# Patient Record
Sex: MALE | Race: WHITE | Employment: OTHER | URBAN - METROPOLITAN AREA
[De-identification: names, ages, dates, MRNs, and addresses within clinical notes are randomized per-mention and may not be internally consistent; named-entity substitution may affect disease eponyms.]

---

## 2017-01-05 ENCOUNTER — CLINICAL DOCUMENTATION (OUTPATIENT)
Dept: PHYSICAL THERAPY | Age: 62
End: 2017-01-05

## 2017-01-10 ENCOUNTER — CLINICAL DOCUMENTATION (OUTPATIENT)
Dept: PHYSICAL THERAPY | Age: 62
End: 2017-01-10

## 2017-01-17 ENCOUNTER — HOSPITAL ENCOUNTER (OUTPATIENT)
Dept: PHYSICAL THERAPY | Age: 62
Setting detail: THERAPIES SERIES
Discharge: HOME | End: 2017-01-17
Payer: COMMERCIAL

## 2017-01-17 PROCEDURE — 97163 PT EVAL HIGH COMPLEX 45 MIN: CPT

## 2017-01-17 ASSESSMENT — PAIN DESCRIPTION - LOCATION: LOCATION: HEAD

## 2017-01-17 ASSESSMENT — PAIN SCALES - GENERAL: PAINLEVEL_OUTOF10: 5

## 2017-01-17 ASSESSMENT — PAIN DESCRIPTION - FREQUENCY: FREQUENCY: INTERMITTENT

## 2017-01-17 ASSESSMENT — PAIN DESCRIPTION - DESCRIPTORS: DESCRIPTORS: ACHING

## 2017-01-24 ENCOUNTER — HOSPITAL ENCOUNTER (OUTPATIENT)
Dept: NUCLEAR MEDICINE | Age: 62
Discharge: HOME | End: 2017-01-24
Payer: COMMERCIAL

## 2017-01-24 ENCOUNTER — HOSPITAL ENCOUNTER (OUTPATIENT)
Dept: NON INVASIVE DIAGNOSTICS | Age: 62
Discharge: HOME | End: 2017-01-24
Payer: COMMERCIAL

## 2017-01-24 VITALS — SYSTOLIC BLOOD PRESSURE: 160 MMHG | DIASTOLIC BLOOD PRESSURE: 85 MMHG

## 2017-01-24 DIAGNOSIS — Z86.79 HISTORY OF HEART FAILURE: ICD-10-CM

## 2017-01-24 DIAGNOSIS — I25.10 CORONARY ARTERY DISEASE INVOLVING NATIVE CORONARY ARTERY OF NATIVE HEART WITHOUT ANGINA PECTORIS: Chronic | ICD-10-CM

## 2017-01-24 DIAGNOSIS — I10 ESSENTIAL HYPERTENSION: Chronic | ICD-10-CM

## 2017-01-24 DIAGNOSIS — Z95.5 STENTED CORONARY ARTERY: Chronic | ICD-10-CM

## 2017-01-24 PROCEDURE — 93017 CV STRESS TEST TRACING ONLY: CPT

## 2017-01-24 PROCEDURE — 2580000003 HC RX 258: Performed by: INTERNAL MEDICINE

## 2017-01-24 PROCEDURE — 6360000002 HC RX W HCPCS: Performed by: INTERNAL MEDICINE

## 2017-01-24 PROCEDURE — A9500 TC99M SESTAMIBI: HCPCS | Performed by: INTERNAL MEDICINE

## 2017-01-24 PROCEDURE — 3430000000 HC RX DIAGNOSTIC RADIOPHARMACEUTICAL: Performed by: INTERNAL MEDICINE

## 2017-01-24 PROCEDURE — 93306 TTE W/DOPPLER COMPLETE: CPT

## 2017-01-24 PROCEDURE — 78452 HT MUSCLE IMAGE SPECT MULT: CPT

## 2017-01-24 RX ORDER — SODIUM CHLORIDE 0.9 % (FLUSH) 0.9 %
10 SYRINGE (ML) INJECTION 2 TIMES DAILY
Status: DISCONTINUED | OUTPATIENT
Start: 2017-01-24 | End: 2017-01-27 | Stop reason: HOSPADM

## 2017-01-24 RX ADMIN — Medication 10 ML: at 11:20

## 2017-01-24 RX ADMIN — REGADENOSON 0.4 MG: 0.08 INJECTION, SOLUTION INTRAVENOUS at 11:20

## 2017-01-24 RX ADMIN — TETRAKIS(2-METHOXYISOBUTYLISOCYANIDE)COPPER(I) TETRAFLUOROBORATE 10 MILLICURIE: 1 INJECTION, POWDER, LYOPHILIZED, FOR SOLUTION INTRAVENOUS at 08:55

## 2017-01-24 RX ADMIN — Medication 10 ML: at 08:55

## 2017-01-24 RX ADMIN — TETRAKIS(2-METHOXYISOBUTYLISOCYANIDE)COPPER(I) TETRAFLUOROBORATE 30 MILLICURIE: 1 INJECTION, POWDER, LYOPHILIZED, FOR SOLUTION INTRAVENOUS at 11:20

## 2017-01-25 ENCOUNTER — HOSPITAL ENCOUNTER (OUTPATIENT)
Dept: PHYSICAL THERAPY | Age: 62
Setting detail: THERAPIES SERIES
Discharge: HOME | End: 2017-01-25
Payer: COMMERCIAL

## 2017-01-25 PROCEDURE — 97112 NEUROMUSCULAR REEDUCATION: CPT

## 2017-01-31 ENCOUNTER — TELEPHONE (OUTPATIENT)
Dept: CARDIOLOGY | Age: 62
End: 2017-01-31

## 2017-02-01 ENCOUNTER — HOSPITAL ENCOUNTER (OUTPATIENT)
Dept: PHYSICAL THERAPY | Age: 62
Setting detail: THERAPIES SERIES
Discharge: HOME | End: 2017-02-01
Payer: COMMERCIAL

## 2017-02-01 ENCOUNTER — TELEPHONE (OUTPATIENT)
Dept: FAMILY MEDICINE CLINIC | Age: 62
End: 2017-02-01

## 2017-02-01 PROCEDURE — 97112 NEUROMUSCULAR REEDUCATION: CPT

## 2017-02-08 ENCOUNTER — HOSPITAL ENCOUNTER (OUTPATIENT)
Dept: PHYSICAL THERAPY | Age: 62
Setting detail: THERAPIES SERIES
Discharge: HOME | End: 2017-02-08
Payer: COMMERCIAL

## 2017-02-08 PROCEDURE — 97112 NEUROMUSCULAR REEDUCATION: CPT

## 2017-02-10 ENCOUNTER — TELEPHONE (OUTPATIENT)
Dept: FAMILY MEDICINE CLINIC | Age: 62
End: 2017-02-10

## 2017-02-10 DIAGNOSIS — E11.9 TYPE 2 DIABETES MELLITUS WITHOUT COMPLICATION, WITHOUT LONG-TERM CURRENT USE OF INSULIN (HCC): Primary | Chronic | ICD-10-CM

## 2017-02-13 ENCOUNTER — HOSPITAL ENCOUNTER (OUTPATIENT)
Dept: LAB | Age: 62
Discharge: HOME | End: 2017-02-13
Payer: COMMERCIAL

## 2017-02-13 DIAGNOSIS — E11.9 TYPE 2 DIABETES MELLITUS WITHOUT COMPLICATION, WITHOUT LONG-TERM CURRENT USE OF INSULIN (HCC): Chronic | ICD-10-CM

## 2017-02-13 LAB
ANION GAP SERPL CALCULATED.3IONS-SCNC: 16 MEQ/L (ref 7–13)
BUN BLDV-MCNC: 12 MG/DL (ref 8–23)
CALCIUM SERPL-MCNC: 9.7 MG/DL (ref 8.6–10.2)
CHLORIDE BLD-SCNC: 96 MEQ/L (ref 98–107)
CO2: 28 MEQ/L (ref 22–29)
CREAT SERPL-MCNC: 0.87 MG/DL (ref 0.7–1.2)
GFR AFRICAN AMERICAN: >60
GFR NON-AFRICAN AMERICAN: >60
GLUCOSE BLD-MCNC: 98 MG/DL (ref 74–109)
HBA1C MFR BLD: 5.7 % (ref 4.8–5.9)
POTASSIUM SERPL-SCNC: 3.5 MEQ/L (ref 3.5–5.1)
SODIUM BLD-SCNC: 140 MEQ/L (ref 132–144)

## 2017-02-13 PROCEDURE — 83036 HEMOGLOBIN GLYCOSYLATED A1C: CPT

## 2017-02-13 PROCEDURE — 36415 COLL VENOUS BLD VENIPUNCTURE: CPT

## 2017-02-13 PROCEDURE — 80048 BASIC METABOLIC PNL TOTAL CA: CPT

## 2017-02-15 ENCOUNTER — OFFICE VISIT (OUTPATIENT)
Dept: CARDIOLOGY | Age: 62
End: 2017-02-15

## 2017-02-15 ENCOUNTER — HOSPITAL ENCOUNTER (OUTPATIENT)
Dept: PHYSICAL THERAPY | Age: 62
Setting detail: THERAPIES SERIES
Discharge: HOME | End: 2017-02-15
Payer: COMMERCIAL

## 2017-02-15 VITALS
SYSTOLIC BLOOD PRESSURE: 122 MMHG | HEIGHT: 72 IN | BODY MASS INDEX: 35.03 KG/M2 | HEART RATE: 88 BPM | OXYGEN SATURATION: 97 % | WEIGHT: 258.6 LBS | DIASTOLIC BLOOD PRESSURE: 68 MMHG | RESPIRATION RATE: 16 BRPM

## 2017-02-15 DIAGNOSIS — Z86.79 HISTORY OF HEART FAILURE: Chronic | ICD-10-CM

## 2017-02-15 DIAGNOSIS — Z98.61 CAD S/P PERCUTANEOUS CORONARY ANGIOPLASTY: Primary | ICD-10-CM

## 2017-02-15 DIAGNOSIS — I10 ESSENTIAL HYPERTENSION: Chronic | ICD-10-CM

## 2017-02-15 DIAGNOSIS — Z86.73 HISTORY OF CVA (CEREBROVASCULAR ACCIDENT): ICD-10-CM

## 2017-02-15 DIAGNOSIS — I25.10 CAD S/P PERCUTANEOUS CORONARY ANGIOPLASTY: Primary | ICD-10-CM

## 2017-02-15 DIAGNOSIS — I25.2 HISTORY OF MYOCARDIAL INFARCTION: Chronic | ICD-10-CM

## 2017-02-15 PROCEDURE — G8427 DOCREV CUR MEDS BY ELIG CLIN: HCPCS | Performed by: INTERNAL MEDICINE

## 2017-02-15 PROCEDURE — 99213 OFFICE O/P EST LOW 20 MIN: CPT | Performed by: INTERNAL MEDICINE

## 2017-02-15 PROCEDURE — G8598 ASA/ANTIPLAT THER USED: HCPCS | Performed by: INTERNAL MEDICINE

## 2017-02-15 PROCEDURE — 3017F COLORECTAL CA SCREEN DOC REV: CPT | Performed by: INTERNAL MEDICINE

## 2017-02-15 PROCEDURE — G8484 FLU IMMUNIZE NO ADMIN: HCPCS | Performed by: INTERNAL MEDICINE

## 2017-02-15 PROCEDURE — 1036F TOBACCO NON-USER: CPT | Performed by: INTERNAL MEDICINE

## 2017-02-15 PROCEDURE — 97112 NEUROMUSCULAR REEDUCATION: CPT

## 2017-02-15 PROCEDURE — G8417 CALC BMI ABV UP PARAM F/U: HCPCS | Performed by: INTERNAL MEDICINE

## 2017-02-15 ASSESSMENT — ENCOUNTER SYMPTOMS
CHEST TIGHTNESS: 0
SHORTNESS OF BREATH: 0

## 2017-02-16 ENCOUNTER — OFFICE VISIT (OUTPATIENT)
Dept: FAMILY MEDICINE CLINIC | Age: 62
End: 2017-02-16

## 2017-02-16 VITALS
OXYGEN SATURATION: 99 % | DIASTOLIC BLOOD PRESSURE: 72 MMHG | SYSTOLIC BLOOD PRESSURE: 130 MMHG | BODY MASS INDEX: 32.97 KG/M2 | WEIGHT: 243.4 LBS | TEMPERATURE: 98.5 F | RESPIRATION RATE: 18 BRPM | HEART RATE: 91 BPM | HEIGHT: 72 IN

## 2017-02-16 DIAGNOSIS — Z86.73 HISTORY OF CVA (CEREBROVASCULAR ACCIDENT): Chronic | ICD-10-CM

## 2017-02-16 DIAGNOSIS — Z98.61 CAD S/P PERCUTANEOUS CORONARY ANGIOPLASTY: Chronic | ICD-10-CM

## 2017-02-16 DIAGNOSIS — I25.10 CAD S/P PERCUTANEOUS CORONARY ANGIOPLASTY: Chronic | ICD-10-CM

## 2017-02-16 DIAGNOSIS — M10.9 GOUT OF MULTIPLE SITES, UNSPECIFIED CAUSE, UNSPECIFIED CHRONICITY: Primary | Chronic | ICD-10-CM

## 2017-02-16 DIAGNOSIS — E11.9 TYPE 2 DIABETES MELLITUS WITHOUT COMPLICATION, WITHOUT LONG-TERM CURRENT USE OF INSULIN (HCC): Chronic | ICD-10-CM
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
## 2017-02-16 DIAGNOSIS — I10 ESSENTIAL HYPERTENSION: Chronic | ICD-10-CM

## 2017-02-16 DIAGNOSIS — M10.9 ACUTE GOUT INVOLVING TOE OF LEFT FOOT, UNSPECIFIED CAUSE: ICD-10-CM
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
## 2017-02-16 DIAGNOSIS — S09.90XA HEAD INJURY, ACUTE, WITHOUT LOSS OF CONSCIOUSNESS, INITIAL ENCOUNTER: ICD-10-CM

## 2017-02-16 PROCEDURE — G8484 FLU IMMUNIZE NO ADMIN: HCPCS | Performed by: FAMILY MEDICINE

## 2017-02-16 PROCEDURE — 3017F COLORECTAL CA SCREEN DOC REV: CPT | Performed by: FAMILY MEDICINE

## 2017-02-16 PROCEDURE — 99214 OFFICE O/P EST MOD 30 MIN: CPT | Performed by: FAMILY MEDICINE

## 2017-02-16 PROCEDURE — G8427 DOCREV CUR MEDS BY ELIG CLIN: HCPCS | Performed by: FAMILY MEDICINE

## 2017-02-16 PROCEDURE — G8417 CALC BMI ABV UP PARAM F/U: HCPCS | Performed by: FAMILY MEDICINE

## 2017-02-16 PROCEDURE — 1036F TOBACCO NON-USER: CPT | Performed by: FAMILY MEDICINE

## 2017-02-16 PROCEDURE — G8598 ASA/ANTIPLAT THER USED: HCPCS | Performed by: FAMILY MEDICINE

## 2017-02-16 PROCEDURE — 3044F HG A1C LEVEL LT 7.0%: CPT | Performed by: FAMILY MEDICINE

## 2017-02-16 RX ORDER — ALLOPURINOL 100 MG/1
TABLET ORAL
Qty: 60 TABLET | Refills: 3 | Status: SHIPPED | OUTPATIENT
Start: 2017-02-16 | End: 2017-09-14 | Stop reason: DRUGHIGH

## 2017-02-16 RX ORDER — INDOMETHACIN 50 MG/1
50 CAPSULE ORAL 3 TIMES DAILY
Qty: 42 CAPSULE | Refills: 0 | Status: SHIPPED | OUTPATIENT
Start: 2017-02-16 | End: 2017-11-27

## 2017-02-16 RX ORDER — METHYLPREDNISOLONE 4 MG/1
TABLET ORAL
Qty: 21 TABLET | Refills: 0 | Status: SHIPPED | OUTPATIENT
Start: 2017-02-16 | End: 2017-02-16

## 2017-02-16 ASSESSMENT — ENCOUNTER SYMPTOMS
VOMITING: 0
DIARRHEA: 0
SHORTNESS OF BREATH: 0
ROS SKIN COMMENTS: SEE HPI
NAUSEA: 0
ALLERGIC REACTION: 1
ABDOMINAL PAIN: 0
CHEST TIGHTNESS: 0
WHEEZING: 0
COUGH: 0

## 2017-02-16 ASSESSMENT — PATIENT HEALTH QUESTIONNAIRE - PHQ9
SUM OF ALL RESPONSES TO PHQ9 QUESTIONS 1 & 2: 0
1. LITTLE INTEREST OR PLEASURE IN DOING THINGS: 0
SUM OF ALL RESPONSES TO PHQ QUESTIONS 1-9: 0
2. FEELING DOWN, DEPRESSED OR HOPELESS: 0

## 2017-03-01 ENCOUNTER — HOSPITAL ENCOUNTER (OUTPATIENT)
Dept: PHYSICAL THERAPY | Age: 62
Setting detail: THERAPIES SERIES
Discharge: HOME | End: 2017-03-01
Payer: COMMERCIAL

## 2017-03-01 PROCEDURE — 97112 NEUROMUSCULAR REEDUCATION: CPT

## 2017-03-01 PROCEDURE — 97116 GAIT TRAINING THERAPY: CPT

## 2017-03-21 ENCOUNTER — TELEPHONE (OUTPATIENT)
Dept: FAMILY MEDICINE CLINIC | Age: 62
End: 2017-03-21

## 2017-03-22 ENCOUNTER — HOSPITAL ENCOUNTER (OUTPATIENT)
Dept: PHYSICAL THERAPY | Age: 62
Setting detail: THERAPIES SERIES
Discharge: HOME | End: 2017-03-22
Payer: COMMERCIAL

## 2017-03-22 PROCEDURE — 97112 NEUROMUSCULAR REEDUCATION: CPT

## 2017-03-22 ASSESSMENT — PAIN DESCRIPTION - LOCATION: LOCATION: KNEE

## 2017-03-22 ASSESSMENT — PAIN DESCRIPTION - DESCRIPTORS: DESCRIPTORS: ACHING;SPASM

## 2017-03-22 ASSESSMENT — PAIN SCALES - GENERAL: PAINLEVEL_OUTOF10: 5

## 2017-03-22 ASSESSMENT — PAIN DESCRIPTION - ORIENTATION: ORIENTATION: RIGHT

## 2017-03-29 ENCOUNTER — HOSPITAL ENCOUNTER (OUTPATIENT)
Dept: PHYSICAL THERAPY | Age: 62
Setting detail: THERAPIES SERIES
Discharge: HOME | End: 2017-03-29
Payer: COMMERCIAL

## 2017-03-29 PROCEDURE — 97112 NEUROMUSCULAR REEDUCATION: CPT

## 2017-04-12 ENCOUNTER — HOSPITAL ENCOUNTER (OUTPATIENT)
Dept: PHYSICAL THERAPY | Age: 62
Setting detail: THERAPIES SERIES
Discharge: HOME | End: 2017-04-12
Payer: COMMERCIAL

## 2017-04-12 PROCEDURE — 97112 NEUROMUSCULAR REEDUCATION: CPT

## 2017-04-12 PROCEDURE — 97116 GAIT TRAINING THERAPY: CPT

## 2017-05-05 ENCOUNTER — TELEPHONE (OUTPATIENT)
Dept: FAMILY MEDICINE CLINIC | Age: 62
End: 2017-05-05

## 2017-05-05 DIAGNOSIS — E11.9 TYPE 2 DIABETES MELLITUS WITHOUT COMPLICATION, WITHOUT LONG-TERM CURRENT USE OF INSULIN (HCC): Primary | Chronic | ICD-10-CM

## 2017-05-05 DIAGNOSIS — Z98.61 CAD S/P PERCUTANEOUS CORONARY ANGIOPLASTY: Chronic | ICD-10-CM

## 2017-05-05 DIAGNOSIS — I25.10 CAD S/P PERCUTANEOUS CORONARY ANGIOPLASTY: Chronic | ICD-10-CM

## 2017-05-09 ENCOUNTER — HOSPITAL ENCOUNTER (OUTPATIENT)
Dept: LAB | Age: 62
Discharge: HOME | End: 2017-05-09
Payer: COMMERCIAL

## 2017-05-09 DIAGNOSIS — I25.10 CAD S/P PERCUTANEOUS CORONARY ANGIOPLASTY: Chronic | ICD-10-CM

## 2017-05-09 DIAGNOSIS — Z98.61 CAD S/P PERCUTANEOUS CORONARY ANGIOPLASTY: Chronic | ICD-10-CM

## 2017-05-09 DIAGNOSIS — E11.9 TYPE 2 DIABETES MELLITUS WITHOUT COMPLICATION, WITHOUT LONG-TERM CURRENT USE OF INSULIN (HCC): Chronic | ICD-10-CM

## 2017-05-09 LAB
ALBUMIN SERPL-MCNC: 4.2 G/DL (ref 3.9–4.9)
ALP BLD-CCNC: 50 U/L (ref 35–104)
ALT SERPL-CCNC: 38 U/L (ref 0–41)
ANION GAP SERPL CALCULATED.3IONS-SCNC: 12 MEQ/L (ref 7–13)
AST SERPL-CCNC: 35 U/L (ref 0–40)
BILIRUB SERPL-MCNC: 0.8 MG/DL (ref 0–1.2)
BUN BLDV-MCNC: 14 MG/DL (ref 8–23)
CALCIUM SERPL-MCNC: 9.4 MG/DL (ref 8.6–10.2)
CHLORIDE BLD-SCNC: 96 MEQ/L (ref 98–107)
CHOLESTEROL, TOTAL: 139 MG/DL (ref 0–199)
CO2: 31 MEQ/L (ref 22–29)
CREAT SERPL-MCNC: 0.84 MG/DL (ref 0.7–1.2)
GFR AFRICAN AMERICAN: >60
GFR NON-AFRICAN AMERICAN: >60
GLOBULIN: 2.6 G/DL (ref 2.3–3.5)
GLUCOSE BLD-MCNC: 134 MG/DL (ref 74–109)
HBA1C MFR BLD: 5.5 % (ref 4.8–5.9)
HDLC SERPL-MCNC: 57 MG/DL (ref 40–59)
LDL CHOLESTEROL CALCULATED: 61 MG/DL (ref 0–129)
POTASSIUM SERPL-SCNC: 3.8 MEQ/L (ref 3.5–5.1)
SODIUM BLD-SCNC: 139 MEQ/L (ref 132–144)
TOTAL PROTEIN: 6.8 G/DL (ref 6.4–8.1)
TRIGL SERPL-MCNC: 107 MG/DL (ref 0–200)

## 2017-05-09 PROCEDURE — 83036 HEMOGLOBIN GLYCOSYLATED A1C: CPT

## 2017-05-09 PROCEDURE — 80053 COMPREHEN METABOLIC PANEL: CPT

## 2017-05-09 PROCEDURE — 80061 LIPID PANEL: CPT

## 2017-05-09 PROCEDURE — 36415 COLL VENOUS BLD VENIPUNCTURE: CPT

## 2017-05-12 RX ORDER — SIMVASTATIN 40 MG
TABLET ORAL
Qty: 90 TABLET | Refills: 3 | Status: SHIPPED | OUTPATIENT
Start: 2017-05-12 | End: 2017-09-09 | Stop reason: ALTCHOICE

## 2017-05-15 ENCOUNTER — OFFICE VISIT (OUTPATIENT)
Dept: FAMILY MEDICINE CLINIC | Age: 62
End: 2017-05-15

## 2017-05-15 VITALS
HEART RATE: 74 BPM | RESPIRATION RATE: 18 BRPM | WEIGHT: 258 LBS | DIASTOLIC BLOOD PRESSURE: 80 MMHG | BODY MASS INDEX: 34.95 KG/M2 | TEMPERATURE: 97.8 F | HEIGHT: 72 IN | SYSTOLIC BLOOD PRESSURE: 136 MMHG

## 2017-05-15 DIAGNOSIS — I25.10 CAD S/P PERCUTANEOUS CORONARY ANGIOPLASTY: Chronic | ICD-10-CM

## 2017-05-15 DIAGNOSIS — E11.8 TYPE 2 DIABETES MELLITUS WITH COMPLICATION, WITHOUT LONG-TERM CURRENT USE OF INSULIN (HCC): Primary | Chronic | ICD-10-CM

## 2017-05-15 DIAGNOSIS — R25.1 TREMOR OF RIGHT HAND: ICD-10-CM

## 2017-05-15 DIAGNOSIS — Z86.73 HISTORY OF CVA (CEREBROVASCULAR ACCIDENT): Chronic | ICD-10-CM

## 2017-05-15 DIAGNOSIS — M10.9 GOUT OF MULTIPLE SITES, UNSPECIFIED CAUSE, UNSPECIFIED CHRONICITY: Chronic | ICD-10-CM

## 2017-05-15 DIAGNOSIS — G47.33 OSA (OBSTRUCTIVE SLEEP APNEA): Chronic | ICD-10-CM

## 2017-05-15 DIAGNOSIS — I10 ESSENTIAL HYPERTENSION: Chronic | ICD-10-CM

## 2017-05-15 DIAGNOSIS — D75.89 MACROCYTOSIS WITHOUT ANEMIA: Chronic | ICD-10-CM

## 2017-05-15 DIAGNOSIS — M62.81 RIGHT-SIDED MUSCLE WEAKNESS: Chronic | ICD-10-CM

## 2017-05-15 DIAGNOSIS — Z98.61 CAD S/P PERCUTANEOUS CORONARY ANGIOPLASTY: Chronic | ICD-10-CM

## 2017-05-15 DIAGNOSIS — H53.2 DIPLOPIA: ICD-10-CM

## 2017-05-15 DIAGNOSIS — R26.81 UNSTEADY GAIT: ICD-10-CM

## 2017-05-15 PROCEDURE — G8598 ASA/ANTIPLAT THER USED: HCPCS | Performed by: FAMILY MEDICINE

## 2017-05-15 PROCEDURE — G8427 DOCREV CUR MEDS BY ELIG CLIN: HCPCS | Performed by: FAMILY MEDICINE

## 2017-05-15 PROCEDURE — G8417 CALC BMI ABV UP PARAM F/U: HCPCS | Performed by: FAMILY MEDICINE

## 2017-05-15 PROCEDURE — 3017F COLORECTAL CA SCREEN DOC REV: CPT | Performed by: FAMILY MEDICINE

## 2017-05-15 PROCEDURE — 1036F TOBACCO NON-USER: CPT | Performed by: FAMILY MEDICINE

## 2017-05-15 PROCEDURE — 3044F HG A1C LEVEL LT 7.0%: CPT | Performed by: FAMILY MEDICINE

## 2017-05-15 PROCEDURE — 99214 OFFICE O/P EST MOD 30 MIN: CPT | Performed by: FAMILY MEDICINE

## 2017-05-15 ASSESSMENT — ENCOUNTER SYMPTOMS
CHEST TIGHTNESS: 0
COUGH: 0
ABDOMINAL PAIN: 0
WHEEZING: 0
DIARRHEA: 0
SHORTNESS OF BREATH: 0
NAUSEA: 0
VOMITING: 0

## 2017-05-16 ENCOUNTER — OFFICE VISIT (OUTPATIENT)
Dept: PULMONOLOGY | Age: 62
End: 2017-05-16

## 2017-05-16 VITALS
HEIGHT: 72 IN | WEIGHT: 258 LBS | RESPIRATION RATE: 16 BRPM | OXYGEN SATURATION: 98 % | DIASTOLIC BLOOD PRESSURE: 86 MMHG | TEMPERATURE: 97.8 F | SYSTOLIC BLOOD PRESSURE: 124 MMHG | BODY MASS INDEX: 34.95 KG/M2 | HEART RATE: 88 BPM

## 2017-05-16 DIAGNOSIS — I63.9 CEREBROVASCULAR ACCIDENT (CVA), UNSPECIFIED MECHANISM (HCC): ICD-10-CM

## 2017-05-16 DIAGNOSIS — G47.33 SLEEP APNEA, OBSTRUCTIVE: Primary | ICD-10-CM

## 2017-05-16 PROCEDURE — G8598 ASA/ANTIPLAT THER USED: HCPCS | Performed by: INTERNAL MEDICINE

## 2017-05-16 PROCEDURE — G8427 DOCREV CUR MEDS BY ELIG CLIN: HCPCS | Performed by: INTERNAL MEDICINE

## 2017-05-16 PROCEDURE — G8417 CALC BMI ABV UP PARAM F/U: HCPCS | Performed by: INTERNAL MEDICINE

## 2017-05-16 PROCEDURE — 99213 OFFICE O/P EST LOW 20 MIN: CPT | Performed by: INTERNAL MEDICINE

## 2017-05-16 PROCEDURE — 3017F COLORECTAL CA SCREEN DOC REV: CPT | Performed by: INTERNAL MEDICINE

## 2017-05-16 PROCEDURE — 1036F TOBACCO NON-USER: CPT | Performed by: INTERNAL MEDICINE

## 2017-05-16 ASSESSMENT — ENCOUNTER SYMPTOMS
SINUS PRESSURE: 0
ABDOMINAL PAIN: 0
SORE THROAT: 0
COUGH: 0
SHORTNESS OF BREATH: 0
DIARRHEA: 0
CHEST TIGHTNESS: 0
NAUSEA: 0
VOMITING: 0
RHINORRHEA: 0
WHEEZING: 0

## 2017-06-05 DIAGNOSIS — I10 ESSENTIAL HYPERTENSION: Chronic | ICD-10-CM

## 2017-06-05 RX ORDER — CARVEDILOL 25 MG/1
25 TABLET ORAL 2 TIMES DAILY
Qty: 180 TABLET | Refills: 3 | Status: SHIPPED | OUTPATIENT
Start: 2017-06-05 | End: 2018-07-16 | Stop reason: SDUPTHER

## 2017-06-14 ENCOUNTER — HOSPITAL ENCOUNTER (OUTPATIENT)
Dept: LAB | Age: 62
Discharge: HOME | End: 2017-06-14
Payer: COMMERCIAL

## 2017-06-14 LAB — URIC ACID, SERUM: 6.1 MG/DL (ref 3.4–7)

## 2017-06-14 PROCEDURE — 36415 COLL VENOUS BLD VENIPUNCTURE: CPT

## 2017-06-14 PROCEDURE — 84550 ASSAY OF BLOOD/URIC ACID: CPT

## 2017-06-21 ENCOUNTER — CLINICAL DOCUMENTATION (OUTPATIENT)
Dept: PHYSICAL THERAPY | Age: 62
End: 2017-06-21

## 2017-06-28 ENCOUNTER — OFFICE VISIT (OUTPATIENT)
Dept: CARDIOLOGY | Age: 62
End: 2017-06-28

## 2017-06-28 VITALS
WEIGHT: 258 LBS | DIASTOLIC BLOOD PRESSURE: 80 MMHG | BODY MASS INDEX: 34.95 KG/M2 | SYSTOLIC BLOOD PRESSURE: 130 MMHG | HEIGHT: 72 IN | HEART RATE: 106 BPM | OXYGEN SATURATION: 95 %

## 2017-06-28 DIAGNOSIS — Z86.73 HISTORY OF CVA (CEREBROVASCULAR ACCIDENT): Chronic | ICD-10-CM

## 2017-06-28 DIAGNOSIS — I25.10 CAD S/P PERCUTANEOUS CORONARY ANGIOPLASTY: Primary | Chronic | ICD-10-CM

## 2017-06-28 DIAGNOSIS — I25.2 HISTORY OF MYOCARDIAL INFARCTION: Chronic | ICD-10-CM

## 2017-06-28 DIAGNOSIS — M10.9 GOUT OF MULTIPLE SITES, UNSPECIFIED CAUSE, UNSPECIFIED CHRONICITY: Chronic | ICD-10-CM

## 2017-06-28 DIAGNOSIS — Z98.61 CAD S/P PERCUTANEOUS CORONARY ANGIOPLASTY: Primary | Chronic | ICD-10-CM

## 2017-06-28 DIAGNOSIS — E11.8 TYPE 2 DIABETES MELLITUS WITH COMPLICATION, WITHOUT LONG-TERM CURRENT USE OF INSULIN (HCC): Chronic | ICD-10-CM

## 2017-06-28 PROCEDURE — G8598 ASA/ANTIPLAT THER USED: HCPCS | Performed by: INTERNAL MEDICINE

## 2017-06-28 PROCEDURE — 3046F HEMOGLOBIN A1C LEVEL >9.0%: CPT | Performed by: INTERNAL MEDICINE

## 2017-06-28 PROCEDURE — 99213 OFFICE O/P EST LOW 20 MIN: CPT | Performed by: INTERNAL MEDICINE

## 2017-06-28 PROCEDURE — G8427 DOCREV CUR MEDS BY ELIG CLIN: HCPCS | Performed by: INTERNAL MEDICINE

## 2017-06-28 PROCEDURE — 1036F TOBACCO NON-USER: CPT | Performed by: INTERNAL MEDICINE

## 2017-06-28 PROCEDURE — G8417 CALC BMI ABV UP PARAM F/U: HCPCS | Performed by: INTERNAL MEDICINE

## 2017-06-28 PROCEDURE — 3017F COLORECTAL CA SCREEN DOC REV: CPT | Performed by: INTERNAL MEDICINE

## 2017-06-28 ASSESSMENT — ENCOUNTER SYMPTOMS: RESPIRATORY NEGATIVE: 1

## 2017-09-08 ENCOUNTER — HOSPITAL ENCOUNTER (OUTPATIENT)
Dept: LAB | Age: 62
Discharge: HOME | End: 2017-09-08
Payer: COMMERCIAL

## 2017-09-08 DIAGNOSIS — G47.33 OSA (OBSTRUCTIVE SLEEP APNEA): Chronic | ICD-10-CM

## 2017-09-08 DIAGNOSIS — I10 ESSENTIAL HYPERTENSION: Chronic | ICD-10-CM

## 2017-09-08 DIAGNOSIS — D75.89 MACROCYTOSIS WITHOUT ANEMIA: Chronic | ICD-10-CM

## 2017-09-08 DIAGNOSIS — E11.8 TYPE 2 DIABETES MELLITUS WITH COMPLICATION, WITHOUT LONG-TERM CURRENT USE OF INSULIN (HCC): Chronic | ICD-10-CM

## 2017-09-08 DIAGNOSIS — I25.10 CAD S/P PERCUTANEOUS CORONARY ANGIOPLASTY: Chronic | ICD-10-CM

## 2017-09-08 DIAGNOSIS — Z98.61 CAD S/P PERCUTANEOUS CORONARY ANGIOPLASTY: Chronic | ICD-10-CM

## 2017-09-08 LAB
ALBUMIN SERPL-MCNC: 4.2 G/DL (ref 3.9–4.9)
ALP BLD-CCNC: 51 U/L (ref 35–104)
ALT SERPL-CCNC: 27 U/L (ref 0–41)
ANION GAP SERPL CALCULATED.3IONS-SCNC: 20 MEQ/L (ref 7–13)
AST SERPL-CCNC: 24 U/L (ref 0–40)
BASOPHILS ABSOLUTE: 0.1 K/UL (ref 0–0.2)
BASOPHILS RELATIVE PERCENT: 1 %
BILIRUB SERPL-MCNC: 0.6 MG/DL (ref 0–1.2)
BUN BLDV-MCNC: 12 MG/DL (ref 8–23)
CALCIUM SERPL-MCNC: 9.2 MG/DL (ref 8.6–10.2)
CHLORIDE BLD-SCNC: 99 MEQ/L (ref 98–107)
CHOLESTEROL, TOTAL: 176 MG/DL (ref 0–199)
CO2: 23 MEQ/L (ref 22–29)
CREAT SERPL-MCNC: 0.74 MG/DL (ref 0.7–1.2)
EOSINOPHILS ABSOLUTE: 0 K/UL (ref 0–0.7)
EOSINOPHILS RELATIVE PERCENT: 2.2 %
GFR AFRICAN AMERICAN: >60
GFR NON-AFRICAN AMERICAN: >60
GLOBULIN: 3 G/DL (ref 2.3–3.5)
GLUCOSE BLD-MCNC: 128 MG/DL (ref 74–109)
HBA1C MFR BLD: 6.2 % (ref 4.8–5.9)
HCT VFR BLD CALC: 43.9 % (ref 42–52)
HDLC SERPL-MCNC: 66 MG/DL (ref 40–59)
HEMOGLOBIN: 15.1 G/DL (ref 14–18)
LDL CHOLESTEROL CALCULATED: 87 MG/DL (ref 0–129)
LYMPHOCYTES ABSOLUTE: 1.4 K/UL (ref 1–4.8)
LYMPHOCYTES RELATIVE PERCENT: 24 %
MACROCYTES: ABNORMAL
MCH RBC QN AUTO: 37.3 PG (ref 27–31.3)
MCHC RBC AUTO-ENTMCNC: 34.3 % (ref 33–37)
MCV RBC AUTO: 108.8 FL (ref 80–100)
MONOCYTES ABSOLUTE: 0.4 K/UL (ref 0.2–0.8)
MONOCYTES RELATIVE PERCENT: 6.7 %
MYELOCYTE PERCENT: 1 %
NEUTROPHILS ABSOLUTE: 3.9 K/UL (ref 1.4–6.5)
NEUTROPHILS RELATIVE PERCENT: 68 %
PDW BLD-RTO: 13.3 % (ref 11.5–14.5)
PLATELET # BLD: 192 K/UL (ref 130–400)
PLATELET SLIDE REVIEW: NORMAL
POTASSIUM SERPL-SCNC: 4.1 MEQ/L (ref 3.5–5.1)
RBC # BLD: 4.04 M/UL (ref 4.7–6.1)
SODIUM BLD-SCNC: 142 MEQ/L (ref 132–144)
TOTAL PROTEIN: 7.2 G/DL (ref 6.4–8.1)
TRIGL SERPL-MCNC: 116 MG/DL (ref 0–200)
URIC ACID, SERUM: 5.9 MG/DL (ref 3.4–7)
WBC # BLD: 5.7 K/UL (ref 4.8–10.8)

## 2017-09-08 PROCEDURE — 80061 LIPID PANEL: CPT

## 2017-09-08 PROCEDURE — 84550 ASSAY OF BLOOD/URIC ACID: CPT

## 2017-09-08 PROCEDURE — 85025 COMPLETE CBC W/AUTO DIFF WBC: CPT

## 2017-09-08 PROCEDURE — 36415 COLL VENOUS BLD VENIPUNCTURE: CPT

## 2017-09-08 PROCEDURE — 83036 HEMOGLOBIN GLYCOSYLATED A1C: CPT

## 2017-09-08 PROCEDURE — 80053 COMPREHEN METABOLIC PANEL: CPT

## 2017-09-09 DIAGNOSIS — Z98.61 CAD S/P PERCUTANEOUS CORONARY ANGIOPLASTY: Primary | Chronic | ICD-10-CM

## 2017-09-09 DIAGNOSIS — I25.10 CAD S/P PERCUTANEOUS CORONARY ANGIOPLASTY: Primary | Chronic | ICD-10-CM

## 2017-09-09 DIAGNOSIS — D75.89 MACROCYTOSIS WITHOUT ANEMIA: Chronic | ICD-10-CM

## 2017-09-09 RX ORDER — ATORVASTATIN CALCIUM 40 MG/1
40 TABLET, FILM COATED ORAL DAILY
Qty: 30 TABLET | Refills: 3 | Status: SHIPPED | OUTPATIENT
Start: 2017-09-09 | End: 2017-09-11 | Stop reason: SDUPTHER

## 2017-09-10 PROBLEM — H53.2 DIPLOPIA: Status: RESOLVED | Noted: 2017-05-15 | Resolved: 2017-09-10

## 2017-09-11 DIAGNOSIS — I25.10 CAD S/P PERCUTANEOUS CORONARY ANGIOPLASTY: Chronic | ICD-10-CM

## 2017-09-11 DIAGNOSIS — Z98.61 CAD S/P PERCUTANEOUS CORONARY ANGIOPLASTY: Chronic | ICD-10-CM

## 2017-09-11 RX ORDER — ATORVASTATIN CALCIUM 40 MG/1
40 TABLET, FILM COATED ORAL DAILY
Qty: 90 TABLET | Refills: 3 | Status: SHIPPED | OUTPATIENT
Start: 2017-09-11 | End: 2018-09-28 | Stop reason: SDUPTHER

## 2017-09-14 ENCOUNTER — OFFICE VISIT (OUTPATIENT)
Dept: FAMILY MEDICINE CLINIC | Age: 62
End: 2017-09-14

## 2017-09-14 VITALS
HEART RATE: 82 BPM | TEMPERATURE: 98 F | DIASTOLIC BLOOD PRESSURE: 78 MMHG | SYSTOLIC BLOOD PRESSURE: 128 MMHG | HEIGHT: 72 IN | WEIGHT: 267 LBS | RESPIRATION RATE: 16 BRPM | BODY MASS INDEX: 36.16 KG/M2

## 2017-09-14 DIAGNOSIS — E66.01 MORBID OBESITY DUE TO EXCESS CALORIES (HCC): Chronic | ICD-10-CM

## 2017-09-14 DIAGNOSIS — Z86.73 HISTORY OF CVA (CEREBROVASCULAR ACCIDENT): Chronic | ICD-10-CM

## 2017-09-14 DIAGNOSIS — Z98.61 CAD S/P PERCUTANEOUS CORONARY ANGIOPLASTY: Chronic | ICD-10-CM

## 2017-09-14 DIAGNOSIS — Z23 NEED FOR INFLUENZA VACCINATION: ICD-10-CM

## 2017-09-14 DIAGNOSIS — D75.89 MACROCYTOSIS WITHOUT ANEMIA: Chronic | ICD-10-CM

## 2017-09-14 DIAGNOSIS — E11.8 TYPE 2 DIABETES MELLITUS WITH COMPLICATION, WITHOUT LONG-TERM CURRENT USE OF INSULIN (HCC): Primary | Chronic | ICD-10-CM

## 2017-09-14 DIAGNOSIS — I25.10 CAD S/P PERCUTANEOUS CORONARY ANGIOPLASTY: Chronic | ICD-10-CM

## 2017-09-14 DIAGNOSIS — G47.33 OSA (OBSTRUCTIVE SLEEP APNEA): Chronic | ICD-10-CM

## 2017-09-14 DIAGNOSIS — R60.9 DEPENDENT EDEMA: ICD-10-CM

## 2017-09-14 DIAGNOSIS — I10 ESSENTIAL HYPERTENSION: Chronic | ICD-10-CM

## 2017-09-14 PROCEDURE — 1036F TOBACCO NON-USER: CPT | Performed by: FAMILY MEDICINE

## 2017-09-14 PROCEDURE — 99214 OFFICE O/P EST MOD 30 MIN: CPT | Performed by: FAMILY MEDICINE

## 2017-09-14 PROCEDURE — 3046F HEMOGLOBIN A1C LEVEL >9.0%: CPT | Performed by: FAMILY MEDICINE

## 2017-09-14 PROCEDURE — 3017F COLORECTAL CA SCREEN DOC REV: CPT | Performed by: FAMILY MEDICINE

## 2017-09-14 PROCEDURE — G8427 DOCREV CUR MEDS BY ELIG CLIN: HCPCS | Performed by: FAMILY MEDICINE

## 2017-09-14 PROCEDURE — G8417 CALC BMI ABV UP PARAM F/U: HCPCS | Performed by: FAMILY MEDICINE

## 2017-09-14 PROCEDURE — 90471 IMMUNIZATION ADMIN: CPT | Performed by: FAMILY MEDICINE

## 2017-09-14 PROCEDURE — 90688 IIV4 VACCINE SPLT 0.5 ML IM: CPT | Performed by: FAMILY MEDICINE

## 2017-09-14 PROCEDURE — G8598 ASA/ANTIPLAT THER USED: HCPCS | Performed by: FAMILY MEDICINE

## 2017-09-14 RX ORDER — ALLOPURINOL 300 MG/1
300 TABLET ORAL DAILY
COMMUNITY
Start: 2017-06-08 | End: 2019-06-27 | Stop reason: SDUPTHER

## 2017-09-14 RX ORDER — AMMONIUM LACTATE 12 G/100G
CREAM TOPICAL
Refills: 5 | COMMUNITY
Start: 2017-08-21

## 2017-09-14 ASSESSMENT — ENCOUNTER SYMPTOMS
VOMITING: 0
ABDOMINAL PAIN: 0
NAUSEA: 0
WHEEZING: 0
SHORTNESS OF BREATH: 0
CHEST TIGHTNESS: 0
DIARRHEA: 0
CONSTIPATION: 0
COUGH: 0

## 2017-09-15 ENCOUNTER — HOSPITAL ENCOUNTER (OUTPATIENT)
Age: 62
Setting detail: SPECIMEN
Discharge: HOME | End: 2017-09-15
Payer: COMMERCIAL

## 2017-09-15 DIAGNOSIS — E11.8 TYPE 2 DIABETES MELLITUS WITH COMPLICATION, WITHOUT LONG-TERM CURRENT USE OF INSULIN (HCC): Chronic | ICD-10-CM

## 2017-09-15 LAB
CREATININE URINE: 92.2 MG/DL
MICROALBUMIN UR-MCNC: 1.9 MG/DL
MICROALBUMIN/CREAT UR-RTO: 20.6 MG/G (ref 0–30)

## 2017-09-15 PROCEDURE — 82570 ASSAY OF URINE CREATININE: CPT

## 2017-09-15 PROCEDURE — 82043 UR ALBUMIN QUANTITATIVE: CPT

## 2017-10-09 ENCOUNTER — TELEPHONE (OUTPATIENT)
Dept: FAMILY MEDICINE CLINIC | Age: 62
End: 2017-10-09

## 2017-10-26 ENCOUNTER — HOSPITAL ENCOUNTER (OUTPATIENT)
Dept: LAB | Age: 62
Discharge: HOME | End: 2017-10-26
Payer: COMMERCIAL

## 2017-10-26 ENCOUNTER — HOSPITAL ENCOUNTER (OUTPATIENT)
Dept: ULTRASOUND IMAGING | Age: 62
Discharge: HOME | End: 2017-10-26
Payer: COMMERCIAL

## 2017-10-26 DIAGNOSIS — D75.89 MACROCYTOSIS WITHOUT ANEMIA: ICD-10-CM

## 2017-10-26 LAB
ALBUMIN SERPL-MCNC: 4.3 G/DL (ref 3.9–4.9)
ALP BLD-CCNC: 57 U/L (ref 35–104)
ALT SERPL-CCNC: 15 U/L (ref 0–41)
ANION GAP SERPL CALCULATED.3IONS-SCNC: 20 MEQ/L (ref 7–13)
AST SERPL-CCNC: 16 U/L (ref 0–40)
BASOPHILS ABSOLUTE: 0 K/UL (ref 0–0.2)
BASOPHILS RELATIVE PERCENT: 0.8 %
BILIRUB SERPL-MCNC: 0.5 MG/DL (ref 0–1.2)
BUN BLDV-MCNC: 15 MG/DL (ref 8–23)
CALCIUM SERPL-MCNC: 10 MG/DL (ref 8.6–10.2)
CHLORIDE BLD-SCNC: 94 MEQ/L (ref 98–107)
CO2: 28 MEQ/L (ref 22–29)
CREAT SERPL-MCNC: 0.81 MG/DL (ref 0.7–1.2)
EOSINOPHILS ABSOLUTE: 0.2 K/UL (ref 0–0.7)
EOSINOPHILS RELATIVE PERCENT: 3.8 %
FOLATE: 18.8 NG/ML (ref 7.3–26.1)
GFR AFRICAN AMERICAN: >60
GFR NON-AFRICAN AMERICAN: >60
GLOBULIN: 2.8 G/DL (ref 2.3–3.5)
GLUCOSE BLD-MCNC: 126 MG/DL (ref 74–109)
HCT VFR BLD CALC: 46 % (ref 42–52)
HEMOGLOBIN: 15.4 G/DL (ref 14–18)
LYMPHOCYTES ABSOLUTE: 1.6 K/UL (ref 1–4.8)
LYMPHOCYTES RELATIVE PERCENT: 29.3 %
MACROCYTES: ABNORMAL
MCH RBC QN AUTO: 36.5 PG (ref 27–31.3)
MCHC RBC AUTO-ENTMCNC: 33.4 % (ref 33–37)
MCV RBC AUTO: 109.3 FL (ref 80–100)
MONOCYTES ABSOLUTE: 0.5 K/UL (ref 0.2–0.8)
MONOCYTES RELATIVE PERCENT: 8.1 %
NEUTROPHILS ABSOLUTE: 3.2 K/UL (ref 1.4–6.5)
NEUTROPHILS RELATIVE PERCENT: 58 %
PDW BLD-RTO: 13.7 % (ref 11.5–14.5)
PLATELET # BLD: 204 K/UL (ref 130–400)
PLATELET SLIDE REVIEW: NORMAL
POTASSIUM SERPL-SCNC: 3.7 MEQ/L (ref 3.5–5.1)
RBC # BLD: 4.21 M/UL (ref 4.7–6.1)
SODIUM BLD-SCNC: 142 MEQ/L (ref 132–144)
TOTAL PROTEIN: 7.1 G/DL (ref 6.4–8.1)
TSH SERPL DL<=0.05 MIU/L-ACNC: 3.42 UIU/ML (ref 0.27–4.2)
VITAMIN B-12: 532 PG/ML (ref 211–946)
WBC # BLD: 5.5 K/UL (ref 4.8–10.8)

## 2017-10-26 PROCEDURE — 76705 ECHO EXAM OF ABDOMEN: CPT

## 2017-10-26 PROCEDURE — 36415 COLL VENOUS BLD VENIPUNCTURE: CPT

## 2017-10-26 PROCEDURE — 80053 COMPREHEN METABOLIC PANEL: CPT

## 2017-10-26 PROCEDURE — 82607 VITAMIN B-12: CPT

## 2017-10-26 PROCEDURE — 83883 ASSAY NEPHELOMETRY NOT SPEC: CPT

## 2017-10-26 PROCEDURE — 82784 ASSAY IGA/IGD/IGG/IGM EACH: CPT

## 2017-10-26 PROCEDURE — 82746 ASSAY OF FOLIC ACID SERUM: CPT

## 2017-10-26 PROCEDURE — 86334 IMMUNOFIX E-PHORESIS SERUM: CPT

## 2017-10-26 PROCEDURE — 84160 ASSAY OF PROTEIN ANY SOURCE: CPT

## 2017-10-26 PROCEDURE — 84443 ASSAY THYROID STIM HORMONE: CPT

## 2017-10-26 PROCEDURE — 84165 PROTEIN E-PHORESIS SERUM: CPT

## 2017-10-26 PROCEDURE — 85025 COMPLETE CBC W/AUTO DIFF WBC: CPT

## 2017-10-29 LAB
+IMM: ABNORMAL
ALBUMIN SERPL-MCNC: 4.56 G/DL (ref 3.75–5.01)
ALPHA-1-GLOBULIN: 0.32 G/DL (ref 0.19–0.46)
ALPHA-2-GLOBULIN: 0.88 G/DL (ref 0.48–1.05)
BETA GLOBULIN: 0.98 G/DL (ref 0.48–1.1)
GAMMA GLOBULIN: 1.06 G/DL (ref 0.62–1.51)
IGA: 332 MG/DL (ref 68–408)
IGG: 1010 MG/DL (ref 768–1632)
IGM: 75 MG/DL (ref 35–263)
KAPPA FREE LIGHT CHAINS QNT: 3.85 MG/DL (ref 0.33–1.94)
KAPPA/LAMBDA FREE LIGHT CHAIN RATIO: 1.44 (ref 0.26–1.65)
LAMBDA FREE LIGHT CHAINS QNT: 2.67 MG/DL (ref 0.57–2.63)
SPE/IFE INTERPRETATION: ABNORMAL
TOTAL PROTEIN: 7.8 G/DL (ref 6–8.3)

## 2017-10-31 DIAGNOSIS — E11.9 TYPE 2 DIABETES MELLITUS WITHOUT COMPLICATION, WITHOUT LONG-TERM CURRENT USE OF INSULIN (HCC): ICD-10-CM

## 2017-10-31 RX ORDER — GLIMEPIRIDE 1 MG/1
1 TABLET ORAL
Qty: 90 TABLET | Refills: 3 | Status: SHIPPED | OUTPATIENT
Start: 2017-10-31 | End: 2018-12-19 | Stop reason: SDUPTHER

## 2017-11-08 ENCOUNTER — HOSPITAL ENCOUNTER (OUTPATIENT)
Dept: PHYSICAL THERAPY | Age: 62
Setting detail: THERAPIES SERIES
Discharge: HOME | End: 2017-11-08
Payer: COMMERCIAL

## 2017-11-08 PROCEDURE — G8978 MOBILITY CURRENT STATUS: HCPCS

## 2017-11-08 PROCEDURE — 97163 PT EVAL HIGH COMPLEX 45 MIN: CPT

## 2017-11-08 PROCEDURE — G8979 MOBILITY GOAL STATUS: HCPCS

## 2017-11-08 ASSESSMENT — PAIN DESCRIPTION - ORIENTATION: ORIENTATION: POSTERIOR

## 2017-11-08 ASSESSMENT — PAIN SCALES - GENERAL: PAINLEVEL_OUTOF10: 2

## 2017-11-08 ASSESSMENT — PAIN DESCRIPTION - FREQUENCY: FREQUENCY: INTERMITTENT

## 2017-11-08 ASSESSMENT — PAIN DESCRIPTION - PAIN TYPE: TYPE: CHRONIC PAIN

## 2017-11-08 ASSESSMENT — PAIN DESCRIPTION - LOCATION: LOCATION: HEAD;NECK

## 2017-11-08 ASSESSMENT — PAIN DESCRIPTION - DESCRIPTORS: DESCRIPTORS: TIGHTNESS;SHARP

## 2017-11-08 NOTE — PROGRESS NOTES
hallucinations\". Pt continues to report mild photosensitivity, which has also improved with cataract surgery. Pt reports frequent imbalance. Most recent \"bad fall\" was 2 mos ago, landing on knees and hands with no injury. Pt reports continued intermittent episodes with dizziness. Difficulty with patterened objects.     Additional Pertinent Hx: CAD, MI, CVA, DM, cataract surgery   Pain Screening  Patient Currently in Pain: Yes  Pain Assessment  Pain Assessment: 0-10  Pain Level: 2 (Range: 0-7/10)  Pain Type: Chronic pain  Pain Location: Head, Neck  Pain Orientation: Posterior  Pain Descriptors: Tightness, Sharp (head: burning/ fire, throb, occ sharp, stabbing )  Pain Frequency: Intermittent (no pain with lying down )  Social/Functional History  Lives With: Spouse  Type of Home: House  Home Layout: Two level, Bed/Bath upstairs (14 steps with 1 HR - uses cane )  Home Access: Stairs to enter with rails  Entrance Stairs - Number of Steps: 4-5  Entrance Stairs - Rails: Both  ADL Assistance: Independent  Homemaking Assistance: Needs assistance  Homemaking Responsibilities: No  Ambulation Assistance: Independent  Transfer Assistance: Independent  Active : No  Occupation: Retired  Type of occupation: 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[address]: volunteers with disabled vets     PAIN   Location:   Pain Location: Head, Neck  Pain Rating (0-10 pain scale):  Pain Level: 2 (Range: 0-7/10)  Pain Description:  Pain Descriptors: Tightness, Sharp (head: burning/ fire, throb, occ sharp, stabbing )  Action:  [x] Acceptable for treatment  []  Other:      Objective  Vision  Vision: Within Functional Limits (photosensitivity )  Hearing  Hearing: Within functional limits  Observation/Palpation  Posture: Fair  Spine  Cervical: Rt rotation 32, Lt rotation 56   Strength RLE  Strength RLE: Exception  R Hip Flexion: 3+/5  R Hip Extension: 4/5  R Hip ABduction: 4/5  R Knee Flexion: 4+/5  R Knee Extension: 4+/5  R Ankle Dorsiflexion: 4+/5  Strength LLE  Strength LLE: Exception  L Hip Flexion: 4+/5  L Hip Extension: 4/5  L Hip ABduction: 4-/5  L Knee Flexion: 4+/5  L Knee Extension: 4+/5  L Ankle Dorsiflexion: 4+/5  Strength RUE  Comment: 4+/5 grossly   Strength LUE  Comment: 4+/5 grossly                    Sensation  Overall Sensation Status: Impaired (c/o intermittent numbness and tingling Rt UE while lying in bed, c/o intermittent N&T Rt posterolateral LE )     Transfers  Sit to Stand: Independent  Stand to sit:  Independent  Comment: with moderate UE use   Ambulation 1  Surface: carpet  Device: Rolling Walker  Assistance: Independent  Quality of Gait: decreased janeth, decreased foot clearance and step length bilaterally, wide HOLLIE   Distance: 45'   Stairs  # Steps : 8  Stairs Height: 8\"  Rails: Bilateral  Assistance: Independent  Comment: non-reciprocal pattern      Balance  Sitting - Static: Good  Sitting - Dynamic: Good  Standing - Static: Fair, -  Standing - Dynamic: Poor    Oculomotor Exam:   NT WFL Symptoms Intensity (0-5) Nystagmus Duration   Spontaneous []  [x]    []     Smooth Pursuit []  []  Difficulty maintaining focus, tracking with smooth pursuit, no corrective saccades, able to tolerate 8 reps ~35 degrees  5/5  [] some c/o nausea     Saccades [x]  []    []     VOR Cancellation [x]  []    []     Gaze Holding [x]  []    []     VOR []  []  Able to go 30-40 degrees x5 reps bilaterally  4.5/5  []     Head Thrust [x]  []    []     DVA [x]  []    []     Rt Hallpike [x]  []    []     Lt Hallpike [x]  []    []     Exercises:   Exercises  Exercise 1: ** posture exs   Exercise 2: ** cervical ROM   Exercise 3: ** chin tucks   Exercise 4: ** UT str/ levator str  Exercise 5: ** static stand   Exercise 6: VOR x8 reps bilaterally   Exercise 7: ** smooth pursuits   Exercise 8: ** single steps   Exercise 9: ** wt shifts   Exercise 10: ** 3 way SLR   Exercise 20: HEP: VOR    Manual therapy  PROM: ** cervical all planes   Muscle energy: ** cervical METs as needed   Manual traction: ** cervical as tolerated   Soft Tissue Mobalization: ** MFR/ STM UT, SCM    ** to be initiated at a future date    Convergence:  [] [facility]  [x] Impaired 18 cm    POST-PAIN    Pain Rating (0-10 pain scale): 3  Location and Pain Description same as pre-pain unless otherwise indicated. Action: [] NA  [x] Call Physician  [x] Perform HEP  [] Meds as prescribed     Assessment   Conditions Requiring Skilled Therapeutic Intervention  Body structures, Functions, Activity limitations: Decreased functional mobility , Decreased strength, Decreased coordination, Decreased endurance, Decreased sensation, Decreased balance, Decreased ROM  Assessment: Pt demonstrates continued imbalance and symptoms with change of head position and eye movements. Pt with overall improved mobility tolerance vs previous episodes of therapy. Pt somewhat self-limiting at times, requiring encouragement for max participation. Treatment Diagnosis: gait abnormality, imbalance, dizziness  Prognosis: Good  Decision Making: High Complexity  History: personal factors: sedentary lifestyle, obesity; contributing PMH: CAD, MI, CVA, DM, cataract surgery   Exam: musculoskeletal, neurological, vestibualr, pain and sensory systems involved impacting ADLs, strength, balance, gait   Clinical Presentation: complicated, unpredictable   Patient Education: importance of mobility   Barriers to Learning: increased processing time   REQUIRES PT FOLLOW UP: Yes  Patient Goal:  Patient goals : \"any help\"    Patient Education:  importance of mobility   Pt verbalized/demonstrated good understanding:     [x] Yes         [] No, pt required further clarification. G Code:     PT G-Codes  Functional Assessment Tool Used: Anderson and clinical judgment   Score: 16/56   Functional Limitation: Mobility: Walking and moving around  Mobility: Walking and Moving Around Current Status ():  At least 60 percent but less than 80 percent impaired, limited or restricted  Mobility: Walking and Moving Around Goal Status (): At least 20 percent but less than 40 percent impaired, limited or restricted    Plan:  Plan  Times per week: 2  Plan weeks: 8  Current Treatment Recommendations: Strengthening, ROM, Balance Training, Functional Mobility Training, Transfer Training, Endurance Training, Gait Training, Stair training, Neuromuscular Re-education, Manual Therapy - Soft Tissue Mobilization, Manual Therapy - Joint Manipulation, Home Exercise Program, Safety Education & Training, Patient/Caregiver Education & Training, Equipment Evaluation, Education, & procurement, Modalities       Evaluation and patient rights have been reviewed and patient agrees with plan of care. Yes  [x]  No  [] Explain:     Signature: Electronically signed by Marilynn Palma PT on 11/8/2017 at 5:04 PM     PT Individual Minutes  Time In: 4305  Time Out: 1520  Minutes: 55  Timed Code Treatment Minutes: 5 Minutes  Procedure Minutes: 50 eval           [place] Fall Risk Assessment    Risk Factor Scale  Score   History of Falls [x] Yes  [] No 25  0 25   Secondary Diagnosis [] Yes  [x] No 15  0 0   Ambulatory Aid [] Furniture  [x] Crutches/cane/walker  [] None/bedrest/wheelchair/nurse 30  15  0 15   IV/Heparin Lock [] Yes  [x] No 20  0 0   Gait/Transferring [] Impaired  [x] Weak  [] Normal/bedrest/immobile 20  10  0 10   Mental Status [] Forgets limitations  [x] Oriented to own ability 15  0 0      Total: 50     Based on the Assessment score: check the appropriate box.     []  No intervention needed   Low =   Score of 0-24    []  Use standard prevention interventions Moderate =  Score of 24-44   [] Discuss fall prevention strategies   [] Indicate moderate falls risk on eval    [x]  Use high risk prevention interventions High = Score of 45 and higher   [x] Discuss fall prevention strategies   [x] Provide supervision during treatment time

## 2017-11-08 NOTE — PLAN OF CARE
Modalities     Precautions: falls             ? Patient Status:[x] Continue/ Initiate plan of Care    [] Discharge PT. Recommend pt continue with HEP. [] Additional visits requested, Please re-certify for additional visits:        Signature: Electronically signed by Laura Dao PT on 11/8/17 at 3:45 PM    If you have any questions or concerns, please don't hesitate to call. Thank you for your referral.    I have reviewed this plan of care and certify a need for medically necessary rehabilitation services.     Physician Signature:__________________________________________________________  Date:  Please sign and return

## 2017-11-12 ENCOUNTER — APPOINTMENT (OUTPATIENT)
Dept: GENERAL RADIOLOGY | Age: 62
DRG: 062 | End: 2017-11-12
Payer: COMMERCIAL

## 2017-11-12 ENCOUNTER — HOSPITAL ENCOUNTER (INPATIENT)
Age: 62
LOS: 2 days | Discharge: NURSING, REHAB OR LONG-TERM CARE | DRG: 062 | End: 2017-11-14
Attending: EMERGENCY MEDICINE | Admitting: INTERNAL MEDICINE
Payer: COMMERCIAL

## 2017-11-12 ENCOUNTER — APPOINTMENT (OUTPATIENT)
Dept: CT IMAGING | Age: 62
DRG: 062 | End: 2017-11-12
Payer: COMMERCIAL

## 2017-11-12 DIAGNOSIS — I63.012 CEREBROVASCULAR ACCIDENT (CVA) DUE TO THROMBOSIS OF LEFT VERTEBRAL ARTERY (HCC): Primary | ICD-10-CM

## 2017-11-12 PROBLEM — I63.9 CVA (CEREBRAL VASCULAR ACCIDENT) (HCC): Status: ACTIVE | Noted: 2017-11-12

## 2017-11-12 LAB
ALBUMIN SERPL-MCNC: 4.4 G/DL (ref 3.9–4.9)
ALP BLD-CCNC: 60 U/L (ref 35–104)
ALT SERPL-CCNC: 17 U/L (ref 0–41)
AMPHETAMINE SCREEN, URINE: NORMAL
ANION GAP SERPL CALCULATED.3IONS-SCNC: 17 MEQ/L (ref 7–13)
APTT: 25.4 SEC (ref 21.6–35.4)
AST SERPL-CCNC: 19 U/L (ref 0–40)
BARBITURATE SCREEN URINE: NORMAL
BASOPHILS ABSOLUTE: 0.1 K/UL (ref 0–0.2)
BASOPHILS RELATIVE PERCENT: 1.1 %
BENZODIAZEPINE SCREEN, URINE: NORMAL
BILIRUB SERPL-MCNC: 0.2 MG/DL (ref 0–1.2)
BILIRUBIN URINE: NEGATIVE
BLOOD, URINE: ABNORMAL
BUN BLDV-MCNC: 12 MG/DL (ref 8–23)
CALCIUM SERPL-MCNC: 9.8 MG/DL (ref 8.6–10.2)
CANNABINOID SCREEN URINE: NORMAL
CHLORIDE BLD-SCNC: 91 MEQ/L (ref 98–107)
CLARITY: CLEAR
CO2: 28 MEQ/L (ref 22–29)
COCAINE METABOLITE SCREEN URINE: NORMAL
COLOR: YELLOW
CREAT SERPL-MCNC: 0.61 MG/DL (ref 0.7–1.2)
EKG ATRIAL RATE: 84 BPM
EKG ATRIAL RATE: 87 BPM
EKG P AXIS: 46 DEGREES
EKG P AXIS: 47 DEGREES
EKG P-R INTERVAL: 206 MS
EKG P-R INTERVAL: 212 MS
EKG Q-T INTERVAL: 380 MS
EKG Q-T INTERVAL: 396 MS
EKG QRS DURATION: 72 MS
EKG QRS DURATION: 74 MS
EKG QTC CALCULATION (BAZETT): 449 MS
EKG QTC CALCULATION (BAZETT): 476 MS
EKG R AXIS: 0 DEGREES
EKG R AXIS: 11 DEGREES
EKG T AXIS: 62 DEGREES
EKG T AXIS: 74 DEGREES
EKG VENTRICULAR RATE: 84 BPM
EKG VENTRICULAR RATE: 87 BPM
EOSINOPHILS ABSOLUTE: 0.1 K/UL (ref 0–0.7)
EOSINOPHILS RELATIVE PERCENT: 2.2 %
EPITHELIAL CELLS, UA: NORMAL /HPF
ETHANOL PERCENT: 0.24 G/DL
ETHANOL: 278 MG/DL (ref 0–0.08)
FOLATE: 11.8 NG/ML (ref 7.3–26.1)
GFR AFRICAN AMERICAN: >60
GFR NON-AFRICAN AMERICAN: >60
GLOBULIN: 2.8 G/DL (ref 2.3–3.5)
GLUCOSE BLD-MCNC: 115 MG/DL (ref 74–109)
GLUCOSE BLD-MCNC: 122 MG/DL (ref 60–115)
GLUCOSE BLD-MCNC: 153 MG/DL (ref 60–115)
GLUCOSE BLD-MCNC: 164 MG/DL (ref 60–115)
GLUCOSE BLD-MCNC: 219 MG/DL (ref 60–115)
GLUCOSE URINE: NEGATIVE MG/DL
HCT VFR BLD CALC: 45.8 % (ref 42–52)
HEMOGLOBIN: 15.8 G/DL (ref 14–18)
INR BLD: 1
INR BLD: 1
KETONES, URINE: NEGATIVE MG/DL
LEUKOCYTE ESTERASE, URINE: NEGATIVE
LYMPHOCYTES ABSOLUTE: 2.9 K/UL (ref 1–4.8)
LYMPHOCYTES RELATIVE PERCENT: 43 %
Lab: NORMAL
MACROCYTES: ABNORMAL
MAGNESIUM: 2.2 MG/DL (ref 1.7–2.3)
MCH RBC QN AUTO: 37.5 PG (ref 27–31.3)
MCHC RBC AUTO-ENTMCNC: 34.4 % (ref 33–37)
MCV RBC AUTO: 108.9 FL (ref 80–100)
MONOCYTES ABSOLUTE: 0.5 K/UL (ref 0.2–0.8)
MONOCYTES RELATIVE PERCENT: 8.1 %
NEUTROPHILS ABSOLUTE: 3 K/UL (ref 1.4–6.5)
NEUTROPHILS RELATIVE PERCENT: 45.6 %
NITRITE, URINE: NEGATIVE
OPIATE SCREEN URINE: NORMAL
PDW BLD-RTO: 14.1 % (ref 11.5–14.5)
PERFORMED ON: ABNORMAL
PERFORMED ON: NORMAL
PH UA: 5.5 (ref 5–9)
PHENCYCLIDINE SCREEN URINE: NORMAL
PLATELET # BLD: 188 K/UL (ref 130–400)
PLATELET SLIDE REVIEW: NORMAL
POC SAMPLE TYPE: NORMAL
POTASSIUM SERPL-SCNC: 3.6 MEQ/L (ref 3.5–5.1)
PROTEIN UA: NEGATIVE MG/DL
PROTHROMBIN TIME, POC: 12.4 SEC (ref 9.5–12.5)
PROTHROMBIN TIME: 10.7 SEC (ref 8.1–13.7)
RBC # BLD: 4.2 M/UL (ref 4.7–6.1)
RBC UA: NORMAL /HPF (ref 0–2)
SODIUM BLD-SCNC: 136 MEQ/L (ref 132–144)
SPECIFIC GRAVITY UA: 1.01 (ref 1–1.03)
TOTAL PROTEIN: 7.2 G/DL (ref 6.4–8.1)
TROPONIN: <0.01 NG/ML (ref 0–0.01)
TSH SERPL DL<=0.05 MIU/L-ACNC: 4.08 UIU/ML (ref 0.27–4.2)
UROBILINOGEN, URINE: 0.2 E.U./DL
VITAMIN B-12: 685 PG/ML (ref 211–946)
VITAMIN D 25-HYDROXY: 47.1 NG/ML (ref 30–100)
WBC # BLD: 6.7 K/UL (ref 4.8–10.8)
WBC UA: NORMAL /HPF (ref 0–5)

## 2017-11-12 PROCEDURE — G0480 DRUG TEST DEF 1-7 CLASSES: HCPCS

## 2017-11-12 PROCEDURE — 2580000003 HC RX 258: Performed by: INTERNAL MEDICINE

## 2017-11-12 PROCEDURE — 84484 ASSAY OF TROPONIN QUANT: CPT

## 2017-11-12 PROCEDURE — 85730 THROMBOPLASTIN TIME PARTIAL: CPT

## 2017-11-12 PROCEDURE — 85610 PROTHROMBIN TIME: CPT

## 2017-11-12 PROCEDURE — 6370000000 HC RX 637 (ALT 250 FOR IP): Performed by: INTERNAL MEDICINE

## 2017-11-12 PROCEDURE — 82948 REAGENT STRIP/BLOOD GLUCOSE: CPT

## 2017-11-12 PROCEDURE — 6360000002 HC RX W HCPCS: Performed by: EMERGENCY MEDICINE

## 2017-11-12 PROCEDURE — 99285 EMERGENCY DEPT VISIT HI MDM: CPT

## 2017-11-12 PROCEDURE — 71010 XR CHEST PORTABLE: CPT

## 2017-11-12 PROCEDURE — 96365 THER/PROPH/DIAG IV INF INIT: CPT

## 2017-11-12 PROCEDURE — 92610 EVALUATE SWALLOWING FUNCTION: CPT

## 2017-11-12 PROCEDURE — 2500000003 HC RX 250 WO HCPCS: Performed by: INTERNAL MEDICINE

## 2017-11-12 PROCEDURE — G8997 SWALLOW GOAL STATUS: HCPCS

## 2017-11-12 PROCEDURE — 80053 COMPREHEN METABOLIC PANEL: CPT

## 2017-11-12 PROCEDURE — 82746 ASSAY OF FOLIC ACID SERUM: CPT

## 2017-11-12 PROCEDURE — 80307 DRUG TEST PRSMV CHEM ANLYZR: CPT

## 2017-11-12 PROCEDURE — 83735 ASSAY OF MAGNESIUM: CPT

## 2017-11-12 PROCEDURE — 2000000000 HC ICU R&B

## 2017-11-12 PROCEDURE — 82306 VITAMIN D 25 HYDROXY: CPT

## 2017-11-12 PROCEDURE — 84443 ASSAY THYROID STIM HORMONE: CPT

## 2017-11-12 PROCEDURE — 93005 ELECTROCARDIOGRAM TRACING: CPT

## 2017-11-12 PROCEDURE — 36415 COLL VENOUS BLD VENIPUNCTURE: CPT

## 2017-11-12 PROCEDURE — 81001 URINALYSIS AUTO W/SCOPE: CPT

## 2017-11-12 PROCEDURE — 70450 CT HEAD/BRAIN W/O DYE: CPT

## 2017-11-12 PROCEDURE — G8996 SWALLOW CURRENT STATUS: HCPCS

## 2017-11-12 PROCEDURE — 3E03317 INTRODUCTION OF OTHER THROMBOLYTIC INTO PERIPHERAL VEIN, PERCUTANEOUS APPROACH: ICD-10-PCS | Performed by: EMERGENCY MEDICINE

## 2017-11-12 PROCEDURE — 82607 VITAMIN B-12: CPT

## 2017-11-12 PROCEDURE — 85025 COMPLETE CBC W/AUTO DIFF WBC: CPT

## 2017-11-12 PROCEDURE — 2580000003 HC RX 258

## 2017-11-12 RX ORDER — SODIUM CHLORIDE 0.9 % (FLUSH) 0.9 %
10 SYRINGE (ML) INJECTION EVERY 12 HOURS SCHEDULED
Status: DISCONTINUED | OUTPATIENT
Start: 2017-11-12 | End: 2017-11-14 | Stop reason: HOSPADM

## 2017-11-12 RX ORDER — ATORVASTATIN CALCIUM 40 MG/1
40 TABLET, FILM COATED ORAL DAILY
Status: DISCONTINUED | OUTPATIENT
Start: 2017-11-12 | End: 2017-11-14 | Stop reason: HOSPADM

## 2017-11-12 RX ORDER — DEXTROSE MONOHYDRATE 25 G/50ML
12.5 INJECTION, SOLUTION INTRAVENOUS
Status: ACTIVE | OUTPATIENT
Start: 2017-11-12 | End: 2017-11-12

## 2017-11-12 RX ORDER — LABETALOL HYDROCHLORIDE 5 MG/ML
10 INJECTION, SOLUTION INTRAVENOUS EVERY 4 HOURS PRN
Status: DISCONTINUED | OUTPATIENT
Start: 2017-11-12 | End: 2017-11-14 | Stop reason: HOSPADM

## 2017-11-12 RX ORDER — DEXTROSE MONOHYDRATE 50 MG/ML
100 INJECTION, SOLUTION INTRAVENOUS PRN
Status: DISCONTINUED | OUTPATIENT
Start: 2017-11-12 | End: 2017-11-14 | Stop reason: HOSPADM

## 2017-11-12 RX ORDER — L. ACIDOPHILUS/L.BULGARICUS 1MM CELL
1 TABLET ORAL EVERY MORNING
Status: DISCONTINUED | OUTPATIENT
Start: 2017-11-13 | End: 2017-11-14 | Stop reason: HOSPADM

## 2017-11-12 RX ORDER — DEXTROSE MONOHYDRATE 25 G/50ML
12.5 INJECTION, SOLUTION INTRAVENOUS PRN
Status: DISCONTINUED | OUTPATIENT
Start: 2017-11-12 | End: 2017-11-14 | Stop reason: HOSPADM

## 2017-11-12 RX ORDER — NICOTINE POLACRILEX 4 MG
15 LOZENGE BUCCAL PRN
Status: DISCONTINUED | OUTPATIENT
Start: 2017-11-12 | End: 2017-11-14 | Stop reason: HOSPADM

## 2017-11-12 RX ORDER — CARVEDILOL 25 MG/1
25 TABLET ORAL 2 TIMES DAILY
Status: DISCONTINUED | OUTPATIENT
Start: 2017-11-12 | End: 2017-11-14 | Stop reason: HOSPADM

## 2017-11-12 RX ORDER — SODIUM CHLORIDE 9 MG/ML
INJECTION, SOLUTION INTRAVENOUS
Status: COMPLETED
Start: 2017-11-12 | End: 2017-11-12

## 2017-11-12 RX ORDER — SODIUM CHLORIDE 0.9 % (FLUSH) 0.9 %
10 SYRINGE (ML) INJECTION PRN
Status: DISCONTINUED | OUTPATIENT
Start: 2017-11-12 | End: 2017-11-14 | Stop reason: HOSPADM

## 2017-11-12 RX ORDER — GLIMEPIRIDE 1 MG/1
1 TABLET ORAL
Status: DISCONTINUED | OUTPATIENT
Start: 2017-11-13 | End: 2017-11-14 | Stop reason: HOSPADM

## 2017-11-12 RX ORDER — AMMONIUM LACTATE 12 G/100G
LOTION TOPICAL PRN
Status: DISCONTINUED | OUTPATIENT
Start: 2017-11-12 | End: 2017-11-14 | Stop reason: HOSPADM

## 2017-11-12 RX ORDER — ALLOPURINOL 300 MG/1
300 TABLET ORAL DAILY
Status: DISCONTINUED | OUTPATIENT
Start: 2017-11-12 | End: 2017-11-14 | Stop reason: HOSPADM

## 2017-11-12 RX ORDER — DEXTROSE AND SODIUM CHLORIDE 5; .9 G/100ML; G/100ML
INJECTION, SOLUTION INTRAVENOUS CONTINUOUS
Status: DISCONTINUED | OUTPATIENT
Start: 2017-11-12 | End: 2017-11-13

## 2017-11-12 RX ORDER — MULTIVITAMIN WITH FOLIC ACID 400 MCG
1 TABLET ORAL DAILY
Status: DISCONTINUED | OUTPATIENT
Start: 2017-11-12 | End: 2017-11-14 | Stop reason: HOSPADM

## 2017-11-12 RX ADMIN — THERA TABS 1 TABLET: TAB at 17:32

## 2017-11-12 RX ADMIN — DEXTROSE AND SODIUM CHLORIDE: 5; 900 INJECTION, SOLUTION INTRAVENOUS at 08:30

## 2017-11-12 RX ADMIN — ATORVASTATIN CALCIUM 40 MG: 40 TABLET, FILM COATED ORAL at 21:04

## 2017-11-12 RX ADMIN — ALLOPURINOL 300 MG: 300 TABLET ORAL at 17:32

## 2017-11-12 RX ADMIN — LABETALOL HYDROCHLORIDE 10 MG: 5 INJECTION INTRAVENOUS at 21:30

## 2017-11-12 RX ADMIN — ALTEPLASE 81 MG: KIT at 05:09

## 2017-11-12 RX ADMIN — SODIUM CHLORIDE 50 ML: 9 INJECTION, SOLUTION INTRAVENOUS at 06:10

## 2017-11-12 RX ADMIN — CARVEDILOL 25 MG: 25 TABLET, FILM COATED ORAL at 17:32

## 2017-11-12 RX ADMIN — DEXTROSE AND SODIUM CHLORIDE: 5; 900 INJECTION, SOLUTION INTRAVENOUS at 17:32

## 2017-11-12 ASSESSMENT — ENCOUNTER SYMPTOMS
SHORTNESS OF BREATH: 0
NAUSEA: 0
DIARRHEA: 0
EYE PAIN: 0
ABDOMINAL PAIN: 0
EYE DISCHARGE: 0
COUGH: 0
RESPIRATORY NEGATIVE: 1
EYE REDNESS: 0
SORE THROAT: 0
GASTROINTESTINAL NEGATIVE: 1
VOMITING: 0
BLURRED VISION: 1
PHOTOPHOBIA: 0
BACK PAIN: 0

## 2017-11-12 ASSESSMENT — PAIN SCALES - GENERAL
PAINLEVEL_OUTOF10: 7
PAINLEVEL_OUTOF10: 0

## 2017-11-12 ASSESSMENT — PAIN DESCRIPTION - DIRECTION: RADIATING_TOWARDS: BEHIND RIGHT EYE

## 2017-11-12 ASSESSMENT — PAIN DESCRIPTION - ORIENTATION: ORIENTATION: RIGHT

## 2017-11-12 ASSESSMENT — PAIN DESCRIPTION - PAIN TYPE: TYPE: ACUTE PAIN

## 2017-11-12 ASSESSMENT — PAIN DESCRIPTION - LOCATION: LOCATION: HEAD

## 2017-11-12 ASSESSMENT — PAIN DESCRIPTION - DESCRIPTORS: DESCRIPTORS: THROBBING

## 2017-11-12 NOTE — H&P
Department of Internal Medicine  General Internal Medicine  Attending History and Physical      CHIEF COMPLAINT:      Confusion, memory loss, r sided weakness, dysarthria. Reason for Admission:  Brain attack    History Obtained From:  patient    HISTORY OF PRESENT ILLNESS:      The patient is a 58 y.o. male with significant past medical history of Brainsteam CVA 6/2016 with residual deficits who presents with altered mental stratus and worsening R sided weakness to ED after acute onset of symptoms after MN. Met criteria and got TPA  In ED. Patient appears near baseline with no real recollection of events.     Past Medical History:        Diagnosis Date    Acute renal failure ([address]     CAD S/P percutaneous coronary angioplasty 3/11/2014    Cardiomyopathy [facility])     Cerebrovascular accident (CVA) due to occlusion of left middle cerebral artery ([address] 08/2016    brainstem infarction from left MCA occlusion    Cerebrovascular disease 07/27/2016    Coronary angioplasty status     Dependent edema 9/14/2017    Diplopia 5/15/2017    Resolved with management of cataracts    Dysphagia 8/18/2011    Dysphonia 8/18/2011    Essential hypertension 8/17/2016    Gallop rhythm     Gout 12/10/2016    Gout of big toe 08/2014    Right Great Toe    H/O fall     Headache     migraines    Heart failure ([address]     History of 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    History of heart failure 1/6/2014    History of myocardial infarction 3/11/2014    History of recurrent pneumonia 2/11/2014    Hx of bacterial pneumonia     Hypotension     Leg swelling     Macrocytosis without anemia 12/10/2016    Morbid obesity ([address] 1/2/2014    Myocardial infarction     Obesity     AYLIN (obstructive sleep apnea) 12/10/2016    Osteoarthritis     Right-sided muscle weakness 10/19/2016    S/P cardiac catheterization     Skin cyst 4/8/2016    Spasm 11/9/2016    Stented coronary artery     Tremor of right hand 5/15/2017    Type 2 diabetes mellitus with complication, without long-term current use of insulin ([facility] 3/11/2014    Unsteady gait 5/15/2017    Weight gain      Past Surgical History:        Procedure Laterality Date    CHOLECYSTECTOMY      CORONARY ANGIOPLASTY WITH STENT PLACEMENT  2014    CYST REMOVAL  05/16/16    DR Allyson Weeks CYST    SHOULDER SURGERY Right 12/18/2015     Immunizations:                Influenza:  Up-to-date            Pneumococcal Polysaccharide:  Up-to-date; approximate date: 2017    Medications Prior to Admission:    Prescriptions Prior to Admission: glimepiride (AMARYL) 1 MG tablet, Take 1 tablet by mouth every morning (before breakfast)  allopurinol (ZYLOPRIM) 300 MG tablet,   ammonium lactate (AMLACTIN) 12 % cream, APPLY TO DRY CALLUS AREAS 1 TO 2 TIMES DAILY  atorvastatin (LIPITOR) 40 MG tablet, Take 1 tablet by mouth daily  carvedilol (COREG) 25 MG tablet, Take 1 tablet by mouth 2 times daily  metFORMIN (GLUCOPHAGE) 500 MG tablet, Take 1 tablet by mouth 2 times daily (with meals)  glucose blood VI test strips (ASCENSIA AUTODISC VI;ONE TOUCH ULTRA TEST VI) strip, Please replace with True Test Strips test 3 times daily  clopidogrel (PLAVIX) 75 MG tablet, Take 1 tablet by mouth daily  torsemide (DEMADEX) 20 MG tablet, Take 1 tablet by mouth daily  Misc. Devices (COMMODE BEDSIDE) MISC, Use daily as needed  LACTOBACILLUS PO, Take by mouth every morning  aspirin 81 MG tablet, Take 81 mg by mouth daily  MULTIPLE VITAMIN PO, Take by mouth daily  indomethacin (INDOCIN) 50 MG capsule, Take 1 capsule by mouth 3 times daily for 14 days  baclofen (LIORESAL) 10 MG tablet,   methocarbamol (ROBAXIN) 500 MG tablet, TAKE 1 TABLET BY MOUTH TWICE DAILY  potassium chloride (K-DUR) 10 MEQ tablet, Take 1 tablet by mouth daily    Allergies:  Review of patient's allergies indicates no known allergies.     Social History:   ETOH:  [employer]    Family History:       Problem Relation Age of Onset    Breast Cancer Mother     Stroke Father      REVIEW OF SYSTEMS:  10 point neg. Uses walker  PHYSICAL EXAM:    Vitals:  /78   Pulse 87   Temp 98.4 °F (36.9 °C) (Oral)   Resp 23   Ht 6' (1.829 m)   Wt 255 lb (115.7 kg)   SpO2 94%   BMI 34.58 kg/m²     Dysarthric  LUNGS:  No increased work of breathing, good air exchange, clear to auscultation bilaterally, no crackles or wheezing  CARDIOVASCULAR:  Normal apical impulse, regular rate and rhythm, normal S1 and S2, no S3 or S4, and no murmur noted  NEUROLOGIC:  Awake, alert, oriented to name, place and time. Cranial nerves II-XII are grossly intact. Motor is 5 out of 5 bilaterally. Cerebellar finger to nose, heel to shin intact. Sensory is intact.   Babinski down going, Romberg negative, and gait is normal.    DATA:  CBC with Differential:    Lab Results   Component Value Date    WBC 6.7 11/12/2017    RBC 4.20 11/12/2017    HGB 15.8 11/12/2017    HCT 45.8 11/12/2017     11/12/2017    .9 11/12/2017    MCH 37.5 11/12/2017    MCHC 34.4 11/12/2017    RDW 14.1 11/12/2017    LYMPHOPCT 43.0 11/12/2017    MONOPCT 8.1 11/12/2017    MYELOPCT 1 09/08/2017    BASOPCT 1.1 11/12/2017    MONOSABS 0.5 11/12/2017    LYMPHSABS 2.9 11/12/2017    EOSABS 0.1 11/12/2017    BASOSABS 0.1 11/12/2017     BMP:    Lab Results   Component Value Date     11/12/2017    K 3.6 11/12/2017    CL 91 11/12/2017    CO2 28 11/12/2017    BUN 12 11/12/2017    LABALBU 4.4 11/12/2017    LABALBU DETECTED 10/27/2011    CREATININE 0.61 11/12/2017    CALCIUM 9.8 11/12/2017    GFRAA >60.0 11/12/2017    LABGLOM >60.0 11/12/2017    GLUCOSE 115 11/12/2017     ASSESSMENT AND PLAN:      Patient Active Hospital Problem List:   Morbid obesity ([address] (1/2/2014)       CAD S/P percutaneous coronary angioplasty (3/11/2014)       Type 2 diabetes mellitus with complication, without long-term current

## 2017-11-12 NOTE — H&P
with complication, without long-term current use of insulin ([facility] 3/11/2014    Unsteady gait 5/15/2017    Weight gain        Allergies: No Known Allergies    Active Problems:    * No active hospital problems. *    Blood pressure 121/73, pulse 86, temperature 98.4 °F (36.9 °C), temperature source Oral, resp. rate 25, height 6' (1.829 m), weight 255 lb (115.7 kg), SpO2 96 %. Review of Systems   Constitutional: Negative. HENT: Negative. Eyes: Positive for blurred vision. Negative for photophobia, pain, discharge and redness. Respiratory: Negative. Cardiovascular: Negative. Gastrointestinal: Negative. Genitourinary: Negative. Musculoskeletal: Negative. Skin: Negative. Neurological: Positive for tremors, sensory change, speech change, focal weakness and headaches. Psychiatric/Behavioral: Negative. Physical Exam   Constitutional: He is oriented to person, place, and time. He appears well-developed and well-nourished. He appears distressed. HENT:   Head: Normocephalic and atraumatic. Right Ear: External ear normal.   Left Ear: External ear normal.   Mouth/Throat: Oropharyngeal exudate present. Eyes: EOM are normal. Pupils are equal, round, and reactive to light. Left eye exhibits no discharge. No scleral icterus. Neck: Normal range of motion. No JVD present. Thyromegaly present. Cardiovascular: Normal rate, regular rhythm, normal heart sounds and intact distal pulses. Exam reveals no gallop. No murmur heard. Pulmonary/Chest: Effort normal and breath sounds normal. No respiratory distress. He has no wheezes. He exhibits no tenderness. Abdominal: Soft. Bowel sounds are normal. He exhibits no distension and no mass. There is no tenderness. There is no rebound and no guarding. Musculoskeletal: Normal range of motion. He exhibits deformity. He exhibits no edema or tenderness. Neurological: He is alert and oriented to person, place, and time.  He displays abnormal reflex. A cranial nerve deficit is present. He exhibits abnormal muscle tone. Coordination abnormal.   Skin: Skin is dry. He is not diaphoretic. Psychiatric: He has a normal mood and affect. His behavior is normal.     Labs/imaging reviewed. Assessment:/Plan:  Acute  Ischemic CVA with right side hemiplegia s/p tPA treatment.   Continue close monitoring of blood pressure  Neurology consult  Swallowing eval  OT/PT evaluation  Dextrose/saline 100cc/hr        Albino Hancock MD  11/12/2017

## 2017-11-12 NOTE — ED NOTES
Call from [facility] Rad at 1512 with preliminary reading for code bat patient     Patricia Res  11/12/17 0435

## 2017-11-12 NOTE — ED PROVIDER NOTES
[facility] ED  eMERGENCY dEPARTMENT eNCOUnter      Pt Name: Silvia Renee  MRN: [id number]  [place] 1955  Date of evaluation: 11/12/2017  Provider: Kaylin Liu MD        HISTORY OF PRESENT ILLNESS    Silvia Renee is a 58 y.o. male per chart review has a h/o CAD s/p PCI, HTN, Hpl, DM II presents to the ED with RUE weakness, R eye blindness, RLE weakness. Pt notes these symptoms started 45 min prior to arrival.  Pt admits to alcohol use today. Pt had a CVA 7/27/16 in which he presented with a L sided headache, RLE weakness, blurry vision. Pt was placed on heparin and admitted to the ICU. REVIEW OF SYSTEMS       Review of Systems   Constitutional: Negative for activity change, chills and fever. HENT: Negative for ear pain and sore throat. Eyes: Positive for visual disturbance. Respiratory: Negative for cough and shortness of breath. Cardiovascular: Negative for chest pain, palpitations and leg swelling. Gastrointestinal: Negative for abdominal pain, diarrhea, nausea and vomiting. Genitourinary: Negative for dysuria. Musculoskeletal: Negative for back pain. Skin: Negative for rash. Neurological: Positive for weakness. Negative for dizziness. Except as noted above the remainder of the review of systems was reviewed and negative.        PAST MEDICAL HISTORY     Past Medical History:   Diagnosis Date    Acute renal failure [facility]     CAD S/P percutaneous coronary angioplasty 3/11/2014    Cardiomyopathy [facility])     Cerebrovascular accident (CVA) due to occlusion of left middle cerebral artery ([facility] 08/2016    brainstem infarction from left MCA occlusion    Cerebrovascular disease 07/27/2016    Coronary angioplasty status     Dependent edema 9/14/2017    Diplopia 5/15/2017    Resolved with management of cataracts    Dysphagia 8/18/2011    Dysphonia 8/18/2011    Essential hypertension 8/17/2016    Gallop rhythm     Gout 12/10/2016    Gout of big toe 08/2014 Right Great Toe    H/O fall     Headache     migraines    Heart failure (HCC)     History of 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    History of heart failure 1/6/2014    History of myocardial infarction 3/11/2014    History of recurrent pneumonia 2/11/2014    Hx of bacterial pneumonia     Hypotension     Leg swelling     Macrocytosis without anemia 12/10/2016    Morbid obesity ([address] 1/2/2014    Myocardial infarction     Obesity     AYLIN (obstructive sleep apnea) 12/10/2016    Osteoarthritis     Right-sided muscle weakness 10/19/2016    S/P cardiac catheterization     Skin cyst 4/8/2016    Spasm 11/9/2016    Stented coronary artery     Tremor of right hand 5/15/2017    Type 2 diabetes mellitus with complication, without long-term current use of insulin ([facility] [address] 3/11/2014    Unsteady gait 5/15/2017    Weight gain          SURGICAL HISTORY       Past Surgical History:   Procedure Laterality Date    CHOLECYSTECTOMY      CORONARY ANGIOPLASTY WITH STENT PLACEMENT  2014    CYST REMOVAL  05/16/16    DR William Ibarra CYST    SHOULDER SURGERY Right 12/18/2015         CURRENT MEDICATIONS       Previous Medications    ALLOPURINOL (ZYLOPRIM) 300 MG TABLET        AMMONIUM LACTATE (AMLACTIN) 12 % CREAM    APPLY TO DRY CALLUS AREAS 1 TO 2 TIMES DAILY    ASPIRIN 81 MG TABLET    Take 81 mg by mouth daily    ATORVASTATIN (LIPITOR) 40 MG TABLET    Take 1 tablet by mouth daily    BACLOFEN (LIORESAL) 10 MG TABLET        CARVEDILOL (COREG) 25 MG TABLET    Take 1 tablet by mouth 2 times daily    CLOPIDOGREL (PLAVIX) 75 MG TABLET    Take 1 tablet by mouth daily    GLIMEPIRIDE (AMARYL) 1 MG TABLET    Take 1 tablet by mouth every morning (before breakfast)    GLUCOSE BLOOD VI TEST STRIPS (ASCENSIA AUTODISC VI;ONE TOUCH ULTRA TEST VI) STRIP    Please replace with True Test Strips test 3 times daily    INDOMETHACIN normal and breath sounds normal.   Abdominal: Soft. Bowel sounds are normal. He exhibits no distension. There is no tenderness. Musculoskeletal: Normal range of motion. Neurological: He is alert and oriented to person, place, and time. NIHSS 8  Complete hemianopia  Minor R sided facial droop  RUE drift, hits bed  RLE some effort against gravity  Mild-Moderate dysarthria   Skin: Skin is warm and dry. Psychiatric: He has a normal mood and affect. Nursing note and vitals reviewed. LABS:  Labs Reviewed   COMPREHENSIVE METABOLIC PANEL - Abnormal; Notable for the following:        Result Value    Chloride 91 (*)     Anion Gap 17 (*)     Glucose 115 (*)     CREATININE 0.61 (*)     All other components within normal limits   CBC WITH AUTO DIFFERENTIAL - Abnormal; Notable for the following:     RBC 4.20 (*)     .9 (*)     MCH 37.5 (*)     All other components within normal limits   POCT GLUCOSE - Abnormal; Notable for the following:     POC Glucose 122 (*)     All other components within normal limits   TROPONIN   APTT   PROTIME-INR   ETHANOL   URINE DRUG SCREEN   MAGNESIUM   URINALYSIS   POCT CAPILLARY         MDM:   Vitals:    Vitals:    11/12/17 0344 11/12/17 0433   BP: (!) 148/86 119/86   Pulse: 89 82   Resp: 22 13   SpO2: 97% 97%   Weight: 255 lb (115.7 kg)    Height: 6' (1.829 m)      59 yo male presents to the ED with R sided weakness, R eye blindness, dysarthria. NIHSS 8. Code BAT paged as pt's symptoms started within 4.5 hours. EKG shows NSR with HR 87, normal axis, normal intervals, no ST changes. Labs significant for glucose 115. Stat CT head read as no acute process. CXR negative for any acute process. Case discussed with Dr. Kamar Steinberg who believed that the pt is having a L posterior CVA. Pt qualifies for tPa. The risks/benefits were discussed about tPa and pt wanted the medication. Pt given tPA in the ED.   Case then discussed with Dr. Jass Abreu and pt admitted to ICU in critical condition. Pt understands plan. CRITICAL CARE TIME   Total Critical Care time was 42 minutes, excluding separately reportable procedures. There was a high probability of clinically significant/life threatening deterioration in the patient's condition which required my urgent intervention. PROCEDURES:  Unless otherwise noted below, none     Procedures      FINAL IMPRESSION      1.  Cerebrovascular accident (CVA) due to thrombosis of left vertebral artery [facility])          DISPOSITION/PLAN   DISPOSITION Decision to Sharon Ashby MD (electronically signed)  Attending Emergency Physician          Syeda White MD  11/12/17 6491

## 2017-11-12 NOTE — FLOWSHEET NOTE
0715 report at bedside, neuro checks maintained every 15 minutes, see flow sheet. Vss. Pt alert and oriented x 4, denies pain except for right eye sensitivity to light and slight right sided headache behind the eye. Wife at bedside. 0745 complete assessment done at this time, see flow sheet. ls cta ra no sob MP SR 2+ blle edema. Denies pain other than photosensitivity to right eye. Assisted with the urinal per request, pt voided 550 cc chandni urine. 0830 IVF hung per order. Neuro checks and VS maintained every 15 minutes. No needs per patient. 0930 Dr. Chaya Hook here, updated. See orders  1000 neuro checks done, pupils equal and reactive at this time. Otherwise neuro checks unchanged. 1200 assessment unchanged from previous. Vss, no needs per pt. Assisted with urinal per request.  1230 ST here, recommended dental soft diet. Order obtained. 1300 pt eating lunch, no coughing noted, taking small bites and voices no issues regarding swallowing   1400 resting in bed, c/o feeling flushed, slight fever 99.2 but no other c/o. Neuro checks unchanged  1500 resting in bed with eyes closed.  Vss, assisted with urinal. _Electronically signed by Grace Shelton RN on 11/12/2017 at 3:43 PM

## 2017-11-12 NOTE — FLOWSHEET NOTE
Pt. To ICU overflow 8 via cart. Neuro check completed. Pt oriented to room. Spouse to bedside. Bedside report given to Ruben Adams.

## 2017-11-12 NOTE — PROGRESS NOTES
SPEECH/LANGUAGE PATHOLOGY  BEDSIDE SWALLOWING EVALUATION    PATIENT NAME:  Rashida Diaz      :  1955      TODAY'S DATE:  2017  ROOM: [place] [id number]    Time in: 12:25    Time out: 12:55      [x]The admitting diagnosis and active problem list, as listed below have been reviewed prior to initiation of this evaluation. Acute CVA (cerebrovascular accident) [facility]) [I63.9]  Patient Active Problem List   Diagnosis    Morbid obesity ([address]    History of heart failure    History of recurrent pneumonia    CAD S/P percutaneous coronary angioplasty    History of myocardial infarction    Type 2 diabetes mellitus with complication, without long-term current use of insulin ([facility]    History of CVA (cerebrovascular accident)    Essential hypertension    Right-sided muscle weakness    Spasm    AYLIN (obstructive sleep apnea)    Gout    Macrocytosis without anemia    Tremor of right hand    Unsteady gait    Dependent edema    CVA (cerebral vascular accident) ([facility]     No Known Allergies        DYSPHAGIA DIAGNOSIS:   Mild oral dysphagia characterized by mild oral weakness resulting in extended mastication time for solid trials.            DIET RECOMMENDATIONS: Dental Soft/Thin    FEEDING RECOMMENDATIONS:    []No assistance required   [x]Stand by assist    []Full assistance required  [x]Set up required    []Supervision with all PO intake [x]Up at 90     []Double swallow    []Chin tuck   []Multiple swallow     [x]Small bites/sips      []Alternate solids / liquids  []Check for oral pocketing   []No straw    []Throat clear       []Spoon sip liquids   []Effortful swallow       THERAPY RECOMMENDATIONS:   []  Therapy is not recommended     [x]  Therapy is recommended to:    [x]  Improve oral motor strength and range of motion    []  Improve tongue base retraction     []  Improve laryngeal strength and range of motion     []  Mealtime assessment of patient's tolerance of prescribed diet     [] Repeat bedside swallow study in    []  Modified Barium Swallow (MBS) is recommended and requires a Physician order    [x]  Yesenia Brennan RN notified                 DIET LEVEL PRIOR TO THIS EVALUATION :    Diet NPO Effective Now    CURRENT RESPIRATORY STATUS:   [x]Room Air  []Nasal Canula []O2  Mask  []Non Re-Breather    []Ventilator  []BiPAP  []Tracheostomy with Room Air []Tracheostomy with O2        CURRENT COGNITIVE STATUS:   [x] Alert    [x]Responsive       []Non-responsive   []Confused  [x] Cooperative     []Uncooperative    []Aphasic       []Impulsive              PROCEDURE   Consistencies Administered During the Evaluation   Liquids: [x]  Thin    [x]  Nectar   []  Honey  Solids:  [x]  Pureed/Pudding   [x]  Soft Solid   [x]  Solid     [x]Moistened Toothette   []Other:      Method of Intake:   [x]  Cup    [x]  Spoon  [x]  Straw    [x]  Self Fed    [x]Set-Up     [x]  Fed by Clinician     Position:   [x]  Upright seated ([x]In bed [] in chair)   []  Reclined upright in bed                     ORAL MECHANISM EXAMINATION  [] Adequate lingual/labial strength  [x] Generalized oral weakness  [] Left labiobuccal weakness   [] Right labiobuccal weakness  [] Left lingual deviation   [] Right lingual deviation  [] Oral apraxia    [] CNT  [] DNT    RESULTS   Oral Stage:   []  WNL []  WFL [x]  Exceptions     [x]  Dentition: [x]  natural  []  missing teeth  []  edentulous  []  partials  [] dentures     []  Inadequate labial seal resulting anterior labial spillage from:   [] right  [] left  [] midline     [x]  Decreased mastication due to:     [x]  General oral weakness  []  decreased lingual control  []  vertical munch    []  cognitive function    []  Delayed A-P transit due to []  decreased initiation   [] reduced lingual strength         []  cognitive function     []  Oral residuals []  right buccal cavity  []  left buccal cavity []  sub lingually   []  on tongue base   []  throughout oral cavity     []  on superior tongue

## 2017-11-12 NOTE — PLAN OF CARE
Problem: IP SWALLOWING  Goal: LTG - patient will tolerate the least restrictive diet consistency to allow for safe consumption of daily meals  Outcome: Ongoing  BSE

## 2017-11-13 ENCOUNTER — APPOINTMENT (OUTPATIENT)
Dept: ULTRASOUND IMAGING | Age: 62
DRG: 062 | End: 2017-11-13
Payer: COMMERCIAL

## 2017-11-13 ENCOUNTER — APPOINTMENT (OUTPATIENT)
Dept: MRI IMAGING | Age: 62
DRG: 062 | End: 2017-11-13
Payer: COMMERCIAL

## 2017-11-13 ENCOUNTER — HOSPITAL ENCOUNTER (OUTPATIENT)
Dept: PHYSICAL THERAPY | Age: 62
Setting detail: THERAPIES SERIES
Discharge: HOME | End: 2017-11-13
Payer: COMMERCIAL

## 2017-11-13 ENCOUNTER — APPOINTMENT (OUTPATIENT)
Dept: CT IMAGING | Age: 62
DRG: 062 | End: 2017-11-13
Payer: COMMERCIAL

## 2017-11-13 LAB
ALBUMIN SERPL-MCNC: 3.5 G/DL (ref 3.9–4.9)
ALP BLD-CCNC: 48 U/L (ref 35–104)
ALT SERPL-CCNC: 13 U/L (ref 0–41)
ANION GAP SERPL CALCULATED.3IONS-SCNC: 14 MEQ/L (ref 7–13)
APTT: 26.8 SEC (ref 21.6–35.4)
AST SERPL-CCNC: 14 U/L (ref 0–40)
BASOPHILS ABSOLUTE: 0 K/UL (ref 0–0.2)
BASOPHILS RELATIVE PERCENT: 0.6 %
BILIRUB SERPL-MCNC: 0.6 MG/DL (ref 0–1.2)
BUN BLDV-MCNC: 8 MG/DL (ref 8–23)
CALCIUM SERPL-MCNC: 8.9 MG/DL (ref 8.6–10.2)
CHLORIDE BLD-SCNC: 98 MEQ/L (ref 98–107)
CO2: 27 MEQ/L (ref 22–29)
CREAT SERPL-MCNC: 0.76 MG/DL (ref 0.7–1.2)
EOSINOPHILS ABSOLUTE: 0.1 K/UL (ref 0–0.7)
EOSINOPHILS RELATIVE PERCENT: 1.9 %
GFR AFRICAN AMERICAN: >60
GFR NON-AFRICAN AMERICAN: >60
GLOBULIN: 2.3 G/DL (ref 2.3–3.5)
GLUCOSE BLD-MCNC: 127 MG/DL (ref 74–109)
GLUCOSE BLD-MCNC: 145 MG/DL (ref 60–115)
GLUCOSE BLD-MCNC: 157 MG/DL (ref 60–115)
GLUCOSE BLD-MCNC: 175 MG/DL (ref 60–115)
GLUCOSE BLD-MCNC: 202 MG/DL (ref 60–115)
HCT VFR BLD CALC: 41.4 % (ref 42–52)
HEMOGLOBIN: 14 G/DL (ref 14–18)
INR BLD: 1.1
LYMPHOCYTES ABSOLUTE: 1.7 K/UL (ref 1–4.8)
LYMPHOCYTES RELATIVE PERCENT: 32.9 %
MCH RBC QN AUTO: 38 PG (ref 27–31.3)
MCHC RBC AUTO-ENTMCNC: 33.9 % (ref 33–37)
MCV RBC AUTO: 111.9 FL (ref 80–100)
MONOCYTES ABSOLUTE: 0.4 K/UL (ref 0.2–0.8)
MONOCYTES RELATIVE PERCENT: 7.8 %
NEUTROPHILS ABSOLUTE: 3 K/UL (ref 1.4–6.5)
NEUTROPHILS RELATIVE PERCENT: 56.8 %
PDW BLD-RTO: 14.1 % (ref 11.5–14.5)
PERFORMED ON: ABNORMAL
PLATELET # BLD: 151 K/UL (ref 130–400)
POTASSIUM SERPL-SCNC: 3.3 MEQ/L (ref 3.5–5.1)
PROTHROMBIN TIME: 11.5 SEC (ref 8.1–13.7)
RBC # BLD: 3.7 M/UL (ref 4.7–6.1)
SODIUM BLD-SCNC: 139 MEQ/L (ref 132–144)
TOTAL PROTEIN: 5.8 G/DL (ref 6.4–8.1)
WBC # BLD: 5.2 K/UL (ref 4.8–10.8)

## 2017-11-13 PROCEDURE — G8978 MOBILITY CURRENT STATUS: HCPCS

## 2017-11-13 PROCEDURE — 6370000000 HC RX 637 (ALT 250 FOR IP): Performed by: INTERNAL MEDICINE

## 2017-11-13 PROCEDURE — 2580000003 HC RX 258: Performed by: INTERNAL MEDICINE

## 2017-11-13 PROCEDURE — G8979 MOBILITY GOAL STATUS: HCPCS

## 2017-11-13 PROCEDURE — 93005 ELECTROCARDIOGRAM TRACING: CPT

## 2017-11-13 PROCEDURE — 70551 MRI BRAIN STEM W/O DYE: CPT

## 2017-11-13 PROCEDURE — 1210000000 HC MED SURG R&B

## 2017-11-13 PROCEDURE — 2580000003 HC RX 258: Performed by: EMERGENCY MEDICINE

## 2017-11-13 PROCEDURE — 97165 OT EVAL LOW COMPLEX 30 MIN: CPT

## 2017-11-13 PROCEDURE — 70450 CT HEAD/BRAIN W/O DYE: CPT

## 2017-11-13 PROCEDURE — 70544 MR ANGIOGRAPHY HEAD W/O DYE: CPT

## 2017-11-13 PROCEDURE — 85610 PROTHROMBIN TIME: CPT

## 2017-11-13 PROCEDURE — G8987 SELF CARE CURRENT STATUS: HCPCS

## 2017-11-13 PROCEDURE — 85730 THROMBOPLASTIN TIME PARTIAL: CPT

## 2017-11-13 PROCEDURE — G8988 SELF CARE GOAL STATUS: HCPCS

## 2017-11-13 PROCEDURE — 85025 COMPLETE CBC W/AUTO DIFF WBC: CPT

## 2017-11-13 PROCEDURE — 95816 EEG AWAKE AND DROWSY: CPT

## 2017-11-13 PROCEDURE — 93880 EXTRACRANIAL BILAT STUDY: CPT

## 2017-11-13 PROCEDURE — 93010 ELECTROCARDIOGRAM REPORT: CPT | Performed by: INTERNAL MEDICINE

## 2017-11-13 PROCEDURE — 36415 COLL VENOUS BLD VENIPUNCTURE: CPT

## 2017-11-13 PROCEDURE — 70547 MR ANGIOGRAPHY NECK W/O DYE: CPT

## 2017-11-13 PROCEDURE — 80053 COMPREHEN METABOLIC PANEL: CPT

## 2017-11-13 PROCEDURE — 97161 PT EVAL LOW COMPLEX 20 MIN: CPT

## 2017-11-13 RX ORDER — TORSEMIDE 20 MG/1
20 TABLET ORAL DAILY
Status: DISCONTINUED | OUTPATIENT
Start: 2017-11-13 | End: 2017-11-14 | Stop reason: HOSPADM

## 2017-11-13 RX ADMIN — ATORVASTATIN CALCIUM 40 MG: 40 TABLET, FILM COATED ORAL at 22:10

## 2017-11-13 RX ADMIN — ALLOPURINOL 300 MG: 300 TABLET ORAL at 09:24

## 2017-11-13 RX ADMIN — DEXTROSE AND SODIUM CHLORIDE: 5; 900 INJECTION, SOLUTION INTRAVENOUS at 05:17

## 2017-11-13 RX ADMIN — LACTOBACILLUS TAB 1 TABLET: TAB at 09:24

## 2017-11-13 RX ADMIN — THERA TABS 1 TABLET: TAB at 09:24

## 2017-11-13 RX ADMIN — TORSEMIDE 20 MG: 10 TABLET ORAL at 10:46

## 2017-11-13 RX ADMIN — GLIMEPIRIDE 1 MG: 1 TABLET ORAL at 09:24

## 2017-11-13 RX ADMIN — CARVEDILOL 25 MG: 25 TABLET, FILM COATED ORAL at 05:14

## 2017-11-13 RX ADMIN — Medication 10 ML: at 22:41

## 2017-11-13 RX ADMIN — CARVEDILOL 25 MG: 25 TABLET, FILM COATED ORAL at 17:18

## 2017-11-13 ASSESSMENT — PAIN SCALES - GENERAL
PAINLEVEL_OUTOF10: 0
PAINLEVEL_OUTOF10: 0

## 2017-11-13 NOTE — PLAN OF CARE
Problem: IP SWALLOWING  Goal: LTG - patient will tolerate the least restrictive diet consistency to allow for safe consumption of daily meals  Outcome: Ongoing

## 2017-11-13 NOTE — CONSULTS
Tadoe Carlson is a 58 y. o.  ambidextrous but mostly right-handed , white  male per chart review has a h/o CAD s/p PCI, HTN, Hpl, DM II presents to the ED with RUE weakness, he had decreased vision on the right side, RLE weakness. Pt notes these symptoms started 45 min prior to arrival.  Pt admits to alcohol use today. Pt had a CVA 7/27/16 in which he presented with a L sided headache, RLE weakness, blurry vision. Pt was given TPA and transferred to the intensive care unit. Since being on the floor patient has improved significantly after the TPA .  He is a speech has improved      REVIEW OF SYSTEMS        Review of Systems   Constitutional: Negative for activity change, chills and fever. HENT: Negative for ear pain and sore throat. Eyes: Positive for visual disturbance. Respiratory: Negative for cough and shortness of breath. Cardiovascular: Negative for chest pain, palpitations and leg swelling. Gastrointestinal: Negative for abdominal pain, diarrhea, nausea and vomiting. Genitourinary: Negative for dysuria. Musculoskeletal: Negative for back pain. Skin: Negative for rash. Neurological: Positive for weakness.  Negative for dizziness.         Except as noted above the remainder of the review of systems was reviewed and negative.         PAST MEDICAL HISTORY      Past Medical History        Past Medical History:   Diagnosis Date    [facility]      CAD S/P percutaneous coronary angioplasty 3/11/2014    Cardiomyopathy [facility])      Cerebrovascular accident (CVA) due to occlusion of left middle cerebral artery ([facility] 08/2016     brainstem infarction from left MCA occlusion    Cerebrovascular disease 07/27/2016    Coronary angioplasty status      Dependent edema 9/14/2017    Diplopia 5/15/2017     Resolved with management of cataracts    Dysphagia 8/18/2011    Dysphonia 8/18/2011    Essential hypertension 8/17/2016    Gallop rhythm      Gout 12/10/2016 TOUCH ULTRA TEST VI) STRIP    Please replace with True Test Strips test 3 times daily     INDOMETHACIN (INDOCIN) 50 MG CAPSULE    Take 1 capsule by mouth 3 times daily for 14 days     LACTOBACILLUS PO    Take by mouth every morning     METFORMIN (GLUCOPHAGE) 500 MG TABLET    Take 1 tablet by mouth 2 times daily (with meals)     METHOCARBAMOL (ROBAXIN) 500 MG TABLET    TAKE 1 TABLET BY MOUTH TWICE DAILY     MISC. DEVICES (COMMODE BEDSIDE) MISC    Use daily as needed     MULTIPLE VITAMIN PO    Take by mouth daily     POTASSIUM CHLORIDE (K-DUR) 10 MEQ TABLET    Take 1 tablet by mouth daily     TORSEMIDE (DEMADEX) 20 MG TABLET    Take 1 tablet by mouth daily         ALLERGIES     Review of patient's allergies indicates no known allergies.     FAMILY HISTORY        Family History         Family History   Problem Relation Age of Onset    Breast Cancer Mother      Stroke Father                 SOCIAL HISTORY        Social History   Social History            Social History    Marital status:        Spouse name: Kyra Gardiner Number of children: 3    Years of education: 12+           Occupational History    medical leave [employer]      Social History Main Topics    Smoking status: Former Smoker       Quit date: 2001    Smokeless tobacco: Never Used    Alcohol use 0.0 oz/week         Comment: limits less than 10/wk     Drug use: No    Sexual activity: Yes       Partners: Female             Other (right side weakness and pain, slow speech, started 30 minutes ago)      Results    Imaging  Ct Head Wo Contrast    Result Date: 11/12/2017  EXAMINATION: CT HEAD WO CONTRAST:  DATE AND TIME: 11/12/2017 at 4:00 AM. CLINICAL HISTORY: HEADACHE. FOCAL NEURODEFICIT, NEW, FIXED OR WORSENING, 3-24 HOURS. COMPARISON: 8/16/2016.  TECHNIQUE: Axial CT from skull base to vertex without  contrast..  All CT scans at this facility use dose modulation, iterative reconstruction, and/or weight based dosing when appropriate to reduce radiation dose to as low as reasonably achievable. FINDINGS:  Ventricles are normal in size and position. Sulci are appropriate for age. No abnormal parenchymal densities. There is no parenchymal mass, or mass effect,. The bony calvarium and skull base are normal. The visualized paranasal sinuses and mastoids are clear. NO ACUTE INTRACRANIAL PATHOLOGY. Xr Chest Portable    Result Date: 11/12/2017  EXAMINATION: XR CHEST, PORTABLE SINGLE VIEW: DATE AND TIME: 11/12/2017 at 4:15 AM. CLINICAL HISTORY: SHORTNESS OF BREATH. CVA. COMPARISONS: 7/27/2016. FINDINGS: Minimal patchy linear opacities at the left base may be exaggerated vessels, due to portable nature of film and inspiratory effort. Some minimal patch of infiltrate or atelectasis, however, is also questioned. No overt signs of pulmonary edema. No consolidation. Mediastinal shadows within normal limits. POSSIBLE MINIMAL LEFT BASE INFILTRATE/ATELECTASIS. Us Abdomen Limited    1. Hepatocellular disease which includes hepatic steatosis 2. No evidence for choledocholithiasis COMPARISON: No prior HISTORY: D75.89 Macrocytosis without anemia ICD10 TECHNIQUE: US ABDOMINAL LIMITED FINDINGS: The liver is increased in echogenicity and has a coarsened in echo texture. .  No intrahepatic ductal dilatation seen. The gallbladder is surgically absent. The common duct measures 0.9 cm   . The pancreas is suboptimally visualized due to overlying bowel gas. .. Labs    CBC:   Recent Labs      11/12/17   0408   WBC  6.7   HGB  15.8   PLT  188     BMP:    Recent Labs      11/12/17   0400   NA  136   K  3.6   CL  91*   CO2  28   BUN  12   CREATININE  0.61*   GLUCOSE  115*     TSH: No results for input(s): TSH in the last 72 hours. Folic Acid: No results for input(s): FOLATE in the last 72 hours. B12:    Lab Results   Component Value Date    [id number] 532 10/26/2017     Vit.  D: No components found for: VITAMIND  Lipids: No results for input(s): CHOL, TRIG, HDL, [address] in the last 72 hours. Ammonia: No results for input(s): AMMONIA in the last 72 hours. LFT:   Recent Labs      11/12/17   0400   AST  19   ALT  17   BILITOT  0.2   ALKPHOS  60        Urine:   Recent Labs      11/12/17   1322   COLORU  Yellow   PHUR  5.5   WBCUA  0-2   RBCUA  0-2   CLARITYU  Clear   SPECGRAV  1.015   LEUKOCYTESUR  Negative   UROBILINOGEN  0.2   BILIRUBINUR  Negative   BLOODU  SMALL*          /76   Pulse 91   Temp 99.1 °F (37.3 °C) (Oral)   Resp 21   Ht 6' (1.829 m)   Wt 262 lb 9.1 oz (119.1 kg)   SpO2 96%   BMI 35.61 kg/m²    Systemic Exam    All the peripheral pulsations are normal    No bruit are heard over the eyeballs, head, neck, abdominal,aorta,iliacs or the femorals. Heart is regular with normal heart sounds and no murmurs. There is no hepatosplenomegaly. Neurological Examination    On Neurological examination, María Jordan is well oriented to day,date, month and the year. Speech, comprehension and expression is intact     Higher cortical functions are normal for patient's age. Both the pupils are equal and react to light. Visual fields are full    Extraocular movements are full and the fundi are normal.    Face is symmetrical.    The rest of the cranial nerves are within normal limits. He has normal muscle tone and normal muscle mass. No fasciculations or involuntary movements are seen. On Horton testing, there is no pronation or drift. The power is normal in all the muscle groups. The deep tendon reflexes are 2+ bilaterally symmetrical.    Both the plantar responses are flexor. The coordination is normal for the patient's age. No cortical release signs are seen. The patient's gait is normal    Romberg position: could not be performed with the reliability, patient ambulates with a walker    Patient has decreased pin prick and temperature sensations below the knees and elbows.  Decreased vibratory sensation in the distal extremities. Proprioception is intact. Impression:  Left cerebral infarction versus reversible ischemic neurological deficit. Patient improved markedly after the TPA  Old left cerebral infarction middle cerebral artery distribution in 2016 with a residual changes  Coronary artery disease he has stents  Alcohol intoxication with a history of alcohol abuse  Diabetes mellitus 2  Peripheral neuropathy  History of heart failure  Obstructive sleep apnea he is on CPAP                  Recommendations: Active Problems:     Morbid obesity ([address]    CAD S/P percutaneous coronary angioplasty    Type 2 diabetes mellitus with complication, without long-term current use of insulin ([facility])    Essential hypertension    CVA (cerebral vascular accident) [facility])       MRI of the brain  MR angiography  CT scan of the brain tomorrow at 6:30 AM  Physical therapy  Occupational therapy  Space therapy  Duplex scan of the carotid arteries  We shall follow the patient    Rick Landon MD

## 2017-11-13 NOTE — PROGRESS NOTES
[facility] OCCUPATIONAL THERAPY EVALUATION - ACUTE   Room: IC11/IC11-01  Date: 2017  Patient Name: Concepción Perez        MRN: [id number]  Account: [de-identified]   : 1955  (58 y.o.)    Chart Review:  Diagnosis:  The encounter diagnosis was Cerebrovascular accident (CVA) due to thrombosis of left vertebral artery ([address]   Past Medical History:   Diagnosis Date    Acute renal failure ([address]     CAD S/P percutaneous coronary angioplasty 3/11/2014    Cardiomyopathy [facility])     Cerebrovascular accident (CVA) due to occlusion of left middle cerebral artery ([address] 2016    brainstem infarction from left MCA occlusion    Cerebrovascular disease 2016    Coronary angioplasty status     Dependent edema 2017    Diplopia 5/15/2017    Resolved with management of cataracts    Dysphagia 2011    Dysphonia 2011    Essential hypertension 2016    Gallop rhythm     Gout 12/10/2016    Gout of big toe 2014    Right Great Toe    H/O fall     Headache     migraines    Heart failure ([address]     History of chest pain 2014    History of CVA (cerebrovascular accident) 8/15/2016    Brainstem infarction - occlusion of left MCA 2016    History of heart failure 2014    History of myocardial infarction 3/11/2014    History of recurrent pneumonia 2014    Hx of bacterial pneumonia     Hypotension     Leg swelling     Macrocytosis without anemia 12/10/2016    Morbid obesity ([address] 2014    Myocardial infarction     Obesity     AYLIN (obstructive sleep apnea) 12/10/2016    Osteoarthritis     Right-sided muscle weakness 10/19/2016    S/P cardiac catheterization     Skin cyst 2016    Spasm 2016    Stented coronary artery     Tremor of right hand 5/15/2017    Type 2 diabetes mellitus with complication, without long-term current use of insulin ([address] 3/11/2014    Unsteady gait 5/15/2017    Weight gain      Past Surgical History:   Procedure Laterality Date Movement:  [x] Good   [] Fair   [] Poor     Coordination:    Gross motor: [x] WFL   [] Impaired   Fine motor: [x] WFL   [] Impaired     Functional Mobility:  Toilet Transfers:  Sup, with [employer], min verb cues for hand placement  Bed Transfer:NT pt sitting EOB  Sit to stand: Sup min verb cues for hand placement   Toilet to EOB per pt request:  Sup wih [employer]    Seated Balance:      Static: [x] Good  [] Fair   [] Poor   Dynamic: [x]  Good  [] Fair   [] Poor     Standing Balance:   After ~5min leaned with head against cabinet  Static: [x] Good -  [] Fair  [] Poor   Dynamic: [x] Good-   [] Fair   [] Poor     Functional Endurance: [] Good  [x] Fair  [] Poor     ADLs  Feeding:  Ind  UE Dressing:  Set up with gown  LB Dressing:  Set up with socks  Bathing:  Set up simulated   Toileting: Min with BM hygiene   Grooming: Set up standing @ sink to wash hands and face    Goals:   Patient will:   [x]  Improve functional endurance to tolerate/complete 30 mins of ADL's   [x]  Be Ind in UB ADLs    [x]  Be Ind in LB ADLs   [x]  Be Ind in ADL transfers without LOB   [x]  Be Ind in toileting tasks   [x]  Improve  R hand fine motor coordination to [facility] in order to manage clothing fasteners/self-care containers in a timely manner   [x]  Improve R UE Function (AROM, strength, motor control, tone normalization) to complete ADLs as projected. [x]  Improve R UE strength and endurance to [facility] in order to participate in self-care activities as projected. [x]  Access appropriate D/C site with as few architectural barriers as possible.       Treatment Plan will consist of:   [x] ADL Training   [x] Strengthening   [x] Endurance   [x] Transfer Training   [x]  DME ed      [x] HEP  [] Manual Therapy   [] AROM/PROM    [x] Coordination   [] Cognitive Training   [x]Safety training   [] Other :    Patient Goal:D /C home  With OP therapy  Discussed and agreed upon: [x] Yes   [] No Comment:     Assessment/Discharge Disposition:     Performance deficits /

## 2017-11-13 NOTE — FLOWSHEET NOTE
1320 pt to MRI via transport.  Belongings walked to  by this nurse, [employer] RN aware. _Electronically signed by Anaid العلي RN on 11/13/17 at 2:07 PM

## 2017-11-13 NOTE — PROGRESS NOTES
Department of Internal Medicine  Progress Note      SUBJECTIVE:  Patient sitting up in bed. All 12 systems reviewed and are negative    OBJECTIVE      Medications    Current Facility-Administered Medications: torsemide (DEMADEX) tablet 20 mg, 20 mg, Oral, Daily  sodium chloride flush 0.9 % injection 10 mL, 10 mL, Intravenous, 2 times per day  sodium chloride flush 0.9 % injection 10 mL, 10 mL, Intravenous, PRN  labetalol (NORMODYNE;TRANDATE) injection 10 mg, 10 mg, Intravenous, Q4H PRN  allopurinol (ZYLOPRIM) tablet 300 mg, 300 mg, Oral, Daily  ammonium lactate (LAC-HYDRIN) 12 % lotion, , Topical, PRN  atorvastatin (LIPITOR) tablet 40 mg, 40 mg, Oral, Daily  carvedilol (COREG) tablet 25 mg, 25 mg, Oral, BID  glimepiride (AMARYL) tablet 1 mg, 1 mg, Oral, QAM AC  lactobacillus acidophilus (FLORANEX) 1 tablet, 1 tablet, Oral, QAM  multivitamin 1 tablet, 1 tablet, Oral, Daily  insulin lispro (HUMALOG) injection vial 0-6 Units, 0-6 Units, Subcutaneous, TID WC  insulin lispro (HUMALOG) injection vial 0-3 Units, 0-3 Units, Subcutaneous, Nightly  glucose (GLUTOSE) 40 % oral gel 15 g, 15 g, Oral, PRN  dextrose 50 % solution 12.5 g, 12.5 g, Intravenous, PRN  glucagon (rDNA) injection 1 mg, 1 mg, Intramuscular, PRN  dextrose 5 % solution, 100 mL/hr, Intravenous, PRN  Physical    VITALS:  BP (!) 152/83   Pulse 85   Temp 98.2 °F (36.8 °C) (Oral)   Resp 18   Ht 6' (1.829 m)   Wt 262 lb 9.1 oz (119.1 kg)   SpO2 96%   BMI 35.61 kg/m²   TEMPERATURE:  Current - Temp: 98.2 °F (36.8 °C);  Max - Temp  Av.5 °F (36.9 °C)  Min: 98.2 °F (36.8 °C)  Max: 99.1 °F (37.3 °C)  RESPIRATIONS RANGE: Resp  Av.1  Min: 15  Max: 26  PULSE RANGE: Pulse  Av.7  Min: 76  Max: 100  BLOOD PRESSURE RANGE:  Systolic (95RRY), QYK:296 , Min:129 , UFW:181   ; Diastolic (49YYV), TZY:74, Min:76, Max:103    PULSE OXIMETRY RANGE: SpO2  Av.7 %  Min: 90 %  Max: 96 %  24HR INTAKE/OUTPUT:    Intake/Output Summary (Last 24 hours) at 17 1301 [employer] filed at 11/13/17 1400   Gross per 24 hour   Intake             3160 ml   Output             3250 ml   Net              -90 ml     LUNGS:  No increased work of breathing, good air exchange, clear to auscultation bilaterally, no crackles or wheezing  CARDIOVASCULAR:  Normal apical impulse, regular rate and rhythm, normal S1 and S2, no S3 or S4, and no murmur noted  NEUROLOGIC:  Awake, alert, oriented to name, place and time. Cranial nerves II-XII are grossly intact. Motor is 5 out of 5 bilaterally. Cerebellar finger to nose, heel to shin intact. Sensory is intact.   Babinski down going, Romberg negative, and gait is normal.     Data    LABS  Recent Labs      11/12/17   0408  11/13/17   0542   WBC  6.7  5.2   RBC  4.20*  3.70*   HGB  15.8  14.0   HCT  45.8  41.4*   MCV  108.9*  111.9*   MCH  37.5*  38.0*   MCHC  34.4  33.9   RDW  14.1  14.1   PLT  188  151       Recent Labs      11/12/17   0400  11/13/17   0542   NA  136  139   K  3.6  3.3*   CL  91*  98   CO2  28  27   BUN  12  8   CREATININE  0.61*  0.76   GLUCOSE  115*  127*   CALCIUM  9.8  8.9       Recent Labs      11/12/17   0400   MG  2.2         ASSESSMENT AND PLAN      Active Hospital Problems    Diagnosis Date Noted    [facility]re[facility] accident[facility] [I63.9] 11/12/2017    Essential hypertension [I10] 08/17/2016    Type 2 diabetes mellitus with complication, without long-term current use of insulin ([facility] [E11.8] 03/11/2014    CAD S/P percutaneous coronary angioplasty [I25.10, Z98.61] 03/11/2014    Morbid obesity ([facility] [E66.01] 01/02/2014     - s/p tPA  - CT brain negative 24 hours after tPA  - aspirin starting tomorrow  - HTN: continue meds  - DM: continue meds      Adeel Boston MD  Pager : [phone]

## 2017-11-13 NOTE — CARE COORDINATION
ANDRI[place] talked to Pt is rounds, his plan is to return home. Will follow with PT/OT to see if Rehab is needed. Pt is from home with wife and just had eye surgery and now can see much better. Pt to have MRI / MRA TODAY. Vijaya Altman Electronically signed by KACIE Pepper on 11/13/2017 at 10:57 AM

## 2017-11-13 NOTE — FLOWSHEET NOTE
0930 critical care rounds done with team  1030 Dr. Gabby Lopez here to see pt  [phone] PT/OT here to see pt, up and ambulating in room with walker.  vss  1130 pt voided per urinal  1230 Report to Kathya Cook RN 2N  _Electronically signed by Champ Hudson RN on 11/13/2017 at 12:31 PM

## 2017-11-13 NOTE — PROGRESS NOTES
[address]       Physical Therapy  Cancellation/No-show Note  Patient Name:  Olga Huffman  :  1955   Date:  2017  Referring Practitioner: Dr. Sarah Wesley  Diagnosis: ataxia     Visit Information:  PT Visit Information  Onset Date: 16  PT Insurance Information: [employer] [place]   Total # of Visits Approved: 60  Progress Note Counter:  Cancelled in hospital     For today's appointment patient:  [x]  Cancelled  []  Rescheduled appointment  []  No-show   []  Called pt to remind of next appointment     Reason given by patient:  [x]  Patient ill  []  Conflicting appointment  []  No transportation    []  Conflict with work  []  No reason given  [x]  Other:  Pt in ICU, needs new orders to resume     Comments:       Signature: Electronically signed by José Scnheider PTA on 17 at 8:05 AM

## 2017-11-13 NOTE — FLOWSHEET NOTE
0715 report at bedside, no needs per pt  0800 assessment complete, see flowsheet. Noted improvement to strength and ROM to R side but still some weakness. PERRL b/l. Denies headache or pain but still sensitive to light. VSS. No needs at this time, discussed POC with pt. HOB up, call light in reach. 0845 EEG here, completed  0919 perfect serve sent to Dr. Ramesh Dawson re d/c IVF, home meds, and also possible transfer out of ICU. Awaiting response. 5841 eating breakfast, AM meds given.    _Electronically signed by Elan Hayes RN on 11/13/2017 at 9:32 AM

## 2017-11-13 NOTE — FLOWSHEET NOTE
1600 increased strength noted in right side. Assessment otherwise unchanged. 1645 pt transferred to ICU upstairs #11  1730 eating dinner. OT done. No changes to neuro assessment. Visiting with son-in-law  8150 pt used urinal on own.  1900 bedside report done. Neuro checks done with Joelle PERRY at bedside. Pt voices no needs.   _Electronically signed by Gabriel Ayala RN on 11/12/2017 at 7:20 PM

## 2017-11-13 NOTE — PROGRESS NOTES
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    History of heart failure 1/6/2014    History of myocardial infarction 3/11/2014    History of recurrent pneumonia 2/11/2014    Hx of bacterial pneumonia     Hypotension     Leg swelling     Macrocytosis without anemia 12/10/2016    Morbid obesity ([address] 1/2/2014    Myocardial infarction     Obesity     AYLIN (obstructive sleep apnea) 12/10/2016    Osteoarthritis     Right-sided muscle weakness 10/19/2016    S/P cardiac catheterization     Skin cyst 4/8/2016    Spasm 11/9/2016    Stented coronary artery     Tremor of right hand 5/15/2017    Type 2 diabetes mellitus with complication, without long-term current use of insulin ([facility] 3/11/2014    Unsteady gait 5/15/2017    Weight gain      Past Surgical History:   Procedure Laterality Date    CHOLECYSTECTOMY      CORONARY ANGIOPLASTY WITH STENT PLACEMENT  2014    CYST REMOVAL  05/16/16    DR Nando Caballero CYST    SHOULDER SURGERY Right 12/18/2015            Restrictions: Body mass index is 35.61 kg/m². SUBJECTIVE: Subjective: Pt reports recently re-started outpatient PT. Has PMH of CVA and was previously in PT.    Pre Treatment Pain Screening  Pain at present: 0    Post Treatment Pain Screening:   Pain Screening  Patient Currently in Pain: No    Prior Level of Function:  Social/Functional History  Lives With: Spouse  Type of Home: House  Home Layout: Two level (uses cane on steps )  Home Access: Stairs to enter without rails  Entrance Stairs - Number of Steps: 1  Bathroom Shower/Tub: Shower chair with back  Home Equipment: Rolling walker, Cane  ADL Assistance: Independent  Homemaking Assistance: Needs assistance  Homemaking Responsibilities:  (wife completes)  Ambulation Assistance: Independent  Transfer Assistance: Independent  Additional Comments: Pt recently re=started outpatient PT for balance, mobility    OBJECTIVE:   Vision/Hearing:  Vision: Within Functional Limits  Hearing: Within functional limits    Cognition:  Overall Orientation Status: Within Normal Limits         ROM:  RLE AROM: WFL  LLE AROM : WFL    Strength: able to lift LEs against gravity and maintain supported sitting indep         Neuro:  Balance  Comments: Pt requires UE support instanding. Complete hygiene in standing with 1 UE support, leaning on toilet handle after several minutes. Donned B socks at bedside with indep             Bed mobility  Supine to Sit: Independent  Sit to Supine: Independent    Transfers  Sit to Stand: Supervision  Stand to sit: Supervision  Comment: cueing for safety with device, complete bed and toilet transfer    Ambulation  Ambulation?: Yes  Ambulation 1  Device: Rolling Walker  Assistance: Supervision  Quality of Gait: reciprocal pattern, slow pace. Slight postural instability with pt reporting feeling off balance and fatigued. No LOb   Stairs/Curb  Stairs?: No    Activity Tolerance  Activity Tolerance: Patient Tolerated treatment well          ASSESSMENT:   Body structures, Functions, Activity limitations: Decreased functional mobility ; Decreased strength;Decreased balance;Decreased high-level IADLs  Decision Making: Low Complexity  History: high  Exam: high  Clinical Presentation: stable     Prognosis: Good  Patient Education: role of PT, safety with AD  Barriers to Learning: none    DISCHARGE RECOMMENDATIONS:  Discharge Recommendations: Patient would benefit from continued therapy after discharge    Assessment: Pt presents s/p CVA with baseline balance impairments and PMH of CVA. Pt recently re-started outpatient PT for balance, gait.  Further outpatient therapy is recommended upon D/C.   REQUIRES PT FOLLOW UP: Yes      PLAN OF CARE:  Plan  Times per week: 3-6  Current Treatment Recommendations: Strengthening, Functional Mobility Training, Transfer Training, Gait Training, Balance Training, Stair training, Neuromuscular Re-education, Home Exercise Program, Safety Education & Training, Equipment Evaluation, Education, & procurement, Patient/Caregiver Education & Training    G-Code:  PT G-Codes  Functional Limitation: Mobility: Walking and moving around  Mobility: Walking and Moving Around Current Status (): At least 20 percent but less than 40 percent impaired, limited or restricted  Mobility: Walking and Moving Around Goal Status ():  At least 20 percent but less than 40 percent impaired, limited or restricted    Goals:  Short term goals  Short term goal 1: Pt will complete 5 STS in <15s  Short term goal 2: Pt will amb 48' x3 with 2WW with mod I  Short term goal 3: Pt will complete dynamic standing activities with 1-2 UE support and supervision    Therapy Time:   Individual   Time In 1100   Time Out 1135   Minutes [address] [place] [address], 11/13/17 at 11:43 AM

## 2017-11-14 VITALS
TEMPERATURE: 98.1 F | BODY MASS INDEX: 35.56 KG/M2 | HEART RATE: 69 BPM | HEIGHT: 72 IN | SYSTOLIC BLOOD PRESSURE: 143 MMHG | OXYGEN SATURATION: 98 % | RESPIRATION RATE: 16 BRPM | WEIGHT: 262.57 LBS | DIASTOLIC BLOOD PRESSURE: 84 MMHG

## 2017-11-14 LAB
GLUCOSE BLD-MCNC: 141 MG/DL (ref 60–115)
GLUCOSE BLD-MCNC: 148 MG/DL (ref 60–115)
GLUCOSE BLD-MCNC: 153 MG/DL (ref 60–115)
PERFORMED ON: ABNORMAL

## 2017-11-14 PROCEDURE — 2580000003 HC RX 258: Performed by: EMERGENCY MEDICINE

## 2017-11-14 PROCEDURE — G9163 LANG EXPRESS GOAL STATUS: HCPCS

## 2017-11-14 PROCEDURE — 97535 SELF CARE MNGMENT TRAINING: CPT

## 2017-11-14 PROCEDURE — 6370000000 HC RX 637 (ALT 250 FOR IP): Performed by: INTERNAL MEDICINE

## 2017-11-14 PROCEDURE — G8996 SWALLOW CURRENT STATUS: HCPCS

## 2017-11-14 PROCEDURE — 93010 ELECTROCARDIOGRAM REPORT: CPT | Performed by: INTERNAL MEDICINE

## 2017-11-14 PROCEDURE — G8997 SWALLOW GOAL STATUS: HCPCS

## 2017-11-14 PROCEDURE — 92523 SPEECH SOUND LANG COMPREHEN: CPT

## 2017-11-14 PROCEDURE — 6370000000 HC RX 637 (ALT 250 FOR IP): Performed by: PSYCHIATRY & NEUROLOGY

## 2017-11-14 PROCEDURE — G9162 LANG EXPRESS CURRENT STATUS: HCPCS

## 2017-11-14 PROCEDURE — 6370000000 HC RX 637 (ALT 250 FOR IP): Performed by: NURSE PRACTITIONER

## 2017-11-14 PROCEDURE — 97116 GAIT TRAINING THERAPY: CPT

## 2017-11-14 RX ORDER — ASPIRIN 81 MG/1
162 TABLET ORAL DAILY
Qty: 30 TABLET | Refills: 3 | DISCHARGE
Start: 2017-11-14 | End: 2019-07-10 | Stop reason: DRUGHIGH

## 2017-11-14 RX ORDER — IBUPROFEN 200 MG
400 TABLET ORAL EVERY 8 HOURS PRN
Qty: 120 TABLET | Refills: 3 | Status: ON HOLD | DISCHARGE
Start: 2017-11-14 | End: 2019-01-16

## 2017-11-14 RX ORDER — CLOPIDOGREL BISULFATE 75 MG/1
75 TABLET ORAL DAILY
Qty: 30 TABLET | Refills: 1 | Status: SHIPPED | OUTPATIENT
Start: 2017-11-14 | End: 2017-11-14

## 2017-11-14 RX ORDER — CLOPIDOGREL BISULFATE 75 MG/1
75 TABLET ORAL DAILY
Qty: 30 TABLET | Refills: 1 | Status: SHIPPED | OUTPATIENT
Start: 2017-11-14 | End: 2018-01-02 | Stop reason: SDUPTHER

## 2017-11-14 RX ORDER — ACETAMINOPHEN 325 MG/1
650 TABLET ORAL EVERY 6 HOURS PRN
Status: DISCONTINUED | OUTPATIENT
Start: 2017-11-14 | End: 2017-11-14 | Stop reason: HOSPADM

## 2017-11-14 RX ORDER — CLOPIDOGREL BISULFATE 75 MG/1
75 TABLET ORAL DAILY
Status: DISCONTINUED | OUTPATIENT
Start: 2017-11-14 | End: 2017-11-14 | Stop reason: HOSPADM

## 2017-11-14 RX ADMIN — INSULIN LISPRO 1 UNITS: 100 INJECTION, SOLUTION INTRAVENOUS; SUBCUTANEOUS at 12:30

## 2017-11-14 RX ADMIN — CARVEDILOL 25 MG: 25 TABLET, FILM COATED ORAL at 06:07

## 2017-11-14 RX ADMIN — LACTOBACILLUS TAB 1 TABLET: TAB at 10:54

## 2017-11-14 RX ADMIN — TORSEMIDE 20 MG: 10 TABLET ORAL at 10:49

## 2017-11-14 RX ADMIN — ACETAMINOPHEN 650 MG: 325 TABLET, FILM COATED ORAL at 10:50

## 2017-11-14 RX ADMIN — Medication 10 ML: at 10:56

## 2017-11-14 RX ADMIN — THERA TABS 1 TABLET: TAB at 10:50

## 2017-11-14 RX ADMIN — GLIMEPIRIDE 1 MG: 1 TABLET ORAL at 06:15

## 2017-11-14 RX ADMIN — CLOPIDOGREL BISULFATE 75 MG: 75 TABLET ORAL at 13:51

## 2017-11-14 RX ADMIN — ALLOPURINOL 300 MG: 300 TABLET ORAL at 10:49

## 2017-11-14 RX ADMIN — ACETAMINOPHEN 650 MG: 325 TABLET, FILM COATED ORAL at 07:06

## 2017-11-14 ASSESSMENT — PAIN SCALES - GENERAL
PAINLEVEL_OUTOF10: 6
PAINLEVEL_OUTOF10: 4
PAINLEVEL_OUTOF10: 2
PAINLEVEL_OUTOF10: 0
PAINLEVEL_OUTOF10: 8

## 2017-11-14 ASSESSMENT — PAIN DESCRIPTION - DESCRIPTORS
DESCRIPTORS: ACHING
DESCRIPTORS: ACHING

## 2017-11-14 ASSESSMENT — PAIN DESCRIPTION - PAIN TYPE
TYPE: CHRONIC PAIN;REFERRED PAIN
TYPE: CHRONIC PAIN;NEUROPATHIC PAIN

## 2017-11-14 ASSESSMENT — PAIN DESCRIPTION - FREQUENCY
FREQUENCY: INTERMITTENT
FREQUENCY: INTERMITTENT

## 2017-11-14 ASSESSMENT — PAIN DESCRIPTION - ORIENTATION
ORIENTATION: RIGHT
ORIENTATION: RIGHT

## 2017-11-14 ASSESSMENT — PAIN DESCRIPTION - LOCATION
LOCATION: HEAD;EYE
LOCATION: HEAD;EYE

## 2017-11-14 NOTE — PROGRESS NOTES
Department of Internal Medicine   Progress Note      SUBJECTIVE:  Improvement in weakness and speech. All 12 systems reviewed and are negative    OBJECTIVE      Medications    Current Facility-Administered Medications: acetaminophen (TYLENOL) tablet 650 mg, 650 mg, Oral, Q6H PRN  clopidogrel (PLAVIX) tablet 75 mg, 75 mg, Oral, Daily  torsemide (DEMADEX) tablet 20 mg, 20 mg, Oral, Daily  sodium chloride flush 0.9 % injection 10 mL, 10 mL, Intravenous, 2 times per day  sodium chloride flush 0.9 % injection 10 mL, 10 mL, Intravenous, PRN  labetalol (NORMODYNE;TRANDATE) injection 10 mg, 10 mg, Intravenous, Q4H PRN  allopurinol (ZYLOPRIM) tablet 300 mg, 300 mg, Oral, Daily  ammonium lactate (LAC-HYDRIN) 12 % lotion, , Topical, PRN  atorvastatin (LIPITOR) tablet 40 mg, 40 mg, Oral, Daily  carvedilol (COREG) tablet 25 mg, 25 mg, Oral, BID  glimepiride (AMARYL) tablet 1 mg, 1 mg, Oral, QAM AC  lactobacillus acidophilus (FLORANEX) 1 tablet, 1 tablet, Oral, QAM  multivitamin 1 tablet, 1 tablet, Oral, Daily  insulin lispro (HUMALOG) injection vial 0-6 Units, 0-6 Units, Subcutaneous, TID WC  insulin lispro (HUMALOG) injection vial 0-3 Units, 0-3 Units, Subcutaneous, Nightly  glucose (GLUTOSE) 40 % oral gel 15 g, 15 g, Oral, PRN  dextrose 50 % solution 12.5 g, 12.5 g, Intravenous, PRN  glucagon (rDNA) injection 1 mg, 1 mg, Intramuscular, PRN  dextrose 5 % solution, 100 mL/hr, Intravenous, PRN  Physical    VITALS:  BP (!) 143/84   Pulse 69   Temp 98.1 °F (36.7 °C) (Oral)   Resp 16   Ht 6' (1.829 m)   Wt 262 lb 9.1 oz (119.1 kg)   SpO2 98%   BMI 35.61 kg/m²   TEMPERATURE:  Current - Temp: 98.1 °F (36.7 °C);  Max - Temp  Av.1 °F (36.7 °C)  Min: 97.5 °F (36.4 °C)  Max: 98.8 °F (37.1 °C)  RESPIRATIONS RANGE: Resp  Av  Min: 16  Max: 18  PULSE RANGE: Pulse  Av.2  Min: 69  Max: 84  BLOOD PRESSURE RANGE:  Systolic (05KWF), NFK:438 , Min:129 , QPM:442   ; Diastolic (24MBF), VO, Min:66, Max:84    PULSE OXIMETRY RANGE: SpO2  Av.2 %  Min: 97 %  Max: 99 %  24HR INTAKE/OUTPUT:    Intake/Output Summary (Last 24 hours) at 17 1532  Last data filed at 17 0800   Gross per 24 hour   Intake              480 ml   Output              300 ml   Net              180 ml     LUNGS:  No increased work of breathing, good air exchange, clear to auscultation bilaterally, no crackles or wheezing  CARDIOVASCULAR:  Normal apical impulse, regular rate and rhythm, normal S1 and S2, no S3 or S4, and no murmur noted  NEUROLOGIC:  Awake, alert, oriented to name, place and time.  Cranial nerves II-XII are grossly intact.  Motor is 5 out of 5 bilaterally.  Cerebellar finger to nose, heel to shin intact.  Sensory is intact.  Babinski down going, Romberg negative, and gait is normal.  Data    LABS  Recent Labs      17   0408  17   0542   WBC  6.7  5.2   RBC  4.20*  3.70*   HGB  15.8  14.0   HCT  45.8  41.4*   MCV  108.9*  111.9*   MCH  37.5*  38.0*   MCHC  34.4  33.9   RDW  14.1  14.1   PLT  188  151       Recent Labs      17   0400  17   0542   NA  136  139   K  3.6  3.3*   CL  91*  98   CO2  28  27   BUN  12  8   CREATININE  0.61*  0.76   GLUCOSE  115*  127*   CALCIUM  9.8  8.9       Recent Labs      17   0400   MG  2.2         ASSESSMENT AND PLAN    Active Hospital Problems    Diagnosis Date Noted    CVA (cerebral vascular accident) ([facility] [I63.9] 2017    Essential hypertension [I10] 2016    Type 2 diabetes mellitus with complication, without long-term current use of insulin ([facility] [E11.8] 2014    CAD S/P percutaneous coronary angioplasty [I25.10, Z98.61] 2014    Morbid obesity ([facility] [E66.01] 2014     - CVA: Started on Aspirin and Statin  - HTN: on meds  - DM: on meds    For rehab  DVT prophylaxis    Tavo Ocasio MD  Pager : [phone]

## 2017-11-14 NOTE — CARE COORDINATION
11/10/17 I MET WITH THE PT. HE IS AMB WITH ASSIST AND HAVING SPEECH THERAPY FOR WORD LOSS. PLAN [address] [place] NEED PRECERT FOR KOV/SEE NAYELY FERMIN NOTE.

## 2017-11-14 NOTE — PROGRESS NOTES
Physical Therapy  Facility/Department: [facility] MED SURG E946/G071-15  Daily Progress Note    NAME: Alice Forrester    : 1955 (58 y.o.)  MRN: [id number]    Account: [de-identified]  Gender: male    Date of Service: 17    Patient Diagnosis(es):   Patient Active Problem List    Diagnosis Date Noted    CVA (cerebral vascular accident) ([facility] 2017    Dependent edema 2017    Tremor of right hand 05/15/2017    Unsteady gait 05/15/2017    AYLIN (obstructive sleep apnea) 12/10/2016    Gout 12/10/2016    Macrocytosis without anemia 12/10/2016    Spasm 2016    Right-sided muscle weakness 10/19/2016    Essential hypertension 2016    History of CVA (cerebrovascular accident) 08/15/2016    CAD S/P percutaneous coronary angioplasty 2014    History of myocardial infarction 2014    Type 2 diabetes mellitus with complication, without long-term current use of insulin ([facility] 2014    History of recurrent pneumonia 2014    History of heart failure 2014    Morbid obesity ([facility] 2014       Precautions/Weight Bearing Restrictions: Full weight bearing,      Falls Safety Armband Present:  [x] Yes  [] No    SUBJECTIVE: Pt stating motivation to get better.  Pt states \"I really think this one did a number on me though\"  Pain:   Initial Pain level: 5 /10   Location: headache   Description: aching  Action:  []  Pt declined intervention  [] Nursing notified     [x] Pain acceptable level for treatment  [x] Other: pt medicated this AM      Reassessment of Pain: 5 /10   Location: headache   Description: aching  Action:  []  Pt declined intervention  [] Nursing notified     [x] Pain acceptable level for treatment  [x] Other: see above    OBJECTIVE:     Bed Mobility:   Rolling: - Independent  Supine to Sit: -  Independent   Sit to Supine: -  Independent    HOB flat, 1 HR used, increased time and effort d/t vertigo    Transfers:   Sit to Stand: - Supervision   Stand to

## 2017-11-14 NOTE — PROGRESS NOTES
normal              Left:  normal          Gait:                       Casual:  normal                         Romberg:  normal            Reflexes:             Deep Tendon Reflexes:             Reflexes are 2 +             Plantar response:                Right:  downgoing               Left:  downgoing    Vascular:  Cardiac Exam:  normal         Ct Head Wo Contrast    Result Date: 11/13/2017  CT HEAD WO CONTRAST CLINICAL HISTORY: Follow-up chest, status post TPA COMPARISON: November 12, 2017 TECHNIQUE: Multiple unenhanced serial axial images of the brain from the vertex of the skull to the base of the skull were performed. FINDINGS: The ventricles are dilated. This is compensatory to the surrounding moderate generalized parenchymal volume loss. No mass. No midline shift. The cisterns are patent. There are white matter and periventricular changes most likely consistent with chronic small vessel disease. No acute intra-axial or extra-axial findings. The visualized osseous structures are unremarkable. The visualized portion of the paranasal sinuses, and mastoids are unremarkable. NO ACUTE INTRA-AXIAL OR EXTRA-AXIAL FINDINGS. All CT scans at this facility use dose modulation, iterative reconstruction, and/or weight based dosing when appropriate to reduce radiation dose to as low as reasonably achievable. Ct Head Wo Contrast    Result Date: 11/12/2017  EXAMINATION: CT HEAD WO CONTRAST:  DATE AND TIME: 11/12/2017 at 4:00 AM. CLINICAL HISTORY: HEADACHE. FOCAL NEURODEFICIT, NEW, FIXED OR WORSENING, 3-24 HOURS. COMPARISON: 8/16/2016. TECHNIQUE: Axial CT from skull base to vertex without  contrast..  All CT scans at this facility use dose modulation, iterative reconstruction, and/or weight based dosing when appropriate to reduce radiation dose to as low as reasonably achievable. FINDINGS:  Ventricles are normal in size and position. Sulci are appropriate for age. No abnormal parenchymal densities.   There is no parenchymal mass, or mass effect,. The bony calvarium and skull base are normal. The visualized paranasal sinuses and mastoids are clear. NO ACUTE INTRACRANIAL PATHOLOGY. Mra Head Wo Contrast    Result Date: 11/13/2017  EXAMINATION: MRI BRAIN WO CONTRAST, MRA NECK WO CONTRAST, MRA HEAD WO CONTRAST CLINICAL HISTORY: Altered vision. History of stroke. Right-sided weakness. Headache. COMPARISONS: July 28, 2016 MRI brain. November 12, 2017 CT brain without contrast enhancement. FINDINGS: Complete MRI of the brain was obtained. There is age-related atrophy. There are very few, very small T2 hyperintense age-related white matter lesions, largest being in the left frontal lobe. There is no mass. There is no edema. There is no hematoma. There is no hydrocephalus. The proximal cervical spinal cord is normal to the C3 level. There is no abnormal restricted diffusion. There is no abnormal paramagnetic signal. Time-of-flight MR angiography of the head and neck was obtained. Images of the brain were obtained centered on the [place] revealing no arterial occlusion or acquired stenosis. There is a large caliber posterior communicating artery on the right. The P1 segment on the right is hypoplastic. There is no aneurysm. The A1 segment on the right is aplastic. There is a patent anterior communicating artery. Images of the neck show no stenosis or occlusion. There is no dissection. Vertebral arteries are large caliber and have normal flow volume. CONCLUSION: ATROPHY. NO ACUTE PROCESS IN THE BRAIN. INCIDENTAL VARIATIONS OF NORMAL ANATOMY ON MR ANGIOGRAM, WITHOUT ACQUIRED STENOSIS AND WITHOUT ARTERIAL OCCLUSION. Xr Chest Portable    Result Date: 11/12/2017  EXAMINATION: XR CHEST, PORTABLE SINGLE VIEW: DATE AND TIME: 11/12/2017 at 4:15 AM. CLINICAL HISTORY: SHORTNESS OF BREATH. CVA. COMPARISONS: 7/27/2016.  FINDINGS: Minimal patchy linear opacities at the left base may be exaggerated vessels, due to portable nature of film and inspiratory effort. Some minimal patch of infiltrate or atelectasis, however, is also questioned. No overt signs of pulmonary edema. No consolidation. Mediastinal shadows within normal limits. POSSIBLE MINIMAL LEFT BASE INFILTRATE/ATELECTASIS. Mra Neck Wo Contrast    Result Date: 11/13/2017  EXAMINATION: MRI BRAIN WO CONTRAST, MRA NECK WO CONTRAST, MRA HEAD WO CONTRAST CLINICAL HISTORY: Altered vision. History of stroke. Right-sided weakness. Headache. COMPARISONS: July 28, 2016 MRI brain. November 12, 2017 CT brain without contrast enhancement. FINDINGS: Complete MRI of the brain was obtained. There is age-related atrophy. There are very few, very small T2 hyperintense age-related white matter lesions, largest being in the left frontal lobe. There is no mass. There is no edema. There is no hematoma. There is no hydrocephalus. The proximal cervical spinal cord is normal to the C3 level. There is no abnormal restricted diffusion. There is no abnormal paramagnetic signal. Time-of-flight MR angiography of the head and neck was obtained. Images of the brain were obtained centered on the [place] revealing no arterial occlusion or acquired stenosis. There is a large caliber posterior communicating artery on the right. The P1 segment on the right is hypoplastic. There is no aneurysm. The A1 segment on the right is aplastic. There is a patent anterior communicating artery. Images of the neck show no stenosis or occlusion. There is no dissection. Vertebral arteries are large caliber and have normal flow volume. CONCLUSION: ATROPHY. NO ACUTE PROCESS IN THE BRAIN. INCIDENTAL VARIATIONS OF NORMAL ANATOMY ON MR ANGIOGRAM, WITHOUT ACQUIRED STENOSIS AND WITHOUT ARTERIAL OCCLUSION. Mri Brain Wo Contrast    Result Date: 11/13/2017  EXAMINATION: MRI BRAIN WO CONTRAST, MRA NECK WO CONTRAST, MRA HEAD WO CONTRAST CLINICAL HISTORY: Altered vision. History of stroke. Right-sided weakness. Headache. COMPARISONS: July 28, 2016 MRI brain. November 12, 2017 CT brain without contrast enhancement. FINDINGS: Complete MRI of the brain was obtained. There is age-related atrophy. There are very few, very small T2 hyperintense age-related white matter lesions, largest being in the left frontal lobe. There is no mass. There is no edema. There is no hematoma. There is no hydrocephalus. The proximal cervical spinal cord is normal to the C3 level. There is no abnormal restricted diffusion. There is no abnormal paramagnetic signal. Time-of-flight MR angiography of the head and neck was obtained. Images of the brain were obtained centered on the [place] revealing no arterial occlusion or acquired stenosis. There is a large caliber posterior communicating artery on the right. The P1 segment on the right is hypoplastic. There is no aneurysm. The A1 segment on the right is aplastic. There is a patent anterior communicating artery. Images of the neck show no stenosis or occlusion. There is no dissection. Vertebral arteries are large caliber and have normal flow volume. CONCLUSION: ATROPHY. NO ACUTE PROCESS IN THE BRAIN. INCIDENTAL VARIATIONS OF NORMAL ANATOMY ON MR ANGIOGRAM, WITHOUT ACQUIRED STENOSIS AND WITHOUT ARTERIAL OCCLUSION. Us Carotid Artery Bilateral    Result Date: 11/13/2017  Carotid Ultrasound Examination Clinical information:  Carotid stenosis Comparison:  July 28, 2016. Findings Grayscale and color Doppler ultrasound examination of the carotid and vertebral artery systems bilaterally. Maximum peak systolic velocity (PSV) / end diastolic velocity (EDV) measurements were obtained. Right Carotid System Right Common Carotid Artery (RCCA): 85  cm/s. Right Internal Carotid Artery (ANASTACIO): 74  cm/s. Right External Carotid Artery (RECA): 98 cm/s. Right Vertebral Artery (RVA): Antegrade flow. Right ICA/CCA ratio: 0.97.    Noncalcified plaque distal right common carotid artery, calcified

## 2017-11-14 NOTE — PLAN OF CARE
Problem: IP COMMUNICATION/DYSARTHRIA  Goal: STG - Patient will use speech intelligibility Intervention at conversational level  Outcome: Ongoing  SLE completed.

## 2017-11-14 NOTE — PROGRESS NOTES
plaque proximal right internal carotid artery, noncalcified plaque right external carotid artery. Left Carotid System Left Common Carotid Artery (LCCA): 103 cm/s. Left Internal Carotid Artery (LICA): 46 cm/s. Left External Carotid Artery (LECA): 109 cm/s. Left Vertebral Artery (LVA): Antegrade flow. Left ICA/CCA ratio: 0.85. Noncalcified plaque left internal carotid artery. Impression No hemodynamically significant stenosis visualized. Validated velocity measurements with angiographic measurements, velocity criteria are extrapolated from diameter data as defined by the Society of Radiologist in [address] [place] [address] Radiology 2003; 258;[id number]\".        Recent Labs      11/12/17   0408  11/13/17   0542   WBC  6.7  5.2   HGB  15.8  14.0   PLT  188  151     Recent Labs      11/12/17 0400  11/13/17   0542   NA  136  139   K  3.6  3.3*   CL  91*  98   CO2  28  27   BUN  12  8   CREATININE  0.61*  0.76   GLUCOSE  115*  127*     Recent Labs      11/12/17 0400  11/13/17   0542   BILITOT  0.2  0.6   ALKPHOS  60  48   AST  19  14   ALT  17  13     Lab Results   Component Value Date    PROTIME 11.5 11/13/2017    PROTIME 12.4 11/12/2017    INR 1.1 11/13/2017     No results found for: LITHIUM, DILFRTOT, VALPROATE    ASSESSMENT AND PLAN  Left CVA, old findings  S/p TPA  Much better  Recurrent  plavix 75 mgs  No new CVA  Rehab , home

## 2017-11-14 NOTE — DISCHARGE SUMMARY
glucose blood VI test strips (ASCENSIA AUTODISC VI;ONE TOUCH ULTRA TEST VI) strip Please replace with True Test Strips test 3 times daily  Qty: 100 each, Refills: 3    Associated Diagnoses: Type 2 diabetes mellitus without complication, without long-term current use of insulin ([facility]      ! ! clopidogrel (PLAVIX) 75 MG tablet Take 1 tablet by mouth daily  Qty: 90 tablet, Refills: 3    Associated Diagnoses: Stented coronary artery      torsemide (DEMADEX) 20 MG tablet Take 1 tablet by mouth daily  Qty: 90 tablet, Refills: 3    Associated Diagnoses: Bilateral lower extremity edema      Misc. Devices (COMMODE BEDSIDE) MISC Use daily as needed  Qty: 1 each, Refills: 0    Associated Diagnoses: Ischemic stroke ([facility]      LACTOBACILLUS PO Take by mouth every morning      MULTIPLE VITAMIN PO Take by mouth daily      indomethacin (INDOCIN) 50 MG capsule Take 1 capsule by mouth 3 times daily for 14 days  Qty: 42 capsule, Refills: 0    Associated Diagnoses: Gout of multiple sites, unspecified cause, unspecified chronicity       ! ! - Potential duplicate medications found. Please discuss with provider. STOP taking these medications       baclofen (LIORESAL) 10 MG tablet Comments:   Reason for Stopping:         methocarbamol (ROBAXIN) 500 MG tablet Comments:   Reason for Stopping:         potassium chloride (K-DUR) 10 MEQ tablet Comments:   Reason for Stopping:         aspirin 81 MG tablet Comments:   Reason for Stopping:             Activity: activity as tolerated  Diet: regular diet  Wound Care: none needed    I spent more than 30 minutes in preparing the discharge and talking to patient.     Signed:  Eliana Treadwell MD  Pager : [phone]   11/14/2017  5:05 PM

## 2017-11-14 NOTE — DISCHARGE INSTR - DIET

## 2017-11-14 NOTE — CARE COORDINATION
ANDRIAW met with pt to discuss DC plan. He lives at home with his wife and has had a previous CVA. He reports that he was recovering well from the previous CVA and now he has to recover again. He is frustrated. Pt does not feel that he can go home as he is now. He reports dizziness and difficulty with ambulation as a result. [facility] and would be interested in going there again. ANDRIAW left info for Luciano Fox at [facility] for review and to start precert.

## 2017-11-14 NOTE — PROGRESS NOTES
SPEECH/LANGUAGE PATHOLOGY  SPEECH/LANGUAGE/COGNITIVE EVALUATION      PATIENT NAME:  Lesly Diaz      :  1955      TODAY'S DATE:  2017  ROOM: [facility]/[place] [address]     ADMITTING DIAGNOSIS: Acute CVA (cerebrovascular accident) ([facility] [I63.9]     ACTIVE PROBLEM LIST:   Patient Active Problem List   Diagnosis    Morbid obesity ([facility]    History of heart failure    History of recurrent pneumonia    CAD S/P percutaneous coronary angioplasty    History of myocardial infarction    Type 2 diabetes mellitus with complication, without long-term current use of insulin (HCC)    History of CVA (cerebrovascular accident)    Essential hypertension    Right-sided muscle weakness    Spasm    AYLIN (obstructive sleep apnea)    Gout    Macrocytosis without anemia    Tremor of right hand    Unsteady gait    Dependent edema    CVA (cerebral vascular accident) ([facility]       TIME IN: 935   TIME OUT: 1014    SPEECH PATHOLOGY DIAGNOSIS:   Mild dysarthria, mild expressive language deficits   THERAPY RECOMMENDATIONS:   []Speech Pathology intervention is not warranted at this time. [x]Speech Pathology intervention is recommended with emphasis on the following: dysarthria and word finding    Pt has hx of CVA, most recently 2016 per pt. Pt stated he received speech therapy inpatient and home care but those services ended. Pt reported he made good progress but has regressed since the current hospitalization for CVA.                MOTOR SPEECH    Oral Peripheral Examination   []Adequate lingual/labial strength   [x]Generalized oral weakness   []Right labiobuccal weakness   []Left labiobuccal weakness   []Right lingual deviation    []Left lingual deviation   []Inadequate velopharyngeal closure  []Oral apraxia      []CNT    Parameters of Speech Production  Respiration:  [x]WFL []SOB []Inadequate for speech production  Articulation:  []WFL []Distortions [x]Anticipatory struggle []CNT  Resonance:  [x]WFL []Hypernasal [x]Date [x]Reason  for hospitalization    Memory   Biographical:  [x]Address  [x]Birthdate  [x] Age  [x]Family [] Not Tested  Delayed recall:  []Chair  []Cup  [x]Grass []Did not recall  [] Not Tested  Comment: Recalled 2/3 independently     Organization/Problem Solving/Reasoning   Sequencing:   [x]Verbal [x] Functional [] Impaired  [] Not Tested      Problem solving:    [x]Verbal task [x] Functional [] Impaired  [] Not Tested   Mathematics:   []Simple arithmetic: []Functional [] Impaired  [x] Not Tested   []Function based: []Functional  [] Impaired  [x] Not Tested    CLINICAL OBSERVATIONS NOTED DURING THE EVALUATION  [x]Latent responses   []Inconsistent responses   []Perseveration errors   []Anomic errors   []Paraphasic errors   []Cueing was required      Long Term Goals:    1. Pt will demonstrate functional word finding abilities independently in 90% of opportunities. 2. Pt will demonstrate functional speech intelligibility independently in 90% of opportunities. Short Term Goals:  Pt to be seen  1-3x/wk     1. Educate pt on speech intelligibility strategies. 2. Pt will implement speech intelligibility strategies during a structured task with min cues in 100% of opportunities  3. Educate pt on word finding strategies. 4. Pt will implement word finding strategies during conversation with min cues in 90% of opportunities. [x]  Prognosis for improvements is: Fair       prior rehab stay/experience  Pain:7 /10 sharp, behind right eye  [x] Nursing notified, Prince PERRY    [x]  Patient stated goals: :\"get back to how I was\"  [x]  Treatment goals discussed with: [x]  patient/  []  family. The [x]  patient/ []  family understand the diagnosis, prognosis and plan of care. Safety Devices:  [x] Call light within reach [x] Chair alarm activated  [] Bed alarm activated    [x]The admitting diagnosis and active problem list, as listed below have been reviewed prior to initiation of this evaluation.       Speech Current  CJ   Speech Goal  CI     Severity Levels  CH - 0% Impaired or limited  CI - At least 1%, but less than 20%  CJ - At least 20%, but less than 40%  CK - At least 40%, but less than 60%  CL - At least 60%, but less than 80%  CM - At least 80%, but less than 100%  CN - 100% impaired or limited    Electronically signed by MATT Doherty on 11/14/2017 at 10:30 AM

## 2017-11-16 RX ORDER — INDOMETHACIN 50 MG/1
50 CAPSULE ORAL
COMMUNITY
Start: 2017-11-16 | End: 2017-12-07

## 2017-11-17 ENCOUNTER — HOSPITAL ENCOUNTER (OUTPATIENT)
Dept: PHYSICAL THERAPY | Age: 62
Setting detail: THERAPIES SERIES
Discharge: HOME | End: 2017-11-17
Payer: COMMERCIAL

## 2017-11-17 ENCOUNTER — TELEPHONE (OUTPATIENT)
Dept: FAMILY MEDICINE CLINIC | Age: 62
End: 2017-11-17

## 2017-11-17 ENCOUNTER — OFFICE VISIT (OUTPATIENT)
Dept: GERIATRIC MEDICINE | Age: 62
End: 2017-11-17

## 2017-11-17 DIAGNOSIS — I10 ESSENTIAL HYPERTENSION: ICD-10-CM

## 2017-11-17 DIAGNOSIS — I63.9 CEREBROVASCULAR ACCIDENT (CVA), UNSPECIFIED MECHANISM (HCC): Primary | ICD-10-CM

## 2017-11-17 DIAGNOSIS — J96.10 CHRONIC RESPIRATORY FAILURE, UNSPECIFIED WHETHER WITH HYPOXIA OR HYPERCAPNIA (HCC): ICD-10-CM

## 2017-11-17 DIAGNOSIS — E11.9 TYPE 2 DIABETES MELLITUS WITHOUT COMPLICATION, UNSPECIFIED LONG TERM INSULIN USE STATUS: ICD-10-CM

## 2017-11-17 PROCEDURE — 99304 1ST NF CARE SF/LOW MDM 25: CPT | Performed by: INTERNAL MEDICINE

## 2017-11-17 PROCEDURE — 3044F HG A1C LEVEL LT 7.0%: CPT | Performed by: INTERNAL MEDICINE

## 2017-11-17 PROCEDURE — 3017F COLORECTAL CA SCREEN DOC REV: CPT | Performed by: INTERNAL MEDICINE

## 2017-11-17 NOTE — PROGRESS NOTES
Physical Therapy  Facility/Department: [facility] MED SURG [id number]  Physical Therapy Discharge      NAME: Cristina Hernandez    : 1955 (58 y.o.)  MRN: [id number]    Account: [de-identified]  Gender: male      Patient has been discharged from acute care hospital. DC patient from current PT program.      Electronically signed by Hattie Mo PT on 17 at 12:28 PM

## 2017-11-20 ENCOUNTER — APPOINTMENT (OUTPATIENT)
Dept: PHYSICAL THERAPY | Age: 62
End: 2017-11-20
Payer: COMMERCIAL

## 2017-11-20 LAB
ANION GAP SERPL CALCULATED.3IONS-SCNC: 14 MEQ/L (ref 7–13)
BUN BLDV-MCNC: 12 MG/DL (ref 8–23)
CALCIUM SERPL-MCNC: 9 MG/DL (ref 8.6–10.2)
CHLORIDE BLD-SCNC: 100 MEQ/L (ref 98–107)
CHOLESTEROL, TOTAL: 116 MG/DL (ref 0–199)
CO2: 24 MEQ/L (ref 22–29)
CREAT SERPL-MCNC: 0.73 MG/DL (ref 0.7–1.2)
GFR AFRICAN AMERICAN: >60
GFR NON-AFRICAN AMERICAN: >60
GLUCOSE BLD-MCNC: 105 MG/DL (ref 74–109)
HBA1C MFR BLD: 6.1 % (ref 4.8–5.9)
HCT VFR BLD CALC: 40.9 % (ref 42–52)
HDLC SERPL-MCNC: 36 MG/DL (ref 40–59)
HEMOGLOBIN: 13.9 G/DL (ref 14–18)
LDL CHOLESTEROL CALCULATED: 55 MG/DL (ref 0–129)
MCH RBC QN AUTO: 37.8 PG (ref 27–31.3)
MCHC RBC AUTO-ENTMCNC: 34.1 % (ref 33–37)
MCV RBC AUTO: 111.1 FL (ref 80–100)
PDW BLD-RTO: 13.8 % (ref 11.5–14.5)
PLATELET # BLD: 179 K/UL (ref 130–400)
POTASSIUM SERPL-SCNC: 4.1 MEQ/L (ref 3.5–5.1)
RBC # BLD: 3.68 M/UL (ref 4.7–6.1)
SODIUM BLD-SCNC: 138 MEQ/L (ref 132–144)
TRIGL SERPL-MCNC: 127 MG/DL (ref 0–200)
WBC # BLD: 5.9 K/UL (ref 4.8–10.8)

## 2017-11-21 ENCOUNTER — OFFICE VISIT (OUTPATIENT)
Dept: GERIATRIC MEDICINE | Age: 62
End: 2017-11-21

## 2017-11-21 DIAGNOSIS — I10 ESSENTIAL HYPERTENSION: ICD-10-CM

## 2017-11-21 DIAGNOSIS — I63.9 CEREBROVASCULAR ACCIDENT (CVA), UNSPECIFIED MECHANISM (HCC): Primary | ICD-10-CM

## 2017-11-21 DIAGNOSIS — R53.1 RIGHT SIDED WEAKNESS: ICD-10-CM

## 2017-11-21 PROCEDURE — 99308 SBSQ NF CARE LOW MDM 20: CPT | Performed by: NURSE PRACTITIONER

## 2017-11-21 PROCEDURE — 3017F COLORECTAL CA SCREEN DOC REV: CPT | Performed by: NURSE PRACTITIONER

## 2017-11-22 VITALS
RESPIRATION RATE: 17 BRPM | SYSTOLIC BLOOD PRESSURE: 122 MMHG | HEIGHT: 72 IN | TEMPERATURE: 98.4 F | BODY MASS INDEX: 35.76 KG/M2 | OXYGEN SATURATION: 96 % | WEIGHT: 264 LBS | DIASTOLIC BLOOD PRESSURE: 64 MMHG | HEART RATE: 72 BPM

## 2017-11-23 NOTE — PROCEDURES
[address]                       [address], [address] [facility]                            ELECTROENCEPHALOGRAM REPORT    PATIENT NAME: Lia Gentile                     :        1955  MED REC NO:   [id number]                            ROOM:       [id number]  ACCOUNT NO:   [de-identified]                           ADMIT DATE: 2017  PROVIDER:     Kayy Hook MD            DATE OF EE2017    REFERRING PROVIDER:  _____    MEDICATIONS:  Glucophage, Coreg, Amaryl, Plavix, and Demadex. CLINICAL INTERPRETATION:  This is a spontaneous 21-channel EEG recording  for this patient with _____ seizures. The patient had acute stroke as  well. The background rhythm of this EEG shows a very well-regulated 8 to 9  Hz posterior dominant rhythm on the alpha. This is symmetrical and  attenuates with eye opening. EKG is monitored. This is regular. Photic  stimulation is performed without any photic responses. There is no photic  response to seizures. Hyperventilation is performed with good effort  without any change in frequencies. The record remains of low amplitude,  though normal muscle artifacts are seen throughout the EEG recording. IMPRESSION:  This is a normal, well-regulated EEG recording. Clinical  course is recommended.         Fay Gray MD    D: 2017 17:35:17       T: 2017 21:33:20     [id number]  Job#: [id number]     Doc#: [id number]

## 2017-11-24 ENCOUNTER — APPOINTMENT (OUTPATIENT)
Dept: PHYSICAL THERAPY | Age: 62
End: 2017-11-24
Payer: COMMERCIAL

## 2017-11-27 ENCOUNTER — HOSPITAL ENCOUNTER (OUTPATIENT)
Dept: PHYSICAL THERAPY | Age: 62
Setting detail: THERAPIES SERIES
End: 2017-11-27
Payer: COMMERCIAL

## 2017-11-27 ENCOUNTER — OFFICE VISIT (OUTPATIENT)
Dept: GERIATRIC MEDICINE | Age: 62
End: 2017-11-27

## 2017-11-27 DIAGNOSIS — R45.86 EMOTIONAL LABILITY: ICD-10-CM

## 2017-11-27 DIAGNOSIS — R53.1 RIGHT SIDED WEAKNESS: ICD-10-CM

## 2017-11-27 DIAGNOSIS — L08.9 INFECTED CYST OF SKIN: Primary | ICD-10-CM

## 2017-11-27 DIAGNOSIS — L72.9 INFECTED CYST OF SKIN: Primary | ICD-10-CM

## 2017-11-27 PROCEDURE — 3017F COLORECTAL CA SCREEN DOC REV: CPT | Performed by: NURSE PRACTITIONER

## 2017-11-27 PROCEDURE — 99309 SBSQ NF CARE MODERATE MDM 30: CPT | Performed by: NURSE PRACTITIONER

## 2017-11-27 RX ORDER — CEPHALEXIN 250 MG/1
250 CAPSULE ORAL 4 TIMES DAILY
Qty: 28 CAPSULE | Refills: 0
Start: 2017-11-27 | End: 2017-12-04

## 2017-11-27 ASSESSMENT — ENCOUNTER SYMPTOMS
CONSTIPATION: 0
COUGH: 0
ABDOMINAL PAIN: 0
WHEEZING: 0
SHORTNESS OF BREATH: 0
COLOR CHANGE: 1

## 2017-11-27 NOTE — PROGRESS NOTES
[place] [address] [address]  [phone] Fax    Subjective  Julio Diaz, 58 y.o. male presents today with:  Chief Complaint   Patient presents with    Cyst     HPI  Patient seen today for complaints of painful cyst on his right mid back. He states that the cyst has been there for many years, however has never hurt until about 4-5 days ago. He also has a cyst on his posterior neck and lower mid back, which he also states have been there for years and have never changed or become infected. He did have an infected cyst on his left testicle about a year ago, which required antibiotics and surgical I&D. The cyst on his back is red, hurts more with therapy, movement, and touch, and he rates the pain as an 8 out of 10 at its worst.  The pain is aching, stabbing with movement. He has not tried anything for it. He does think that the pain is slightly better today than it was yesterday. He also has an area on his right upper thigh/buttock, which was hurting but he thinks that it popped and now is feeling better. Review of Systems   Constitutional: Negative for chills, fatigue and fever. Respiratory: Negative for cough, shortness of breath and wheezing. Cardiovascular: Negative for chest pain and palpitations. Gastrointestinal: Negative for abdominal pain and constipation. Musculoskeletal: Positive for gait problem (using walker). Negative for arthralgias. Skin: Positive for color change. Neurological: Positive for dizziness, speech difficulty, weakness (right side) and headaches (occasional). States that he is doing well in therapy, is walking with a walker, using a WC for longer distances   Psychiatric/Behavioral: Positive for behavioral problems. The patient is nervous/anxious. Patient is concerned about his emotional lability, stating that he cries at little things when he has always been \"tough\".  He has a brochure on PBA, but states he has not discussed this with neuro.  He does not want \"another damn pill\"      No Known Allergies     Past Medical History:   Diagnosis Date    Acute renal failure ([address]     Anxiety     CAD S/P percutaneous coronary angioplasty 3/11/2014    Cardiomyopathy ([address]     Cerebrovascular accident (CVA) due to occlusion of left middle cerebral artery ([address] 08/2016    brainstem infarction from left MCA occlusion    Cerebrovascular disease 07/27/2016    Coronary angioplasty status     Dependent edema 9/14/2017    Diplopia 5/15/2017    Resolved with management of cataracts    Dysphagia 8/18/2011    Dysphonia 8/18/2011    Essential hypertension 8/17/2016    Gallop rhythm     Gout 12/10/2016    Gout of big toe 08/2014    Right Great Toe    H/O fall     Headache     migraines    Heart failure ([address]     History of 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    History of heart failure 1/6/2014    History of myocardial infarction 3/11/2014    History of recurrent pneumonia 2/11/2014    Hx of bacterial pneumonia     Hypotension     Leg swelling     Macrocytosis without anemia 12/10/2016    Morbid obesity ([address] 1/2/2014    Myocardial infarction     Obesity     AYLIN (obstructive sleep apnea) 12/10/2016    Osteoarthritis     Right-sided muscle weakness 10/19/2016    S/P cardiac catheterization     Skin cyst 4/8/2016    Spasm 11/9/2016    Stented coronary artery     Tremor of right hand 5/15/2017    Type 2 diabetes mellitus with complication, without long-term current use of insulin ([address] 3/11/2014    Unsteady gait 5/15/2017    Weakness     Weight gain      Past Surgical History:   Procedure Laterality Date    CHOLECYSTECTOMY      CORONARY ANGIOPLASTY WITH STENT PLACEMENT  2014    CYST REMOVAL  05/16/16    DR Cali Lott CYST    SHOULDER SURGERY Right 12/18/2015     Medications reviewed at

## 2017-11-28 VITALS
TEMPERATURE: 98.6 F | HEART RATE: 70 BPM | WEIGHT: 266 LBS | SYSTOLIC BLOOD PRESSURE: 117 MMHG | HEIGHT: 72 IN | BODY MASS INDEX: 36.03 KG/M2 | RESPIRATION RATE: 18 BRPM | OXYGEN SATURATION: 96 % | DIASTOLIC BLOOD PRESSURE: 64 MMHG

## 2017-11-28 LAB
ANION GAP SERPL CALCULATED.3IONS-SCNC: 16 MEQ/L (ref 7–13)
BUN BLDV-MCNC: 15 MG/DL (ref 8–23)
CALCIUM SERPL-MCNC: 8.9 MG/DL (ref 8.6–10.2)
CHLORIDE BLD-SCNC: 100 MEQ/L (ref 98–107)
CO2: 25 MEQ/L (ref 22–29)
CREAT SERPL-MCNC: 0.86 MG/DL (ref 0.7–1.2)
GFR AFRICAN AMERICAN: >60
GFR NON-AFRICAN AMERICAN: >60
GLUCOSE BLD-MCNC: 122 MG/DL (ref 74–109)
HCT VFR BLD CALC: 39.6 % (ref 42–52)
HEMOGLOBIN: 13.4 G/DL (ref 14–18)
MCH RBC QN AUTO: 36.8 PG (ref 27–31.3)
MCHC RBC AUTO-ENTMCNC: 33.8 % (ref 33–37)
MCV RBC AUTO: 109 FL (ref 80–100)
PDW BLD-RTO: 13.6 % (ref 11.5–14.5)
PLATELET # BLD: 242 K/UL (ref 130–400)
POTASSIUM SERPL-SCNC: 3.9 MEQ/L (ref 3.5–5.1)
RBC # BLD: 3.63 M/UL (ref 4.7–6.1)
SODIUM BLD-SCNC: 141 MEQ/L (ref 132–144)
WBC # BLD: 7 K/UL (ref 4.8–10.8)

## 2017-11-29 VITALS — HEART RATE: 86 BPM | TEMPERATURE: 98.2 F | SYSTOLIC BLOOD PRESSURE: 113 MMHG | DIASTOLIC BLOOD PRESSURE: 66 MMHG

## 2017-11-29 NOTE — PROGRESS NOTES
PATIENT: Bhupinder Shi : 1955 DOS: 2017     [facility]    Admission history and physical.    CHIEF COMPLAINT:  Status post CVA, diabetes, hypertension. HISTORY OF PRESENT ILLNESS:  This is a 51-year-old gentleman, who arrives here from [facility]. The patient has presented there with acute change in mental status, weakness. The patient did undergo evaluation for possibility of acute CVA. The patient did undergo evaluation by neurology. He was admitted. The patient did have screening performed. The patient did undergo a course of initial physical therapy and occupational therapy. He was found to be week below his baseline and was subsequently transferred here prior to return home. The patient was evaluated for possibility of acute neurological deficits. PAST MEDICAL HISTORY:  Pneumonia, diabetes, hypertension, status post CVA, dysphagia, weakness, AYLIN on CPAP. MEDICATIONS:  On arrival included the following:  He is on allopurinol 300 mg daily, aspirin 81 mg daily, Plavix 75 mg daily, Coreg 25 mg twice daily, Demadex 20 mg daily, glimepiride 1 mg daily, ibuprofen 200 mg daily, indomethacin 50 mg daily, acidophilus 1 tablet daily, Lipitor 40 mg daily, metformin 500 mg twice daily. FAMILY HISTORY:  Positive for coronary artery disease. SOCIAL HISTORY:  The patient is currently nonsmoker, nondrinker. REVIEW OF SYSTEMS:  The patient denied new chest pain or palpitations. No new nausea or vomiting. No headaches, fevers, or chills. Intermittent low back pain. Intermittent constipation. Rest of 14-point review of systems was unremarkable. PHYSICAL EXAMINATION:  The patient's vital signs were stable. He was afebrile at 98.2, pulse 86, blood pressure 113/66. He was normocephalic, atraumatic. Pupils are equal and reactive. His oral mucosa was moist.  Chest showed no crackles, no wheezing. Cardiovascular exam showed a regular rate.   Abdomen is

## 2017-12-01 ENCOUNTER — HOSPITAL ENCOUNTER (OUTPATIENT)
Dept: PHYSICAL THERAPY | Age: 62
Setting detail: THERAPIES SERIES
End: 2017-12-01
Payer: COMMERCIAL

## 2017-12-04 ENCOUNTER — HOSPITAL ENCOUNTER (OUTPATIENT)
Dept: PHYSICAL THERAPY | Age: 62
Setting detail: THERAPIES SERIES
End: 2017-12-04
Payer: COMMERCIAL

## 2017-12-07 ENCOUNTER — OFFICE VISIT (OUTPATIENT)
Dept: GERIATRIC MEDICINE | Age: 62
End: 2017-12-07

## 2017-12-07 DIAGNOSIS — L72.0 EPIDERMAL INCLUSION CYST: Primary | ICD-10-CM

## 2017-12-07 DIAGNOSIS — L98.9 PAINFUL SKIN LESION: ICD-10-CM

## 2017-12-07 PROCEDURE — 3017F COLORECTAL CA SCREEN DOC REV: CPT | Performed by: NURSE PRACTITIONER

## 2017-12-07 PROCEDURE — 99309 SBSQ NF CARE MODERATE MDM 30: CPT | Performed by: NURSE PRACTITIONER

## 2017-12-07 RX ORDER — HYDROCODONE BITARTRATE AND ACETAMINOPHEN 5; 325 MG/1; MG/1
1 TABLET ORAL EVERY 6 HOURS PRN
Qty: 12 TABLET | Refills: 0 | Status: SHIPPED | OUTPATIENT
Start: 2017-12-07 | End: 2017-12-10

## 2017-12-07 ASSESSMENT — ENCOUNTER SYMPTOMS
SHORTNESS OF BREATH: 0
COLOR CHANGE: 1
WHEEZING: 0
COUGH: 0

## 2017-12-07 NOTE — PROGRESS NOTES
LONG TERM ACUTE CARE [facility] AT [facility] of [address] [facility]  [phone] Fax    Subjective  Kelly Vásquez, 58 y.o. male presents today with:  Chief Complaint   Patient presents with    Pain     HPI  Patient seen today for complaints of pain in his left back. He had an inflamed epidermal inclusion cyst I&D yesterday with the dermatologist, and he says that she told him any short-term pain medication would come from his PCP. He is rating the pain as a 7 or 8 out of 10, sharp and throbbing. He has tried Motrin, and ice, which took the edge off but did not make the pain tolerable. Pain is worse with touch/patient. He has completed a course of Keflex. He states that the dermatologist took a biopsy and he has a planned follow-up appointment in 2 months. Review of Systems   Constitutional: Negative for chills, fatigue and fever. Respiratory: Negative for cough, shortness of breath and wheezing. Cardiovascular: Negative for chest pain and palpitations. Musculoskeletal: Positive for gait problem (using walker). Negative for arthralgias. Skin: Positive for color change and wound.      No Known Allergies     Past Medical History:   Diagnosis Date    Acute renal failure (HCC)     Anxiety     CAD S/P percutaneous coronary angioplasty 3/11/2014    Cardiomyopathy [facility])     Cerebrovascular accident (CVA) due to occlusion of left middle cerebral artery ([facility] 08/2016    brainstem infarction from left MCA occlusion    Cerebrovascular disease 07/27/2016    Coronary angioplasty status     Dependent edema 9/14/2017    Diplopia 5/15/2017    Resolved with management of cataracts    Dysphagia 8/18/2011    Dysphonia 8/18/2011    Essential hypertension 8/17/2016    Gallop rhythm     Gout 12/10/2016    Gout of big toe 08/2014    Right Great Toe    H/O fall     Headache     migraines    Heart failure ([facility]     History of chest pain 1/2/2014    History of CVA (cerebrovascular accident) site, call if increased redness or drainage     Orders Placed This Encounter   Medications    HYDROcodone-acetaminophen (NORCO) 5-325 MG per tablet     Sig: Take 1 tablet by mouth every 6 hours as needed for Pain . Dispense:  12 tablet     Refill:  0     Medications Discontinued During This Encounter   Medication Reason    indomethacin (INDOCIN) 50 MG capsule      Medications reviewed and updated  Encourage fluid intake, exercise as tolerated, and good nutrition. Continue therapies, and continue to stress fall prevention strategies. Return for follow up as needed while in skilled rehab.     Carito Johnston, CNP

## 2017-12-08 ENCOUNTER — APPOINTMENT (OUTPATIENT)
Dept: PHYSICAL THERAPY | Age: 62
End: 2017-12-08
Payer: COMMERCIAL

## 2017-12-08 VITALS
HEART RATE: 60 BPM | OXYGEN SATURATION: 97 % | TEMPERATURE: 98.7 F | BODY MASS INDEX: 36.16 KG/M2 | WEIGHT: 267 LBS | DIASTOLIC BLOOD PRESSURE: 76 MMHG | HEIGHT: 72 IN | RESPIRATION RATE: 18 BRPM | SYSTOLIC BLOOD PRESSURE: 116 MMHG

## 2017-12-08 ASSESSMENT — ENCOUNTER SYMPTOMS
CONSTIPATION: 0
COUGH: 0
DIARRHEA: 0
ABDOMINAL PAIN: 0
PHOTOPHOBIA: 1
EYE REDNESS: 0
WHEEZING: 0
SHORTNESS OF BREATH: 0
EYE PAIN: 0

## 2017-12-11 ENCOUNTER — APPOINTMENT (OUTPATIENT)
Dept: PHYSICAL THERAPY | Age: 62
End: 2017-12-11
Payer: COMMERCIAL

## 2017-12-11 ENCOUNTER — OFFICE VISIT (OUTPATIENT)
Dept: GERIATRIC MEDICINE | Age: 62
End: 2017-12-11

## 2017-12-11 DIAGNOSIS — R11.11 NON-INTRACTABLE VOMITING WITHOUT NAUSEA, UNSPECIFIED VOMITING TYPE: ICD-10-CM

## 2017-12-11 DIAGNOSIS — L72.3 INFLAMED EPIDERMOID CYST OF SKIN: Primary | ICD-10-CM

## 2017-12-11 PROCEDURE — 99309 SBSQ NF CARE MODERATE MDM 30: CPT | Performed by: NURSE PRACTITIONER

## 2017-12-11 PROCEDURE — 3017F COLORECTAL CA SCREEN DOC REV: CPT | Performed by: NURSE PRACTITIONER

## 2017-12-12 VITALS
RESPIRATION RATE: 20 BRPM | SYSTOLIC BLOOD PRESSURE: 124 MMHG | HEART RATE: 77 BPM | OXYGEN SATURATION: 97 % | TEMPERATURE: 98.6 F | WEIGHT: 268 LBS | HEIGHT: 72 IN | DIASTOLIC BLOOD PRESSURE: 74 MMHG | BODY MASS INDEX: 36.3 KG/M2

## 2017-12-12 LAB
ALBUMIN SERPL-MCNC: 4.2 G/DL (ref 3.9–4.9)
ALP BLD-CCNC: 55 U/L (ref 35–104)
ALT SERPL-CCNC: 29 U/L (ref 0–41)
ANION GAP SERPL CALCULATED.3IONS-SCNC: 17 MEQ/L (ref 9–17)
AST SERPL-CCNC: 19 U/L (ref 0–40)
BILIRUB SERPL-MCNC: 0.3 MG/DL (ref 0–1.2)
BUN BLDV-MCNC: 16 MG/DL (ref 8–23)
CALCIUM SERPL-MCNC: 9.1 MG/DL (ref 8.6–10.2)
CHLORIDE BLD-SCNC: 100 MEQ/L (ref 97–107)
CO2: 24 MEQ/L (ref 17–24)
CREAT SERPL-MCNC: 0.94 MG/DL (ref 0.7–1.2)
GFR AFRICAN AMERICAN: >60
GFR NON-AFRICAN AMERICAN: >60
GLOBULIN: 2.1 G/DL (ref 2.3–3.5)
GLUCOSE BLD-MCNC: 117 MG/DL (ref 74–109)
HCT VFR BLD CALC: 39.6 % (ref 42–52)
HEMOGLOBIN: 13.6 G/DL (ref 14–18)
MCH RBC QN AUTO: 37.4 PG (ref 27–31.3)
MCHC RBC AUTO-ENTMCNC: 34.4 % (ref 33–37)
MCV RBC AUTO: 108.9 FL (ref 80–100)
PDW BLD-RTO: 13.6 % (ref 11.5–14.5)
PLATELET # BLD: 197 K/UL (ref 130–400)
POTASSIUM SERPL-SCNC: 3.7 MEQ/L (ref 3.2–4.4)
RBC # BLD: 3.64 M/UL (ref 4.7–6.1)
SODIUM BLD-SCNC: 141 MEQ/L (ref 132–138)
TOTAL PROTEIN: 6.3 G/DL (ref 6.4–8.1)
WBC # BLD: 5.9 K/UL (ref 4.8–10.8)

## 2017-12-14 ENCOUNTER — OFFICE VISIT (OUTPATIENT)
Dept: GERIATRIC MEDICINE | Age: 62
End: 2017-12-14

## 2017-12-14 DIAGNOSIS — L72.3 INFLAMED EPIDERMOID CYST OF SKIN: ICD-10-CM

## 2017-12-14 DIAGNOSIS — R26.81 UNSTEADY GAIT: ICD-10-CM

## 2017-12-14 DIAGNOSIS — R53.1 RIGHT SIDED WEAKNESS: Primary | ICD-10-CM

## 2017-12-14 DIAGNOSIS — E11.8 TYPE 2 DIABETES MELLITUS WITH COMPLICATION, WITHOUT LONG-TERM CURRENT USE OF INSULIN (HCC): Chronic | ICD-10-CM

## 2017-12-14 PROCEDURE — 99316 NF DSCHRG MGMT 30 MIN+: CPT | Performed by: NURSE PRACTITIONER

## 2017-12-14 PROCEDURE — 3044F HG A1C LEVEL LT 7.0%: CPT | Performed by: NURSE PRACTITIONER

## 2017-12-15 ENCOUNTER — APPOINTMENT (OUTPATIENT)
Dept: PHYSICAL THERAPY | Age: 62
End: 2017-12-15
Payer: COMMERCIAL

## 2017-12-15 VITALS
BODY MASS INDEX: 36.16 KG/M2 | HEART RATE: 81 BPM | SYSTOLIC BLOOD PRESSURE: 124 MMHG | OXYGEN SATURATION: 96 % | RESPIRATION RATE: 18 BRPM | WEIGHT: 267 LBS | HEIGHT: 72 IN | DIASTOLIC BLOOD PRESSURE: 65 MMHG | TEMPERATURE: 98.2 F

## 2017-12-15 ASSESSMENT — ENCOUNTER SYMPTOMS
COLOR CHANGE: 1
SHORTNESS OF BREATH: 0
EYE PAIN: 0
COUGH: 0
DIARRHEA: 0
CONSTIPATION: 0
WHEEZING: 0
PHOTOPHOBIA: 1
ABDOMINAL PAIN: 0

## 2017-12-15 NOTE — PROGRESS NOTES
[facility] [address] [place] [address]  [phone] Fax  Subjective  Allyson Diaz, 58 y.o. male is seen today for planned discharge home with his wife. HPI  Patient has received physical and occupation therapies and will continue to receive outpatient therapy for further strengthening. He continues to have some right-sided weakness, but is able to transfer independently and is walking fairly long distances with a walker. He is diabetic, and is on 2 oral agents. Labs have been monitored and are stable. He was seen by dermatology for an inflamed cyst of his back, which is much improved. He is pretty adamant about following up with Dr. Apoorva Rolle for this, who cared for a scrotal cyst he had a few years ago. We discussed the health benefits of losing weight, as he is obese. He had an abdominal ultrasound yesterday which was unremarkable. He is concerned about a firm ridge running down the center of his abdomen, more prominent with straining, possibly a diastasis recti, and some regurgitation of mucous while eating. He is going to follow up with GI after discharge. Review of Systems   Constitutional: Negative for appetite change, chills, fatigue and fever. Eyes: Positive for photophobia and visual disturbance (continues to have visual issues from previous stroke). Negative for pain. Respiratory: Negative for cough, shortness of breath and wheezing. Cardiovascular: Positive for leg swelling (improved). Negative for chest pain and palpitations. Gastrointestinal: Negative for abdominal pain, constipation and diarrhea. Endocrine: Negative for polydipsia, polyphagia and polyuria. Musculoskeletal: Positive for gait problem (using walker). Negative for arthralgias. Skin: Positive for color change and wound. Neurological: Positive for dizziness, speech difficulty, weakness and headaches (occasional).    Psychiatric/Behavioral: Positive for behavioral tablet 3    ibuprofen (ADVIL) 200 MG tablet Take 2 tablets by mouth every 8 hours as needed for Pain (headache) 120 tablet 3    glimepiride (AMARYL) 1 MG tablet Take 1 tablet by mouth every morning (before breakfast) 90 tablet 3    allopurinol (ZYLOPRIM) 300 MG tablet Take 300 mg by mouth daily       ammonium lactate (AMLACTIN) 12 % cream APPLY TO DRY CALLUS AREAS 1 TO 2 TIMES DAILY  5    atorvastatin (LIPITOR) 40 MG tablet Take 1 tablet by mouth daily 90 tablet 3    carvedilol (COREG) 25 MG tablet Take 1 tablet by mouth 2 times daily 180 tablet 3    metFORMIN (GLUCOPHAGE) 500 MG tablet Take 1 tablet by mouth 2 times daily (with meals) 180 tablet 3    torsemide (DEMADEX) 20 MG tablet Take 1 tablet by mouth daily 90 tablet 3    LACTOBACILLUS PO Take by mouth every morning      MULTIPLE VITAMIN PO Take by mouth daily      glucose blood VI test strips (ASCENSIA AUTODISC VI;ONE TOUCH ULTRA TEST VI) strip Please replace with True Test Strips test 3 times daily 100 each 3    Misc. Devices (COMMODE BEDSIDE) MISC Use daily as needed 1 each 0     No facility-administered encounter medications on file as of 12/14/2017. Objective  Vitals:    12/14/17 0906   BP: 124/65   Pulse: 81   Resp: 18   Temp: 98.2 °F (36.8 °C)   SpO2: 96%   Weight: 267 lb (121.1 kg)   Height: 6' (1.829 m)     Lab Results   Component Value Date    LABA1C 6.1 (H) 11/20/2017     Physical Exam   Constitutional: He is oriented to person, place, and time and well-developed, well-nourished, and in no distress. Obese   Cardiovascular: Normal rate, regular rhythm and normal heart sounds. No murmur heard. Trace bilateral lower extremity edema, naye hose on   Pulmonary/Chest: Effort normal and breath sounds normal. He has no wheezes. Abdominal: Soft. Bowel sounds are normal. There is no tenderness. Musculoskeletal: Normal range of motion. He exhibits no tenderness.    Using walker/wheelchair   Neurological: He is alert and oriented to

## 2017-12-18 ENCOUNTER — APPOINTMENT (OUTPATIENT)
Dept: PHYSICAL THERAPY | Age: 62
End: 2017-12-18
Payer: COMMERCIAL

## 2017-12-22 ENCOUNTER — APPOINTMENT (OUTPATIENT)
Dept: PHYSICAL THERAPY | Age: 62
End: 2017-12-22
Payer: COMMERCIAL

## 2017-12-26 ENCOUNTER — APPOINTMENT (OUTPATIENT)
Dept: PHYSICAL THERAPY | Age: 62
End: 2017-12-26
Payer: COMMERCIAL

## 2017-12-29 ENCOUNTER — APPOINTMENT (OUTPATIENT)
Dept: PHYSICAL THERAPY | Age: 62
End: 2017-12-29
Payer: COMMERCIAL

## 2018-01-02 ENCOUNTER — OFFICE VISIT (OUTPATIENT)
Dept: FAMILY MEDICINE CLINIC | Age: 63
End: 2018-01-02

## 2018-01-02 ENCOUNTER — CLINICAL DOCUMENTATION (OUTPATIENT)
Dept: PHYSICAL THERAPY | Age: 63
End: 2018-01-02

## 2018-01-02 VITALS
TEMPERATURE: 97.2 F | WEIGHT: 271.6 LBS | OXYGEN SATURATION: 98 % | HEART RATE: 90 BPM | RESPIRATION RATE: 20 BRPM | DIASTOLIC BLOOD PRESSURE: 70 MMHG | BODY MASS INDEX: 36.79 KG/M2 | HEIGHT: 72 IN | SYSTOLIC BLOOD PRESSURE: 116 MMHG

## 2018-01-02 DIAGNOSIS — Z98.61 CAD S/P PERCUTANEOUS CORONARY ANGIOPLASTY: Chronic | ICD-10-CM

## 2018-01-02 DIAGNOSIS — M62.81 RIGHT-SIDED MUSCLE WEAKNESS: Chronic | ICD-10-CM

## 2018-01-02 DIAGNOSIS — R60.9 DEPENDENT EDEMA: Chronic | ICD-10-CM

## 2018-01-02 DIAGNOSIS — M62.08 DIASTASIS RECTI: ICD-10-CM

## 2018-01-02 DIAGNOSIS — I10 ESSENTIAL HYPERTENSION: Chronic | ICD-10-CM

## 2018-01-02 DIAGNOSIS — I63.9 CEREBROVASCULAR ACCIDENT (CVA), UNSPECIFIED MECHANISM (HCC): Primary | ICD-10-CM

## 2018-01-02 DIAGNOSIS — I25.10 CAD S/P PERCUTANEOUS CORONARY ANGIOPLASTY: Chronic | ICD-10-CM

## 2018-01-02 DIAGNOSIS — R26.81 UNSTEADY GAIT: Chronic | ICD-10-CM

## 2018-01-02 DIAGNOSIS — E66.01 MORBID OBESITY (HCC): ICD-10-CM

## 2018-01-02 DIAGNOSIS — E11.8 TYPE 2 DIABETES MELLITUS WITH COMPLICATION, WITHOUT LONG-TERM CURRENT USE OF INSULIN (HCC): Chronic | ICD-10-CM

## 2018-01-02 DIAGNOSIS — G47.33 OSA (OBSTRUCTIVE SLEEP APNEA): Chronic | ICD-10-CM

## 2018-01-02 DIAGNOSIS — L72.3 SEBACEOUS CYST: ICD-10-CM

## 2018-01-02 PROCEDURE — 99214 OFFICE O/P EST MOD 30 MIN: CPT | Performed by: FAMILY MEDICINE

## 2018-01-02 RX ORDER — CLOPIDOGREL BISULFATE 75 MG/1
75 TABLET ORAL DAILY
Qty: 90 TABLET | Refills: 3 | Status: SHIPPED | OUTPATIENT
Start: 2018-01-02 | End: 2019-04-08 | Stop reason: SDUPTHER

## 2018-01-02 ASSESSMENT — ENCOUNTER SYMPTOMS
COUGH: 0
ABDOMINAL PAIN: 0
VOMITING: 0
WHEEZING: 0
DIARRHEA: 0
NAUSEA: 0
CHEST TIGHTNESS: 0
SHORTNESS OF BREATH: 0
CONSTIPATION: 0

## 2018-01-02 NOTE — PROGRESS NOTES
[facility]    [x]  [address]  []    [address]     [address], [address]    [address] [facility], [address] [place] [address]  Ph: [phone]     Ph: [phone]  Fax: [phone]     Fax: [phone]    [] Certification  [] Recertification []  Plan of Care  [] Progress Note [x] Discharge    Date: 2018  Patient Name: Rubens Bill  : 1955  MRN: [id number]    To:   Dr. Emily Johnson     From: Uday Chery PT     [x]    FYI:      [x]   Patient has not been seen in PT since 17 due to hospitalization and skilled therapy. Patient will be discharged from outpatient therapy due to > 30 day lapse in treatment. Pt has new order. Will require new evaluation. Pt was seen for evaluation only. Please use original POC for last objective measurements. Thank you for your referral and the opportunity to treat this patient. Please contact us with any questions or concerns.     Electronically signed by Uday Chery PT on 2018 at 11:00 AM

## 2018-01-03 ENCOUNTER — HOSPITAL ENCOUNTER (OUTPATIENT)
Dept: PHYSICAL THERAPY | Age: 63
Setting detail: THERAPIES SERIES
Discharge: HOME | End: 2018-01-03
Payer: COMMERCIAL

## 2018-01-03 PROCEDURE — G8979 MOBILITY GOAL STATUS: HCPCS

## 2018-01-03 PROCEDURE — G8978 MOBILITY CURRENT STATUS: HCPCS

## 2018-01-03 PROCEDURE — 97163 PT EVAL HIGH COMPLEX 45 MIN: CPT

## 2018-01-03 ASSESSMENT — PAIN DESCRIPTION - LOCATION: LOCATION: HAND;NECK

## 2018-01-03 ASSESSMENT — PAIN DESCRIPTION - PAIN TYPE: TYPE: CHRONIC PAIN

## 2018-01-03 ASSESSMENT — PAIN SCALES - GENERAL: PAINLEVEL_OUTOF10: 5

## 2018-01-03 ASSESSMENT — PAIN DESCRIPTION - ORIENTATION: ORIENTATION: POSTERIOR

## 2018-01-03 ASSESSMENT — PAIN DESCRIPTION - DESCRIPTORS: DESCRIPTORS: CONSTANT;TIGHTNESS;SHARP

## 2018-01-03 ASSESSMENT — PAIN DESCRIPTION - FREQUENCY: FREQUENCY: INTERMITTENT

## 2018-01-03 NOTE — PROGRESS NOTES
Allergies  Current Outpatient Prescriptions on File Prior to Encounter   Medication Sig Dispense Refill    clopidogrel (PLAVIX) 75 MG tablet Take 1 tablet by mouth daily 90 tablet 3    aspirin EC 81 MG EC tablet Take 2 tablets by mouth daily 30 tablet 3    ibuprofen (ADVIL) 200 MG tablet Take 2 tablets by mouth every 8 hours as needed for Pain (headache) 120 tablet 3    glimepiride (AMARYL) 1 MG tablet Take 1 tablet by mouth every morning (before breakfast) 90 tablet 3    allopurinol (ZYLOPRIM) 300 MG tablet Take 300 mg by mouth daily       ammonium lactate (AMLACTIN) 12 % cream APPLY TO DRY CALLUS AREAS 1 TO 2 TIMES DAILY  5    atorvastatin (LIPITOR) 40 MG tablet Take 1 tablet by mouth daily 90 tablet 3    carvedilol (COREG) 25 MG tablet Take 1 tablet by mouth 2 times daily 180 tablet 3    metFORMIN (GLUCOPHAGE) 500 MG tablet Take 1 tablet by mouth 2 times daily (with meals) 180 tablet 3    glucose blood VI test strips (ASCENSIA AUTODISC VI;ONE TOUCH ULTRA TEST VI) strip Please replace with True Test Strips test 3 times daily 100 each 3    torsemide (DEMADEX) 20 MG tablet Take 1 tablet by mouth daily 90 tablet 3    Misc. Devices (COMMODE BEDSIDE) MISC Use daily as needed 1 each 0    LACTOBACILLUS PO Take by mouth every morning      MULTIPLE VITAMIN PO Take by mouth daily       No current facility-administered medications on file prior to encounter. Subjective  Subjective: Pt with new CVA 11/12/17. Pt received TPA. States was in ICU and right side was affected. Pt with rehab stay at [facility] x5 weeks. Pt with sebaceous cyst Rt mid-back, sees surgeon on 1/5/18. Pt with h/o cerebellar CVA 7/27/16. Pt reports increased difficulty advancing Rt LE. Pt with no sig change with vestibular symptoms, continues to be limiting. Pt states no longer experiencing diplopia. Uses eye patch to decrease visual stimulus and avoid motion sensitivity. c/o dizziness with change of position.    Additional goal 4: Improve bilateral LE strength >/= 4+/5 to improve activity tolerance and balance     Plan:  Plan  Times per week: 2  Plan weeks: 8  Current Treatment Recommendations: Strengthening, ROM, Balance Training, Functional Mobility Training, Transfer Training, Endurance Training, Gait Training, Stair training, Neuromuscular Re-education, Manual Therapy - Soft Tissue Mobilization, Manual Therapy - Joint Manipulation, Home Exercise Program, Safety Education & Training, Patient/Caregiver Education & Training, Equipment Evaluation, Education, & procurement, Modalities  Plan Comment: consider aquatic therapy        Evaluation and patient rights have been reviewed and patient agrees with plan of care. Yes  [x]  No  []   Explain:     Signature: Electronically signed by Ailyn Hernandez PT on 1/3/2018 at 3:18 PM    PT Individual Minutes  Time In: 2737  Time Out: 1503  Minutes: 51  Timed Code Treatment Minutes: 0 Minutes  Procedure Minutes: 46    [place] Fall Risk Assessment  Risk Factor Scale  Score   History of Falls [x] Yes  [] No 25  0 25   Secondary Diagnosis [] Yes  [x] No 15  0 0   Ambulatory Aid [] Furniture  [x] Crutches/cane/walker  [] None/bedrest/wheelchair/nurse 30  15  0 15   IV/Heparin Lock [] Yes  [x] No 20  0 0   Gait/Transferring [x] Impaired  [] Weak  [] Normal/bedrest/immobile 20  10  0 20   Mental Status [] Forgets limitations  [x] Oriented to own ability 15  0 0      Total: 60     Based on the Assessment score: check the appropriate box.   []  No intervention needed   Low =   Score of 0-24  []  Use standard prevention interventions Moderate =  Score of 24-44   [] Discuss fall prevention strategies   [] Indicate moderate falls risk on eval  [x]  Use high risk prevention interventions High = Score of 45 and higher   [x] Discuss fall prevention strategies   [x] Provide supervision during treatment time

## 2018-01-05 ENCOUNTER — OFFICE VISIT (OUTPATIENT)
Dept: SURGERY | Age: 63
End: 2018-01-05

## 2018-01-05 VITALS
SYSTOLIC BLOOD PRESSURE: 122 MMHG | WEIGHT: 273 LBS | DIASTOLIC BLOOD PRESSURE: 70 MMHG | BODY MASS INDEX: 36.98 KG/M2 | TEMPERATURE: 97.7 F | HEIGHT: 72 IN

## 2018-01-05 DIAGNOSIS — L02.212 CUTANEOUS ABSCESS OF BACK EXCLUDING BUTTOCKS: ICD-10-CM

## 2018-01-05 DIAGNOSIS — L72.9 SKIN CYST: Primary | ICD-10-CM

## 2018-01-05 PROCEDURE — G8598 ASA/ANTIPLAT THER USED: HCPCS | Performed by: SURGERY

## 2018-01-05 PROCEDURE — 99213 OFFICE O/P EST LOW 20 MIN: CPT | Performed by: SURGERY

## 2018-01-05 PROCEDURE — 1036F TOBACCO NON-USER: CPT | Performed by: SURGERY

## 2018-01-05 PROCEDURE — 3017F COLORECTAL CA SCREEN DOC REV: CPT | Performed by: SURGERY

## 2018-01-05 PROCEDURE — G8427 DOCREV CUR MEDS BY ELIG CLIN: HCPCS | Performed by: SURGERY

## 2018-01-05 PROCEDURE — G8417 CALC BMI ABV UP PARAM F/U: HCPCS | Performed by: SURGERY

## 2018-01-05 PROCEDURE — G8484 FLU IMMUNIZE NO ADMIN: HCPCS | Performed by: SURGERY

## 2018-01-05 ASSESSMENT — ENCOUNTER SYMPTOMS
EYES NEGATIVE: 1
CONSTIPATION: 0
RHINORRHEA: 0
ABDOMINAL PAIN: 0
ABDOMINAL DISTENTION: 0
COLOR CHANGE: 0
SHORTNESS OF BREATH: 0
CHEST TIGHTNESS: 0
ALLERGIC/IMMUNOLOGIC NEGATIVE: 1
BLOOD IN STOOL: 0

## 2018-01-05 NOTE — PROGRESS NOTES
Subjective:      Patient ID: Starla Chamorro is a 58 y.o. male. HPI Starla Chamorro is a 58 y.o. male seen at the request of Macrina TREJO for a soft tissue lesions of the back x 2. It has been there for 4 weeks and has been getting larger. It is painful. The patient admits to infection/inflammation. There is a history of previous surgery at this site(I&D). The patient  has similar lesions. Testing has not been done. He is on Plavix for several CVAs. He wants the cysts removed    Review of Systems   Constitutional: Negative for activity change, appetite change and unexpected weight change. HENT: Negative for congestion, nosebleeds, postnasal drip, rhinorrhea and sneezing. Eyes: Negative. Negative for visual disturbance. Respiratory: Negative for chest tightness and shortness of breath. Cardiovascular: Negative for chest pain and leg swelling. Gastrointestinal: Negative for abdominal distention, abdominal pain, blood in stool and constipation. Endocrine: Negative. Genitourinary: Negative for difficulty urinating. Musculoskeletal: Positive for gait problem. Negative for arthralgias and myalgias. Skin: Negative for color change. Allergic/Immunologic: Negative. Neurological: Positive for speech difficulty and weakness. Negative for dizziness, light-headedness, numbness and headaches. Hematological: Does not bruise/bleed easily. Psychiatric/Behavioral: Negative for sleep disturbance. Objective:   Physical Exam   Pulmonary/Chest:             /70   Temp 97.7 °F (36.5 °C) (Temporal)   Ht 6' (1.829 m)   Wt 273 lb (123.8 kg)   BMI 37.03 kg/m²   Assessment:      Back cyst x 2      Plan:        Excision of back cysts x 2 in then office      The options of therapy were discussed. The procedure of the excision as well as potential risks and complications were discussed including but not exclusive to recurrence, infection, difficulty with wound healing and blood loss.  He understands and is agreeable to the surgery.    Stop Plavix for 7 days

## 2018-01-08 DIAGNOSIS — R60.0 BILATERAL LOWER EXTREMITY EDEMA: ICD-10-CM

## 2018-01-08 RX ORDER — TORSEMIDE 20 MG/1
20 TABLET ORAL DAILY
Qty: 90 TABLET | Refills: 3 | Status: SHIPPED | OUTPATIENT
Start: 2018-01-08 | End: 2019-01-02 | Stop reason: SDUPTHER

## 2018-01-11 ENCOUNTER — HOSPITAL ENCOUNTER (OUTPATIENT)
Dept: PHYSICAL THERAPY | Age: 63
Setting detail: THERAPIES SERIES
Discharge: HOME | End: 2018-01-11
Payer: COMMERCIAL

## 2018-01-11 PROCEDURE — 97112 NEUROMUSCULAR REEDUCATION: CPT

## 2018-01-11 PROCEDURE — 97110 THERAPEUTIC EXERCISES: CPT

## 2018-01-11 ASSESSMENT — PAIN DESCRIPTION - LOCATION: LOCATION: NECK

## 2018-01-11 ASSESSMENT — PAIN DESCRIPTION - DESCRIPTORS: DESCRIPTORS: SORE

## 2018-01-11 ASSESSMENT — PAIN DESCRIPTION - PAIN TYPE: TYPE: CHRONIC PAIN

## 2018-01-11 NOTE — PROGRESS NOTES
[address] [address]  Outpatient Physical Therapy    Treatment Note        Date: 2018  Patient: Rubens Bill  : 1955  ACCT #: [de-identified]  Referring Practitioner: Jennyfer Cobos CNP   Diagnosis: cerebral infarction with muscle weakness     Visit Information:  PT Visit Information  Onset Date: 17  PT Insurance Information: Medical [place]   Total # of Visits Approved: 60 (PT/ OT/ chiro )  Total # of Visits to Date: 1  No Show: 0  Canceled Appointment: 0  Progress Note Counter: 2/10    Subjective: Has follow up appt on 1/15/17. Uses [facility] inside house, [employer] outside of house. Improved vision d/t Cataract removal but occ vision d/t vestibular issues. HEP Compliance:  Initiated today.     Vital Signs  Patient Currently in Pain: Yes   Pain Screening  Patient Currently in Pain: Yes  Pain Assessment  Pain Type: Chronic pain  Pain Location: Neck  Pain Descriptors: Sore    OBJECTIVE:   Exercises  Exercise 1: Scap retractions, shoulder rolls x10ea  Exercise 3: SLR Bx10 ( Max A on R), clams ( Min A on R)  Exercise 4: Nu step at level 3 for 5' for increased LE/core strength  Exercise 5: Static stand unsupported up to 15'' x3, static stand supported up to 2', 1'  Exercise 7: Smooth pursuits 2/20'', V/H (4/5 dizzy/nausea)          Ambulation 1  Device: Rolling Walker  Assistance: Stand by assistance  Quality of Gait: Decreased B foot clearance, decreased janeth  Distance: 40ft              Strength: [x] NT  [] MMT completed:     ROM: [x] NT  [] ROM measurements:       *Indicates exercise, modality, or manual techniques to be initiated when appropriate    Assessment:   Activity Tolerance  Activity Tolerance: Patient Tolerated treatment well  Activity Tolerance: C/o dizziness    Body structures, Functions, Activity limitations: Decreased functional mobility , Decreased strength, Decreased coordination, Decreased endurance, Decreased sensation, Decreased balance, Decreased ROM  Assessment: Initiated exercises to improve balance and mobility. Increased nausea with smooth pursuits only able to tolerate up to 20''. Focusing/targeting improving symptoms throughout transfers. Good tolerance to tx, occ seated RBs with standing tasks. Treatment Diagnosis: gait abnormality, imbalance, dizziness, LE weakness  Prognosis: Good       Goals:  Short term goals  Time Frame for Short term goals: 4 wks   Short term goal 1: Independent with HEP   Short term goal 2: Report 25% reduction in symptoms   Short term goal 3: Anderson >/= 25/56 to demonstrate improved static balance and decreased risk for falls   Short term goal 4: Ambulate >/= 100' independently with ww     Long term goals  Time Frame for Long term goals : 8 wks   Long term goal 1: Report >/= 50% reduction in overall symptoms to increase activity level   Long term goal 2: Anderson >/= 35/56 to demonstrate improved balance and decreased risk for falls   Long term goal 3: Ambulate >/= 25' with str cane with SBA with good safety awareness   Long term goal 4: Improve bilateral LE strength >/= 4+/5 to improve activity tolerance and balance   Progress toward goals: Strength, ambulate    POST-PAIN       Pain Rating (0-10 pain scale):   7/10   Location and pain description same as pre-treatment unless indicated. Action: [] NA   [] Perform HEP  [x] Meds as prescribed  [] Modalities as prescribed   [] Call Physician     Frequency/Duration:  Plan  Times per week: 2  Plan weeks: 8  Current Treatment Recommendations: Strengthening, ROM, Balance Training, Functional Mobility Training, Transfer Training, Endurance Training, Gait Training, Stair training, Neuromuscular Re-education, Manual Therapy - Soft Tissue Mobilization, Manual Therapy - Joint Manipulation, Home Exercise Program, Safety Education & Training, Patient/Caregiver Education & Training, Equipment Evaluation, Education, & procurement, Modalities  Plan Comment: consider aquatic therapy      Pt to continue current HEP.   See objective

## 2018-01-16 ENCOUNTER — HOSPITAL ENCOUNTER (OUTPATIENT)
Dept: PHYSICAL THERAPY | Age: 63
Setting detail: THERAPIES SERIES
Discharge: HOME | End: 2018-01-16
Payer: COMMERCIAL

## 2018-01-16 PROCEDURE — 97140 MANUAL THERAPY 1/> REGIONS: CPT

## 2018-01-16 PROCEDURE — 97110 THERAPEUTIC EXERCISES: CPT

## 2018-01-16 ASSESSMENT — PAIN DESCRIPTION - ORIENTATION: ORIENTATION: RIGHT;LEFT

## 2018-01-16 ASSESSMENT — PAIN SCALES - GENERAL: PAINLEVEL_OUTOF10: 6

## 2018-01-16 ASSESSMENT — PAIN DESCRIPTION - DESCRIPTORS: DESCRIPTORS: ACHING;TIGHTNESS

## 2018-01-16 ASSESSMENT — PAIN DESCRIPTION - LOCATION: LOCATION: NECK;SHOULDER;HAND

## 2018-01-16 ASSESSMENT — PAIN DESCRIPTION - PAIN TYPE: TYPE: CHRONIC PAIN

## 2018-01-18 ENCOUNTER — HOSPITAL ENCOUNTER (OUTPATIENT)
Dept: PHYSICAL THERAPY | Age: 63
Setting detail: THERAPIES SERIES
Discharge: HOME | End: 2018-01-18
Payer: COMMERCIAL

## 2018-01-18 PROCEDURE — 97110 THERAPEUTIC EXERCISES: CPT

## 2018-01-18 PROCEDURE — 97112 NEUROMUSCULAR REEDUCATION: CPT

## 2018-01-18 ASSESSMENT — PAIN DESCRIPTION - DESCRIPTORS: DESCRIPTORS: ACHING

## 2018-01-18 ASSESSMENT — PAIN DESCRIPTION - ORIENTATION: ORIENTATION: RIGHT;LEFT

## 2018-01-18 ASSESSMENT — PAIN DESCRIPTION - LOCATION: LOCATION: NECK;HAND;SHOULDER

## 2018-01-18 ASSESSMENT — PAIN SCALES - GENERAL: PAINLEVEL_OUTOF10: 3

## 2018-01-18 NOTE — PROGRESS NOTES
[address] [address]  Outpatient Physical Therapy    Treatment Note        Date: 2018  Patient: Fran Garcia  : 1955  ACCT #: [de-identified]  Referring Practitioner: Shari Judge CNP   Diagnosis: cerebral infarction with muscle weakness     Visit Information:  PT Visit Information  Onset Date: 17  PT Insurance Information: Medical [place]   Total # of Visits Approved: 60 (PT/ OT/ chiro )  Total # of Visits to Date: 4  No Show: 0  Canceled Appointment: 0  Progress Note Counter: 4/10    Subjective: Pt reports feeling dizziness, nausea, weakness after last session, lasted 2 hrs, slept until 4pm yesterday and felt no pain yesterday     HEP Compliance:  [x] Good [x] Fair [] Poor [] Reports not doing due to:    Vital Signs  Patient Currently in Pain: Yes   Pain Screening  Patient Currently in Pain: Yes  Pain Assessment  Pain Assessment: 0-10  Pain Level: 3  Pain Location: Neck;Hand;Shoulder  Pain Orientation: Right;Left  Pain Descriptors: Aching (with use sharp)    OBJECTIVE:   Exercises  Exercise 1: Scap retractions, shoulder rolls x10ea with c/o pain  with scap retxs  Exercise 2:  cervical ROM , rot, flex/ext x 5-10, slight dizziness with flex/ext  Exercise 3:  chin tucks , 3s x 10  Exercise 4: SLR rt, max A x 10, no eccentric control  Exercise 5: modified juarez rt le , tapping on mat x 5 with control  Exercise 8: single steping over sc x 10   Exercise 9:  wt shifts: in/out 6\" box  Exercise 13: Nu step L2 x 5 min  Exercise 14: rt gastroc str. 20s x 2  Exercise 15: Gait drills: focus on heel strike and step length  Exercise 20: HEP: gastroc str. *Indicates exercise, modality, or manual techniques to be initiated when appropriate    Assessment:         Body structures, Functions, Activity limitations: Decreased functional mobility , Decreased strength, Decreased coordination, Decreased endurance, Decreased sensation, Decreased balance, Decreased ROM  Assessment: Pt with decreased rt shoulder therapeutic exercise changes, additions or modifications this date.          PT Individual Minutes  Time In: 3325  Time Out: 1400  Minutes: 58      Signature:  Electronically signed by Suzanne Ahn PTA on 1/18/18 at 11:59 AM

## 2018-01-23 ENCOUNTER — HOSPITAL ENCOUNTER (OUTPATIENT)
Dept: PHYSICAL THERAPY | Age: 63
Setting detail: THERAPIES SERIES
Discharge: HOME | End: 2018-01-23
Payer: COMMERCIAL

## 2018-01-23 PROCEDURE — 97110 THERAPEUTIC EXERCISES: CPT

## 2018-01-23 ASSESSMENT — PAIN DESCRIPTION - LOCATION: LOCATION: NECK;SHOULDER

## 2018-01-23 ASSESSMENT — PAIN DESCRIPTION - DESCRIPTORS: DESCRIPTORS: ACHING

## 2018-01-23 ASSESSMENT — PAIN SCALES - GENERAL: PAINLEVEL_OUTOF10: 6

## 2018-01-23 ASSESSMENT — PAIN DESCRIPTION - PAIN TYPE: TYPE: CHRONIC PAIN

## 2018-01-23 ASSESSMENT — PAIN DESCRIPTION - ORIENTATION: ORIENTATION: RIGHT;LEFT

## 2018-01-23 NOTE — PROGRESS NOTES
[id number] [address]  Outpatient Physical Therapy    Treatment Note        Date: 2018  Patient: Autumn Damico  : 1955  ACCT #: [de-identified]  Referring Practitioner: Buel Heimlich, CNP   Diagnosis: cerebral infarction with muscle weakness     Visit Information:  PT Visit Information  Onset Date: 17  PT Insurance Information: Medical [place]   Total # of Visits Approved: 60 (PT/ OT/ chiro )  Total # of Visits to Date: 5  No Show: 0  Canceled Appointment: 0  Progress Note Counter: 5/10    Subjective: Reports compliance with HEP. Increased nausea today at rest.      HEP Compliance:  [x] Good [] Fair [] Poor [] Reports not doing due to:    Vital Signs  Patient Currently in Pain: Yes   Pain Screening  Patient Currently in Pain: Yes  Pain Assessment  Pain Level: 6  Pain Type: Chronic pain  Pain Location: Neck; Shoulder  Pain Orientation: Right;Left  Pain Descriptors: Aching    OBJECTIVE:   Exercises  Exercise 4: SLR Right, max A x 10, no eccentric control  Exercise 13: Nu step L3 x 5 min  Exercise 14: Clams on R x10  Exercise 15: Gait: focus on heel strike, forward, retro  Exercise 16: Bridges x10  Exercise 17: Hooklying marches x10 (AAROM on R)  Exercise 18: Gastroc stretch with belt 3/30''      Strength: [] NT  [x] MMT completed:  Strength RLE  R Hip Flexion: 3/5                ROM: [x] NT  [] ROM measurements:      *Indicates exercise, modality, or manual techniques to be initiated when appropriate    Assessment:   Activity Tolerance  Activity Tolerance: Limited by nausea    Body structures, Functions, Activity limitations: Decreased functional mobility , Decreased strength, Decreased coordination, Decreased endurance, Decreased sensation, Decreased balance, Decreased ROM  Assessment: Oculomotor exercises held d/t nausea to begin with. Increased nausea with retro stepping however good foot clearance and step length with gait drills.  Continues to display difficulty with SLR requiring Max A although improving R hip flexion strength with MMT. Treatment Diagnosis: gait abnormality, imbalance, dizziness, LE weakness  Prognosis: Good       Goals:  Short term goals  Time Frame for Short term goals: 4 wks   Short term goal 1: Independent with HEP   Short term goal 2: Report 25% reduction in symptoms   Short term goal 3: Anderson >/= 25/56 to demonstrate improved static balance and decreased risk for falls   Short term goal 4: Ambulate >/= 100' independently with ww     Long term goals  Time Frame for Long term goals : 8 wks   Long term goal 1: Report >/= 50% reduction in overall symptoms to increase activity level   Long term goal 2: Anderson >/= 35/56 to demonstrate improved balance and decreased risk for falls   Long term goal 3: Ambulate >/= 25' with str cane with SBA with good safety awareness   Long term goal 4: Improve bilateral LE strength >/= 4+/5 to improve activity tolerance and balance   Progress toward goals: Strength,  balance    POST-PAIN       Pain Rating (0-10 pain scale):   6/10;  Nausea, headache   Location and pain description same as pre-treatment unless indicated. Action: [] NA   [] Perform HEP  [x] Meds as prescribed  [] Modalities as prescribed   [] Call Physician     Frequency/Duration:  Plan  Times per week: 2  Plan weeks: 8  Current Treatment Recommendations: Strengthening, ROM, Balance Training, Functional Mobility Training, Transfer Training, Endurance Training, Gait Training, Stair training, Neuromuscular Re-education, Manual Therapy - Soft Tissue Mobilization, Manual Therapy - Joint Manipulation, Home Exercise Program, Safety Education & Training, Patient/Caregiver Education & Training, Equipment Evaluation, Education, & procurement, Modalities  Plan Comment: consider aquatic therapy      Pt to continue current HEP. See objective section for any therapeutic exercise changes, additions or modifications this date.          PT Individual Minutes  Time In: 1067  Time Out: 7144  Minutes:

## 2018-01-25 ENCOUNTER — APPOINTMENT (OUTPATIENT)
Dept: PHYSICAL THERAPY | Age: 63
End: 2018-01-25
Payer: COMMERCIAL

## 2018-01-25 ENCOUNTER — PROCEDURE VISIT (OUTPATIENT)
Dept: SURGERY | Age: 63
End: 2018-01-25
Payer: COMMERCIAL

## 2018-01-25 VITALS
DIASTOLIC BLOOD PRESSURE: 80 MMHG | HEIGHT: 72 IN | WEIGHT: 266.4 LBS | SYSTOLIC BLOOD PRESSURE: 122 MMHG | TEMPERATURE: 99 F | BODY MASS INDEX: 36.08 KG/M2

## 2018-01-25 DIAGNOSIS — L08.9 SKIN INFECTION: ICD-10-CM

## 2018-01-25 DIAGNOSIS — L72.9 SKIN CYST: ICD-10-CM

## 2018-01-25 DIAGNOSIS — L72.9 SKIN CYST: Primary | ICD-10-CM

## 2018-01-25 PROCEDURE — 12034 INTMD RPR S/TR/EXT 7.6-12.5: CPT | Performed by: SURGERY

## 2018-01-25 PROCEDURE — 11402 EXC TR-EXT B9+MARG 1.1-2 CM: CPT | Performed by: SURGERY

## 2018-01-25 PROCEDURE — 11401 EXC TR-EXT B9+MARG 0.6-1 CM: CPT | Performed by: SURGERY

## 2018-01-25 RX ORDER — HYDROCODONE BITARTRATE AND ACETAMINOPHEN 5; 325 MG/1; MG/1
TABLET ORAL
Qty: 20 TABLET | Refills: 0 | Status: SHIPPED | OUTPATIENT
Start: 2018-01-25 | End: 2018-02-01

## 2018-01-25 ASSESSMENT — ENCOUNTER SYMPTOMS
CHEST TIGHTNESS: 0
ALLERGIC/IMMUNOLOGIC NEGATIVE: 1
BLOOD IN STOOL: 0
RHINORRHEA: 0
ABDOMINAL PAIN: 0
COLOR CHANGE: 0
CONSTIPATION: 0
EYES NEGATIVE: 1
SHORTNESS OF BREATH: 0
ABDOMINAL DISTENTION: 0

## 2018-01-25 NOTE — PROGRESS NOTES
intermediate closure of 4 cm was done using 2-0 viacryl for the deep fascia, 3-0 viacryl, 4-0 monocryl, and 5-0 nylon. Antibiotic ointment was applied and a DSD was placed on the wound. Blood loss was minimal . He tolerated the procedure well. Care instructions were given. A prescription was given for [place]. Return visit will be scheduled for 2 week(s). He was instructed to call for fever over 101, wound redness, drainage or any other concerns.

## 2018-01-30 ENCOUNTER — HOSPITAL ENCOUNTER (OUTPATIENT)
Dept: PHYSICAL THERAPY | Age: 63
Setting detail: THERAPIES SERIES
Discharge: HOME | End: 2018-01-30
Payer: COMMERCIAL

## 2018-01-30 PROCEDURE — 97112 NEUROMUSCULAR REEDUCATION: CPT

## 2018-01-30 PROCEDURE — 97110 THERAPEUTIC EXERCISES: CPT

## 2018-01-30 ASSESSMENT — PAIN DESCRIPTION - ORIENTATION: ORIENTATION: MID;LOWER

## 2018-01-30 ASSESSMENT — PAIN DESCRIPTION - PAIN TYPE: TYPE: CHRONIC PAIN

## 2018-01-30 ASSESSMENT — PAIN DESCRIPTION - LOCATION: LOCATION: BACK

## 2018-01-30 ASSESSMENT — PAIN DESCRIPTION - DESCRIPTORS: DESCRIPTORS: ACHING;SORE

## 2018-01-30 ASSESSMENT — PAIN SCALES - GENERAL: PAINLEVEL_OUTOF10: 5

## 2018-01-30 NOTE — PROGRESS NOTES
in open gym today although very sensitive to sound. Increased Anderson score to 22/56. Fair tolerance to tx, nausea with standing activities unsupported. Decreased righting reactions observed, occ Min A to correct. Treatment Diagnosis: gait abnormality, imbalance, dizziness, LE weakness  Prognosis: Good       Goals:  Short term goals  Time Frame for Short term goals: 4 wks   Short term goal 1: Independent with HEP   Short term goal 2: Report 25% reduction in symptoms   Short term goal 3: Anderson >/= 25/56 to demonstrate improved static balance and decreased risk for falls   Short term goal 4: Ambulate >/= 100' independently with ww     Long term goals  Time Frame for Long term goals : 8 wks   Long term goal 1: Report >/= 50% reduction in overall symptoms to increase activity level   Long term goal 2: Anderson >/= 35/56 to demonstrate improved balance and decreased risk for falls   Long term goal 3: Ambulate >/= 25' with str cane with SBA with good safety awareness   Long term goal 4: Improve bilateral LE strength >/= 4+/5 to improve activity tolerance and balance   Progress toward goals: strength, balance    POST-PAIN       Pain Rating (0-10 pain scale):   7/10 ; back/headache/nausea  Location and pain description same as pre-treatment unless indicated. Action: [] NA   [] Perform HEP  [x] Meds as prescribed  [] Modalities as prescribed   [] Call Physician     Frequency/Duration:  Plan  Times per week: 2  Plan weeks: 8  Current Treatment Recommendations: Strengthening, ROM, Balance Training, Functional Mobility Training, Transfer Training, Endurance Training, Gait Training, Stair training, Neuromuscular Re-education, Manual Therapy - Soft Tissue Mobilization, Manual Therapy - Joint Manipulation, Home Exercise Program, Safety Education & Training, Patient/Caregiver Education & Training, Equipment Evaluation, Education, & procurement, Modalities  Plan Comment: consider aquatic therapy      Pt to continue current HEP.   See

## 2018-02-01 ENCOUNTER — HOSPITAL ENCOUNTER (OUTPATIENT)
Dept: PHYSICAL THERAPY | Age: 63
Setting detail: THERAPIES SERIES
Discharge: HOME | End: 2018-02-01
Payer: COMMERCIAL

## 2018-02-01 PROCEDURE — 97112 NEUROMUSCULAR REEDUCATION: CPT

## 2018-02-01 ASSESSMENT — PAIN DESCRIPTION - PAIN TYPE: TYPE: CHRONIC PAIN

## 2018-02-01 ASSESSMENT — PAIN DESCRIPTION - ORIENTATION: ORIENTATION: LOWER;MID

## 2018-02-01 ASSESSMENT — PAIN DESCRIPTION - LOCATION: LOCATION: BACK

## 2018-02-01 ASSESSMENT — PAIN SCALES - GENERAL: PAINLEVEL_OUTOF10: 5

## 2018-02-01 ASSESSMENT — PAIN DESCRIPTION - DESCRIPTORS: DESCRIPTORS: ACHING;SORE

## 2018-02-06 ENCOUNTER — HOSPITAL ENCOUNTER (OUTPATIENT)
Dept: PHYSICAL THERAPY | Age: 63
Setting detail: THERAPIES SERIES
Discharge: HOME | End: 2018-02-06
Payer: COMMERCIAL

## 2018-02-06 PROCEDURE — 97112 NEUROMUSCULAR REEDUCATION: CPT

## 2018-02-06 PROCEDURE — 97110 THERAPEUTIC EXERCISES: CPT

## 2018-02-06 ASSESSMENT — PAIN DESCRIPTION - ORIENTATION: ORIENTATION: MID

## 2018-02-06 ASSESSMENT — PAIN SCALES - GENERAL: PAINLEVEL_OUTOF10: 6

## 2018-02-06 ASSESSMENT — PAIN DESCRIPTION - PAIN TYPE: TYPE: CHRONIC PAIN

## 2018-02-06 ASSESSMENT — PAIN DESCRIPTION - LOCATION: LOCATION: HEAD;NECK;BACK

## 2018-02-06 ASSESSMENT — PAIN DESCRIPTION - DESCRIPTORS: DESCRIPTORS: ACHING

## 2018-02-08 ENCOUNTER — HOSPITAL ENCOUNTER (OUTPATIENT)
Dept: PHYSICAL THERAPY | Age: 63
Setting detail: THERAPIES SERIES
End: 2018-02-08
Payer: COMMERCIAL

## 2018-02-08 ENCOUNTER — OFFICE VISIT (OUTPATIENT)
Dept: SURGERY | Age: 63
End: 2018-02-08
Payer: COMMERCIAL

## 2018-02-08 VITALS
DIASTOLIC BLOOD PRESSURE: 70 MMHG | WEIGHT: 274 LBS | HEIGHT: 72 IN | BODY MASS INDEX: 37.11 KG/M2 | TEMPERATURE: 97.7 F | SYSTOLIC BLOOD PRESSURE: 122 MMHG

## 2018-02-08 DIAGNOSIS — L72.9 SKIN CYST: Primary | ICD-10-CM

## 2018-02-08 DIAGNOSIS — L08.9 SKIN INFECTION: ICD-10-CM

## 2018-02-08 PROCEDURE — 3017F COLORECTAL CA SCREEN DOC REV: CPT | Performed by: SURGERY

## 2018-02-08 PROCEDURE — G8598 ASA/ANTIPLAT THER USED: HCPCS | Performed by: SURGERY

## 2018-02-08 PROCEDURE — G8427 DOCREV CUR MEDS BY ELIG CLIN: HCPCS | Performed by: SURGERY

## 2018-02-08 PROCEDURE — 99212 OFFICE O/P EST SF 10 MIN: CPT | Performed by: SURGERY

## 2018-02-08 PROCEDURE — G8484 FLU IMMUNIZE NO ADMIN: HCPCS | Performed by: SURGERY

## 2018-02-08 PROCEDURE — G8417 CALC BMI ABV UP PARAM F/U: HCPCS | Performed by: SURGERY

## 2018-02-08 PROCEDURE — 1036F TOBACCO NON-USER: CPT | Performed by: SURGERY

## 2018-02-15 ENCOUNTER — HOSPITAL ENCOUNTER (OUTPATIENT)
Dept: PHYSICAL THERAPY | Age: 63
Setting detail: THERAPIES SERIES
Discharge: HOME | End: 2018-02-15
Payer: COMMERCIAL

## 2018-02-15 PROCEDURE — 97110 THERAPEUTIC EXERCISES: CPT

## 2018-02-15 PROCEDURE — 97112 NEUROMUSCULAR REEDUCATION: CPT

## 2018-02-15 ASSESSMENT — PAIN DESCRIPTION - ORIENTATION: ORIENTATION: LEFT

## 2018-02-15 ASSESSMENT — PAIN DESCRIPTION - PAIN TYPE: TYPE: CHRONIC PAIN

## 2018-02-15 ASSESSMENT — PAIN DESCRIPTION - DESCRIPTORS: DESCRIPTORS: ACHING;SORE

## 2018-02-15 ASSESSMENT — PAIN SCALES - GENERAL: PAINLEVEL_OUTOF10: 5

## 2018-02-20 ENCOUNTER — HOSPITAL ENCOUNTER (OUTPATIENT)
Dept: PHYSICAL THERAPY | Age: 63
Setting detail: THERAPIES SERIES
Discharge: HOME | End: 2018-02-20
Payer: COMMERCIAL

## 2018-02-20 PROCEDURE — 97112 NEUROMUSCULAR REEDUCATION: CPT

## 2018-02-20 PROCEDURE — G8978 MOBILITY CURRENT STATUS: HCPCS

## 2018-02-20 PROCEDURE — G8979 MOBILITY GOAL STATUS: HCPCS

## 2018-02-20 ASSESSMENT — PAIN SCALES - GENERAL: PAINLEVEL_OUTOF10: 5

## 2018-02-20 ASSESSMENT — PAIN DESCRIPTION - DESCRIPTORS: DESCRIPTORS: SORE

## 2018-02-20 ASSESSMENT — PAIN DESCRIPTION - PAIN TYPE: TYPE: CHRONIC PAIN

## 2018-02-20 ASSESSMENT — PAIN DESCRIPTION - ORIENTATION: ORIENTATION: LEFT

## 2018-02-20 NOTE — PROGRESS NOTES
22 to 24 however pt challenged with static standing unsupported. Trialed gait training with cane in // bars, pt demo'ing difficulty d/t pattterned floor which affects dizziness. Pt feels he is progressing well with PT, would like to continue to address balance and strength deficits. Treatment Diagnosis: gait abnormality, imbalance, dizziness, LE weakness  Prognosis: Good       Goals:  Short term goals  Time Frame for Short term goals: 4 wks   Short term goal 1: Independent with HEP   Short term goal 2: Report 25% reduction in symptoms   Short term goal 3: Anderson >/= 25/56 to demonstrate improved static balance and decreased risk for falls   Short term goal 4: Ambulate >/= 100' independently with ww     Long term goals  Time Frame for Long term goals : 8 wks   Long term goal 1: Report >/= 50% reduction in overall symptoms to increase activity level   Long term goal 2: Anderson >/= 35/56 to demonstrate improved balance and decreased risk for falls   Long term goal 3: Ambulate >/= 25' with str cane with SBA with good safety awareness   Long term goal 4: Improve bilateral LE strength >/= 4+/5 to improve activity tolerance and balance   Progress toward goals: Balance, strength    POST-PAIN       Pain Rating (0-10 pain scale):  6 /10   Location and pain description same as pre-treatment unless indicated.    Action: [] NA   [] Perform HEP  [x] Meds as prescribed  [] Modalities as prescribed   [] Call Physician     Frequency/Duration:  Plan  Times per week: 2  Plan weeks: 8  Current Treatment Recommendations: Strengthening, ROM, Balance Training, Functional Mobility Training, Transfer Training, Endurance Training, Gait Training, Stair training, Neuromuscular Re-education, Manual Therapy - Soft Tissue Mobilization, Manual Therapy - Joint Manipulation, Home Exercise Program, Safety Education & Training, Patient/Caregiver Education & Training, Equipment Evaluation, Education, & procurement, Modalities  Plan Comment: consider

## 2018-02-20 NOTE — PROGRESS NOTES
[address], [address]     Ph: [phone]  Fax: [phone]    [] Certification  [] Recertification [x]  Plan of Care  [] Progress Note [] Discharge      To:  Referring Practitioner: Zoë Roberts CNP        From:  Blessing Higgins PT  Patient: Darryl Suh          : 1955  Diagnosis: cerebral infarction with muscle weakness      Date: 2018  Treatment Diagnosis: gait abnormality, imbalance, dizziness, LE weakness     Progress Report Period from:  2018  to 2018    Total # of Visits to Date: 10   No Show: 0    Canceled Appointment: 1     OBJECTIVE:   Short Term Goals - Time Frame for Short term goals: 4 wks     Goals Current/Discharge status  Met   Short term goal 1: Independent with HEP   Ongoing [x] yes  [] no   Short term goal 2: Report 25% reduction in symptoms   Reports 30% reduction in symptoms [x] yes  [] no   Short term goal 3: Anderson >/= 25/56 to demonstrate improved static balance and decreased risk for falls   Anderson/56 [] yes  [x] no   Short term goal 4: Ambulate >/= 100' independently with ww   100ft with [employer] Ind[employer]/SUP, decreased stride length/foot clearance [] yes  [x] no     Long Term Goals - Time Frame for Long term goals : 8 wks   Goals Current/ Discharge status Met   Long term goal 1: Report >/= 50% reduction in overall symptoms to increase activity level  Reports 30% improvement [] yes  [x] no   Long term goal 2: Anderson >/= 35/56 to demonstrate improved balance and decreased risk for falls  Anderson/56 [] yes  [x] no   Long term goal 3: Ambulate >/= 25' with str cane with SBA with good safety awareness  Amb with Str cane limited by dizziness in facility.  [] yes  [x] no   Long term goal 4: Improve bilateral LE strength >/= 4+/5 to improve activity tolerance and balance  Strength RLE  R Hip Flexion: 4-/5  R Hip Extension: 3-/5  R Hip ABduction: 3/5, 3+/5 (w/ pain)  Strength LLE  L this plan of care and certify a need for medically necessary rehabilitation services.     Physician Signature:__________________________________________________________  Date:  Please sign and return

## 2018-02-22 ENCOUNTER — HOSPITAL ENCOUNTER (OUTPATIENT)
Dept: PHYSICAL THERAPY | Age: 63
Setting detail: THERAPIES SERIES
Discharge: HOME | End: 2018-02-22
Payer: COMMERCIAL

## 2018-02-22 PROCEDURE — 97110 THERAPEUTIC EXERCISES: CPT

## 2018-02-22 PROCEDURE — 97112 NEUROMUSCULAR REEDUCATION: CPT

## 2018-02-22 ASSESSMENT — PAIN DESCRIPTION - LOCATION: LOCATION: HEAD;NECK

## 2018-02-22 ASSESSMENT — PAIN DESCRIPTION - PAIN TYPE: TYPE: CHRONIC PAIN

## 2018-02-22 ASSESSMENT — PAIN SCALES - GENERAL: PAINLEVEL_OUTOF10: 5

## 2018-02-22 NOTE — PROGRESS NOTES
[address] [address]  Outpatient Physical Therapy    Treatment Note        Date: 2018  Patient: Frank Dias  : 1955  ACCT #: [de-identified]  Referring Practitioner: Carmen Ricahrd CNP   Diagnosis: cerebral infarction with muscle weakness     Visit Information:  PT Visit Information  Onset Date: 17  PT Insurance Information: Medical [place]   Total # of Visits Approved: 60 (PT/ OT/ chiro )  Total # of Visits to Date: 11  No Show: 0  Canceled Appointment: 1  Progress Note Counter: 1/10    Subjective: Pt reports faster recovery after tx last tx, vestibular symptoms lasting ~1 hour vs many hours like previous tx. HEP Compliance:  [x] Good [] Fair [] Poor [] Reports not doing due to:    Vital Signs  Patient Currently in Pain: Yes   Pain Screening  Patient Currently in Pain: Yes  Pain Assessment  Pain Level: 5  Pain Type: Chronic pain  Pain Location: Head;Neck    OBJECTIVE:   Exercises  Exercise 2: 4way hip YTB x10 ea  Exercise 7: Smooth pursuits 2/20'', Vertical(4/5 dizzy/nausea)  Exercise 10: Chest pulls 3 way YTB x10  Exercise 13: Nu step L2 x 5 min         Strength: [x] NT  [] MMT completed:     ROM: [x] NT  [] ROM measurements:       *Indicates exercise, modality, or manual techniques to be initiated when appropriate    Assessment:   Activity Tolerance  Activity Tolerance: Patient Tolerated treatment well    Body structures, Functions, Activity limitations: Decreased functional mobility , Decreased strength, Decreased coordination, Decreased endurance, Decreased sensation, Decreased balance, Decreased ROM  Assessment: Initiated 4 way hip for increased strengthening and endurance. Increased time to complete d/t dizziness symptoms, encouraged pt to target with directional changes. Continues to report increased dizziness/nausea with smooth pursuits, only able to tolerate 20'' before needing a RB. Significant time spent on education on compensation techniques with movements with min carry over. Treatment Diagnosis: gait abnormality, imbalance, dizziness, LE weakness  Prognosis: Good       Goals:  Short term goals  Time Frame for Short term goals: 4 wks   Short term goal 1: Independent with HEP   Short term goal 2: Report 25% reduction in symptoms   Short term goal 3: Anderson >/= 25/56 to demonstrate improved static balance and decreased risk for falls   Short term goal 4: Ambulate >/= 100' independently with ww     Long term goals  Time Frame for Long term goals : 8 wks   Long term goal 1: Report >/= 50% reduction in overall symptoms to increase activity level   Long term goal 2: Anderson >/= 35/56 to demonstrate improved balance and decreased risk for falls   Long term goal 3: Ambulate >/= 25' with str cane with SBA with good safety awareness   Long term goal 4: Improve bilateral LE strength >/= 4+/5 to improve activity tolerance and balance   Progress toward goals: Balance, strength    POST-PAIN       Pain Rating (0-10 pain scale):   6/10   Location and pain description same as pre-treatment unless indicated. Action: [] NA   [] Perform HEP  [x] Meds as prescribed  [] Modalities as prescribed   [] Call Physician     Frequency/Duration:  Plan  Times per week: 2  Plan weeks: 8  Current Treatment Recommendations: Strengthening, ROM, Balance Training, Functional Mobility Training, Transfer Training, Endurance Training, Gait Training, Stair training, Neuromuscular Re-education, Manual Therapy - Soft Tissue Mobilization, Manual Therapy - Joint Manipulation, Home Exercise Program, Safety Education & Training, Patient/Caregiver Education & Training, Equipment Evaluation, Education, & procurement, Modalities  Plan Comment: consider aquatic therapy      Pt to continue current HEP. See objective section for any therapeutic exercise changes, additions or modifications this date.          PT Individual Minutes  Time In: 1503  Time Out: 1600  Minutes: 57  Timed Code Treatment Minutes: 55 Minutes  Procedure

## 2018-02-27 ENCOUNTER — HOSPITAL ENCOUNTER (OUTPATIENT)
Dept: PHYSICAL THERAPY | Age: 63
Setting detail: THERAPIES SERIES
Discharge: HOME | End: 2018-02-27
Payer: COMMERCIAL

## 2018-02-27 ENCOUNTER — HOSPITAL ENCOUNTER (OUTPATIENT)
Dept: LAB | Age: 63
Discharge: HOME | End: 2018-02-27
Payer: COMMERCIAL

## 2018-02-27 DIAGNOSIS — I10 ESSENTIAL HYPERTENSION: Chronic | ICD-10-CM

## 2018-02-27 DIAGNOSIS — E11.8 TYPE 2 DIABETES MELLITUS WITH COMPLICATION, WITHOUT LONG-TERM CURRENT USE OF INSULIN (HCC): Chronic | ICD-10-CM

## 2018-02-27 LAB
ALBUMIN SERPL-MCNC: 4.5 G/DL (ref 3.9–4.9)
ALP BLD-CCNC: 62 U/L (ref 35–104)
ALT SERPL-CCNC: 20 U/L (ref 0–41)
ANION GAP SERPL CALCULATED.3IONS-SCNC: 20 MEQ/L (ref 7–13)
AST SERPL-CCNC: 22 U/L (ref 0–40)
BILIRUB SERPL-MCNC: 0.6 MG/DL (ref 0–1.2)
BUN BLDV-MCNC: 10 MG/DL (ref 8–23)
CALCIUM SERPL-MCNC: 9.6 MG/DL (ref 8.6–10.2)
CHLORIDE BLD-SCNC: 97 MEQ/L (ref 98–107)
CO2: 25 MEQ/L (ref 22–29)
CREAT SERPL-MCNC: 0.85 MG/DL (ref 0.7–1.2)
GFR AFRICAN AMERICAN: >60
GFR NON-AFRICAN AMERICAN: >60
GLOBULIN: 2.7 G/DL (ref 2.3–3.5)
GLUCOSE BLD-MCNC: 117 MG/DL (ref 74–109)
HBA1C MFR BLD: 6.1 % (ref 4.8–5.9)
POTASSIUM SERPL-SCNC: 4.1 MEQ/L (ref 3.5–5.1)
SODIUM BLD-SCNC: 142 MEQ/L (ref 132–144)
TOTAL PROTEIN: 7.2 G/DL (ref 6.4–8.1)

## 2018-02-27 PROCEDURE — 97110 THERAPEUTIC EXERCISES: CPT

## 2018-02-27 PROCEDURE — 36415 COLL VENOUS BLD VENIPUNCTURE: CPT

## 2018-02-27 PROCEDURE — 80053 COMPREHEN METABOLIC PANEL: CPT

## 2018-02-27 PROCEDURE — 83036 HEMOGLOBIN GLYCOSYLATED A1C: CPT

## 2018-02-27 PROCEDURE — 97112 NEUROMUSCULAR REEDUCATION: CPT

## 2018-02-27 ASSESSMENT — PAIN DESCRIPTION - DESCRIPTORS: DESCRIPTORS: SORE;TENDER

## 2018-02-27 ASSESSMENT — PAIN DESCRIPTION - ORIENTATION: ORIENTATION: RIGHT;LEFT

## 2018-02-27 ASSESSMENT — PAIN DESCRIPTION - PAIN TYPE: TYPE: CHRONIC PAIN

## 2018-02-27 ASSESSMENT — PAIN SCALES - GENERAL: PAINLEVEL_OUTOF10: 7

## 2018-02-27 ASSESSMENT — PAIN DESCRIPTION - LOCATION: LOCATION: HIP;BACK;NECK

## 2018-02-27 NOTE — PROGRESS NOTES
therapeutic exercise changes, additions or modifications this date.          PT Individual Minutes  Time In: 9486  Time Out: 1600  Minutes: 58  Timed Code Treatment Minutes: 56 Minutes  Procedure Minutes:0  Neuro: 40  There ex: 16    Signature:  Electronically signed by Woody Ormond, PTA on 2/27/18 at 3:06 PM

## 2018-02-28 PROCEDURE — G8978 MOBILITY CURRENT STATUS: HCPCS

## 2018-02-28 PROCEDURE — G8979 MOBILITY GOAL STATUS: HCPCS

## 2018-02-28 NOTE — PROGRESS NOTES
[address]       Physical Therapy  Cancellation/No-show Note  Patient Name:  Chasidy Hatch  :  1955   Date:  2018  Referring Practitioner: Linnette Hughes CNP   Diagnosis: cerebral infarction with muscle weakness     Visit Information:  PT Visit Information  Onset Date: 17  PT Insurance Information: Medical [place]   Total # of Visits Approved: 60 (PT/ OT/ chiro )  Total # of Visits to Date: 12  No Show: 0  Canceled Appointment: 2  Progress Note Counter: 2/10 (cx for 3/1/18)    For today's appointment patient:  [x]  Cancelled  []  Rescheduled appointment  []  No-show   []  Called pt to remind of next appointment     Reason given by patient:  []  Patient ill  []  Conflicting appointment  []  No transportation    []  Conflict with work  []  No reason given  [x]  Other:  SI pain, migraine     Comments:       Signature: Electronically signed by Amara Layton PTA on 18 at 4:50 PM

## 2018-03-01 ENCOUNTER — HOSPITAL ENCOUNTER (OUTPATIENT)
Dept: PHYSICAL THERAPY | Age: 63
Setting detail: THERAPIES SERIES
Discharge: HOME | End: 2018-03-01
Payer: COMMERCIAL

## 2018-03-12 ENCOUNTER — HOSPITAL ENCOUNTER (OUTPATIENT)
Dept: GENERAL RADIOLOGY | Age: 63
Discharge: HOME | End: 2018-03-14
Payer: COMMERCIAL

## 2018-03-12 ENCOUNTER — HOSPITAL ENCOUNTER (OUTPATIENT)
Age: 63
Discharge: HOME | End: 2018-03-14
Payer: COMMERCIAL

## 2018-03-12 ENCOUNTER — OFFICE VISIT (OUTPATIENT)
Dept: FAMILY MEDICINE CLINIC | Age: 63
End: 2018-03-12
Payer: COMMERCIAL

## 2018-03-12 VITALS
TEMPERATURE: 96.6 F | OXYGEN SATURATION: 98 % | RESPIRATION RATE: 18 BRPM | DIASTOLIC BLOOD PRESSURE: 82 MMHG | HEART RATE: 84 BPM | HEIGHT: 72 IN | SYSTOLIC BLOOD PRESSURE: 132 MMHG

## 2018-03-12 DIAGNOSIS — M79.641 RIGHT HAND PAIN: ICD-10-CM

## 2018-03-12 DIAGNOSIS — M25.562 ACUTE PAIN OF LEFT KNEE: ICD-10-CM

## 2018-03-12 DIAGNOSIS — M25.562 ACUTE PAIN OF LEFT KNEE: Primary | ICD-10-CM

## 2018-03-12 PROCEDURE — 73130 X-RAY EXAM OF HAND: CPT

## 2018-03-12 PROCEDURE — 99213 OFFICE O/P EST LOW 20 MIN: CPT | Performed by: FAMILY MEDICINE

## 2018-03-12 PROCEDURE — 73564 X-RAY EXAM KNEE 4 OR MORE: CPT

## 2018-03-12 RX ORDER — TRAMADOL HYDROCHLORIDE 50 MG/1
50 TABLET ORAL EVERY 8 HOURS PRN
Qty: 21 TABLET | Refills: 0 | Status: SHIPPED | OUTPATIENT
Start: 2018-03-12 | End: 2018-03-19

## 2018-03-12 ASSESSMENT — ENCOUNTER SYMPTOMS
NAUSEA: 0
WHEEZING: 0
VOMITING: 0
ABDOMINAL PAIN: 0
SHORTNESS OF BREATH: 0
CHEST TIGHTNESS: 0

## 2018-03-12 ASSESSMENT — PATIENT HEALTH QUESTIONNAIRE - PHQ9
1. LITTLE INTEREST OR PLEASURE IN DOING THINGS: 0
SUM OF ALL RESPONSES TO PHQ9 QUESTIONS 1 & 2: 0
2. FEELING DOWN, DEPRESSED OR HOPELESS: 0
SUM OF ALL RESPONSES TO PHQ QUESTIONS 1-9: 0

## 2018-03-12 NOTE — PROGRESS NOTES
(CVA) due to occlusion of left middle cerebral artery ([address] 08/2016    brainstem infarction from left MCA occlusion    Cerebrovascular disease 07/27/2016    Coronary angioplasty status     CVA (cerebral vascular accident) ([address] 11/12/2017    Dependent edema 9/14/2017    Diplopia 5/15/2017    Resolved with management of cataracts    Dupuytren contracture 1/2/2018    Dysphagia 8/18/2011    Dysphonia 8/18/2011    Essential hypertension 8/17/2016    Gallop rhythm     Gout 12/10/2016    Gout of big toe 08/2014    Right Great Toe    H/O fall     Headache     migraines    Heart failure ([address]     History of 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    History of heart failure 1/6/2014    History of myocardial infarction 3/11/2014    History of recurrent pneumonia 2/11/2014    Hx of bacterial pneumonia     Hypotension     Leg swelling     Macrocytosis without anemia 12/10/2016    Morbid obesity ([address] 1/2/2014    Myocardial infarction     Obesity     AYLIN (obstructive sleep apnea) 12/10/2016    Osteoarthritis     Right-sided muscle weakness 10/19/2016    S/P cardiac catheterization     Skin cyst 4/8/2016    Spasm 11/9/2016    Stented coronary artery     Tremor of right hand 5/15/2017    Type 2 diabetes mellitus with complication, without long-term current use of insulin ([address] 3/11/2014    Unsteady gait 5/15/2017    Weakness     Weight gain      Past Surgical History:   Procedure Laterality Date    CHOLECYSTECTOMY      CORONARY ANGIOPLASTY WITH STENT PLACEMENT  2014    CYST REMOVAL  05/16/16    DR David Sahni CYST    SHOULDER SURGERY Right 12/18/2015     Family History   Problem Relation Age of Onset    Breast Cancer Mother     Stroke Father      Social History     Social History    Marital status:      Spouse name: Sara Poon Number of children: 3    Years of education: 12+

## 2018-03-13 ENCOUNTER — OFFICE VISIT (OUTPATIENT)
Dept: PULMONOLOGY | Age: 63
End: 2018-03-13
Payer: COMMERCIAL

## 2018-03-13 VITALS
HEART RATE: 84 BPM | OXYGEN SATURATION: 95 % | BODY MASS INDEX: 36.87 KG/M2 | SYSTOLIC BLOOD PRESSURE: 118 MMHG | HEIGHT: 72 IN | TEMPERATURE: 97.6 F | WEIGHT: 272.2 LBS | DIASTOLIC BLOOD PRESSURE: 80 MMHG

## 2018-03-13 DIAGNOSIS — I25.10 CORONARY ARTERY DISEASE INVOLVING NATIVE HEART, ANGINA PRESENCE UNSPECIFIED, UNSPECIFIED VESSEL OR LESION TYPE: ICD-10-CM

## 2018-03-13 DIAGNOSIS — G47.33 SLEEP APNEA, OBSTRUCTIVE: Primary | ICD-10-CM

## 2018-03-13 DIAGNOSIS — I63.9 CEREBROVASCULAR ACCIDENT (CVA), UNSPECIFIED MECHANISM (HCC): ICD-10-CM

## 2018-03-13 PROCEDURE — G8417 CALC BMI ABV UP PARAM F/U: HCPCS | Performed by: INTERNAL MEDICINE

## 2018-03-13 PROCEDURE — 99214 OFFICE O/P EST MOD 30 MIN: CPT | Performed by: INTERNAL MEDICINE

## 2018-03-13 PROCEDURE — G8482 FLU IMMUNIZE ORDER/ADMIN: HCPCS | Performed by: INTERNAL MEDICINE

## 2018-03-13 PROCEDURE — 1036F TOBACCO NON-USER: CPT | Performed by: INTERNAL MEDICINE

## 2018-03-13 PROCEDURE — G8427 DOCREV CUR MEDS BY ELIG CLIN: HCPCS | Performed by: INTERNAL MEDICINE

## 2018-03-13 PROCEDURE — 3017F COLORECTAL CA SCREEN DOC REV: CPT | Performed by: INTERNAL MEDICINE

## 2018-03-13 PROCEDURE — G8598 ASA/ANTIPLAT THER USED: HCPCS | Performed by: INTERNAL MEDICINE

## 2018-03-13 ASSESSMENT — ENCOUNTER SYMPTOMS
NAUSEA: 0
DIARRHEA: 0
VOMITING: 0
WHEEZING: 0
RHINORRHEA: 0
CHEST TIGHTNESS: 0
SHORTNESS OF BREATH: 0
SINUS PRESSURE: 0
ABDOMINAL PAIN: 0
SORE THROAT: 0
COUGH: 0

## 2018-03-13 NOTE — PROGRESS NOTES
leg swelling. Gastrointestinal: Negative for abdominal pain, diarrhea, nausea and vomiting. Genitourinary: Negative for difficulty urinating and hematuria. Musculoskeletal: Negative for arthralgias, joint swelling and myalgias. Skin: Negative for rash. Allergic/Immunologic: Negative for environmental allergies. Neurological: Negative for dizziness, tremors, weakness and headaches. Psychiatric/Behavioral: Negative for behavioral problems and sleep disturbance. Objective:     Vitals:    03/13/18 1430   BP: 118/80   Pulse: 84   Temp: 97.6 °F (36.4 °C)   TempSrc: Tympanic   SpO2: 95%   Weight: 272 lb 3.2 oz (123.5 kg)   Height: 6' (1.829 m)         Physical Exam   Constitutional: He is oriented to person, place, and time. He appears well-developed and well-nourished. HENT:   Head: Normocephalic and atraumatic. Mouth/Throat: Oropharynx is clear and moist.   Eyes: EOM are normal. Pupils are equal, round, and reactive to light. Neck: Normal range of motion. Neck supple. No JVD present. No tracheal deviation present. No thyromegaly present. Cardiovascular: Normal rate and regular rhythm. Exam reveals no gallop. No murmur heard. Pulmonary/Chest: Effort normal and breath sounds normal. He has no wheezes. He has no rales. He exhibits no tenderness. Abdominal: He exhibits no distension. Musculoskeletal: Normal range of motion. He exhibits no edema. Neurological: He is alert and oriented to person, place, and time. He has normal reflexes. Skin: Skin is warm and dry. No rash noted. Psychiatric: He has a normal mood and affect. Assessment:     1. Sleep apnea, obstructive     2. Cerebrovascular accident (CVA), unspecified mechanism ([address]     3.  Coronary artery disease involving native heart, angina presence unspecified, unspecified vessel or lesion type       Patient was advised to continue regular use of CPAP, work on losing weight and improving his sleep hygiene otherwise

## 2018-03-20 ENCOUNTER — HOSPITAL ENCOUNTER (OUTPATIENT)
Dept: PHYSICAL THERAPY | Age: 63
Setting detail: THERAPIES SERIES
Discharge: HOME | End: 2018-03-20
Payer: COMMERCIAL

## 2018-03-20 PROCEDURE — 97112 NEUROMUSCULAR REEDUCATION: CPT

## 2018-03-20 PROCEDURE — 97116 GAIT TRAINING THERAPY: CPT

## 2018-03-20 ASSESSMENT — PAIN SCALES - GENERAL: PAINLEVEL_OUTOF10: 2

## 2018-03-20 ASSESSMENT — PAIN DESCRIPTION - LOCATION: LOCATION: KNEE

## 2018-03-20 NOTE — PROGRESS NOTES
[facility]   Outpatient Physical Therapy   Treatment Note  [x] [address]  [] [facility]ENTER            of [address]  Date: 3/20/2018  Patient: Brenda Sena  : 1955  ACCT #: [de-identified]  Referring Practitioner: Cristy Rivera CNP   Diagnosis: cerebral infarction with muscle weakness     Visit Information:  PT Visit Information  Onset Date: 17  PT Insurance Information: Medical [place]   Total # of Visits Approved: 60 (PT/ OT/ chiro )  Total # of Visits to Date: 13  No Show: 0  Canceled Appointment: 4  Progress Note Counter: 3/10    SUBJECTIVE:   Subjective  Subjective: Wife states pt had xray of knee done after bumping it on the car door last week. Results were negative, pt has been doing better. Difficulty WBing through LE last week. Saw orthopedist,    HEP Compliance:  [x] Good [] Fair [] Poor [] Reports not doing due to:    PAIN   Location:   Pain Location: Knee (Lt )  Pain Rating (0-10 pain scale):  Pain Level: 2 (4/10 neck, 5/10 hand from bruise)  Pain Description:    sore, ache  Action:  [x] Acceptable for treatment  []  Other:    OBJECTIVE:     Anderson Balance Score: 23    Manual: [x]  NA    Modalities: [x]  NA    Mobility: []  NA      Bed Mobility  Rolling: Independent  Supine to Sit: Independent  Sit to Supine: Independent  Comment: decreased c/o dizziness and effort   Transfers  Sit to Stand: Independent  Stand to sit:  Independent  Comment: decreased UE use this date   Ambulation 1  Surface: carpet  Device: Rolling Walker  Assistance: Independent  Quality of Gait: improving janeth, mild decreased stride length, good foot clearance, no LOB, continued use of strategies, stops before change of direction   Distance: 100'        Stairs  # Steps : 4  Stairs Height: 6\"  Rails: Bilateral  Assistance: Supervision, Independent  Comment: non-reciprocal pattern - attempts to alternate LEs with increased pain Lt LE       Strength: [] NT  Strength RLE  R Hip

## 2018-03-20 NOTE — PLAN OF CARE
[address]     Ph: [phone]  Fax: [phone]    [] Certification  [] Recertification [x]  Plan of Care  [] Progress Note [] Discharge      To:  Referring Practitioner: Dta Wasserman CNP / Dr. Caroline Taylor  From:  [facility], PT   Patient: Jer Gomez     : 1955  Diagnosis: cerebral infarction with muscle weakness      Date: 3/20/2018          Progress Report Period from:  3/20/2018  to 3/20/2018    Total # of Visits to Date: 13   No Show: 0    Canceled Appointment: 4     OBJECTIVE:   Short Term Goals - Time Frame for Short term goals: 4 wks     Goals Current/Discharge status  Met   Short term goal 1: Independent with HEP   Independent with current HEP  [x] yes  [] no   Short term goal 2: Report 25% reduction in symptoms   Reports 20% reduction in symptoms with increased ability to compensate for deficits  [] yes  [x] no   Short term goal 3: Anderson >/= 25/56 to demonstrate improved static balance and decreased risk for falls   Anderson Balance Score: 23   [] yes  [x] no   Short term goal 4: Ambulate >/= 100' independently with ww   Independent with ww 100' with improving janeth, mild decreased stride length, continued use of strategies with stopping prior to change of position  [x] yes  [] no     Long Term Goals - Time Frame for Long term goals : 8 wks   Goals Current/ Discharge status Met   Long term goal 1: Report >/= 50% reduction in overall symptoms to increase activity level  Reports 20% reduction in symptoms with increased ability to compensate for deficits  [] yes  [x] no   Long term goal 2: Anderson >/= 35/56 to demonstrate improved balance and decreased risk for falls  Anderson Balance Score: 23   [] yes  [x] no   Long term goal 3: Ambulate >/= 25' with str cane with SBA with good safety awareness  Unable to safely ambulate with str cane at this time due to Lt knee pain  [] yes  [x] no

## 2018-03-22 ENCOUNTER — HOSPITAL ENCOUNTER (OUTPATIENT)
Dept: PHYSICAL THERAPY | Age: 63
Setting detail: THERAPIES SERIES
Discharge: HOME | End: 2018-03-22
Payer: COMMERCIAL

## 2018-03-22 PROCEDURE — 97110 THERAPEUTIC EXERCISES: CPT

## 2018-03-22 PROCEDURE — 97112 NEUROMUSCULAR REEDUCATION: CPT

## 2018-03-22 ASSESSMENT — PAIN DESCRIPTION - LOCATION: LOCATION: KNEE;HEAD

## 2018-03-22 ASSESSMENT — PAIN DESCRIPTION - ORIENTATION: ORIENTATION: RIGHT;LEFT

## 2018-03-22 ASSESSMENT — PAIN SCALES - GENERAL: PAINLEVEL_OUTOF10: 5

## 2018-03-27 ENCOUNTER — APPOINTMENT (OUTPATIENT)
Dept: PHYSICAL THERAPY | Age: 63
End: 2018-03-27
Payer: COMMERCIAL

## 2018-03-29 ENCOUNTER — HOSPITAL ENCOUNTER (OUTPATIENT)
Dept: PHYSICAL THERAPY | Age: 63
Setting detail: THERAPIES SERIES
End: 2018-03-29
Payer: COMMERCIAL

## 2018-04-16 ENCOUNTER — OFFICE VISIT (OUTPATIENT)
Dept: FAMILY MEDICINE CLINIC | Age: 63
End: 2018-04-16
Payer: COMMERCIAL

## 2018-04-16 ENCOUNTER — HOSPITAL ENCOUNTER (OUTPATIENT)
Age: 63
Discharge: HOME | End: 2018-04-18
Payer: COMMERCIAL

## 2018-04-16 ENCOUNTER — HOSPITAL ENCOUNTER (OUTPATIENT)
Dept: GENERAL RADIOLOGY | Age: 63
Discharge: HOME | End: 2018-04-18
Payer: COMMERCIAL

## 2018-04-16 VITALS
OXYGEN SATURATION: 98 % | HEIGHT: 72 IN | RESPIRATION RATE: 16 BRPM | BODY MASS INDEX: 36.57 KG/M2 | TEMPERATURE: 98.6 F | SYSTOLIC BLOOD PRESSURE: 120 MMHG | HEART RATE: 78 BPM | DIASTOLIC BLOOD PRESSURE: 80 MMHG | WEIGHT: 270 LBS

## 2018-04-16 DIAGNOSIS — M25.561 ACUTE PAIN OF RIGHT KNEE: Primary | ICD-10-CM

## 2018-04-16 DIAGNOSIS — M25.561 ACUTE PAIN OF RIGHT KNEE: ICD-10-CM

## 2018-04-16 PROCEDURE — 73562 X-RAY EXAM OF KNEE 3: CPT

## 2018-04-16 PROCEDURE — G8427 DOCREV CUR MEDS BY ELIG CLIN: HCPCS | Performed by: FAMILY MEDICINE

## 2018-04-16 PROCEDURE — G8598 ASA/ANTIPLAT THER USED: HCPCS | Performed by: FAMILY MEDICINE

## 2018-04-16 PROCEDURE — G8417 CALC BMI ABV UP PARAM F/U: HCPCS | Performed by: FAMILY MEDICINE

## 2018-04-16 PROCEDURE — 3017F COLORECTAL CA SCREEN DOC REV: CPT | Performed by: FAMILY MEDICINE

## 2018-04-16 PROCEDURE — 1036F TOBACCO NON-USER: CPT | Performed by: FAMILY MEDICINE

## 2018-04-16 PROCEDURE — 99213 OFFICE O/P EST LOW 20 MIN: CPT | Performed by: FAMILY MEDICINE

## 2018-04-16 RX ORDER — HYDROCODONE BITARTRATE AND ACETAMINOPHEN 5; 325 MG/1; MG/1
1 TABLET ORAL EVERY 6 HOURS PRN
Qty: 20 TABLET | Refills: 0 | Status: SHIPPED | OUTPATIENT
Start: 2018-04-16 | End: 2018-04-19

## 2018-04-20 ASSESSMENT — ENCOUNTER SYMPTOMS
ABDOMINAL PAIN: 0
NAUSEA: 0
COUGH: 0
CHEST TIGHTNESS: 0
SHORTNESS OF BREATH: 0
APNEA: 0
CONSTIPATION: 0
DIARRHEA: 0
BLOOD IN STOOL: 0
VOMITING: 0

## 2018-05-07 ENCOUNTER — TELEPHONE (OUTPATIENT)
Dept: FAMILY MEDICINE CLINIC | Age: 63
End: 2018-05-07

## 2018-05-10 ENCOUNTER — OFFICE VISIT (OUTPATIENT)
Dept: FAMILY MEDICINE CLINIC | Age: 63
End: 2018-05-10
Payer: COMMERCIAL

## 2018-05-10 VITALS
TEMPERATURE: 98.6 F | SYSTOLIC BLOOD PRESSURE: 120 MMHG | BODY MASS INDEX: 36.6 KG/M2 | DIASTOLIC BLOOD PRESSURE: 80 MMHG | HEART RATE: 91 BPM | RESPIRATION RATE: 14 BRPM | OXYGEN SATURATION: 96 % | WEIGHT: 270.25 LBS | HEIGHT: 72 IN

## 2018-05-10 DIAGNOSIS — Z86.73 HISTORY OF CVA (CEREBROVASCULAR ACCIDENT): Chronic | ICD-10-CM

## 2018-05-10 DIAGNOSIS — E66.01 MORBID OBESITY (HCC): ICD-10-CM

## 2018-05-10 DIAGNOSIS — M10.9 GOUT OF MULTIPLE SITES, UNSPECIFIED CAUSE, UNSPECIFIED CHRONICITY: Chronic | ICD-10-CM

## 2018-05-10 DIAGNOSIS — D75.89 MACROCYTOSIS WITHOUT ANEMIA: Chronic | ICD-10-CM

## 2018-05-10 DIAGNOSIS — I25.10 CAD S/P PERCUTANEOUS CORONARY ANGIOPLASTY: Chronic | ICD-10-CM

## 2018-05-10 DIAGNOSIS — G47.33 OSA (OBSTRUCTIVE SLEEP APNEA): Chronic | ICD-10-CM

## 2018-05-10 DIAGNOSIS — Z23 NEED FOR VACCINATION: ICD-10-CM

## 2018-05-10 DIAGNOSIS — Z98.61 CAD S/P PERCUTANEOUS CORONARY ANGIOPLASTY: Chronic | ICD-10-CM

## 2018-05-10 DIAGNOSIS — R60.9 DEPENDENT EDEMA: Chronic | ICD-10-CM

## 2018-05-10 DIAGNOSIS — I10 ESSENTIAL HYPERTENSION: Chronic | ICD-10-CM

## 2018-05-10 DIAGNOSIS — E11.8 TYPE 2 DIABETES MELLITUS WITH COMPLICATION, WITHOUT LONG-TERM CURRENT USE OF INSULIN (HCC): Primary | Chronic | ICD-10-CM

## 2018-05-10 PROCEDURE — 1036F TOBACCO NON-USER: CPT | Performed by: FAMILY MEDICINE

## 2018-05-10 PROCEDURE — G8417 CALC BMI ABV UP PARAM F/U: HCPCS | Performed by: FAMILY MEDICINE

## 2018-05-10 PROCEDURE — 3044F HG A1C LEVEL LT 7.0%: CPT | Performed by: FAMILY MEDICINE

## 2018-05-10 PROCEDURE — 99214 OFFICE O/P EST MOD 30 MIN: CPT | Performed by: FAMILY MEDICINE

## 2018-05-10 PROCEDURE — G8598 ASA/ANTIPLAT THER USED: HCPCS | Performed by: FAMILY MEDICINE

## 2018-05-10 PROCEDURE — G8427 DOCREV CUR MEDS BY ELIG CLIN: HCPCS | Performed by: FAMILY MEDICINE

## 2018-05-10 PROCEDURE — 2022F DILAT RTA XM EVC RTNOPTHY: CPT | Performed by: FAMILY MEDICINE

## 2018-05-10 PROCEDURE — 3017F COLORECTAL CA SCREEN DOC REV: CPT | Performed by: FAMILY MEDICINE

## 2018-05-10 ASSESSMENT — ENCOUNTER SYMPTOMS
APNEA: 0
VOMITING: 0
ABDOMINAL PAIN: 0
COUGH: 0
DIARRHEA: 0
WHEEZING: 0
CONSTIPATION: 0
CHEST TIGHTNESS: 0
NAUSEA: 0
SHORTNESS OF BREATH: 0

## 2018-06-05 ENCOUNTER — HOSPITAL ENCOUNTER (OUTPATIENT)
Dept: PHYSICAL THERAPY | Age: 63
Setting detail: THERAPIES SERIES
Discharge: HOME | End: 2018-06-05
Payer: COMMERCIAL

## 2018-06-05 PROCEDURE — 97163 PT EVAL HIGH COMPLEX 45 MIN: CPT

## 2018-06-05 PROCEDURE — 97162 PT EVAL MOD COMPLEX 30 MIN: CPT

## 2018-06-05 ASSESSMENT — PAIN DESCRIPTION - LOCATION: LOCATION: HEAD

## 2018-06-05 ASSESSMENT — PAIN DESCRIPTION - FREQUENCY: FREQUENCY: INTERMITTENT

## 2018-06-05 ASSESSMENT — PAIN DESCRIPTION - DESCRIPTORS: DESCRIPTORS: ACHING

## 2018-06-05 ASSESSMENT — PAIN SCALES - GENERAL: PAINLEVEL_OUTOF10: 5

## 2018-06-12 ENCOUNTER — HOSPITAL ENCOUNTER (OUTPATIENT)
Dept: PHYSICAL THERAPY | Age: 63
Setting detail: THERAPIES SERIES
Discharge: HOME | End: 2018-06-12
Payer: COMMERCIAL

## 2018-06-12 PROCEDURE — 97110 THERAPEUTIC EXERCISES: CPT

## 2018-06-12 PROCEDURE — 97535 SELF CARE MNGMENT TRAINING: CPT

## 2018-06-12 PROCEDURE — 97112 NEUROMUSCULAR REEDUCATION: CPT

## 2018-06-12 ASSESSMENT — PAIN DESCRIPTION - DESCRIPTORS: DESCRIPTORS: ACHING

## 2018-06-12 ASSESSMENT — PAIN DESCRIPTION - LOCATION: LOCATION: NECK

## 2018-06-12 ASSESSMENT — PAIN SCALES - GENERAL: PAINLEVEL_OUTOF10: 4

## 2018-06-19 ENCOUNTER — HOSPITAL ENCOUNTER (OUTPATIENT)
Dept: PHYSICAL THERAPY | Age: 63
Setting detail: THERAPIES SERIES
Discharge: HOME | End: 2018-06-19
Payer: COMMERCIAL

## 2018-06-19 PROCEDURE — 97113 AQUATIC THERAPY/EXERCISES: CPT

## 2018-06-19 ASSESSMENT — PAIN DESCRIPTION - PAIN TYPE: TYPE: CHRONIC PAIN

## 2018-06-19 ASSESSMENT — PAIN SCALES - GENERAL: PAINLEVEL_OUTOF10: 5

## 2018-06-19 ASSESSMENT — PAIN DESCRIPTION - ORIENTATION: ORIENTATION: MID

## 2018-06-19 ASSESSMENT — PAIN DESCRIPTION - DESCRIPTORS: DESCRIPTORS: ACHING

## 2018-06-19 ASSESSMENT — PAIN DESCRIPTION - LOCATION: LOCATION: NECK;BACK

## 2018-06-26 ENCOUNTER — HOSPITAL ENCOUNTER (OUTPATIENT)
Dept: PHYSICAL THERAPY | Age: 63
Setting detail: THERAPIES SERIES
Discharge: HOME | End: 2018-06-26
Payer: COMMERCIAL

## 2018-06-26 PROCEDURE — 97140 MANUAL THERAPY 1/> REGIONS: CPT

## 2018-06-26 PROCEDURE — 97110 THERAPEUTIC EXERCISES: CPT

## 2018-06-26 PROCEDURE — 97112 NEUROMUSCULAR REEDUCATION: CPT

## 2018-06-26 ASSESSMENT — PAIN DESCRIPTION - DESCRIPTORS: DESCRIPTORS: ACHING

## 2018-06-26 ASSESSMENT — PAIN DESCRIPTION - LOCATION: LOCATION: NECK

## 2018-06-26 ASSESSMENT — PAIN SCALES - GENERAL: PAINLEVEL_OUTOF10: 4

## 2018-06-26 ASSESSMENT — PAIN DESCRIPTION - PAIN TYPE: TYPE: CHRONIC PAIN

## 2018-07-02 ENCOUNTER — HOSPITAL ENCOUNTER (OUTPATIENT)
Dept: LAB | Age: 63
Discharge: HOME | End: 2018-07-02
Payer: COMMERCIAL

## 2018-07-02 DIAGNOSIS — E11.8 TYPE 2 DIABETES MELLITUS WITH COMPLICATION, WITHOUT LONG-TERM CURRENT USE OF INSULIN (HCC): Chronic | ICD-10-CM

## 2018-07-02 DIAGNOSIS — D75.89 MACROCYTOSIS WITHOUT ANEMIA: Chronic | ICD-10-CM

## 2018-07-02 DIAGNOSIS — Z98.61 CAD S/P PERCUTANEOUS CORONARY ANGIOPLASTY: Chronic | ICD-10-CM

## 2018-07-02 DIAGNOSIS — I25.10 CAD S/P PERCUTANEOUS CORONARY ANGIOPLASTY: Chronic | ICD-10-CM

## 2018-07-02 DIAGNOSIS — M10.9 GOUT OF MULTIPLE SITES, UNSPECIFIED CAUSE, UNSPECIFIED CHRONICITY: Chronic | ICD-10-CM

## 2018-07-02 DIAGNOSIS — I10 ESSENTIAL HYPERTENSION: Chronic | ICD-10-CM

## 2018-07-02 LAB
ANION GAP SERPL CALCULATED.3IONS-SCNC: 16 MEQ/L (ref 7–13)
BASOPHILS ABSOLUTE: 0 K/UL (ref 0–0.2)
BASOPHILS RELATIVE PERCENT: 0.7 %
BUN BLDV-MCNC: 13 MG/DL (ref 8–23)
CALCIUM SERPL-MCNC: 9.4 MG/DL (ref 8.6–10.2)
CHLORIDE BLD-SCNC: 95 MEQ/L (ref 98–107)
CHOLESTEROL, TOTAL: 113 MG/DL (ref 0–199)
CO2: 30 MEQ/L (ref 22–29)
CREAT SERPL-MCNC: 0.96 MG/DL (ref 0.7–1.2)
EOSINOPHILS ABSOLUTE: 0.1 K/UL (ref 0–0.7)
EOSINOPHILS RELATIVE PERCENT: 1.9 %
GFR AFRICAN AMERICAN: >60
GFR NON-AFRICAN AMERICAN: >60
GLUCOSE BLD-MCNC: 126 MG/DL (ref 74–109)
HBA1C MFR BLD: 6.3 % (ref 4.8–5.9)
HCT VFR BLD CALC: 41.5 % (ref 42–52)
HDLC SERPL-MCNC: 44 MG/DL (ref 40–59)
HEMOGLOBIN: 14.4 G/DL (ref 14–18)
LDL CHOLESTEROL CALCULATED: 47 MG/DL (ref 0–129)
LYMPHOCYTES ABSOLUTE: 1.6 K/UL (ref 1–4.8)
LYMPHOCYTES RELATIVE PERCENT: 27.8 %
MCH RBC QN AUTO: 38.1 PG (ref 27–31.3)
MCHC RBC AUTO-ENTMCNC: 34.8 % (ref 33–37)
MCV RBC AUTO: 109.5 FL (ref 80–100)
MONOCYTES ABSOLUTE: 0.4 K/UL (ref 0.2–0.8)
MONOCYTES RELATIVE PERCENT: 7.5 %
NEUTROPHILS ABSOLUTE: 3.5 K/UL (ref 1.4–6.5)
NEUTROPHILS RELATIVE PERCENT: 62.1 %
PDW BLD-RTO: 13.7 % (ref 11.5–14.5)
PLATELET # BLD: 186 K/UL (ref 130–400)
POIKILOCYTES: ABNORMAL
POTASSIUM SERPL-SCNC: 3.8 MEQ/L (ref 3.5–5.1)
RBC # BLD: 3.79 M/UL (ref 4.7–6.1)
SODIUM BLD-SCNC: 141 MEQ/L (ref 132–144)
SPHEROCYTES: ABNORMAL
TRIGL SERPL-MCNC: 112 MG/DL (ref 0–200)
URIC ACID, SERUM: 6.6 MG/DL (ref 3.4–7)
WBC # BLD: 5.7 K/UL (ref 4.8–10.8)

## 2018-07-02 PROCEDURE — 80061 LIPID PANEL: CPT

## 2018-07-02 PROCEDURE — 85025 COMPLETE CBC W/AUTO DIFF WBC: CPT

## 2018-07-02 PROCEDURE — 80048 BASIC METABOLIC PNL TOTAL CA: CPT

## 2018-07-02 PROCEDURE — 83036 HEMOGLOBIN GLYCOSYLATED A1C: CPT

## 2018-07-02 PROCEDURE — 84550 ASSAY OF BLOOD/URIC ACID: CPT

## 2018-07-02 PROCEDURE — 36415 COLL VENOUS BLD VENIPUNCTURE: CPT

## 2018-07-03 ENCOUNTER — HOSPITAL ENCOUNTER (OUTPATIENT)
Dept: PHYSICAL THERAPY | Age: 63
Setting detail: THERAPIES SERIES
Discharge: HOME | End: 2018-07-03
Payer: COMMERCIAL

## 2018-07-03 PROCEDURE — 97113 AQUATIC THERAPY/EXERCISES: CPT

## 2018-07-03 ASSESSMENT — PAIN SCALES - GENERAL: PAINLEVEL_OUTOF10: 5

## 2018-07-03 ASSESSMENT — PAIN DESCRIPTION - ORIENTATION: ORIENTATION: RIGHT;LEFT

## 2018-07-03 ASSESSMENT — PAIN DESCRIPTION - PAIN TYPE: TYPE: CHRONIC PAIN

## 2018-07-03 ASSESSMENT — PAIN DESCRIPTION - DESCRIPTORS: DESCRIPTORS: ACHING

## 2018-07-10 ENCOUNTER — HOSPITAL ENCOUNTER (OUTPATIENT)
Dept: PHYSICAL THERAPY | Age: 63
Setting detail: THERAPIES SERIES
Discharge: HOME | End: 2018-07-10
Payer: COMMERCIAL

## 2018-07-10 PROCEDURE — 97112 NEUROMUSCULAR REEDUCATION: CPT

## 2018-07-10 PROCEDURE — 97116 GAIT TRAINING THERAPY: CPT

## 2018-07-10 ASSESSMENT — PAIN DESCRIPTION - DESCRIPTORS: DESCRIPTORS: ACHING

## 2018-07-10 ASSESSMENT — PAIN DESCRIPTION - PAIN TYPE: TYPE: CHRONIC PAIN

## 2018-07-10 ASSESSMENT — PAIN SCALES - GENERAL: PAINLEVEL_OUTOF10: 6

## 2018-07-10 ASSESSMENT — PAIN DESCRIPTION - LOCATION: LOCATION: NECK

## 2018-07-10 NOTE — PROGRESS NOTES
[address] [address]  Outpatient Physical Therapy    Treatment Note        Date: 7/10/2018  Patient: Alice Ruffin  : 1955  ACCT #: [de-identified]  Referring Practitioner: Dr. Alondra Barnhart, Dr. Demetra Burkitt   Diagnosis: vertigo, knee OA    Visit Information:  PT Visit Information  PT Insurance Information: MMO  Total # of Visits Approved: 44  Total # of Visits to Date: 6  No Show: 0  Canceled Appointment: 0  Progress Note Counter:     Subjective: Pt wore unsupportive slip on sandals into therapy d/t inc'd gilma LE swelling. Pt reports  was the best day, no MM spasms, least amt of pain. HEP Compliance:  [] Good [x] Fair [] Poor [] Reports not doing due to:    Vital Signs  Patient Currently in Pain: Yes   Pain Screening  Patient Currently in Pain: Yes  Pain Assessment  Pain Level: 6  Pain Type: Chronic pain  Pain Location: Neck (Rt UT, thoracic)  Pain Descriptors: Aching    OBJECTIVE:   Exercises  Exercise 4:  gait drills :   Exercise 18: Anderson= 30, up 9 pts. Ambulation 1  Surface: level tile  Device: Rolling Walker  Assistance: Independent  Quality of Gait: Improved janeth, Gilma ER, dec'd foot clearance  Distance: 265'   Comments: Pt demo'd improved pattern on track with less visual disturbances     *Indicates exercise, modality, or manual techniques to be initiated when appropriate    Assessment: Body structures, Functions, Activity limitations: Decreased functional mobility , Decreased strength, Decreased safe awareness, Decreased endurance, Decreased balance, Decreased coordination  Assessment: Pt required several rb's during Anderson testing d/t pending HA and nausea from visual disurbances. Pt also took inc'd time and effort to complete test, however increased score by 9 pts to 30/56. Pt easily disturbed by environment and trialed track environment for dec'd distraction with good tolerance.   Treatment Diagnosis: dizziness, imbalance, impaired mobility, difficulty with gait, LE weakness, knee pain           Goals:  Short term goals  Time Frame for Short term goals: 3 wks   Short term goal 1: Independent with HEP   Short term goal 2: Decrease vestibular symptoms by 25% to allow increased tolerance to mobility     Long term goals  Time Frame for Long term goals : 6 wks   Long term goal 1: Improve LE strength 1/2 grade to allow increased ease with walking with increased balance   Long term goal 2: Mahajan >/= 38/56 to demonstrate improved balance and decreased risk for falls   Long term goal 3: Ambulate >/= 150' with least restrictive device independently with decreased startle reflex and LOB   Long term goal 4: Up/ down 4-6\" steps with 1 HR independently  Progress toward goals:mahajan, gait    POST-PAIN       Pain Rating (0-10 pain scale):  8 /10   Location and pain description same as pre-treatment unless indicated. Action: [] NA   [] Perform HEP  [x] Meds as prescribed  [] Modalities as prescribed   [] Call Physician     Frequency/Duration:  Plan  Times per week: 1-2  Current Treatment Recommendations: Strengthening, ROM, Balance Training, Functional Mobility Training, Transfer Training, Gait Training, Stair training, Neuromuscular Re-education, Manual Therapy - Soft Tissue Mobilization, Manual Therapy - Joint Manipulation, Home Exercise Program, Safety Education & Training, Patient/Caregiver Education & Training, Equipment Evaluation, Education, & procurement, Positioning, Aquatics     Pt to continue current HEP. See objective section for any therapeutic exercise changes, additions or modifications this date.          PT Individual Minutes  Time In: 5854  Time Out: 3866  Minutes: 63     Gait x 15  Neuro x 45    Signature:  Electronically signed by Daja Arreguin PTA on 7/10/18 at 12:56 PM

## 2018-07-11 ENCOUNTER — OFFICE VISIT (OUTPATIENT)
Dept: CARDIOLOGY CLINIC | Age: 63
End: 2018-07-11
Payer: COMMERCIAL

## 2018-07-11 VITALS
OXYGEN SATURATION: 96 % | RESPIRATION RATE: 22 BRPM | BODY MASS INDEX: 36.9 KG/M2 | SYSTOLIC BLOOD PRESSURE: 122 MMHG | HEART RATE: 93 BPM | WEIGHT: 272.4 LBS | HEIGHT: 72 IN | TEMPERATURE: 97.6 F | DIASTOLIC BLOOD PRESSURE: 74 MMHG

## 2018-07-11 DIAGNOSIS — I25.2 HISTORY OF MYOCARDIAL INFARCTION: ICD-10-CM

## 2018-07-11 DIAGNOSIS — I25.10 CAD S/P PERCUTANEOUS CORONARY ANGIOPLASTY: Primary | ICD-10-CM

## 2018-07-11 DIAGNOSIS — I10 ESSENTIAL HYPERTENSION: ICD-10-CM

## 2018-07-11 DIAGNOSIS — Z98.61 CAD S/P PERCUTANEOUS CORONARY ANGIOPLASTY: Primary | ICD-10-CM

## 2018-07-11 DIAGNOSIS — Z86.73 HISTORY OF CVA (CEREBROVASCULAR ACCIDENT): ICD-10-CM

## 2018-07-11 PROCEDURE — 99213 OFFICE O/P EST LOW 20 MIN: CPT | Performed by: INTERNAL MEDICINE

## 2018-07-11 PROCEDURE — G8598 ASA/ANTIPLAT THER USED: HCPCS | Performed by: INTERNAL MEDICINE

## 2018-07-11 PROCEDURE — 3017F COLORECTAL CA SCREEN DOC REV: CPT | Performed by: INTERNAL MEDICINE

## 2018-07-11 PROCEDURE — 1036F TOBACCO NON-USER: CPT | Performed by: INTERNAL MEDICINE

## 2018-07-11 PROCEDURE — G8427 DOCREV CUR MEDS BY ELIG CLIN: HCPCS | Performed by: INTERNAL MEDICINE

## 2018-07-11 PROCEDURE — G8417 CALC BMI ABV UP PARAM F/U: HCPCS | Performed by: INTERNAL MEDICINE

## 2018-07-11 ASSESSMENT — ENCOUNTER SYMPTOMS
ABDOMINAL DISTENTION: 0
COLOR CHANGE: 0
BLOOD IN STOOL: 0
NAUSEA: 0
FACIAL SWELLING: 0
DIARRHEA: 0
ANAL BLEEDING: 0
VOICE CHANGE: 0
APNEA: 0
CHEST TIGHTNESS: 0
WHEEZING: 0
VOMITING: 0
SHORTNESS OF BREATH: 0
TROUBLE SWALLOWING: 0

## 2018-07-11 NOTE — PROGRESS NOTES
1/2/2018    Dysphagia 8/18/2011    Dysphonia 8/18/2011    Essential hypertension 8/17/2016    Gallop rhythm     Gout 12/10/2016    Gout of big toe 08/2014    Right Great Toe    H/O fall     Headache     migraines    Heart failure ([address]     History of 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    History of heart failure 1/6/2014    History of myocardial infarction 3/11/2014    History of recurrent pneumonia 2/11/2014    Hx of bacterial pneumonia     Hypotension     Leg swelling     Macrocytosis without anemia 12/10/2016    Morbid obesity ([address] 1/2/2014    Myocardial infarction     Obesity     AYLIN (obstructive sleep apnea) 12/10/2016    Osteoarthritis     Right-sided muscle weakness 10/19/2016    S/P cardiac catheterization     Skin cyst 4/8/2016    Spasm 11/9/2016    Stented coronary artery     Tremor of right hand 5/15/2017    Type 2 diabetes mellitus with complication, without long-term current use of insulin ([address] 3/11/2014    Unsteady gait 5/15/2017    Weakness     Weight gain        Past Surgical History:   Procedure Laterality Date    CHOLECYSTECTOMY      CORONARY ANGIOPLASTY WITH STENT PLACEMENT  2014    CYST REMOVAL  05/16/16    DR Jolene Alvarez CYST    SHOULDER SURGERY Right 12/18/2015       Family History   Problem Relation Age of Onset    Breast Cancer Mother     Stroke Father        Social History     Social History    Marital status:      Spouse name: Satnam Simms Number of children: 3    Years of education: 12+     Occupational History    medical leave [employer]     Social History Main Topics    Smoking status: Former Smoker     Quit date: 2001    Smokeless tobacco: Never Used    Alcohol use 0.0 oz/week      Comment: limits less than 10/wk     Drug use: No    Sexual activity: Yes     Partners: Female     Other Topics Concern    None     Social History

## 2018-07-17 ENCOUNTER — HOSPITAL ENCOUNTER (OUTPATIENT)
Dept: PHYSICAL THERAPY | Age: 63
Setting detail: THERAPIES SERIES
Discharge: HOME | End: 2018-07-17
Payer: COMMERCIAL

## 2018-07-17 PROCEDURE — 97113 AQUATIC THERAPY/EXERCISES: CPT

## 2018-07-17 ASSESSMENT — PAIN SCALES - GENERAL: PAINLEVEL_OUTOF10: 6

## 2018-07-17 ASSESSMENT — PAIN DESCRIPTION - PAIN TYPE: TYPE: CHRONIC PAIN

## 2018-07-17 ASSESSMENT — PAIN DESCRIPTION - LOCATION: LOCATION: NECK

## 2018-07-17 ASSESSMENT — PAIN DESCRIPTION - DESCRIPTORS: DESCRIPTORS: ACHING

## 2018-07-17 NOTE — PROGRESS NOTES
[facility]   Outpatient Physical Therapy   Treatment Note  [x] [address]  [] [facility]ENTER            of [address]  Date: 2018  Patient: Jose Alejandro Espinoza  : 1955  ACCT #: [de-identified]  Referring Practitioner: Dr. Tania Denis, Dr. Gavino Dutta   Diagnosis: vertigo, knee OA    Visit Information:  PT Visit Information  Onset Date: 17  PT Insurance Information: MMO  Total # of Visits Approved: 44  Total # of Visits to Date: 7  No Show: 0  Canceled Appointment: 0  Progress Note Counter:     SUBJECTIVE:   Subjective  Subjective: Pt reports continued edema in bilateral LEs. PCP and cardiologist are aware. HEP Compliance:  [x] Good [] Fair [] Poor [] Reports not doing due to:    PAIN   Location:   Pain Location: Neck  Pain Rating (0-10 pain scale):  Pain Level: 6  Pain Description:  Pain Descriptors: Aching  Action:  [x] Acceptable for treatment  []  Other:    OBJECTIVE:   Exercises  Exercise 19: See paper PRE for Aquatic Ex's to be scanned at D/C    Mobility: []  NA   Ambulation 2  Surface - 2: carpet, level tile  Device 2: Rolling Walker  Assistance 2: Independent  Quality of Gait 2: Pt with decreased foot clearance due to wearing slipper/ sandals. Distance: 21'     Stairs  # Steps : 4  Stairs Height: 6\"  Rails: Bilateral  Assistance: Supervision, Independent  Comment: non-reciprocal     HEP  Continue with current Home Exercise Program.  See Objective section for progression of HEP. Comments:       POST-PAIN    Pain Rating (0-10 pain scale):   Location and Pain Description same as pre-pain unless otherwise indicated. Action: [] NA  [] Call Physician  [] Perform HEP  [] Meds as prescribed     ASSESSMENT:     Conditions Requiring Skilled Therapeutic Intervention  Assessment: Pt with good tolerance to all exercises in the pool today except increased nausea with squatting and retro walking. Pt feels aquatic therapy is helping.   Would

## 2018-07-25 ENCOUNTER — TELEPHONE (OUTPATIENT)
Dept: FAMILY MEDICINE CLINIC | Age: 63
End: 2018-07-25

## 2018-07-25 ENCOUNTER — OFFICE VISIT (OUTPATIENT)
Dept: FAMILY MEDICINE CLINIC | Age: 63
End: 2018-07-25
Payer: COMMERCIAL

## 2018-07-25 ENCOUNTER — HOSPITAL ENCOUNTER (OUTPATIENT)
Dept: ULTRASOUND IMAGING | Age: 63
Discharge: HOME | End: 2018-07-27
Payer: COMMERCIAL

## 2018-07-25 ENCOUNTER — HOSPITAL ENCOUNTER (OUTPATIENT)
Dept: PHYSICAL THERAPY | Age: 63
Setting detail: THERAPIES SERIES
Discharge: HOME | End: 2018-07-25
Payer: COMMERCIAL

## 2018-07-25 VITALS
RESPIRATION RATE: 16 BRPM | WEIGHT: 277.2 LBS | TEMPERATURE: 99.1 F | SYSTOLIC BLOOD PRESSURE: 138 MMHG | BODY MASS INDEX: 37.55 KG/M2 | DIASTOLIC BLOOD PRESSURE: 80 MMHG | HEIGHT: 72 IN | OXYGEN SATURATION: 98 % | HEART RATE: 90 BPM

## 2018-07-25 DIAGNOSIS — M79.662 PAIN IN LEFT LOWER LEG: ICD-10-CM

## 2018-07-25 DIAGNOSIS — I10 ESSENTIAL HYPERTENSION: Chronic | ICD-10-CM

## 2018-07-25 DIAGNOSIS — R60.0 BILATERAL LOWER EXTREMITY EDEMA: ICD-10-CM

## 2018-07-25 DIAGNOSIS — R60.9 DEPENDENT EDEMA: Primary | Chronic | ICD-10-CM

## 2018-07-25 PROCEDURE — G8427 DOCREV CUR MEDS BY ELIG CLIN: HCPCS | Performed by: FAMILY MEDICINE

## 2018-07-25 PROCEDURE — 93971 EXTREMITY STUDY: CPT

## 2018-07-25 PROCEDURE — 99214 OFFICE O/P EST MOD 30 MIN: CPT | Performed by: FAMILY MEDICINE

## 2018-07-25 PROCEDURE — G8417 CALC BMI ABV UP PARAM F/U: HCPCS | Performed by: FAMILY MEDICINE

## 2018-07-25 PROCEDURE — 1036F TOBACCO NON-USER: CPT | Performed by: FAMILY MEDICINE

## 2018-07-25 PROCEDURE — G8598 ASA/ANTIPLAT THER USED: HCPCS | Performed by: FAMILY MEDICINE

## 2018-07-25 PROCEDURE — 3017F COLORECTAL CA SCREEN DOC REV: CPT | Performed by: FAMILY MEDICINE

## 2018-07-25 RX ORDER — MEDICAL SUPPLY, MISCELLANEOUS
EACH MISCELLANEOUS
Qty: 2 EACH | Refills: 0 | Status: SHIPPED | OUTPATIENT
Start: 2018-07-25 | End: 2019-05-14 | Stop reason: SDUPTHER

## 2018-07-25 ASSESSMENT — ENCOUNTER SYMPTOMS
VOMITING: 0
CHEST TIGHTNESS: 0
ABDOMINAL PAIN: 0
NAUSEA: 0
DIARRHEA: 0
WHEEZING: 0
SHORTNESS OF BREATH: 0
COUGH: 0

## 2018-07-25 NOTE — PROGRESS NOTES
[facility]    [] [address]  [] [facility]         of [address]     Physical Therapy  Cancellation/No-show Note  Patient Name:  Genesis Caraballo  :  1955   Date:  2018  Referring Practitioner: Dr. Betsy Lancaster, Dr. Curvin Sicard   Diagnosis: vertigo, knee OA    Visit Information:  PT Visit Information  Onset Date: 17  PT Insurance Information: MMO  Total # of Visits Approved: 44  Total # of Visits to Date: 7  No Show: 0  Canceled Appointment: 0  Progress Note Counter:  (cxl by PT)    For today's appointment patient:  [x]  Cancelled by PT  []  Rescheduled appointment  []  No-show   []  Called pt to remind of next appointment     Reason given by patient:  []  Patient ill  []  Conflicting appointment  []  No transportation    []  Conflict with work  []  No reason given  []  Inclement weather   []  Other:       Comments:   pt presents with increased swelling in Lt LE this date with increased redness and warmth. Increased pain with palpation of calf. Noted increased redness on dorsum of foot as well.  Pt contacted physician who scheduled appt for later this am. Cancel appt by therapist, no treatment given    Signature: Electronically signed by Amira Haynes PT on 18 at 11:31 AM

## 2018-07-25 NOTE — PROGRESS NOTES
Subjective:      Patient ID: Aidan Purdy is a 61 y.o. male who presents today for:  Chief Complaint   Patient presents with    Leg Swelling     patient presents with bilateral leg swelling, redness, warm to the touch and pain upon pressure. patient states swelling has increased since last OV. HPI     Patient presents for acute visit RE: worsneing BLE edema with discomfort. He reports over the past 2 months he has noted worsening edema and even more so in the past 2 weeks, L>R with  increasing discomfort and increased erythema and warmth. He reports taking his medications daily as prescribed. He denies any chest pain, dyspnea, palpitations, lightheadedness, dizziness, or syncope. He denies any orthopnea or PND. He reports using his CPAP nightly with good restful sleep. He denies any recent injury to the lower extremities. He denies any change in his baseline activity or functioning.      Past Medical History:   Diagnosis Date    Acute renal failure ([address]     Anxiety     CAD S/P percutaneous coronary angioplasty 3/11/2014    Cardiomyopathy [facility])     Cerebrovascular accident (CVA) due to occlusion of left middle cerebral artery ([address] 08/2016    brainstem infarction from left MCA occlusion    Cerebrovascular disease 07/27/2016    Coronary angioplasty status     CVA (cerebral vascular accident) ([address] 11/12/2017    Dependent edema 9/14/2017    Diplopia 5/15/2017    Resolved with management of cataracts    Dupuytren contracture 1/2/2018    Dysphagia 8/18/2011    Dysphonia 8/18/2011    Essential hypertension 8/17/2016    Gallop rhythm     Gout 12/10/2016    Gout of big toe 08/2014    Right Great Toe    H/O fall     Headache     migraines    Heart failure ([address]     History of 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    History of heart failure 1/6/2014    History of myocardial infarction 3/11/2014    History of recurrent pneumonia 2/11/2014    Hx of bacterial pneumonia     Hypotension     Leg swelling     Macrocytosis without anemia 12/10/2016    Morbid obesity ([facility] 1/2/2014    Myocardial infarction     Obesity     AYLIN (obstructive sleep apnea) 12/10/2016    Osteoarthritis     Right-sided muscle weakness 10/19/2016    S/P cardiac catheterization     Skin cyst 4/8/2016    Spasm 11/9/2016    Stented coronary artery     Tremor of right hand 5/15/2017    Type 2 diabetes mellitus with complication, without long-term current use of insulin ([facility] 3/11/2014    Unsteady gait 5/15/2017    Weakness     Weight gain      Past Surgical History:   Procedure Laterality Date    CHOLECYSTECTOMY      CORONARY ANGIOPLASTY WITH STENT PLACEMENT  2014    CYST REMOVAL  05/16/16    DR Michelle Moore CYST    SHOULDER SURGERY Right 12/18/2015     Family History   Problem Relation Age of Onset    Breast Cancer Mother     Stroke Father      Social History     Social History    Marital status:      Spouse name: Amirah Fernandez Number of children: 3    Years of education: 12+     Occupational History    medical leave [employer]     Social History Main Topics    Smoking status: Former Smoker     Packs/day: 2.50     Years: 30.00     Quit date: 2001    Smokeless tobacco: Never Used    Alcohol use 0.0 oz/week      Comment: limits less than 10/wk     Drug use: No    Sexual activity: Yes     Partners: Female     Other Topics Concern    Not on file     Social History Narrative    No narrative on file     Current Outpatient Prescriptions on File Prior to Visit   Medication Sig Dispense Refill    carvedilol (COREG) 25 MG tablet Take 1 tablet by mouth 2 times daily 180 tablet 3    metFORMIN (GLUCOPHAGE) 500 MG tablet TAKE 1 TABLET TWICE A DAY WITH MEALS 180 tablet 3    torsemide (DEMADEX) 20 MG tablet Take 1 tablet by mouth daily 90 tablet 3    clopidogrel (PLAVIX) 75 MG tablet Take 1 tablet by mouth daily 90 tablet 3    aspirin EC 81 MG EC tablet Take 2 tablets by mouth daily 30 tablet 3    ibuprofen (ADVIL) 200 MG tablet Take 2 tablets by mouth every 8 hours as needed for Pain (headache) 120 tablet 3    glimepiride (AMARYL) 1 MG tablet Take 1 tablet by mouth every morning (before breakfast) 90 tablet 3    allopurinol (ZYLOPRIM) 300 MG tablet Take 300 mg by mouth daily       ammonium lactate (AMLACTIN) 12 % cream APPLY TO DRY CALLUS AREAS 1 TO 2 TIMES DAILY  5    atorvastatin (LIPITOR) 40 MG tablet Take 1 tablet by mouth daily 90 tablet 3    glucose blood VI test strips (ASCENSIA AUTODISC VI;ONE TOUCH ULTRA TEST VI) strip Please replace with True Test Strips test 3 times daily 100 each 3    Misc. Devices (COMMODE BEDSIDE) MISC Use daily as needed 1 each 0    LACTOBACILLUS PO Take by mouth every morning      MULTIPLE VITAMIN PO Take by mouth daily       No current facility-administered medications on file prior to visit. Allergies:  Patient has no known allergies. Review of Systems   Constitutional: Positive for unexpected weight change (weight gain over the past 2 weeks, 5 lbs). Negative for appetite change, chills, diaphoresis, fatigue and fever. Respiratory: Negative for cough, chest tightness, shortness of breath and wheezing. Cardiovascular: Positive for leg swelling. Negative for chest pain and palpitations. No orthopnea, No PND   Gastrointestinal: Negative for abdominal pain, diarrhea, nausea and vomiting. Genitourinary: Negative for decreased urine volume, difficulty urinating, dysuria and hematuria. Musculoskeletal: Positive for myalgias (left lower leg). Skin: Positive for rash (erythema to bilateral lower legs). Neurological: Negative for dizziness, syncope, light-headedness and headaches.        Objective:     /80 (Site: Right Arm, Position: Sitting, Cuff Size: Large Adult)   Pulse 90   Temp 99.1 °F (37.3 °C) (Temporal)   Resp 16   Ht 6' (1.829 m) Wt 277 lb 3.2 oz (125.7 kg)   SpO2 98%   BMI 37.60 kg/m²     Physical Exam   Constitutional: He is oriented to person, place, and time. He appears well-developed and well-nourished. No distress. Neck: Neck supple. Carotid bruit is not present. No thyromegaly present. Cardiovascular: Normal rate, regular rhythm and normal heart sounds. No murmur heard. Pulmonary/Chest: Effort normal and breath sounds normal. No accessory muscle usage. No tachypnea. No respiratory distress. He has no decreased breath sounds. He has no wheezes. He has no rhonchi. He has no rales. Abdominal: Soft. Bowel sounds are normal. He exhibits no distension. There is no tenderness. There is no rebound. Musculoskeletal: He exhibits edema (2+ pitting edema BLE to mid calf with noted rubor/chronic venous stasis skin changes, no weeping/bleeding areas, no open wounds, no increased warmth). Right lower leg: He exhibits swelling and edema. He exhibits no tenderness, no bony tenderness and no deformity. Left lower leg: He exhibits tenderness, swelling and edema. He exhibits no bony tenderness, no deformity and no laceration. Neurological: He is alert and oriented to person, place, and time. Skin: He is not diaphoretic. Ortho Exam (If Applicable)      Assessment & Plan:      1. Dependent edema  I stressed with patient at length the need for daily compression hose use. Due to reports of discomfort with knee high compression stockings I will have him try thigh-high compression stockings use on a daily basis. - Elastic Bandages & Supports (V-4 HIGH COMPRESSION HOSE) MISC; THIGH HIGH. 20-30 MMHG PRESSURE. Use daily as directed. Dispense: 2 each; Refill: 0  - Basic Metabolic Panel; Future    2. Bilateral lower extremity edema  There is no increased warmth or areas of broken skin to suggest cellulitis. Patient without dyspnea at rest or exertion and with no orthopnea or PND to suggest acutely worsening heart failure.

## 2018-07-30 ENCOUNTER — HOSPITAL ENCOUNTER (OUTPATIENT)
Dept: LAB | Age: 63
Discharge: HOME | End: 2018-07-30
Payer: COMMERCIAL

## 2018-07-30 DIAGNOSIS — I10 ESSENTIAL HYPERTENSION: Chronic | ICD-10-CM

## 2018-07-30 DIAGNOSIS — R60.9 DEPENDENT EDEMA: Chronic | ICD-10-CM

## 2018-07-30 DIAGNOSIS — R60.0 BILATERAL LOWER EXTREMITY EDEMA: ICD-10-CM

## 2018-07-30 LAB
ANION GAP SERPL CALCULATED.3IONS-SCNC: 19 MEQ/L (ref 7–13)
BUN BLDV-MCNC: 12 MG/DL (ref 8–23)
CALCIUM SERPL-MCNC: 9.4 MG/DL (ref 8.6–10.2)
CHLORIDE BLD-SCNC: 92 MEQ/L (ref 98–107)
CO2: 28 MEQ/L (ref 22–29)
CREAT SERPL-MCNC: 0.91 MG/DL (ref 0.7–1.2)
GFR AFRICAN AMERICAN: >60
GFR NON-AFRICAN AMERICAN: >60
GLUCOSE BLD-MCNC: 250 MG/DL (ref 74–109)
POTASSIUM SERPL-SCNC: 3.9 MEQ/L (ref 3.5–5.1)
PRO-BNP: 65 PG/ML
SODIUM BLD-SCNC: 139 MEQ/L (ref 132–144)

## 2018-07-30 PROCEDURE — 36415 COLL VENOUS BLD VENIPUNCTURE: CPT

## 2018-07-30 PROCEDURE — 83880 ASSAY OF NATRIURETIC PEPTIDE: CPT

## 2018-07-30 PROCEDURE — 80048 BASIC METABOLIC PNL TOTAL CA: CPT

## 2018-07-31 ENCOUNTER — HOSPITAL ENCOUNTER (OUTPATIENT)
Dept: PHYSICAL THERAPY | Age: 63
Setting detail: THERAPIES SERIES
Discharge: HOME | End: 2018-07-31
Payer: COMMERCIAL

## 2018-07-31 PROCEDURE — 97113 AQUATIC THERAPY/EXERCISES: CPT

## 2018-07-31 ASSESSMENT — PAIN DESCRIPTION - DESCRIPTORS: DESCRIPTORS: ACHING

## 2018-07-31 ASSESSMENT — PAIN DESCRIPTION - PAIN TYPE: TYPE: CHRONIC PAIN

## 2018-07-31 ASSESSMENT — PAIN DESCRIPTION - LOCATION: LOCATION: BACK;HAND

## 2018-07-31 ASSESSMENT — PAIN SCALES - GENERAL: PAINLEVEL_OUTOF10: 5

## 2018-07-31 ASSESSMENT — PAIN DESCRIPTION - ORIENTATION: ORIENTATION: MID

## 2018-08-02 ENCOUNTER — OFFICE VISIT (OUTPATIENT)
Dept: FAMILY MEDICINE CLINIC | Age: 63
End: 2018-08-02
Payer: COMMERCIAL

## 2018-08-02 VITALS
HEART RATE: 90 BPM | OXYGEN SATURATION: 98 % | BODY MASS INDEX: 36.98 KG/M2 | SYSTOLIC BLOOD PRESSURE: 128 MMHG | HEIGHT: 72 IN | RESPIRATION RATE: 18 BRPM | DIASTOLIC BLOOD PRESSURE: 80 MMHG | WEIGHT: 273 LBS | TEMPERATURE: 97.9 F

## 2018-08-02 DIAGNOSIS — R60.9 DEPENDENT EDEMA: Primary | Chronic | ICD-10-CM

## 2018-08-02 DIAGNOSIS — I10 ESSENTIAL HYPERTENSION: Chronic | ICD-10-CM

## 2018-08-02 PROCEDURE — 3017F COLORECTAL CA SCREEN DOC REV: CPT | Performed by: FAMILY MEDICINE

## 2018-08-02 PROCEDURE — G8427 DOCREV CUR MEDS BY ELIG CLIN: HCPCS | Performed by: FAMILY MEDICINE

## 2018-08-02 PROCEDURE — 99213 OFFICE O/P EST LOW 20 MIN: CPT | Performed by: FAMILY MEDICINE

## 2018-08-02 PROCEDURE — 1036F TOBACCO NON-USER: CPT | Performed by: FAMILY MEDICINE

## 2018-08-02 PROCEDURE — G8598 ASA/ANTIPLAT THER USED: HCPCS | Performed by: FAMILY MEDICINE

## 2018-08-02 PROCEDURE — G8417 CALC BMI ABV UP PARAM F/U: HCPCS | Performed by: FAMILY MEDICINE

## 2018-08-02 ASSESSMENT — ENCOUNTER SYMPTOMS
ABDOMINAL PAIN: 0
NAUSEA: 0
CHEST TIGHTNESS: 0
SHORTNESS OF BREATH: 0
COUGH: 0
VOMITING: 0
WHEEZING: 0

## 2018-08-02 NOTE — PROGRESS NOTES
2/11/2014    Hx of bacterial pneumonia     Hypotension     Leg swelling     Macrocytosis without anemia 12/10/2016    Morbid obesity ([facility] 1/2/2014    Myocardial infarction     Obesity     AYLIN (obstructive sleep apnea) 12/10/2016    Osteoarthritis     Right-sided muscle weakness 10/19/2016    S/P cardiac catheterization     Skin cyst 4/8/2016    Spasm 11/9/2016    Stented coronary artery     Tremor of right hand 5/15/2017    Type 2 diabetes mellitus with complication, without long-term current use of insulin ([facility] 3/11/2014    Unsteady gait 5/15/2017    Weakness     Weight gain      Past Surgical History:   Procedure Laterality Date    CHOLECYSTECTOMY      CORONARY ANGIOPLASTY WITH STENT PLACEMENT  2014    CYST REMOVAL  05/16/16    DR Michelle Moore CYST    SHOULDER SURGERY Right 12/18/2015     Family History   Problem Relation Age of Onset    Breast Cancer Mother     Stroke Father      Social History     Social History    Marital status:      Spouse name: Amirah Fernandez Number of children: 3    Years of education: 12+     Occupational History    medical leave [employer]     Social History Main Topics    Smoking status: Former Smoker     Packs/day: 2.50     Years: 30.00     Quit date: 2001    Smokeless tobacco: Never Used    Alcohol use 0.0 oz/week      Comment: limits less than 10/wk     Drug use: No    Sexual activity: Yes     Partners: Female     Other Topics Concern    Not on file     Social History Narrative    No narrative on file     Current Outpatient Prescriptions on File Prior to Visit   Medication Sig Dispense Refill    Elastic Bandages & Supports (V-4 HIGH COMPRESSION HOSE) MISC THIGH HIGH. 20-30 MMHG PRESSURE. Use daily as directed.  2 each 0    carvedilol (COREG) 25 MG tablet Take 1 tablet by mouth 2 times daily 180 tablet 3    metFORMIN (GLUCOPHAGE) 500 MG tablet TAKE 1 TABLET TWICE A DAY WITH MEALS 180 tablet 3    torsemide (DEMADEX) 20 MG tablet Take 1 tablet by mouth daily 90 tablet 3    clopidogrel (PLAVIX) 75 MG tablet Take 1 tablet by mouth daily 90 tablet 3    aspirin EC 81 MG EC tablet Take 2 tablets by mouth daily 30 tablet 3    ibuprofen (ADVIL) 200 MG tablet Take 2 tablets by mouth every 8 hours as needed for Pain (headache) 120 tablet 3    glimepiride (AMARYL) 1 MG tablet Take 1 tablet by mouth every morning (before breakfast) 90 tablet 3    allopurinol (ZYLOPRIM) 300 MG tablet Take 300 mg by mouth daily       ammonium lactate (AMLACTIN) 12 % cream APPLY TO DRY CALLUS AREAS 1 TO 2 TIMES DAILY  5    atorvastatin (LIPITOR) 40 MG tablet Take 1 tablet by mouth daily 90 tablet 3    glucose blood VI test strips (ASCENSIA AUTODISC VI;ONE TOUCH ULTRA TEST VI) strip Please replace with True Test Strips test 3 times daily 100 each 3    Misc. Devices (COMMODE BEDSIDE) MISC Use daily as needed 1 each 0    LACTOBACILLUS PO Take by mouth every morning      MULTIPLE VITAMIN PO Take by mouth daily       No current facility-administered medications on file prior to visit. Allergies:  Patient has no known allergies. Review of Systems   Constitutional: Negative for appetite change, chills, diaphoresis, fatigue, fever and unexpected weight change. Eyes: Negative for visual disturbance. Respiratory: Negative for cough, chest tightness, shortness of breath and wheezing. Cardiovascular: Positive for leg swelling. Negative for chest pain and palpitations. No orthopnea, No PND   Gastrointestinal: Negative for abdominal pain, nausea and vomiting. Genitourinary: Negative for dysuria and hematuria. Skin: Negative for rash. Neurological: Negative for dizziness, syncope, light-headedness and headaches.        Objective:     /80 (Site: Left Arm, Position: Sitting, Cuff Size: Large Adult)   Pulse 90   Temp 97.9 °F (36.6 °C) (Temporal)   Resp 18   Ht 6' (1.829 m)   Wt 273 lb (123.8 kg)   SpO2 98%   BMI 37.03 kg/m²     Physical Exam Constitutional: He is oriented to person, place, and time. He appears well-developed and well-nourished. No distress. Cardiovascular: Normal rate, regular rhythm and normal heart sounds. No murmur heard. Pulmonary/Chest: Effort normal and breath sounds normal. No respiratory distress. He has no wheezes. He has no rales. Abdominal: Soft. Bowel sounds are normal. He exhibits no distension. There is no tenderness. Musculoskeletal: He exhibits edema (1+ edema BLE to mid-calves, noted rubor and chronic venous stasis skin changes). Neurological: He is alert and oriented to person, place, and time. Skin: No rash noted. He is not diaphoretic. Ortho Exam (If Applicable)      Assessment & Plan:      1. Dependent edema  Managed well currently with elevating the legs at night and taking diuretic at home. I stressed with him the importance of a lower salt diet and routine activity at home. I urged patient to start daily compression hose use once he receives the compression hose in the mail    2. Essential hypertension  Blood pressure within normal limits today in the office. Continue current medication      Modified Medications    No medications on file       New Prescriptions    No medications on file       There are no discontinued medications. Return for keep next scheduled visit 09/13/18.     Karli Schrader MD

## 2018-08-06 ENCOUNTER — HOSPITAL ENCOUNTER (OUTPATIENT)
Dept: PHYSICAL THERAPY | Age: 63
Setting detail: THERAPIES SERIES
Discharge: HOME | End: 2018-08-06
Payer: COMMERCIAL

## 2018-08-06 PROCEDURE — 97535 SELF CARE MNGMENT TRAINING: CPT

## 2018-08-06 PROCEDURE — 97112 NEUROMUSCULAR REEDUCATION: CPT

## 2018-08-06 ASSESSMENT — PAIN DESCRIPTION - PAIN TYPE: TYPE: CHRONIC PAIN

## 2018-08-06 ASSESSMENT — PAIN DESCRIPTION - LOCATION: LOCATION: BACK;NECK;LEG

## 2018-08-06 ASSESSMENT — PAIN DESCRIPTION - DESCRIPTORS: DESCRIPTORS: ACHING;TIGHTNESS

## 2018-08-06 ASSESSMENT — PAIN DESCRIPTION - ORIENTATION: ORIENTATION: RIGHT;LEFT

## 2018-08-06 ASSESSMENT — PAIN SCALES - GENERAL: PAINLEVEL_OUTOF10: 6

## 2018-08-06 NOTE — PROGRESS NOTES
[address] [address]  Outpatient Physical Therapy    Treatment Note        Date: 2018  Patient: Brandie Marmolejo  : 1955  ACCT #: [de-identified]  Referring Practitioner: Dr. Aidan Payton, Dr. Humza Paredes   Diagnosis: vertigo, knee OA    Visit Information:  PT Visit Information  Onset Date: 17  PT Insurance Information: MMO  Total # of Visits Approved: 44  Total # of Visits to Date: 9  No Show: 0  Canceled Appointment: 0  Progress Note Counter:     Subjective: Pt reports pain thoracic, neck,possibly d/t bumpy road, HA. Reports sx's decreased approximately 10%. HEP Compliance:  [x] Good [x] Fair [] Poor [] Reports not doing due to:    Vital Signs  Patient Currently in Pain: Yes   Pain Screening  Patient Currently in Pain: Yes  Pain Assessment  Pain Level: 6 (4-6 inc'd in neck)  Pain Type: Chronic pain  Pain Location: Back;Neck;Leg  Pain Orientation: Right;Left  Pain Descriptors: Aching;Tightness    OBJECTIVE:   Exercises  Exercise 6:  static  front of mat , FA: 40\", 43\", increased nausea and instability/FT 25\", 30\", partial tandem, 5-10\"  Exercise 7: Dual tasking without ue support, tapping ball to no# wall, increased nausea  Exercise 8: SLS with 1 foot on 4\" block  Exercise 9: alt toe taps 4\" block  Exercise 10: 90deg turns with spotting  Exercise 16: Static stand: FT, FA 0-2 UE support       Transfers  Comment: STSx 10 from mat, without ue's, focus on focal point ahead, 2 retropulsive bracing against mat      Strength: [x] NT  [] MMT completed:     ROM: [x] NT  [] ROM measurements:      *Indicates exercise, modality, or manual techniques to be initiated when appropriate    Assessment:         Body structures, Functions, Activity limitations: Decreased functional mobility , Decreased strength, Decreased safe awareness, Decreased endurance, Decreased balance, Decreased coordination  Assessment: Initiated balance drills outside of // bars to habituate pt to environmental mvmt and distractions. Pt with several episodes of posterior lob with people walking by, and/or fast mvmts. Pt self correcting by HHA to supportive surface approx. 70%, seated rb's occassionally needed. Initiated static balance with ue dynamics at no# wall with ball touch, pt reports increased HA . Pt perforrmed STS from mat 8/10 x without posterior lob. Treatment Diagnosis: dizziness, imbalance, impaired mobility, difficulty with gait, LE weakness, knee pain           Goals:  Short term goals  Time Frame for Short term goals: 3 wks   Short term goal 1: Independent with HEP   Short term goal 2: Decrease vestibular symptoms by 25% to allow increased tolerance to mobility   Short term goal 3: Anderson >/= 25/56 to demonstrate improved static balance and decreased risk for falls   Short term goal 4: Ambulate >/= 100' independently with ww     Long term goals  Time Frame for Long term goals : 6 wks   Long term goal 1: Improve LE strength 1/2 grade to allow increased ease with walking with increased balance   Long term goal 2: Anderson >/= 38/56 to demonstrate improved balance and decreased risk for falls   Long term goal 3: Ambulate >/= 150' with least restrictive device independently with decreased startle reflex and LOB   Long term goal 4: Up/ down 4-6\" steps with 1 HR independently  Progress toward goals: transfers, balance    POST-PAIN       Pain Rating (0-10 pain scale):  6 /10   Location and pain description same as pre-treatment unless indicated.    Action: [] NA   [x] Perform HEP  [x] Meds as prescribed  [] Modalities as prescribed   [] Call Physician     Frequency/Duration:  Plan  Times per week: 1-2  Current Treatment Recommendations: Strengthening, ROM, Balance Training, Functional Mobility Training, Transfer Training, Gait Training, Stair training, Neuromuscular Re-education, Manual Therapy - Soft Tissue Mobilization, Manual Therapy - Joint Manipulation, Home Exercise Program, Safety Education & Training, Patient/Caregiver

## 2018-08-07 ENCOUNTER — HOSPITAL ENCOUNTER (OUTPATIENT)
Dept: PHYSICAL THERAPY | Age: 63
Setting detail: THERAPIES SERIES
End: 2018-08-07
Payer: COMMERCIAL

## 2018-08-14 ENCOUNTER — HOSPITAL ENCOUNTER (OUTPATIENT)
Dept: PHYSICAL THERAPY | Age: 63
Setting detail: THERAPIES SERIES
Discharge: HOME | End: 2018-08-14
Payer: COMMERCIAL

## 2018-08-14 PROCEDURE — 97113 AQUATIC THERAPY/EXERCISES: CPT

## 2018-08-14 NOTE — PROGRESS NOTES
[id number] [address]  Outpatient Physical Therapy    Treatment Note        Date: 2018  Patient: Brianne Powers  : 1955  ACCT #: [de-identified]  Referring Practitioner: Dr. Dontae Friedman, Dr. Pedro Zamora   Diagnosis: vertigo, knee OA    Visit Information:  PT Visit Information  Onset Date: 17  PT Insurance Information: MMO  Total # of Visits Approved: 44  Total # of Visits to Date: 10  No Show: 0  Canceled Appointment: 0  Progress Note Counter: 10/12    Subjective: No new reports. HEP Compliance:  [x] Good [] Fair [] Poor [] Reports not doing due to:    Vital Signs  Patient Currently in Pain: Yes   Pain Screening  Patient Currently in Pain: Yes    OBJECTIVE:   Exercises  Exercise 19: See paper PRE for Aquatic Ex's to be scanned at D/C    *Indicates exercise, modality, or manual techniques to be initiated when appropriate    Assessment: Body structures, Functions, Activity limitations: Decreased functional mobility , Decreased strength, Decreased safe awareness, Decreased endurance, Decreased balance, Decreased coordination  Assessment: Pt able to ambulate F/L without UEs with improved balance. Tx focused on balance such as alternating toe taps and posture exs unsupported with improved balance. Occ posterior LOB but able to self correct 90% of the time with decreased physical assist. Able to exit pool stairs Indep with B HRs, NR pattern.     Treatment Diagnosis: dizziness, imbalance, impaired mobility, difficulty with gait, LE weakness, knee pain   Prognosis: Good       Goals:  Short term goals  Time Frame for Short term goals: 3 wks   Short term goal 1: Independent with HEP   Short term goal 2: Decrease vestibular symptoms by 25% to allow increased tolerance to mobility   Short term goal 3: Anderson >/= 25/56 to demonstrate improved static balance and decreased risk for falls   Short term goal 4: Ambulate >/= 100' independently with ww     Long term goals  Time Frame for Long term goals : 6 wks   Long term goal 1: Improve LE strength 1/2 grade to allow increased ease with walking with increased balance   Long term goal 2: Anderson >/= 38/56 to demonstrate improved balance and decreased risk for falls   Long term goal 3: Ambulate >/= 150' with least restrictive device independently with decreased startle reflex and LOB   Long term goal 4: Up/ down 4-6\" steps with 1 HR independently  Progress toward goals: Balance    POST-PAIN       Pain Rating (0-10 pain scale):  7 /10   Location and pain description same as pre-treatment unless indicated. Action: [] NA   [] Perform HEP  [x] Meds as prescribed  [] Modalities as prescribed   [] Call Physician     Frequency/Duration:  Plan  Times per week: 1-2  Current Treatment Recommendations: Strengthening, ROM, Balance Training, Functional Mobility Training, Transfer Training, Gait Training, Stair training, Neuromuscular Re-education, Manual Therapy - Soft Tissue Mobilization, Manual Therapy - Joint Manipulation, Home Exercise Program, Safety Education & Training, Patient/Caregiver Education & Training, Equipment Evaluation, Education, & procurement, Positioning, Aquatics     Pt to continue current HEP. See objective section for any therapeutic exercise changes, additions or modifications this date.          PT Individual Minutes  Time In: 0282  Time Out: 7860  Minutes: 40  Timed Code Treatment Minutes: 40 Minutes  Procedure Minutes:0  Aq: 36  Signature:  Electronically signed by Vesta Eller PTA on 8/14/18 at 4:53 PM

## 2018-08-21 ENCOUNTER — HOSPITAL ENCOUNTER (OUTPATIENT)
Dept: PHYSICAL THERAPY | Age: 63
Setting detail: THERAPIES SERIES
Discharge: HOME | End: 2018-08-21
Payer: COMMERCIAL

## 2018-08-21 PROCEDURE — 97112 NEUROMUSCULAR REEDUCATION: CPT

## 2018-08-21 PROCEDURE — 97110 THERAPEUTIC EXERCISES: CPT

## 2018-08-21 PROCEDURE — 97116 GAIT TRAINING THERAPY: CPT

## 2018-08-21 ASSESSMENT — PAIN DESCRIPTION - DESCRIPTORS: DESCRIPTORS: ACHING

## 2018-08-21 ASSESSMENT — PAIN DESCRIPTION - LOCATION: LOCATION: BACK;NECK;HEAD

## 2018-08-28 ENCOUNTER — HOSPITAL ENCOUNTER (OUTPATIENT)
Dept: PHYSICAL THERAPY | Age: 63
Setting detail: THERAPIES SERIES
Discharge: HOME | End: 2018-08-28
Payer: COMMERCIAL

## 2018-08-28 PROCEDURE — 97113 AQUATIC THERAPY/EXERCISES: CPT

## 2018-08-28 ASSESSMENT — PAIN DESCRIPTION - ORIENTATION: ORIENTATION: RIGHT;LEFT

## 2018-08-28 ASSESSMENT — PAIN DESCRIPTION - LOCATION: LOCATION: BACK;NECK

## 2018-08-28 ASSESSMENT — PAIN DESCRIPTION - PAIN TYPE: TYPE: CHRONIC PAIN

## 2018-08-28 ASSESSMENT — PAIN DESCRIPTION - DESCRIPTORS: DESCRIPTORS: ACHING;SORE

## 2018-08-28 ASSESSMENT — PAIN SCALES - GENERAL: PAINLEVEL_OUTOF10: 5

## 2018-08-28 NOTE — PROGRESS NOTES
Prognosis: Good       Goals:  Short term goals  Time Frame for Short term goals: 3 wks   Short term goal 1: Independent with HEP   Short term goal 2: Decrease vestibular symptoms by 25% to allow increased tolerance to mobility   Short term goal 3: Anderson >/= 25/56 to demonstrate improved static balance and decreased risk for falls   Short term goal 4: Ambulate >/= 100' independently with ww     Long term goals  Time Frame for Long term goals : 6 wks   Long term goal 1: Improve LE strength 1/2 grade to allow increased ease with walking with increased balance   Long term goal 2: Anderson >/= 38/56 to demonstrate improved balance and decreased risk for falls   Long term goal 3: Ambulate >/= 150' with least restrictive device independently with decreased startle reflex and LOB   Long term goal 4: Up/ down 4-6\" steps with 1 HR independently  Progress toward goals:  Balance    POST-PAIN       Pain Rating (0-10 pain scale):   4/10   Location and pain description same as pre-treatment unless indicated. Action: [] NA   [] Perform HEP  [x] Meds as prescribed  [] Modalities as prescribed   [] Call Physician     Frequency/Duration:  Plan  Times per week: 1  Plan weeks: 6  Current Treatment Recommendations: Strengthening, ROM, Balance Training, Functional Mobility Training, Transfer Training, Gait Training, Stair training, Neuromuscular Re-education, Manual Therapy - Soft Tissue Mobilization, Manual Therapy - Joint Manipulation, Home Exercise Program, Safety Education & Training, Patient/Caregiver Education & Training, Equipment Evaluation, Education, & procurement, Positioning, Aquatics  Plan Comment: alternate aquatic and land therapy      Pt to continue current HEP. See objective section for any therapeutic exercise changes, additions or modifications this date.          PT Individual Minutes  Time In: 8207  Time Out: 1449  Minutes: 44  Timed Code Treatment Minutes: 44 Minutes  Procedure Minutes:0  Aq: 40    Signature:

## 2018-09-04 ENCOUNTER — HOSPITAL ENCOUNTER (OUTPATIENT)
Dept: PHYSICAL THERAPY | Age: 63
Setting detail: THERAPIES SERIES
Discharge: HOME | End: 2018-09-04
Payer: COMMERCIAL

## 2018-09-04 PROCEDURE — 97112 NEUROMUSCULAR REEDUCATION: CPT

## 2018-09-04 PROCEDURE — 97110 THERAPEUTIC EXERCISES: CPT

## 2018-09-04 ASSESSMENT — PAIN DESCRIPTION - LOCATION: LOCATION: NECK;BACK

## 2018-09-04 ASSESSMENT — PAIN DESCRIPTION - DESCRIPTORS: DESCRIPTORS: SORE;BURNING

## 2018-09-04 ASSESSMENT — PAIN DESCRIPTION - PAIN TYPE: TYPE: CHRONIC PAIN

## 2018-09-04 ASSESSMENT — PAIN SCALES - GENERAL: PAINLEVEL_OUTOF10: 5

## 2018-09-04 ASSESSMENT — PAIN DESCRIPTION - ORIENTATION: ORIENTATION: RIGHT;LEFT

## 2018-09-11 ENCOUNTER — OFFICE VISIT (OUTPATIENT)
Dept: PULMONOLOGY | Age: 63
End: 2018-09-11
Payer: COMMERCIAL

## 2018-09-11 ENCOUNTER — HOSPITAL ENCOUNTER (OUTPATIENT)
Dept: PHYSICAL THERAPY | Age: 63
Setting detail: THERAPIES SERIES
Discharge: HOME | End: 2018-09-11
Payer: COMMERCIAL

## 2018-09-11 VITALS
BODY MASS INDEX: 36.98 KG/M2 | HEART RATE: 86 BPM | TEMPERATURE: 98.3 F | DIASTOLIC BLOOD PRESSURE: 60 MMHG | WEIGHT: 273 LBS | OXYGEN SATURATION: 95 % | RESPIRATION RATE: 16 BRPM | SYSTOLIC BLOOD PRESSURE: 120 MMHG | HEIGHT: 72 IN

## 2018-09-11 DIAGNOSIS — F17.201 TOBACCO ABUSE, IN REMISSION: ICD-10-CM

## 2018-09-11 DIAGNOSIS — G47.33 SLEEP APNEA, OBSTRUCTIVE: Primary | ICD-10-CM

## 2018-09-11 DIAGNOSIS — I63.9 CEREBROVASCULAR ACCIDENT (CVA), UNSPECIFIED MECHANISM (HCC): ICD-10-CM

## 2018-09-11 DIAGNOSIS — I25.10 CORONARY ARTERY DISEASE INVOLVING NATIVE HEART, ANGINA PRESENCE UNSPECIFIED, UNSPECIFIED VESSEL OR LESION TYPE: ICD-10-CM

## 2018-09-11 PROCEDURE — 99214 OFFICE O/P EST MOD 30 MIN: CPT | Performed by: PHYSICIAN ASSISTANT

## 2018-09-11 PROCEDURE — G8598 ASA/ANTIPLAT THER USED: HCPCS | Performed by: PHYSICIAN ASSISTANT

## 2018-09-11 PROCEDURE — 97113 AQUATIC THERAPY/EXERCISES: CPT

## 2018-09-11 PROCEDURE — 1036F TOBACCO NON-USER: CPT | Performed by: PHYSICIAN ASSISTANT

## 2018-09-11 PROCEDURE — G8417 CALC BMI ABV UP PARAM F/U: HCPCS | Performed by: PHYSICIAN ASSISTANT

## 2018-09-11 PROCEDURE — G8427 DOCREV CUR MEDS BY ELIG CLIN: HCPCS | Performed by: PHYSICIAN ASSISTANT

## 2018-09-11 PROCEDURE — 3017F COLORECTAL CA SCREEN DOC REV: CPT | Performed by: PHYSICIAN ASSISTANT

## 2018-09-11 RX ORDER — LIDOCAINE 50 MG/G
1 PATCH TOPICAL EVERY 12 HOURS
COMMUNITY
Start: 2018-09-08 | End: 2021-10-05

## 2018-09-11 ASSESSMENT — ENCOUNTER SYMPTOMS
BACK PAIN: 0
SHORTNESS OF BREATH: 0
RHINORRHEA: 0
SINUS PAIN: 1
COUGH: 1
CHEST TIGHTNESS: 0
SINUS PRESSURE: 1
WHEEZING: 0
TROUBLE SWALLOWING: 0
SORE THROAT: 0
ABDOMINAL PAIN: 0
VOICE CHANGE: 0
STRIDOR: 0
COLOR CHANGE: 0

## 2018-09-11 ASSESSMENT — PAIN SCALES - GENERAL: PAINLEVEL_OUTOF10: 4

## 2018-09-11 ASSESSMENT — PAIN DESCRIPTION - DESCRIPTORS: DESCRIPTORS: SORE

## 2018-09-11 ASSESSMENT — PAIN DESCRIPTION - LOCATION: LOCATION: NECK

## 2018-09-11 ASSESSMENT — PAIN DESCRIPTION - PAIN TYPE: TYPE: CHRONIC PAIN

## 2018-09-11 NOTE — PROGRESS NOTES
hypertension 8/17/2016    Gallop rhythm     Gout 12/10/2016    Gout of big toe 08/2014    Right Great Toe    H/O fall     Headache     migraines    Heart failure ([address]     History of 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    History of heart failure 1/6/2014    History of myocardial infarction 3/11/2014    History of recurrent pneumonia 2/11/2014    Hx of bacterial pneumonia     Hypotension     Leg swelling     Macrocytosis without anemia 12/10/2016    Morbid obesity ([address] 1/2/2014    Myocardial infarction ([address]     Obesity     AYLIN (obstructive sleep apnea) 12/10/2016    Osteoarthritis     Right-sided muscle weakness 10/19/2016    S/P cardiac catheterization     Skin cyst 4/8/2016    Spasm 11/9/2016    Stented coronary artery     Tremor of right hand 5/15/2017    Type 2 diabetes mellitus with complication, without long-term current use of insulin ([address] 3/11/2014    Unsteady gait 5/15/2017    Weakness     Weight gain      Past Surgical History:   Procedure Laterality Date    CHOLECYSTECTOMY      CORONARY ANGIOPLASTY WITH STENT PLACEMENT  2014    CYST REMOVAL  05/16/16    DR Cari Barnhart CYST    SHOULDER SURGERY Right 12/18/2015     Family History   Problem Relation Age of Onset    Breast Cancer Mother     Stroke Father        No Known Allergies    Current Outpatient Prescriptions   Medication Sig Dispense Refill    carvedilol (COREG) 25 MG tablet Take 1 tablet by mouth 2 times daily 180 tablet 3    aspirin EC 81 MG EC tablet Take 2 tablets by mouth daily 30 tablet 3    allopurinol (ZYLOPRIM) 300 MG tablet Take 300 mg by mouth daily       ammonium lactate (AMLACTIN) 12 % cream APPLY TO DRY CALLUS AREAS 1 TO 2 TIMES DAILY  5    atorvastatin (LIPITOR) 40 MG tablet Take 1 tablet by mouth daily 90 tablet 3    lidocaine (LIDODERM) 5 %       Elastic Bandages & Supports (V-4 HIGH COMPRESSION HOSE) MISC THIGH HIGH. 20-30 MMHG PRESSURE. Use daily as directed. 2 each 0    metFORMIN (GLUCOPHAGE) 500 MG tablet TAKE 1 TABLET TWICE A DAY WITH MEALS 180 tablet 3    torsemide (DEMADEX) 20 MG tablet Take 1 tablet by mouth daily 90 tablet 3    clopidogrel (PLAVIX) 75 MG tablet Take 1 tablet by mouth daily 90 tablet 3    ibuprofen (ADVIL) 200 MG tablet Take 2 tablets by mouth every 8 hours as needed for Pain (headache) 120 tablet 3    glimepiride (AMARYL) 1 MG tablet Take 1 tablet by mouth every morning (before breakfast) 90 tablet 3    glucose blood VI test strips (ASCENSIA AUTODISC VI;ONE TOUCH ULTRA TEST VI) strip Please replace with True Test Strips test 3 times daily 100 each 3    Misc. Devices (COMMODE BEDSIDE) MISC Use daily as needed 1 each 0    LACTOBACILLUS PO Take by mouth every morning      MULTIPLE VITAMIN PO Take by mouth daily       No current facility-administered medications for this visit. Review of Systems   Constitutional: Negative for activity change, appetite change, fever and unexpected weight change. HENT: Positive for postnasal drip, sinus pain and sinus pressure. Negative for rhinorrhea, sneezing, sore throat, tinnitus, trouble swallowing and voice change. Eyes: Negative for visual disturbance. Respiratory: Positive for cough. Negative for chest tightness, shortness of breath, wheezing and stridor. Cardiovascular: Negative for chest pain and leg swelling. Gastrointestinal: Negative for abdominal pain. Musculoskeletal: Positive for gait problem (since stoke in 2016 issues with equilibrium ). Negative for arthralgias, back pain and myalgias. Skin: Negative for color change, rash and wound. Allergic/Immunologic: Negative for immunocompromised state. Neurological: Negative for dizziness and headaches. Hematological: Negative for adenopathy. Psychiatric/Behavioral: Negative for confusion.      Objective    Vitals:    09/11/18 1457   BP: 120/60 Pulse: 86   Resp: 16   Temp: 98.3 °F (36.8 °C)   TempSrc: Tympanic   SpO2: 95%   Weight: 273 lb (123.8 kg)   Height: 6' (1.829 m)       Physical Exam   Constitutional: He is oriented to person, place, and time. He appears well-developed and well-nourished. No distress. HENT:   Head: Normocephalic and atraumatic. Right Ear: External ear normal. No tenderness. No middle ear effusion. Left Ear: External ear normal. No tenderness. No middle ear effusion. Nose: Nose normal. No mucosal edema, rhinorrhea or nose lacerations. Mouth/Throat: Oropharynx is clear and moist. No oral lesions. Normal dentition. No dental abscesses. No oropharyngeal exudate, posterior oropharyngeal edema or posterior oropharyngeal erythema. Eyes: Pupils are equal, round, and reactive to light. Conjunctivae and EOM are normal. Right eye exhibits no discharge. Left eye exhibits no discharge. No scleral icterus. Neck: Normal range of motion. Neck supple. No JVD present. No tracheal deviation present. No thyromegaly present. Cardiovascular: Normal rate, regular rhythm, normal heart sounds and intact distal pulses. Exam reveals no gallop and no friction rub. No murmur heard. Pulmonary/Chest: Effort normal and breath sounds normal. No stridor. No respiratory distress. He has no wheezes. He has no rales. He exhibits no tenderness. Abdominal: Soft. He exhibits no distension. There is no tenderness. Musculoskeletal: Normal range of motion. He exhibits no edema or tenderness. Lymphadenopathy:     He has no cervical adenopathy. Neurological: He is alert and oriented to person, place, and time. No cranial nerve deficit. Skin: Skin is warm and dry. No rash noted. He is not diaphoretic. No erythema. Psychiatric: He has a normal mood and affect. His behavior is normal.     Assessment and Plan      ICD-10-CM ICD-9-CM    1. Sleep apnea, obstructive G47.33 327.23    2.  Cerebrovascular accident (CVA), unspecified mechanism ([facility]

## 2018-09-13 ENCOUNTER — OFFICE VISIT (OUTPATIENT)
Dept: FAMILY MEDICINE CLINIC | Age: 63
End: 2018-09-13
Payer: COMMERCIAL

## 2018-09-13 ENCOUNTER — HOSPITAL ENCOUNTER (OUTPATIENT)
Dept: LAB | Age: 63
Discharge: HOME | End: 2018-09-13
Payer: COMMERCIAL

## 2018-09-13 VITALS
HEART RATE: 82 BPM | BODY MASS INDEX: 36.68 KG/M2 | SYSTOLIC BLOOD PRESSURE: 130 MMHG | HEIGHT: 72 IN | RESPIRATION RATE: 16 BRPM | TEMPERATURE: 98.3 F | DIASTOLIC BLOOD PRESSURE: 72 MMHG | WEIGHT: 270.8 LBS

## 2018-09-13 DIAGNOSIS — E11.42 TYPE 2 DIABETES MELLITUS WITH DIABETIC POLYNEUROPATHY, WITHOUT LONG-TERM CURRENT USE OF INSULIN (HCC): Chronic | ICD-10-CM

## 2018-09-13 DIAGNOSIS — Z12.5 SCREENING FOR PROSTATE CANCER: ICD-10-CM

## 2018-09-13 DIAGNOSIS — I10 ESSENTIAL HYPERTENSION: Chronic | ICD-10-CM

## 2018-09-13 DIAGNOSIS — Z00.01 ENCOUNTER FOR WELL ADULT EXAM WITH ABNORMAL FINDINGS: Primary | ICD-10-CM

## 2018-09-13 DIAGNOSIS — Z23 NEED FOR VACCINATION: ICD-10-CM

## 2018-09-13 LAB
ALBUMIN SERPL-MCNC: 4.7 G/DL (ref 3.9–4.9)
ALP BLD-CCNC: 67 U/L (ref 35–104)
ALT SERPL-CCNC: 35 U/L (ref 0–41)
ANION GAP SERPL CALCULATED.3IONS-SCNC: 20 MEQ/L (ref 7–13)
AST SERPL-CCNC: 38 U/L (ref 0–40)
BILIRUB SERPL-MCNC: 0.7 MG/DL (ref 0–1.2)
BUN BLDV-MCNC: 12 MG/DL (ref 8–23)
CALCIUM SERPL-MCNC: 9.7 MG/DL (ref 8.6–10.2)
CHLORIDE BLD-SCNC: 96 MEQ/L (ref 98–107)
CO2: 26 MEQ/L (ref 22–29)
CREAT SERPL-MCNC: 0.84 MG/DL (ref 0.7–1.2)
CREATININE URINE: 239.7 MG/DL
GFR AFRICAN AMERICAN: >60
GFR NON-AFRICAN AMERICAN: >60
GLOBULIN: 2.9 G/DL (ref 2.3–3.5)
GLUCOSE BLD-MCNC: 126 MG/DL (ref 74–109)
MICROALBUMIN UR-MCNC: 8.5 MG/DL
MICROALBUMIN/CREAT UR-RTO: 35.5 MG/G (ref 0–30)
POTASSIUM SERPL-SCNC: 4.2 MEQ/L (ref 3.5–5.1)
PROSTATE SPECIFIC ANTIGEN: 1.45 NG/ML (ref 0–5.4)
SODIUM BLD-SCNC: 142 MEQ/L (ref 132–144)
TOTAL PROTEIN: 7.6 G/DL (ref 6.4–8.1)

## 2018-09-13 PROCEDURE — 80053 COMPREHEN METABOLIC PANEL: CPT

## 2018-09-13 PROCEDURE — 82043 UR ALBUMIN QUANTITATIVE: CPT

## 2018-09-13 PROCEDURE — 36415 COLL VENOUS BLD VENIPUNCTURE: CPT

## 2018-09-13 PROCEDURE — 90471 IMMUNIZATION ADMIN: CPT | Performed by: FAMILY MEDICINE

## 2018-09-13 PROCEDURE — 82570 ASSAY OF URINE CREATININE: CPT

## 2018-09-13 PROCEDURE — 90688 IIV4 VACCINE SPLT 0.5 ML IM: CPT | Performed by: FAMILY MEDICINE

## 2018-09-13 PROCEDURE — G0103 PSA SCREENING: HCPCS

## 2018-09-13 PROCEDURE — 99396 PREV VISIT EST AGE 40-64: CPT | Performed by: FAMILY MEDICINE

## 2018-09-13 ASSESSMENT — ENCOUNTER SYMPTOMS
CONSTIPATION: 0
EYE DISCHARGE: 0
COUGH: 0
ABDOMINAL PAIN: 0
EYE PAIN: 0
NAUSEA: 0
ABDOMINAL DISTENTION: 0
DIARRHEA: 0
CHOKING: 0
BLOOD IN STOOL: 0
APNEA: 0
WHEEZING: 0
RHINORRHEA: 0
CHEST TIGHTNESS: 0
VOMITING: 0
COLOR CHANGE: 0
SINUS PRESSURE: 0
SHORTNESS OF BREATH: 0
SORE THROAT: 0
PHOTOPHOBIA: 0

## 2018-09-13 NOTE — PROGRESS NOTES
Subjective:      Patient ID: Boston Albert is a 61 y.o. male who presents today for:  Chief Complaint   Patient presents with    Annual Exam     Presents today for his routine Annual Physical Exam. Reports that his leg swelling is improving but, still present.  Health Maintenance     HM reviewed with patient. He states that Dr. Ross Mcdonald in [facility] [address] and [address] did his DM Foot Exam about 2 weeks ago.  He RX'd him Lidocaine Patches 5 %       HPI     Patient presents for routine well visit    Past Medical History:   Diagnosis Date    Acute renal failure ([address]     Anxiety     CAD S/P percutaneous coronary angioplasty 3/11/2014    Cardiomyopathy ([address]     Cerebrovascular accident (CVA) due to occlusion of left middle cerebral artery ([address] 08/2016    brainstem infarction from left MCA occlusion    Cerebrovascular disease 07/27/2016    Coronary angioplasty status     CVA (cerebral vascular accident) ([address] 11/12/2017    Dependent edema 9/14/2017    Diplopia 5/15/2017    Resolved with management of cataracts    Dupuytren contracture 1/2/2018    Dysphagia 8/18/2011    Dysphonia 8/18/2011    Essential hypertension 8/17/2016    Gallop rhythm     Gout 12/10/2016    Gout of big toe 08/2014    Right Great Toe    H/O fall     Headache     migraines    Heart failure ([address]     History of 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    History of heart failure 1/6/2014    History of myocardial infarction 3/11/2014    History of recurrent pneumonia 2/11/2014    Hx of bacterial pneumonia     Hypotension     Leg swelling     Macrocytosis without anemia 12/10/2016    Morbid obesity ([address] 1/2/2014    Myocardial infarction ([address]     Obesity     AYLIN (obstructive sleep apnea) 12/10/2016    Osteoarthritis     Right-sided muscle weakness 10/19/2016    S/P cardiac catheterization     Skin cyst 4/8/2016   

## 2018-09-13 NOTE — PROGRESS NOTES
Vaccine Information Sheet, \"Influenza - Inactivated\"  given to Latonia Luna, or parent/legal guardian of  Latonia Luna and verbalized understanding. Patient responses:    Have you ever had a reaction to a flu vaccine? No  Are you able to eat eggs without adverse effects? Yes  Do you have any current illness? No  Have you ever had Guillian [place] Syndrome? No    Flu vaccine given per order. Please see immunization tab.

## 2018-09-14 PROBLEM — E11.29 MICROALBUMINURIA DUE TO TYPE 2 DIABETES MELLITUS (HCC): Chronic | Status: ACTIVE | Noted: 2018-09-14

## 2018-09-14 PROBLEM — R80.9 MICROALBUMINURIA DUE TO TYPE 2 DIABETES MELLITUS (HCC): Chronic | Status: ACTIVE | Noted: 2018-09-14

## 2018-09-18 ENCOUNTER — TELEPHONE (OUTPATIENT)
Dept: CARDIOLOGY CLINIC | Age: 63
End: 2018-09-18

## 2018-09-18 ENCOUNTER — HOSPITAL ENCOUNTER (OUTPATIENT)
Dept: PHYSICAL THERAPY | Age: 63
Setting detail: THERAPIES SERIES
Discharge: HOME | End: 2018-09-18
Payer: COMMERCIAL

## 2018-09-18 PROCEDURE — 97112 NEUROMUSCULAR REEDUCATION: CPT

## 2018-09-18 PROCEDURE — 97116 GAIT TRAINING THERAPY: CPT

## 2018-09-18 ASSESSMENT — PAIN SCALES - GENERAL: PAINLEVEL_OUTOF10: 5

## 2018-09-18 ASSESSMENT — PAIN DESCRIPTION - ORIENTATION: ORIENTATION: RIGHT;LEFT

## 2018-09-18 ASSESSMENT — PAIN DESCRIPTION - PAIN TYPE: TYPE: CHRONIC PAIN

## 2018-09-18 ASSESSMENT — PAIN DESCRIPTION - LOCATION: LOCATION: NECK;LEG

## 2018-09-18 ASSESSMENT — PAIN DESCRIPTION - DESCRIPTORS: DESCRIPTORS: SORE;ACHING;THROBBING

## 2018-09-18 NOTE — TELEPHONE ENCOUNTER
PT NEEDS DIRECTIVE FOR PLAVIX. HAVING BIG TOE TOE NAILS REMOVED. HOW SHOULD HE TAKE THE PLAVIX? PLEASE CALL PATIENT.

## 2018-09-18 NOTE — PROGRESS NOTES
Electronically signed by Geovanni Arnold PTA on 9/18/18 at 1:02 PM      Total Minutes:60      Transfer/Bed mobility training:      Gait training:10      Neuro re education:50     Therapeutic ex:

## 2018-09-25 ENCOUNTER — HOSPITAL ENCOUNTER (OUTPATIENT)
Dept: PHYSICAL THERAPY | Age: 63
Setting detail: THERAPIES SERIES
Discharge: HOME | End: 2018-09-25
Payer: COMMERCIAL

## 2018-09-25 PROCEDURE — 97113 AQUATIC THERAPY/EXERCISES: CPT

## 2018-09-25 ASSESSMENT — PAIN SCALES - GENERAL: PAINLEVEL_OUTOF10: 5

## 2018-09-25 ASSESSMENT — PAIN DESCRIPTION - PAIN TYPE: TYPE: CHRONIC PAIN

## 2018-09-25 ASSESSMENT — PAIN DESCRIPTION - ORIENTATION: ORIENTATION: RIGHT;LEFT

## 2018-09-25 ASSESSMENT — PAIN DESCRIPTION - DESCRIPTORS: DESCRIPTORS: SORE

## 2018-09-25 ASSESSMENT — PAIN DESCRIPTION - LOCATION: LOCATION: NECK

## 2018-09-25 NOTE — PROGRESS NOTES
[address] [address]  Outpatient Physical Therapy    Treatment Note        Date: 2018  Patient: Zuhair Haley  : 1955  ACCT #: [de-identified]  Referring Practitioner: Dr. Maine Storey, Dr. Anival Guajardo   Diagnosis: vertigo, knee OA    Visit Information:  PT Visit Information  Onset Date: 17  PT Insurance Information: MMO  Total # of Visits Approved: 44  Total # of Visits to Date: 16  No Show: 0  Canceled Appointment: 0  Progress Note Counter:     Subjective: Pt states he is concerned about not being active with toe nails being removed. HEP Compliance:  [x] Good [] Fair [] Poor [] Reports not doing due to:    Vital Signs  Patient Currently in Pain: Yes   Pain Screening  Patient Currently in Pain: Yes  Pain Assessment  Pain Level: 5  Pain Type: Chronic pain  Pain Location: Neck  Pain Orientation: Right;Left  Pain Descriptors: Sore    OBJECTIVE:   Exercises  Exercise 19: See paper PRE for Aquatic Ex's to be scanned at D/C      Strength: [x] NT  [] MMT completed:     ROM: [x] NT      *Indicates exercise, modality, or manual techniques to be initiated when appropriate    Assessment:   Activity Tolerance  Activity Tolerance: Patient Tolerated treatment well    Body structures, Functions, Activity limitations: Decreased functional mobility , Decreased strength, Decreased safe awareness, Decreased endurance, Decreased balance, Decreased coordination  Assessment: Pt with improved balance in pool with dynamic tasks unsupported. Improved focus on tasks this date. Feels he is ready for D/C, education on staying active as possible and HEP with pt inquiring about gym membership after D/C.    Treatment Diagnosis: dizziness, imbalance, impaired mobility, difficulty with gait, LE weakness, knee pain   Prognosis: Good       Goals:  Short term goals  Time Frame for Short term goals: 3 wks   Short term goal 1: Independent with HEP   Short term goal 2: Decrease vestibular symptoms by 25% to allow

## 2018-09-27 ENCOUNTER — HOSPITAL ENCOUNTER (OUTPATIENT)
Dept: LAB | Age: 63
Discharge: HOME | End: 2018-09-27
Payer: COMMERCIAL

## 2018-09-27 ENCOUNTER — TELEPHONE (OUTPATIENT)
Dept: FAMILY MEDICINE CLINIC | Age: 63
End: 2018-09-27

## 2018-09-27 ENCOUNTER — OFFICE VISIT (OUTPATIENT)
Dept: FAMILY MEDICINE CLINIC | Age: 63
End: 2018-09-27
Payer: COMMERCIAL

## 2018-09-27 VITALS
DIASTOLIC BLOOD PRESSURE: 80 MMHG | OXYGEN SATURATION: 99 % | SYSTOLIC BLOOD PRESSURE: 138 MMHG | HEART RATE: 89 BPM | TEMPERATURE: 97.8 F | WEIGHT: 272.6 LBS | HEIGHT: 72 IN | BODY MASS INDEX: 36.92 KG/M2

## 2018-09-27 DIAGNOSIS — L72.3 INFECTED SEBACEOUS CYST: Primary | ICD-10-CM

## 2018-09-27 DIAGNOSIS — L08.9 INFECTED SEBACEOUS CYST: Primary | ICD-10-CM

## 2018-09-27 DIAGNOSIS — M10.9 GOUT OF MULTIPLE SITES, UNSPECIFIED CAUSE, UNSPECIFIED CHRONICITY: Chronic | ICD-10-CM

## 2018-09-27 LAB — URIC ACID, SERUM: 5.6 MG/DL (ref 3.4–7)

## 2018-09-27 PROCEDURE — G8427 DOCREV CUR MEDS BY ELIG CLIN: HCPCS | Performed by: FAMILY MEDICINE

## 2018-09-27 PROCEDURE — 36415 COLL VENOUS BLD VENIPUNCTURE: CPT

## 2018-09-27 PROCEDURE — 99213 OFFICE O/P EST LOW 20 MIN: CPT | Performed by: FAMILY MEDICINE

## 2018-09-27 PROCEDURE — G8417 CALC BMI ABV UP PARAM F/U: HCPCS | Performed by: FAMILY MEDICINE

## 2018-09-27 PROCEDURE — 1036F TOBACCO NON-USER: CPT | Performed by: FAMILY MEDICINE

## 2018-09-27 PROCEDURE — 3017F COLORECTAL CA SCREEN DOC REV: CPT | Performed by: FAMILY MEDICINE

## 2018-09-27 PROCEDURE — 84550 ASSAY OF BLOOD/URIC ACID: CPT

## 2018-09-27 PROCEDURE — G8598 ASA/ANTIPLAT THER USED: HCPCS | Performed by: FAMILY MEDICINE

## 2018-09-27 RX ORDER — SULFAMETHOXAZOLE AND TRIMETHOPRIM 800; 160 MG/1; MG/1
1 TABLET ORAL 2 TIMES DAILY
Qty: 20 TABLET | Refills: 0 | Status: SHIPPED | OUTPATIENT
Start: 2018-09-27 | End: 2018-10-07

## 2018-09-27 ASSESSMENT — ENCOUNTER SYMPTOMS
NAUSEA: 0
VOMITING: 0

## 2018-09-27 NOTE — TELEPHONE ENCOUNTER
Patient called wanted to let you know that he has an appt with Dr Felipa Carpio on October 4 at 2:00pm.cp

## 2018-09-27 NOTE — TELEPHONE ENCOUNTER
Patient called wanted to know if he should get his shingles shot because he found a place that has 4 of them but wanted to know if it was okay for him to get or should he wait until he is off the antibiotic and all cleared up. cp

## 2018-09-28 DIAGNOSIS — Z98.61 CAD S/P PERCUTANEOUS CORONARY ANGIOPLASTY: Chronic | ICD-10-CM

## 2018-09-28 DIAGNOSIS — I25.10 CAD S/P PERCUTANEOUS CORONARY ANGIOPLASTY: Chronic | ICD-10-CM

## 2018-09-28 NOTE — TELEPHONE ENCOUNTER
PHARMACY IS  requesting medication refill.  Please approve or deny this request.    Rx requested:  Requested Prescriptions     Pending Prescriptions Disp Refills    atorvastatin (LIPITOR) 40 MG tablet [Pharmacy Med Name: ATORVASTATIN TABS 40MG] 90 tablet 3     Sig: TAKE 1 TABLET DAILY       Last Office Visit:   9/27/2018    Last Labs:  08/13/18    Next Visit Date:  Future Appointments  Date Time Provider Qasim Rae   10/2/2018 3:00 PM Qasim Heath, [address]   10/4/2018 2:00 PM Niraj Lock  [address] [place] [address]   1/14/2019 1:40 PM Lexie La MD [place] [address]   3/11/2019 2:00 PM ERICH Mock [facility] [place]   7/10/2019 1:00 PM Laraine Denver, MD [address]

## 2018-10-01 RX ORDER — ATORVASTATIN CALCIUM 40 MG/1
TABLET, FILM COATED ORAL
Qty: 90 TABLET | Refills: 3 | Status: SHIPPED | OUTPATIENT
Start: 2018-10-01 | End: 2019-03-02 | Stop reason: SDUPTHER

## 2018-10-02 ENCOUNTER — HOSPITAL ENCOUNTER (OUTPATIENT)
Dept: PHYSICAL THERAPY | Age: 63
Setting detail: THERAPIES SERIES
Discharge: HOME | End: 2018-10-02
Payer: COMMERCIAL

## 2018-10-02 PROCEDURE — 97110 THERAPEUTIC EXERCISES: CPT

## 2018-10-02 PROCEDURE — 97116 GAIT TRAINING THERAPY: CPT

## 2018-10-02 PROCEDURE — 97112 NEUROMUSCULAR REEDUCATION: CPT

## 2018-10-02 PROCEDURE — G8978 MOBILITY CURRENT STATUS: HCPCS

## 2018-10-02 PROCEDURE — G8980 MOBILITY D/C STATUS: HCPCS

## 2018-10-02 PROCEDURE — G8979 MOBILITY GOAL STATUS: HCPCS

## 2018-10-02 ASSESSMENT — PAIN DESCRIPTION - LOCATION: LOCATION: HIP;LEG

## 2018-10-02 ASSESSMENT — PAIN SCALES - GENERAL: PAINLEVEL_OUTOF10: 3

## 2018-10-02 ASSESSMENT — PAIN DESCRIPTION - DESCRIPTORS: DESCRIPTORS: SORE

## 2018-10-02 NOTE — PROGRESS NOTES
Dorsiflexion: 4+/5, 5/5  Strength LLE  L Hip Flexion: 4+/5  L Knee Flexion: 5/5  L Knee Extension: 5/5  L Ankle Dorsiflexion: 4+/5, 5/5           Anderson Balance Score: 43    HEP  Continue with current Home Exercise Program.  See Objective section for progression of HEP. Comments:       POST-PAIN    Pain Rating (0-10 pain scale): 3  Location and Pain Description same as pre-pain unless otherwise indicated. Action: [] NA  [] Call Physician  [] Perform HEP  [] Meds as prescribed     ASSESSMENT:     Conditions Requiring Skilled Therapeutic Intervention  Assessment: Pt is independent with given HEP and compensatory strategies. Pt approaching functional plateau at this time and appropriate for discharge. Pt in agreement. Treatment Diagnosis: dizziness, imbalance, impaired mobility, difficulty with gait, LE weakness, knee pain      Tolerance to treatment:  [x] Good   [] Fair   [] Poor  [] Fatigued   [] Increased pain   Limited by:    Goals:     Short term goals  Time Frame for Short term goals: 3 wks   Short term goal 1: Independent with HEP   Short term goal 2: Decrease vestibular symptoms by 25% to allow increased tolerance to mobility   Short term goal 3: Anderson >/= 25/56 to demonstrate improved static balance and decreased risk for falls   Short term goal 4: Ambulate >/= 100' independently with ww   Long term goals  Time Frame for Long term goals : 6 wks   Long term goal 1: Improve LE strength 1/2 grade to allow increased ease with walking with increased balance   Long term goal 2: Anderson >/= 38/56 to demonstrate improved balance and decreased risk for falls   Long term goal 3: Ambulate >/= 150' with least restrictive device independently with decreased startle reflex and LOB   Long term goal 4: Up/ down 4-6\" steps with 1 HR independently    [x]  See discharge summary  Comments:    PLAN:  [] Continue POC to pt tolerance  [] Hold PT for ___ days.   See note to physician  [x] Discharge PT    Signature: Electronically signed by Quinton Zimmerman PT on 10/2/18 at 4:36 PM    PT Individual Minutes  Time In: 8449  Time Out: 8252  Minutes: 62  Timed Code Treatment Minutes: 54 Minutes

## 2018-10-04 ENCOUNTER — OFFICE VISIT (OUTPATIENT)
Dept: SURGERY | Age: 63
End: 2018-10-04
Payer: COMMERCIAL

## 2018-10-04 ENCOUNTER — TELEPHONE (OUTPATIENT)
Dept: FAMILY MEDICINE CLINIC | Age: 63
End: 2018-10-04

## 2018-10-04 VITALS
DIASTOLIC BLOOD PRESSURE: 64 MMHG | BODY MASS INDEX: 36.31 KG/M2 | HEIGHT: 73 IN | SYSTOLIC BLOOD PRESSURE: 122 MMHG | TEMPERATURE: 97.6 F | WEIGHT: 274 LBS

## 2018-10-04 DIAGNOSIS — L02.212 CUTANEOUS ABSCESS OF BACK EXCLUDING BUTTOCKS: ICD-10-CM

## 2018-10-04 DIAGNOSIS — L72.9 SKIN CYST: Primary | ICD-10-CM

## 2018-10-04 DIAGNOSIS — L72.9 SKIN CYST: ICD-10-CM

## 2018-10-04 PROCEDURE — 99024 POSTOP FOLLOW-UP VISIT: CPT | Performed by: SURGERY

## 2018-10-04 PROCEDURE — 3017F COLORECTAL CA SCREEN DOC REV: CPT | Performed by: SURGERY

## 2018-10-04 PROCEDURE — G8417 CALC BMI ABV UP PARAM F/U: HCPCS | Performed by: SURGERY

## 2018-10-04 PROCEDURE — G8598 ASA/ANTIPLAT THER USED: HCPCS | Performed by: SURGERY

## 2018-10-04 PROCEDURE — G8482 FLU IMMUNIZE ORDER/ADMIN: HCPCS | Performed by: SURGERY

## 2018-10-04 PROCEDURE — 1036F TOBACCO NON-USER: CPT | Performed by: SURGERY

## 2018-10-04 PROCEDURE — 10061 I&D ABSCESS COMP/MULTIPLE: CPT | Performed by: SURGERY

## 2018-10-04 PROCEDURE — G8427 DOCREV CUR MEDS BY ELIG CLIN: HCPCS | Performed by: SURGERY

## 2018-10-04 RX ORDER — HYDROCODONE BITARTRATE AND ACETAMINOPHEN 5; 325 MG/1; MG/1
1 TABLET ORAL EVERY 6 HOURS PRN
Qty: 20 TABLET | Refills: 0 | Status: SHIPPED | OUTPATIENT
Start: 2018-10-04 | End: 2018-10-11

## 2018-10-04 RX ORDER — SULFAMETHOXAZOLE AND TRIMETHOPRIM 800; 160 MG/1; MG/1
1 TABLET ORAL 2 TIMES DAILY
Qty: 28 TABLET | Refills: 1 | Status: SHIPPED | OUTPATIENT
Start: 2018-10-04 | End: 2018-10-18

## 2018-10-04 ASSESSMENT — ENCOUNTER SYMPTOMS
EYES NEGATIVE: 1
ABDOMINAL PAIN: 0
SHORTNESS OF BREATH: 0
CONSTIPATION: 0
CHEST TIGHTNESS: 0
COLOR CHANGE: 0
ABDOMINAL DISTENTION: 0
RHINORRHEA: 0
BLOOD IN STOOL: 0
ALLERGIC/IMMUNOLOGIC NEGATIVE: 1

## 2018-10-04 NOTE — TELEPHONE ENCOUNTER
Patient calling, states you wanted him to call you to give you a update on his cyst he has on his upper right back. Patient reports that he saw Dr. Patrick Bunn and he lanced the site and got all of the puss out. He RX'd the same ABX you did to give it another week for a total of 2 weeks on the ABX.  Patient will follow up with Dr. Patrick Bunn in one week to see how it is doing and once it heals Dr. Patrick Bunn with do the surgery to remove the cyst.    ASH

## 2018-10-07 LAB
GRAM STAIN RESULT: ABNORMAL
ORGANISM: ABNORMAL
WOUND/ABSCESS: ABNORMAL
WOUND/ABSCESS: ABNORMAL

## 2018-10-11 ENCOUNTER — OFFICE VISIT (OUTPATIENT)
Dept: SURGERY | Age: 63
End: 2018-10-11

## 2018-10-11 VITALS
WEIGHT: 270 LBS | SYSTOLIC BLOOD PRESSURE: 120 MMHG | DIASTOLIC BLOOD PRESSURE: 72 MMHG | HEIGHT: 73 IN | TEMPERATURE: 99 F | BODY MASS INDEX: 35.78 KG/M2

## 2018-10-11 DIAGNOSIS — L02.212 CUTANEOUS ABSCESS OF BACK EXCLUDING BUTTOCKS: Primary | ICD-10-CM

## 2018-10-11 PROCEDURE — 99024 POSTOP FOLLOW-UP VISIT: CPT | Performed by: SURGERY

## 2018-10-11 NOTE — PROGRESS NOTES
Subjective:      Patient ID: Giselle Nolasco is a 61 y.o. male. HPI  Latrice Diaz is status post Incision and drainage of a back abscess on 10/4/2018. Cultures showed Staphylococcus coagulase-negative. The packing has been removed. The wound is draining. Pain is rated as a 1. He is back to normal activities. He is on Bactrim DS    Review of Systems   All other systems reviewed and are negative. Objective:   Physical Exam   Constitutional: He is oriented to person, place, and time. He appears well-developed and well-nourished. No distress. Pulmonary/Chest:           Musculoskeletal:   Normal gait   Neurological: He is alert and oriented to person, place, and time. Psychiatric: He has a normal mood and affect. Judgment normal.     /72   Temp 99 °F (37.2 °C) (Temporal)   Ht 6' 0.5\" (1.842 m)   Wt 270 lb (122.5 kg)   BMI 36.12 kg/m²   Assessment:      Satisfactory course, improving      Plan:      The patient is instructed to return to see me in 1 month.    Continue the Bactrim        Gabi Mcgill MD

## 2018-10-27 ENCOUNTER — APPOINTMENT (OUTPATIENT)
Dept: CT IMAGING | Age: 63
End: 2018-10-27
Payer: COMMERCIAL

## 2018-10-27 ENCOUNTER — HOSPITAL ENCOUNTER (EMERGENCY)
Age: 63
Discharge: HOME | End: 2018-10-27
Attending: EMERGENCY MEDICINE
Payer: COMMERCIAL

## 2018-10-27 ENCOUNTER — APPOINTMENT (OUTPATIENT)
Dept: GENERAL RADIOLOGY | Age: 63
End: 2018-10-27
Payer: COMMERCIAL

## 2018-10-27 VITALS
HEIGHT: 73 IN | DIASTOLIC BLOOD PRESSURE: 74 MMHG | TEMPERATURE: 98.7 F | BODY MASS INDEX: 35.32 KG/M2 | WEIGHT: 266.5 LBS | OXYGEN SATURATION: 97 % | SYSTOLIC BLOOD PRESSURE: 121 MMHG | HEART RATE: 88 BPM | RESPIRATION RATE: 14 BRPM
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
## 2018-10-27 DIAGNOSIS — S06.0X1A CONCUSSION WITH LOSS OF CONSCIOUSNESS OF 30 MINUTES OR LESS, INITIAL ENCOUNTER: ICD-10-CM

## 2018-10-27 DIAGNOSIS — S40.011A CONTUSION OF RIGHT SHOULDER, INITIAL ENCOUNTER: ICD-10-CM

## 2018-10-27 DIAGNOSIS — S40.012A CONTUSION OF LEFT SHOULDER, INITIAL ENCOUNTER: ICD-10-CM

## 2018-10-27 PROCEDURE — 72125 CT NECK SPINE W/O DYE: CPT

## 2018-10-27 PROCEDURE — 70450 CT HEAD/BRAIN W/O DYE: CPT

## 2018-10-27 PROCEDURE — 99285 EMERGENCY DEPT VISIT HI MDM: CPT

## 2018-10-27 PROCEDURE — 73030 X-RAY EXAM OF SHOULDER: CPT

## 2018-10-27 PROCEDURE — 6370000000 HC RX 637 (ALT 250 FOR IP): Performed by: EMERGENCY MEDICINE

## 2018-10-27 RX ORDER — HYDROCODONE BITARTRATE AND ACETAMINOPHEN 5; 325 MG/1; MG/1
1 TABLET ORAL EVERY 6 HOURS PRN
Qty: 10 TABLET | Refills: 0 | Status: SHIPPED | OUTPATIENT
Start: 2018-10-27 | End: 2018-11-03

## 2018-10-27 RX ORDER — OXYCODONE HYDROCHLORIDE AND ACETAMINOPHEN 5; 325 MG/1; MG/1
1 TABLET ORAL ONCE
Status: COMPLETED | OUTPATIENT
Start: 2018-10-27 | End: 2018-10-27

## 2018-10-27 RX ADMIN — OXYCODONE AND ACETAMINOPHEN 1 TABLET: 5; 325 TABLET ORAL at 02:52

## 2018-10-27 ASSESSMENT — PAIN DESCRIPTION - DESCRIPTORS: DESCRIPTORS: ACHING

## 2018-10-27 ASSESSMENT — PAIN DESCRIPTION - ORIENTATION: ORIENTATION: POSTERIOR

## 2018-10-27 ASSESSMENT — PAIN SCALES - GENERAL
PAINLEVEL_OUTOF10: 9
PAINLEVEL_OUTOF10: 9

## 2018-10-27 ASSESSMENT — PAIN DESCRIPTION - PAIN TYPE: TYPE: ACUTE PAIN

## 2018-10-27 ASSESSMENT — PAIN DESCRIPTION - LOCATION: LOCATION: HEAD;NECK

## 2018-10-27 NOTE — ED PROVIDER NOTES
3/11/2014    Cardiomyopathy ([address]     Cerebrovascular accident (CVA) due to occlusion of left middle cerebral artery ([address] 08/2016    brainstem infarction from left MCA occlusion    Cerebrovascular disease 07/27/2016    Coronary angioplasty status     CVA (cerebral vascular accident) ([address] 11/12/2017    Dependent edema 9/14/2017    Diplopia 5/15/2017    Resolved with management of cataracts    Dupuytren contracture 1/2/2018    Dysphagia 8/18/2011    Dysphonia 8/18/2011    Essential hypertension 8/17/2016    Gallop rhythm     Gout 12/10/2016    Gout of big toe 08/2014    Right Great Toe    H/O fall     Headache     migraines    Heart failure ([address]     History of 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    History of heart failure 1/6/2014    History of myocardial infarction 3/11/2014    History of recurrent pneumonia 2/11/2014    Hx of bacterial pneumonia     Hypotension     Leg swelling     Macrocytosis without anemia 12/10/2016    Microalbuminuria due to type 2 diabetes mellitus ([address] 9/14/2018    Morbid obesity ([address] 1/2/2014    Myocardial infarction ([address]     Obesity     AYLIN (obstructive sleep apnea) 12/10/2016    Osteoarthritis     Right-sided muscle weakness 10/19/2016    S/P cardiac catheterization     Skin cyst 4/8/2016    Spasm 11/9/2016    Stented coronary artery     Tremor of right hand 5/15/2017    Type 2 diabetes mellitus with diabetic polyneuropathy, without long-term current use of insulin ([address]     Unsteady gait 5/15/2017    Weakness     Weight gain          SURGICALHISTORY       Past Surgical History:   Procedure Laterality Date    CHOLECYSTECTOMY      CORONARY ANGIOPLASTY WITH STENT PLACEMENT  2014    CYST REMOVAL  05/16/16    DR Juan Garcia CYST    SHOULDER SURGERY Right 12/18/2015         CURRENT MEDICATIONS       Previous Medications    ALLOPURINOL Yes     Partners: Female     Other Topics Concern    None     Social History Narrative    None       SCREENINGS      @FLOW([id number])@      PHYSICAL EXAM    (up to 7 for level 4, 8 or more for level 5)     ED Triage Vitals   BP Temp Temp Source Pulse Resp SpO2 Height Weight   10/27/18 0210 10/27/18 0207 10/27/18 0207 10/27/18 0207 10/27/18 0207 10/27/18 0207 10/27/18 0207 10/27/18 0207   119/76 98.7 °F (37.1 °C) Oral 96 18 99 % 6' 1\" (1.854 m) 266 lb 8 oz (120.9 kg)       Physical Exam     CONST: -Well-developed well-nourished ;                -In no acute distress. -Vitals reviewed. Exam reveals ecchymosis without laceration left temporal region. EYES: -EOM intact, BENNIE:              -Sclera normal and conjunctiva: clear bilaterally. ENT: - [place] Mookie is present at approximately C5 spinous process. NECK: -Supple (chin-to-chest). CARD: -Rate and rhythm: Regular              -Murmurs: No  RESP: -Respiratory effort and chest excursion with respirations: Normal             -Breath sounds equal bilaterally: Clear             -Wheezes: No             -Rales: No    BACK: -Flank pain: No              -Pain on palpation: No    ABD: -Distended: No           -Bruits: No           -Bowel sounds: Normal.           -Deep palpation: Non-tender           -Organomegaly palpable: No           -Abnormal masses: No    EXT: Gross appearance and use of all four extremities: Both shoulders are tender to range of motion and palpation but nothing severe SKIN: -Good turgor warm and dry. -Apparent lesions or rashes: No    NEURO: -Patient: alert                -Oriented to: person, place and time.                 -Appearance and judgment: appropriate.                -Cranial Nerves: Normal.                -Speech: Normal          DIAGNOSTIC RESULTS     EKG: All EKG's are interpreted by the Emergency Department Physician who either signs or Co-signsthis chart in the absence of a cardiologist.        RADIOLOGY:

## 2018-10-29 ENCOUNTER — TELEPHONE (OUTPATIENT)
Dept: PULMONOLOGY | Age: 63
End: 2018-10-29

## 2018-10-30 ENCOUNTER — OFFICE VISIT (OUTPATIENT)
Dept: FAMILY MEDICINE CLINIC | Age: 63
End: 2018-10-30
Payer: COMMERCIAL

## 2018-10-30 ENCOUNTER — TELEPHONE (OUTPATIENT)
Dept: FAMILY MEDICINE CLINIC | Age: 63
End: 2018-10-30

## 2018-10-30 VITALS
HEIGHT: 73 IN | BODY MASS INDEX: 36.1 KG/M2 | OXYGEN SATURATION: 99 % | TEMPERATURE: 98.3 F | DIASTOLIC BLOOD PRESSURE: 80 MMHG | HEART RATE: 70 BPM | SYSTOLIC BLOOD PRESSURE: 130 MMHG | WEIGHT: 272.4 LBS | RESPIRATION RATE: 16 BRPM

## 2018-10-30 DIAGNOSIS — M54.2 NECK PAIN, BILATERAL: ICD-10-CM

## 2018-10-30 DIAGNOSIS — M25.512 BILATERAL SHOULDER PAIN, UNSPECIFIED CHRONICITY: ICD-10-CM

## 2018-10-30 DIAGNOSIS — S06.0X9A CONCUSSION WITH LOSS OF CONSCIOUSNESS OF UNSPECIFIED DURATION, INITIAL ENCOUNTER: Primary | ICD-10-CM

## 2018-10-30 DIAGNOSIS — Z86.73 HISTORY OF CVA (CEREBROVASCULAR ACCIDENT): Chronic | ICD-10-CM

## 2018-10-30 DIAGNOSIS — M54.9 UPPER BACK PAIN: ICD-10-CM

## 2018-10-30 DIAGNOSIS — I10 ESSENTIAL HYPERTENSION: Chronic | ICD-10-CM
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
## 2018-10-30 DIAGNOSIS — M25.511 BILATERAL SHOULDER PAIN, UNSPECIFIED CHRONICITY: ICD-10-CM

## 2018-10-30 PROCEDURE — 3017F COLORECTAL CA SCREEN DOC REV: CPT | Performed by: FAMILY MEDICINE

## 2018-10-30 PROCEDURE — 99214 OFFICE O/P EST MOD 30 MIN: CPT | Performed by: FAMILY MEDICINE

## 2018-10-30 PROCEDURE — G8482 FLU IMMUNIZE ORDER/ADMIN: HCPCS | Performed by: FAMILY MEDICINE

## 2018-10-30 PROCEDURE — G8427 DOCREV CUR MEDS BY ELIG CLIN: HCPCS | Performed by: FAMILY MEDICINE

## 2018-10-30 PROCEDURE — G8417 CALC BMI ABV UP PARAM F/U: HCPCS | Performed by: FAMILY MEDICINE

## 2018-10-30 PROCEDURE — 1036F TOBACCO NON-USER: CPT | Performed by: FAMILY MEDICINE

## 2018-10-30 PROCEDURE — G8598 ASA/ANTIPLAT THER USED: HCPCS | Performed by: FAMILY MEDICINE

## 2018-10-30 RX ORDER — CEPHALEXIN 500 MG/1
CAPSULE ORAL
Refills: 0 | COMMUNITY
Start: 2018-10-22 | End: 2019-01-04 | Stop reason: ALTCHOICE

## 2018-10-30 RX ORDER — TRAMADOL HYDROCHLORIDE 50 MG/1
50 TABLET ORAL EVERY 8 HOURS PRN
Qty: 15 TABLET | Refills: 0 | Status: SHIPPED | OUTPATIENT
Start: 2018-10-30 | End: 2018-11-04

## 2018-10-30 ASSESSMENT — ENCOUNTER SYMPTOMS
NAUSEA: 0
DIARRHEA: 0
BACK PAIN: 1
ABDOMINAL PAIN: 0
SHORTNESS OF BREATH: 0
WHEEZING: 0
CHEST TIGHTNESS: 0
VOMITING: 0

## 2018-10-30 NOTE — PROGRESS NOTES
Subjective:      Patient ID: Jabari Cristobal is a 61 y.o. male who presents today for:  Chief Complaint   Patient presents with    Follow-Up from [address] [place] [address] today to follow up from [facility] EM[facility] [place]. Patient was seen on 10/27/18 for a fall. Reports that he hit his head on the chess in the bedroom. He is still in a lot of pain and states he has a concussion. He was RX'd Norco which has helped with the pain. He finished the last of this last night. Would like to discuss getting a refill       HPI     Patient presents for ER follow-up visit. He was seen at [facility] [place] on 10/27/18 status post a trip and fall backward with head injury when he hit his head on the corner of a dresser in his bedroom. There was loss of consciousness for an unknown amount of time and patient states he woke up with abrasions on his head. Patient was noted to have multiple abrasions to the right side of the back of the head and complained of neck and bilateral shoulder pain. X-rays of the bilateral shoulders were obtained which showed no signs of acute bony injury but noted signs of chronic arthritis. CT of the cervical spine and CT of the head were obtained which showed no acute intracranial processes or fractures of the cervical spine. He was thought to have a concussion with loss of consciousness and was discharged home with a course of pain medication to follow up with primary care. Patient denies any further falls since being home from the ER. He reports intermittent H/A, mainly over the back of the head with bilateral neck pain and pain radiating into his shoulders. There is no reported confusion, but he reports more difficulty with focus and concentration. \"I feel a little slower than usual.\" He denies any issues with nausea or emesis. No reported new-onset vision changes, new paresthesias or new onset weakness. No reported dizziness or lightheadedness, no reported new onset weakness.  He denies feeling unsteady on his

## 2018-10-30 NOTE — TELEPHONE ENCOUNTER
Please let patient know that I am sorry to hear that and I hope he is ok. Thank you for letting us know. When possible please resume CPAP therapy.      Garry Rowland

## 2018-10-31 NOTE — TELEPHONE ENCOUNTER
Called and spoke with Jamin Stafford. She is going to have Dr. Steven Watson review patient's chart and see when Dr. Steven Watson would like to see this patient. Dr. Steven Watson does not return into the office until this upcoming Friday. On Friday please give the office a call to follow up on this.

## 2018-11-15 ENCOUNTER — OFFICE VISIT (OUTPATIENT)
Dept: SURGERY | Age: 63
End: 2018-11-15
Payer: COMMERCIAL

## 2018-11-15 VITALS
SYSTOLIC BLOOD PRESSURE: 120 MMHG | BODY MASS INDEX: 35.39 KG/M2 | HEIGHT: 73 IN | DIASTOLIC BLOOD PRESSURE: 62 MMHG | WEIGHT: 267 LBS | TEMPERATURE: 98.7 F

## 2018-11-15 DIAGNOSIS — L02.212 CUTANEOUS ABSCESS OF BACK EXCLUDING BUTTOCKS: ICD-10-CM

## 2018-11-15 DIAGNOSIS — L72.9 SKIN CYST: Primary | ICD-10-CM

## 2018-11-15 PROCEDURE — G8417 CALC BMI ABV UP PARAM F/U: HCPCS | Performed by: SURGERY

## 2018-11-15 PROCEDURE — G8482 FLU IMMUNIZE ORDER/ADMIN: HCPCS | Performed by: SURGERY

## 2018-11-15 PROCEDURE — G8427 DOCREV CUR MEDS BY ELIG CLIN: HCPCS | Performed by: SURGERY

## 2018-11-15 PROCEDURE — 3017F COLORECTAL CA SCREEN DOC REV: CPT | Performed by: SURGERY

## 2018-11-15 PROCEDURE — 99212 OFFICE O/P EST SF 10 MIN: CPT | Performed by: SURGERY

## 2018-11-15 PROCEDURE — G8598 ASA/ANTIPLAT THER USED: HCPCS | Performed by: SURGERY

## 2018-11-15 PROCEDURE — 1036F TOBACCO NON-USER: CPT | Performed by: SURGERY

## 2018-11-20 ENCOUNTER — OFFICE VISIT (OUTPATIENT)
Dept: INTERVENTIONAL RADIOLOGY/VASCULAR | Age: 63
End: 2018-11-20
Payer: COMMERCIAL

## 2018-11-20 VITALS
WEIGHT: 266.8 LBS | RESPIRATION RATE: 14 BRPM | SYSTOLIC BLOOD PRESSURE: 136 MMHG | BODY MASS INDEX: 35.69 KG/M2 | OXYGEN SATURATION: 95 % | DIASTOLIC BLOOD PRESSURE: 84 MMHG | HEART RATE: 94 BPM

## 2018-11-20 DIAGNOSIS — M79.89 PAIN AND SWELLING OF LOWER LEG, UNSPECIFIED LATERALITY: Primary | ICD-10-CM

## 2018-11-20 DIAGNOSIS — I89.0 LYMPHEDEMA OF BOTH LOWER EXTREMITIES: ICD-10-CM

## 2018-11-20 DIAGNOSIS — M79.669 PAIN AND SWELLING OF LOWER LEG, UNSPECIFIED LATERALITY: Primary | ICD-10-CM

## 2018-11-20 DIAGNOSIS — I70.203 ATHEROSCLEROSIS OF ARTERY OF BOTH LOWER EXTREMITIES (HCC): ICD-10-CM

## 2018-11-20 PROCEDURE — G8482 FLU IMMUNIZE ORDER/ADMIN: HCPCS | Performed by: NURSE PRACTITIONER

## 2018-11-20 PROCEDURE — 99245 OFF/OP CONSLTJ NEW/EST HI 55: CPT | Performed by: NURSE PRACTITIONER

## 2018-11-20 PROCEDURE — 3017F COLORECTAL CA SCREEN DOC REV: CPT | Performed by: NURSE PRACTITIONER

## 2018-11-20 PROCEDURE — G8427 DOCREV CUR MEDS BY ELIG CLIN: HCPCS | Performed by: NURSE PRACTITIONER

## 2018-11-20 PROCEDURE — G8417 CALC BMI ABV UP PARAM F/U: HCPCS | Performed by: NURSE PRACTITIONER

## 2018-11-20 ASSESSMENT — ENCOUNTER SYMPTOMS
EYE REDNESS: 0
EYE ITCHING: 0
NAUSEA: 0
ABDOMINAL PAIN: 0
CONSTIPATION: 0
SORE THROAT: 0
SINUS PAIN: 0
SHORTNESS OF BREATH: 0
DIARRHEA: 0
EYE PAIN: 0
ALLERGIC/IMMUNOLOGIC NEGATIVE: 1
VOMITING: 0
WHEEZING: 0
COUGH: 0
BACK PAIN: 0
SINUS PRESSURE: 0
EYES NEGATIVE: 1

## 2018-11-20 NOTE — PROGRESS NOTES
Prior to Visit   Medication Sig Dispense Refill    cephALEXin (KEFLEX) 500 MG capsule TAKE 1 CAPSULE BY MOUTH THREE TIMES DAILY  0    atorvastatin (LIPITOR) 40 MG tablet TAKE 1 TABLET DAILY 90 tablet 3    lidocaine (LIDODERM) 5 % Place 1 patch onto the skin every 12 hours       Elastic Bandages & Supports (V-4 HIGH COMPRESSION HOSE) MISC THIGH HIGH. 20-30 MMHG PRESSURE. Use daily as directed. 2 each 0    carvedilol (COREG) 25 MG tablet Take 1 tablet by mouth 2 times daily 180 tablet 3    metFORMIN (GLUCOPHAGE) 500 MG tablet TAKE 1 TABLET TWICE A DAY WITH MEALS 180 tablet 3    torsemide (DEMADEX) 20 MG tablet Take 1 tablet by mouth daily 90 tablet 3    clopidogrel (PLAVIX) 75 MG tablet Take 1 tablet by mouth daily 90 tablet 3    aspirin EC 81 MG EC tablet Take 2 tablets by mouth daily 30 tablet 3    ibuprofen (ADVIL) 200 MG tablet Take 2 tablets by mouth every 8 hours as needed for Pain (headache) 120 tablet 3    glimepiride (AMARYL) 1 MG tablet Take 1 tablet by mouth every morning (before breakfast) 90 tablet 3    allopurinol (ZYLOPRIM) 300 MG tablet Take 300 mg by mouth daily       ammonium lactate (AMLACTIN) 12 % cream APPLY TO DRY CALLUS AREAS 1 TO 2 TIMES DAILY  5    glucose blood VI test strips (ASCENSIA AUTODISC VI;ONE TOUCH ULTRA TEST VI) strip Please replace with True Test Strips test 3 times daily 100 each 3    Misc. Devices (COMMODE BEDSIDE) MISC Use daily as needed 1 each 0    LACTOBACILLUS PO Take by mouth every morning      MULTIPLE VITAMIN PO Take by mouth daily       No current facility-administered medications on file prior to visit. Review of Systems   Constitutional: Negative for activity change, appetite change, chills, fatigue and fever. HENT: Positive for tinnitus. Negative for congestion, ear pain, nosebleeds, sinus pain, sinus pressure and sore throat. Eyes: Negative. Negative for pain, redness and itching.    Respiratory: Negative for cough, shortness of breath

## 2018-11-21 PROBLEM — I89.0 LYMPHEDEMA OF BOTH LOWER EXTREMITIES: Status: ACTIVE | Noted: 2018-11-21

## 2018-11-28 ENCOUNTER — HOSPITAL ENCOUNTER (OUTPATIENT)
Dept: MRI IMAGING | Age: 63
Discharge: HOME | End: 2018-11-30
Payer: COMMERCIAL

## 2018-11-28 DIAGNOSIS — I69.393 ATAXIA FOLLOWING CEREBRAL INFARCTION: ICD-10-CM

## 2018-11-28 DIAGNOSIS — I69.322 CVA, OLD, DYSARTHRIA: ICD-10-CM

## 2018-11-28 PROCEDURE — 70551 MRI BRAIN STEM W/O DYE: CPT

## 2018-12-04 ENCOUNTER — APPOINTMENT (OUTPATIENT)
Dept: MRI IMAGING | Age: 63
End: 2018-12-04
Payer: COMMERCIAL

## 2018-12-04 ENCOUNTER — HOSPITAL ENCOUNTER (OUTPATIENT)
Dept: MRI IMAGING | Age: 63
Discharge: HOME | End: 2018-12-06
Payer: COMMERCIAL

## 2018-12-04 DIAGNOSIS — M79.669 PAIN AND SWELLING OF LOWER LEG, UNSPECIFIED LATERALITY: ICD-10-CM

## 2018-12-04 DIAGNOSIS — I70.203 ATHEROSCLEROSIS OF ARTERY OF BOTH LOWER EXTREMITIES (HCC): ICD-10-CM

## 2018-12-04 DIAGNOSIS — M79.89 PAIN AND SWELLING OF LOWER LEG, UNSPECIFIED LATERALITY: ICD-10-CM

## 2018-12-04 PROCEDURE — 73725 MR ANG LWR EXT W OR W/O DYE: CPT

## 2018-12-04 PROCEDURE — 73725 MR ANG LWR EXT W OR W/O DYE: CPT | Performed by: RADIOLOGY

## 2018-12-04 PROCEDURE — 6360000004 HC RX CONTRAST MEDICATION: Performed by: RADIOLOGY

## 2018-12-04 PROCEDURE — A9577 INJ MULTIHANCE: HCPCS | Performed by: RADIOLOGY

## 2018-12-04 RX ORDER — 0.9 % SODIUM CHLORIDE 0.9 %
40 VIAL (ML) INJECTION ONCE
Status: DISCONTINUED | OUTPATIENT
Start: 2018-12-04 | End: 2018-12-07 | Stop reason: HOSPADM

## 2018-12-04 RX ADMIN — GADOBENATE DIMEGLUMINE 20 ML: 529 INJECTION, SOLUTION INTRAVENOUS at 09:20

## 2018-12-06 ENCOUNTER — PROCEDURE VISIT (OUTPATIENT)
Dept: SURGERY | Age: 63
End: 2018-12-06
Payer: COMMERCIAL

## 2018-12-06 VITALS
TEMPERATURE: 98.4 F | SYSTOLIC BLOOD PRESSURE: 120 MMHG | DIASTOLIC BLOOD PRESSURE: 82 MMHG | BODY MASS INDEX: 36.05 KG/M2 | WEIGHT: 272 LBS | HEIGHT: 73 IN

## 2018-12-06 DIAGNOSIS — L08.9 LOCAL INFECTION OF SKIN AND SUBCUTANEOUS TISSUE: ICD-10-CM

## 2018-12-06 DIAGNOSIS — L72.9 SKIN CYST: Primary | ICD-10-CM

## 2018-12-06 DIAGNOSIS — L72.9 SKIN CYST: ICD-10-CM

## 2018-12-06 PROCEDURE — 99024 POSTOP FOLLOW-UP VISIT: CPT | Performed by: SURGERY

## 2018-12-06 PROCEDURE — 12032 INTMD RPR S/A/T/EXT 2.6-7.5: CPT | Performed by: SURGERY

## 2018-12-06 PROCEDURE — 11402 EXC TR-EXT B9+MARG 1.1-2 CM: CPT | Performed by: SURGERY

## 2018-12-06 RX ORDER — SULFAMETHOXAZOLE AND TRIMETHOPRIM 800; 160 MG/1; MG/1
1 TABLET ORAL 2 TIMES DAILY
Qty: 28 TABLET | Refills: 1 | Status: SHIPPED | OUTPATIENT
Start: 2018-12-06 | End: 2018-12-20

## 2018-12-06 RX ORDER — HYDROCODONE BITARTRATE AND ACETAMINOPHEN 5; 325 MG/1; MG/1
1 TABLET ORAL EVERY 6 HOURS PRN
Qty: 25 TABLET | Refills: 0 | Status: SHIPPED | OUTPATIENT
Start: 2018-12-06 | End: 2018-12-13

## 2018-12-07 ENCOUNTER — TELEPHONE (OUTPATIENT)
Dept: INTERVENTIONAL RADIOLOGY/VASCULAR | Age: 63
End: 2018-12-07

## 2018-12-07 NOTE — PROGRESS NOTES
Subjective:      Patient ID: Margot Gomez is a 61 y.o. male. HPI  Margot Gomez is here for excision of a back cyst that previously had an I&D. Cultures showed Staphylococcus coagulase-negative. The wound is not draining. Pain is rated as a 0. He is back to normal activities. He is on Bactrim DS. Review of Systems   All other systems reviewed and are negative. Objective:   Physical Exam   Constitutional: He is oriented to person, place, and time. He appears well-developed and well-nourished. No distress. Pulmonary/Chest:           Musculoskeletal:   Normal gait   Neurological: He is alert and oriented to person, place, and time. Psychiatric: He has a normal mood and affect. Judgment normal.     /82   Temp 98.4 °F (36.9 °C) (Temporal)   Ht 6' 0.5\" (1.842 m)   Wt 272 lb (123.4 kg)   BMI 36.38 kg/m²   Assessment:      Back cyst      Plan:      Excision of back cyst      The options of therapy were discussed. The procedure of the excision as well as potential risks and complications were discussed including but not exclusive to recurrence, infection, difficulty with wound healing and blood loss. He understands and is agreeable to the surgery. The patient was placed on the table in the treatment room in the sitting position. He is here for excision of a back cyst. Pre procedure it measures 2 cm. Informed consent has been obtained and a time out was called to properly identify the patient and procedure. He was prepped with  solution and draped in a sterile fashion. 10 ml of 2% lidocaine with epinephirine. After adequate local anesthesia a transverse elliptical incision 6 cm was made. The lesion with margin measures 2 cm. The lesion was completely removed. Hemostasis was controlled with electrocautery. Skin flaps were undermined circumferentially and an intermediate closure of 6 cm was done using 2-0 Vicryl for the deep fascia, 3-0 Vicryl and 4-0 nylon.  Antibiotic ointment was applied and a

## 2018-12-13 ENCOUNTER — OFFICE VISIT (OUTPATIENT)
Dept: INTERVENTIONAL RADIOLOGY/VASCULAR | Age: 63
End: 2018-12-13
Payer: COMMERCIAL

## 2018-12-13 VITALS
DIASTOLIC BLOOD PRESSURE: 80 MMHG | SYSTOLIC BLOOD PRESSURE: 120 MMHG | OXYGEN SATURATION: 96 % | WEIGHT: 272 LBS | HEART RATE: 72 BPM | BODY MASS INDEX: 36.38 KG/M2

## 2018-12-13 DIAGNOSIS — M79.89 PAIN AND SWELLING OF LOWER LEG, UNSPECIFIED LATERALITY: ICD-10-CM

## 2018-12-13 DIAGNOSIS — I70.211 ATHEROSCLEROSIS OF NATIVE ARTERIES OF EXTREMITIES WITH INTERMITTENT CLAUDICATION, RIGHT LEG (HCC): Primary | ICD-10-CM

## 2018-12-13 DIAGNOSIS — M79.669 PAIN AND SWELLING OF LOWER LEG, UNSPECIFIED LATERALITY: ICD-10-CM

## 2018-12-13 DIAGNOSIS — I89.0 LYMPHEDEMA OF BOTH LOWER EXTREMITIES: ICD-10-CM

## 2018-12-13 PROCEDURE — G8417 CALC BMI ABV UP PARAM F/U: HCPCS | Performed by: NURSE PRACTITIONER

## 2018-12-13 PROCEDURE — 99215 OFFICE O/P EST HI 40 MIN: CPT | Performed by: NURSE PRACTITIONER

## 2018-12-13 PROCEDURE — G8428 CUR MEDS NOT DOCUMENT: HCPCS | Performed by: NURSE PRACTITIONER

## 2018-12-13 PROCEDURE — G8598 ASA/ANTIPLAT THER USED: HCPCS | Performed by: NURSE PRACTITIONER

## 2018-12-13 PROCEDURE — 1036F TOBACCO NON-USER: CPT | Performed by: NURSE PRACTITIONER

## 2018-12-13 PROCEDURE — G8482 FLU IMMUNIZE ORDER/ADMIN: HCPCS | Performed by: NURSE PRACTITIONER

## 2018-12-13 PROCEDURE — 3017F COLORECTAL CA SCREEN DOC REV: CPT | Performed by: NURSE PRACTITIONER

## 2018-12-13 ASSESSMENT — ENCOUNTER SYMPTOMS
EYE ITCHING: 0
SINUS PAIN: 0
NAUSEA: 0
BACK PAIN: 0
EYE PAIN: 0
ABDOMINAL PAIN: 0
EYES NEGATIVE: 1
COUGH: 0
SHORTNESS OF BREATH: 0
SORE THROAT: 0
VOMITING: 0
CONSTIPATION: 0
WHEEZING: 0
EYE REDNESS: 0
SINUS PRESSURE: 0
ALLERGIC/IMMUNOLOGIC NEGATIVE: 1
DIARRHEA: 0

## 2018-12-13 NOTE — PROGRESS NOTES
MG capsule TAKE 1 CAPSULE BY MOUTH THREE TIMES DAILY  0    atorvastatin (LIPITOR) 40 MG tablet TAKE 1 TABLET DAILY 90 tablet 3    lidocaine (LIDODERM) 5 % Place 1 patch onto the skin every 12 hours       Elastic Bandages & Supports (V-4 HIGH COMPRESSION HOSE) MISC THIGH HIGH. 20-30 MMHG PRESSURE. Use daily as directed. 2 each 0    carvedilol (COREG) 25 MG tablet Take 1 tablet by mouth 2 times daily 180 tablet 3    metFORMIN (GLUCOPHAGE) 500 MG tablet TAKE 1 TABLET TWICE A DAY WITH MEALS 180 tablet 3    torsemide (DEMADEX) 20 MG tablet Take 1 tablet by mouth daily 90 tablet 3    clopidogrel (PLAVIX) 75 MG tablet Take 1 tablet by mouth daily 90 tablet 3    aspirin EC 81 MG EC tablet Take 2 tablets by mouth daily 30 tablet 3    ibuprofen (ADVIL) 200 MG tablet Take 2 tablets by mouth every 8 hours as needed for Pain (headache) 120 tablet 3    glimepiride (AMARYL) 1 MG tablet Take 1 tablet by mouth every morning (before breakfast) 90 tablet 3    allopurinol (ZYLOPRIM) 300 MG tablet Take 300 mg by mouth daily       ammonium lactate (AMLACTIN) 12 % cream APPLY TO DRY CALLUS AREAS 1 TO 2 TIMES DAILY  5    glucose blood VI test strips (ASCENSIA AUTODISC VI;ONE TOUCH ULTRA TEST VI) strip Please replace with True Test Strips test 3 times daily 100 each 3    Misc. Devices (COMMODE BEDSIDE) MISC Use daily as needed 1 each 0    LACTOBACILLUS PO Take by mouth every morning      MULTIPLE VITAMIN PO Take by mouth daily       No current facility-administered medications on file prior to visit. Review of Systems   Constitutional: Negative for activity change, appetite change, chills, fatigue and fever. HENT: Positive for tinnitus. Negative for congestion, ear pain, nosebleeds, sinus pain, sinus pressure and sore throat. Eyes: Negative. Negative for pain, redness and itching. Respiratory: Negative for cough, shortness of breath and wheezing. Cardiovascular: Positive for leg swelling (both legs).

## 2018-12-19 DIAGNOSIS — E11.9 TYPE 2 DIABETES MELLITUS WITHOUT COMPLICATION, WITHOUT LONG-TERM CURRENT USE OF INSULIN (HCC): ICD-10-CM

## 2018-12-20 RX ORDER — GLIMEPIRIDE 1 MG/1
TABLET ORAL
Qty: 90 TABLET | Refills: 0 | Status: SHIPPED | OUTPATIENT
Start: 2018-12-20 | End: 2019-03-18 | Stop reason: SDUPTHER

## 2018-12-20 NOTE — TELEPHONE ENCOUNTER
Last seen 10/30/2018. Has a follow up appointment scheduled for Future Appointments  Date Time Provider Qasim Rae   12/21/2018 2:30 PM Ashok Owens  [address] [facility] [address]   1/4/2019 10:15 AM DO [facility]   1/14/2019 1:40 PM Jacqueline Alonso MD [facility] [address]   1/21/2019 1:00 PM [address] [place] [address] Jero, APRN - CNP [id number] [facility]   3/11/2019 2:00 PM Rajiv Donohue [facility] [facility]   7/10/2019 1:00 PM Amado Hurley MD [address]   . Medication is pending if okay.  Please advise

## 2018-12-21 ENCOUNTER — OFFICE VISIT (OUTPATIENT)
Dept: SURGERY | Age: 63
End: 2018-12-21
Payer: COMMERCIAL

## 2018-12-21 VITALS
BODY MASS INDEX: 35.92 KG/M2 | SYSTOLIC BLOOD PRESSURE: 114 MMHG | WEIGHT: 271 LBS | DIASTOLIC BLOOD PRESSURE: 78 MMHG | TEMPERATURE: 98.4 F | HEIGHT: 73 IN

## 2018-12-21 DIAGNOSIS — L08.9 LOCAL INFECTION OF SKIN AND SUBCUTANEOUS TISSUE: Primary | ICD-10-CM

## 2018-12-21 DIAGNOSIS — L72.9 SKIN CYST: ICD-10-CM

## 2018-12-21 PROCEDURE — G8417 CALC BMI ABV UP PARAM F/U: HCPCS | Performed by: SURGERY

## 2018-12-21 PROCEDURE — G8598 ASA/ANTIPLAT THER USED: HCPCS | Performed by: SURGERY

## 2018-12-21 PROCEDURE — 3017F COLORECTAL CA SCREEN DOC REV: CPT | Performed by: SURGERY

## 2018-12-21 PROCEDURE — G8482 FLU IMMUNIZE ORDER/ADMIN: HCPCS | Performed by: SURGERY

## 2018-12-21 PROCEDURE — 1036F TOBACCO NON-USER: CPT | Performed by: SURGERY

## 2018-12-21 PROCEDURE — 99212 OFFICE O/P EST SF 10 MIN: CPT | Performed by: SURGERY

## 2018-12-21 PROCEDURE — G8427 DOCREV CUR MEDS BY ELIG CLIN: HCPCS | Performed by: SURGERY

## 2019-01-02 DIAGNOSIS — R60.0 BILATERAL LOWER EXTREMITY EDEMA: ICD-10-CM

## 2019-01-02 RX ORDER — TORSEMIDE 20 MG/1
20 TABLET ORAL DAILY
Qty: 90 TABLET | Refills: 3 | Status: SHIPPED | OUTPATIENT
Start: 2019-01-02 | End: 2019-08-05 | Stop reason: SDUPTHER

## 2019-01-04 ENCOUNTER — OFFICE VISIT (OUTPATIENT)
Dept: CARDIOLOGY CLINIC | Age: 64
End: 2019-01-04
Payer: COMMERCIAL

## 2019-01-04 VITALS — DIASTOLIC BLOOD PRESSURE: 70 MMHG | OXYGEN SATURATION: 96 % | HEART RATE: 96 BPM | SYSTOLIC BLOOD PRESSURE: 130 MMHG

## 2019-01-04 DIAGNOSIS — I73.9 PAD (PERIPHERAL ARTERY DISEASE) (HCC): ICD-10-CM

## 2019-01-04 DIAGNOSIS — I73.9 CLAUDICATION (HCC): ICD-10-CM

## 2019-01-04 DIAGNOSIS — I10 ESSENTIAL HYPERTENSION: Chronic | ICD-10-CM

## 2019-01-04 DIAGNOSIS — G47.33 OSA (OBSTRUCTIVE SLEEP APNEA): Primary | Chronic | ICD-10-CM

## 2019-01-04 DIAGNOSIS — I25.2 HISTORY OF MYOCARDIAL INFARCTION: Chronic | ICD-10-CM

## 2019-01-04 DIAGNOSIS — Z98.61 CAD S/P PERCUTANEOUS CORONARY ANGIOPLASTY: Chronic | ICD-10-CM

## 2019-01-04 DIAGNOSIS — I25.10 CAD S/P PERCUTANEOUS CORONARY ANGIOPLASTY: Chronic | ICD-10-CM

## 2019-01-04 PROCEDURE — G8427 DOCREV CUR MEDS BY ELIG CLIN: HCPCS | Performed by: INTERNAL MEDICINE

## 2019-01-04 PROCEDURE — 99214 OFFICE O/P EST MOD 30 MIN: CPT | Performed by: INTERNAL MEDICINE

## 2019-01-04 PROCEDURE — 1036F TOBACCO NON-USER: CPT | Performed by: INTERNAL MEDICINE

## 2019-01-04 PROCEDURE — G8599 NO ASA/ANTIPLAT THER USE RNG: HCPCS | Performed by: INTERNAL MEDICINE

## 2019-01-04 PROCEDURE — G8417 CALC BMI ABV UP PARAM F/U: HCPCS | Performed by: INTERNAL MEDICINE

## 2019-01-04 PROCEDURE — G8482 FLU IMMUNIZE ORDER/ADMIN: HCPCS | Performed by: INTERNAL MEDICINE

## 2019-01-04 PROCEDURE — 3017F COLORECTAL CA SCREEN DOC REV: CPT | Performed by: INTERNAL MEDICINE

## 2019-01-14 ENCOUNTER — HOSPITAL ENCOUNTER (OUTPATIENT)
Dept: LAB | Age: 64
Discharge: HOME | End: 2019-01-14
Payer: COMMERCIAL

## 2019-01-14 ENCOUNTER — OFFICE VISIT (OUTPATIENT)
Dept: FAMILY MEDICINE CLINIC | Age: 64
End: 2019-01-14
Payer: COMMERCIAL

## 2019-01-14 VITALS
HEART RATE: 70 BPM | DIASTOLIC BLOOD PRESSURE: 80 MMHG | OXYGEN SATURATION: 99 % | WEIGHT: 272.4 LBS | TEMPERATURE: 97.7 F | SYSTOLIC BLOOD PRESSURE: 128 MMHG | HEIGHT: 73 IN | RESPIRATION RATE: 18 BRPM | BODY MASS INDEX: 36.1 KG/M2

## 2019-01-14 DIAGNOSIS — E11.42 TYPE 2 DIABETES MELLITUS WITH DIABETIC POLYNEUROPATHY, WITHOUT LONG-TERM CURRENT USE OF INSULIN (HCC): Primary | Chronic | ICD-10-CM

## 2019-01-14 DIAGNOSIS — I73.9 PAD (PERIPHERAL ARTERY DISEASE) (HCC): ICD-10-CM

## 2019-01-14 DIAGNOSIS — E11.29 MICROALBUMINURIA DUE TO TYPE 2 DIABETES MELLITUS (HCC): Chronic | ICD-10-CM

## 2019-01-14 DIAGNOSIS — I10 ESSENTIAL HYPERTENSION: Chronic | ICD-10-CM

## 2019-01-14 DIAGNOSIS — I25.10 CAD S/P PERCUTANEOUS CORONARY ANGIOPLASTY: Chronic | ICD-10-CM

## 2019-01-14 DIAGNOSIS — Z98.61 CAD S/P PERCUTANEOUS CORONARY ANGIOPLASTY: Chronic | ICD-10-CM

## 2019-01-14 DIAGNOSIS — G47.33 OSA (OBSTRUCTIVE SLEEP APNEA): Chronic | ICD-10-CM

## 2019-01-14 DIAGNOSIS — I63.9 CEREBROVASCULAR ACCIDENT (CVA), UNSPECIFIED MECHANISM (HCC): ICD-10-CM

## 2019-01-14 DIAGNOSIS — R80.9 MICROALBUMINURIA DUE TO TYPE 2 DIABETES MELLITUS (HCC): Chronic | ICD-10-CM

## 2019-01-14 DIAGNOSIS — Z23 NEED FOR VACCINATION: ICD-10-CM

## 2019-01-14 DIAGNOSIS — E11.42 TYPE 2 DIABETES MELLITUS WITH DIABETIC POLYNEUROPATHY, WITHOUT LONG-TERM CURRENT USE OF INSULIN (HCC): Chronic | ICD-10-CM

## 2019-01-14 LAB
ALBUMIN SERPL-MCNC: 4.5 G/DL (ref 3.9–4.9)
ALP BLD-CCNC: 62 U/L (ref 35–104)
ALT SERPL-CCNC: 22 U/L (ref 0–41)
ANION GAP SERPL CALCULATED.3IONS-SCNC: 16 MEQ/L (ref 7–13)
AST SERPL-CCNC: 29 U/L (ref 0–40)
BILIRUB SERPL-MCNC: 0.7 MG/DL (ref 0–1.2)
BUN BLDV-MCNC: 15 MG/DL (ref 8–23)
CALCIUM SERPL-MCNC: 10.1 MG/DL (ref 8.6–10.2)
CHLORIDE BLD-SCNC: 94 MEQ/L (ref 98–107)
CHOLESTEROL, TOTAL: 127 MG/DL (ref 0–199)
CO2: 30 MEQ/L (ref 22–29)
CREAT SERPL-MCNC: 0.76 MG/DL (ref 0.7–1.2)
GFR AFRICAN AMERICAN: >60
GFR NON-AFRICAN AMERICAN: >60
GLOBULIN: 3.1 G/DL (ref 2.3–3.5)
GLUCOSE BLD-MCNC: 139 MG/DL (ref 74–109)
HBA1C MFR BLD: 6.5 % (ref 4.8–5.9)
HDLC SERPL-MCNC: 54 MG/DL (ref 40–59)
LDL CHOLESTEROL CALCULATED: 49 MG/DL (ref 0–129)
POTASSIUM SERPL-SCNC: 4 MEQ/L (ref 3.5–5.1)
SODIUM BLD-SCNC: 140 MEQ/L (ref 132–144)
TOTAL PROTEIN: 7.6 G/DL (ref 6.4–8.1)
TRIGL SERPL-MCNC: 119 MG/DL (ref 0–200)

## 2019-01-14 PROCEDURE — 83036 HEMOGLOBIN GLYCOSYLATED A1C: CPT

## 2019-01-14 PROCEDURE — 99214 OFFICE O/P EST MOD 30 MIN: CPT | Performed by: FAMILY MEDICINE

## 2019-01-14 PROCEDURE — 36415 COLL VENOUS BLD VENIPUNCTURE: CPT

## 2019-01-14 PROCEDURE — 80061 LIPID PANEL: CPT

## 2019-01-14 PROCEDURE — 80053 COMPREHEN METABOLIC PANEL: CPT

## 2019-01-14 ASSESSMENT — ENCOUNTER SYMPTOMS
WHEEZING: 0
DIARRHEA: 0
SHORTNESS OF BREATH: 0
CONSTIPATION: 0
CHEST TIGHTNESS: 0
NAUSEA: 0
ABDOMINAL PAIN: 0
COUGH: 0
VOMITING: 0

## 2019-01-16 ENCOUNTER — HOSPITAL ENCOUNTER (OUTPATIENT)
Dept: CARDIAC CATH/INVASIVE PROCEDURES | Age: 64
Discharge: HOME | End: 2019-01-16
Attending: INTERNAL MEDICINE | Admitting: INTERNAL MEDICINE
Payer: COMMERCIAL

## 2019-01-16 VITALS
HEART RATE: 91 BPM | DIASTOLIC BLOOD PRESSURE: 68 MMHG | OXYGEN SATURATION: 94 % | TEMPERATURE: 99.7 F | WEIGHT: 262 LBS | RESPIRATION RATE: 18 BRPM | SYSTOLIC BLOOD PRESSURE: 141 MMHG | BODY MASS INDEX: 35.49 KG/M2 | HEIGHT: 72 IN

## 2019-01-16 DIAGNOSIS — I73.9 PAD (PERIPHERAL ARTERY DISEASE) (HCC): ICD-10-CM

## 2019-01-16 DIAGNOSIS — I73.9 CLAUDICATION (HCC): ICD-10-CM

## 2019-01-16 LAB
ABO/RH: NORMAL
ANTIBODY SCREEN: NORMAL
HCT VFR BLD CALC: 43.3 % (ref 42–52)
HEMOGLOBIN: 15 G/DL (ref 14–18)
MCH RBC QN AUTO: 37.2 PG (ref 27–31.3)
MCHC RBC AUTO-ENTMCNC: 34.5 % (ref 33–37)
MCV RBC AUTO: 107.7 FL (ref 80–100)
PDW BLD-RTO: 14.8 % (ref 11.5–14.5)
PLATELET # BLD: 206 K/UL (ref 130–400)
RBC # BLD: 4.02 M/UL (ref 4.7–6.1)
WBC # BLD: 5.1 K/UL (ref 4.8–10.8)

## 2019-01-16 PROCEDURE — 37225 HC FEM POP TERRITORY ATHERECTOMY: CPT | Performed by: INTERNAL MEDICINE

## 2019-01-16 PROCEDURE — C1769 GUIDE WIRE: HCPCS

## 2019-01-16 PROCEDURE — 86901 BLOOD TYPING SEROLOGIC RH(D): CPT

## 2019-01-16 PROCEDURE — 85027 COMPLETE CBC AUTOMATED: CPT

## 2019-01-16 PROCEDURE — C1887 CATHETER, GUIDING: HCPCS

## 2019-01-16 PROCEDURE — 2580000003 HC RX 258

## 2019-01-16 PROCEDURE — 75716 ARTERY X-RAYS ARMS/LEGS: CPT | Performed by: INTERNAL MEDICINE

## 2019-01-16 PROCEDURE — C1760 CLOSURE DEV, VASC: HCPCS

## 2019-01-16 PROCEDURE — 86900 BLOOD TYPING SEROLOGIC ABO: CPT

## 2019-01-16 PROCEDURE — 37225 PR REVSC OPN/PRQ FEM/POP W/ATHRC/ANGIOP SM VSL: CPT | Performed by: INTERNAL MEDICINE

## 2019-01-16 PROCEDURE — 6370000000 HC RX 637 (ALT 250 FOR IP): Performed by: INTERNAL MEDICINE

## 2019-01-16 PROCEDURE — 6360000002 HC RX W HCPCS

## 2019-01-16 PROCEDURE — C2623 CATH, TRANSLUMIN, DRUG-COAT: HCPCS

## 2019-01-16 PROCEDURE — 86850 RBC ANTIBODY SCREEN: CPT

## 2019-01-16 PROCEDURE — C1894 INTRO/SHEATH, NON-LASER: HCPCS

## 2019-01-16 PROCEDURE — 2500000003 HC RX 250 WO HCPCS

## 2019-01-16 PROCEDURE — C1724 CATH, TRANS ATHEREC,ROTATION: HCPCS

## 2019-01-16 PROCEDURE — 2709999900 HC NON-CHARGEABLE SUPPLY

## 2019-01-16 RX ORDER — HYDRALAZINE HYDROCHLORIDE 20 MG/ML
10 INJECTION INTRAMUSCULAR; INTRAVENOUS EVERY 10 MIN PRN
Status: CANCELLED | OUTPATIENT
Start: 2019-01-16

## 2019-01-16 RX ORDER — SODIUM CHLORIDE 0.9 % (FLUSH) 0.9 %
10 SYRINGE (ML) INJECTION EVERY 12 HOURS SCHEDULED
Status: DISCONTINUED | OUTPATIENT
Start: 2019-01-16 | End: 2019-01-16 | Stop reason: HOSPADM

## 2019-01-16 RX ORDER — SODIUM CHLORIDE 9 MG/ML
INJECTION, SOLUTION INTRAVENOUS CONTINUOUS
Status: DISCONTINUED | OUTPATIENT
Start: 2019-01-16 | End: 2019-01-16 | Stop reason: HOSPADM

## 2019-01-16 RX ORDER — SODIUM CHLORIDE 0.9 % (FLUSH) 0.9 %
10 SYRINGE (ML) INJECTION PRN
Status: DISCONTINUED | OUTPATIENT
Start: 2019-01-16 | End: 2019-01-16 | Stop reason: HOSPADM

## 2019-01-16 RX ORDER — ONDANSETRON 2 MG/ML
4 INJECTION INTRAMUSCULAR; INTRAVENOUS EVERY 6 HOURS PRN
Status: DISCONTINUED | OUTPATIENT
Start: 2019-01-16 | End: 2019-01-16 | Stop reason: HOSPADM

## 2019-01-16 RX ORDER — ACETAMINOPHEN 325 MG/1
650 TABLET ORAL EVERY 4 HOURS PRN
Status: DISCONTINUED | OUTPATIENT
Start: 2019-01-16 | End: 2019-01-16 | Stop reason: HOSPADM

## 2019-01-16 RX ORDER — MULTIVIT WITH MINERALS/LUTEIN
250 TABLET ORAL DAILY
Status: ON HOLD | COMMUNITY
End: 2021-08-19

## 2019-01-16 RX ORDER — UBIDECARENONE 75 MG
50 CAPSULE ORAL DAILY
COMMUNITY

## 2019-01-16 RX ORDER — LABETALOL HYDROCHLORIDE 5 MG/ML
10 INJECTION, SOLUTION INTRAVENOUS EVERY 30 MIN PRN
Status: CANCELLED | OUTPATIENT
Start: 2019-01-16

## 2019-01-16 RX ORDER — ASPIRIN 81 MG/1
81 TABLET, CHEWABLE ORAL ONCE
Status: COMPLETED | OUTPATIENT
Start: 2019-01-16 | End: 2019-01-16

## 2019-01-16 RX ADMIN — ASPIRIN 81 MG 81 MG: 81 TABLET ORAL at 11:49

## 2019-01-16 ASSESSMENT — PAIN DESCRIPTION - LOCATION: LOCATION: KNEE

## 2019-01-16 ASSESSMENT — PAIN SCALES - GENERAL: PAINLEVEL_OUTOF10: 3

## 2019-01-16 ASSESSMENT — PAIN DESCRIPTION - ORIENTATION: ORIENTATION: LEFT

## 2019-01-17 ENCOUNTER — TELEPHONE (OUTPATIENT)
Dept: CARDIOLOGY CLINIC | Age: 64
End: 2019-01-17

## 2019-01-18 DIAGNOSIS — I73.9 CLAUDICATION (HCC): ICD-10-CM

## 2019-01-18 DIAGNOSIS — I73.9 PAD (PERIPHERAL ARTERY DISEASE) (HCC): Primary | ICD-10-CM

## 2019-01-18 RX ORDER — TRAMADOL HYDROCHLORIDE 50 MG/1
50 TABLET ORAL EVERY 6 HOURS PRN
Qty: 28 TABLET | Refills: 0 | Status: SHIPPED | OUTPATIENT
Start: 2019-01-18 | End: 2019-01-25

## 2019-01-21 ENCOUNTER — OFFICE VISIT (OUTPATIENT)
Dept: INTERVENTIONAL RADIOLOGY/VASCULAR | Age: 64
End: 2019-01-21
Payer: COMMERCIAL

## 2019-01-21 VITALS
RESPIRATION RATE: 14 BRPM | SYSTOLIC BLOOD PRESSURE: 100 MMHG | OXYGEN SATURATION: 96 % | DIASTOLIC BLOOD PRESSURE: 60 MMHG | HEART RATE: 76 BPM

## 2019-01-21 DIAGNOSIS — I89.0 LYMPHEDEMA OF BOTH LOWER EXTREMITIES: ICD-10-CM

## 2019-01-21 DIAGNOSIS — I87.2 VENOUS INSUFFICIENCY OF BOTH LOWER EXTREMITIES: Primary | ICD-10-CM

## 2019-01-21 DIAGNOSIS — I73.9 PAD (PERIPHERAL ARTERY DISEASE) (HCC): ICD-10-CM

## 2019-01-21 PROCEDURE — G8482 FLU IMMUNIZE ORDER/ADMIN: HCPCS | Performed by: NURSE PRACTITIONER

## 2019-01-21 PROCEDURE — 1036F TOBACCO NON-USER: CPT | Performed by: NURSE PRACTITIONER

## 2019-01-21 PROCEDURE — G8598 ASA/ANTIPLAT THER USED: HCPCS | Performed by: NURSE PRACTITIONER

## 2019-01-21 PROCEDURE — 3017F COLORECTAL CA SCREEN DOC REV: CPT | Performed by: NURSE PRACTITIONER

## 2019-01-21 PROCEDURE — G8417 CALC BMI ABV UP PARAM F/U: HCPCS | Performed by: NURSE PRACTITIONER

## 2019-01-21 PROCEDURE — G8427 DOCREV CUR MEDS BY ELIG CLIN: HCPCS | Performed by: NURSE PRACTITIONER

## 2019-01-21 PROCEDURE — 99215 OFFICE O/P EST HI 40 MIN: CPT | Performed by: NURSE PRACTITIONER

## 2019-01-21 ASSESSMENT — ENCOUNTER SYMPTOMS
SINUS PAIN: 0
BACK PAIN: 0
NAUSEA: 0
SINUS PRESSURE: 0
EYE REDNESS: 0
ALLERGIC/IMMUNOLOGIC NEGATIVE: 1
VOMITING: 0
SORE THROAT: 0
SHORTNESS OF BREATH: 0
WHEEZING: 0
EYE PAIN: 0
DIARRHEA: 0
EYE ITCHING: 0
ABDOMINAL PAIN: 0
COUGH: 0
CONSTIPATION: 0
EYES NEGATIVE: 1

## 2019-03-01 ENCOUNTER — HOSPITAL ENCOUNTER (OUTPATIENT)
Dept: CARDIAC CATH/INVASIVE PROCEDURES | Age: 64
Discharge: HOME | End: 2019-03-01
Attending: INTERNAL MEDICINE | Admitting: INTERNAL MEDICINE
Payer: COMMERCIAL

## 2019-03-01 VITALS
SYSTOLIC BLOOD PRESSURE: 159 MMHG | WEIGHT: 264 LBS | HEART RATE: 92 BPM | RESPIRATION RATE: 20 BRPM | TEMPERATURE: 99.4 F | HEIGHT: 72 IN | BODY MASS INDEX: 35.76 KG/M2 | DIASTOLIC BLOOD PRESSURE: 69 MMHG

## 2019-03-01 LAB
ABO/RH: NORMAL
ANION GAP SERPL CALCULATED.3IONS-SCNC: 16 MEQ/L (ref 9–15)
ANTIBODY SCREEN: NORMAL
BUN BLDV-MCNC: 9 MG/DL (ref 8–23)
CALCIUM SERPL-MCNC: 9.5 MG/DL (ref 8.5–9.9)
CHLORIDE BLD-SCNC: 99 MEQ/L (ref 95–107)
CO2: 25 MEQ/L (ref 20–31)
CREAT SERPL-MCNC: 0.7 MG/DL (ref 0.7–1.2)
GFR AFRICAN AMERICAN: >60
GFR NON-AFRICAN AMERICAN: >60
GLUCOSE BLD-MCNC: 105 MG/DL (ref 70–99)
HCT VFR BLD CALC: 41.4 % (ref 42–52)
HEMOGLOBIN: 14.4 G/DL (ref 14–18)
INR BLD: 1
MCH RBC QN AUTO: 37.9 PG (ref 27–31.3)
MCHC RBC AUTO-ENTMCNC: 34.9 % (ref 33–37)
MCV RBC AUTO: 108.6 FL (ref 80–100)
PDW BLD-RTO: 14.6 % (ref 11.5–14.5)
PLATELET # BLD: 195 K/UL (ref 130–400)
POTASSIUM SERPL-SCNC: 3.4 MEQ/L (ref 3.4–4.9)
PROTHROMBIN TIME: 10.2 SEC (ref 9–11.5)
RBC # BLD: 3.81 M/UL (ref 4.7–6.1)
SODIUM BLD-SCNC: 140 MEQ/L (ref 135–144)
WBC # BLD: 4.2 K/UL (ref 4.8–10.8)

## 2019-03-01 PROCEDURE — 2580000003 HC RX 258: Performed by: INTERNAL MEDICINE

## 2019-03-01 PROCEDURE — 2580000003 HC RX 258

## 2019-03-01 PROCEDURE — 2709999900 HC NON-CHARGEABLE SUPPLY

## 2019-03-01 PROCEDURE — 85027 COMPLETE CBC AUTOMATED: CPT

## 2019-03-01 PROCEDURE — 2500000003 HC RX 250 WO HCPCS

## 2019-03-01 PROCEDURE — 80048 BASIC METABOLIC PNL TOTAL CA: CPT

## 2019-03-01 PROCEDURE — 6360000002 HC RX W HCPCS

## 2019-03-01 PROCEDURE — 36247 INS CATH ABD/L-EXT ART 3RD: CPT | Performed by: INTERNAL MEDICINE

## 2019-03-01 PROCEDURE — C1894 INTRO/SHEATH, NON-LASER: HCPCS

## 2019-03-01 PROCEDURE — 85610 PROTHROMBIN TIME: CPT

## 2019-03-01 PROCEDURE — 86850 RBC ANTIBODY SCREEN: CPT

## 2019-03-01 PROCEDURE — 86900 BLOOD TYPING SEROLOGIC ABO: CPT

## 2019-03-01 PROCEDURE — 75710 ARTERY X-RAYS ARM/LEG: CPT | Performed by: INTERNAL MEDICINE

## 2019-03-01 PROCEDURE — 36200 PLACE CATHETER IN AORTA: CPT | Performed by: INTERNAL MEDICINE

## 2019-03-01 PROCEDURE — C1769 GUIDE WIRE: HCPCS

## 2019-03-01 PROCEDURE — 86901 BLOOD TYPING SEROLOGIC RH(D): CPT

## 2019-03-01 RX ORDER — SODIUM CHLORIDE 0.9 % (FLUSH) 0.9 %
10 SYRINGE (ML) INJECTION EVERY 12 HOURS SCHEDULED
Status: DISCONTINUED | OUTPATIENT
Start: 2019-03-01 | End: 2019-03-01 | Stop reason: HOSPADM

## 2019-03-01 RX ORDER — ACETAMINOPHEN 325 MG/1
650 TABLET ORAL EVERY 4 HOURS PRN
Status: CANCELLED | OUTPATIENT
Start: 2019-03-01

## 2019-03-01 RX ORDER — HYDRALAZINE HYDROCHLORIDE 20 MG/ML
10 INJECTION INTRAMUSCULAR; INTRAVENOUS EVERY 10 MIN PRN
Status: CANCELLED | OUTPATIENT
Start: 2019-03-01

## 2019-03-01 RX ORDER — LABETALOL HYDROCHLORIDE 5 MG/ML
10 INJECTION, SOLUTION INTRAVENOUS EVERY 30 MIN PRN
Status: CANCELLED | OUTPATIENT
Start: 2019-03-01

## 2019-03-01 RX ORDER — ONDANSETRON 2 MG/ML
4 INJECTION INTRAMUSCULAR; INTRAVENOUS EVERY 6 HOURS PRN
Status: CANCELLED | OUTPATIENT
Start: 2019-03-01

## 2019-03-01 RX ORDER — DIAZEPAM 5 MG/1
5 TABLET ORAL
Status: DISCONTINUED | OUTPATIENT
Start: 2019-03-01 | End: 2019-03-01 | Stop reason: HOSPADM

## 2019-03-01 RX ORDER — SODIUM CHLORIDE 9 MG/ML
INJECTION, SOLUTION INTRAVENOUS CONTINUOUS
Status: DISCONTINUED | OUTPATIENT
Start: 2019-03-01 | End: 2019-03-01 | Stop reason: HOSPADM

## 2019-03-01 RX ORDER — SODIUM CHLORIDE 9 MG/ML
INJECTION, SOLUTION INTRAVENOUS CONTINUOUS
Status: CANCELLED | OUTPATIENT
Start: 2019-03-01 | End: 2019-03-01

## 2019-03-01 RX ADMIN — SODIUM CHLORIDE: 9 INJECTION, SOLUTION INTRAVENOUS at 12:27

## 2019-03-02 RX ORDER — ATORVASTATIN CALCIUM 40 MG/1
40 TABLET, FILM COATED ORAL DAILY
COMMUNITY
Start: 2019-01-20 | End: 2020-01-15 | Stop reason: SDUPTHER

## 2019-03-18 DIAGNOSIS — E11.9 TYPE 2 DIABETES MELLITUS WITHOUT COMPLICATION, WITHOUT LONG-TERM CURRENT USE OF INSULIN (HCC): ICD-10-CM

## 2019-03-19 RX ORDER — GLIMEPIRIDE 1 MG/1
1 TABLET ORAL
Qty: 90 TABLET | Refills: 1 | Status: SHIPPED | OUTPATIENT
Start: 2019-03-19 | End: 2019-06-27 | Stop reason: SDUPTHER

## 2019-04-08 DIAGNOSIS — I63.9 CEREBROVASCULAR ACCIDENT (CVA), UNSPECIFIED MECHANISM (HCC): ICD-10-CM

## 2019-04-08 DIAGNOSIS — Z98.61 CAD S/P PERCUTANEOUS CORONARY ANGIOPLASTY: Chronic | ICD-10-CM

## 2019-04-08 DIAGNOSIS — I25.10 CAD S/P PERCUTANEOUS CORONARY ANGIOPLASTY: Chronic | ICD-10-CM

## 2019-04-08 RX ORDER — CLOPIDOGREL BISULFATE 75 MG/1
75 TABLET ORAL DAILY
Qty: 90 TABLET | Refills: 3 | Status: SHIPPED | OUTPATIENT
Start: 2019-04-08 | End: 2019-11-21 | Stop reason: SDUPTHER

## 2019-04-08 NOTE — TELEPHONE ENCOUNTER
Pharmacy is requesting medication refill.  Please approve or deny this request.    Rx requested:  Requested Prescriptions     Pending Prescriptions Disp Refills    clopidogrel (PLAVIX) 75 MG tablet [Pharmacy Med Name: CLOPIDOGREL BISULFATE TABS 75MG] 90 tablet 3     Sig: TAKE 1 TABLET DAILY         Last Office Visit:   1/14/2019      Next Visit Date:  Future Appointments   Date Time Provider Qasim Kyra   4/18/2019  1:45 PM Zita Palacios MD [address]   5/14/2019  1:40 PM Delaney Aldana MD [place] [id number]   7/10/2019  1:00 PM Oma Robbins MD [address]

## 2019-04-16 LAB — DIABETIC RETINOPATHY: NEGATIVE

## 2019-04-18 ENCOUNTER — OFFICE VISIT (OUTPATIENT)
Dept: PULMONOLOGY | Age: 64
End: 2019-04-18
Payer: COMMERCIAL

## 2019-04-18 VITALS
WEIGHT: 274 LBS | OXYGEN SATURATION: 98 % | SYSTOLIC BLOOD PRESSURE: 120 MMHG | HEART RATE: 90 BPM | DIASTOLIC BLOOD PRESSURE: 68 MMHG | BODY MASS INDEX: 37.11 KG/M2 | RESPIRATION RATE: 16 BRPM | TEMPERATURE: 98.1 F | HEIGHT: 72 IN

## 2019-04-18 DIAGNOSIS — I25.10 CORONARY ARTERY DISEASE INVOLVING NATIVE HEART, ANGINA PRESENCE UNSPECIFIED, UNSPECIFIED VESSEL OR LESION TYPE: ICD-10-CM

## 2019-04-18 DIAGNOSIS — G47.33 SLEEP APNEA, OBSTRUCTIVE: Primary | ICD-10-CM

## 2019-04-18 DIAGNOSIS — I63.9 CEREBROVASCULAR ACCIDENT (CVA), UNSPECIFIED MECHANISM (HCC): ICD-10-CM

## 2019-04-18 PROCEDURE — 1036F TOBACCO NON-USER: CPT | Performed by: INTERNAL MEDICINE

## 2019-04-18 PROCEDURE — G8427 DOCREV CUR MEDS BY ELIG CLIN: HCPCS | Performed by: INTERNAL MEDICINE

## 2019-04-18 PROCEDURE — G8598 ASA/ANTIPLAT THER USED: HCPCS | Performed by: INTERNAL MEDICINE

## 2019-04-18 PROCEDURE — 99214 OFFICE O/P EST MOD 30 MIN: CPT | Performed by: INTERNAL MEDICINE

## 2019-04-18 PROCEDURE — G8417 CALC BMI ABV UP PARAM F/U: HCPCS | Performed by: INTERNAL MEDICINE

## 2019-04-18 PROCEDURE — 3017F COLORECTAL CA SCREEN DOC REV: CPT | Performed by: INTERNAL MEDICINE

## 2019-04-18 ASSESSMENT — ENCOUNTER SYMPTOMS
SHORTNESS OF BREATH: 0
SORE THROAT: 0
NAUSEA: 0
CHEST TIGHTNESS: 1
ABDOMINAL PAIN: 0
COUGH: 0
SINUS PRESSURE: 0
DIARRHEA: 0
VOMITING: 0
RHINORRHEA: 0
WHEEZING: 0

## 2019-04-18 NOTE — PROGRESS NOTES
Subjective:     Tay Pereira is a 59 y.o. male who complains today of:     Chief Complaint   Patient presents with    Follow-up     6 month f/u AYLIN       HPI  Patient's here for a follow-up visit regarding obstructive sleep apnea. Patient has known history of strokes, he also has significant peripheral vascular disease as well as chronic venous insufficiency, and cardiomyopathy. Since the last visit patient remains very compliant and tolerant of CPAP. Using his treatment every night routinely averaging about 10-12 hours of sleep. He feels rested and alert during the day. Patient had a second stroke requiring TPA with significant reversibility of his neurologic deficits except for some residual weakness. He continues to have speech difficulties related to both strokes. Allergies:  Patient has no known allergies.   Past Medical History:   Diagnosis Date    Acute renal failure ([address]     Anxiety     CAD S/P percutaneous coronary angioplasty 3/11/2014    Cardiomyopathy [facility])     Cerebrovascular accident (CVA) due to occlusion of left middle cerebral artery ([address] 08/2016    brainstem infarction from left MCA occlusion    Cerebrovascular disease 07/27/2016    Coronary angioplasty status     CVA (cerebral vascular accident) ([address] 11/12/2017    Dependent edema 9/14/2017    Diplopia 5/15/2017    Resolved with management of cataracts    Dupuytren contracture 1/2/2018    Dysphagia 8/18/2011    Dysphonia 8/18/2011    Essential hypertension 8/17/2016    Gallop rhythm     Gout 12/10/2016    Gout of big toe 08/2014    Right Great Toe    H/O fall     Headache     migraines    Heart failure ([address]     History of 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    History of heart failure 1/6/2014    History of myocardial infarction 3/11/2014    History of recurrent pneumonia 2/11/2014    Hx of bacterial pneumonia  Hypotension     Leg swelling     Macrocytosis without anemia 12/10/2016    Microalbuminuria due to type 2 diabetes mellitus [facility] 2018    Morbid obesity ([facility] 2014    Myocardial infarction ([facility]     Obesity     AYLIN (obstructive sleep apnea) 12/10/2016    Osteoarthritis     Right-sided muscle weakness 10/19/2016    S/P cardiac catheterization     Skin cyst 2016    Spasm 2016    Stented coronary artery     Tremor of right hand 5/15/2017    Type 2 diabetes mellitus with diabetic polyneuropathy, without long-term current use of insulin ([facility])     Unsteady gait 5/15/2017    Weakness     Weight gain      Past Surgical History:   Procedure Laterality Date    CHOLECYSTECTOMY      COLONOSCOPY  01/15/2010    polyp, diverticulosis (DR ELAINE)    CORONARY ANGIOPLASTY WITH STENT PLACEMENT      CYST REMOVAL  16    DR Angeli Pittman CYST    SHOULDER SURGERY Right 2015     Family History   Problem Relation Age of Onset    Breast Cancer Mother     Stroke Father     Colon Cancer Father 76     Social History     Socioeconomic History    Marital status:      Spouse name: Aline Henderson    Number of children: 3    Years of education: 12+    Highest education level: Not on file   Occupational History    Occupation: medical leave     Employer: [employer]   Social Needs    Financial resource strain: Not on file    Food insecurity:     Worry: Not on file     Inability: Not on file    Transportation needs:     Medical: Not on file     Non-medical: Not on file   Tobacco Use    Smoking status: Former Smoker     Packs/day: 2.50     Years: 30.00     Pack years: 75.00     Last attempt to quit:      Years since quittin.3    Smokeless tobacco: Never Used   Substance and Sexual Activity    Alcohol use:  Yes     Alcohol/week: 0.0 oz     Comment: limits less than 10/wk     Drug use: No    Sexual activity: Yes     Partners: Female   Lifestyle    Physical activity:     Days per week: Not on file     Minutes per session: Not on file    Stress: Not on file   Relationships    Social connections:     Talks on phone: Not on file     Gets together: Not on file     Attends [employer] service: Not on file     Active member of club or organization: Not on file     Attends meetings of clubs or organizations: Not on file     Relationship status: Not on file    Intimate partner violence:     Fear of current or ex partner: Not on file     Emotionally abused: Not on file     Physically abused: Not on file     Forced sexual activity: Not on file   Other Topics Concern    Not on file   Social History Narrative    Not on file         Review of Systems   Constitutional: Positive for fatigue. Negative for chills, diaphoresis and fever. HENT: Positive for congestion. Negative for postnasal drip, rhinorrhea, sinus pressure, sneezing and sore throat. Eyes: Negative for visual disturbance. Respiratory: Positive for chest tightness. Negative for cough, shortness of breath and wheezing. Cardiovascular: Positive for palpitations and leg swelling. Negative for chest pain. Gastrointestinal: Negative for abdominal pain, diarrhea, nausea and vomiting. Genitourinary: Negative for difficulty urinating and hematuria. Musculoskeletal: Positive for arthralgias and myalgias. Negative for joint swelling. Skin: Negative for rash. Allergic/Immunologic: Negative for environmental allergies. Neurological: Positive for speech difficulty and weakness. Negative for dizziness, tremors and headaches. Psychiatric/Behavioral: Negative for behavioral problems and sleep disturbance.          Objective:     Vitals:    04/18/19 1349   BP: 120/68   Site: Left Upper Arm   Position: Sitting   Cuff Size: Large Adult   Pulse: 90   Resp: 16   Temp: 98.1 °F (36.7 °C)   TempSrc: Tympanic   SpO2: 98%   Weight: 274 lb (124.3 kg)   Height: 6' (1.829 m)         Physical Exam   Constitutional: He is oriented to person, place, and time. He appears well-developed and well-nourished. HENT:   Head: Normocephalic and atraumatic. Mouth/Throat: Oropharynx is clear and moist.   Eyes: Pupils are equal, round, and reactive to light. EOM are normal.   Neck: Normal range of motion. Neck supple. No JVD present. No tracheal deviation present. No thyromegaly present. Cardiovascular: Normal rate and regular rhythm. Exam reveals no gallop. No murmur heard. Pulmonary/Chest: Effort normal and breath sounds normal. He has no wheezes. He has no rales. He exhibits no tenderness. Abdominal: He exhibits no distension. Musculoskeletal: Normal range of motion. He exhibits no edema. Neurological: He is alert and oriented to person, place, and time. He has normal reflexes. Skin: Skin is warm and dry. No rash noted. Psychiatric: He has a normal mood and affect. Assessment:      Diagnosis Orders   1. Sleep apnea, obstructive     2. Cerebrovascular accident (CVA), unspecified mechanism ([address]     3. Coronary artery disease involving native heart, angina presence unspecified, unspecified vessel or lesion type       Patient is doing very well with current CPAP therapy, continued regular use of CPAP, weight loss, were discussed today we'll see patient for follow-up in 6 months      Plan:     No orders of the defined types were placed in this encounter. No orders of the defined types were placed in this encounter. Return in about 6 months (around 10/18/2019) for re-evaluation.        Jennifer Ojeda MD

## 2019-05-14 ENCOUNTER — OFFICE VISIT (OUTPATIENT)
Dept: FAMILY MEDICINE CLINIC | Age: 64
End: 2019-05-14
Payer: COMMERCIAL

## 2019-05-14 VITALS
BODY MASS INDEX: 37.38 KG/M2 | SYSTOLIC BLOOD PRESSURE: 130 MMHG | RESPIRATION RATE: 16 BRPM | DIASTOLIC BLOOD PRESSURE: 80 MMHG | WEIGHT: 276 LBS | TEMPERATURE: 97.8 F | HEIGHT: 72 IN | HEART RATE: 78 BPM

## 2019-05-14 DIAGNOSIS — R60.9 DEPENDENT EDEMA: Chronic | ICD-10-CM

## 2019-05-14 DIAGNOSIS — E66.01 MORBID OBESITY (HCC): ICD-10-CM

## 2019-05-14 DIAGNOSIS — Z23 NEED FOR VACCINATION: ICD-10-CM

## 2019-05-14 DIAGNOSIS — Z86.73 HISTORY OF CVA (CEREBROVASCULAR ACCIDENT): Chronic | ICD-10-CM

## 2019-05-14 DIAGNOSIS — Z98.61 CAD S/P PERCUTANEOUS CORONARY ANGIOPLASTY: Chronic | ICD-10-CM

## 2019-05-14 DIAGNOSIS — E11.29 MICROALBUMINURIA DUE TO TYPE 2 DIABETES MELLITUS (HCC): Chronic | ICD-10-CM

## 2019-05-14 DIAGNOSIS — I87.2 VENOUS INSUFFICIENCY OF BOTH LOWER EXTREMITIES: ICD-10-CM

## 2019-05-14 DIAGNOSIS — I89.0 LYMPHEDEMA OF BOTH LOWER EXTREMITIES: ICD-10-CM

## 2019-05-14 DIAGNOSIS — I25.10 CAD S/P PERCUTANEOUS CORONARY ANGIOPLASTY: Chronic | ICD-10-CM

## 2019-05-14 DIAGNOSIS — G47.33 OSA (OBSTRUCTIVE SLEEP APNEA): Chronic | ICD-10-CM

## 2019-05-14 DIAGNOSIS — D75.89 MACROCYTOSIS WITHOUT ANEMIA: Chronic | ICD-10-CM

## 2019-05-14 DIAGNOSIS — I10 ESSENTIAL HYPERTENSION: Chronic | ICD-10-CM

## 2019-05-14 DIAGNOSIS — R80.9 MICROALBUMINURIA DUE TO TYPE 2 DIABETES MELLITUS (HCC): Chronic | ICD-10-CM

## 2019-05-14 DIAGNOSIS — E11.42 TYPE 2 DIABETES MELLITUS WITH DIABETIC POLYNEUROPATHY, WITHOUT LONG-TERM CURRENT USE OF INSULIN (HCC): Primary | Chronic | ICD-10-CM

## 2019-05-14 DIAGNOSIS — I73.9 PAD (PERIPHERAL ARTERY DISEASE) (HCC): ICD-10-CM

## 2019-05-14 LAB — HBA1C MFR BLD: 6.5 %

## 2019-05-14 PROCEDURE — 3017F COLORECTAL CA SCREEN DOC REV: CPT | Performed by: FAMILY MEDICINE

## 2019-05-14 PROCEDURE — G8427 DOCREV CUR MEDS BY ELIG CLIN: HCPCS | Performed by: FAMILY MEDICINE

## 2019-05-14 PROCEDURE — G8598 ASA/ANTIPLAT THER USED: HCPCS | Performed by: FAMILY MEDICINE

## 2019-05-14 PROCEDURE — 99214 OFFICE O/P EST MOD 30 MIN: CPT | Performed by: FAMILY MEDICINE

## 2019-05-14 PROCEDURE — 1036F TOBACCO NON-USER: CPT | Performed by: FAMILY MEDICINE

## 2019-05-14 PROCEDURE — 3044F HG A1C LEVEL LT 7.0%: CPT | Performed by: FAMILY MEDICINE

## 2019-05-14 PROCEDURE — 83036 HEMOGLOBIN GLYCOSYLATED A1C: CPT | Performed by: FAMILY MEDICINE

## 2019-05-14 PROCEDURE — 2022F DILAT RTA XM EVC RTNOPTHY: CPT | Performed by: FAMILY MEDICINE

## 2019-05-14 PROCEDURE — G8417 CALC BMI ABV UP PARAM F/U: HCPCS | Performed by: FAMILY MEDICINE

## 2019-05-14 RX ORDER — MEDICAL SUPPLY, MISCELLANEOUS
EACH MISCELLANEOUS
Qty: 2 EACH | Refills: 1 | Status: SHIPPED | OUTPATIENT
Start: 2019-05-14 | End: 2020-05-18 | Stop reason: SDUPTHER

## 2019-05-14 ASSESSMENT — PATIENT HEALTH QUESTIONNAIRE - PHQ9
1. LITTLE INTEREST OR PLEASURE IN DOING THINGS: 0
SUM OF ALL RESPONSES TO PHQ9 QUESTIONS 1 & 2: 0
SUM OF ALL RESPONSES TO PHQ QUESTIONS 1-9: 0
SUM OF ALL RESPONSES TO PHQ QUESTIONS 1-9: 0
2. FEELING DOWN, DEPRESSED OR HOPELESS: 0

## 2019-05-14 ASSESSMENT — ENCOUNTER SYMPTOMS
CHEST TIGHTNESS: 0
SHORTNESS OF BREATH: 0
COUGH: 0
WHEEZING: 0
VOMITING: 0
DIARRHEA: 0
NAUSEA: 0
ABDOMINAL PAIN: 0
CONSTIPATION: 0

## 2019-05-14 NOTE — PROGRESS NOTES
Subjective:      Patient ID: Shawnee Nance is a 59 y.o. male who presents today for:     Chief Complaint   Patient presents with    Diabetes     Presents today for his routien chronic check up. Would liek to discuss getting shorter support stockings    Hypertension       HPI     Patient presents for chronic diabetes, hypertension, AYLIN, CAD, PAD, CVA follow-up visit. He reports taking his medications as prescribed without any noted side effects. He denies adhering to any specific lower salt or lower carbohydrate diet. He denies any routine exercise outside of his normal day-to-day activity. There is no reported polyuria or polydipsia, new onset vision changes, or new onset paresthesias. He denies any chest pain, dyspnea, palpitations, lightheadedness, dizziness, worsening lower extremity edema, or syncope. Patient reports nightly use of CPAP machine and getting restful sleep with use of machine at home. Since his most recent visit he is status post vascular intervention per Dr. Aime Woods for his known PAD with claudication. He reports feeling improved overall with activity and denies claudication pain limiting his ambulation. F/U is in place with neurology with history of CVA and next visit is scheduled in 3 months.      Past Medical History:   Diagnosis Date    Acute renal failure ([address]     Anxiety     CAD S/P percutaneous coronary angioplasty 3/11/2014    Cardiomyopathy [facility])     Cerebrovascular accident (CVA) due to occlusion of left middle cerebral artery ([address] 08/2016    brainstem infarction from left MCA occlusion    Cerebrovascular disease 07/27/2016    Coronary angioplasty status     CVA (cerebral vascular accident) ([address] 11/12/2017    Dependent edema 9/14/2017    Diplopia 5/15/2017    Resolved with management of cataracts    Dupuytren contracture 1/2/2018    Dysphagia 8/18/2011    Dysphonia 8/18/2011    Essential hypertension 8/17/2016    Gallop rhythm     Gout 12/10/2016    Gout of big toe 08/2014    Right Great Toe    H/O fall     Headache     migraines    Heart failure ([facility]     History of 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    History of heart failure 1/6/2014    History of myocardial infarction 3/11/2014    History of recurrent pneumonia 2/11/2014    Hx of bacterial pneumonia     Hypotension     Leg swelling     Macrocytosis without anemia 12/10/2016    Microalbuminuria due to type 2 diabetes mellitus ([facility] 9/14/2018    Morbid obesity ([facility] 1/2/2014    Myocardial infarction ([facility]     Obesity     AYLIN (obstructive sleep apnea) 12/10/2016    Osteoarthritis     Right-sided muscle weakness 10/19/2016    S/P cardiac catheterization     Skin cyst 4/8/2016    Spasm 11/9/2016    Stented coronary artery     Tremor of right hand 5/15/2017    Type 2 diabetes mellitus with diabetic polyneuropathy, without long-term current use of insulin ([facility])     Unsteady gait 5/15/2017    Weakness     Weight gain      Past Surgical History:   Procedure Laterality Date    CHOLECYSTECTOMY      COLONOSCOPY  01/15/2010    polyp, diverticulosis (DR ELAINE)    CORONARY ANGIOPLASTY WITH STENT PLACEMENT  2014    CYST REMOVAL  05/16/16    DR Aidan Cornelius CYST    SHOULDER SURGERY Right 12/18/2015     Family History   Problem Relation Age of Onset    Breast Cancer Mother     Stroke Father     Colon Cancer Father 76     Social History     Socioeconomic History    Marital status:      Spouse name: Apollo Murray Number of children: 3    Years of education: 12+    Highest education level: Not on file   Occupational History    Occupation: medical leave     Employer: [employer]   Social Needs    Financial resource strain: Not on file    Food insecurity:     Worry: Not on file     Inability: Not on file    Transportation needs:     Medical: Not on file     Non-medical: Not on file DAY WITH MEALS 180 tablet 3    aspirin EC 81 MG EC tablet Take 2 tablets by mouth daily (Patient taking differently: Take 81 mg by mouth daily ) 30 tablet 3    allopurinol (ZYLOPRIM) 300 MG tablet Take 300 mg by mouth daily       ammonium lactate (AMLACTIN) 12 % cream APPLY TO DRY CALLUS AREAS 1 TO 2 TIMES DAILY  5    glucose blood VI test strips (ASCENSIA AUTODISC VI;ONE TOUCH ULTRA TEST VI) strip Please replace with True Test Strips test 3 times daily 100 each 3    Misc. Devices (COMMODE BEDSIDE) MISC Use daily as needed 1 each 0    MULTIPLE VITAMIN PO Take 1 tablet by mouth daily        No current facility-administered medications on file prior to visit. Allergies:  Patient has no known allergies. Review of Systems   Constitutional: Negative for appetite change, chills, diaphoresis, fatigue, fever and unexpected weight change. Eyes: Negative for visual disturbance. Respiratory: Negative for cough, chest tightness, shortness of breath and wheezing. Cardiovascular: Positive for leg swelling. Negative for chest pain and palpitations. No orthopnea, No PND   Gastrointestinal: Negative for abdominal pain, constipation, diarrhea, nausea and vomiting. Endocrine: Negative for cold intolerance, heat intolerance, polydipsia, polyphagia and polyuria. Genitourinary: Negative for dysuria and hematuria. Musculoskeletal: Negative for myalgias. Skin: Negative for rash. Neurological: Negative for dizziness, syncope, light-headedness and headaches. Objective:     /80 (Site: Left Upper Arm, Position: Sitting, Cuff Size: Large Adult)   Pulse 78   Temp 97.8 °F (36.6 °C) (Temporal)   Resp 16   Ht 6' (1.829 m)   Wt 276 lb (125.2 kg)   BMI 37.43 kg/m²     Physical Exam   Constitutional: He is oriented to person, place, and time. He appears well-developed and well-nourished. Neck: Neck supple. Carotid bruit is not present. No thyromegaly present.    Cardiovascular: Normal rate, regular rhythm, normal heart sounds and intact distal pulses. No murmur heard. Pulmonary/Chest: Effort normal and breath sounds normal. No respiratory distress. He has no wheezes. Abdominal: Soft. Bowel sounds are normal. He exhibits no distension. There is no tenderness. There is no rebound and no guarding. Musculoskeletal: Normal range of motion. He exhibits edema (1+ edema BLE to knees). Lymphadenopathy:     He has no cervical adenopathy. Neurological: He is alert and oriented to person, place, and time. Skin: Skin is warm. No rash noted. Psychiatric: He has a normal mood and affect. Ortho Exam (If Applicable)    Results for POC orders placed in visit on 05/14/19   POCT glycosylated hemoglobin (Hb A1C)   Result Value Ref Range    Hemoglobin A1C 6.5 %       Assessment & Plan:      1. Type 2 diabetes mellitus with diabetic polyneuropathy, without long-term current use of insulin ([facility]  Diabetes at goal control based on A1c today in the office. Continue current management  - POCT glycosylated hemoglobin (Hb A1C)  - Basic Metabolic Panel; Future    2. Microalbuminuria due to type 2 diabetes mellitus ([facility]  We reviewed the importance of tight blood glucose, lipid, and blood pressure control long term. Will follow labwork over time    3. Essential hypertension  Blood pressure within normal limits today in the office. Continue current medication    - Basic Metabolic Panel; Future    4. AYLIN (obstructive sleep apnea)  Managed well with nightly use of CPAP machine. Continue current management    5. History of CVA (cerebrovascular accident)  Patient established with neurology for long-term follow-up and management. We reviewed the importance of tight blood glucose, lipid, and blood pressure control long term. 6. CAD S/P percutaneous coronary angioplasty  Patient established with cardiology for long-term follow-up and management. He denies any change in activity tolerance.  We reviewed the importance of tight blood glucose, lipid, and blood pressure control long term. 7. PAD (peripheral artery disease) ([facility]  Status post intervention per Dr. conley with reported clinical improvement. We will continue to follow peripherally over time. We reviewed the importance of tight blood glucose, lipid, and blood pressure control long term. 8. Macrocytosis without anemia  Will follow labwork over time. Patient has been established with hematology for long-term follow-up    9. Morbid obesity ([address]  Pt counseled on healthy dietary choices and the benefits of a lower salt and carbohydrate diet. Pt also counseled on benefits of moderate intensity cardiovascular exercise for 20-30 minutes at least 3-5 days a week. Advice was given to make small changes over time, setting smaller achievable goals until recommended lifestyle changes are reached. 10. Dependent edema    - Elastic Bandages & Supports (V-4 HIGH COMPRESSION HOSE) MISC; KNEE HIGH - 20-30 mmHg  Dispense: 2 each; Refill: 1    11. Venous insufficiency of both lower extremities    - Elastic Bandages & Supports (V-4 HIGH COMPRESSION HOSE) MISC; KNEE HIGH - 20-30 mmHg  Dispense: 2 each; Refill: 1    12. Lymphedema of both lower extremities    - Elastic Bandages & Supports (V-4 HIGH COMPRESSION HOSE) MISC; KNEE HIGH - 20-30 mmHg  Dispense: 2 each; Refill: 1    13. Need for vaccination    - zoster recombinant adjuvanted vaccine [facility]) 50 MCG/0.5ML SUSR injection;  Inject 0.5 mLs into the muscle once for 1 dose - dose #2 indicated 2-6 months after dose #1  Dispense: 0.5 mL; Refill: 1      Modified Medications    Modified Medication Previous Medication    ZOSTER RECOMBINANT ADJUVANTED VACCINE (SHINGRIX) 50 MCG/0.5ML SUSR INJECTION zoster recombinant adjuvanted vaccine (SHINGRIX) 50 MCG/0.5ML SUSR injection       Inject 0.5 mLs into the muscle once for 1 dose - dose #2 indicated 2-6 months after dose #1    Inject 0.5 mLs into the muscle once for 1 dose - dose #2 indicated 2-6 months after dose #1          New Prescriptions    ELASTIC BANDAGES & SUPPORTS (V-4 HIGH COMPRESSION HOSE) MISC    KNEE HIGH - 20-30 mmHg          Medications Discontinued During This Encounter   Medication Reason    Elastic Bandages & Supports (V-4 HIGH COMPRESSION HOSE) MISC DUPLICATE    zoster recombinant adjuvanted vaccine [facility]) 50 MCG/0.5ML SUSR injection REORDER       Return in about 4 months (around 9/14/2019) for Chronic Disease Check.     Edgar Greer MD

## 2019-05-16 ENCOUNTER — TELEPHONE (OUTPATIENT)
Dept: INTERVENTIONAL RADIOLOGY/VASCULAR | Age: 64
End: 2019-05-16

## 2019-05-16 DIAGNOSIS — E11.42 TYPE 2 DIABETES MELLITUS WITH DIABETIC POLYNEUROPATHY, WITHOUT LONG-TERM CURRENT USE OF INSULIN (HCC): Chronic | ICD-10-CM

## 2019-05-16 DIAGNOSIS — I10 ESSENTIAL HYPERTENSION: Chronic | ICD-10-CM

## 2019-05-16 LAB
ANION GAP SERPL CALCULATED.3IONS-SCNC: 19 MEQ/L (ref 9–15)
BUN BLDV-MCNC: 13 MG/DL (ref 8–23)
CALCIUM SERPL-MCNC: 9.1 MG/DL (ref 8.5–9.9)
CHLORIDE BLD-SCNC: 92 MEQ/L (ref 95–107)
CO2: 26 MEQ/L (ref 20–31)
CREAT SERPL-MCNC: 0.66 MG/DL (ref 0.7–1.2)
GFR AFRICAN AMERICAN: >60
GFR NON-AFRICAN AMERICAN: >60
GLUCOSE BLD-MCNC: 266 MG/DL (ref 70–99)
POTASSIUM SERPL-SCNC: 3.6 MEQ/L (ref 3.4–4.9)
SODIUM BLD-SCNC: 137 MEQ/L (ref 135–144)

## 2019-06-26 ENCOUNTER — TELEPHONE (OUTPATIENT)
Dept: INTERVENTIONAL RADIOLOGY/VASCULAR | Age: 64
End: 2019-06-26

## 2019-06-27 ENCOUNTER — TELEPHONE (OUTPATIENT)
Dept: FAMILY MEDICINE CLINIC | Age: 64
End: 2019-06-27

## 2019-06-27 DIAGNOSIS — M10.9 GOUT OF MULTIPLE SITES, UNSPECIFIED CAUSE, UNSPECIFIED CHRONICITY: Chronic | ICD-10-CM

## 2019-06-27 DIAGNOSIS — E11.9 TYPE 2 DIABETES MELLITUS WITHOUT COMPLICATION, WITHOUT LONG-TERM CURRENT USE OF INSULIN (HCC): Primary | ICD-10-CM

## 2019-06-27 RX ORDER — GLIMEPIRIDE 1 MG/1
1 TABLET ORAL
Qty: 30 TABLET | Refills: 0 | Status: SHIPPED | OUTPATIENT
Start: 2019-06-27 | End: 2019-08-05 | Stop reason: SDUPTHER

## 2019-06-27 RX ORDER — ALLOPURINOL 300 MG/1
300 TABLET ORAL DAILY
Qty: 30 TABLET | Refills: 0 | Status: SHIPPED | OUTPATIENT
Start: 2019-06-27 | End: 2019-08-13 | Stop reason: SDUPTHER

## 2019-06-27 NOTE — TELEPHONE ENCOUNTER
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 He called the health plan department and he will be covered but, now has to wait for new cards to come in. He will need refills  of his allopurinol and glimepiride. He will have to pay out of pocket but, if we send a one month supply to [facility] in [place] they have a program where they will pay him back once his cards come in. RX's pending.  Please approve or deny this request.    Rx requested:  Requested Prescriptions     Pending Prescriptions Disp Refills    glimepiride (AMARYL) 1 MG tablet 30 tablet 0     Sig: Take 1 tablet by mouth every morning (before breakfast)    allopurinol (ZYLOPRIM) 300 MG tablet 30 tablet 0     Sig: Take 1 tablet by mouth daily         Last Office Visit:   5/14/2019      Next Visit Date:  Future Appointments   Date Time Provider Qasim Kyra   7/10/2019  1:00 PM Caleb Goldberg, MD [address]   9/16/2019  2:20 PM Josephine Marcos MD [facility]   10/22/2019  2:45 PM Leonora Jeffrey MD [facility]

## 2019-07-10 ENCOUNTER — OFFICE VISIT (OUTPATIENT)
Dept: CARDIOLOGY CLINIC | Age: 64
End: 2019-07-10
Payer: COMMERCIAL

## 2019-07-10 VITALS
BODY MASS INDEX: 37.38 KG/M2 | RESPIRATION RATE: 12 BRPM | HEIGHT: 72 IN | SYSTOLIC BLOOD PRESSURE: 130 MMHG | DIASTOLIC BLOOD PRESSURE: 80 MMHG | WEIGHT: 276 LBS | HEART RATE: 100 BPM

## 2019-07-10 DIAGNOSIS — I73.9 PAD (PERIPHERAL ARTERY DISEASE) (HCC): ICD-10-CM

## 2019-07-10 DIAGNOSIS — I25.2 HISTORY OF MYOCARDIAL INFARCTION: ICD-10-CM

## 2019-07-10 DIAGNOSIS — I10 ESSENTIAL HYPERTENSION: ICD-10-CM

## 2019-07-10 DIAGNOSIS — I25.10 CAD S/P PERCUTANEOUS CORONARY ANGIOPLASTY: Primary | ICD-10-CM

## 2019-07-10 DIAGNOSIS — Z98.61 CAD S/P PERCUTANEOUS CORONARY ANGIOPLASTY: Primary | ICD-10-CM

## 2019-07-10 DIAGNOSIS — G47.33 OSA (OBSTRUCTIVE SLEEP APNEA): ICD-10-CM

## 2019-07-10 DIAGNOSIS — Z86.73 HISTORY OF CVA (CEREBROVASCULAR ACCIDENT): ICD-10-CM

## 2019-07-10 PROCEDURE — G8598 ASA/ANTIPLAT THER USED: HCPCS | Performed by: INTERNAL MEDICINE

## 2019-07-10 PROCEDURE — 99214 OFFICE O/P EST MOD 30 MIN: CPT | Performed by: INTERNAL MEDICINE

## 2019-07-10 PROCEDURE — 3017F COLORECTAL CA SCREEN DOC REV: CPT | Performed by: INTERNAL MEDICINE

## 2019-07-10 PROCEDURE — G8427 DOCREV CUR MEDS BY ELIG CLIN: HCPCS | Performed by: INTERNAL MEDICINE

## 2019-07-10 PROCEDURE — G8417 CALC BMI ABV UP PARAM F/U: HCPCS | Performed by: INTERNAL MEDICINE

## 2019-07-10 PROCEDURE — 1036F TOBACCO NON-USER: CPT | Performed by: INTERNAL MEDICINE

## 2019-07-10 RX ORDER — ASPIRIN 81 MG/1
81 TABLET ORAL DAILY
COMMUNITY

## 2019-07-10 ASSESSMENT — ENCOUNTER SYMPTOMS
APNEA: 0
ANAL BLEEDING: 0
COLOR CHANGE: 0
CHEST TIGHTNESS: 0
BLOOD IN STOOL: 0
VOICE CHANGE: 0
ABDOMINAL DISTENTION: 0
WHEEZING: 0
TROUBLE SWALLOWING: 0
COUGH: 0
SHORTNESS OF BREATH: 0
DIARRHEA: 0
ABDOMINAL PAIN: 0
VOMITING: 0
NAUSEA: 0
FACIAL SWELLING: 0

## 2019-07-10 NOTE — PROGRESS NOTES
volume and dysuria. Musculoskeletal: Negative for gait problem and myalgias. Skin: Negative for color change, pallor, rash and wound. Neurological: Negative for dizziness, syncope, facial asymmetry, speech difficulty, weakness, light-headedness, numbness and headaches. Hematological: Does not bruise/bleed easily. Psychiatric/Behavioral: Negative for agitation, behavioral problems, confusion, hallucinations and suicidal ideas. The patient is not nervous/anxious. All other systems reviewed and are negative. Review of System is negative except for as mentioned above. Physical Examination:    /80   Pulse 100   Resp 12   Ht 6' (1.829 m)   Wt 276 lb (125.2 kg)   BMI 37.43 kg/m²    Physical Exam   Constitutional: He appears healthy. No distress. HENT:   Nose: Nose normal.   Mouth/Throat: Dentition is normal. Oropharynx is clear. Eyes: Pupils are equal, round, and reactive to light. Conjunctivae are normal.   Neck: Normal range of motion and thyroid normal. Neck supple. Cardiovascular: Regular rhythm, S1 normal, S2 normal, normal heart sounds, intact distal pulses and normal pulses. PMI is not displaced. No murmur heard. Pulmonary/Chest: He has no wheezes. He has no rales. He exhibits no tenderness. Abdominal: Soft. Bowel sounds are normal. He exhibits no distension and no mass. There is no splenomegaly or hepatomegaly. There is no tenderness. No hernia. Neurological: He is alert and oriented to person, place, and time. He has normal motor skills. Gait normal.   Skin: Skin is warm and dry. No cyanosis. No jaundice. Nails show no clubbing.            Patient Active Problem List   Diagnosis    Morbid obesity ([address]    History of heart failure    History of recurrent pneumonia    CAD S/P percutaneous coronary angioplasty    History of myocardial infarction    Type 2 diabetes mellitus with diabetic polyneuropathy, without long-term current use of insulin ([address]    Skin cyst   

## 2019-08-01 ENCOUNTER — TELEPHONE (OUTPATIENT)
Dept: INTERVENTIONAL RADIOLOGY/VASCULAR | Age: 64
End: 2019-08-01

## 2019-08-05 DIAGNOSIS — E11.9 TYPE 2 DIABETES MELLITUS WITHOUT COMPLICATION, WITHOUT LONG-TERM CURRENT USE OF INSULIN (HCC): ICD-10-CM

## 2019-08-05 DIAGNOSIS — R60.0 BILATERAL LOWER EXTREMITY EDEMA: ICD-10-CM

## 2019-08-05 RX ORDER — GLIMEPIRIDE 1 MG/1
1 TABLET ORAL
Qty: 30 TABLET | Refills: 5 | Status: SHIPPED | OUTPATIENT
Start: 2019-08-05 | End: 2019-12-03 | Stop reason: SDUPTHER

## 2019-08-05 RX ORDER — TORSEMIDE 20 MG/1
20 TABLET ORAL DAILY
Qty: 30 TABLET | Refills: 5 | Status: SHIPPED | OUTPATIENT
Start: 2019-08-05 | End: 2019-11-21 | Stop reason: SDUPTHER

## 2019-08-05 NOTE — TELEPHONE ENCOUNTER
Patient is requesting medication refill. Please approve or deny this request.    Rx requested:  Requested Prescriptions     Pending Prescriptions Disp Refills    torsemide (DEMADEX) 20 MG tablet 30 tablet 0     Sig: Take 1 tablet by mouth daily    glimepiride (AMARYL) 1 MG tablet 30 tablet 0     Sig: Take 1 tablet by mouth every morning (before breakfast)         Last Office Visit:   5/14/2019      Next Visit Date:  Future Appointments   Date Time Provider [facility] Kyra   9/16/2019  2:20 PM Jamey Flores MD [place] [id number]   10/22/2019  2:45 PM Rosita Ellison MD [address]   7/8/2020  1:00 PM Dottie Vázquez MD [address]     Patient has 2 pills left of his medication. cp

## 2019-08-12 DIAGNOSIS — M10.9 GOUT OF MULTIPLE SITES, UNSPECIFIED CAUSE, UNSPECIFIED CHRONICITY: Chronic | ICD-10-CM

## 2019-08-13 RX ORDER — ALLOPURINOL 300 MG/1
300 TABLET ORAL DAILY
Qty: 30 TABLET | Refills: 0 | Status: SHIPPED | OUTPATIENT
Start: 2019-08-13 | End: 2019-11-21 | Stop reason: SDUPTHER

## 2019-09-16 ENCOUNTER — OFFICE VISIT (OUTPATIENT)
Dept: FAMILY MEDICINE CLINIC | Age: 64
End: 2019-09-16
Payer: MEDICARE

## 2019-09-16 VITALS
SYSTOLIC BLOOD PRESSURE: 130 MMHG | HEIGHT: 72 IN | HEART RATE: 96 BPM | DIASTOLIC BLOOD PRESSURE: 82 MMHG | BODY MASS INDEX: 37.68 KG/M2 | OXYGEN SATURATION: 98 % | WEIGHT: 278.2 LBS

## 2019-09-16 DIAGNOSIS — R80.9 MICROALBUMINURIA DUE TO TYPE 2 DIABETES MELLITUS (HCC): Chronic | ICD-10-CM

## 2019-09-16 DIAGNOSIS — E11.42 TYPE 2 DIABETES MELLITUS WITH DIABETIC POLYNEUROPATHY, WITHOUT LONG-TERM CURRENT USE OF INSULIN (HCC): Primary | Chronic | ICD-10-CM

## 2019-09-16 DIAGNOSIS — Z98.61 CAD S/P PERCUTANEOUS CORONARY ANGIOPLASTY: Chronic | ICD-10-CM

## 2019-09-16 DIAGNOSIS — Z23 NEED FOR VACCINATION: ICD-10-CM

## 2019-09-16 DIAGNOSIS — M10.9 GOUT OF MULTIPLE SITES, UNSPECIFIED CAUSE, UNSPECIFIED CHRONICITY: Chronic | ICD-10-CM

## 2019-09-16 DIAGNOSIS — I10 ESSENTIAL HYPERTENSION: Chronic | ICD-10-CM

## 2019-09-16 DIAGNOSIS — E66.01 MORBID OBESITY (HCC): ICD-10-CM

## 2019-09-16 DIAGNOSIS — D75.89 MACROCYTOSIS WITHOUT ANEMIA: Chronic | ICD-10-CM

## 2019-09-16 DIAGNOSIS — I25.10 CAD S/P PERCUTANEOUS CORONARY ANGIOPLASTY: Chronic | ICD-10-CM

## 2019-09-16 DIAGNOSIS — E11.29 MICROALBUMINURIA DUE TO TYPE 2 DIABETES MELLITUS (HCC): Chronic | ICD-10-CM

## 2019-09-16 DIAGNOSIS — Z12.5 SCREENING FOR PROSTATE CANCER: ICD-10-CM

## 2019-09-16 DIAGNOSIS — I87.2 VENOUS INSUFFICIENCY OF BOTH LOWER EXTREMITIES: ICD-10-CM

## 2019-09-16 DIAGNOSIS — R60.9 DEPENDENT EDEMA: Chronic | ICD-10-CM

## 2019-09-16 DIAGNOSIS — G47.33 OSA (OBSTRUCTIVE SLEEP APNEA): Chronic | ICD-10-CM

## 2019-09-16 DIAGNOSIS — I73.9 PAD (PERIPHERAL ARTERY DISEASE) (HCC): ICD-10-CM

## 2019-09-16 PROBLEM — I63.9 CVA (CEREBRAL VASCULAR ACCIDENT) (HCC): Status: RESOLVED | Noted: 2017-11-12 | Resolved: 2019-09-16

## 2019-09-16 PROCEDURE — 90688 IIV4 VACCINE SPLT 0.5 ML IM: CPT | Performed by: FAMILY MEDICINE

## 2019-09-16 PROCEDURE — 99214 OFFICE O/P EST MOD 30 MIN: CPT | Performed by: FAMILY MEDICINE

## 2019-09-16 PROCEDURE — G0008 ADMIN INFLUENZA VIRUS VAC: HCPCS | Performed by: FAMILY MEDICINE

## 2019-09-16 ASSESSMENT — ENCOUNTER SYMPTOMS
COUGH: 0
ABDOMINAL PAIN: 0
CHEST TIGHTNESS: 0
NAUSEA: 0
SHORTNESS OF BREATH: 0
VOMITING: 0
DIARRHEA: 0
WHEEZING: 0

## 2019-09-16 NOTE — PROGRESS NOTES
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    History of heart failure 1/6/2014    History of myocardial infarction 3/11/2014    History of recurrent pneumonia 2/11/2014    Hx of bacterial pneumonia     Hypotension     Leg swelling     Macrocytosis without anemia 12/10/2016    Microalbuminuria due to type 2 diabetes mellitus ([facility] 9/14/2018    Morbid obesity ([facility] 1/2/2014    Myocardial infarction ([facility]     Obesity     AYLIN (obstructive sleep apnea) 12/10/2016    Osteoarthritis     Right-sided muscle weakness 10/19/2016    S/P cardiac catheterization     Skin cyst 4/8/2016    Spasm 11/9/2016    Stented coronary artery     Tremor of right hand 5/15/2017    Type 2 diabetes mellitus with diabetic polyneuropathy, without long-term current use of insulin ([facility])     Unsteady gait 5/15/2017    Weakness     Weight gain      Past Surgical History:   Procedure Laterality Date    CHOLECYSTECTOMY      COLONOSCOPY  01/15/2010    polyp, diverticulosis (DR ELAINE)    CORONARY ANGIOPLASTY WITH STENT PLACEMENT  2014    CYST REMOVAL  05/16/16    DR Gil Carp CYST    SHOULDER SURGERY Right 12/18/2015     Family History   Problem Relation Age of Onset    Breast Cancer Mother     Stroke Father     Colon Cancer Father 76     Social History     Socioeconomic History    Marital status:      Spouse name: Lata Adhikari    Number of children: 3    Years of education: 12+    Highest education level: Not on file   Occupational History    Occupation: medical leave     Employer: [employer]   Social Needs    Financial resource strain: Not on file    Food insecurity:     Worry: Not on file     Inability: Not on file    Transportation needs:     Medical: Not on file     Non-medical: Not on file   Tobacco Use    Smoking status: Former Smoker     Packs/day: 2.50     Years: 30.00     Pack years: 75.00     Last attempt to quit: 2001     Years since

## 2019-09-18 ENCOUNTER — HOSPITAL ENCOUNTER (OUTPATIENT)
Dept: LAB | Age: 64
Discharge: HOME | End: 2019-09-18
Payer: MEDICARE

## 2019-09-18 DIAGNOSIS — R80.9 MICROALBUMINURIA DUE TO TYPE 2 DIABETES MELLITUS (HCC): Chronic | ICD-10-CM

## 2019-09-18 DIAGNOSIS — Z12.5 SCREENING FOR PROSTATE CANCER: ICD-10-CM

## 2019-09-18 DIAGNOSIS — I25.10 CAD S/P PERCUTANEOUS CORONARY ANGIOPLASTY: Chronic | ICD-10-CM

## 2019-09-18 DIAGNOSIS — Z98.61 CAD S/P PERCUTANEOUS CORONARY ANGIOPLASTY: Chronic | ICD-10-CM

## 2019-09-18 DIAGNOSIS — I73.9 PAD (PERIPHERAL ARTERY DISEASE) (HCC): ICD-10-CM

## 2019-09-18 DIAGNOSIS — E11.29 MICROALBUMINURIA DUE TO TYPE 2 DIABETES MELLITUS (HCC): Chronic | ICD-10-CM

## 2019-09-18 DIAGNOSIS — R60.9 DEPENDENT EDEMA: Chronic | ICD-10-CM

## 2019-09-18 DIAGNOSIS — I10 ESSENTIAL HYPERTENSION: Chronic | ICD-10-CM

## 2019-09-18 DIAGNOSIS — E11.42 TYPE 2 DIABETES MELLITUS WITH DIABETIC POLYNEUROPATHY, WITHOUT LONG-TERM CURRENT USE OF INSULIN (HCC): Chronic | ICD-10-CM

## 2019-09-18 DIAGNOSIS — D75.89 MACROCYTOSIS WITHOUT ANEMIA: Chronic | ICD-10-CM

## 2019-09-18 DIAGNOSIS — M10.9 GOUT OF MULTIPLE SITES, UNSPECIFIED CAUSE, UNSPECIFIED CHRONICITY: Chronic | ICD-10-CM

## 2019-09-18 LAB
ALBUMIN SERPL-MCNC: 4.3 G/DL (ref 3.5–4.6)
ALP BLD-CCNC: 64 U/L (ref 35–104)
ALT SERPL-CCNC: 24 U/L (ref 0–41)
ANION GAP SERPL CALCULATED.3IONS-SCNC: 16 MEQ/L (ref 9–15)
AST SERPL-CCNC: 27 U/L (ref 0–40)
BASOPHILS ABSOLUTE: 0 K/UL (ref 0–0.2)
BASOPHILS RELATIVE PERCENT: 0.7 %
BILIRUB SERPL-MCNC: 0.5 MG/DL (ref 0.2–0.7)
BUN BLDV-MCNC: 10 MG/DL (ref 8–23)
CALCIUM SERPL-MCNC: 9.4 MG/DL (ref 8.5–9.9)
CHLORIDE BLD-SCNC: 94 MEQ/L (ref 95–107)
CHOLESTEROL, TOTAL: 145 MG/DL (ref 0–199)
CO2: 25 MEQ/L (ref 20–31)
CREAT SERPL-MCNC: 0.74 MG/DL (ref 0.7–1.2)
CREATININE URINE: 120.1 MG/DL
EOSINOPHILS ABSOLUTE: 0.1 K/UL (ref 0–0.7)
EOSINOPHILS RELATIVE PERCENT: 1.8 %
GFR AFRICAN AMERICAN: >60
GFR NON-AFRICAN AMERICAN: >60
GLOBULIN: 3.2 G/DL (ref 2.3–3.5)
GLUCOSE BLD-MCNC: 142 MG/DL (ref 70–99)
HBA1C MFR BLD: 6.8 % (ref 4.8–5.9)
HCT VFR BLD CALC: 44.8 % (ref 42–52)
HDLC SERPL-MCNC: 47 MG/DL (ref 40–59)
HEMOGLOBIN: 15.3 G/DL (ref 14–18)
LDL CHOLESTEROL CALCULATED: 66 MG/DL (ref 0–129)
LYMPHOCYTES ABSOLUTE: 1.2 K/UL (ref 1–4.8)
LYMPHOCYTES RELATIVE PERCENT: 25.6 %
MCH RBC QN AUTO: 38.7 PG (ref 27–31.3)
MCHC RBC AUTO-ENTMCNC: 34.1 % (ref 33–37)
MCV RBC AUTO: 113.3 FL (ref 80–100)
MICROALBUMIN UR-MCNC: 9.7 MG/DL
MICROALBUMIN/CREAT UR-RTO: 80.8 MG/G (ref 0–30)
MONOCYTES ABSOLUTE: 0.3 K/UL (ref 0.2–0.8)
MONOCYTES RELATIVE PERCENT: 7.3 %
NEUTROPHILS ABSOLUTE: 2.9 K/UL (ref 1.4–6.5)
NEUTROPHILS RELATIVE PERCENT: 64.6 %
PDW BLD-RTO: 13.2 % (ref 11.5–14.5)
PLATELET # BLD: 201 K/UL (ref 130–400)
PLATELET SLIDE REVIEW: NORMAL
POTASSIUM SERPL-SCNC: 4.1 MEQ/L (ref 3.4–4.9)
PROSTATE SPECIFIC ANTIGEN: 1.66 NG/ML (ref 0–5.4)
RBC # BLD: 3.95 M/UL (ref 4.7–6.1)
SLIDE REVIEW: ABNORMAL
SODIUM BLD-SCNC: 135 MEQ/L (ref 135–144)
TOTAL PROTEIN: 7.5 G/DL (ref 6.3–8)
TRIGL SERPL-MCNC: 158 MG/DL (ref 0–150)
URIC ACID, SERUM: 5.9 MG/DL (ref 3.4–7)
WBC # BLD: 4.5 K/UL (ref 4.8–10.8)

## 2019-09-18 PROCEDURE — 84550 ASSAY OF BLOOD/URIC ACID: CPT

## 2019-09-18 PROCEDURE — 84153 ASSAY OF PSA TOTAL: CPT

## 2019-09-18 PROCEDURE — 82043 UR ALBUMIN QUANTITATIVE: CPT

## 2019-09-18 PROCEDURE — 80061 LIPID PANEL: CPT

## 2019-09-18 PROCEDURE — 83036 HEMOGLOBIN GLYCOSYLATED A1C: CPT

## 2019-09-18 PROCEDURE — 36415 COLL VENOUS BLD VENIPUNCTURE: CPT

## 2019-09-18 PROCEDURE — 80053 COMPREHEN METABOLIC PANEL: CPT

## 2019-09-18 PROCEDURE — 85025 COMPLETE CBC W/AUTO DIFF WBC: CPT

## 2019-09-18 PROCEDURE — 82570 ASSAY OF URINE CREATININE: CPT

## 2019-10-30 ENCOUNTER — OFFICE VISIT (OUTPATIENT)
Dept: PULMONOLOGY | Age: 64
End: 2019-10-30
Payer: MEDICARE

## 2019-10-30 VITALS
HEART RATE: 102 BPM | BODY MASS INDEX: 37.25 KG/M2 | TEMPERATURE: 98.3 F | WEIGHT: 275 LBS | OXYGEN SATURATION: 99 % | HEIGHT: 72 IN | RESPIRATION RATE: 16 BRPM | DIASTOLIC BLOOD PRESSURE: 68 MMHG | SYSTOLIC BLOOD PRESSURE: 122 MMHG

## 2019-10-30 DIAGNOSIS — G47.33 SLEEP APNEA, OBSTRUCTIVE: Primary | ICD-10-CM

## 2019-10-30 DIAGNOSIS — I25.10 CORONARY ARTERY DISEASE INVOLVING NATIVE HEART, ANGINA PRESENCE UNSPECIFIED, UNSPECIFIED VESSEL OR LESION TYPE: ICD-10-CM

## 2019-10-30 DIAGNOSIS — I63.9 CEREBROVASCULAR ACCIDENT (CVA), UNSPECIFIED MECHANISM (HCC): ICD-10-CM

## 2019-10-30 PROCEDURE — 99213 OFFICE O/P EST LOW 20 MIN: CPT | Performed by: INTERNAL MEDICINE

## 2019-10-30 ASSESSMENT — ENCOUNTER SYMPTOMS
SINUS PRESSURE: 0
NAUSEA: 0
WHEEZING: 0
SORE THROAT: 0
RHINORRHEA: 0
VOMITING: 0
DIARRHEA: 0
COUGH: 0
SHORTNESS OF BREATH: 0
CHEST TIGHTNESS: 0
ABDOMINAL PAIN: 0

## 2019-11-21 RX ORDER — ALLOPURINOL 300 MG/1
300 TABLET ORAL DAILY
Qty: 90 TABLET | Refills: 1 | Status: SHIPPED | OUTPATIENT
Start: 2019-11-21 | End: 2020-05-19

## 2019-11-21 RX ORDER — TORSEMIDE 20 MG/1
20 TABLET ORAL DAILY
Qty: 90 TABLET | Refills: 1 | Status: SHIPPED | OUTPATIENT
Start: 2019-11-21 | End: 2020-06-22

## 2019-11-21 RX ORDER — CLOPIDOGREL BISULFATE 75 MG/1
75 TABLET ORAL DAILY
Qty: 90 TABLET | Refills: 1 | Status: SHIPPED | OUTPATIENT
Start: 2019-11-21 | End: 2020-09-15

## 2019-12-03 ENCOUNTER — HOSPITAL ENCOUNTER (OUTPATIENT)
Age: 64
Discharge: HOME | End: 2019-12-05
Payer: MEDICARE

## 2019-12-03 ENCOUNTER — HOSPITAL ENCOUNTER (OUTPATIENT)
Dept: GENERAL RADIOLOGY | Age: 64
Discharge: HOME | End: 2019-12-05
Payer: MEDICARE

## 2019-12-03 ENCOUNTER — OFFICE VISIT (OUTPATIENT)
Dept: FAMILY MEDICINE CLINIC | Age: 64
End: 2019-12-03
Payer: MEDICARE

## 2019-12-03 VITALS
HEART RATE: 110 BPM | OXYGEN SATURATION: 97 % | DIASTOLIC BLOOD PRESSURE: 82 MMHG | BODY MASS INDEX: 37.3 KG/M2 | RESPIRATION RATE: 18 BRPM | HEIGHT: 72 IN | SYSTOLIC BLOOD PRESSURE: 138 MMHG | TEMPERATURE: 97.9 F
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
## 2019-12-03 DIAGNOSIS — M54.41 ACUTE RIGHT-SIDED LOW BACK PAIN WITH RIGHT-SIDED SCIATICA: ICD-10-CM
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
## 2019-12-03 DIAGNOSIS — M25.551 RIGHT HIP PAIN: ICD-10-CM

## 2019-12-03 DIAGNOSIS — E11.9 TYPE 2 DIABETES MELLITUS WITHOUT COMPLICATION, WITHOUT LONG-TERM CURRENT USE OF INSULIN (HCC): ICD-10-CM

## 2019-12-03 PROCEDURE — 73501 X-RAY EXAM HIP UNI 1 VIEW: CPT

## 2019-12-03 PROCEDURE — 99213 OFFICE O/P EST LOW 20 MIN: CPT | Performed by: FAMILY MEDICINE

## 2019-12-03 PROCEDURE — 72110 X-RAY EXAM L-2 SPINE 4/>VWS: CPT

## 2019-12-03 RX ORDER — GLIMEPIRIDE 1 MG/1
1 TABLET ORAL
Qty: 90 TABLET | Refills: 1 | Status: SHIPPED | OUTPATIENT
Start: 2019-12-03 | End: 2020-05-19

## 2019-12-03 ASSESSMENT — ENCOUNTER SYMPTOMS
ABDOMINAL PAIN: 0
BACK PAIN: 1
VOMITING: 0
SHORTNESS OF BREATH: 0
CHEST TIGHTNESS: 0
NAUSEA: 0

## 2019-12-23 ENCOUNTER — HOSPITAL ENCOUNTER (EMERGENCY)
Age: 64
Discharge: HOME | End: 2019-12-23
Attending: EMERGENCY MEDICINE
Payer: MEDICARE

## 2019-12-23 VITALS
HEART RATE: 80 BPM | BODY MASS INDEX: 35.39 KG/M2 | OXYGEN SATURATION: 99 % | TEMPERATURE: 98.2 F | RESPIRATION RATE: 18 BRPM | SYSTOLIC BLOOD PRESSURE: 135 MMHG | DIASTOLIC BLOOD PRESSURE: 88 MMHG | WEIGHT: 267 LBS | HEIGHT: 73 IN

## 2019-12-23 DIAGNOSIS — L03.115 CELLULITIS OF RIGHT THIGH: Primary | ICD-10-CM

## 2019-12-23 LAB
GLUCOSE BLD-MCNC: 194 MG/DL (ref 60–115)
PERFORMED ON: ABNORMAL

## 2019-12-23 PROCEDURE — 6370000000 HC RX 637 (ALT 250 FOR IP): Performed by: EMERGENCY MEDICINE

## 2019-12-23 PROCEDURE — 99282 EMERGENCY DEPT VISIT SF MDM: CPT

## 2019-12-23 RX ORDER — CLINDAMYCIN HYDROCHLORIDE 150 MG/1
600 CAPSULE ORAL ONCE
Status: COMPLETED | OUTPATIENT
Start: 2019-12-23 | End: 2019-12-23

## 2019-12-23 RX ORDER — HYDROCODONE BITARTRATE AND ACETAMINOPHEN 5; 325 MG/1; MG/1
1 TABLET ORAL EVERY 6 HOURS PRN
Qty: 10 TABLET | Refills: 0 | Status: SHIPPED | OUTPATIENT
Start: 2019-12-23 | End: 2019-12-26

## 2019-12-23 RX ORDER — IBUPROFEN 800 MG/1
800 TABLET ORAL EVERY 8 HOURS PRN
Qty: 30 TABLET | Refills: 0 | Status: ON HOLD | OUTPATIENT
Start: 2019-12-23 | End: 2020-10-29 | Stop reason: HOSPADM

## 2019-12-23 RX ORDER — CLINDAMYCIN HYDROCHLORIDE 300 MG/1
300 CAPSULE ORAL 3 TIMES DAILY
Qty: 30 CAPSULE | Refills: 0 | Status: SHIPPED | OUTPATIENT
Start: 2019-12-23 | End: 2020-01-02 | Stop reason: SDUPTHER

## 2019-12-23 RX ORDER — IBUPROFEN 400 MG/1
800 TABLET ORAL ONCE
Status: COMPLETED | OUTPATIENT
Start: 2019-12-23 | End: 2019-12-23

## 2019-12-23 RX ADMIN — CLINDAMYCIN HYDROCHLORIDE 600 MG: 150 CAPSULE ORAL at 17:01

## 2019-12-23 RX ADMIN — IBUPROFEN 800 MG: 400 TABLET, FILM COATED ORAL at 17:01

## 2019-12-23 ASSESSMENT — PAIN DESCRIPTION - PROGRESSION
CLINICAL_PROGRESSION: NOT CHANGED
CLINICAL_PROGRESSION: RAPIDLY WORSENING

## 2019-12-23 ASSESSMENT — ENCOUNTER SYMPTOMS
VOICE CHANGE: 0
EYE PAIN: 0
COLOR CHANGE: 1
CHEST TIGHTNESS: 0
BLOOD IN STOOL: 0
CHOKING: 0
FACIAL SWELLING: 0
SINUS PRESSURE: 0
CONSTIPATION: 0
SHORTNESS OF BREATH: 0
TROUBLE SWALLOWING: 0
COUGH: 0
STRIDOR: 0
SORE THROAT: 0
DIARRHEA: 0
BACK PAIN: 0
VOMITING: 0
WHEEZING: 0
EYE DISCHARGE: 0
ABDOMINAL PAIN: 0
EYE REDNESS: 0

## 2019-12-23 ASSESSMENT — PAIN SCALES - GENERAL
PAINLEVEL_OUTOF10: 6
PAINLEVEL_OUTOF10: 7
PAINLEVEL_OUTOF10: 7

## 2019-12-23 ASSESSMENT — PAIN DESCRIPTION - DESCRIPTORS
DESCRIPTORS: SHARP
DESCRIPTORS: BURNING

## 2019-12-23 ASSESSMENT — PAIN DESCRIPTION - ORIENTATION
ORIENTATION: RIGHT
ORIENTATION: RIGHT

## 2019-12-23 ASSESSMENT — PAIN DESCRIPTION - ONSET
ONSET: PROGRESSIVE
ONSET: ON-GOING

## 2019-12-23 ASSESSMENT — PAIN DESCRIPTION - FREQUENCY
FREQUENCY: CONTINUOUS
FREQUENCY: CONTINUOUS

## 2019-12-23 ASSESSMENT — PAIN DESCRIPTION - PAIN TYPE: TYPE: ACUTE PAIN

## 2019-12-23 ASSESSMENT — PAIN DESCRIPTION - LOCATION
LOCATION: GROIN
LOCATION: GROIN

## 2019-12-23 ASSESSMENT — PAIN - FUNCTIONAL ASSESSMENT: PAIN_FUNCTIONAL_ASSESSMENT: 0-10

## 2019-12-27 ENCOUNTER — OFFICE VISIT (OUTPATIENT)
Dept: FAMILY MEDICINE CLINIC | Age: 64
End: 2019-12-27
Payer: MEDICARE

## 2019-12-27 VITALS
HEART RATE: 82 BPM | HEIGHT: 73 IN | OXYGEN SATURATION: 99 % | DIASTOLIC BLOOD PRESSURE: 80 MMHG | SYSTOLIC BLOOD PRESSURE: 130 MMHG | TEMPERATURE: 97.3 F | RESPIRATION RATE: 16 BRPM | BODY MASS INDEX: 35.39 KG/M2 | WEIGHT: 267 LBS

## 2019-12-27 DIAGNOSIS — L03.115 CELLULITIS OF RIGHT THIGH: Primary | ICD-10-CM

## 2019-12-27 DIAGNOSIS — L02.415 ABSCESS OF RIGHT THIGH: ICD-10-CM

## 2019-12-27 DIAGNOSIS — E11.42 TYPE 2 DIABETES MELLITUS WITH DIABETIC POLYNEUROPATHY, WITHOUT LONG-TERM CURRENT USE OF INSULIN (HCC): Chronic | ICD-10-CM

## 2019-12-27 PROCEDURE — 99213 OFFICE O/P EST LOW 20 MIN: CPT | Performed by: FAMILY MEDICINE

## 2019-12-27 ASSESSMENT — ENCOUNTER SYMPTOMS
WHEEZING: 0
SHORTNESS OF BREATH: 0
NAUSEA: 0
DIARRHEA: 0
VOMITING: 0
ABDOMINAL PAIN: 0
CHEST TIGHTNESS: 0

## 2020-01-02 ENCOUNTER — OFFICE VISIT (OUTPATIENT)
Dept: SURGERY | Age: 65
End: 2020-01-02
Payer: MEDICARE

## 2020-01-02 VITALS
DIASTOLIC BLOOD PRESSURE: 70 MMHG | WEIGHT: 274 LBS | BODY MASS INDEX: 36.31 KG/M2 | TEMPERATURE: 98.4 F | SYSTOLIC BLOOD PRESSURE: 110 MMHG | HEIGHT: 73 IN

## 2020-01-02 PROCEDURE — 99202 OFFICE O/P NEW SF 15 MIN: CPT | Performed by: SURGERY

## 2020-01-02 RX ORDER — CLINDAMYCIN HYDROCHLORIDE 300 MG/1
300 CAPSULE ORAL 3 TIMES DAILY
Qty: 30 CAPSULE | Refills: 0 | Status: SHIPPED | OUTPATIENT
Start: 2020-01-02 | End: 2020-01-27 | Stop reason: SDUPTHER

## 2020-01-02 ASSESSMENT — ENCOUNTER SYMPTOMS
ABDOMINAL DISTENTION: 0
SHORTNESS OF BREATH: 0
CONSTIPATION: 0
ABDOMINAL PAIN: 0
CHEST TIGHTNESS: 0
BLOOD IN STOOL: 0
EYES NEGATIVE: 1
COLOR CHANGE: 0
ALLERGIC/IMMUNOLOGIC NEGATIVE: 1
RHINORRHEA: 0

## 2020-01-06 ENCOUNTER — PROCEDURE VISIT (OUTPATIENT)
Dept: SURGERY | Age: 65
End: 2020-01-06
Payer: MEDICARE

## 2020-01-06 VITALS
DIASTOLIC BLOOD PRESSURE: 72 MMHG | BODY MASS INDEX: 36.58 KG/M2 | WEIGHT: 276 LBS | SYSTOLIC BLOOD PRESSURE: 116 MMHG | TEMPERATURE: 97.8 F | HEIGHT: 73 IN

## 2020-01-06 DIAGNOSIS — L02.415 ABSCESS OF RIGHT LEG: ICD-10-CM

## 2020-01-06 DIAGNOSIS — L02.212 ABSCESS OF BACK: ICD-10-CM

## 2020-01-06 PROCEDURE — 10061 I&D ABSCESS COMP/MULTIPLE: CPT | Performed by: SURGERY

## 2020-01-06 RX ORDER — OXYCODONE HYDROCHLORIDE AND ACETAMINOPHEN 5; 325 MG/1; MG/1
1 TABLET ORAL EVERY 6 HOURS PRN
Qty: 25 TABLET | Refills: 0 | Status: SHIPPED | OUTPATIENT
Start: 2020-01-06 | End: 2020-01-13 | Stop reason: SDUPTHER

## 2020-01-06 ASSESSMENT — ENCOUNTER SYMPTOMS
ABDOMINAL DISTENTION: 0
CONSTIPATION: 0
ALLERGIC/IMMUNOLOGIC NEGATIVE: 1
EYES NEGATIVE: 1
SHORTNESS OF BREATH: 0
BLOOD IN STOOL: 0
COLOR CHANGE: 0
CHEST TIGHTNESS: 0
ABDOMINAL PAIN: 0
RHINORRHEA: 0

## 2020-01-06 NOTE — PROGRESS NOTES
will be cleansed daily with water and a dressing applied. Incision and drainage of right thigh abscess  Time out was called and the patient and procedure were identified. Options of therapy were discussed with the patient and Luis Brothers agrees to an incision and drainage. The procedure as well as risks and complications including but not exclusive to blood loss and recurrent infection, even requiring further surgery were discussed and a consent was signed. The patient was in the supine position. The area was prepped and draped with betadine in a sterile fashion. The area was infiltrated with 2% lidocaine with epinepherine. A cruciate incision was made with a 15 knife blade and a small amount of  purulent material was drained. A culture was obtained. The wound was packed with 1/4 in.nuguaze   DSD was applied. Patient tolerated procedure well with minimal blood loss. Pain level pre procedure was 4 and post procedure it was 1 Instructions were given to remove the packing in 1 days. The wound will be cleansed daily with water and a dressing applied. Prescriptions given Percocet 5 mg. Return visit in 3 weeks.         Rosario Martin MD

## 2020-01-09 LAB
GRAM STAIN RESULT: ABNORMAL
GRAM STAIN RESULT: NORMAL
ORGANISM: ABNORMAL
WOUND/ABSCESS: ABNORMAL
WOUND/ABSCESS: NORMAL

## 2020-01-13 ENCOUNTER — OFFICE VISIT (OUTPATIENT)
Dept: SURGERY | Age: 65
End: 2020-01-13
Payer: MEDICARE

## 2020-01-13 VITALS
BODY MASS INDEX: 36.45 KG/M2 | HEIGHT: 73 IN | DIASTOLIC BLOOD PRESSURE: 82 MMHG | SYSTOLIC BLOOD PRESSURE: 122 MMHG | WEIGHT: 275 LBS | TEMPERATURE: 98.1 F

## 2020-01-13 PROCEDURE — 99213 OFFICE O/P EST LOW 20 MIN: CPT | Performed by: SURGERY

## 2020-01-13 RX ORDER — SULFAMETHOXAZOLE AND TRIMETHOPRIM 800; 160 MG/1; MG/1
1 TABLET ORAL 2 TIMES DAILY
Qty: 28 TABLET | Refills: 1 | Status: SHIPPED | OUTPATIENT
Start: 2020-01-13 | End: 2020-01-27

## 2020-01-13 RX ORDER — OXYCODONE HYDROCHLORIDE AND ACETAMINOPHEN 5; 325 MG/1; MG/1
1 TABLET ORAL EVERY 6 HOURS PRN
Qty: 25 TABLET | Refills: 0 | Status: SHIPPED | OUTPATIENT
Start: 2020-01-13 | End: 2020-02-26 | Stop reason: SDUPTHER

## 2020-01-16 ENCOUNTER — TELEPHONE (OUTPATIENT)
Dept: SURGERY | Age: 65
End: 2020-01-16

## 2020-01-16 RX ORDER — ATORVASTATIN CALCIUM 40 MG/1
40 TABLET, FILM COATED ORAL DAILY
Qty: 90 TABLET | Refills: 1 | Status: SHIPPED | OUTPATIENT
Start: 2020-01-16 | End: 2020-09-29

## 2020-01-16 NOTE — TELEPHONE ENCOUNTER
Spoke with Dr. Esther Rome. He advised patient to scop taking the Bactrim. Will see him at his follow-up appointment on 1-27-20. I spoke with patient, told him to stop the Bactrim.

## 2020-01-27 ENCOUNTER — OFFICE VISIT (OUTPATIENT)
Dept: SURGERY | Age: 65
End: 2020-01-27
Payer: MEDICARE

## 2020-01-27 VITALS
BODY MASS INDEX: 35.52 KG/M2 | TEMPERATURE: 97.7 F | HEIGHT: 73 IN | DIASTOLIC BLOOD PRESSURE: 78 MMHG | SYSTOLIC BLOOD PRESSURE: 120 MMHG | WEIGHT: 268 LBS

## 2020-01-27 DIAGNOSIS — L02.415 ABSCESS OF RIGHT LEG: ICD-10-CM

## 2020-01-27 PROCEDURE — 10061 I&D ABSCESS COMP/MULTIPLE: CPT | Performed by: SURGERY

## 2020-01-27 RX ORDER — CLINDAMYCIN HYDROCHLORIDE 300 MG/1
300 CAPSULE ORAL 3 TIMES DAILY
Qty: 42 CAPSULE | Refills: 0 | Status: SHIPPED | OUTPATIENT
Start: 2020-01-27 | End: 2020-02-26 | Stop reason: SDUPTHER

## 2020-01-27 NOTE — PATIENT INSTRUCTIONS
Remove the packing in 24 hours after wetting the incision in the shower or tub  Cleanse daily in the shower or tub and apply dry sterile dressing or band aid.

## 2020-01-27 NOTE — PROGRESS NOTES
Subjective:      Patient ID: Jessy Jeter is a 59 y.o. male. HPI   Nicole Diaz is status post Incision and drainage of a back and right thigh abscess on 1/6/2020. Cultures showed Staphylococcus coagulase-negative. The packing has been removed. The wounds are not draining. He feels that the right thigh abscess has recurred. Pain is rated as a 6. The left posterior auricular area is still draining when he presses on it. He was on Bactrim states it made him sick. Review of Systems  14 symptoms of systems were reviewed and all were negative other than history of present illness  Objective:   Physical Exam  Constitutional:       General: He is not in acute distress. Appearance: Normal appearance. HENT:      Head:        Mouth/Throat:      Mouth: Mucous membranes are moist.      Pharynx: Oropharynx is clear. Eyes:      Pupils: Pupils are equal, round, and reactive to light. Neck:      Comments: Neck is supple without any masses, no thyromegaly, trachea midline  Musculoskeletal:      Comments: Normal gait   Lymphadenopathy:      Cervical: No cervical adenopathy. Upper Body:      Right upper body: No supraclavicular or axillary adenopathy. Left upper body: No supraclavicular or axillary adenopathy. Skin:     Findings: No bruising, lesion or rash. Neurological:      Mental Status: He is alert and oriented to person, place, and time. Psychiatric:         Mood and Affect: Mood normal.         Judgment: Judgment normal.       /78   Temp 97.7 °F (36.5 °C) (Temporal)   Ht 6' 1\" (1.854 m)   Wt 268 lb (121.6 kg)   BMI 35.36 kg/m²   Assessment:      Back area are doing well  Recurrent right thigh abscess  Left posterior  auricular cyst stable  New right anterior proximal thigh cyst      Plan:      Incision and drainage of right thigh abscess  Time out was called and the patient and procedure were identified.  Options of therapy were discussed with the patient and Jessy Jeter

## 2020-01-30 LAB
GRAM STAIN RESULT: ABNORMAL
ORGANISM: ABNORMAL
WOUND/ABSCESS: ABNORMAL

## 2020-02-10 ENCOUNTER — OFFICE VISIT (OUTPATIENT)
Dept: SURGERY | Age: 65
End: 2020-02-10
Payer: MEDICARE

## 2020-02-10 VITALS
TEMPERATURE: 98.7 F | DIASTOLIC BLOOD PRESSURE: 82 MMHG | SYSTOLIC BLOOD PRESSURE: 136 MMHG | BODY MASS INDEX: 36.45 KG/M2 | WEIGHT: 275 LBS | HEIGHT: 73 IN

## 2020-02-10 PROCEDURE — 99213 OFFICE O/P EST LOW 20 MIN: CPT | Performed by: SURGERY

## 2020-02-24 NOTE — TELEPHONE ENCOUNTER
Patient requesting medication refill.  Please approve or deny this request.    Rx requested:  Requested Prescriptions     Pending Prescriptions Disp Refills    metFORMIN (GLUCOPHAGE) 500 MG tablet 180 tablet 1     Sig: Take 1 tablet by mouth 2 times daily (with meals)         Last Office Visit:   12/27/2019      Next Visit Date:  Future Appointments   Date Time Provider Qasim Rae   2/26/2020  9:30 AM Jayna Tamayo  [address]   3/19/2020  3:00 PM King Radford MD [facility]   7/7/2020  1:30 PM Paulie Valerio MD [address]   8/12/2020  1:45 PM Jarrett Harris MD [facility]   11/2/2020  2:45 PM Tammie Solano MD [facility]

## 2020-02-26 ENCOUNTER — PROCEDURE VISIT (OUTPATIENT)
Dept: SURGERY | Age: 65
End: 2020-02-26
Payer: MEDICARE

## 2020-02-26 VITALS
SYSTOLIC BLOOD PRESSURE: 128 MMHG | TEMPERATURE: 97.7 F | DIASTOLIC BLOOD PRESSURE: 78 MMHG | HEIGHT: 73 IN | BODY MASS INDEX: 36.45 KG/M2 | WEIGHT: 275 LBS

## 2020-02-26 PROCEDURE — 11402 EXC TR-EXT B9+MARG 1.1-2 CM: CPT | Performed by: SURGERY

## 2020-02-26 PROCEDURE — 12034 INTMD RPR S/TR/EXT 7.6-12.5: CPT | Performed by: SURGERY

## 2020-02-26 PROCEDURE — 11421 EXC H-F-NK-SP B9+MARG 0.6-1: CPT | Performed by: SURGERY

## 2020-02-26 RX ORDER — OXYCODONE HYDROCHLORIDE AND ACETAMINOPHEN 5; 325 MG/1; MG/1
1 TABLET ORAL EVERY 6 HOURS PRN
Qty: 25 TABLET | Refills: 0 | Status: SHIPPED | OUTPATIENT
Start: 2020-02-26 | End: 2020-03-04

## 2020-02-26 RX ORDER — CLINDAMYCIN HYDROCHLORIDE 300 MG/1
300 CAPSULE ORAL 3 TIMES DAILY
Qty: 42 CAPSULE | Refills: 0 | Status: SHIPPED | OUTPATIENT
Start: 2020-02-26 | End: 2020-03-11

## 2020-02-26 NOTE — PROGRESS NOTES
risks and complications were discussed including but not exclusive to recurrence, infection, difficulty with wound healing and blood loss. The patient understands and is agreeable to the surgery. He will need to stop his Plavix for 1 week. Excision of a back cyst  The patient was placed on the table in the treatment room in the sitting position. The patient is here for excision of a 2 cm mid back cyst. Informed consent has been obtained and a time out was called to properly identify the patient and procedure. The patient was prepped with solution and draped in a sterile fashion. 8 ml of 2% lidocaine with epinephirine. After adequate local anesthesia a vertical elliptical incision 7 cm was made. The lesion with margin measures 2 cm. The lesion was completely removed. Hemostasis was controlled with electrocautery. Skin flaps were undermined circumferentially and an intermediate closure of 7 cm was done using 2-0 Vicryl for the deep fascia, 3-0 Vicryl, 4-0 Monocryl, and 5-0 nylon. Antibiotic ointment was applied and a DSD was placed on the wound. Blood loss was minimal . The patient tolerated the procedure well. Excision of a left posterior auricular cyst   The patient was placed on the table in the treatment room in the right lateral position. The patient is here for excision of a 1 cm left posterior auricular cyst. Informed consent has been obtained and a time out was called to properly identify the patient and procedure. The patient was prepped with solution and draped in a sterile fashion. 4 ml of 2% lidocaine with epinephirine. After adequate local anesthesia a transverse elliptical incision 4 cm was made. The lesion with margin measures 1 cm. The lesion was completely removed. Hemostasis was controlled with electrocautery. Skin flaps were undermined circumferentially and an intermediate closure of 4 cm was done using 3-0 Vicryl for the deep fascia, 4-0 Monocryl, and 5-0 nylon.  Antibiotic ointment was applied and a DSD was placed on the wound. Blood loss was minimal . The patient tolerated the procedure well. Care instructions were given. A prescription was given for Percocet and Cleocin. Return visit will be scheduled for 2 week(s). The patient was instructed to call for fever over 101, wound redness, drainage or any other concerns.      Linda Burnham MD

## 2020-02-27 DIAGNOSIS — L72.9 SKIN CYST: ICD-10-CM

## 2020-03-12 ENCOUNTER — OFFICE VISIT (OUTPATIENT)
Dept: SURGERY | Age: 65
End: 2020-03-12
Payer: MEDICARE

## 2020-03-12 VITALS
DIASTOLIC BLOOD PRESSURE: 86 MMHG | WEIGHT: 271 LBS | BODY MASS INDEX: 35.92 KG/M2 | HEIGHT: 73 IN | SYSTOLIC BLOOD PRESSURE: 136 MMHG | TEMPERATURE: 98.1 F

## 2020-03-12 PROCEDURE — 99213 OFFICE O/P EST LOW 20 MIN: CPT | Performed by: SURGERY

## 2020-03-12 NOTE — PROGRESS NOTES
Subjective:      Patient ID: Haley Gilbert is a 59 y.o. male. HPI   Haley Gilbert is her for a post operartive visit after a left posterior auricular and back cyst removal on February, 26, 2020. Pain is rated as a  0. Pathology was a follicular cyst behind the ear and epidermoid inclusion cyst in the back. The patient denies drainage. He patient is back to normal daily activities. Review of Systems  14 systems are reviewed and negative other than history of present illness  Objective:   Physical Exam  Constitutional:       General: He is not in acute distress. Appearance: Normal appearance. HENT:      Head:        Mouth/Throat:      Mouth: Mucous membranes are moist.      Pharynx: Oropharynx is clear. Eyes:      Pupils: Pupils are equal, round, and reactive to light. Neck:      Comments: Neck is supple without any masses, no thyromegaly, trachea midline  Musculoskeletal:      Comments: In a wheelchair   Skin:     Findings: No bruising, lesion or rash. Neurological:      Mental Status: He is alert and oriented to person, place, and time.    Psychiatric:         Mood and Affect: Mood normal.         Judgment: Judgment normal.       /86   Temp 98.1 °F (36.7 °C) (Temporal)   Ht 6' 1\" (1.854 m)   Wt 271 lb (122.9 kg)   BMI 35.75 kg/m²   Assessment:      Satisfactory course      Plan:      Removed sutures  Return to see me as needed  He is elected not to do anything with his right thigh cyst at this time        Virgen King MD

## 2020-05-07 ENCOUNTER — TELEPHONE (OUTPATIENT)
Dept: FAMILY MEDICINE CLINIC | Age: 65
End: 2020-05-07

## 2020-05-12 ENCOUNTER — TELEPHONE (OUTPATIENT)
Dept: PRIMARY CARE CLINIC | Age: 65
End: 2020-05-12

## 2020-05-14 ENCOUNTER — TELEPHONE (OUTPATIENT)
Dept: FAMILY MEDICINE CLINIC | Age: 65
End: 2020-05-14

## 2020-05-14 RX ORDER — BLOOD PRESSURE TEST KIT
KIT MISCELLANEOUS
Qty: 100 EACH | Refills: 5 | Status: SHIPPED | OUTPATIENT
Start: 2020-05-14

## 2020-05-14 RX ORDER — LANCETS 30 GAUGE
EACH MISCELLANEOUS
Qty: 100 EACH | Refills: 5 | Status: SHIPPED | OUTPATIENT
Start: 2020-05-14

## 2020-05-14 RX ORDER — GLUCOSAMINE HCL/CHONDROITIN SU 500-400 MG
CAPSULE ORAL
Qty: 100 STRIP | Refills: 5 | Status: SHIPPED | OUTPATIENT
Start: 2020-05-14

## 2020-05-18 RX ORDER — MEDICAL SUPPLY, MISCELLANEOUS
EACH MISCELLANEOUS
Qty: 2 EACH | Refills: 1 | Status: SHIPPED | OUTPATIENT
Start: 2020-05-18

## 2020-05-19 RX ORDER — GLIMEPIRIDE 1 MG/1
1 TABLET ORAL
Qty: 90 TABLET | Refills: 3 | Status: ON HOLD | OUTPATIENT
Start: 2020-05-19 | End: 2020-10-29 | Stop reason: SDUPTHER

## 2020-05-19 RX ORDER — ALLOPURINOL 300 MG/1
300 TABLET ORAL DAILY
Qty: 90 TABLET | Refills: 3 | Status: SHIPPED | OUTPATIENT
Start: 2020-05-19 | End: 2021-06-14

## 2020-05-19 NOTE — TELEPHONE ENCOUNTER
Pharmacy  requesting medication refill.  Please approve or deny this request.    Rx requested:  Requested Prescriptions     Pending Prescriptions Disp Refills    glimepiride (AMARYL) 1 MG tablet [Pharmacy Med Name: GLIMEPIRIDE TABS 1MG] 90 tablet 3     Sig: TAKE 1 TABLET EVERY MORNING BEFORE BREAKFAST    allopurinol (ZYLOPRIM) 300 MG tablet [Pharmacy Med Name: ALLOPURINOL TABS 300MG] 90 tablet 3     Sig: TAKE 1 TABLET DAILY         Last Office Visit:   12/27/2019      Next Visit Date:  Future Appointments   Date Time Provider Qasim Kyra   7/7/2020  1:30 PM Sharif Ha MD [address]   7/27/2020  3:00 PM [facility]   8/12/2020  1:45 PM MD Hilario Blakely   11/2/2020  2:45 PM Wes Husain MD [facility]

## 2020-07-01 ENCOUNTER — OFFICE VISIT (OUTPATIENT)
Dept: NEUROLOGY | Age: 65
End: 2020-07-01
Payer: MEDICARE

## 2020-07-01 VITALS
HEART RATE: 100 BPM | DIASTOLIC BLOOD PRESSURE: 77 MMHG | WEIGHT: 272 LBS | SYSTOLIC BLOOD PRESSURE: 137 MMHG | BODY MASS INDEX: 35.89 KG/M2

## 2020-07-01 PROBLEM — H90.5 SENSORINEURAL HEARING LOSS (SNHL) OF LEFT EAR: Status: ACTIVE | Noted: 2020-07-01

## 2020-07-01 PROBLEM — R26.0 ATAXIC GAIT: Status: ACTIVE | Noted: 2020-07-01

## 2020-07-01 PROBLEM — H93.13 TINNITUS OF BOTH EARS: Status: ACTIVE | Noted: 2020-07-01

## 2020-07-01 PROCEDURE — 99214 OFFICE O/P EST MOD 30 MIN: CPT | Performed by: PSYCHIATRY & NEUROLOGY

## 2020-07-01 ASSESSMENT — ENCOUNTER SYMPTOMS
TROUBLE SWALLOWING: 0
PHOTOPHOBIA: 0
SHORTNESS OF BREATH: 0
CHOKING: 0
VOMITING: 0
NAUSEA: 0
COLOR CHANGE: 0
BACK PAIN: 0

## 2020-07-01 NOTE — PROGRESS NOTES
[facility])     History of chest pain 2014    History of CVA (cerebrovascular accident) 8/15/2016    Brainstem infarction - occlusion of left MCA 2016    History of heart failure 2014    History of myocardial infarction 3/11/2014    History of recurrent pneumonia 2014    Hx of bacterial pneumonia     Hypotension     Leg swelling     Macrocytosis without anemia 12/10/2016    Microalbuminuria due to type 2 diabetes mellitus ([facility] [address] 2018    Morbid obesity ([facility] [address] [facility] 2014    Myocardial infarction ([facility] [address]     Obesity     AYLIN (obstructive sleep apnea) 12/10/2016    Osteoarthritis     Right-sided muscle weakness 10/19/2016    S/P cardiac catheterization     Skin cyst 2016    Spasm 2016    Stented coronary artery     Tremor of right hand 5/15/2017    Type 2 diabetes mellitus with diabetic polyneuropathy, without long-term current use of insulin [facility]     Unsteady gait 5/15/2017    Weakness     Weight gain      Past Surgical History:   Procedure Laterality Date    CHOLECYSTECTOMY      COLONOSCOPY  01/15/2010    polyp, diverticulosis (DR ELAINE)    CORONARY ANGIOPLASTY WITH STENT PLACEMENT  2014    CYST REMOVAL  16    DR Mi Suarez CYST    SHOULDER SURGERY Right 2015     Social History     Socioeconomic History    Marital status:      Spouse name: Jose Rodriguez Number of children: 3    Years of education: 12+    Highest education level: Not on file   Occupational History    Occupation: medical leave     Employer: [employer]   Social Needs    Financial resource strain: Not on file    Food insecurity     Worry: Not on file     Inability: Not on file   [employer] needs     Medical: Not on file     Non-medical: Not on file   Tobacco Use    Smoking status: Former Smoker     Packs/day: 2.50     Years: 30.00     Pack years: 75.00     Last attempt to quit:      Years since quittin.5    Smokeless tobacco: Never Used   Substance and Sexual Activity    Alcohol use: Yes     Alcohol/week: 0.0 standard drinks     Comment: limits less than 10/wk     Drug use: No    Sexual activity: Yes     Partners: Female   Lifestyle    Physical activity     Days per week: Not on file     Minutes per session: Not on file    Stress: Not on file   Relationships    Social connections     Talks on phone: Not on file     Gets together: Not on file     Attends Taoist service: Not on file     Active member of club or organization: Not on file     Attends meetings of clubs or organizations: Not on file     Relationship status: Not on file    Intimate partner violence     Fear of current or ex partner: Not on file     Emotionally abused: Not on file     Physically abused: Not on file     Forced sexual activity: Not on file   Other Topics Concern    Not on file   Social History Narrative    Not on file     Family History   Problem Relation Age of Onset    Breast Cancer Mother     Stroke Father     Colon Cancer Father 76     Allergies   Allergen Reactions    Bactrim [Sulfamethoxazole-Trimethoprim] Nausea And Vomiting and Other (See Comments)     Sugar count elevated, had troubles urinating       Current Outpatient Medications   Medication Sig Dispense Refill    torsemide (DEMADEX) 20 MG tablet Take 1 tablet by mouth daily 90 tablet 1    glimepiride (AMARYL) 1 MG tablet Take 1 tablet by mouth every morning (before breakfast) 90 tablet 3    allopurinol (ZYLOPRIM) 300 MG tablet Take 1 tablet by mouth daily 90 tablet 3    Elastic Bandages & Supports (V-4 HIGH COMPRESSION HOSE) MISC KNEE HIGH - 20-30 mmHg 2 each 1    Alcohol Swabs PADS DX: diabetes mellitus. Test 1 time(s) daily - Ok to substitute per insurance (1 each = 1 box) 100 each 5    blood glucose monitor strips DX: diabetes mellitus. Use 1 time(s) daily - True Metrix requested by patient - Lux Mccoy to substitute per insurance 100 strip 5    Lancets MISC DX: diabetes mellitus.  Use 1 time(s) daily - Ok to substitute per insurance (1 each = 1 box) 100 each 5    blood glucose monitor kit and supplies DX: diabetes mellitus. Test 1 time(s) daily - True Metrix requested by patient - Toni Mask to substitute per insurance 1 kit 0    metFORMIN (GLUCOPHAGE) 500 MG tablet Take 1 tablet by mouth 2 times daily (with meals) 180 tablet 1    atorvastatin (LIPITOR) 40 MG tablet Take 1 tablet by mouth daily 90 tablet 1    ibuprofen (IBU) 800 MG tablet Take 1 tablet by mouth every 8 hours as needed for Pain 30 tablet 0    clopidogrel (PLAVIX) 75 MG tablet Take 1 tablet by mouth daily 90 tablet 1    carvedilol (COREG) 25 MG tablet Take 1 tablet by mouth 2 times daily 180 tablet 1    aspirin 81 MG EC tablet Take 81 mg by mouth daily      Probiotic Product (PROBIOTIC DAILY PO) Take 1 tablet by mouth daily      Ascorbic Acid (VITAMIN C) 250 MG tablet Take 250 mg by mouth daily      vitamin B-12 (CYANOCOBALAMIN) 100 MCG tablet Take 50 mcg by mouth daily      lidocaine (LIDODERM) 5 % Place 1 patch onto the skin every 12 hours       ammonium lactate (AMLACTIN) 12 % cream APPLY TO DRY CALLUS AREAS 1 TO 2 TIMES DAILY  5    Misc. Devices (COMMODE BEDSIDE) MISC Use daily as needed 1 each 0    MULTIPLE VITAMIN PO Take 1 tablet by mouth daily        No current facility-administered medications for this visit. Review of Systems   Constitutional: Negative for fever. HENT: Negative for ear pain, tinnitus and trouble swallowing. Eyes: Negative for photophobia and visual disturbance. Respiratory: Negative for choking and shortness of breath. Cardiovascular: Negative for chest pain and palpitations. Gastrointestinal: Negative for nausea and vomiting. Musculoskeletal: Negative for back pain, gait problem, joint swelling, myalgias, neck pain and neck stiffness. Skin: Negative for color change. Allergic/Immunologic: Negative for food allergies.    Neurological: Negative for dizziness, tremors, seizures, syncope, facial asymmetry, speech difficulty, weakness, light-headedness, numbness and headaches. Psychiatric/Behavioral: Negative for behavioral problems, confusion, hallucinations and sleep disturbance. Objective:   /77 (Site: Left Upper Arm, Position: Sitting, Cuff Size: Large Adult)   Pulse 100   Wt 272 lb (123.4 kg)   BMI 35.89 kg/m²     Physical Exam  Vitals signs reviewed. Eyes:      Pupils: Pupils are equal, round, and reactive to light. Neck:      Musculoskeletal: Normal range of motion. Cardiovascular:      Rate and Rhythm: Normal rate and regular rhythm. Heart sounds: No murmur. Pulmonary:      Effort: Pulmonary effort is normal.      Breath sounds: Normal breath sounds. Abdominal:      General: Bowel sounds are normal.   Musculoskeletal: Normal range of motion. Skin:     General: Skin is warm. Neurological:      Mental Status: He is alert and oriented to person, place, and time. Cranial Nerves: No cranial nerve deficit. Sensory: No sensory deficit. Motor: No abnormal muscle tone. Coordination: Coordination normal.      Deep Tendon Reflexes: Reflexes are normal and symmetric. Babinski sign absent on the right side. Babinski sign absent on the left side. Psychiatric:         Mood and Affect: Mood normal.       Central gait ataxia walks with a walker. No results found.     Lab Results   Component Value Date    WBC 4.5 09/18/2019    RBC 3.95 09/18/2019    HGB 15.3 09/18/2019    HCT 44.8 09/18/2019    .3 09/18/2019    MCH 38.7 09/18/2019    MCHC 34.1 09/18/2019    RDW 13.2 09/18/2019     09/18/2019    MPV 8.4 09/25/2015     Lab Results   Component Value Date     09/18/2019    K 4.1 09/18/2019    CL 94 09/18/2019    CO2 25 09/18/2019    BUN 10 09/18/2019    CREATININE 0.74 09/18/2019    GFRAA >60.0 09/18/2019    LABGLOM >60.0 09/18/2019    GLUCOSE 142 09/18/2019    PROT 7.5 09/18/2019    LABALBU 4.3 09/18/2019    LABALBU DETECTED 10/27/2011 CALCIUM 9.4 09/18/2019    BILITOT 0.5 09/18/2019    ALKPHOS 64 09/18/2019    AST 27 09/18/2019    ALT 24 09/18/2019     Lab Results   Component Value Date    PROTIME 10.2 03/01/2019    PROTIME 12.4 11/12/2017    INR 1.0 03/01/2019     Lab Results   Component Value Date    TSH 4.080 11/12/2017    [id number] 685 11/12/2017    FOLATE 11.8 11/12/2017     Lab Results   Component Value Date    TRIG 158 09/18/2019    HDL 47 09/18/2019    LDLCALC 66 09/18/2019     Lab Results   Component Value Date    LABAMPH Neg 11/12/2017    BARBSCNU Neg 11/12/2017    LABBENZ Neg 11/12/2017    OPIATESCREENURINE Neg 11/12/2017    PHENCYCLIDINESCREENURINE Neg 11/12/2017    ETOH 278 11/12/2017     No results found for: LITHIUM, DILFRTOT, VALPROATE    Assessment:       Diagnosis Orders   1. History of CVA (cerebrovascular accident)     2. Tinnitus of both ears     3. Sensorineural hearing loss (SNHL) of left ear, unspecified hearing status on contralateral side     4. 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 Patient has the symptoms since 2016. He also has vestibulopathy vestibular dysfunction on the left with tinnitus which is gone to beating to some of his symptoms. Patient had a few falls 1 of them appear to cause some bruising. He is very careful but on occasion he does lose his balance. This is not changed. Recommended a carotid ultrasound to make sure his vertebral artery flow studies are normal.  Not had done this for a few years. His risk factors for cerebrovascular disease were reviewed. Not seen him for some time and therefore a quick follow-up on his vascular status. We will continue to keep him on observation and follow him the patient has cardiac stents and continues on dual antiplatelet therapy      Plan:      No orders of the defined types were placed in this encounter. No orders of the defined types were placed in this encounter.       Return in about 1 year (around

## 2020-07-07 ENCOUNTER — OFFICE VISIT (OUTPATIENT)
Dept: CARDIOLOGY CLINIC | Age: 65
End: 2020-07-07
Payer: MEDICARE

## 2020-07-07 VITALS
DIASTOLIC BLOOD PRESSURE: 84 MMHG | SYSTOLIC BLOOD PRESSURE: 124 MMHG | RESPIRATION RATE: 22 BRPM | HEART RATE: 96 BPM | OXYGEN SATURATION: 99 %

## 2020-07-07 PROCEDURE — 99214 OFFICE O/P EST MOD 30 MIN: CPT | Performed by: INTERNAL MEDICINE

## 2020-07-07 ASSESSMENT — ENCOUNTER SYMPTOMS
NAUSEA: 0
VOMITING: 0
APNEA: 0
COLOR CHANGE: 0
ABDOMINAL DISTENTION: 0
DIARRHEA: 0
BLOOD IN STOOL: 0
CHEST TIGHTNESS: 0
SHORTNESS OF BREATH: 0

## 2020-07-07 NOTE — LETTER
[facility]  [address] [address]  Phone: [phone]  Fax: [phone]    Lalita Chavira MD        July 7, 2020     Patient: Marce Schulz   YOB: 1955   Date of Visit: 7/7/2020       To Whom It May Concern: It is my medical opinion that Meredith Alonso requires a disability parking placard for the following reasons:  He cannot walk without assistance from another person or the use of an assistance device (cane, crutch, prosthetic device, wheelchair, etc.). Patient also has Cardiomyopathy and History of Stroke (CVA)  Duration of need: 5 years    If you have any questions or concerns, please don't hesitate to call.     Sincerely,      Lalita Chavira MD

## 2020-07-07 NOTE — PROGRESS NOTES
[facility] CARDIOLOGY OFFICE FOLLOW-UP      Patient: Huang Messer  YOB: 1955  MRN: [id number]    Chief Complaint:  Chief Complaint   Patient presents with    1 Year Follow Up    Claudication     PAD    Coronary Artery Disease    Hypertension         Subjective/HPI:  7/7/2020: Patient presents today for follow-up of coronary artery disease and ischemic cardiomyopathy. Had stent to the LAD almost 7 years ago. Severe ischemic cardiomyopathy which has resolved. In the interim he has been on disability. He had CVA 2 years after his cardiac event. Is diabetic. Overall doing well. He still has dizziness. Blood sugars have been controlled. Is compliant with his diet and medications. No chest pain congestive heart failure. Disability parking will be renewed for another 5 years       7/10/19: Patient presents today for follow-up of coronary artery disease and ischemic cardiomyopathy. Had stent to the LAD. Cardiomyopathy has improved substantially after angioplasty. Had 2 strokes. Also had  2 falls which were mechanical falls. Had peripheral angioplasty by Dr. conley. Has varicose veins with chronic pigmentary changes. He is diabetic morbidly obese. He is retired. Denies any chest pain. Moderately short of breath. Had sleep apnea and uses a mask on chronic Plavix therapy. He wears COLUMBA hose which helps the swelling. He will see me in 1 year.     7/11/18: Patient presents today for follow-up of coronary artery disease. Had LAD stent. Cardiomyopathy with improved significantly. Had 2 strokes. Currently not working. Problems with vision. Diabetes under good control. Hypertension under good control. See me in one year.     6/28/17: For follow-up of CAD. He had cardiomyopathy. He had LAD stenting done has improved significantly. From a cardiac standpoint we'll. Unfortunately he had a CVA about a year ago and has had significant set back.  Problems with vision DM excellent Hb 1C in the fives. Has no chest pain no congestive heart failure symptoms. Is going to get a cataract surgery next week.     2/15/2017: For follow-up of CAD. His last echo showed ejection fraction to be near normal at 45%. He has left bundle branch block and septal wall motion abnormality. He had a mechanical fall and hurt his nose. Not going see the emergency room. He has mild visual issues in the right eye for which she is going to see an eye doctor in the near future. He sees Dr. Savannah Zuñiga.  He has gained a significant amount of strength in the right lower extremity. Continue same medications and see me in 6 months      Past Medical History:   Diagnosis Date    Acute renal failure ([address]     Anxiety     CAD S/P percutaneous coronary angioplasty 3/11/2014    Cardiomyopathy ([address]     Cerebrovascular accident (CVA) due to occlusion of left middle cerebral artery ([address] 08/2016    brainstem infarction from left MCA occlusion    Cerebrovascular disease 07/27/2016    Coronary angioplasty status     CVA (cerebral vascular accident) ([address] 11/12/2017    Dependent edema 9/14/2017    Diplopia 5/15/2017    Resolved with management of cataracts    Dupuytren contracture 1/2/2018    Dysphagia 8/18/2011    Dysphonia 8/18/2011    Essential hypertension 8/17/2016    Gallop rhythm     Gout 12/10/2016    Gout of big toe 08/2014    Right Great Toe    H/O fall     Headache     migraines    Heart failure ([address]     History of 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    History of heart failure 1/6/2014    History of myocardial infarction 3/11/2014    History of recurrent pneumonia 2/11/2014    Hx of bacterial pneumonia     Hypotension     Leg swelling     Macrocytosis without anemia 12/10/2016    Microalbuminuria due to type 2 diabetes mellitus ([address] 9/14/2018    Morbid obesity ([address] 1/2/2014    Myocardial infarction ([address]     Obesity  AYLIN (obstructive sleep apnea) 12/10/2016    Osteoarthritis     Right-sided muscle weakness 10/19/2016    S/P cardiac catheterization     Skin cyst 2016    Spasm 2016    Stented coronary artery     Tremor of right hand 5/15/2017    Type 2 diabetes mellitus with diabetic polyneuropathy, without long-term current use of insulin (HCC)     Unsteady gait 5/15/2017    Weakness     Weight gain        Past Surgical History:   Procedure Laterality Date    CHOLECYSTECTOMY      COLONOSCOPY  01/15/2010    polyp, diverticulosis (DR ELAINE)    CORONARY ANGIOPLASTY WITH STENT PLACEMENT      CYST REMOVAL  16    DR Flores Older CYST    SHOULDER SURGERY Right 2015       Family History   Problem Relation Age of Onset    Breast Cancer Mother     Stroke Father     Colon Cancer Father 76       Social History     Socioeconomic History    Marital status:      Spouse name: Esther Montilla Number of children: 3    Years of education: 12+    Highest education level: None   Occupational History    Occupation: medical leave     Employer: [employer]   Social Needs    Financial resource strain: None    Food insecurity     Worry: None     Inability: None    Transportation needs     Medical: None     Non-medical: None   Tobacco Use    Smoking status: Former Smoker     Packs/day: 2.50     Years: 30.00     Pack years: 75.00     Last attempt to quit:      Years since quittin.5    Smokeless tobacco: Never Used   Substance and Sexual Activity    Alcohol use:  Yes     Alcohol/week: 0.0 standard drinks     Comment: limits less than 10/wk     Drug use: No    Sexual activity: Yes     Partners: Female   Lifestyle    Physical activity     Days per week: None     Minutes per session: None    Stress: None   Relationships    Social connections     Talks on phone: None     Gets together: None     Attends [employer] service: None     Active member of club or organization: None Attends meetings of clubs or organizations: None     Relationship status: None    Intimate partner violence     Fear of current or ex partner: None     Emotionally abused: None     Physically abused: None     Forced sexual activity: None   Other Topics Concern    None   Social History Narrative    None       Allergies   Allergen Reactions    Bactrim [Sulfamethoxazole-Trimethoprim] Nausea And Vomiting and Other (See Comments)     Sugar count elevated, had troubles urinating       Current Outpatient Medications   Medication Sig Dispense Refill    torsemide (DEMADEX) 20 MG tablet Take 1 tablet by mouth daily 90 tablet 1    glimepiride (AMARYL) 1 MG tablet Take 1 tablet by mouth every morning (before breakfast) 90 tablet 3    allopurinol (ZYLOPRIM) 300 MG tablet Take 1 tablet by mouth daily 90 tablet 3    Elastic Bandages & Supports (V-4 HIGH COMPRESSION HOSE) MISC KNEE HIGH - 20-30 mmHg 2 each 1    Alcohol Swabs PADS DX: diabetes mellitus. Test 1 time(s) daily - Ok to substitute per insurance (1 each = 1 box) 100 each 5    blood glucose monitor strips DX: diabetes mellitus. Use 1 time(s) daily - True Metrix requested by patient - [id number] Claudia Hope to substitute per insurance 100 strip 5    Lancets MISC DX: diabetes mellitus. Use 1 time(s) daily - Ok to substitute per insurance (1 each = 1 box) 100 each 5    blood glucose monitor kit and supplies DX: diabetes mellitus.  Test 1 time(s) daily - True Metrix requested by patient - [id number] Claudia Hope to substitute per insurance 1 kit 0    metFORMIN (GLUCOPHAGE) 500 MG tablet Take 1 tablet by mouth 2 times daily (with meals) 180 tablet 1    atorvastatin (LIPITOR) 40 MG tablet Take 1 tablet by mouth daily 90 tablet 1    ibuprofen (IBU) 800 MG tablet Take 1 tablet by mouth every 8 hours as needed for Pain 30 tablet 0    clopidogrel (PLAVIX) 75 MG tablet Take 1 tablet by mouth daily 90 tablet 1    carvedilol (COREG) 25 MG tablet Take 1 tablet by mouth 2 times daily 180 tablet 1    aspirin 81 MG EC tablet Take 81 mg by mouth daily      Probiotic Product (PROBIOTIC DAILY PO) Take 1 tablet by mouth daily      Ascorbic Acid (VITAMIN C) 250 MG tablet Take 250 mg by mouth daily      vitamin B-12 (CYANOCOBALAMIN) 100 MCG tablet Take 50 mcg by mouth daily      lidocaine (LIDODERM) 5 % Place 1 patch onto the skin every 12 hours       ammonium lactate (AMLACTIN) 12 % cream APPLY TO DRY CALLUS AREAS 1 TO 2 TIMES DAILY  5    Misc. Devices (COMMODE BEDSIDE) MISC Use daily as needed 1 each 0    MULTIPLE VITAMIN PO Take 1 tablet by mouth daily        No current facility-administered medications for this visit. Review of Systems:   Review of Systems   Constitutional: Negative for appetite change, diaphoresis and fatigue. HENT: Negative for nosebleeds. Respiratory: Negative for apnea, chest tightness and shortness of breath. Cardiovascular: Negative for chest pain, palpitations and leg swelling. Gastrointestinal: Negative for abdominal distention, blood in stool, diarrhea, nausea and vomiting. Musculoskeletal: Negative for myalgias, neck pain and neck stiffness. Skin: Negative for color change, pallor, rash and wound. Neurological: Negative for dizziness, seizures, syncope, weakness, light-headedness, numbness and headaches. Hematological: Does not bruise/bleed easily. Psychiatric/Behavioral: Negative for agitation, behavioral problems and confusion. The patient is not nervous/anxious and is not hyperactive. All other systems reviewed and are negative. Review of System is negative except for as mentioned above. Physical Examination:    /84 (Site: Left Upper Arm, Position: Sitting, Cuff Size: Medium Adult)   Pulse 96   Resp 22   SpO2 99%    Physical Exam   Constitutional: He appears healthy. No distress. HENT:   Nose: Nose normal.   Mouth/Throat: Dentition is normal. Oropharynx is clear. Eyes: Pupils are equal, round, and reactive to light.  Conjunctivae are normal.   Neck: Normal range of motion and thyroid normal. Neck supple. Cardiovascular: Regular rhythm, S1 normal, S2 normal, normal heart sounds, intact distal pulses and normal pulses. PMI is not displaced. No murmur heard. Pulmonary/Chest: He has no wheezes. He has no rales. He exhibits no tenderness. Abdominal: Soft. Bowel sounds are normal. He exhibits no distension and no mass. There is no splenomegaly or hepatomegaly. There is no abdominal tenderness. No hernia. Neurological: He is alert and oriented to person, place, and time. He has normal motor skills. Gait normal.   Skin: Skin is warm and dry. No cyanosis. No jaundice. Nails show no clubbing. Patient Active Problem List   Diagnosis    Morbid obesity ([facility]    History of heart failure    History of recurrent pneumonia    CAD S/P percutaneous coronary angioplasty    History of myocardial infarction    Type 2 diabetes mellitus with diabetic polyneuropathy, without long-term current use of insulin (HCC)    Skin cyst    Local infection of skin and subcutaneous tissue    History of CVA (cerebrovascular accident)    Essential hypertension    Right-sided muscle weakness    Spasm    AYLIN (obstructive sleep apnea)    Gout    Macrocytosis without anemia    Tremor of right hand    Unsteady gait    Dependent edema    Cutaneous abscess of back excluding buttocks    Skin infection    Microalbuminuria due to type 2 diabetes mellitus (HCC)    Lymphedema of both lower extremities    PAD (peripheral artery disease) (HCC)    Venous insufficiency of both lower extremities    Claudication (HCC)    Abscess of back    Abscess of right leg    Tinnitus of both ears    Sensorineural hearing loss (SNHL) of left ear    Ataxic gait           No orders of the defined types were placed in this encounter. No orders of the defined types were placed in this encounter.               Assessment:    1. CAD S/P percutaneous coronary angioplasty    2. Essential hypertension    3. PAD (peripheral artery disease) ([address]    4. AYLIN (obstructive sleep apnea)    5. History of CVA (cerebrovascular accident)         Plan:     Stay on same medications. See me in 1 year. This note was partially generated using Dragon voice recognition system, and there may be some incorrect words, spellings, punctuation that were not noticed in checking the note before saving.         Electronically signed by Ulisses Beatty MD on 7/7/2020 at 4:08 PM

## 2020-07-27 ENCOUNTER — VIRTUAL VISIT (OUTPATIENT)
Dept: FAMILY MEDICINE CLINIC | Age: 65
End: 2020-07-27
Payer: MEDICARE

## 2020-07-27 VITALS — HEIGHT: 72 IN | WEIGHT: 266 LBS | BODY MASS INDEX: 36.03 KG/M2

## 2020-07-27 PROCEDURE — G0438 PPPS, INITIAL VISIT: HCPCS | Performed by: FAMILY MEDICINE

## 2020-07-27 ASSESSMENT — LIFESTYLE VARIABLES
HOW OFTEN DO YOU HAVE SIX OR MORE DRINKS ON ONE OCCASION: 1
HOW OFTEN DURING THE LAST YEAR HAVE YOU BEEN UNABLE TO REMEMBER WHAT HAPPENED THE NIGHT BEFORE BECAUSE YOU HAD BEEN DRINKING: 0
HOW OFTEN DO YOU HAVE A DRINK CONTAINING ALCOHOL: 4
HOW OFTEN DURING THE LAST YEAR HAVE YOU FOUND THAT YOU WERE NOT ABLE TO STOP DRINKING ONCE YOU HAD STARTED: 0
HOW OFTEN DURING THE LAST YEAR HAVE YOU HAD A FEELING OF GUILT OR REMORSE AFTER DRINKING: 0
HOW OFTEN DURING THE LAST YEAR HAVE YOU NEEDED AN ALCOHOLIC DRINK FIRST THING IN THE MORNING TO GET YOURSELF GOING AFTER A NIGHT OF HEAVY DRINKING: 0
HAVE YOU OR SOMEONE ELSE BEEN INJURED AS A RESULT OF YOUR DRINKING: 0
HOW OFTEN DURING THE LAST YEAR HAVE YOU FAILED TO DO WHAT WAS NORMALLY EXPECTED FROM YOU BECAUSE OF DRINKING: 0
HAS A RELATIVE, FRIEND, DOCTOR, OR ANOTHER HEALTH PROFESSIONAL EXPRESSED CONCERN ABOUT YOUR DRINKING OR SUGGESTED YOU CUT DOWN: 2
AUDIT TOTAL SCORE: 8
AUDIT-C TOTAL SCORE: 6
HOW MANY STANDARD DRINKS CONTAINING ALCOHOL DO YOU HAVE ON A TYPICAL DAY: 1

## 2020-07-27 ASSESSMENT — PATIENT HEALTH QUESTIONNAIRE - PHQ9
SUM OF ALL RESPONSES TO PHQ QUESTIONS 1-9: 0
SUM OF ALL RESPONSES TO PHQ QUESTIONS 1-9: 0

## 2020-07-27 NOTE — PROGRESS NOTES
Medicare Annual Wellness Visit  Are Name: Jabari Cochran Date: 2020   MRN: [id number] Sex: Male   Age: 72 y.o. Ethnicity: Non-/Non    : 1955 Race: Jenna Diaz is here for Medicare AWV (pt is doing well)    Screenings for behavioral, psychosocial and functional/safety risks, and cognitive dysfunction are all negative except as indicated below. These results, as well as other patient data from the [address], [place] by [facility] form, are documented in Flowsheets linked to this Encounter. Allergies   Allergen Reactions    Bactrim [Sulfamethoxazole-Trimethoprim] Nausea And Vomiting and Other (See Comments)     Sugar count elevated, had troubles urinating         Prior to Visit Medications    Medication Sig Taking? Authorizing Provider   zoster recombinant adjuvanted vaccine (SHINGRIX) 50 MCG/0.5ML SUSR injection Inject 0.5 mLs into the muscle once for 1 dose - dose #2 indicated 2-6 months after dose #1 Yes [place], MD   torsemide (DEMADEX) 20 MG tablet Take 1 tablet by mouth daily Yes David Galvez MD   glimepiride (AMARYL) 1 MG tablet Take 1 tablet by mouth every morning (before breakfast) Yes [place], MD   allopurinol (ZYLOPRIM) 300 MG tablet Take 1 tablet by mouth daily Yes [place], MD   Elastic Bandages & Supports (V-4 HIGH COMPRESSION HOSE) MISC KNEE HIGH - 20-30 mmHg Yes [place], MD   Alcohol Swabs PADS DX: diabetes mellitus. Test 1 time(s) daily - Ok to substitute per insurance (1 each = 1 box) Yes [place], MD   blood glucose monitor strips DX: diabetes mellitus. Use 1 time(s) daily - True Metrix requested by patient - [id number] Claudia Hope to substitute per insurance Yes [place], MD   Lancets MISC DX: diabetes mellitus. Use 1 time(s) daily - Ok to substitute per insurance (1 each = 1 box) Yes [place], MD   blood glucose monitor kit and supplies DX: diabetes mellitus.  Test 1 time(s) daily - True Metrix requested by patient Physician (Pulmonology)  Car Hadley as Consulting Physician (Optometry)  Farhan Goddard MD as Consulting Physician (Thoracic Surgery)  Harmeet Wolff MD as Surgeon (Ophthalmology)  Trish Bentley MD as Surgeon (Orthopedic Surgery)  Jenelle Esteban DPM as Consulting Physician (Podiatry)  Yolanda Savage MD as Consulting Physician (Hematology and Oncology)  Kristofer Rodriguez MD as Consulting Physician (Dermatology)  Clement Pagan MD as Surgeon (Orthopedic Surgery)  Gayland Landau, DO as Consulting Physician (Cardiology)  Autumn Antunez MD as Consulting Physician (Interventional Radiology)    Wt Readings from Last 3 Encounters:   07/27/20 266 lb (120.7 kg)   07/01/20 272 lb (123.4 kg)   03/12/20 271 lb (122.9 kg)      No flowsheet data found. Body mass index is 36.08 kg/m². Based upon direct observation of the patient, evaluation of cognition reveals recent and remote memory intact. Patient's complete Health Risk Assessment and screening values have been reviewed and are found in Flowsheets. The following problems were reviewed today and where indicated follow up appointments were made and/or referrals ordered. Positive Risk Factor Screenings with Interventions:     Fall Risk:  2 or more falls in past year?: (!) yes  Fall with injury in past year?: (!) yes  Fall Risk Interventions:    · Home safety tips provided  · Patient declines any further evaluation/treatment for this issue    General Health:  General  In general, how would you say your health is?: Good  In the past 7 days, have you experienced any of the following?  New or Increased Pain, New or Increased Fatigue, Loneliness, Social Isolation, Stress or Anger?: (!) Stress  Do you get the social and emotional support that you need?: Yes  Do you have a Living Will?: Yes  General Health Risk Interventions:  · Stress: patient's comments regarding reasons for stress and/or anger: related to global pandemic, manageable    Health 08/17/2016    Zoster Live (Zostavax) 02/13/2017        Health Maintenance   Topic Date Due    Shingles Vaccine (2 of 3) 04/10/2017    Annual Wellness Visit (AWV)  09/16/2019    Flu vaccine (1) 09/01/2020    Diabetic foot exam  09/16/2020    A1C test (Diabetic or Prediabetic)  09/18/2020    Diabetic microalbuminuria test  09/18/2020    Lipid screen  09/18/2020    Potassium monitoring  09/18/2020    Creatinine monitoring  09/18/2020    Colon cancer screen colonoscopy  03/27/2021    Diabetic retinal exam  04/16/2021    Pneumococcal 65+ years Vaccine (1 of 1 - PPSV23) 12/14/2022    DTaP/Tdap/Td vaccine (2 - Td) 08/17/2026    AAA screen  Completed    Hepatitis C screen  Completed    HIV screen  Completed    Hepatitis A vaccine  Aged Out    Hib vaccine  Aged Out    Meningococcal (ACWY) vaccine  Aged Out     Recommendations for Zingfin Due: see orders and patient instructions/AVS.  . Recommended screening schedule for the next 5-10 years is provided to the patient in written form: see Patient Instructions/AVS.    Kike Bell was seen today for medicare awv. Diagnoses and all orders for this visit:    Routine general medical examination at a health care facility    Screening for prostate cancer  -     Psa screening; Future    Need for vaccination  -     zoster recombinant adjuvanted vaccine [facility]) 50 MCG/0.5ML SUSR injection; Inject 0.5 mLs into the muscle once for 1 dose - dose #2 indicated 2-6 months after dose #1    Type 2 diabetes mellitus with diabetic polyneuropathy, without long-term current use of insulin (HCC)  -     Hemoglobin A1C; Future  -     Lipid Panel; Future  -     Microalbumin / Creatinine Urine Ratio; Future    Microalbuminuria due to type 2 diabetes mellitus (HCC)  -     Hemoglobin A1C; Future  -     Comprehensive Metabolic Panel; Future  -     CBC Auto Differential; Future  -     Microalbumin / Creatinine Urine Ratio;  Future    Essential hypertension  -     Comprehensive Metabolic Panel; Future  -     CBC Auto Differential; Future    CAD S/P percutaneous coronary angioplasty  -     Lipid Panel; Future  -     CBC Auto Differential; Future    Morbid obesity ([address]  Patient counseled on healthy dietary choices and the benefits of a lower salt and/or lower carbohydrate diet as appropriate. Patient also counseled on benefits of moderate intensity cardiovascular exercise for 20-30 minutes at least 3-5 days a week. Advice was given to make small changes over time, setting smaller achievable goals until recommended lifestyle changes are reached. History of CVA (cerebrovascular accident)  -     Ultrasound Carotid doppler-Bilateral; Future    Gout of multiple sites, unspecified cause, unspecified chronicity  -     Uric Acid; Future    Unsteady gait  -     Ultrasound Carotid doppler-Bilateral; Future        Follow-up in 6 months for Chronic disease check      rIa Wilson is a 72 y.o. male being evaluated by a Virtual Visit (phone) encounter to address concerns as mentioned above. A caregiver was present when appropriate. Due to this being a TeleHealth encounter (During FXXKB-38 public health emergency), evaluation of the following organ systems was limited: Vitals/Constitutional/EENT/Resp/CV/GI//MS/Neuro/Skin/Heme-Lymph-Imm. Pursuant to the emergency declaration under the [facility] [address] authority and the [employer] Act, this Virtual Visit was conducted with patient's (and/or legal guardian's) consent, to reduce the patient's risk of exposure to COVID-19 and provide necessary medical care. The patient (and/or legal guardian) has also been advised to contact this office for worsening conditions or problems, and seek emergency medical treatment and/or call 911 if deemed necessary.      Patient identification was verified at the start of the visit: Yes    Services were provided through phone to substitute for in-person clinic visit. Patient and provider were located at their individual homes. --Abe Gomez MD on 7/27/2020 at 3:51 PM    An electronic signature was used to authenticate this note.

## 2020-07-27 NOTE — PATIENT INSTRUCTIONS
Personalized Preventive Plan for Patricia Solis - 7/27/2020  Medicare offers a range of preventive health benefits. Some of the tests and screenings are paid in full while other may be subject to a deductible, co-insurance, and/or copay. Some of these benefits include a comprehensive review of your medical history including lifestyle, illnesses that may run in your family, and various assessments and screenings as appropriate. After reviewing your medical record and screening and assessments performed today your provider may have ordered immunizations, labs, imaging, and/or referrals for you. A list of these orders (if applicable) as well as your Preventive Care list are included within your After Visit Summary for your review. Other Preventive Recommendations:    · A preventive eye exam performed by an eye specialist is recommended every 1-2 years to screen for glaucoma; cataracts, macular degeneration, and other eye disorders. · A preventive dental visit is recommended every 6 months. · Try to get at least 150 minutes of exercise per week or 10,000 steps per day on a pedometer . · Order or download the FREE \"Exercise & Physical Activity: Your Everyday Guide\" from [employer]. Call 7-946.635.3720 or search [employer] online. · You need 3602-5053 mg of calcium and 0761-1731 IU of vitamin D per day. It is possible to meet your calcium requirement with diet alone, but a vitamin D supplement is usually necessary to meet this goal.  · When exposed to the sun, use a sunscreen that protects against both UVA and UVB radiation with an SPF of 30 or greater. Reapply every 2 to 3 hours or after sweating, drying off with a towel, or swimming. · Always wear a seat belt when traveling in a car. Always wear a helmet when riding a bicycle or motorcycle. Heart-Healthy Diet   Sodium, Fat, and Cholesterol Controlled Diet       What Is a Heart Healthy Diet?    A heart-healthy diet is one that limits sodium , certain types of fat , and cholesterol . This type of diet is recommended for:   People with any form of cardiovascular disease (eg, coronary heart disease , peripheral vascular disease , previous heart attack , previous stroke )   People with risk factors for cardiovascular disease, such as high blood pressure , high cholesterol , or diabetes   Anyone who wants to lower their risk of developing cardiovascular disease   Sodium    Sodium is a mineral found in many foods. In general, most people consume much more sodium than they need. Diets high in sodium can increase blood pressure and lead to edema (water retention). On a heart-healthy diet, you should consume no more than 2,300 mg (milligrams) of sodium per dayabout the amount in one teaspoon of table salt. The foods highest in sodium include table salt (about 50% sodium), processed foods, convenience foods, and preserved foods. Cholesterol    Cholesterol is a fat-like, waxy substance in your blood. Our bodies make some cholesterol. It is also found in animal products, with the highest amounts in fatty meat, egg yolks, whole milk, cheese, shellfish, and organ meats. On a heart-healthy diet, you should limit your cholesterol intake to less than 200 mg per day. It is normal and important to have some cholesterol in your bloodstream. But too much cholesterol can cause plaque to build up within your arteries, which can eventually lead to a heart attack or stroke. The two types of cholesterol that are most commonly referred to are:   Low-density lipoprotein (LDL) cholesterol  Also known as bad cholesterol, this is the cholesterol that tends to build up along your arteries. Bad cholesterol levels are increased by eating fats that are saturated or hydrogenated. Optimal level of this cholesterol is less than 100. Over 130 starts to get risky for heart disease.    High-density lipoprotein (HDL) cholesterol  Also known as good cholesterol, this type of cholesterol actually carries cholesterol away from your arteries and may, therefore, help lower your risk of having a heart attack. You want this level to be high (ideally greater than 60). It is a risk to have a level less than 40. You can raise this good cholesterol by eating olive oil, canola oil, avocados, or nuts. Exercise raises this level, too. Fat    Fat is calorie dense and packs a lot of calories into a small amount of food. Even though fats should be limited due to their high calorie content, not all fats are bad. In fact, some fats are quite healthful. Fat can be broken down into four main types. The good-for-you fats are:   Monounsaturated fat  found in oils such as olive and canola, avocados, and nuts and natural nut butters; can decrease cholesterol levels, while keeping levels of HDL cholesterol high   Polyunsaturated fat  found in oils such as safflower, sunflower, soybean, corn, and sesame; can decrease total cholesterol and LDL cholesterol   Omega-3 fatty acids  particularly those found in fatty fish (such as salmon, trout, tuna, mackerel, herring, and sardines); can decrease risk of arrhythmias, decrease triglyceride levels, and slightly lower blood pressure   The fats that you want to limit are:   Saturated fat  found in animal products, many fast foods, and a few vegetables; increases total blood cholesterol, including LDL levels   Animal fats that are saturated include: butter, lard, whole-milk dairy products, meat fat, and poultry skin   Vegetable fats that are saturated include: hydrogenated shortening, palm oil, coconut oil, cocoa butter   Hydrogenated or trans fat  found in margarine and vegetable shortening, most shelf stable snack foods, and fried foods; increases LDL and decreases HDL     It is generally recommended that you limit your total fat for the day to less than 30% of your total calories.  If you follow an 1800-calorie heart healthy diet, for example, this would mean 60 grams of fat or less per day. Saturated fat and trans fat in your diet raises your blood cholesterol the most, much more than dietary cholesterol does. For this reason, on a heart-healthy diet, less than 7% of your calories should come from saturated fat and ideally 0% from trans fat. On an 1800-calorie diet, this translates into less than 14 grams of saturated fat per day, leaving 46 grams of fat to come from mono- and polyunsaturated fats.    Food Choices on a Heart Healthy Diet   Food Category   Foods Recommended   Foods to Avoid   Grains   Breads and rolls without salted tops Most dry and cooked cereals Unsalted crackers and breadsticks Low-sodium or homemade breadcrumbs or stuffing All rice and pastas   Breads, rolls, and crackers with salted tops High-fat baked goods (eg, muffins, donuts, pastries) Quick breads, self-rising flour, and biscuit mixes Regular bread crumbs Instant hot cereals Commercially prepared rice, pasta, or stuffing mixes   Vegetables   Most fresh, frozen, and low-sodium canned vegetables Low-sodium and salt-free vegetable juices Canned vegetables if unsalted or rinsed   Regular canned vegetables and juices, including sauerkraut and pickled vegetables Frozen vegetables with sauces Commercially prepared potato and vegetable mixes   Fruits   Most fresh, frozen, and canned fruits All fruit juices   Fruits processed with salt or sodium   Milk   Nonfat or low-fat (1%) milk Nonfat or low-fat yogurt Cottage cheese, low-fat ricotta, cheeses labeled as low-fat and low-sodium   Whole milk Reduced-fat (2%) milk Malted and chocolate milk Full fat yogurt Most cheeses (unless low-fat and low salt) Buttermilk (no more than 1 cup per week)   Meats and Beans   Lean cuts of fresh or frozen beef, veal, lamb, or pork (look for the word loin) Fresh or frozen poultry without the skin Fresh or frozen fish and some shellfish Egg whites and egg substitutes (Limit whole eggs to three per week) Tofu Nuts or seeds (unsalted, dry-roasted), low-sodium peanut butter Dried peas, beans, and lentils   Any smoked, cured, salted, or canned meat, fish, or poultry (including rocha, chipped beef, cold cuts, hot dogs, sausages, sardines, and anchovies) Poultry skins Breaded and/or fried fish or meats Canned peas, beans, and lentils Salted nuts   Fats and Oils   Olive oil and canola oil Low-sodium, low-fat salad dressings and mayonnaise   Butter, margarine, coconut and palm oils, rocha fat   Snacks, Sweets, and Condiments   Low-sodium or unsalted versions of broths, soups, soy sauce, and condiments Pepper, herbs, and spices; vinegar, lemon, or lime juice Low-fat frozen desserts (yogurt, sherbet, fruit bars) Sugar, cocoa powder, honey, syrup, jam, and preserves Low-fat, trans-fat free cookies, cakes, and pies Deng and animal crackers, fig bars, cynthia snaps   High-fat desserts Broth, soups, gravies, and sauces, made from instant mixes or other high-sodium ingredients Salted snack foods Canned olives Meat tenderizers, seasoning salt, and most flavored vinegars   Beverages   Low-sodium carbonated beverages Tea and coffee in moderation Soy milk   Commercially softened water   Suggestions   Make whole grains, fruits, and vegetables the base of your diet. Choose heart-healthy fats such as canola, olive, and flaxseed oil, and foods high in heart-healthy fats, such as nuts, seeds, soybeans, tofu, and fish. Eat fish at least twice per week; the fish highest in omega-3 fatty acids and lowest in mercury include salmon, herring, mackerel, sardines, and canned chunk light tuna. If you eat fish less than twice per week or have high triglycerides, talk to your doctor about taking fish oil supplements. Read food labels.    For products low in fat and cholesterol, look for fat free, low-fat, cholesterol free, saturated fat free, and trans fat freeAlso scan the Nutrition Facts Label, which lists saturated fat, trans fat, and cholesterol amounts. For products low in sodium, look for sodium free, very low sodium, low sodium, no added salt, and unsalted   Skip the salt when cooking or at the table; if food needs more flavor, get creative and try out different herbs and spices. Garlic and onion also add substantial flavor to foods. Trim any visible fat off meat and poultry before cooking, and drain the fat off after saldana. Use cooking methods that require little or no added fat, such as grilling, boiling, baking, poaching, broiling, roasting, steaming, stir-frying, and sauting. Avoid fast food and convenience food. They tend to be high in saturated and trans fat and have a lot of added salt. Talk to a registered dietitian for individualized diet advice. Last Reviewed: March 2011 Sara Swain MS, MPH, RD   Updated: 3/29/2011   ·   Patient information: Weight loss treatments    INTRODUCTION -- Obesity is a major international problem, and Americans are among the heaviest people in the world. The percentage of obese people in the [facility] has risen steadily. Many people find that although they initially lose weight by dieting, they quickly regain the weight after the diet ends. Because it so hard to keep weight off over time, it is important to have as much information and support as possible before starting a diet. You are most likely to be successful in losing weight and keeping it off when you believe that your body weight can be controlled. STARTING A WEIGHT LOSS PROGRAM -- Some people like to talk to their doctor or nurse to get help choosing the best plan, monitoring progress, and getting advice and support along the way. To know what treatment (or combination of treatments) will work best, determine your body mass index (BMI) and waist circumference (measurement). The BMI is calculated from your height and weight.   A person with a BMI between 25 and 29.9 is considered overweight   A person with a BMI of 30 or greater is considered to be obese  A waist circumference greater than 35 inches (88 cm) in women and 40 inches (102 cm) in men increases the risk of obesity-related complications, such as heart disease and diabetes. People who are obese and who have a larger waist size may need more aggressive weight loss treatment than others. Talk to your doctor or nurse for advice. Types of treatment -- Based on your measurements and your medical history, your doctor or nurse can determine what combination of weight loss treatments would work best for you. Treatments may include changes in lifestyle, exercise, dieting, and, in some cases, weight loss medicines or weight loss surgery. Weight loss surgery, also called bariatric surgery, is reserved for people with severe obesity who have not responded to other weight loss treatments. SETTING A WEIGHT LOSS GOAL -- It is important to set a realistic weight loss goal. Your first goal should be to avoid gaining more weight and staying at your current weight (or within 5 percent). Many people have a \"dream\" weight that is difficult or impossible to achieve. People at high risk of developing diabetes who are able to lose 5 percent of their body weight and maintain this weight will reduce their risk of developing diabetes by about 50 percent and reduce their blood pressure. This is a success. Losing more than 15 percent of your body weight and staying at this weight is an extremely good result, even if you never reach your \"dream\" or \"ideal\" weight. LIFESTYLE CHANGES -- Programs that help you to change your lifestyle are usually run by psychologists or other professionals. The goals of lifestyle changes are to help you change your eating habits, become more active, and be more aware of how much you eat and exercise, helping you to make healthier choices. This type of treatment can be broken down into three steps:   The triggers that make you want to eat   Eating   What happens after you eat  Triggers to eat -- Determining what triggers you to eat involves figuring out what foods you eat and where and when you eat. To figure out what triggers you to eat, keep a record for a few days of everything you eat, the places where you eat, how often you eat, and the emotions you were feeling when you ate. For some people, the trigger is related to a certain time of day or night. For others, the trigger is related to a certain place, like sitting at a desk working. Eating -- You can change your eating habits by breaking the chain of events between the trigger for eating and eating itself. There are many ways to do this. For instance, you can:  Limit where you eat to a few places (eg, dining room)   Restrict the number of utensils (eg, only a fork) used for eating   Drink a sip of water between each bite   Chew your food a certain number of times   Get up and stop eating every few minutes  What happens after you eat -- Rewarding yourself for good eating behaviors can help you to develop better habits. This is not a reward for weight loss; instead, it is a reward for changing unhealthy behaviors. Do not use food as a reward. Some people find money, clothing, or personal care (eg, a hair cut, manicure, or massage) to be effective rewards. Treat yourself immediately after making better eating choices to reinforce the value of the good behavior. You need to have clear behavior goals, and you must have a time frame for reaching your goals. Reward small changes along the way to your final goal.  Other factors that contribute to successful weight loss -- Changing your behavior involves more than just changing unhealthy eating habits; it also involves finding people around you to support your weight loss, reducing stress, and learning to be strong when tempted by food. Establish a \"elaine\" system -- Having a friend or family member available to provide support and reinforce good behavior is very helpful. eat.  CHOOSING A DIET -- A calorie is a unit of energy found in food. Your body needs calories to function. The goal of any diet is to burn up more calories than you eat. How quickly you lose weight depends upon several factors, such as your age, gender, and starting weight. Older people have a slower metabolism than young people, so they lose weight more slowly. Men lose more weight than women of similar height and weight when dieting because they use more energy. People who are extremely overweight lose weight more quickly than those who are only mildly overweight. Try not to drink alcohol or drinks with added sugar, and most sweets (candy, cakes, cookies), since they rarely contain important nutrients. Portion-controlled diets -- One simple way to diet is to buy packaged foods, like frozen low-calorie meals or meal-replacement canned drinks. A typical meal plan for one day may include:  A meal-replacement drink or breakfast bar for breakfast   A meal-replacement drink or a frozen low-calorie (250 to 350 calories) meal for lunch   A frozen low-calorie meal or other prepackaged, calorie-controlled meal, along with extra vegetables for dinner  This would give you 1000 to 1500 calories per day. Low-fat diet -- To reduce the amount of fat in your diet, you can:  Eat low-fat foods. Low-fat foods are those that contain less than 30 percent of calories from fat. Fat is listed on the food facts label (figure 1). Count fat grams. For a 1500 calorie diet, this would mean about 45 g or fewer of fat per day. Low-carbohydrate diet -- Low- and very-low-carbohydrate diets (eg, Atkins diet, [employer]) have become popular ways to lose weight quickly.   With a very-low-carbohydrate diet, you eat between 0 and 60 grams of carbohydrates per day (a standard diet contains 200 to 300 grams of carbohydrates)   With a low-carbohydrate diet, you eat between 60 and 130 grams of carbohydrates per day  Carbohydrates are found in fruits, vegetables, and grains (including breads, rice, pasta, and cereal), alcoholic beverages, and in dairy products. Meat and fish do not contain carbohydrates. Side effects of very-low-carbohydrate diets can include constipation, headache, bad breath, muscle cramps, diarrhea, and weakness. Mediterranean diet -- The term \"Mediterranean diet\" refers to a way of eating that is common in [place] regions around the [facility]. Although there is some variation in Mediterranean diets, there are some similarities. Most Mediterranean diets include:  A high level of monounsaturated fats (from olive or canola oil, walnuts, pecans, almonds) and a low level of saturated fats (from butter)   A high amount of vegetables, fruits, legumes, and grains (7 to 10 servings of fruits and vegetables per day)   A moderate amount of milk and dairy products, mostly in the form of cheese. Use low-fat dairy products (skim milk, fat-free yogurt, low-fat cheese). A relatively low amount of red meat and meat products. Substitute fish or poultry for red meat. For those who drink alcohol, a modest amount (mainly as red wine) may help to protect against cardiovascular disease. A modest amount is up to one (4 ounce) glass per day for women and up to two glasses per day for men. Which diet is best? -- Studies have compared different diets, including:  Very-low-carbohydrate (Atkins)   Macronutrient balance controlling glycemic load (Zone®)   Reduced-calorie (Weight Watchers®)   Very-low-fat (Ornish)  No one diet is \"best\" for weight loss. Any diet will help you to lose weight if you stick with the diet. Therefore, it is important to choose a diet that includes foods you like. Fad diets -- Fad diets often promise quick weight loss (more than 1 to 2 pounds per week) and may claim that you do not need to exercise or give up favorite foods. Some fad diets cost a lot of money, because you have to pay for seminars or pills. heart disease, heart failure, uncontrolled hypertension, stroke, irregular heart rhythms, or peripheral vascular disease (poor circulation in the legs). Orlistat -- Orlistat (Xenical® 120 mg capsules) is a medicine that reduces the amount of fat your body absorbs from the foods you eat. A lower-dose version is now available without a prescription (Nilay® 60 mg capsules) in many countries, including the [place]. The medicine is recommended three times per day, taken with a meal; you can skip a dose if you skip a meal or if the meal contains no fat. After one year of treatment with orlistat, the average weight loss is approximately 8 to 10 percent of initial body weight (4 percent more than in those who took a placebo). Cholesterol levels often improve, and blood pressure sometimes falls. In people with diabetes, orlistat may help control blood sugar levels. Side effects occur in 15 to 10 percent of people and may include stomach cramps, gas, diarrhea, leakage of stool, or oily stools. These problems are more likely when you take orlistat with a high-fat meal (if more than 30 percent of calories in the meal are from fat). Side effects usually improve as you learn to avoid high-fat foods. Severe liver injury has been reported rarely in patients taking orlistat, but it is not known if orlistat caused the liver problems. Diet supplements -- Diet supplements are widely used by people who are trying to lose weight, although the safety and efficacy of these supplements are often unproven. A few of the more common diet supplements are discussed below; none of these are recommended because they have not been studied carefully, and there is no proof they are safe or effective. Chitosan and wheat dextrin are ineffective for weight loss, and their use is not recommended. Ephedra, a compound related to ephedrine, is no longer available in the [place] due to safety concerns.  Many nonprescription diet pills previously contained ephedra. Although some studies have shown that ephedra helps with weight loss, there can be serious side effects (psychiatric symptoms, palpitations, and stomach upset), including death. There are not enough data about the safety and efficacy of chromium, ginseng, glucomannan, green tea, hydroxycitric acid, L carnitine, psyllium, pyruvate supplements, [place] Y wort, and conjugated linoleic acid. Two supplements from [facility], [address] [place] (also known as the [place] 15 pill) and Herbathin dietary supplement, have been shown to contain prescription drugs. Hoodia gordonii is a dietary supplement derived from a plant in [place]. It is not recommended because there is no proof that it is safe or effective. Bitter orange (Citrus aurantium) can increase your heart rate and blood pressure and is not recommended. SHOULD I HAVE SURGERY TO LOSE WEIGHT? -- Weight loss surgery is recommended ONLY for people with one of the following:  Severe obesity (body mass index above 40) (calculator 1 and calculator 2) who have not responded to diet, exercise, or weight loss medicines   Body mass index between 35 and 40, along with a serious medical problem (including diabetes, severe joint pain, or sleep apnea) that would improve with weight loss  You should be sure that you understand the potential risks and benefits of weight loss surgery. You must be motivated and willing to make lifelong changes in how you eat to reach and maintain a healthier weight after surgery. You must also be realistic about weight loss after surgery (see 'Effectiveness of weight loss surgery' below). PREPARING FOR WEIGHT LOSS SURGERY -- Most people who have weight loss surgery will meet with several specialists before surgery is scheduled. This often includes a dietitian, mental health counselor, a doctor who specializes in care of obese people, and a surgeon who performs weight loss surgery (bariatric surgeon).  You may need to work with these providers for several weeks or months before surgery. The nutritionist will explain what and how much you will be able to eat after surgery. You may also need to lose a small amount of weight before surgery. The mental health specialist will help you to cope with stress and other factors that can make it harder to lose weight or trigger you to eat   The medical doctor will determine whether you need other tests, counseling, or treatment before surgery. He or she might also help you begin a medical weight loss program so that you can lose some weight before surgery. The bariatric surgeon will meet with you to discuss the surgeries available to treat obesity. He or she will also make sure you are a good candidate for surgery. TYPES OF WEIGHT LOSS SURGERY -- There are several types of weight loss surgeries, the most common being lap banding, gastric bypass, and gastric sleeve. Lap banding -- Laparoscopic adjustable gastric banding (LAGB), or lap banding, is a surgery that uses an adjustable band around the opening to the stomach (figure 1). This reduces the amount of food that you can eat at one time. Lap banding is done through small incisions, with a laparoscope. The band can be adjusted after surgery, allowing you to eat more or less food. Adjustments to the size and tightness of the band are made by using a needle to add or remove fluid from a port (a small container under the skin that is connected to the band). Adding fluid to the band makes it tighter which restricts the amount of food you can eat and may help you to lose more weight. Lap banding is a popular choice because it is relatively simple to perform, can be adjusted or removed, and has a low risk of serious complications immediately after surgery. However, weight loss with the lap band depends on your ability to follow the program closely.   You will need to prepare nutritious meals that \"work with\" the band, not against it. For example, the lap band will not work well if you eat or drink a large amount of liquid calories (like ice cream). The band will not help you to feel full when you eat/drink liquid calories. Weight loss ranges from 45 to 75 percent after two years. As an example, a person who is 120 pounds overweight could expect to lose approximately 54 to 90 pounds in the two years after lap banding. Gastric bypass -- Rashel-en-Y gastric bypass, also called gastric bypass, helps you to lose weight by reducing the amount of food you can eat and reducing the number of calories and nutrients you absorb from the food you eat. To perform gastric bypass, a surgeon creates a small stomach pouch by dividing the stomach and attaching it to the small intestine. This helps you to lose weight in two ways: The smaller stomach can hold less food than before surgery. This causes you to feel full after eating a very small amount of food or liquid. Over time, the pouch might stretch, allowing you to eat more food. The body absorbs fewer calories, since food bypasses most of the stomach as well as the upper small intestine. This new arrangement seems to decrease your appetite and change how you break down foods by changing the release of various hormones. Gastric bypass can be performed as open surgery (through an incision on the abdomen) or laparoscopically, which uses smaller incisions and smaller instruments. Both the laparoscopic and open techniques have risks and benefits. You and your surgeon should work together to decide which surgery, if any, is right for you. Gastric bypass has a high success rate, and people lose an average of 62 to 68 percent of their excess body weight in the first year. Weight loss typically levels off after one to two years, with an overall excess weight loss between 50 and 75 percent. For a person who is 120 pounds overweight, an average of 60 to 90 pounds of weight loss would be expected.   Gastric sleeve -- Gastric sleeve, also known as sleeve gastrectomy, is a surgery that reduces the size of the stomach and makes it into a narrow tube (figure 3). The new stomach is much smaller and produces less of the hormone (ghrelin) that causes hunger, helping you feel satisfied with less food. Sleeve gastrectomy is safer than gastric bypass because the intestines are not rearranged, and there is less chance of malnutrition. It also appears to control hunger better than lap banding. It might be safer than the lap banding because no foreign materials are used. The gastric sleeve has a good success rate, and people lose an average of 33 percent of their excess body weight in the first year. For a person who is 120 pounds overweight, this would mean losing about 40 pounds in the first year. WEIGHT LOSS SURGERY COMPLICATIONS -- A variety of complications can occur with weight loss surgery. The risks of surgery depend upon which surgery you have and any medical problems you had before surgery. Some of the more common early surgical complications (one to six weeks after surgery) include:  Bleeding   Infection   Blockage or tear in the bowels   Need for further surgery  Important medical complications after surgery can include blood clots in the legs or lungs, heart attack, pneumonia, and urinary tract infection. Complications are less likely when surgery is performed in centers that are experienced in weight loss surgery. In general, centers with experience in weight loss surgery have:  Board-certified doctors and surgeons   A team of support staff (dietitians, counselors, nurses)   Long-term follow-up after surgery   Hospital staff experienced with the care of weight loss patients. This includes nurses who are trained in the care of patients immediately after surgery and anesthesiologists who are experienced in caring for the morbidly obese.   EFFECTIVENESS OF WEIGHT LOSS SURGERY -- The goal of weight loss surgery is to plastic surgery (called \"body contouring\") to remove excess skin from the body, particularly in the abdominal area. Before you decide to have weight loss surgery, you must commit to staying healthy for life. This includes following up with your healthcare team, exercising most days of the week, and eating a sensible diet every day. It can be difficult to develop new eating and exercise habits after weight loss surgery, and you will have to work hard to stick to your goals. Recovering from surgery and losing weight can be stressful and emotional, and it is important to have the support of family and friends. Working with a , therapist, or support group can help you through the ups and downs. WHERE TO GET MORE INFORMATION -- Your healthcare provider is the best source of information for questions and concerns related to your medical problem. This article will be updated as needed every four months on our Web site ([email or web address])  ·     [phone]        As we get older, changes in balance, gait, strength, vision, hearing, and cognition make even the most youthful senior more prone to accidents. Falls are one of the leading health risks for older people. This increased risk of falling is related to:   Aging process (eg, decreased muscle strength, slowed reflexes)   Higher incidence of chronic health problems (eg, arthritis, diabetes) that may limit mobility, agility or sensory awareness   Side effects of medicine (eg, dizziness, blurred vision)especially medicines like prescription pain medicines and drugs used to treat mental health conditions   Depending on the brittleness of your bones, the consequences of a fall can be serious and long lasting. Home Life   Research by the Association of Aging [facility]) shows that some home accidents among older adults can be prevented by making simple lifestyle changes and basic modifications and repairs to the home environment.  Here are some lifestyle changes that experts recommend:   Have your hearing and vision checked regularly. Be sure to wear prescription glasses that are right for you. Speak to your doctor or pharmacist about the possible side effects of your medicines. A number of medicines can cause dizziness. If you have problems with sleep, talk to your doctor. Limit your intake of alcohol. If necessary, use a cane or walker to help maintain your balance. Wear supportive, rubber-soled shoes, even at home. If you live in a region that gets wintry weather, you may want to put special cleats on your shoes to prevent you from slipping on the snow and ice. Exercise regularly to help maintain muscle tone, agility, and balance. Always hold the banister when going up or down stairs. Also, use  bars when getting in or out of the bath or shower, or using the toilet. To avoid dizziness, get up slowly from a lying down position. Sit up first, dangling your legs for a minute or two before rising to a standing position. Overall Home Safety Check   According to the Consumer Product Safety Commision's \"Older Consumer Home Safety Checklist,\" it is important to check for potential hazards in each room. And remember, proper lighting is an essential factor in home safety. If you cannot see clearly, you are more likely to fall. Important questions to ask yourself include:   Are lamp, electric, extension, and telephone cords placed out of the flow of traffic and maintained in good condition? Have frayed cords been replaced? Are all small rugs and runners slip resistant? If not, you can secure them to the floor with a special double-sided carpet tape. Are smoke detectors properly locatedone on every floor of your home and one outside of every sleeping area? Are they in good working order? Are batteries replaced at least once a year? Do you have a well-maintained carbon monoxide detector outside every sleeping are in your home? firmly to the floor. Bathtubs should have at least one, preferably two, grab bars, firmly attached to structural supports in the wall. (Do not use built-in soap holders or glass shower doors as grab bars.)    Tub seats fitted with non-slip material on the legs allow you to wash sitting down. For people with limited mobility, bathtub transfer benches allow you to slide safely into the tub. Raised toilet seats and toilet safety rails are helpful for those with knee or hip problems. In the [facility]    Make sure you use a nightlight and that the area around your bed is clear of potential obstacles. Be careful with electric blankets and never go to sleep with a heating pad, which can cause serious burns even if on a low setting. Use fire-resistant mattress covers and pillows, and NEVER smoke in bed. Keep a phone next to the bed that is programmed to dial 911 at the push of a button. If you have a chronic condition, you may want to sign on with an automatic call-in service. Typically the system includes a small pendant that connects directly to an emergency medical voice-response system. You should also make arrangements to stay in contact with someonefriend, neighbor, family memberon a regular schedule. Fire Prevention   According to the [employer] (Smoke Alarms for Every) [address] [place] [address], senior citizens are one of the two highest risk groups for death and serious injuries due to residential fires. When cooking, wear short-sleeved items, never a bulky long-sleeved robe. The [facility]'s Safety Checklist for Older Consumers emphasizes the importance of checking basements, garages, workshops and storage areas for fire hazards, such as volatile liquids, piles of old rags or clothing and overloaded circuits. Never smoke in bed or when lying down on a couch or recliner chair.     Small portable electric or kerosene heaters are responsible for many home fires and should be used cautiously if at all. If you do use one, be sure to keep them away from flammable materials. In case of fire, make sure you have a pre-established emergency exit plan. Have a professional check your fireplace and other fuel-burning appliances yearly. [employer]   Baby boomers entering the bhardwaj years will continue to see the development of new products to help older adults live safely and independently in spite of age-related changes. Making Life More Livable  , by [employer], lists over 1,000 products for \"living well in the mature years,\" such as bathing and mobility aids, household security devices, ergonomically designed knives and peelers, and faucet valves and knobs for temperature control. Medical supply stores and organizations are good sources of information about products that improve your quality of life and insure your safety.      Last Reviewed: November 2009 Gypsy Guajardo MD   Updated: 3/7/2011     ·

## 2020-08-07 ENCOUNTER — HOSPITAL ENCOUNTER (OUTPATIENT)
Dept: LAB | Age: 65
Discharge: HOME | End: 2020-08-07
Payer: MEDICARE

## 2020-08-07 LAB
ALBUMIN SERPL-MCNC: 4.4 G/DL (ref 3.5–4.6)
ALP BLD-CCNC: 59 U/L (ref 35–104)
ALT SERPL-CCNC: 19 U/L (ref 0–41)
ANION GAP SERPL CALCULATED.3IONS-SCNC: 13 MEQ/L (ref 9–15)
AST SERPL-CCNC: 24 U/L (ref 0–40)
BASOPHILS ABSOLUTE: 0 K/UL (ref 0–0.2)
BASOPHILS RELATIVE PERCENT: 0.6 %
BILIRUB SERPL-MCNC: 0.6 MG/DL (ref 0.2–0.7)
BUN BLDV-MCNC: 16 MG/DL (ref 8–23)
CALCIUM SERPL-MCNC: 9.4 MG/DL (ref 8.5–9.9)
CHLORIDE BLD-SCNC: 96 MEQ/L (ref 95–107)
CHOLESTEROL, TOTAL: 124 MG/DL (ref 0–199)
CO2: 27 MEQ/L (ref 20–31)
CREAT SERPL-MCNC: 0.92 MG/DL (ref 0.7–1.2)
CREATININE URINE: 163.9 MG/DL
EOSINOPHILS ABSOLUTE: 0.1 K/UL (ref 0–0.7)
EOSINOPHILS RELATIVE PERCENT: 2 %
GFR AFRICAN AMERICAN: >60
GFR NON-AFRICAN AMERICAN: >60
GLOBULIN: 2.9 G/DL (ref 2.3–3.5)
GLUCOSE BLD-MCNC: 167 MG/DL (ref 70–99)
HBA1C MFR BLD: 6.7 % (ref 4.8–5.9)
HCT VFR BLD CALC: 45.5 % (ref 42–52)
HDLC SERPL-MCNC: 51 MG/DL (ref 40–59)
HEMOGLOBIN: 15.7 G/DL (ref 14–18)
LDL CHOLESTEROL CALCULATED: 53 MG/DL (ref 0–129)
LYMPHOCYTES ABSOLUTE: 1.6 K/UL (ref 1–4.8)
LYMPHOCYTES RELATIVE PERCENT: 30.4 %
MACROCYTES: ABNORMAL
MCH RBC QN AUTO: 37.6 PG (ref 27–31.3)
MCHC RBC AUTO-ENTMCNC: 34.4 % (ref 33–37)
MCV RBC AUTO: 109.4 FL (ref 80–100)
MICROALBUMIN UR-MCNC: 8.1 MG/DL
MICROALBUMIN/CREAT UR-RTO: 49.4 MG/G (ref 0–30)
MONOCYTES ABSOLUTE: 0.4 K/UL (ref 0.2–0.8)
MONOCYTES RELATIVE PERCENT: 7.6 %
NEUTROPHILS ABSOLUTE: 3.1 K/UL (ref 1.4–6.5)
NEUTROPHILS RELATIVE PERCENT: 59.4 %
PDW BLD-RTO: 13.8 % (ref 11.5–14.5)
PLATELET # BLD: 187 K/UL (ref 130–400)
POTASSIUM SERPL-SCNC: 4.3 MEQ/L (ref 3.4–4.9)
PROSTATE SPECIFIC ANTIGEN: 1.79 NG/ML (ref 0–5.4)
RBC # BLD: 4.16 M/UL (ref 4.7–6.1)
ROULEAUX: ABNORMAL
SODIUM BLD-SCNC: 136 MEQ/L (ref 135–144)
TOTAL PROTEIN: 7.3 G/DL (ref 6.3–8)
TRIGL SERPL-MCNC: 98 MG/DL (ref 0–150)
URIC ACID, SERUM: 5 MG/DL (ref 3.4–7)
WBC # BLD: 5.2 K/UL (ref 4.8–10.8)

## 2020-08-07 PROCEDURE — 82043 UR ALBUMIN QUANTITATIVE: CPT

## 2020-08-07 PROCEDURE — 82570 ASSAY OF URINE CREATININE: CPT

## 2020-08-07 PROCEDURE — G0103 PSA SCREENING: HCPCS

## 2020-08-07 PROCEDURE — 84153 ASSAY OF PSA TOTAL: CPT

## 2020-08-07 PROCEDURE — 84550 ASSAY OF BLOOD/URIC ACID: CPT

## 2020-08-07 PROCEDURE — 83036 HEMOGLOBIN GLYCOSYLATED A1C: CPT

## 2020-08-07 PROCEDURE — 80053 COMPREHEN METABOLIC PANEL: CPT

## 2020-08-07 PROCEDURE — 36415 COLL VENOUS BLD VENIPUNCTURE: CPT

## 2020-08-07 PROCEDURE — 80061 LIPID PANEL: CPT

## 2020-08-07 PROCEDURE — 85025 COMPLETE CBC W/AUTO DIFF WBC: CPT

## 2020-08-10 RX ORDER — CARVEDILOL 25 MG/1
25 TABLET ORAL 2 TIMES DAILY
Qty: 180 TABLET | Refills: 1 | Status: ON HOLD | OUTPATIENT
Start: 2020-08-10 | End: 2021-08-19

## 2020-08-10 NOTE — TELEPHONE ENCOUNTER
Patient requesting medication refill.  Please approve or deny this request.    Rx requested:  Requested Prescriptions     Pending Prescriptions Disp Refills    carvedilol (COREG) 25 MG tablet 180 tablet 1     Sig: Take 1 tablet by mouth 2 times daily         Last Office Visit:   7/27/2020      Next Visit Date:  Future Appointments   Date Time Provider Qasim Rae   8/18/2020  1:45 PM [facility] ULTRASOUND ROOM 1 [facility]   11/2/2020  2:45 PM Benji Wright MD [address]   6/30/2021  1:45 PM Zander Coronel MD [facility]

## 2020-08-18 ENCOUNTER — HOSPITAL ENCOUNTER (OUTPATIENT)
Dept: ULTRASOUND IMAGING | Age: 65
Discharge: HOME | End: 2020-08-20
Payer: MEDICARE

## 2020-08-18 PROCEDURE — 93880 EXTRACRANIAL BILAT STUDY: CPT

## 2020-08-23 PROBLEM — I65.22 LEFT CAROTID ARTERY STENOSIS: Status: ACTIVE | Noted: 2020-08-23

## 2020-09-15 RX ORDER — CLOPIDOGREL BISULFATE 75 MG/1
75 TABLET ORAL DAILY
Qty: 90 TABLET | Refills: 3 | Status: SHIPPED | OUTPATIENT
Start: 2020-09-15 | End: 2021-12-06

## 2020-09-15 NOTE — TELEPHONE ENCOUNTER
Pharmacy is requesting medication refill.  Please approve or deny this request.    Rx requested:  Requested Prescriptions     Pending Prescriptions Disp Refills    clopidogrel (PLAVIX) 75 MG tablet [Pharmacy Med Name: CLOPIDOGREL BISULFATE TABS 75MG] 90 tablet 3     Sig: Take 1 tablet by mouth daily         Last Office Visit:   7/27/2020      Next Visit Date:  Future Appointments   Date Time Provider Qasim Rae   11/2/2020  2:45 PM Corbin Casanova MD [address]   6/30/2021  1:45 PM Suresh Osborn MD [facility]

## 2020-09-22 LAB — DIABETIC RETINOPATHY: NEGATIVE

## 2020-10-26 ENCOUNTER — APPOINTMENT (OUTPATIENT)
Dept: GENERAL RADIOLOGY | Age: 65
DRG: 065 | End: 2020-10-26
Payer: MEDICARE

## 2020-10-26 ENCOUNTER — APPOINTMENT (OUTPATIENT)
Dept: CT IMAGING | Age: 65
DRG: 065 | End: 2020-10-26
Payer: MEDICARE

## 2020-10-26 ENCOUNTER — HOSPITAL ENCOUNTER (INPATIENT)
Age: 65
LOS: 3 days | Discharge: HOME | DRG: 065 | End: 2020-10-29
Attending: INTERNAL MEDICINE | Admitting: INTERNAL MEDICINE
Payer: MEDICARE

## 2020-10-26 ENCOUNTER — APPOINTMENT (OUTPATIENT)
Dept: MRI IMAGING | Age: 65
DRG: 065 | End: 2020-10-26
Payer: MEDICARE

## 2020-10-26 PROBLEM — I63.9 ACUTE CEREBROVASCULAR ACCIDENT (CVA) (HCC): Status: ACTIVE | Noted: 2020-10-26

## 2020-10-26 LAB
ALBUMIN SERPL-MCNC: 4.3 G/DL (ref 3.5–4.6)
ALP BLD-CCNC: 64 U/L (ref 35–104)
ALT SERPL-CCNC: 24 U/L (ref 0–41)
AMPHETAMINE SCREEN, URINE: NORMAL
ANION GAP SERPL CALCULATED.3IONS-SCNC: 13 MEQ/L (ref 9–15)
ANTI-XA UNFRAC HEPARIN: <0.1 IU/ML
APTT: 28.6 SEC (ref 24.4–36.8)
AST SERPL-CCNC: 25 U/L (ref 0–40)
BARBITURATE SCREEN URINE: NORMAL
BASOPHILS ABSOLUTE: 0 K/UL (ref 0–0.2)
BASOPHILS RELATIVE PERCENT: 1 %
BENZODIAZEPINE SCREEN, URINE: NORMAL
BILIRUB SERPL-MCNC: 0.4 MG/DL (ref 0.2–0.7)
BILIRUBIN URINE: NEGATIVE
BLOOD, URINE: ABNORMAL
BUN BLDV-MCNC: 12 MG/DL (ref 8–23)
CALCIUM SERPL-MCNC: 9.4 MG/DL (ref 8.5–9.9)
CANNABINOID SCREEN URINE: NORMAL
CHLORIDE BLD-SCNC: 92 MEQ/L (ref 95–107)
CHP ED QC CHECK: YES
CLARITY: CLEAR
CO2: 28 MEQ/L (ref 20–31)
COCAINE METABOLITE SCREEN URINE: NORMAL
COLOR: YELLOW
CREAT SERPL-MCNC: 0.72 MG/DL (ref 0.7–1.2)
EKG ATRIAL RATE: 70 BPM
EKG ATRIAL RATE: 86 BPM
EKG ATRIAL RATE: 89 BPM
EKG P AXIS: 54 DEGREES
EKG P AXIS: 56 DEGREES
EKG P AXIS: 62 DEGREES
EKG P-R INTERVAL: 206 MS
EKG P-R INTERVAL: 222 MS
EKG P-R INTERVAL: 240 MS
EKG Q-T INTERVAL: 384 MS
EKG Q-T INTERVAL: 390 MS
EKG Q-T INTERVAL: 422 MS
EKG QRS DURATION: 72 MS
EKG QRS DURATION: 72 MS
EKG QRS DURATION: 76 MS
EKG QTC CALCULATION (BAZETT): 455 MS
EKG QTC CALCULATION (BAZETT): 466 MS
EKG QTC CALCULATION (BAZETT): 467 MS
EKG R AXIS: 11 DEGREES
EKG R AXIS: 12 DEGREES
EKG R AXIS: 9 DEGREES
EKG T AXIS: 54 DEGREES
EKG T AXIS: 63 DEGREES
EKG T AXIS: 67 DEGREES
EKG VENTRICULAR RATE: 70 BPM
EKG VENTRICULAR RATE: 86 BPM
EKG VENTRICULAR RATE: 89 BPM
EOSINOPHILS ABSOLUTE: 0.1 K/UL (ref 0–0.7)
EOSINOPHILS RELATIVE PERCENT: 2.4 %
ETHANOL PERCENT: 0.18 G/DL
ETHANOL: 210 MG/DL (ref 0–0.08)
GFR AFRICAN AMERICAN: >60
GFR NON-AFRICAN AMERICAN: >60
GLOBULIN: 2.6 G/DL (ref 2.3–3.5)
GLUCOSE BLD-MCNC: 107 MG/DL (ref 70–99)
GLUCOSE BLD-MCNC: 112 MG/DL
GLUCOSE BLD-MCNC: 112 MG/DL (ref 60–115)
GLUCOSE BLD-MCNC: 173 MG/DL (ref 60–115)
GLUCOSE BLD-MCNC: 183 MG/DL (ref 60–115)
GLUCOSE BLD-MCNC: 223 MG/DL (ref 60–115)
GLUCOSE URINE: NEGATIVE MG/DL
HBA1C MFR BLD: 7.1 % (ref 4.8–5.9)
HCT VFR BLD CALC: 39.4 % (ref 42–52)
HEMOGLOBIN: 14.4 G/DL (ref 14–18)
INR BLD: 0.9
KETONES, URINE: NEGATIVE MG/DL
LEUKOCYTE ESTERASE, URINE: NEGATIVE
LV EF: 60 %
LVEF MODALITY: NORMAL
LYMPHOCYTES ABSOLUTE: 2.3 K/UL (ref 1–4.8)
LYMPHOCYTES RELATIVE PERCENT: 45.5 %
Lab: NORMAL
MAGNESIUM: 1.6 MG/DL (ref 1.7–2.4)
MCH RBC QN AUTO: 39 PG (ref 27–31.3)
MCHC RBC AUTO-ENTMCNC: 36.5 % (ref 33–37)
MCV RBC AUTO: 106.7 FL (ref 80–100)
METHADONE SCREEN, URINE: NORMAL
MONOCYTES ABSOLUTE: 0.5 K/UL (ref 0.2–0.8)
MONOCYTES RELATIVE PERCENT: 9.6 %
NEUTROPHILS ABSOLUTE: 2.1 K/UL (ref 1.4–6.5)
NEUTROPHILS RELATIVE PERCENT: 41.5 %
NITRITE, URINE: NEGATIVE
OPIATE SCREEN URINE: NORMAL
OXYCODONE URINE: NORMAL
PDW BLD-RTO: 13.1 % (ref 11.5–14.5)
PERFORMED ON: ABNORMAL
PERFORMED ON: NORMAL
PH UA: 6.5 (ref 5–9)
PHENCYCLIDINE SCREEN URINE: NORMAL
PHOSPHORUS: 3 MG/DL (ref 2.3–4.8)
PLATELET # BLD: 170 K/UL (ref 130–400)
PLATELET # BLD: 179 K/UL (ref 130–400)
POTASSIUM SERPL-SCNC: 3.1 MEQ/L (ref 3.4–4.9)
PROPOXYPHENE SCREEN: NORMAL
PROTEIN UA: NEGATIVE MG/DL
PROTHROMBIN TIME: 12.2 SEC (ref 12.3–14.9)
RBC # BLD: 3.69 M/UL (ref 4.7–6.1)
RBC # BLD: NORMAL 10*6/UL
RBC UA: NORMAL /HPF (ref 0–2)
SODIUM BLD-SCNC: 133 MEQ/L (ref 135–144)
SPECIFIC GRAVITY UA: 1.02 (ref 1–1.03)
TOTAL PROTEIN: 6.9 G/DL (ref 6.3–8)
TROPONIN: <0.01 NG/ML (ref 0–0.01)
URINE REFLEX TO CULTURE: ABNORMAL
UROBILINOGEN, URINE: 0.2 E.U./DL
WBC # BLD: 5 K/UL (ref 4.8–10.8)
WBC UA: NORMAL /HPF (ref 0–5)

## 2020-10-26 PROCEDURE — G0480 DRUG TEST DEF 1-7 CLASSES: HCPCS

## 2020-10-26 PROCEDURE — 93010 ELECTROCARDIOGRAM REPORT: CPT | Performed by: INTERNAL MEDICINE

## 2020-10-26 PROCEDURE — 80053 COMPREHEN METABOLIC PANEL: CPT

## 2020-10-26 PROCEDURE — 70450 CT HEAD/BRAIN W/O DYE: CPT

## 2020-10-26 PROCEDURE — 85025 COMPLETE CBC W/AUTO DIFF WBC: CPT

## 2020-10-26 PROCEDURE — 99222 1ST HOSP IP/OBS MODERATE 55: CPT | Performed by: PSYCHIATRY & NEUROLOGY

## 2020-10-26 PROCEDURE — 71045 X-RAY EXAM CHEST 1 VIEW: CPT

## 2020-10-26 PROCEDURE — 2580000003 HC RX 258: Performed by: PHYSICIAN ASSISTANT

## 2020-10-26 PROCEDURE — 6360000002 HC RX W HCPCS: Performed by: PHYSICIAN ASSISTANT

## 2020-10-26 PROCEDURE — 36415 COLL VENOUS BLD VENIPUNCTURE: CPT

## 2020-10-26 PROCEDURE — 81001 URINALYSIS AUTO W/SCOPE: CPT

## 2020-10-26 PROCEDURE — 2580000003 HC RX 258: Performed by: INTERNAL MEDICINE

## 2020-10-26 PROCEDURE — 97167 OT EVAL HIGH COMPLEX 60 MIN: CPT

## 2020-10-26 PROCEDURE — 6360000002 HC RX W HCPCS: Performed by: INTERNAL MEDICINE

## 2020-10-26 PROCEDURE — 84100 ASSAY OF PHOSPHORUS: CPT

## 2020-10-26 PROCEDURE — 70498 CT ANGIOGRAPHY NECK: CPT

## 2020-10-26 PROCEDURE — 80307 DRUG TEST PRSMV CHEM ANLYZR: CPT

## 2020-10-26 PROCEDURE — 85049 AUTOMATED PLATELET COUNT: CPT

## 2020-10-26 PROCEDURE — 99285 EMERGENCY DEPT VISIT HI MDM: CPT

## 2020-10-26 PROCEDURE — 92610 EVALUATE SWALLOWING FUNCTION: CPT

## 2020-10-26 PROCEDURE — 85520 HEPARIN ASSAY: CPT

## 2020-10-26 PROCEDURE — 83036 HEMOGLOBIN GLYCOSYLATED A1C: CPT

## 2020-10-26 PROCEDURE — 6370000000 HC RX 637 (ALT 250 FOR IP): Performed by: INTERNAL MEDICINE

## 2020-10-26 PROCEDURE — 83735 ASSAY OF MAGNESIUM: CPT

## 2020-10-26 PROCEDURE — 6360000004 HC RX CONTRAST MEDICATION: Performed by: PHYSICIAN ASSISTANT

## 2020-10-26 PROCEDURE — 85730 THROMBOPLASTIN TIME PARTIAL: CPT

## 2020-10-26 PROCEDURE — 1210000000 HC MED SURG R&B

## 2020-10-26 PROCEDURE — 70496 CT ANGIOGRAPHY HEAD: CPT

## 2020-10-26 PROCEDURE — 93005 ELECTROCARDIOGRAM TRACING: CPT | Performed by: INTERNAL MEDICINE

## 2020-10-26 PROCEDURE — 84484 ASSAY OF TROPONIN QUANT: CPT

## 2020-10-26 PROCEDURE — 85610 PROTHROMBIN TIME: CPT

## 2020-10-26 PROCEDURE — 93306 TTE W/DOPPLER COMPLETE: CPT

## 2020-10-26 PROCEDURE — 2500000003 HC RX 250 WO HCPCS: Performed by: INTERNAL MEDICINE

## 2020-10-26 PROCEDURE — 97162 PT EVAL MOD COMPLEX 30 MIN: CPT

## 2020-10-26 PROCEDURE — 70551 MRI BRAIN STEM W/O DYE: CPT

## 2020-10-26 RX ORDER — GLIMEPIRIDE 1 MG/1
1 TABLET ORAL
Status: DISCONTINUED | OUTPATIENT
Start: 2020-10-27 | End: 2020-10-29 | Stop reason: HOSPADM

## 2020-10-26 RX ORDER — ATORVASTATIN CALCIUM 80 MG/1
80 TABLET, FILM COATED ORAL DAILY
Status: DISCONTINUED | OUTPATIENT
Start: 2020-10-26 | End: 2020-10-29 | Stop reason: HOSPADM

## 2020-10-26 RX ORDER — CARVEDILOL 25 MG/1
25 TABLET ORAL 2 TIMES DAILY
Status: DISCONTINUED | OUTPATIENT
Start: 2020-10-26 | End: 2020-10-29 | Stop reason: HOSPADM

## 2020-10-26 RX ORDER — ACETAMINOPHEN 650 MG/1
650 SUPPOSITORY RECTAL EVERY 6 HOURS PRN
Status: DISCONTINUED | OUTPATIENT
Start: 2020-10-26 | End: 2020-10-29 | Stop reason: HOSPADM

## 2020-10-26 RX ORDER — DEXTROSE MONOHYDRATE 25 G/50ML
12.5 INJECTION, SOLUTION INTRAVENOUS PRN
Status: DISCONTINUED | OUTPATIENT
Start: 2020-10-26 | End: 2020-10-29 | Stop reason: HOSPADM

## 2020-10-26 RX ORDER — PROMETHAZINE HYDROCHLORIDE 12.5 MG/1
12.5 TABLET ORAL EVERY 6 HOURS PRN
Status: DISCONTINUED | OUTPATIENT
Start: 2020-10-26 | End: 2020-10-29 | Stop reason: HOSPADM

## 2020-10-26 RX ORDER — CLOPIDOGREL BISULFATE 75 MG/1
75 TABLET ORAL DAILY
Status: DISCONTINUED | OUTPATIENT
Start: 2020-10-26 | End: 2020-10-29 | Stop reason: HOSPADM

## 2020-10-26 RX ORDER — POLYETHYLENE GLYCOL 3350 17 G/17G
17 POWDER, FOR SOLUTION ORAL DAILY PRN
Status: DISCONTINUED | OUTPATIENT
Start: 2020-10-26 | End: 2020-10-29 | Stop reason: HOSPADM

## 2020-10-26 RX ORDER — ALLOPURINOL 300 MG/1
300 TABLET ORAL DAILY
Status: DISCONTINUED | OUTPATIENT
Start: 2020-10-26 | End: 2020-10-29 | Stop reason: HOSPADM

## 2020-10-26 RX ORDER — ONDANSETRON 2 MG/ML
4 INJECTION INTRAMUSCULAR; INTRAVENOUS EVERY 6 HOURS PRN
Status: DISCONTINUED | OUTPATIENT
Start: 2020-10-26 | End: 2020-10-29 | Stop reason: HOSPADM

## 2020-10-26 RX ORDER — UBIDECARENONE 75 MG
50 CAPSULE ORAL DAILY
Status: DISCONTINUED | OUTPATIENT
Start: 2020-10-26 | End: 2020-10-29 | Stop reason: HOSPADM

## 2020-10-26 RX ORDER — SODIUM CHLORIDE 0.9 % (FLUSH) 0.9 %
10 SYRINGE (ML) INJECTION EVERY 12 HOURS SCHEDULED
Status: DISCONTINUED | OUTPATIENT
Start: 2020-10-26 | End: 2020-10-29 | Stop reason: HOSPADM

## 2020-10-26 RX ORDER — ALPRAZOLAM 0.5 MG/1
0.5 TABLET ORAL EVERY 6 HOURS PRN
Status: DISCONTINUED | OUTPATIENT
Start: 2020-10-26 | End: 2020-10-29 | Stop reason: HOSPADM

## 2020-10-26 RX ORDER — ACETAMINOPHEN 325 MG/1
650 TABLET ORAL EVERY 6 HOURS PRN
Status: DISCONTINUED | OUTPATIENT
Start: 2020-10-26 | End: 2020-10-29 | Stop reason: HOSPADM

## 2020-10-26 RX ORDER — SODIUM CHLORIDE 0.9 % (FLUSH) 0.9 %
10 SYRINGE (ML) INJECTION PRN
Status: DISCONTINUED | OUTPATIENT
Start: 2020-10-26 | End: 2020-10-29 | Stop reason: HOSPADM

## 2020-10-26 RX ORDER — HEPARIN SODIUM 10000 [USP'U]/100ML
12 INJECTION, SOLUTION INTRAVENOUS CONTINUOUS
Status: DISCONTINUED | OUTPATIENT
Start: 2020-10-26 | End: 2020-10-27

## 2020-10-26 RX ORDER — 0.9 % SODIUM CHLORIDE 0.9 %
1000 INTRAVENOUS SOLUTION INTRAVENOUS ONCE
Status: COMPLETED | OUTPATIENT
Start: 2020-10-26 | End: 2020-10-26

## 2020-10-26 RX ORDER — ASPIRIN 81 MG/1
81 TABLET ORAL DAILY
Status: DISCONTINUED | OUTPATIENT
Start: 2020-10-26 | End: 2020-10-29 | Stop reason: HOSPADM

## 2020-10-26 RX ORDER — NICOTINE POLACRILEX 4 MG
15 LOZENGE BUCCAL PRN
Status: DISCONTINUED | OUTPATIENT
Start: 2020-10-26 | End: 2020-10-29 | Stop reason: HOSPADM

## 2020-10-26 RX ORDER — DEXTROSE MONOHYDRATE 50 MG/ML
100 INJECTION, SOLUTION INTRAVENOUS PRN
Status: DISCONTINUED | OUTPATIENT
Start: 2020-10-26 | End: 2020-10-29 | Stop reason: HOSPADM

## 2020-10-26 RX ADMIN — ATORVASTATIN CALCIUM 80 MG: 80 TABLET, FILM COATED ORAL at 14:03

## 2020-10-26 RX ADMIN — HEPARIN SODIUM AND DEXTROSE 12 UNITS/KG/HR: 10000; 5 INJECTION INTRAVENOUS at 07:34

## 2020-10-26 RX ADMIN — SODIUM CHLORIDE 1000 ML: 9 INJECTION, SOLUTION INTRAVENOUS at 06:51

## 2020-10-26 RX ADMIN — ASPIRIN 81 MG: 81 TABLET, COATED ORAL at 14:03

## 2020-10-26 RX ADMIN — IOPAMIDOL 100 ML: 612 INJECTION, SOLUTION INTRAVENOUS at 06:26

## 2020-10-26 RX ADMIN — CARVEDILOL 25 MG: 25 TABLET, FILM COATED ORAL at 20:47

## 2020-10-26 RX ADMIN — INSULIN LISPRO 4 UNITS: 100 INJECTION, SOLUTION INTRAVENOUS; SUBCUTANEOUS at 17:02

## 2020-10-26 RX ADMIN — INSULIN LISPRO 4 UNITS: 100 INJECTION, SOLUTION INTRAVENOUS; SUBCUTANEOUS at 14:07

## 2020-10-26 RX ADMIN — POTASSIUM BICARBONATE 40 MEQ: 782 TABLET, EFFERVESCENT ORAL at 14:02

## 2020-10-26 RX ADMIN — VITAM B12 50 MCG: 100 TAB at 17:00

## 2020-10-26 RX ADMIN — Medication 10 ML: at 20:50

## 2020-10-26 RX ADMIN — INSULIN LISPRO 1 UNITS: 100 INJECTION, SOLUTION INTRAVENOUS; SUBCUTANEOUS at 20:50

## 2020-10-26 RX ADMIN — THIAMINE HYDROCHLORIDE: 100 INJECTION, SOLUTION INTRAMUSCULAR; INTRAVENOUS at 22:27

## 2020-10-26 RX ADMIN — CLOPIDOGREL BISULFATE 75 MG: 75 TABLET ORAL at 14:03

## 2020-10-26 RX ADMIN — CARVEDILOL 25 MG: 25 TABLET, FILM COATED ORAL at 14:03

## 2020-10-26 RX ADMIN — ALLOPURINOL 300 MG: 300 TABLET ORAL at 14:03

## 2020-10-26 ASSESSMENT — PAIN DESCRIPTION - FREQUENCY: FREQUENCY: CONTINUOUS

## 2020-10-26 ASSESSMENT — PAIN DESCRIPTION - LOCATION
LOCATION: HIP
LOCATION: HEAD
LOCATION: HIP

## 2020-10-26 ASSESSMENT — ENCOUNTER SYMPTOMS
SHORTNESS OF BREATH: 0
COLOR CHANGE: 0
CHOKING: 0
VOMITING: 0
TROUBLE SWALLOWING: 0
BACK PAIN: 0
NAUSEA: 0
PHOTOPHOBIA: 0

## 2020-10-26 ASSESSMENT — PAIN DESCRIPTION - DESCRIPTORS
DESCRIPTORS: HEADACHE
DESCRIPTORS: HEADACHE

## 2020-10-26 ASSESSMENT — PAIN DESCRIPTION - ORIENTATION
ORIENTATION: RIGHT
ORIENTATION: RIGHT;LEFT
ORIENTATION: RIGHT;LEFT

## 2020-10-26 ASSESSMENT — PAIN SCALES - GENERAL
PAINLEVEL_OUTOF10: 7
PAINLEVEL_OUTOF10: 6
PAINLEVEL_OUTOF10: 6

## 2020-10-26 ASSESSMENT — PAIN DESCRIPTION - PAIN TYPE
TYPE: CHRONIC PAIN
TYPE: CHRONIC PAIN

## 2020-10-26 NOTE — PROGRESS NOTES
[facility]   Facility/Department: [facility] Keo Castañeda  Speech Language Pathology  Clinical Bedside Swallow Evaluation    NAME:Khari Diaz  : 1955 (72 y.o.)   MRN: [id number]  ROOM: [place] [address]  ADMISSION DATE: 10/26/2020  PATIENT DIAGNOSIS(ES): Acute cerebrovascular accident (CVA) [facility]) [I63.9]  Chief Complaint   Patient presents with    Extremity Weakness     right    Headache     Patient Active Problem List    Diagnosis Date Noted    Claudication [facility])      Priority: High    Acute cerebrovascular accident (CVA) ([facility] [address] 10/26/2020    Left carotid artery stenosis 2020    Tinnitus of both ears 2020    Sensorineural hearing loss (SNHL) of left ear 2020    Ataxic gait 2020    Abscess of back 2020    Abscess of right leg 2020    Venous insufficiency of both lower extremities 2019    PAD (peripheral artery disease) ([facility] [address] 2019    Lymphedema of both lower extremities 2018    Microalbuminuria due to type 2 diabetes mellitus ([facility] [address] 2018    Skin infection 2018    Cutaneous abscess of back excluding buttocks 2018    Dependent edema 2017    Tremor of right hand 05/15/2017    Unsteady gait 05/15/2017    AYLIN (obstructive sleep apnea) 12/10/2016    Gout 12/10/2016    Macrocytosis without anemia 12/10/2016    Spasm 2016    Right-sided muscle weakness 10/19/2016    Essential hypertension 2016    History of CVA (cerebrovascular accident) 08/15/2016    Skin cyst 2016    Local infection of skin and subcutaneous tissue 2016    CAD S/P percutaneous coronary angioplasty 2014    History of myocardial infarction 2014    Type 2 diabetes mellitus with diabetic polyneuropathy, without long-term current use of insulin ([address] 2014    History of recurrent pneumonia 2014    History of heart failure 2014    Morbid obesity ([facility] [address] 2014     Past Medical History:   Diagnosis Date    Acute renal failure ([address]     Anxiety     CAD S/P percutaneous coronary angioplasty 3/11/2014    Cardiomyopathy [facility])     Cerebrovascular accident (CVA) due to occlusion of left middle cerebral artery ([address] 08/2016    brainstem infarction from left MCA occlusion    Cerebrovascular disease 07/27/2016    Coronary angioplasty status     CVA (cerebral vascular accident) ([address] 11/12/2017    Dependent edema 9/14/2017    Diplopia 5/15/2017    Resolved with management of cataracts    Dupuytren contracture 1/2/2018    Dysphagia 8/18/2011    Dysphonia 8/18/2011    Essential hypertension 8/17/2016    Gallop rhythm     Gout 12/10/2016    Gout of big toe 08/2014    Right Great Toe    H/O fall     Headache     migraines    Heart failure ([address]     History of 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    History of heart failure 1/6/2014    History of myocardial infarction 3/11/2014    History of recurrent pneumonia 2/11/2014    Hx of bacterial pneumonia     Hypotension     Left carotid artery stenosis 8/23/2020    Left carotid artery stenosis 8/23/2020    Leg swelling     Macrocytosis without anemia 12/10/2016    Microalbuminuria due to type 2 diabetes mellitus ([address] 9/14/2018    Morbid obesity ([address] 1/2/2014    Myocardial infarction ([address]     Obesity     AYLIN (obstructive sleep apnea) 12/10/2016    Osteoarthritis     Right-sided muscle weakness 10/19/2016    S/P cardiac catheterization     Skin cyst 4/8/2016    Spasm 11/9/2016    Stented coronary artery     Tremor of right hand 5/15/2017    Type 2 diabetes mellitus with diabetic polyneuropathy, without long-term current use of insulin ([address]     Unsteady gait 5/15/2017    Weakness     Weight gain      Past Surgical History:   Procedure Laterality Date    CHOLECYSTECTOMY      COLONOSCOPY  01/15/2010    polyp, diverticulosis (DR ELAINE)    CORONARY ANGIOPLASTY WITH STENT PLACEMENT  2014    CYST REMOVAL  05/16/16    DR Primitivo Crystal CYST    SHOULDER SURGERY Right 12/18/2015     Allergies   Allergen Reactions    Bactrim [Sulfamethoxazole-Trimethoprim] Nausea And Vomiting and Other (See Comments)     Sugar count elevated, had troubles urinating       DATE ONSET: 10/26/2020    Date of Evaluation: 10/26/2020   Evaluating Therapist: Loreto Mckinney. Wayne Manley, SLP    Recommended Diet and Intervention  Diet Solids Recommendation: Regular  Liquid Consistency Recommendation: Thin  Recommended Form of Meds: PO  Recommendations: Self feed       Compensatory Swallowing Strategies  Compensatory Swallowing Strategies: Alternate solids and liquids;Small bites/sips    Reason for Referral  Cecilia Quijano was referred for a bedside swallow evaluation to assess the efficiency of his swallow function, identify signs and symptoms of aspiration and make recommendations regarding safe dietary consistencies, effective compensatory strategies, and safe eating environment. General  Chart Reviewed: Yes  Behavior/Cognition: Alert; Cooperative;Pleasant mood  Respiratory Status: Room air  Communication Observation: Aphasia  Follows Directions: Simple  Dentition: Adequate  Patient Positioning: Upright in bed  Baseline Vocal Quality: Normal  Prior Dysphagia History: Pt reports no dysphagia following previous strokes. Consistencies Administered: Reg solid; Dysphagia Pureed (Dysphagia I); Thin - cup    Current Diet level:  Current Diet : Regular  Current Liquid Diet : Thin    Oral Motor Deficits  Oral/Motor  Oral Motor: Exceptions to [facility]  Lingual Sensation: Reduced(pt c/o numbness anterior portion of tongue)    Oral Phase Dysfunction  Oral Phase  Oral Phase: Exceptions  Oral Phase Dysfunction  Decreased Anterior to Posterior Transit: Pudding;Reg solid  Oral Phase  Oral Phase - Comment: Mild oral dysphagia characterized by increased time for bolus propulsion with slow rate.  Pt utilized liquid wash to assist with propulsion with cracker. Indicators of Pharyngeal Phase Dysfunction   Pharyngeal Phase  Pharyngeal Phase: WFL  Pharyngeal Phase   Pharyngeal: Pharyngeal phase WFL. Adequate laryngeal elevation. No overt s/s of aspiration. Impression  Dysphagia Diagnosis: Mild oral stage dysphagia  Dysphagia Outcome Severity Scale: Level 5: Mild dysphagia- Distant supervision. May need one diet consistency restricted     Treatment Plan  Requires SLP Intervention: No       Prognosis  Individuals consulted  Consulted and agree with results and recommendations: Patient; Family member, Lauren PERRY  Family member consulted: Spouse    Education  Patient Education: Educated pt and spouse regarding results. Patient Education Response: Verbalizes understanding  Safety Devices in place: Yes  Type of devices: Call light within reach    Pain Assessment:  Initial Assessment:  Patient demonstrated no s/s of pain. Re-assessment:  Patient demonstrated no s/s of pain. Therapy Time  SLP Individual Minutes  Time In: 1270  Time Out: [facility] [address]  Minutes: 20              Signature: Electronically signed by Belén Oconnell.  MATT Kruger on 10/26/2020 at 11:11 AM

## 2020-10-26 NOTE — PROGRESS NOTES
[facility] OCCUPATIONAL THERAPY EVALUATION - ACUTE     NAME: Cecilia Quijano  : 1955 (72 y.o.)  MRN: [id number]  CODE STATUS: Full Code  [facility]    Date of Service: 10/26/2020    Patient Diagnosis(es): Acute cerebrovascular accident (CVA) [facility]) [I63.9]   Chief Complaint   Patient presents with    Extremity Weakness     right    Headache     Patient Active Problem List    Diagnosis Date Noted    Claudication [facility])      Priority: High    Acute cerebrovascular accident (CVA) ([facility] [address] 10/26/2020    Left carotid artery stenosis 2020    Tinnitus of both ears 2020    Sensorineural hearing loss (SNHL) of left ear 2020    Ataxic gait 2020    Abscess of back 2020    Abscess of right leg 2020    Venous insufficiency of both lower extremities 2019    PAD (peripheral artery disease) ([facility] [address] 2019    Lymphedema of both lower extremities 2018    Microalbuminuria due to type 2 diabetes mellitus ([facility] [address] 2018    Skin infection 2018    Cutaneous abscess of back excluding buttocks 2018    Dependent edema 2017    Tremor of right hand 05/15/2017    Unsteady gait 05/15/2017    AYLIN (obstructive sleep apnea) 12/10/2016    Gout 12/10/2016    Macrocytosis without anemia 12/10/2016    Spasm 2016    Right-sided muscle weakness 10/19/2016    Essential hypertension 2016    History of CVA (cerebrovascular accident) 08/15/2016    Skin cyst 2016    Local infection of skin and subcutaneous tissue 2016    CAD S/P percutaneous coronary angioplasty 2014    History of myocardial infarction 2014    Type 2 diabetes mellitus with diabetic polyneuropathy, without long-term current use of insulin ([address] 2014    History of recurrent pneumonia 2014    History of heart failure 2014    Morbid obesity ([address] 2014        Past Medical History:   Diagnosis Date    Acute renal failure ([address] PLACEMENT  2014    CYST REMOVAL  05/16/16    DR Jose Eduardo Larios CYST    SHOULDER SURGERY Right 12/18/2015        Restrictions  Restrictions/Precautions: Fall Risk(No cruz)     Safety Devices: Safety Devices  Safety Devices in place: Yes  Type of devices: All fall risk precautions in place        Subjective  Pre Treatment Pain Screening  Pain at present: 6  Scale Used: Numeric Score  Intervention List: Patient able to continue with treatment, Patient declined any intervention    Pain Reassessment:   Pain Assessment  Patient Currently in Pain: Yes  Pain Assessment: 0-10  Pain Level: 6  Pain Type: Chronic pain  Pain Location: Hip(And SI joint)  Pain Orientation: Right, Left  Pain Descriptors: Headache       Prior Level of Function:  Social/Functional History  Lives With: Spouse  Type of Home: House  Home Layout: Two level, Bed/Bath upstairs, Laundry in basement(Railing)  Home Access: Stairs to enter with rails  Entrance Stairs - Number of Steps: 3 in back, 4 in front  Bathroom Shower/Tub: Tub/Shower unit  Bathroom Equipment: Shower chair  Home Equipment: Rolling walker, Cane  ADL Assistance: Independent  Homemaking Assistance: Needs assistance(Spouse  is primary; vertigo and arthritis limit housework)  Ambulation Assistance: Independent(Walker outside, cane inside)  Transfer Assistance: Independent  Active : No  Patient's  Info: has not driven since CVA    OBJECTIVE:     Orientation Status:  Orientation  Overall Orientation Status: Within Functional Limits    Observation:  Observation/Palpation  Posture: Good  Observation: difficulty with word finding, increased time to speak and to process commands    Cognition Status:  Cognition  Overall Cognitive Status: Exceptions  Arousal/Alertness: Appropriate responses to stimuli  Following Commands:  Follows multistep commands with increased time, Follows one step commands with increased time  Attention Span: Appears intact  Memory: Appears intact  Safety Judgement: Good awareness of safety precautions  Problem Solving: Assistance required to generate solutions, Assistance required to implement solutions, Assistance required to correct errors made, Assistance required to identify errors made  Insights: Fully aware of deficits  Initiation: Requires cues for some  Sequencing: Requires cues for some  Cognition Comment: Increased time for problem solving and sequencing    Perception Status:  Perception  Overall Perceptual Status: WFL    Sensation Status:  Sensation  Overall Sensation Status: [facility]    Vision and Hearing Status:  Vision  Vision: Impaired  Vision Exceptions: Wears glasses at all times  Hearing  Hearing: Within functional limits     ROM:   LUE AROM (degrees)  LUE AROM : WFL  Left Hand AROM (degrees)  Left Hand AROM: WFL  RUE AROM (degrees)  RUE AROM : WFL  Right Hand AROM (degrees)  Right Hand AROM: WFL    Strength:  LUE Strength  Gross LUE Strength: Exceptions to [facility]  L Hand General: 4/5  LUE Strength Comment: 4/5 all planes  RUE Strength  Gross RUE Strength: Exceptions to [facility]  R Hand General: 3+/5  RUE Strength Comment: 3+/5 all planes    Coordination, Tone, Quality of Movement: Tone RUE  RUE Tone: Normotonic  Tone LUE  LUE Tone: Normotonic  Coordination  Movements Are Fluid And Coordinated: No  Coordination and Movement description: Fine motor impairments, Decreased speed, Decreased accuracy, Right UE  Quality of Movement Other  Comment: Pt. states he has some mild baseline deficits in R hand but overall decrease is significant at this time. Hand Dominance:  Hand Dominance  Hand Dominance: Right    ADL Status:  ADL  Feeding: Independent  Grooming: Stand by assistance  UE Bathing: Stand by assistance  LE Bathing: Maximum assistance  UE Dressing: Minimal assistance  LE Dressing: Maximum assistance  Toileting: Maximum assistance  Additional Comments: Simulated ADLs as above.  unable to stand to simulate pants management; unable to perform figure 4 for socks.  Toilet Transfers  Toilet Transfer: Unable to assess  Toilet Transfers Comments: Anticipate dependent based on other mobility       Therapy key for assistance levels -   Independent = Pt. is able to perform task with no assistance but may require a device   Stand by assistance = Pt. does not perform task at an independent level but does not need physical assistance, requires verbal cues  Minimal, Moderate, Maximal Assistance = Pt. requires physical assistance (25%, 50%, 75% assist from helper) for task but is able to actively participate in task   Dependent = Pt. requires total assistance with task and is not able to actively participate with task completion     Functional Mobility:  Functional Mobility  Functional Mobility Comments: Unable to take any steps  Transfers  Sit to stand: Unable to assess  Stand to sit: Unable to assess  Transfer Comments: Unable to come fully to stand despite assist of 2 and lifting the bed    Bed Mobility  Bed mobility  Sit to Supine: Stand by assistance  Comment: Pt. sitting EOB at start of eval. Returns to supine with increased effort    Seated and Standing Balance:  Balance  Sitting Balance: Supervision  Standing Balance: Unable to assess(comment)(Unable to fully come to stand even with bed elevated)    Functional Endurance:  Activity Tolerance  Activity Tolerance: Patient Tolerated treatment well    D/C Recommendations:  OT D/C RECOMMENDATIONS  REQUIRES OT FOLLOW UP: Yes    Equipment Recommendations:  OT Equipment Recommendations  Other: Continue to assess    OT Education:   OT Education  OT Education: OT Role, Plan of Care  Patient Education: Educated pt.  on role of acute care OT  Barriers to Learning: None    OT Follow Up:  OT D/C RECOMMENDATIONS  REQUIRES OT FOLLOW UP: Yes       Assessment/Discharge Disposition:  Assessment: Pt. is a 72year old man from home with spouse who presents to [facility] with a CVA with the above deficits which impact his ability to perform ADLs and appropriate D/C site with as few architectural barriers as possible. - Sequence self-care tasks with no verbal cues for safety    Patient Goal: Patient goals : \"I want to get home\"      Discussed and agreed upon: Yes Comments:     Therapy Time:   OT Individual Minutes  Time In: 0585  Time Out: 1125  Minutes: 28    Eval: 18 minutes   -10 minute for physician visit    Electronically signed by:     HOUSTON Still  10/26/2020, 1:02 PM

## 2020-10-26 NOTE — ED NOTES
Report called to [facility] in [place] on [address] monitor applied onto patient   All belongings gathered  Clothing sent home with wife       Naomy Dangelo, [address] [facility]  10/26/20 1437

## 2020-10-26 NOTE — PROGRESS NOTES
Spiritual Care Services     Summary of Visit:   visited patient in ED bed 5 due to a Code BAT. Patient was in bed with a hat over his eyes because he said it hurts for the light to be in his eyes. Patient said this was his third stroke. Wife was sitting at bedside. Patient says he his a  with a local 's organization.  said a prayer and provided encouragement and support to patient and wife. Spiritual Assessment/Intervention/Outcomes:    Encounter Summary  Services provided to[de-identified] Patient and family together  Support System: Spouse, Children, [employer] community  Continue Visiting: No  Complexity of Encounter: High  Length of Encounter: 15 minutes     Crisis  Type: Code, Stroke Alert  Assessment: Calm, Approachable  Intervention: Prayer, Sustaining presence/ Ministry of presence  Outcome: Expressed gratitude, Encouraged           Advance Directives (For Healthcare)  Pre-existing DNR Comfort Care/DNR Arrest/DNI Order: No                Care Plan:        Spiritual Care Services   Electronically signed by Sushil Ellis on 10/26/20 at 7:51 AM EDT     To reach a  for emotional and spiritual support, place an [facility] consult request.   If a  is needed immediately, dial 0 and ask to page the on-call .

## 2020-10-26 NOTE — H&P
Hospital Medicine  History and Physical    Patient:  Mace Halsted  MRN: [id number]    CHIEF COMPLAINT:    Chief Complaint   Patient presents with    Extremity Weakness     right    Headache       History Obtained From:  Patient, EMR  Primary Care Physician: Rodrigue Gasca MD    HISTORY OF PRESENT ILLNESS:   The patient is a 72 y.o. male with PMH of stroke and coronary atherosclerosis. Yesterday he had developed right-sided weakness and expressive aphasia. He came to the emergency room and that was diagnosed having a suspected CVA. He was determined not to be candidate for TPA due to a timeframe. CTA did not show any clots. Consultation with neurology had been already implemented in the emergency room department. I was asked to admit the patient. No chest pain or palpitation. No abdominal pain, nausea or vomiting. No headaches, loss of consciousness or seizure.     Past Medical History:      Diagnosis Date    Acute renal failure ([address]     Anxiety     CAD S/P percutaneous coronary angioplasty 3/11/2014    Cardiomyopathy [facility])     Cerebrovascular accident (CVA) due to occlusion of left middle cerebral artery ([address] 08/2016    brainstem infarction from left MCA occlusion    Cerebrovascular disease 07/27/2016    Coronary angioplasty status     CVA (cerebral vascular accident) ([address] 11/12/2017    Dependent edema 9/14/2017    Diplopia 5/15/2017    Resolved with management of cataracts    Dupuytren contracture 1/2/2018    Dysphagia 8/18/2011    Dysphonia 8/18/2011    Essential hypertension 8/17/2016    Gallop rhythm     Gout 12/10/2016    Gout of big toe 08/2014    Right Great Toe    H/O fall     Headache     migraines    Heart failure ([address]     History of 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    History of heart failure 1/6/2014    History of myocardial infarction 3/11/2014    History of recurrent pneumonia 2/11/2014    Hx of bacterial pneumonia     Hypotension     Left carotid artery stenosis 8/23/2020    Left carotid artery stenosis 8/23/2020    Leg swelling     Macrocytosis without anemia 12/10/2016    Microalbuminuria due to type 2 diabetes mellitus ([facility] 9/14/2018    Morbid obesity ([facility] 1/2/2014    Myocardial infarction ([facility]     Obesity     AYLIN (obstructive sleep apnea) 12/10/2016    Osteoarthritis     Right-sided muscle weakness 10/19/2016    S/P cardiac catheterization     Skin cyst 4/8/2016    Spasm 11/9/2016    Stented coronary artery     Tremor of right hand 5/15/2017    Type 2 diabetes mellitus with diabetic polyneuropathy, without long-term current use of insulin ([facility]     Unsteady gait 5/15/2017    Weakness     Weight gain        Past Surgical History:      Procedure Laterality Date    CHOLECYSTECTOMY      COLONOSCOPY  01/15/2010    polyp, diverticulosis (DR ELAINE)    CORONARY ANGIOPLASTY WITH STENT PLACEMENT  2014    CYST REMOVAL  05/16/16    DR Villalta Elverta CYST    SHOULDER SURGERY Right 12/18/2015       Medications Prior to Admission:    Prior to Admission medications    Medication Sig Start Date End Date Taking?  Authorizing Provider   atorvastatin (LIPITOR) 40 MG tablet Take 1 tablet by mouth daily 9/29/20  Yes Anna Blanco MD   clopidogrel (PLAVIX) 75 MG tablet Take 1 tablet by mouth daily 9/15/20  Yes Anna Blanco MD   carvedilol (COREG) 25 MG tablet Take 1 tablet by mouth 2 times daily 8/10/20  Yes Anna Blanco MD   torsemide (DEMADEX) 20 MG tablet Take 1 tablet by mouth daily 6/22/20  Yes Anna Blanco MD   glimepiride (AMARYL) 1 MG tablet Take 1 tablet by mouth every morning (before breakfast) 5/19/20  Yes Anna Blanco MD   allopurinol (ZYLOPRIM) 300 MG tablet Take 1 tablet by mouth daily 5/19/20  Yes Anna Blanco MD   Elastic Bandages & Supports (V-4 HIGH COMPRESSION HOSE) MISC KNEE HIGH - 20-30 mmHg 5/18/20  Yes Lieutenant Torie MD   Alcohol Swabs PADS DX: diabetes mellitus. Test 1 time(s) daily - Ok to substitute per insurance (1 each = 1 box) 5/14/20  Yes Lieutenant Torie MD   blood glucose monitor strips DX: diabetes mellitus. Use 1 time(s) daily - True Metrix requested by patient - [id number] Claudia Hope to substitute per insurance 5/14/20  Yes Lieutenant Torie MD   Lancets MISC DX: diabetes mellitus. Use 1 time(s) daily - Ok to substitute per insurance (1 each = 1 box) 5/14/20  Yes Lieutenant Torie MD   blood glucose monitor kit and supplies DX: diabetes mellitus. Test 1 time(s) daily - True Metrix requested by patient - [id number] Claudia Hope to substitute per insurance 5/14/20  Yes Lieutenant Torie MD   metFORMIN (GLUCOPHAGE) 500 MG tablet Take 1 tablet by mouth 2 times daily (with meals) 2/24/20  Yes Lieutenant Torie MD   ibuprofen (IBU) 800 MG tablet Take 1 tablet by mouth every 8 hours as needed for Pain 12/23/19  Yes Mary Zazueta MD   aspirin 81 MG EC tablet Take 81 mg by mouth daily   Yes Historical Provider, MD   Probiotic Product (PROBIOTIC DAILY PO) Take 1 tablet by mouth daily   Yes Historical Provider, MD   Ascorbic Acid (VITAMIN C) 250 MG tablet Take 250 mg by mouth daily   Yes Historical Provider, MD   vitamin B-12 (CYANOCOBALAMIN) 100 MCG tablet Take 50 mcg by mouth daily   Yes Historical Provider, MD   lidocaine (LIDODERM) 5 % Place 1 patch onto the skin every 12 hours  9/8/18  Yes Historical Provider, MD   ammonium lactate (AMLACTIN) 12 % cream APPLY TO DRY CALLUS AREAS 1 TO 2 TIMES DAILY 8/21/17  Yes Historical Provider, MD   Misc. Devices (COMMODE BEDSIDE) MISC Use daily as needed 8/22/16  Yes Kevin Damon MD   MULTIPLE VITAMIN PO Take 1 tablet by mouth daily    Yes Historical Provider, MD       Allergies:  Bactrim [sulfamethoxazole-trimethoprim]    Social History:   TOBACCO:   reports that he quit smoking about 19 years ago. He has a 75.00 pack-year smoking history.  He has never used smokeless tobacco.  ETOH:   reports current alcohol use. Family History:       Problem Relation Age of Onset   Ardyth Moritz Breast Cancer Mother     Stroke Father     Colon Cancer Father 76       REVIEW OF SYSTEMS:  Ten systems reviewed and negative except for stated in HPI    Physical Exam:    Vitals: /65   Pulse 90   Temp 98.2 °F (36.8 °C) (Oral)   Resp 21   Ht 6' 1\" (1.854 m)   Wt 287 lb 14.7 oz (130.6 kg)   SpO2 97%   BMI 37.99 kg/m²   General appearance: alert, appears stated age and cooperative, no  acute distress. Morbidly obese. Skin: Skin color, texture, turgor normal. No rashes or lesions  HEENT: Head: Normocephalic, no lesions, without obvious abnormality. Neck: no adenopathy, no carotid bruit, no JVD, supple, symmetrical, trachea midline and thyroid not enlarged, symmetric, no tenderness/mass/nodules  Lungs: clear to auscultation bilaterally  Heart: regular rate and rhythm, S1, S2 normal, no murmur, click, rub or gallop  Abdomen: soft, non-tender; bowel sounds normal; no masses,  no organomegaly  Extremities: extremities normal, atraumatic, no cyanosis or edema  Neurologic: Patient has definite expressive aphasia. He has some cognitive loss. His right arm is 4/5 involving the proximal muscle group. His right leg is about 3/5. Please refer to a neuro note for details on his neurological status.     Recent Labs     10/26/20  0615   WBC 5.0   HGB 14.4        Recent Labs     10/26/20  0602 10/26/20  0615   NA  --  133*   K  --  3.1*   CL  --  92*   CO2  --  28   BUN  --  12   CREATININE  --  0.72   GLUCOSE 112 107*   AST  --  25   ALT  --  24   BILITOT  --  0.4   ALKPHOS  --  64     Troponin T:   Recent Labs     10/26/20  0615   TROPONINI <0.010       ABGs: No results found for: PHART, PO2ART, MNO8UFA  INR:   Recent Labs     10/26/20  0615   INR 0.9     URINALYSIS:  Recent Labs     10/26/20  0615   COLORU Yellow   PHUR 6.5   CLARITYU Clear   SPECGRAV 1.017   LEUKOCYTESUR Negative   UROBILINOGEN 0.2   BILIRUBINUR Negative the vertex to the aortic arch following the uneventful intravenous administration of 100 cc of nonionic contrast without incident. 2-D images were reconstructed in the sagittal and  coronal planes. Three Dimensional Maximum Intensity Projection (3D-MIP) images were created. All images were reviewed and primarily archived to PACS workstation. All CT scans at this facility use dose modulation, iterative reconstruction, and/or weight  based dosing when appropriate to reduce radiation dose to as low as reasonably achievable. NASCET Criteria were utilized Result / Findings: CTA NECK: Aortic Arch: No stenosis aneurysm or dissection. Carotid:   Right  Carotid Stenosis (% by NASCET Criteria):  Less than 50%  . Tiny amount of plaque proximal right internal carotid artery. Left  Carotid Stenosis (% by NASCET Criteria):  Less than 50%     Cervical Vertebral Arteries:    Patency: No stenosis aneurysm or dissection. Vertebral arteries are codominant. EXAMINATION: CTA HEAD W WO CONTRAST, DATE AND TIME:10/26/2020 6:20 AM CLINICAL HISTORY: Severe headache.  right sided weakness code bat  COMPARISON: None TECHNIQUE:Axial CT from skull base to vertex without  contrast..  All CT scans at this facility use dose modulation, iterative reconstruction, and/or weight based dosing when appropriate to reduce radiation dose to as low as reasonably achievable. FINDINGS CSF spaces: Ventricles are normal in size and position. Sulci are appropriate for age. Brain parenchyma: No  parenchymal mass,  mass effect,  signs of acute infarct, or extra-axial fluid. There is faint hyperdensity in the left middle cranial fossa that lies anterior to the distal left M1 segment. The possibility of clot versus artifact is  raised. Hemorrhage:No acute intracranial hemorrhage. Skull base: The bony calvarium and skull base are normal.   The visualized paranasal sinuses and mastoids are clear. IMPRESSION: NO ACUTE INTRACRANIAL PATHOLOGY. THESE FINDINGS WERE COMMUNICATED TO THE EMERGENCY ROOM STAFF AT 10/26/2020 AT 6:35 AM.     Ct Head Wo Contrast    Result Date: 10/26/2020  CTA HEAD AND CTA NECK No large vessel occlusion or significant stenosis EXAMINATION: CTA HEAD W WO CONTRAST, CTA NECK W WO CONTRAST, CT HEAD WO CONTRAST CTA HEAD DATE AND TIME:10/26/2020 6:20 AM CLINICAL HISTORY: Stroke  right sided weakness code bat  COMPARISON: None TECHNIQUE:Helical CTA of the head and neck was performed from the vertex to the aortic arch following the uneventful intravenous administration of 100 cc of nonionic contrast without incident. 2-D images were reconstructed in the sagittal and coronal planes. Three Dimensional Maximum Intensity Projection (3D-MIP) images were created. All images were reviewed and primarily archived to PACS workstation. All CT scans at this facility use dose modulation, iterative reconstruction, and/or weight based dosing when appropriate to reduce radiation dose to as low as reasonably achievable. NASCET Criteria were utilized FINDINGS CTA Head     Intracranial ICAs: Normal flow in precavernous, cavernous, clinoid and supraclinoid segments of the internal carotid arteries bilaterally     Anterior cerebral arteries:No occlusion or significant stenosis. Middle cerebral arteries:No large vessel occlusion or significant stenosis. Specifically there is no left MCA clot. Posterior Circulation: No large vessel occlusion or significant stenosis. Basilar artery:No significant stenosis. Aneurysm No aneurysm or dissection in the anterior or posterior circulations. .     Neurocranium Negative     Dural sinus: Dural venous sinuses are unremarkable. EXAMINATION: CTA HEAD W WO CONTRAST, CTA NECK W WO CONTRAST, CT HEAD WO CONTRAST.  CTA NECK DATE AND TIME:10/26/2020 6:20 AM CLINICAL HISTORY: Stroke symptoms   right sided weakness code bat  TECHNIQUE: TECHNIQUE:Helical CTA of the head and mastoids are clear. IMPRESSION: NO ACUTE INTRACRANIAL PATHOLOGY. THESE FINDINGS WERE COMMUNICATED TO THE EMERGENCY ROOM STAFF AT 10/26/2020 AT 6:35 AM.     Sundar Horne Neck W Wo Contrast    Result Date: 10/26/2020  CTA HEAD AND CTA NECK No large vessel occlusion or significant stenosis EXAMINATION: CTA HEAD W WO CONTRAST, CTA NECK W WO CONTRAST, CT HEAD WO CONTRAST CTA HEAD DATE AND TIME:10/26/2020 6:20 AM CLINICAL HISTORY: Stroke  right sided weakness code bat  COMPARISON: None TECHNIQUE:Helical CTA of the head and neck was performed from the vertex to the aortic arch following the uneventful intravenous administration of 100 cc of nonionic contrast without incident. 2-D images were reconstructed in the sagittal and coronal planes. Three Dimensional Maximum Intensity Projection (3D-MIP) images were created. All images were reviewed and primarily archived to PACS workstation. All CT scans at this facility use dose modulation, iterative reconstruction, and/or weight based dosing when appropriate to reduce radiation dose to as low as reasonably achievable. NASCET Criteria were utilized FINDINGS CTA Head     Intracranial ICAs: Normal flow in precavernous, cavernous, clinoid and supraclinoid segments of the internal carotid arteries bilaterally     Anterior cerebral arteries:No occlusion or significant stenosis. Middle cerebral arteries:No large vessel occlusion or significant stenosis. Specifically there is no left MCA clot. Posterior Circulation: No large vessel occlusion or significant stenosis. Basilar artery:No significant stenosis. Aneurysm No aneurysm or dissection in the anterior or posterior circulations. .     Neurocranium Negative     Dural sinus: Dural venous sinuses are unremarkable. EXAMINATION: CTA HEAD W WO CONTRAST, CTA NECK W WO CONTRAST, CT HEAD WO CONTRAST.  CTA NECK DATE AND TIME:10/26/2020 6:20 AM CLINICAL HISTORY: Stroke symptoms   right sided weakness code bat  TECHNIQUE: TECHNIQUE:Helical CTA of the head and neck was performed from the vertex to the aortic arch following the uneventful intravenous administration of 100 cc of nonionic contrast without incident. 2-D images were reconstructed in the sagittal and  coronal planes. Three Dimensional Maximum Intensity Projection (3D-MIP) images were created. All images were reviewed and primarily archived to PACS workstation. All CT scans at this facility use dose modulation, iterative reconstruction, and/or weight  based dosing when appropriate to reduce radiation dose to as low as reasonably achievable. NASCET Criteria were utilized Result / Findings: CTA NECK: Aortic Arch: No stenosis aneurysm or dissection. Carotid:   Right  Carotid Stenosis (% by NASCET Criteria):  Less than 50%  . Tiny amount of plaque proximal right internal carotid artery. Left  Carotid Stenosis (% by NASCET Criteria):  Less than 50%     Cervical Vertebral Arteries:    Patency: No stenosis aneurysm or dissection. Vertebral arteries are codominant. EXAMINATION: CTA HEAD W WO CONTRAST, DATE AND TIME:10/26/2020 6:20 AM CLINICAL HISTORY: Severe headache.  right sided weakness code bat  COMPARISON: None TECHNIQUE:Axial CT from skull base to vertex without  contrast..  All CT scans at this facility use dose modulation, iterative reconstruction, and/or weight based dosing when appropriate to reduce radiation dose to as low as reasonably achievable. FINDINGS CSF spaces: Ventricles are normal in size and position. Sulci are appropriate for age. Brain parenchyma: No  parenchymal mass,  mass effect,  signs of acute infarct, or extra-axial fluid. There is faint hyperdensity in the left middle cranial fossa that lies anterior to the distal left M1 segment. The possibility of clot versus artifact is  raised. Hemorrhage:No acute intracranial hemorrhage. Skull base:  The bony calvarium and skull base are normal. The visualized paranasal sinuses and mastoids are clear. IMPRESSION: NO ACUTE INTRACRANIAL PATHOLOGY. THESE FINDINGS WERE COMMUNICATED TO THE EMERGENCY ROOM STAFF AT 10/26/2020 AT 6:35 AM.     Rachel Landis Chest Portable    Result Date: 10/26/2020  EXAMINATION: XR CHEST PORTABLE CLINICAL HISTORY: VERÓNICA TYSON COMPARISONS: NOVEMBER 12, 2017, JULY 27, 2016 FINDINGS: Osseous structures intact. Cardiopericardial silhouette normal. Vasculature normal. Right lung clear. Ill-defined area increased opacity left lung base. LEFT LOWER LUNG ATELECTASIS/PNEUMONIA. Assessment and Plan     *Right-sided hemiparesis, expressive aphasia suspecting acute left hemispheric insult/TIA/CVA. Patient was determined to be not a candidate for TPA in the emergency room department after consultation with the neurology. *Hypertension. *Obesity. *Intermittent tobacco use. *Daily alcohol consumption, scotch. *Hypokalemia. *Morbid obesity. *CAD. Reported 3 stents in the past.    Plan:  Once again patient determined not to be TPA candidate in the emergency room department after consultation with the neurology prior to my acceptance. Neurologist Dr. April Bella recommended heparin infusion. No occlusion or stenosis seen on CTA. I requested MRI of the brain as well as echocardiogram.    I started the patient on high intensity statin. Resume his aspirin and the Plavix pending neurology evaluation and further recommendations in terms of antiplatelets therapy. Telemetry monitoring to rule out any cardiac dysrhythmia that could potentially cause embolic stroke. Counseling about tobacco addiction as well as alcohol consumption. I started the patient on banana bag with thiamine and folic acid. PT OT, speech and swallow evaluation. Patient is status is a dynamic and the evolutionary therefore the aforementioned assessment and plan may or may not be complete or conclusive at this time.   Additional work-up, investigation, therapeutic intervention and documentation will be determined and implemented that based on the clinical progression, follow-up test result and the specialists recommendations. Patient Active Problem List   Diagnosis Code    Morbid obesity ([facility] E66.01    History of heart failure Z86.79    History of recurrent pneumonia Z87.01    CAD S/P percutaneous coronary angioplasty I25.10, Z98.61    History of myocardial infarction I25.2    Type 2 diabetes mellitus with diabetic polyneuropathy, without long-term current use of insulin ([facility]) E11.42    Skin cyst L72.9    Local infection of skin and subcutaneous tissue L08.9    History of CVA (cerebrovascular accident) Z80.78    Essential hypertension I10    Right-sided muscle weakness M62.81    Spasm R25.2    AYLIN (obstructive sleep apnea) G47.33    Gout M10.9    Macrocytosis without anemia D75.89    Tremor of right hand R25.1    Unsteady gait R26.81    Dependent edema R60.9    Cutaneous abscess of back excluding buttocks L02.212    Skin infection L08.9    Microalbuminuria due to type 2 diabetes mellitus ([facility]) E11.29, R80.9    Lymphedema of both lower extremities I89.0    PAD (peripheral artery disease) ([facility]) I73.9    Venous insufficiency of both lower extremities I87.2    Claudication ([facility]) I73.9    Abscess of back L02.212    Abscess of right leg L02.415    Tinnitus of both ears H93.13    Sensorineural hearing loss (SNHL) of left ear H90.5    Ataxic gait R26.0    Left carotid artery stenosis I65.22    Acute cerebrovascular accident (CVA) ([facility] I63.9       Simon Lackey MD  Admitting Hospitalist    TTS: 85mins where I focused more than 75% of my attention on rendering care, and planning treatment course for this patient, in addition to talking to RN team, mid levels, consulting with other physicians and following up on labs and imaging. High Risk Readmission Screening Tool Score Noted.      Emergency Contact:

## 2020-10-26 NOTE — ED PROVIDER NOTES
 Diplopia 5/15/2017    Resolved with management of cataracts    Dupuytren contracture 1/2/2018    Dysphagia 8/18/2011    Dysphonia 8/18/2011    Essential hypertension 8/17/2016    Gallop rhythm     Gout 12/10/2016    Gout of big toe 08/2014    Right Great Toe    H/O fall     Headache     migraines    Heart failure ([address]     History of 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    History of heart failure 1/6/2014    History of myocardial infarction 3/11/2014    History of recurrent pneumonia 2/11/2014    Hx of bacterial pneumonia     Hypotension     Left carotid artery stenosis 8/23/2020    Left carotid artery stenosis 8/23/2020    Leg swelling     Macrocytosis without anemia 12/10/2016    Microalbuminuria due to type 2 diabetes mellitus ([address] 9/14/2018    Morbid obesity ([address] 1/2/2014    Myocardial infarction ([address]     Obesity     AYLIN (obstructive sleep apnea) 12/10/2016    Osteoarthritis     Right-sided muscle weakness 10/19/2016    S/P cardiac catheterization     Skin cyst 4/8/2016    Spasm 11/9/2016    Stented coronary artery     Tremor of right hand 5/15/2017    Type 2 diabetes mellitus with diabetic polyneuropathy, without long-term current use of insulin ([address]     Unsteady gait 5/15/2017    Weakness     Weight gain          SURGICALHISTORY       Past Surgical History:   Procedure Laterality Date    CHOLECYSTECTOMY      COLONOSCOPY  01/15/2010    polyp, diverticulosis (DR ELAINE)    CORONARY ANGIOPLASTY WITH STENT PLACEMENT  2014    CYST REMOVAL  05/16/16    DR Sheryl Préez CYST    SHOULDER SURGERY Right 12/18/2015         CURRENT MEDICATIONS       Previous Medications    ALCOHOL SWABS PADS    DX: diabetes mellitus.  Test 1 time(s) daily - Ok to substitute per insurance (1 each = 1 box)    ALLOPURINOL (ZYLOPRIM) 300 MG TABLET    Take 1 tablet by mouth daily    AMMONIUM LACTATE (AMLACTIN) 12 % CREAM    APPLY TO DRY CALLUS AREAS 1 TO 2 TIMES DAILY    ASCORBIC ACID (VITAMIN C) 250 MG TABLET    Take 250 mg by mouth daily    ASPIRIN 81 MG EC TABLET    Take 81 mg by mouth daily    ATORVASTATIN (LIPITOR) 40 MG TABLET    Take 1 tablet by mouth daily    BLOOD GLUCOSE MONITOR KIT AND SUPPLIES    DX: diabetes mellitus. Test 1 time(s) daily - True Metrix requested by patient - [id number] Claudia Hope to substitute per insurance    BLOOD GLUCOSE MONITOR STRIPS    DX: diabetes mellitus. Use 1 time(s) daily - True Metrix requested by patient - [id number] Claudia Hope to substitute per insurance    CARVEDILOL (COREG) 25 MG TABLET    Take 1 tablet by mouth 2 times daily    CLOPIDOGREL (PLAVIX) 75 MG TABLET    Take 1 tablet by mouth daily    ELASTIC BANDAGES & SUPPORTS (V-4 HIGH COMPRESSION HOSE) MISC    KNEE HIGH - 20-30 mmHg    GLIMEPIRIDE (AMARYL) 1 MG TABLET    Take 1 tablet by mouth every morning (before breakfast)    IBUPROFEN (IBU) 800 MG TABLET    Take 1 tablet by mouth every 8 hours as needed for Pain    LANCETS MISC    DX: diabetes mellitus. Use 1 time(s) daily - Ok to substitute per insurance (1 each = 1 box)    LIDOCAINE (LIDODERM) 5 %    Place 1 patch onto the skin every 12 hours     METFORMIN (GLUCOPHAGE) 500 MG TABLET    Take 1 tablet by mouth 2 times daily (with meals)    MISC.  DEVICES (COMMODE BEDSIDE) MISC    Use daily as needed    MULTIPLE VITAMIN PO    Take 1 tablet by mouth daily     PROBIOTIC PRODUCT (PROBIOTIC DAILY PO)    Take 1 tablet by mouth daily    TORSEMIDE (DEMADEX) 20 MG TABLET    Take 1 tablet by mouth daily    VITAMIN B-12 (CYANOCOBALAMIN) 100 MCG TABLET    Take 50 mcg by mouth daily       ALLERGIES     Bactrim [sulfamethoxazole-trimethoprim]    FAMILY HISTORY       Family History   Problem Relation Age of Onset    Breast Cancer Mother     Stroke Father     Colon Cancer Father 76          SOCIAL HISTORY       Social History     Socioeconomic History    Marital status:      Spouse name: Alfred Madrigal  Number of children: 3    Years of education: 12+    Highest education level: None   Occupational History    Occupation: medical leave     Employer: [employer]   Social Needs    Financial resource strain: None    Food insecurity     Worry: None     Inability: None    Transportation needs     Medical: None     Non-medical: None   Tobacco Use    Smoking status: Former Smoker     Packs/day: 2.50     Years: 30.00     Pack years: 75.00     Last attempt to quit:      Years since quittin.8    Smokeless tobacco: Never Used   Substance and Sexual Activity    Alcohol use: Yes     Alcohol/week: 0.0 standard drinks     Comment: limits less than 10/wk     Drug use: No    Sexual activity: Yes     Partners: Female   Lifestyle    Physical activity     Days per week: None     Minutes per session: None    Stress: None   Relationships    Social connections     Talks on phone: None     Gets together: None     Attends Mandaeism service: None     Active member of club or organization: None     Attends meetings of clubs or organizations: None     Relationship status: None    Intimate partner violence     Fear of current or ex partner: None     Emotionally abused: None     Physically abused: None     Forced sexual activity: None   Other Topics Concern    None   Social History Narrative    None       SCREENINGS   NIH Stroke Scale  NIH Stroke Scale Assessed: Yes  Interval: Baseline  Level of Consciousness (1a. ): Alert  LOC Questions (1b. ):  Answers both correctly  LOC Commands (1c. ): Performs both tasks correctly  Best Gaze (2. ): Normal  Visual (3. ): No visual loss  Facial Palsy (4. ): Normal symmetrical movement  Motor Arm, Left (5a. ): No drift  Motor Arm, Right (5b. ): Some effort against gravity  Motor Leg, Left (6a. ): No drift  Motor Leg, Right (6b. ): No movement  Limb Ataxia (7. ): (!) Present in two limbs  Sensory (8. ): Normal  Best Language (9. ): Mild to moderate aphasia  Dysarthria (10. ): Normal  Extinction and Inattention (11): No abnormality  Total: 9Glasgow Coma Scale  Eye Opening: Spontaneous  Best Verbal Response: Oriented  Best Motor Response: Obeys commands  Adolphus Coma Scale Score: 15 @FLOW(34039624)@      PHYSICAL EXAM    (up to 7 for level 4, 8 or more for level 5)     ED Triage Vitals [10/26/20 0602]   BP Temp Temp Source Pulse Resp SpO2 Height Weight   122/77 98.2 °F (36.8 °C) Oral 88 20 97 % 6' 1\" (1.854 m) 268 lb (121.6 kg)       Physical Exam  Vitals signs and nursing note reviewed. Constitutional:       General: He is not in acute distress. Appearance: Normal appearance. He is well-developed. He is not diaphoretic. HENT:      Head: Normocephalic and atraumatic. Eyes:      General: Lids are normal.      Conjunctiva/sclera: Conjunctivae normal.   Neck:      Musculoskeletal: Normal range of motion and neck supple. Cardiovascular:      Rate and Rhythm: Normal rate and regular rhythm. Pulses: Normal pulses. Heart sounds: Normal heart sounds. Pulmonary:      Effort: Pulmonary effort is normal.      Breath sounds: Normal breath sounds. Abdominal:      General: Bowel sounds are normal.      Palpations: Abdomen is soft. Tenderness: There is no abdominal tenderness. Lymphadenopathy:      Cervical: No cervical adenopathy. Skin:     General: Skin is warm and dry. Capillary Refill: Capillary refill takes less than 2 seconds. Findings: No rash. Neurological:      Mental Status: He is alert and oriented to person, place, and time. Sensory: Sensory deficit present. Coordination: Finger-Nose-Finger Test abnormal and Heel to [employer] abnormal.      Comments: Mild expressive aphasia  B/l equal hand grasps  Right arm weakness with minimal movement  Right leg pt cannot lift. He can pivot his right leg. Decreased sensation on right as compared to left. No facial droop. Tongue midline. NIH 9   Psychiatric:         Thought Content:  Thought content normal.         Judgment: Judgment normal.         DIAGNOSTIC RESULTS     EKG: All EKG's are interpreted by the Emergency Department Physician who either signs or Co-signsthis chart in the absence of a cardiologist.    Sinus rhythm with 1st degree av block 86bpm no acute st changes no ectopy    RADIOLOGY:   Non-plain filmimages such as CT, Ultrasound and MRI are read by the radiologist. Plain radiographic images are visualized and preliminarily interpreted by the emergency physician with the below findings:    CT head shows small focal hyperdensity of left MCA branch in the left sylvian fissure. Series 2, image 16. Somewhat similar appearance on the prior. Differential diagnosis includes thrombosis. Correlate consider CTA. No midline shift or mass-effect no intracranial hemorrhage. Overall mid volume loss and small vessel disease. CTA neck shows no occlusion or severe stenosis no aneurysm or dissection mild atherosclerotic calcifications at the carotid bulbs. CTA head shows no occlusion or severe stenosis no aneurysm or dissection.     Interpretation per the Radiologist below, if available at the time ofthis note:    [address]    (Results Pending)   XR CHEST PORTABLE    (Results Pending)   CTA [address]    (Results Pending)   CTA HEAD W WO CONTRAST    (Results Pending)         ED BEDSIDE ULTRASOUND:   Performed by ED Physician - none    LABS:  Labs Reviewed   COMPREHENSIVE METABOLIC PANEL - Abnormal; Notable for the following components:       Result Value    Sodium 133 (*)     Potassium 3.1 (*)     Chloride 92 (*)     Glucose 107 (*)     All other components within normal limits   CBC WITH AUTO DIFFERENTIAL - Abnormal; Notable for the following components:    RBC 3.69 (*)     Hematocrit 39.4 (*)     .7 (*)     MCH 39.0 (*)     All other components within normal limits   PROTIME-INR - Abnormal; Notable for the following components:    Protime 12.2 (*)     All other note were completed with a voice recognition program.  Efforts were made to edit the dictations but occasionally words are mis-transcribed.)    Jeremiah Ayala PA-C (electronically signed)  Attending Emergency Physician         Jeremiah Ayala PA-C  10/26/20 506    Attending Supervisory Note/Shared Visit   I have personally performed a face to face diagnostic evaluation on this patient. I have reviewed the mid-levels findings and agree.   History and Exam by me shows LAUREN Heredia DO  Attending Emergency Physician         Angela Heredia DO  10/28/20 2690

## 2020-10-26 NOTE — CONSULTS
Subjective:      Patient ID: Yakelin Galvez is a 72 y.o. male who presents today for stroke    HPI 51-year-old right-handed gentleman who was admitted with a session of a stroke. Patient reports his symptoms started around 2 AM and was not quite sure of the timing he woke his wife up around 5 AM and they came to the hospital where a timeout was called it was 6:20 AM and patient was reaching for 41/2-hour TPA time which we were not able to do the studies were all in. We have done TPA in the past for this gentleman though he was late this time. Patient has weakness in the right lower extremity and complains of some speech issues. Difficulty with ambulation. Symptoms have not changed since arrival to hospital restart him on heparin given these findings. Patient is already on aspirin and Plavix and takes Lipitor he has history of cardiomyopathy. Patient had a brainstem stroke in the past and has some gait ataxia with the same. Seen him in office in July and had recommended a carotid ultrasound shows 50 to 60% left internal carotid stenosis. We had obtained a CT angiogram to see if there is any M1 clot but this appeared to be normal as well and therefore he was not a candidate for intra-arterial.  Does have a headache which she reports that is a tension muscular headache. Review of Systems   Constitutional: Negative for fever. HENT: Negative for ear pain, tinnitus and trouble swallowing. Eyes: Negative for photophobia and visual disturbance. Respiratory: Negative for choking and shortness of breath. Cardiovascular: Negative for chest pain and palpitations. Gastrointestinal: Negative for nausea and vomiting. Musculoskeletal: Positive for gait problem. Negative for back pain, joint swelling, myalgias, neck pain and neck stiffness. Skin: Negative for color change. Allergic/Immunologic: Negative for food allergies. Neurological: Positive for weakness.  Negative for dizziness, tremors, seizures, syncope, facial asymmetry, speech difficulty, light-headedness, numbness and headaches. Psychiatric/Behavioral: Negative for behavioral problems, confusion, hallucinations and sleep disturbance.        Past Medical History:   Diagnosis Date    Acute renal failure ([address]     Anxiety     CAD S/P percutaneous coronary angioplasty 3/11/2014    Cardiomyopathy [facility])     Cerebrovascular accident (CVA) due to occlusion of left middle cerebral artery ([address] 08/2016    brainstem infarction from left MCA occlusion    Cerebrovascular disease 07/27/2016    Coronary angioplasty status     CVA (cerebral vascular accident) ([address] 11/12/2017    Dependent edema 9/14/2017    Diplopia 5/15/2017    Resolved with management of cataracts    Dupuytren contracture 1/2/2018    Dysphagia 8/18/2011    Dysphonia 8/18/2011    Essential hypertension 8/17/2016    Gallop rhythm     Gout 12/10/2016    Gout of big toe 08/2014    Right Great Toe    H/O fall     Headache     migraines    Heart failure ([address]     History of 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    History of heart failure 1/6/2014    History of myocardial infarction 3/11/2014    History of recurrent pneumonia 2/11/2014    Hx of bacterial pneumonia     Hypotension     Left carotid artery stenosis 8/23/2020    Left carotid artery stenosis 8/23/2020    Leg swelling     Macrocytosis without anemia 12/10/2016    Microalbuminuria due to type 2 diabetes mellitus ([address] 9/14/2018    Morbid obesity ([address] 1/2/2014    Myocardial infarction ([address]     Obesity     AYLIN (obstructive sleep apnea) 12/10/2016    Osteoarthritis     Right-sided muscle weakness 10/19/2016    S/P cardiac catheterization     Skin cyst 4/8/2016    Spasm 11/9/2016    Stented coronary artery     Tremor of right hand 5/15/2017    Type 2 diabetes mellitus with diabetic polyneuropathy, without long-term current use of insulin (HCC)     Unsteady gait 5/15/2017    Weakness     Weight gain      Past Surgical History:   Procedure Laterality Date    CHOLECYSTECTOMY      COLONOSCOPY  01/15/2010    polyp, diverticulosis (DR ELAINE)    CORONARY ANGIOPLASTY WITH STENT PLACEMENT  2014    CYST REMOVAL  16    DR Aba Ornelas CYST    SHOULDER SURGERY Right 2015     Social History     Socioeconomic History    Marital status:      Spouse name: Irene Miles Number of children: 3    Years of education: 12+    Highest education level: Not on file   Occupational History    Occupation: medical leave     Employer: [employer]   Social Needs    Financial resource strain: Not on file    Food insecurity     Worry: Not on file     Inability: Not on file   [employer] needs     Medical: Not on file     Non-medical: Not on file   Tobacco Use    Smoking status: Former Smoker     Packs/day: 2.50     Years: 30.00     Pack years: 75.00     Last attempt to quit:      Years since quittin.8    Smokeless tobacco: Never Used   Substance and Sexual Activity    Alcohol use:  Yes     Alcohol/week: 0.0 standard drinks     Comment: limits less than 10/wk     Drug use: No    Sexual activity: Yes     Partners: Female   Lifestyle    Physical activity     Days per week: Not on file     Minutes per session: Not on file    Stress: Not on file   Relationships    Social connections     Talks on phone: Not on file     Gets together: Not on file     Attends Evangelical service: Not on file     Active member of club or organization: Not on file     Attends meetings of clubs or organizations: Not on file     Relationship status: Not on file    Intimate partner violence     Fear of current or ex partner: Not on file     Emotionally abused: Not on file     Physically abused: Not on file     Forced sexual activity: Not on file   Other Topics Concern    Not on file   Social History Narrative    Not on Comments: In addition to the above patient has right lower extremity weakness of 3/5 with  strength of 4/5 with weakness in the right upper extremity as well as mild dysarthric. He is not able to ambulate with physical therapy. Psychiatric:         Mood and Affect: Mood normal.       Not able to complete his ambulation. Cta Head W Wo Contrast    Result Date: 10/26/2020  CTA HEAD AND CTA NECK No large vessel occlusion or significant stenosis EXAMINATION: CTA HEAD W WO CONTRAST, CTA NECK W WO CONTRAST, CT HEAD WO CONTRAST CTA HEAD DATE AND TIME:10/26/2020 6:20 AM CLINICAL HISTORY: Stroke  right sided weakness code bat  COMPARISON: None TECHNIQUE:Helical CTA of the head and neck was performed from the vertex to the aortic arch following the uneventful intravenous administration of 100 cc of nonionic contrast without incident. 2-D images were reconstructed in the sagittal and coronal planes. Three Dimensional Maximum Intensity Projection (3D-MIP) images were created. All images were reviewed and primarily archived to PACS workstation. All CT scans at this facility use dose modulation, iterative reconstruction, and/or weight based dosing when appropriate to reduce radiation dose to as low as reasonably achievable. NASCET Criteria were utilized FINDINGS CTA Head     Intracranial ICAs: Normal flow in precavernous, cavernous, clinoid and supraclinoid segments of the internal carotid arteries bilaterally     Anterior cerebral arteries:No occlusion or significant stenosis. Middle cerebral arteries:No large vessel occlusion or significant stenosis. Specifically there is no left MCA clot. Posterior Circulation: No large vessel occlusion or significant stenosis. Basilar artery:No significant stenosis. Aneurysm No aneurysm or dissection in the anterior or posterior circulations. .     Neurocranium Negative     Dural sinus: Dural venous sinuses are unremarkable.  EXAMINATION: CTA HEAD W WO CONTRAST, CTA NECK W WO CONTRAST, CT HEAD WO CONTRAST. CTA NECK DATE AND TIME:10/26/2020 6:20 AM CLINICAL HISTORY: Stroke symptoms   right sided weakness code bat  TECHNIQUE: TECHNIQUE:Helical CTA of the head and neck was performed from the vertex to the aortic arch following the uneventful intravenous administration of 100 cc of nonionic contrast without incident. 2-D images were reconstructed in the sagittal and  coronal planes. Three Dimensional Maximum Intensity Projection (3D-MIP) images were created. All images were reviewed and primarily archived to PACS workstation. All CT scans at this facility use dose modulation, iterative reconstruction, and/or weight  based dosing when appropriate to reduce radiation dose to as low as reasonably achievable. NASCET Criteria were utilized Result / Findings: CTA NECK: Aortic Arch: No stenosis aneurysm or dissection. Carotid:   Right  Carotid Stenosis (% by NASCET Criteria):  Less than 50%  . Tiny amount of plaque proximal right internal carotid artery. Left  Carotid Stenosis (% by NASCET Criteria):  Less than 50%     Cervical Vertebral Arteries:    Patency: No stenosis aneurysm or dissection. Vertebral arteries are codominant. EXAMINATION: CTA HEAD W WO CONTRAST, DATE AND TIME:10/26/2020 6:20 AM CLINICAL HISTORY: Severe headache.  right sided weakness code bat  COMPARISON: None TECHNIQUE:Axial CT from skull base to vertex without  contrast..  All CT scans at this facility use dose modulation, iterative reconstruction, and/or weight based dosing when appropriate to reduce radiation dose to as low as reasonably achievable. FINDINGS CSF spaces: Ventricles are normal in size and position. Sulci are appropriate for age. Brain parenchyma: No  parenchymal mass,  mass effect,  signs of acute infarct, or extra-axial fluid. There is faint hyperdensity in the left middle cranial fossa that lies anterior to the distal left M1 segment.  The possibility of clot versus HEAD W WO CONTRAST, CTA NECK W WO CONTRAST, CT HEAD WO CONTRAST. CTA NECK DATE AND TIME:10/26/2020 6:20 AM CLINICAL HISTORY: Stroke symptoms   right sided weakness code bat  TECHNIQUE: TECHNIQUE:Helical CTA of the head and neck was performed from the vertex to the aortic arch following the uneventful intravenous administration of 100 cc of nonionic contrast without incident. 2-D images were reconstructed in the sagittal and  coronal planes. Three Dimensional Maximum Intensity Projection (3D-MIP) images were created. All images were reviewed and primarily archived to PACS workstation. All CT scans at this facility use dose modulation, iterative reconstruction, and/or weight  based dosing when appropriate to reduce radiation dose to as low as reasonably achievable. NASCET Criteria were utilized Result / Findings: CTA NECK: Aortic Arch: No stenosis aneurysm or dissection. Carotid:   Right  Carotid Stenosis (% by NASCET Criteria):  Less than 50%  . Tiny amount of plaque proximal right internal carotid artery. Left  Carotid Stenosis (% by NASCET Criteria):  Less than 50%     Cervical Vertebral Arteries:    Patency: No stenosis aneurysm or dissection. Vertebral arteries are codominant. EXAMINATION: CTA HEAD W WO CONTRAST, DATE AND TIME:10/26/2020 6:20 AM CLINICAL HISTORY: Severe headache.  right sided weakness code bat  COMPARISON: None TECHNIQUE:Axial CT from skull base to vertex without  contrast..  All CT scans at this facility use dose modulation, iterative reconstruction, and/or weight based dosing when appropriate to reduce radiation dose to as low as reasonably achievable. FINDINGS CSF spaces: Ventricles are normal in size and position. Sulci are appropriate for age. Brain parenchyma: No  parenchymal mass,  mass effect,  signs of acute infarct, or extra-axial fluid. There is faint hyperdensity in the left middle cranial fossa that lies anterior to the distal left M1 segment.  The possibility of clot versus artifact is  raised. Hemorrhage:No acute intracranial hemorrhage. Skull base: The bony calvarium and skull base are normal.   The visualized paranasal sinuses and mastoids are clear. IMPRESSION: NO ACUTE INTRACRANIAL PATHOLOGY. THESE FINDINGS WERE COMMUNICATED TO THE EMERGENCY ROOM STAFF AT 10/26/2020 AT 6:35 AM.     Kamila Weirjeana Neck W Wo Contrast    Result Date: 10/26/2020  CTA HEAD AND CTA NECK No large vessel occlusion or significant stenosis EXAMINATION: CTA HEAD W WO CONTRAST, CTA NECK W WO CONTRAST, CT HEAD WO CONTRAST CTA HEAD DATE AND TIME:10/26/2020 6:20 AM CLINICAL HISTORY: Stroke  right sided weakness code bat  COMPARISON: None TECHNIQUE:Helical CTA of the head and neck was performed from the vertex to the aortic arch following the uneventful intravenous administration of 100 cc of nonionic contrast without incident. 2-D images were reconstructed in the sagittal and coronal planes. Three Dimensional Maximum Intensity Projection (3D-MIP) images were created. All images were reviewed and primarily archived to PACS workstation. All CT scans at this facility use dose modulation, iterative reconstruction, and/or weight based dosing when appropriate to reduce radiation dose to as low as reasonably achievable. NASCET Criteria were utilized FINDINGS CTA Head     Intracranial ICAs: Normal flow in precavernous, cavernous, clinoid and supraclinoid segments of the internal carotid arteries bilaterally     Anterior cerebral arteries:No occlusion or significant stenosis. Middle cerebral arteries:No large vessel occlusion or significant stenosis. Specifically there is no left MCA clot. Posterior Circulation: No large vessel occlusion or significant stenosis. Basilar artery:No significant stenosis. Aneurysm No aneurysm or dissection in the anterior or posterior circulations. .     Neurocranium Negative     Dural sinus: Dural venous sinuses are unremarkable. EXAMINATION: CTA HEAD W WO CONTRAST, CTA NECK W WO CONTRAST, CT HEAD WO CONTRAST. CTA NECK DATE AND TIME:10/26/2020 6:20 AM CLINICAL HISTORY: Stroke symptoms   right sided weakness code bat  TECHNIQUE: TECHNIQUE:Helical CTA of the head and neck was performed from the vertex to the aortic arch following the uneventful intravenous administration of 100 cc of nonionic contrast without incident. 2-D images were reconstructed in the sagittal and  coronal planes. Three Dimensional Maximum Intensity Projection (3D-MIP) images were created. All images were reviewed and primarily archived to PACS workstation. All CT scans at this facility use dose modulation, iterative reconstruction, and/or weight  based dosing when appropriate to reduce radiation dose to as low as reasonably achievable. NASCET Criteria were utilized Result / Findings: CTA NECK: Aortic Arch: No stenosis aneurysm or dissection. Carotid:   Right  Carotid Stenosis (% by NASCET Criteria):  Less than 50%  . Tiny amount of plaque proximal right internal carotid artery. Left  Carotid Stenosis (% by NASCET Criteria):  Less than 50%     Cervical Vertebral Arteries:    Patency: No stenosis aneurysm or dissection. Vertebral arteries are codominant. EXAMINATION: CTA HEAD W WO CONTRAST, DATE AND TIME:10/26/2020 6:20 AM CLINICAL HISTORY: Severe headache.  right sided weakness code bat  COMPARISON: None TECHNIQUE:Axial CT from skull base to vertex without  contrast..  All CT scans at this facility use dose modulation, iterative reconstruction, and/or weight based dosing when appropriate to reduce radiation dose to as low as reasonably achievable. FINDINGS CSF spaces: Ventricles are normal in size and position. Sulci are appropriate for age. Brain parenchyma: No  parenchymal mass,  mass effect,  signs of acute infarct, or extra-axial fluid.  There is faint hyperdensity in the left middle cranial fossa that lies anterior to the distal left M1 segment. The possibility of clot versus artifact is  raised. Hemorrhage:No acute intracranial hemorrhage. Skull base: The bony calvarium and skull base are normal.   The visualized paranasal sinuses and mastoids are clear. IMPRESSION: NO ACUTE INTRACRANIAL PATHOLOGY. THESE FINDINGS WERE COMMUNICATED TO THE EMERGENCY ROOM STAFF AT 10/26/2020 AT 6:35 AM.     Chelsi Barraza Chest Portable    Result Date: 10/26/2020  EXAMINATION: XR CHEST PORTABLE CLINICAL HISTORY: CODE BAT COMPARISONS: NOVEMBER 12, 2017, JULY 27, 2016 FINDINGS: Osseous structures intact. Cardiopericardial silhouette normal. Vasculature normal. Right lung clear. Ill-defined area increased opacity left lung base. LEFT LOWER LUNG ATELECTASIS/PNEUMONIA.       Lab Results   Component Value Date    WBC 5.0 10/26/2020    RBC 3.69 10/26/2020    HGB 14.4 10/26/2020    HCT 39.4 10/26/2020    .7 10/26/2020    MCH 39.0 10/26/2020    MCHC 36.5 10/26/2020    RDW 13.1 10/26/2020     10/26/2020    MPV 8.4 09/25/2015     Lab Results   Component Value Date     10/26/2020    K 3.1 10/26/2020    CL 92 10/26/2020    CO2 28 10/26/2020    BUN 12 10/26/2020    CREATININE 0.72 10/26/2020    GFRAA >60.0 10/26/2020    LABGLOM >60.0 10/26/2020    GLUCOSE 107 10/26/2020    PROT 6.9 10/26/2020    LABALBU 4.3 10/26/2020    LABALBU DETECTED 10/27/2011    CALCIUM 9.4 10/26/2020    BILITOT 0.4 10/26/2020    ALKPHOS 64 10/26/2020    AST 25 10/26/2020    ALT 24 10/26/2020     Lab Results   Component Value Date    PROTIME 12.2 10/26/2020    PROTIME 12.4 11/12/2017    INR 0.9 10/26/2020     Lab Results   Component Value Date    TSH 4.080 11/12/2017    YQOADZUH17 685 11/12/2017    FOLATE 11.8 11/12/2017     Lab Results   Component Value Date    TRIG 98 08/07/2020    HDL 51 08/07/2020    LDLCALC 53 08/07/2020     Lab Results   Component Value Date    LABAMPH Neg 10/26/2020    BARBSCNU Neg 10/26/2020    LABBENZ Neg 10/26/2020 LABMETH Neg 10/26/2020    OPIATESCREENURINE Neg 10/26/2020    PHENCYCLIDINESCREENURINE Neg 10/26/2020    ETOH 210 10/26/2020     No results found for: LITHIUM, DILFRTOT, VALPROATE    Assessment:   Brainstem stroke or a left periventricular stroke. Patient symptoms started at 2 AM and I was called at 6:20 AM with the results and therefore patient was not a candidate for TPA as we which is reaching 4-1/2 hours. Large vessel evaluation CT angiograms were performed in the emergency room does not show any large clot or an M1 segment stenosis or clot and therefore patient was not a candidate for intra-arterial.  I therefore start him on heparin which he is taking for has not worsened since he is admitted. He is aware that he came late this time as we had performed TPA on him in the past.  He had a brainstem stroke with some improvement. He actually was walking independently though he said a few falls in the interim. Is on aspirin and Plavix already and if he truly has a new stroke then he is not a antiplatelet responder and we may have to consider low-dose Xarelto with aspirin. We will complete his studies today and keep him on heparin for now as we have no other choice at this is more so as he has cardiomyopathy. Patient will require a ANDREW if there is a new stroke. Alex Osborn MD, [id number], American Board of Psychiatry & Neurology  Board Certified in Vascular Neurology  Board Certified in Neuromuscular Medicine  Certified in [facility]:

## 2020-10-26 NOTE — PROGRESS NOTES
Physical Therapy Med Surg Initial Assessment  Facility/Department: [facility]  [facility]: [facility] [place]       NAME: Marisol De La Cruz  : 1955 (72 y.o.)  MRN: [id number]  CODE STATUS: Full Code    Date of Service: 10/26/2020    Patient Diagnosis(es): Acute cerebrovascular accident (CVA) [facility]) [I63.9]   Chief Complaint   Patient presents with    Extremity Weakness     right    Headache     Patient Active Problem List    Diagnosis Date Noted    Claudication [facility])      Priority: High    Acute cerebrovascular accident (CVA) ([facility] [address] 10/26/2020    Left carotid artery stenosis 2020    Tinnitus of both ears 2020    Sensorineural hearing loss (SNHL) of left ear 2020    Ataxic gait 2020    Abscess of back 2020    Abscess of right leg 2020    Venous insufficiency of both lower extremities 2019    PAD (peripheral artery disease) ([facility] [address] 2019    Lymphedema of both lower extremities 2018    Microalbuminuria due to type 2 diabetes mellitus ([facility] [address] 2018    Skin infection 2018    Cutaneous abscess of back excluding buttocks 2018    Dependent edema 2017    Tremor of right hand 05/15/2017    Unsteady gait 05/15/2017    AYLIN (obstructive sleep apnea) 12/10/2016    Gout 12/10/2016    Macrocytosis without anemia 12/10/2016    Spasm 2016    Right-sided muscle weakness 10/19/2016    Essential hypertension 2016    History of CVA (cerebrovascular accident) 08/15/2016    Skin cyst 2016    Local infection of skin and subcutaneous tissue 2016    CAD S/P percutaneous coronary angioplasty 2014    History of myocardial infarction 2014    Type 2 diabetes mellitus with diabetic polyneuropathy, without long-term current use of insulin ([address] 2014    History of recurrent pneumonia 2014    History of heart failure 2014    Morbid obesity ([facility] [address] 2014        Past Medical History:   Diagnosis Date    Acute renal failure ([address]     Anxiety     CAD S/P percutaneous coronary angioplasty 3/11/2014    Cardiomyopathy [facility])     Cerebrovascular accident (CVA) due to occlusion of left middle cerebral artery ([address] 08/2016    brainstem infarction from left MCA occlusion    Cerebrovascular disease 07/27/2016    Coronary angioplasty status     CVA (cerebral vascular accident) ([address] 11/12/2017    Dependent edema 9/14/2017    Diplopia 5/15/2017    Resolved with management of cataracts    Dupuytren contracture 1/2/2018    Dysphagia 8/18/2011    Dysphonia 8/18/2011    Essential hypertension 8/17/2016    Gallop rhythm     Gout 12/10/2016    Gout of big toe 08/2014    Right Great Toe    H/O fall     Headache     migraines    Heart failure ([address]     History of 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    History of heart failure 1/6/2014    History of myocardial infarction 3/11/2014    History of recurrent pneumonia 2/11/2014    Hx of bacterial pneumonia     Hypotension     Left carotid artery stenosis 8/23/2020    Left carotid artery stenosis 8/23/2020    Leg swelling     Macrocytosis without anemia 12/10/2016    Microalbuminuria due to type 2 diabetes mellitus ([address] 9/14/2018    Morbid obesity ([address] 1/2/2014    Myocardial infarction ([address]     Obesity     AYLIN (obstructive sleep apnea) 12/10/2016    Osteoarthritis     Right-sided muscle weakness 10/19/2016    S/P cardiac catheterization     Skin cyst 4/8/2016    Spasm 11/9/2016    Stented coronary artery     Tremor of right hand 5/15/2017    Type 2 diabetes mellitus with diabetic polyneuropathy, without long-term current use of insulin ([address]     Unsteady gait 5/15/2017    Weakness     Weight gain      Past Surgical History:   Procedure Laterality Date    CHOLECYSTECTOMY      COLONOSCOPY  01/15/2010    polyp, diverticulosis (DR LEAINE)    CORONARY ANGIOPLASTY WITH STENT PLACEMENT  2014    CYST REMOVAL  05/16/16    DR Susan Harrison CYST    SHOULDER SURGERY Right 12/18/2015       Chart Reviewed: Yes  Patient assessed for rehabilitation services?: Yes  Family / Caregiver Present: Yes(spouse)  General Comment  Comments: Pt is seated EOB upon arrival, spouse at bedside, agrees to PT eval.    Restrictions:  Restrictions/Precautions: Fall Risk     SUBJECTIVE:      Pain  Pre Treatment Pain Screening  Pain at present: 6  Intervention List: Patient able to continue with treatment;Patient declined any intervention  Comments / Details: states his pain in chronic, tolerable    Post Treatment Pain Screening:   Pain Screening  Patient Currently in Pain: Yes  Pain Assessment  Pain Level: 6  Pain Type: Chronic pain  Pain Location: Hip(and SI joint)  Pain Orientation: Right    Prior Level of Function:  Social/Functional History  Lives With: Spouse  Type of Home: House  Home Layout: Two level, Bed/Bath upstairs, Laundry in basement(Railing)  Home Access: Stairs to enter with rails  Entrance Stairs - Number of Steps: 3 in back, 4 in front  Bathroom Shower/Tub: Tub/Shower unit  Bathroom Equipment: Shower chair  Home Equipment: Rolling walker, Cane  ADL Assistance: Independent  Homemaking Assistance: Needs assistance(Spouse  is primary; vertigo and arthritis limit housework)  Ambulation Assistance: Independent(Walker outside, cane inside)  Transfer Assistance: Independent  Active : No  Patient's  Info: has not driven since CVA    OBJECTIVE:        Cognition:  Overall Orientation Status: Within Functional Limits  Follows Commands: Within Functional Limits    Observation/Palpation  Posture: Good  Observation: difficulty with word finding, increased time to speak and to process commands    ROM:  RLE PROM: Exceptions  RLE General PROM: hip flexion limited due to pain, about 100* with soft endfeel, unable to fully extend knee while in seated position either without hip pain, ankle WFL  LLE PROM: [facility]    Strength:  Strength RLE  Strength RLE: Exception  Comment: hip flex/ext 3/5, knee ext 3-/5, ankle 3/5  Strength LLE  Strength LLE: WFL    Neuro:  Balance  Sitting - Static: Good(no deficits though has difficulty reaching due to R hip pain)  Sitting - Dynamic: Good  Standing - Static: Poor        Sensation  Overall Sensation Status: [facility]    Bed mobility  Supine to Sit: (DNT- pt seated EOB upon entry)  Sit to Supine: Stand by assistance    Transfers  Sit to Stand: Maximum Assistance(with bed raised, able to get about 75% upright but unable to full extend R hip in order to achieve full stand)  Stand to sit: Maximum Assistance  Bed to Chair: Unable to assess(unsafe to attempt pivot to chair due to impaired strength/coordination)    Ambulation  Ambulation?: No(unable to safely progress to gait or pre-gait activities)              Activity Tolerance  Activity Tolerance: Patient Tolerated treatment well  Activity Tolerance: though reports he is very fatigued after being up all night          PT Education  PT Education: Goals;PT Role;Plan of Care;Home Exercise Program;General Safety    ASSESSMENT:   Body structures, Functions, Activity limitations: Decreased functional mobility ; Decreased balance;Decreased coordination;Decreased endurance; Increased pain;Decreased strength  Decision Making: Medium Complexity  History: high  Exam: high  Clinical Presentation: medium    Prognosis: Good    DISCHARGE RECOMMENDATIONS:  Discharge Recommendations: Patient would benefit from continued therapy after discharge    Assessment: Pt with acute deficits in RLE affecting his ability to stand or ambulate. Will benefit from continued PT to progress mobility as tolerated.   REQUIRES PT FOLLOW UP: Yes      PLAN OF CARE:  Plan  Times per week: 3-6  Current Treatment Recommendations: Strengthening, Transfer Training, Endurance Training, Neuromuscular Re-education, Patient/Caregiver Education & Training, Equipment Evaluation, Education, & procurement, Pain Management, Balance Training, Gait Training, Home Exercise Program, Functional Mobility Training, Stair training, Safety Education & Training  Safety Devices  Type of devices: Bed alarm in place, Call light within reach    Goals:  Patient goals : to get stronger  Short term goals  Short term goal 1: pt to STS with min A and maintain standing at [employer] with upright posture >1 minute  Short term goal 2: indep with HEP in order to improve RLE strength >/= 4/5  Long term goals  Long term goal 1: pt to be indep with bed mobility  Long term goal 2: pt to be SBA with transfers  Long term goal 3: pt to ambulate >50 ft with SBA and [employer]  Long term goal 4: pt to be assessed for stairs when appropriate    [facility] (6 CLICK) [address] Inpatient Mobility Raw Score : 11     Therapy Time:   Individual   Time In 1057   Time Out 1125   Minutes 28      Variance: 10  Reason: Procedure(physician assessment)    Victor Hugo Guillen PT, 10/26/20 at 12:28 PM         Definitions for assistance levels  Independent = pt does not require any physical supervision or assistance from another person for activity completion. Device may be needed.   Stand by assistance = pt requires verbal cues or instructions from another person, close to but not touching, to perform the activity  Minimal assistance= pt performs 75% or more of the activity; assistance is required to complete the activity  Moderate assistance= pt performs 50% of the activity; assistance is required to complete the activity  Maximal assistance = pt performs 25% of the activity; assistance is required to complete the activity  Dependent = pt requires total physical assistance to accomplish the task

## 2020-10-27 LAB
ALBUMIN SERPL-MCNC: 3.6 G/DL (ref 3.5–4.6)
ALP BLD-CCNC: 58 U/L (ref 35–104)
ALT SERPL-CCNC: 21 U/L (ref 0–41)
ANION GAP SERPL CALCULATED.3IONS-SCNC: 11 MEQ/L (ref 9–15)
ANTI-XA UNFRAC HEPARIN: 0.13 IU/ML
ANTI-XA UNFRAC HEPARIN: 0.34 IU/ML
ANTI-XA UNFRAC HEPARIN: 0.36 IU/ML
AST SERPL-CCNC: 23 U/L (ref 0–40)
BASOPHILS ABSOLUTE: 0 K/UL (ref 0–0.2)
BASOPHILS RELATIVE PERCENT: 0.5 %
BILIRUB SERPL-MCNC: 0.7 MG/DL (ref 0.2–0.7)
BILIRUBIN DIRECT: 0.3 MG/DL (ref 0–0.4)
BILIRUBIN, INDIRECT: 0.4 MG/DL (ref 0–0.6)
BUN BLDV-MCNC: 9 MG/DL (ref 8–23)
CALCIUM SERPL-MCNC: 8.4 MG/DL (ref 8.5–9.9)
CHLORIDE BLD-SCNC: 98 MEQ/L (ref 95–107)
CHOLESTEROL, TOTAL: 95 MG/DL (ref 0–199)
CO2: 28 MEQ/L (ref 20–31)
CREAT SERPL-MCNC: 0.73 MG/DL (ref 0.7–1.2)
EOSINOPHILS ABSOLUTE: 0.1 K/UL (ref 0–0.7)
EOSINOPHILS RELATIVE PERCENT: 1.7 %
GFR AFRICAN AMERICAN: >60
GFR NON-AFRICAN AMERICAN: >60
GLUCOSE BLD-MCNC: 144 MG/DL (ref 60–115)
GLUCOSE BLD-MCNC: 150 MG/DL (ref 70–99)
GLUCOSE BLD-MCNC: 202 MG/DL (ref 60–115)
GLUCOSE BLD-MCNC: 224 MG/DL (ref 60–115)
GLUCOSE BLD-MCNC: 282 MG/DL (ref 60–115)
HCT VFR BLD CALC: 38.1 % (ref 42–52)
HDLC SERPL-MCNC: 40 MG/DL (ref 40–59)
HEMOGLOBIN: 13.1 G/DL (ref 14–18)
LDL CHOLESTEROL CALCULATED: 30 MG/DL (ref 0–129)
LYMPHOCYTES ABSOLUTE: 1.6 K/UL (ref 1–4.8)
LYMPHOCYTES RELATIVE PERCENT: 35 %
MAGNESIUM: 1.6 MG/DL (ref 1.7–2.4)
MCH RBC QN AUTO: 37 PG (ref 27–31.3)
MCHC RBC AUTO-ENTMCNC: 34.4 % (ref 33–37)
MCV RBC AUTO: 107.7 FL (ref 80–100)
MONOCYTES ABSOLUTE: 0.3 K/UL (ref 0.2–0.8)
MONOCYTES RELATIVE PERCENT: 7.5 %
NEUTROPHILS ABSOLUTE: 2.6 K/UL (ref 1.4–6.5)
NEUTROPHILS RELATIVE PERCENT: 55.3 %
PDW BLD-RTO: 13.5 % (ref 11.5–14.5)
PERFORMED ON: ABNORMAL
PLATELET # BLD: 145 K/UL (ref 130–400)
POTASSIUM REFLEX MAGNESIUM: 3.2 MEQ/L (ref 3.4–4.9)
RBC # BLD: 3.53 M/UL (ref 4.7–6.1)
SODIUM BLD-SCNC: 137 MEQ/L (ref 135–144)
TOTAL PROTEIN: 5.9 G/DL (ref 6.3–8)
TRIGL SERPL-MCNC: 126 MG/DL (ref 0–150)
WBC # BLD: 4.6 K/UL (ref 4.8–10.8)

## 2020-10-27 PROCEDURE — 6360000002 HC RX W HCPCS: Performed by: PHYSICIAN ASSISTANT

## 2020-10-27 PROCEDURE — 2580000003 HC RX 258: Performed by: INTERNAL MEDICINE

## 2020-10-27 PROCEDURE — 85520 HEPARIN ASSAY: CPT

## 2020-10-27 PROCEDURE — 6360000002 HC RX W HCPCS: Performed by: INTERNAL MEDICINE

## 2020-10-27 PROCEDURE — 80061 LIPID PANEL: CPT

## 2020-10-27 PROCEDURE — 80048 BASIC METABOLIC PNL TOTAL CA: CPT

## 2020-10-27 PROCEDURE — 36415 COLL VENOUS BLD VENIPUNCTURE: CPT

## 2020-10-27 PROCEDURE — 99233 SBSQ HOSP IP/OBS HIGH 50: CPT | Performed by: PSYCHIATRY & NEUROLOGY

## 2020-10-27 PROCEDURE — 83735 ASSAY OF MAGNESIUM: CPT

## 2020-10-27 PROCEDURE — 85025 COMPLETE CBC W/AUTO DIFF WBC: CPT

## 2020-10-27 PROCEDURE — 6370000000 HC RX 637 (ALT 250 FOR IP): Performed by: INTERNAL MEDICINE

## 2020-10-27 PROCEDURE — 80076 HEPATIC FUNCTION PANEL: CPT

## 2020-10-27 PROCEDURE — 1210000000 HC MED SURG R&B

## 2020-10-27 PROCEDURE — APPSS30 APP SPLIT SHARED TIME 16-30 MINUTES: Performed by: NURSE PRACTITIONER

## 2020-10-27 RX ORDER — MAGNESIUM SULFATE IN WATER 40 MG/ML
2 INJECTION, SOLUTION INTRAVENOUS 2 TIMES DAILY
Status: COMPLETED | OUTPATIENT
Start: 2020-10-27 | End: 2020-10-28

## 2020-10-27 RX ORDER — THIAMINE MONONITRATE (VIT B1) 100 MG
100 TABLET ORAL DAILY
Status: DISCONTINUED | OUTPATIENT
Start: 2020-10-28 | End: 2020-10-29 | Stop reason: HOSPADM

## 2020-10-27 RX ORDER — FOLIC ACID 1 MG/1
1 TABLET ORAL DAILY
Status: DISCONTINUED | OUTPATIENT
Start: 2020-10-28 | End: 2020-10-29 | Stop reason: HOSPADM

## 2020-10-27 RX ADMIN — CARVEDILOL 25 MG: 25 TABLET, FILM COATED ORAL at 23:22

## 2020-10-27 RX ADMIN — CARVEDILOL 25 MG: 25 TABLET, FILM COATED ORAL at 08:53

## 2020-10-27 RX ADMIN — POTASSIUM BICARBONATE 40 MEQ: 782 TABLET, EFFERVESCENT ORAL at 11:40

## 2020-10-27 RX ADMIN — GLIMEPIRIDE 1 MG: 1 TABLET ORAL at 08:53

## 2020-10-27 RX ADMIN — Medication 10 ML: at 23:23

## 2020-10-27 RX ADMIN — CLOPIDOGREL BISULFATE 75 MG: 75 TABLET ORAL at 08:52

## 2020-10-27 RX ADMIN — ALLOPURINOL 300 MG: 300 TABLET ORAL at 08:52

## 2020-10-27 RX ADMIN — MAGNESIUM SULFATE HEPTAHYDRATE 2 G: 40 INJECTION, SOLUTION INTRAVENOUS at 11:40

## 2020-10-27 RX ADMIN — INSULIN LISPRO 4 UNITS: 100 INJECTION, SOLUTION INTRAVENOUS; SUBCUTANEOUS at 11:47

## 2020-10-27 RX ADMIN — VITAM B12 50 MCG: 100 TAB at 08:53

## 2020-10-27 RX ADMIN — ATORVASTATIN CALCIUM 80 MG: 80 TABLET, FILM COATED ORAL at 08:52

## 2020-10-27 RX ADMIN — HEPARIN SODIUM AND DEXTROSE 12 UNITS/KG/HR: 10000; 5 INJECTION INTRAVENOUS at 06:31

## 2020-10-27 RX ADMIN — INSULIN LISPRO 4 UNITS: 100 INJECTION, SOLUTION INTRAVENOUS; SUBCUTANEOUS at 17:43

## 2020-10-27 RX ADMIN — INSULIN LISPRO 1 UNITS: 100 INJECTION, SOLUTION INTRAVENOUS; SUBCUTANEOUS at 23:37

## 2020-10-27 RX ADMIN — MAGNESIUM SULFATE HEPTAHYDRATE 2 G: 40 INJECTION, SOLUTION INTRAVENOUS at 23:53

## 2020-10-27 RX ADMIN — INSULIN LISPRO 4 UNITS: 100 INJECTION, SOLUTION INTRAVENOUS; SUBCUTANEOUS at 08:57

## 2020-10-27 RX ADMIN — ASPIRIN 81 MG: 81 TABLET, COATED ORAL at 08:53

## 2020-10-27 ASSESSMENT — ENCOUNTER SYMPTOMS
COLOR CHANGE: 0
CHEST TIGHTNESS: 0
WHEEZING: 0
SHORTNESS OF BREATH: 0
NAUSEA: 0
COUGH: 0
VOMITING: 0
TROUBLE SWALLOWING: 0

## 2020-10-27 ASSESSMENT — PAIN SCALES - GENERAL
PAINLEVEL_OUTOF10: 4
PAINLEVEL_OUTOF10: 4
PAINLEVEL_OUTOF10: 5
PAINLEVEL_OUTOF10: 4

## 2020-10-27 ASSESSMENT — PAIN DESCRIPTION - PAIN TYPE
TYPE: CHRONIC PAIN

## 2020-10-27 ASSESSMENT — PAIN DESCRIPTION - LOCATION
LOCATION: ARM;HEAD
LOCATION: ARM;HEAD

## 2020-10-27 NOTE — PROGRESS NOTES
Physical Therapy Missed Treatment   Facility/Department: [facility] MED SURG G216/I886-57    NAME: Yakelin Galvez    : 1955 (72 y.o.)  MRN: [id number]    Account: [de-identified]  Gender: male    Chart reviewed, attempted PT at 11:10. Patient unavailable 2° to:    [] Hold per nsg request    [x] Pt declined d/t fatigue. Pt just got back into bed. He sat up all morning and just had a BM. 14:43 - Pt sound asleep. Did not want to wake d/t busy morning. Nsg reports pt has been up in the chair and walking to the bathroom. [x] Nsg notified   [] Other notified    [] Pt. . off floor for test/procedure. [] Pt. Unavailable        Will attempt PT treatment again at earliest convenience.       Electronically signed by Wendy Krishnamurthy PTA on 10/27/20 at 11:14 AM EDT

## 2020-10-27 NOTE — PROGRESS NOTES
supplementation. PT OT eval and treatment. Patient status is a dynamic and the evolutionary therefore the aforementioned assessment and plan may or may not be complete or conclusive at this time. Additional work-up, desiccation, therapeutic intervention and the documentation will be determined that based on the clinical progression, follow-up test result and specialists recommendations. I may or may not have addressed all of this pt symptoms, medical issues, abnormal labs and findings. Pt will need additional work up, investigation, testing, surveillance, and treatment to be done at a later time and date during this hospitalization or post discharge by PCP and other out pt providers. Portion of patient care is managed by other providers. Please refer to their notes for details. I will follow specialists recommendations given their expertise in specialty subject matters. I will transfer patient to a tertiary care center if recommended by specialists. Further workup and plan will be determined based on the clinical progression and a follow-up tests result. Night and next week  hospitalist will take care of the patient in my absence.

## 2020-10-27 NOTE — PROGRESS NOTES
[facility] Neurology Daily Progress Note  Name: Prieto Banuelos  Age: 72 y.o. Gender: male  CodeStatus: Full Code  Allergies: Bactrim [Sulfamethoxazole-Trimethoprim]    Chief Complaint:Extremity Weakness (right) and Headache    Primary Care Provider: Anthony Ordaz MD  InpatientTreatment Team: Treatment Team: Attending Provider: Aleyda Elkins MD; Utilization Reviewer: Farooq Velázquez RN; Consulting Physician: Shabana Carpio MD; Respiratory Therapist (Day): Providence Boast, RCP; Registered Nurse: Sixto Horne, [place]; : Shawna Shah, [place]; Patient Care Tech: Celine Joya  Admission Date: 10/26/2020      HPI   Pt seen and examined for neuro follow up for acute CVA that presented with right-sided weakness and expressive aphasia. With history of CVA and right-sided weakness at baseline. He reports that overall his expressive aphasia and dysarthria have improved since admission. He continues to have some right-sided weakness of both upper and lower extremities. No dysphagia. No headache or diplopia. Continues on heparin drip. Vitals:    10/27/20 1000   BP: 125/66   Pulse: 88   Resp: 20   Temp: 98 °F (36.7 °C)   SpO2: 96%      Review of Systems   Constitutional: Negative for appetite change, chills, fatigue and fever. HENT: Negative for hearing loss and trouble swallowing. Eyes: Negative for visual disturbance. Respiratory: Negative for cough, chest tightness, shortness of breath and wheezing. Cardiovascular: Negative for chest pain, palpitations and leg swelling. Gastrointestinal: Negative for nausea and vomiting. Musculoskeletal: Positive for gait problem. Skin: Negative for color change and rash. Neurological: Positive for speech difficulty and weakness (right). Negative for dizziness, tremors, seizures, syncope, facial asymmetry, light-headedness, numbness and headaches. Psychiatric/Behavioral: Negative for agitation, confusion and hallucinations.  The patient is not nervous/anxious. Physical Exam  Vitals signs and nursing note reviewed. Constitutional:       General: He is not in acute distress. Appearance: He is obese. He is not diaphoretic. HENT:      Head: Normocephalic and atraumatic. Eyes:      General: No visual field deficit. Extraocular Movements: Extraocular movements intact. Pupils: Pupils are equal, round, and reactive to light. Cardiovascular:      Rate and Rhythm: Normal rate and regular rhythm. Pulmonary:      Effort: Pulmonary effort is normal. No respiratory distress. Breath sounds: Normal breath sounds. Skin:     General: Skin is warm and dry. Neurological:      Mental Status: He is alert and oriented to person, place, and time. Cranial Nerves: Cranial nerve deficit and dysarthria present. No facial asymmetry. Motor: Weakness present. No tremor. Abnormal muscle tone: right.       Gait: Gait abnormal.      Comments: Expressive aphasia               Medications:  Reviewed    Infusion Medications:    heparin (PORCINE) Infusion 12 Units/kg/hr (10/27/20 0631)    dextrose       Scheduled Medications:    magnesium sulfate  2 g Intravenous BID    [START ON 10/28/2020] thiamine  100 mg Oral Daily    [START ON 88/30/7309] folic acid  1 mg Oral Daily    sodium chloride flush  10 mL Intravenous 2 times per day    allopurinol  300 mg Oral Daily    aspirin  81 mg Oral Daily    atorvastatin  80 mg Oral Daily    carvedilol  25 mg Oral BID    clopidogrel  75 mg Oral Daily    glimepiride  1 mg Oral QAM AC    vitamin B-12  50 mcg Oral Daily    insulin lispro  0.03 Units/kg Subcutaneous TID WC    insulin lispro  0-6 Units Subcutaneous TID WC    insulin lispro  0-3 Units Subcutaneous Nightly     PRN Meds: sodium chloride flush, acetaminophen **OR** acetaminophen, polyethylene glycol, promethazine **OR** ondansetron, glucose, dextrose, glucagon (rDNA), dextrose, ALPRAZolam    Labs:   Recent Labs     10/26/20  0616 10/26/20  1944 10/27/20  0505   WBC 5.0  --  4.6*   HGB 14.4  --  13.1*   HCT 39.4*  --  38.1*    179 145     Recent Labs     10/26/20  0615 10/27/20  0505   * 137   K 3.1* 3.2*   CL 92* 98   CO2 28 28   BUN 12 9   CREATININE 0.72 0.73   CALCIUM 9.4 8.4*   PHOS 3.0  --      Recent Labs     10/26/20  0615 10/27/20  0505   AST 25 23   ALT 24 21   BILIDIR  --  0.3   BILITOT 0.4 0.7   ALKPHOS 64 58     Recent Labs     10/26/20  0615   INR 0.9     Recent Labs     10/26/20  0615   TROPONINI <0.010       Urinalysis:   Lab Results   Component Value Date    NITRU Negative 10/26/2020    WBCUA 0-2 10/26/2020    RBCUA 0-2 10/26/2020    BLOODU TRACE 10/26/2020    SPECGRAV 1.017 10/26/2020    GLUCOSEU Negative 10/26/2020       Radiology:   Most recent    EEG No procedure found. MRI of Brain No results found for this or any previous visit. Results for orders placed during the hospital encounter of 10/26/20   MRI BRAIN WO CONTRAST    Narrative EXAMINATION: MRI BRAIN WO CONTRAST     CLINICAL HISTORY:  CVA     COMPARISONS: NONE AVAILABLE    TECHNIQUE:    Multiplanar multisequence images of the brain were obtained without contrast. Diffusion perfusion imaging was obtained. FINDINGS:      There are no extra-axial collections. There is no evidence of hemorrhage. There are no areas of signal abnormality on perfusion diffusion imaging to suggest ischemia. The susceptibility images do not demonstrate evidence of hemosiderin deposition within   the brain parenchyma or the leptomeninges. There is preservation of the gray-white matter differentiation. There are a few scattered foci of T2/FLAIR increased signal in the subcortical and periventricular white matter without associated edema or mass effect. There is marked prominence of the   sulci and ventricles indicating severe global cerebral atrophy and chronic involutional changes especially affecting the bilateral frontal lobes.   The midline structures are intact, the corpus callosum is within normal limits. The region of the pineal gland and the sella turcica are unremarkable. There are no space-occupying lesions in the posterior fossa. The basilar cisterns are patent. The craniocervical junction is unremarkable. The visualized portions of the orbits are within normal limits, the globes are intact. The visualized portions of the paranasal sinuses are within normal limits. The calvarium and soft tissues are unremarkable. Impression There are no acute intracranial changes, there is no evidence of hemorrhage or acute ischemia. There is prominence of sulci and ventricles especially involving the frontal lobes indicating chronic interstitial changes and severe cerebral atrophy. Few   scattered foci of T2 FLAIR hyperintensity are noted throughout both hemispheres. The findings are most commonly associated with chronic microangiopathy. MRA of the Head and Neck: No results found for this or any previous visit. No results found for this or any previous visit. Results for orders placed during the hospital encounter of 11/12/17   MRA Head WO Contrast    Narrative EXAMINATION: MRI BRAIN WO CONTRAST, MRA NECK WO CONTRAST, MRA HEAD WO CONTRAST    CLINICAL HISTORY: Altered vision. History of stroke. Right-sided weakness. Headache. COMPARISONS: July 28, 2016 MRI brain. November 12, 2017 CT brain without contrast enhancement. FINDINGS: Complete MRI of the brain was obtained. There is age-related atrophy. There are very few, very small T2 hyperintense age-related white matter lesions, largest being in the left frontal lobe. There is no mass. There is no edema. There is no   hematoma. There is no hydrocephalus. The proximal cervical spinal cord is normal to the C3 level. There is no abnormal restricted diffusion. There is no abnormal paramagnetic signal.    Time-of-flight MR angiography of the head and neck was obtained.  Images of the brain were obtained centered on the [place] revealing no arterial occlusion or acquired stenosis. There is a large caliber posterior communicating artery on the   right. The P1 segment on the right is hypoplastic. There is no aneurysm. The A1 segment on the right is aplastic. There is a patent anterior communicating artery. Images of the neck show no stenosis or occlusion. There is no dissection. Vertebral   arteries are large caliber and have normal flow volume. CONCLUSION: ATROPHY. NO ACUTE PROCESS IN THE BRAIN. INCIDENTAL VARIATIONS OF NORMAL ANATOMY ON MR ANGIOGRAM, WITHOUT ACQUIRED STENOSIS AND WITHOUT ARTERIAL OCCLUSION. CT of the Head:   Results for orders placed during the hospital encounter of 10/26/20   CT HEAD WO CONTRAST    Impression CTA HEAD AND CTA NECK No large vessel occlusion or significant stenosis    EXAMINATION: CTA HEAD W WO CONTRAST, CTA NECK W WO CONTRAST, CT HEAD WO CONTRAST CTA HEAD    DATE AND TIME:10/26/2020 6:20 AM    CLINICAL HISTORY: Stroke  right sided weakness code bat      COMPARISON: None    TECHNIQUE:Helical CTA of the head and neck was performed from the vertex to the aortic arch following the uneventful intravenous administration of 100 cc of nonionic contrast without incident. 2-D images were reconstructed in the sagittal and coronal   planes. Three Dimensional Maximum Intensity Projection (3D-MIP) images were created. All images were reviewed and primarily archived to PACS workstation. All CT scans at this facility use dose modulation, iterative reconstruction, and/or weight based   dosing when appropriate to reduce radiation dose to as low as reasonably achievable. NASCET Criteria were utilized      FINDINGS CTA Head         Intracranial ICAs: Normal flow in precavernous, cavernous, clinoid and supraclinoid segments of the internal carotid arteries bilaterally        Anterior cerebral arteries:No occlusion or significant stenosis. Middle cerebral arteries:No large vessel occlusion or significant stenosis. Specifically there is no left MCA clot. Posterior Circulation: No large vessel occlusion or significant stenosis. Basilar artery:No significant stenosis. Aneurysm No aneurysm or dissection in the anterior or posterior circulations. .        Neurocranium Negative         Dural sinus: Dural venous sinuses are unremarkable. EXAMINATION: CTA HEAD W WO CONTRAST, CTA NECK W WO CONTRAST, CT HEAD WO CONTRAST. CTA NECK    DATE AND TIME:10/26/2020 6:20 AM    CLINICAL HISTORY: Stroke symptoms   right sided weakness code bat      TECHNIQUE: TECHNIQUE:Helical CTA of the head and neck was performed from the vertex to the aortic arch following the uneventful intravenous administration of 100 cc of nonionic contrast without incident. 2-D images were reconstructed in the sagittal and   coronal planes. Three Dimensional Maximum Intensity Projection (3D-MIP) images were created. All images were reviewed and primarily archived to PACS workstation. All CT scans at this facility use dose modulation, iterative reconstruction, and/or weight   based dosing when appropriate to reduce radiation dose to as low as reasonably achievable. NASCET Criteria were utilized    Result / Findings:    CTA NECK:    Aortic Arch: No stenosis aneurysm or dissection. Carotid:     Right  Carotid Stenosis (% by NASCET Criteria):  Less than 50%  . Tiny amount of plaque proximal right internal carotid artery. Left  Carotid Stenosis (% by NASCET Criteria):  Less than 50%           Cervical Vertebral Arteries:       Patency: No stenosis aneurysm or dissection. Vertebral arteries are codominant.             EXAMINATION: CTA HEAD W WO CONTRAST,   DATE AND TIME:10/26/2020 6:20 AM    CLINICAL HISTORY: Severe headache.  right sided weakness code bat      COMPARISON: None    TECHNIQUE:Axial CT from skull base to vertex without  contrast..  All CT scans peak systolic velocity 56.1 cm/s    Proximal internal carotid artery peak systolic velocity is 482.5 centimeters per second  Mid internal carotid artery peak systolic velocity is 472.3 centimeters per second. Distal internal carotid artery peak systolic velocity is 69.3 centimeters per second    Vertebral artery peak systolic velocity is 84.8 centimeters per second. Vertebral artery flow is antegrade    ICA/CCA ratio is 0.8    Left side:  Proximal common carotid artery peak systolic velocity is 776.3 centimeters per second  Mid, common carotid artery peak systolic velocity is 949.3 centimeters per second. Distal common carotid artery peak systolic velocity is 239.6 centimeters per second. External carotid artery peak systolic velocity is 730.7 centimeters per second. Left carotid artery bulb/bifurcation peak systolic velocity 00.6 cm/s. Proximal internal carotid artery peak systolic velocity is 46.1 centimeters per second  Mid internal carotid artery peak systolic velocity is 45.2 centimeters per second. Distal internal carotid artery peak systolic velocity is 588.1 centimeters per second    Vertebral artery peak systolic velocity is 27.7 centimeters per second. Vertebral artery flow is antegrade    ICA/CCA ratio is 1.1. Calcified and noncalcified plaque bilateral carotid artery bulb/bifurcations and bilateral internal carotid arteries      Impression Peak systolic velocities, ICA/CCA ratios and antegrade vertebral artery flow as above. See above for details of the examination and below for internal carotid artery interpretation guidelines. 1. Estimated luminal stenosis 50-69% distal left internal carotid artery. 2.Calcified and noncalcified plaque bilateral carotid artery bulb/bifurcations and bilateral internal carotid arteries.     GOSINK CRITERIA    Diameter          PSV t            EDV t          PSV          EDV          Stenosis Site        %              cm/sec          cm/sec      ICA/CCA

## 2020-10-28 PROBLEM — I69.90 LATE EFFECTS OF CVA (CEREBROVASCULAR ACCIDENT): Status: ACTIVE | Noted: 2020-10-28

## 2020-10-28 LAB
ANION GAP SERPL CALCULATED.3IONS-SCNC: 13 MEQ/L (ref 9–15)
BUN BLDV-MCNC: 6 MG/DL (ref 8–23)
CALCIUM SERPL-MCNC: 9.1 MG/DL (ref 8.5–9.9)
CHLORIDE BLD-SCNC: 99 MEQ/L (ref 95–107)
CO2: 26 MEQ/L (ref 20–31)
CREAT SERPL-MCNC: 0.8 MG/DL (ref 0.7–1.2)
GFR AFRICAN AMERICAN: >60
GFR NON-AFRICAN AMERICAN: >60
GLUCOSE BLD-MCNC: 131 MG/DL (ref 70–99)
GLUCOSE BLD-MCNC: 140 MG/DL (ref 60–115)
GLUCOSE BLD-MCNC: 187 MG/DL (ref 60–115)
GLUCOSE BLD-MCNC: 260 MG/DL (ref 60–115)
GLUCOSE BLD-MCNC: 266 MG/DL (ref 60–115)
MAGNESIUM: 2.3 MG/DL (ref 1.7–2.4)
PERFORMED ON: ABNORMAL
PLATELET # BLD: 159 K/UL (ref 130–400)
POTASSIUM SERPL-SCNC: 3.7 MEQ/L (ref 3.4–4.9)
SODIUM BLD-SCNC: 138 MEQ/L (ref 135–144)

## 2020-10-28 PROCEDURE — 85049 AUTOMATED PLATELET COUNT: CPT

## 2020-10-28 PROCEDURE — 92523 SPEECH SOUND LANG COMPREHEN: CPT

## 2020-10-28 PROCEDURE — 97116 GAIT TRAINING THERAPY: CPT

## 2020-10-28 PROCEDURE — 80048 BASIC METABOLIC PNL TOTAL CA: CPT

## 2020-10-28 PROCEDURE — 1210000000 HC MED SURG R&B

## 2020-10-28 PROCEDURE — APPSS15 APP SPLIT SHARED TIME 0-15 MINUTES: Performed by: NURSE PRACTITIONER

## 2020-10-28 PROCEDURE — 97112 NEUROMUSCULAR REEDUCATION: CPT

## 2020-10-28 PROCEDURE — 99233 SBSQ HOSP IP/OBS HIGH 50: CPT | Performed by: PSYCHIATRY & NEUROLOGY

## 2020-10-28 PROCEDURE — 36415 COLL VENOUS BLD VENIPUNCTURE: CPT

## 2020-10-28 PROCEDURE — 6370000000 HC RX 637 (ALT 250 FOR IP): Performed by: INTERNAL MEDICINE

## 2020-10-28 PROCEDURE — 2580000003 HC RX 258: Performed by: INTERNAL MEDICINE

## 2020-10-28 PROCEDURE — 83735 ASSAY OF MAGNESIUM: CPT

## 2020-10-28 PROCEDURE — 93005 ELECTROCARDIOGRAM TRACING: CPT | Performed by: PHYSICIAN ASSISTANT

## 2020-10-28 PROCEDURE — 99222 1ST HOSP IP/OBS MODERATE 55: CPT | Performed by: PHYSICAL MEDICINE & REHABILITATION

## 2020-10-28 PROCEDURE — 93010 ELECTROCARDIOGRAM REPORT: CPT | Performed by: INTERNAL MEDICINE

## 2020-10-28 RX ORDER — POLYETHYLENE GLYCOL 3350 17 G/17G
17 POWDER, FOR SOLUTION ORAL DAILY PRN
Status: CANCELLED | OUTPATIENT
Start: 2020-10-28

## 2020-10-28 RX ORDER — THIAMINE MONONITRATE (VIT B1) 100 MG
100 TABLET ORAL DAILY
Status: CANCELLED | OUTPATIENT
Start: 2020-10-29

## 2020-10-28 RX ORDER — ALPRAZOLAM 0.5 MG/1
0.5 TABLET ORAL EVERY 6 HOURS PRN
Status: CANCELLED | OUTPATIENT
Start: 2020-10-28

## 2020-10-28 RX ORDER — NICOTINE POLACRILEX 4 MG
15 LOZENGE BUCCAL PRN
Status: CANCELLED | OUTPATIENT
Start: 2020-10-28

## 2020-10-28 RX ORDER — GLIMEPIRIDE 1 MG/1
1 TABLET ORAL
Status: CANCELLED | OUTPATIENT
Start: 2020-10-29

## 2020-10-28 RX ORDER — ACETAMINOPHEN 650 MG/1
650 SUPPOSITORY RECTAL EVERY 6 HOURS PRN
Status: CANCELLED | OUTPATIENT
Start: 2020-10-28

## 2020-10-28 RX ORDER — DEXTROSE MONOHYDRATE 50 MG/ML
100 INJECTION, SOLUTION INTRAVENOUS PRN
Status: CANCELLED | OUTPATIENT
Start: 2020-10-28

## 2020-10-28 RX ORDER — SODIUM CHLORIDE 0.9 % (FLUSH) 0.9 %
10 SYRINGE (ML) INJECTION PRN
Status: CANCELLED | OUTPATIENT
Start: 2020-10-28

## 2020-10-28 RX ORDER — ASPIRIN 81 MG/1
81 TABLET ORAL DAILY
Status: CANCELLED | OUTPATIENT
Start: 2020-10-29

## 2020-10-28 RX ORDER — CLOPIDOGREL BISULFATE 75 MG/1
75 TABLET ORAL DAILY
Status: CANCELLED | OUTPATIENT
Start: 2020-10-29

## 2020-10-28 RX ORDER — UBIDECARENONE 75 MG
50 CAPSULE ORAL DAILY
Status: CANCELLED | OUTPATIENT
Start: 2020-10-29

## 2020-10-28 RX ORDER — DEXTROSE MONOHYDRATE 25 G/50ML
12.5 INJECTION, SOLUTION INTRAVENOUS PRN
Status: CANCELLED | OUTPATIENT
Start: 2020-10-28

## 2020-10-28 RX ORDER — ALLOPURINOL 300 MG/1
300 TABLET ORAL DAILY
Status: CANCELLED | OUTPATIENT
Start: 2020-10-29

## 2020-10-28 RX ORDER — FOLIC ACID 1 MG/1
1 TABLET ORAL DAILY
Status: CANCELLED | OUTPATIENT
Start: 2020-10-29

## 2020-10-28 RX ORDER — CARVEDILOL 25 MG/1
25 TABLET ORAL 2 TIMES DAILY
Status: CANCELLED | OUTPATIENT
Start: 2020-10-28

## 2020-10-28 RX ORDER — ATORVASTATIN CALCIUM 80 MG/1
80 TABLET, FILM COATED ORAL DAILY
Status: CANCELLED | OUTPATIENT
Start: 2020-10-29

## 2020-10-28 RX ORDER — ACETAMINOPHEN 325 MG/1
650 TABLET ORAL EVERY 6 HOURS PRN
Status: CANCELLED | OUTPATIENT
Start: 2020-10-28

## 2020-10-28 RX ADMIN — CARVEDILOL 25 MG: 25 TABLET, FILM COATED ORAL at 09:12

## 2020-10-28 RX ADMIN — INSULIN LISPRO 4 UNITS: 100 INJECTION, SOLUTION INTRAVENOUS; SUBCUTANEOUS at 09:41

## 2020-10-28 RX ADMIN — CLOPIDOGREL BISULFATE 75 MG: 75 TABLET ORAL at 09:12

## 2020-10-28 RX ADMIN — INSULIN LISPRO 4 UNITS: 100 INJECTION, SOLUTION INTRAVENOUS; SUBCUTANEOUS at 17:44

## 2020-10-28 RX ADMIN — GLIMEPIRIDE 1 MG: 1 TABLET ORAL at 09:12

## 2020-10-28 RX ADMIN — INSULIN LISPRO 2 UNITS: 100 INJECTION, SOLUTION INTRAVENOUS; SUBCUTANEOUS at 21:05

## 2020-10-28 RX ADMIN — Medication 10 ML: at 21:06

## 2020-10-28 RX ADMIN — Medication 100 MG: at 09:12

## 2020-10-28 RX ADMIN — ASPIRIN 81 MG: 81 TABLET, COATED ORAL at 09:12

## 2020-10-28 RX ADMIN — CARVEDILOL 25 MG: 25 TABLET, FILM COATED ORAL at 21:05

## 2020-10-28 RX ADMIN — FOLIC ACID 1 MG: 1 TABLET ORAL at 09:12

## 2020-10-28 RX ADMIN — INSULIN LISPRO 4 UNITS: 100 INJECTION, SOLUTION INTRAVENOUS; SUBCUTANEOUS at 12:53

## 2020-10-28 RX ADMIN — VITAM B12 50 MCG: 100 TAB at 09:12

## 2020-10-28 RX ADMIN — ATORVASTATIN CALCIUM 80 MG: 80 TABLET, FILM COATED ORAL at 09:12

## 2020-10-28 RX ADMIN — ALLOPURINOL 300 MG: 300 TABLET ORAL at 09:13

## 2020-10-28 SDOH — SOCIAL STABILITY: SOCIAL NETWORK: HOW OFTEN DO YOU ATTENT MEETINGS OF THE CLUB OR ORGANIZATION YOU BELONG TO?: MORE THAN 4 TIMES PER YEAR

## 2020-10-28 SDOH — HEALTH STABILITY: PHYSICAL HEALTH: ON AVERAGE, HOW MANY MINUTES DO YOU ENGAGE IN EXERCISE AT THIS LEVEL?: 0 MIN

## 2020-10-28 SDOH — ECONOMIC STABILITY: TRANSPORTATION INSECURITY
IN THE PAST 12 MONTHS, HAS THE LACK OF TRANSPORTATION KEPT YOU FROM MEDICAL APPOINTMENTS OR FROM GETTING MEDICATIONS?: NO

## 2020-10-28 SDOH — ECONOMIC STABILITY: INCOME INSECURITY: HOW HARD IS IT FOR YOU TO PAY FOR THE VERY BASICS LIKE FOOD, HOUSING, MEDICAL CARE, AND HEATING?: NOT HARD AT ALL

## 2020-10-28 SDOH — ECONOMIC STABILITY: TRANSPORTATION INSECURITY
IN THE PAST 12 MONTHS, HAS LACK OF TRANSPORTATION KEPT YOU FROM MEETINGS, WORK, OR FROM GETTING THINGS NEEDED FOR DAILY LIVING?: NO

## 2020-10-28 SDOH — SOCIAL STABILITY: SOCIAL NETWORK
DO YOU BELONG TO ANY CLUBS OR ORGANIZATIONS SUCH AS CHURCH GROUPS UNIONS, FRATERNAL OR ATHLETIC GROUPS, OR SCHOOL GROUPS?: YES

## 2020-10-28 SDOH — SOCIAL STABILITY: SOCIAL INSECURITY: WITHIN THE LAST YEAR, HAVE YOU BEEN HUMILIATED OR EMOTIONALLY ABUSED IN OTHER WAYS BY YOUR PARTNER OR EX-PARTNER?: NO

## 2020-10-28 SDOH — ECONOMIC STABILITY: FOOD INSECURITY: WITHIN THE PAST 12 MONTHS, YOU WORRIED THAT YOUR FOOD WOULD RUN OUT BEFORE YOU GOT MONEY TO BUY MORE.: NEVER TRUE

## 2020-10-28 SDOH — SOCIAL STABILITY: SOCIAL NETWORK: ARE YOU MARRIED, WIDOWED, DIVORCED, SEPARATED, NEVER MARRIED, OR LIVING WITH A PARTNER?: MARRIED

## 2020-10-28 SDOH — SOCIAL STABILITY: SOCIAL INSECURITY
WITHIN THE LAST YEAR, HAVE YOU BEEN KICKED, HIT, SLAPPED, OR OTHERWISE PHYSICALLY HURT BY YOUR PARTNER OR EX-PARTNER?: NO

## 2020-10-28 SDOH — HEALTH STABILITY: MENTAL HEALTH
STRESS IS WHEN SOMEONE FEELS TENSE, NERVOUS, ANXIOUS, OR CAN'T SLEEP AT NIGHT BECAUSE THEIR MIND IS TROUBLED. HOW STRESSED ARE YOU?: ONLY A LITTLE

## 2020-10-28 SDOH — SOCIAL STABILITY: SOCIAL NETWORK
IN A TYPICAL WEEK, HOW MANY TIMES DO YOU TALK ON THE PHONE WITH FAMILY, FRIENDS, OR NEIGHBORS?: MORE THAN THREE TIMES A WEEK

## 2020-10-28 SDOH — SOCIAL STABILITY: SOCIAL INSECURITY
WITHIN THE LAST YEAR, HAVE TO BEEN RAPED OR FORCED TO HAVE ANY KIND OF SEXUAL ACTIVITY BY YOUR PARTNER OR EX-PARTNER?: NO

## 2020-10-28 SDOH — SOCIAL STABILITY: SOCIAL NETWORK: HOW OFTEN DO YOU ATTEND CHURCH OR RELIGIOUS SERVICES?: 1 TO 4 TIMES PER YEAR

## 2020-10-28 SDOH — SOCIAL STABILITY: SOCIAL INSECURITY: WITHIN THE LAST YEAR, HAVE YOU BEEN AFRAID OF YOUR PARTNER OR EX-PARTNER?: NO

## 2020-10-28 SDOH — ECONOMIC STABILITY: FOOD INSECURITY: WITHIN THE PAST 12 MONTHS, THE FOOD YOU BOUGHT JUST DIDN'T LAST AND YOU DIDN'T HAVE MONEY TO GET MORE.: NEVER TRUE

## 2020-10-28 SDOH — SOCIAL STABILITY: SOCIAL NETWORK: HOW OFTEN DO YOU GET TOGETHER WITH FRIENDS OR RELATIVES?: MORE THAN THREE TIMES A WEEK

## 2020-10-28 SDOH — HEALTH STABILITY: PHYSICAL HEALTH: ON AVERAGE, HOW MANY DAYS PER WEEK DO YOU ENGAGE IN MODERATE TO STRENUOUS EXERCISE (LIKE A BRISK WALK)?: 0 DAYS

## 2020-10-28 ASSESSMENT — ENCOUNTER SYMPTOMS
BOWEL INCONTINENCE: 0
VOMITING: 0
PHOTOPHOBIA: 0
SORE THROAT: 0
DIARRHEA: 0
SHORTNESS OF BREATH: 0
NAUSEA: 0
COLOR CHANGE: 0
EYE PAIN: 0
ABDOMINAL PAIN: 0
CHEST TIGHTNESS: 0
RHINORRHEA: 0
WHEEZING: 0
BACK PAIN: 0
TROUBLE SWALLOWING: 0
COUGH: 0

## 2020-10-28 ASSESSMENT — PAIN SCALES - GENERAL: PAINLEVEL_OUTOF10: 0

## 2020-10-28 NOTE — CONSULTS
Physical Medicine & Rehabilitation  Consult Note      Admitting Physician: Magalene Lundborg, MD    Primary Care Provider: Maria Ines Rodriguez MD     Reason for Consult:  Asses rehab needs, promote physical and mental function, and decrease likelihood of re-admit to the hospital after discharge. History of Present Illness:    Kaylin Cooper is a 72 y.o. male admitted to [facility] on 10/26/2020. Patient was admitted through the emergency room with a sudden onset right-sided weakness and aphasia in the early hours of 10/26/2020. Unfortunately by the time he came in he was out of the window that he could have TPA. He was evaluated by neurology. He seems to be doing well with his independent exercise program in the room and PT OT on the acute floor. He is hoping for acute lower lobe rehabilitation though he is improving so fast he me not need therapy at an acute level. Neurologic Problem   The patient's primary symptoms include an altered mental status, clumsiness, focal sensory loss, focal weakness and weakness (right). The patient's pertinent negatives include no syncope. This is a recurrent problem. The current episode started in the past 7 days. The neurological problem developed suddenly. The problem has been rapidly improving since onset. There was right-sided, upper extremity and lower extremity focality noted. Associated symptoms include confusion and fatigue. Pertinent negatives include no abdominal pain, auditory change, aura, back pain, bladder incontinence, bowel incontinence, chest pain, diaphoresis, dizziness, fever, headaches, light-headedness, nausea, palpitations, shortness of breath or vomiting. Past treatments include sleep and aspirin. The treatment provided mild relief. His past medical history is significant for a CVA. There is no history of a bleeding disorder or a clotting disorder.             Their inpatient work up has included:    Imaging:    Imaging and other studies reviewed and discussed with patient and staff    Cta Head   10/26/2020  CTA HEAD AND CTA NECK No large vessel occlusion or significant stenosis EXAMINATION: CTA HEAD W WO CONTRAST, CTA NECK W WO CONTRAST, CT HEAD WO CONTRAST CTA HEAD DATE AND TIME:10/26/2020 6:20 AM CLINICAL HISTORY: Stroke  right sided weakness code bat    EXAMINATION: CTA HEAD W WO CONTRAST, CTA NECK W WO CONTRAST, CT HEAD WO CONTRAST. CTA NECK DATE AND TIME:10/26/2020 6:20 AM CLINICAL HISTORY: Stroke symptoms   right sided weakness code bat    IMPRESSION: NO ACUTE INTRACRANIAL PATHOLOGY. THESE FINDINGS WERE COMMUNICATED TO THE EMERGENCY ROOM STAFF AT 10/26/2020 AT 6:35 AM.     Ct Head  10/26/2020  IMPRESSION: NO ACUTE INTRACRANIAL PATHOLOGY. THESE FINDINGS WERE COMMUNICATED TO THE EMERGENCY ROOM STAFF AT 10/26/2020 AT 6:35 AM.     Shante Vasquez Neck  10/26/2020. IMPRESSION: NO ACUTE INTRACRANIAL PATHOLOGY. THESE FINDINGS WERE COMMUNICATED TO THE EMERGENCY ROOM STAFF AT 10/26/2020 AT 6:35 AM.     Cindy Mustard Chest  10/26/2020 Osseous structures intact. Cardiopericardial silhouette normal. Vasculature normal. Right lung clear. Ill-defined area increased opacity left lung base. LEFT LOWER LUNG ATELECTASIS/PNEUMONIA. Mri Brain   10/27/2020  EXAMINATION: MRI BRAIN WO CONTRAST CLINICAL HISTORY:  CVA COMPARISONS: NONE AVAILABLE TECHNIQUE: Multiplanar multisequence images of the brain were obtained without contrast. Diffusion perfusion imaging was obtained. FINDINGS:  There are no extra-axial collections. There is no evidence of hemorrhage. There are no areas of signal abnormality on perfusion diffusion imaging to suggest ischemia. The susceptibility images do not demonstrate evidence of hemosiderin deposition within  the brain parenchyma or the leptomeninges. There is preservation of the gray-white matter differentiation.  There are a few scattered foci of T2/FLAIR increased signal in the subcortical and periventricular white matter without associated edema or mass effect. There is marked prominence of the sulci and ventricles indicating severe global cerebral atrophy and chronic involutional changes especially affecting the bilateral frontal lobes. The midline structures are intact, the corpus callosum is within normal limits. The region of the pineal gland and the sella turcica are unremarkable. There are no space-occupying lesions in the posterior fossa. The basilar cisterns are patent. The craniocervical junction is unremarkable. The visualized portions of the orbits are within normal limits, the globes are intact. The visualized portions of the paranasal sinuses are within normal limits. The calvarium and soft tissues are unremarkable. There are no acute intracranial changes, there is no evidence of hemorrhage or acute ischemia. There is prominence of sulci and ventricles especially involving the frontal lobes indicating chronic interstitial changes and severe cerebral atrophy. Few scattered foci of T2 FLAIR hyperintensity are noted throughout both hemispheres. The findings are most commonly associated with chronic microangiopathy.              Labs:     labs reviewed and discussed with patient and staff    Lab Results   Component Value Date    POCGLU 140 10/28/2020    POCGLU 224 10/27/2020    POCGLU 202 10/27/2020    POCGLU 282 10/27/2020    POCGLU 144 10/27/2020     Lab Results   Component Value Date     10/28/2020    K 3.7 10/28/2020    K 3.2 10/27/2020    CL 99 10/28/2020    CO2 26 10/28/2020    BUN 6 10/28/2020    CREATININE 0.80 10/28/2020    CALCIUM 9.1 10/28/2020    LABALBU 3.6 10/27/2020    LABALBU DETECTED 10/27/2011    BILITOT 0.7 10/27/2020    ALKPHOS 58 10/27/2020    AST 23 10/27/2020    ALT 21 10/27/2020     Lab Results   Component Value Date    WBC 4.6 10/27/2020    RBC 3.53 10/27/2020    HGB 13.1 10/27/2020    HCT 38.1 10/27/2020    .7 10/27/2020    MCH 37.0 10/27/2020    MCHC 34.4 10/27/2020    RDW 13.5 10/27/2020     10/27/2020    MPV 8.4 09/25/2015     Lab Results   Component Value Date    VITD25 47.1 11/12/2017     Lab Results   Component Value Date    COLORU Yellow 10/26/2020    NITRU Negative 10/26/2020    GLUCOSEU Negative 10/26/2020    KETUA Negative 10/26/2020    UROBILINOGEN 0.2 10/26/2020    BILIRUBINUR Negative 10/26/2020     Lab Results   Component Value Date    PROTIME 12.2 10/26/2020    PROTIME 12.4 11/12/2017     Lab Results   Component Value Date    INR 0.9 10/26/2020         I discussed results with patient. Current Rehabilitation Assessments:    Rehabilitation:  Physical therapy: FIMS:  BedMobility:      Transfers: Sit to Stand: Stand by assistance  Stand to sit: Stand by assistance  Bed to Chair: Stand by assistance, Ambulation 1  Surface: level tile  Device: Rolling Walker  Assistance: Stand by assistance  Quality of Gait: difficulty with turns d/t \"vertigo\"  Gait Deviations: Slow Elaine, Increased HOLLIE  Distance: 22' x2  Comments: pt reports he is feeling much better about his mobility.,      FIMS: ,  , Assessment: pt with much improved mobility since Eval. pt confident in mobility and feels going home is a safe plan at this time. Occupational therapy: FIMS:   ,  , Assessment: Pt. is a 72year old man from home with spouse who presents to [facility] with a CVA with the above deficits which impact his ability to perform ADLs and IADLs. Pt would benefit from continued OT to maximize independence and safety with ADL tasks. ADL  Feeding: Independent (10/26/20 1250)  Grooming: Stand by assistance (10/26/20 1250)  UE Bathing: Stand by assistance (10/26/20 1250)  LE Bathing: Maximum assistance (10/26/20 1250)  UE Dressing: Minimal assistance (10/26/20 1250)  LE Dressing: Maximum assistance (10/26/20 1250)  Toileting: Maximum assistance (10/26/20 1250)  Additional Comments: Simulated ADLs as above. unable to stand to simulate pants management; unable to perform figure 4 for socks.  (10/26/20 1250)  OCCUPATIONAL THERAPY  Hand Dominance: Left(Pt. is 'ambidexterous but I think I used to be a left hander\")  ADL  Feeding: Independent (10/26/20 1250)  Grooming: Stand by assistance (10/26/20 1250)  UE Bathing: Stand by assistance (10/26/20 1250)  LE Bathing: Maximum assistance (10/26/20 1250)  UE Dressing: Minimal assistance (10/26/20 1250)  LE Dressing: Maximum assistance (10/26/20 1250)  Toileting: Maximum assistance (10/26/20 1250)  Additional Comments: Simulated ADLs as above. unable to stand to simulate pants management; unable to perform figure 4 for socks.  (10/26/20 1250)  Toilet Transfers  Toilet Transfer: Unable to assess (10/26/20 1253)  Toilet Transfers Comments: Anticipate dependent based on other mobility (10/26/20 1253)            LTG:                 Speech therapy: FIMS:          Past Medical History:          Diagnosis Date    Acute renal failure ([address]     Anxiety     CAD S/P percutaneous coronary angioplasty 3/11/2014    Cardiomyopathy ([address]     Cerebrovascular accident (CVA) due to occlusion of left middle cerebral artery ([address] 08/2016    brainstem infarction from left MCA occlusion    Cerebrovascular disease 07/27/2016    Coronary angioplasty status     CVA (cerebral vascular accident) ([address] 11/12/2017    Dependent edema 9/14/2017    Diplopia 5/15/2017    Resolved with management of cataracts    Dupuytren contracture 1/2/2018    Dysphagia 8/18/2011    Dysphonia 8/18/2011    Essential hypertension 8/17/2016    Gallop rhythm     Gout 12/10/2016    Gout of big toe 08/2014    Right Great Toe    H/O fall     Headache     migraines    Heart failure ([address]     History of 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    History of heart failure 1/6/2014    History of myocardial infarction 3/11/2014    History of recurrent pneumonia 2/11/2014    Hx of bacterial pneumonia     Hypotension  Left carotid artery stenosis 8/23/2020    Left carotid artery stenosis 8/23/2020    Leg swelling     Macrocytosis without anemia 12/10/2016    Microalbuminuria due to type 2 diabetes mellitus ([address] 9/14/2018    Morbid obesity ([address] 1/2/2014    [facility]     Obesity     AYLIN (obstructive sleep apnea) 12/10/2016    Osteoarthritis     Right-sided muscle weakness 10/19/2016    S/P cardiac catheterization     Skin cyst 4/8/2016    Spasm 11/9/2016    Stented coronary artery     Tremor of right hand 5/15/2017    Type 2 diabetes mellitus with diabetic polyneuropathy, without long-term current use of insulin ([address]     Unsteady gait 5/15/2017    Weakness     Weight gain          PastSurgical History:          Procedure Laterality Date    CHOLECYSTECTOMY      COLONOSCOPY  01/15/2010    polyp, diverticulosis (DR ELAINE)    CORONARY ANGIOPLASTY WITH STENT PLACEMENT  2014    CYST REMOVAL  05/16/16    DR Ortiz [place] CYST    SHOULDER SURGERY Right 12/18/2015         Allergies:      Allergies   Allergen Reactions    Bactrim [Sulfamethoxazole-Trimethoprim] Nausea And Vomiting and Other (See Comments)     Sugar count elevated, had troubles urinating        CurrentMedications:     Current Facility-Administered Medications: vitamin B-1 (THIAMINE) tablet 100 mg, 100 mg, Oral, Daily  folic acid (FOLVITE) tablet 1 mg, 1 mg, Oral, Daily  sodium chloride flush 0.9 % injection 10 mL, 10 mL, Intravenous, 2 times per day  sodium chloride flush 0.9 % injection 10 mL, 10 mL, Intravenous, PRN  acetaminophen (TYLENOL) tablet 650 mg, 650 mg, Oral, Q6H PRN **OR** acetaminophen (TYLENOL) suppository 650 mg, 650 mg, Rectal, Q6H PRN  polyethylene glycol (GLYCOLAX) packet 17 g, 17 g, Oral, Daily PRN  promethazine (PHENERGAN) tablet 12.5 mg, 12.5 mg, Oral, Q6H PRN **OR** ondansetron (ZOFRAN) injection 4 mg, 4 mg, Intravenous, Q6H PRN  allopurinol (ZYLOPRIM) tablet 300 mg, 300 mg, Oral, Daily  aspirin EC tablet 81 mg, 81 mg, Oral, Daily  atorvastatin (LIPITOR) tablet 80 mg, 80 mg, Oral, Daily  carvedilol (COREG) tablet 25 mg, 25 mg, Oral, BID  clopidogrel (PLAVIX) tablet 75 mg, 75 mg, Oral, Daily  glimepiride (AMARYL) tablet 1 mg, 1 mg, Oral, QAM AC  vitamin B-12 (CYANOCOBALAMIN) tablet 50 mcg, 50 mcg, Oral, Daily  insulin lispro (HUMALOG) injection vial 4 Units, 0.03 Units/kg, Subcutaneous, TID WC  insulin lispro (HUMALOG) injection vial 0-6 Units, 0-6 Units, Subcutaneous, TID WC  insulin lispro (HUMALOG) injection vial 0-3 Units, 0-3 Units, Subcutaneous, Nightly  glucose (GLUTOSE) 40 % oral gel 15 g, 15 g, Oral, PRN  dextrose 50 % IV solution, 12.5 g, Intravenous, PRN  glucagon (rDNA) injection 1 mg, 1 mg, Intramuscular, PRN  dextrose 5 % solution, 100 mL/hr, Intravenous, PRN  ALPRAZolam (XANAX) tablet 0.5 mg, 0.5 mg, Oral, Q6H PRN      Social History:    Social History     Socioeconomic History    Marital status:      Spouse name: Jarrett Becerril    Number of children: 3    Years of education: 12+    Highest education level: Not on file   Occupational History    Occupation: medical retired   CVA     Employer: [employer]    Financial resource strain: Not hard at all   [place] insecurity     Worry: Never true     Inability: Never true    Transportation needs     Medical: No     Non-medical: No   Tobacco Use    Smoking status: Former Smoker     Packs/day: 2.50     Years: 30.00     Pack years: 75.00     Last attempt to quit:      Years since quittin.8    Smokeless tobacco: Never Used   Substance and Sexual Activity    Alcohol use: Yes     Alcohol/week: 0.0 standard drinks     Comment: limits less than 10/wk     Drug use: No    Sexual activity: Yes     Partners: Female   Lifestyle    Physical activity     Days per week: 0 days     Minutes per session: 0 min    Stress:  Only a little   Relationships    Social connections     Talks on phone: More than three times a week     Gets together: More than three times a week     Attends Islam service: 1 to 4 times per year     Active member of club or organization: Yes     Attends meetings of clubs or organizations: More than 4 times per year     Relationship status:     Intimate partner violence     Fear of current or ex partner: No     Emotionally abused: No     Physically abused: No     Forced sexual activity: No   Other Topics Concern    Not on file   Social History Narrative    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 from [place] after 2016 CVA    Lives With: Spouse    Type of Home: [employer] Layout: Two level, Bed/Bath upstairs, Laundry in basement(Railing)    Home Access: Stairs to enter with rails    Entrance Stairs - Number of Steps: 3 in back, 4 in front    Bathroom Shower/Tub: Tub/Shower unit    Bathroom Equipment: Shower chair    Home Equipment: Rolling walker, Cane    ADL Assistance: Independent    Homemaking Assistance: Needs assistance(Spouse  is primary; vertigo and arthritis limit housework)    Ambulation Assistance: Independent(Walker outside, cane inside)    Transfer Assistance: Independent    Active : No    Patient's  Info: has not driven since CVA          Family History:         Problem Relation Age of Onset    Breast Cancer Mother     Stroke Father     Colon Cancer Father 76       Review of Systems:    Review of Systems   Constitutional: Positive for fatigue. Negative for appetite change, chills, diaphoresis and fever. HENT: Negative for hearing loss and trouble swallowing. Eyes: Negative for visual disturbance. Respiratory: Negative for cough, chest tightness, shortness of breath and wheezing. Cardiovascular: Negative for chest pain, palpitations and leg swelling. Gastrointestinal: Negative for abdominal pain, bowel incontinence, nausea and vomiting. Genitourinary: Negative for bladder incontinence. Musculoskeletal: Positive for gait problem.  Negative for back pain.   Skin: Negative for color change and rash. Neurological: Positive for focal weakness, speech difficulty and weakness (right). Negative for dizziness, tremors, seizures, syncope, facial asymmetry, light-headedness, numbness and headaches. Psychiatric/Behavioral: Positive for confusion. Negative for agitation and hallucinations. The patient is not nervous/anxious. Physical Exam:    /72   Pulse 74   Temp 98.2 °F (36.8 °C) (Oral)   Resp 18   Ht 6' 1\" (1.854 m)   Wt 287 lb 14.7 oz (130.6 kg)   SpO2 98%   BMI 37.99 kg/m²      Physical Exam  Constitutional:       General: He is not in acute distress. Appearance: He is well-developed. He is ill-appearing. He is not toxic-appearing or diaphoretic. HENT:      Head: Normocephalic. Not macrocephalic and not microcephalic. No raccoon eyes or Aguilera's sign. Mouth/Throat:      Pharynx: Oropharyngeal exudate present. Eyes:      General: No scleral icterus. Right eye: No discharge. Left eye: No discharge. Conjunctiva/sclera: Conjunctivae normal.      Pupils: Pupils are equal, round, and reactive to light. Neck:      Musculoskeletal: Normal range of motion. Spinous process tenderness and muscular tenderness present. Thyroid: No thyromegaly. Vascular: Normal carotid pulses. No carotid bruit, hepatojugular reflux or JVD. Trachea: No tracheal deviation. Cardiovascular:      Heart sounds: Heart sounds are distant. Pulmonary:      Effort: Pulmonary effort is normal.      Breath sounds: No stridor. Decreased breath sounds present. Abdominal:      General: Bowel sounds are decreased. There is no distension. Palpations: Abdomen is soft. Tenderness: There is no abdominal tenderness. There is no guarding or rebound. Musculoskeletal:      Cervical back: He exhibits decreased range of motion and tenderness. Thoracic back: He exhibits decreased range of motion and tenderness.       Lumbar 1+  Left brachioradialis: 1+  Right biceps: 1+  Left biceps: 1+  Right triceps: 1+  Left triceps: 1+  Right patellar: 0  Left patellar: 0  Right achilles: 0  Left achilles: 0            Diagnostics:    Recent Results (from the past 24 hour(s))   HEPARIN LEVEL/ANTI-XA    Collection Time: 10/27/20 11:37 AM   Result Value Ref Range    Anti-XA Unfrac Heparin 0.34 IU/mL   POCT Glucose    Collection Time: 10/27/20 11:44 AM   Result Value Ref Range    POC Glucose 282 (H) 60 - 115 mg/dl    Performed on ACCU-CHEK    POCT Glucose    Collection Time: 10/27/20  4:51 PM   Result Value Ref Range    POC Glucose 202 (H) 60 - 115 mg/dl    Performed on ACCU-CHEK    HEPARIN LEVEL/ANTI-XA    Collection Time: 10/27/20  5:29 PM   Result Value Ref Range    Anti-XA Unfrac Heparin 0.36 IU/mL   POCT Glucose    Collection Time: 10/27/20  8:22 PM   Result Value Ref Range    POC Glucose 224 (H) 60 - 115 mg/dl    Performed on ACCU-CHEK    Magnesium    Collection Time: 10/28/20  5:11 AM   Result Value Ref Range    Magnesium 2.3 1.7 - 2.4 mg/dL   Basic Metabolic Panel    Collection Time: 10/28/20  5:11 AM   Result Value Ref Range    Sodium 138 135 - 144 mEq/L    Potassium 3.7 3.4 - 4.9 mEq/L    Chloride 99 95 - 107 mEq/L    CO2 26 20 - 31 mEq/L    Anion Gap 13 9 - 15 mEq/L    Glucose 131 (H) 70 - 99 mg/dL    BUN 6 (L) 8 - 23 mg/dL    CREATININE 0.80 0.70 - 1.20 mg/dL    GFR Non-African American >60.0 >60    GFR  >60.0 >60    Calcium 9.1 8.5 - 9.9 mg/dL   EKG 12 Lead    Collection Time: 10/28/20  5:51 AM   Result Value Ref Range    Ventricular Rate 70 BPM    Atrial Rate 70 BPM    P-R Interval 206 ms    QRS Duration 72 ms    Q-T Interval 422 ms    QTc Calculation (Bazett) 455 ms    P Axis 62 degrees    R Axis 9 degrees    T Axis 54 degrees   POCT Glucose    Collection Time: 10/28/20  8:43 AM   Result Value Ref Range    POC Glucose 140 (H) 60 - 115 mg/dl    Performed on ACCU-CHEK               Impression:    1.  Impaired mobility and ADLs due to  Acute on Chronic CVA with right-sided weakness aphasia and cognitive deficits. 2. Fatigue and Malaise      Complex Active General Medical Issues thatcomplicate care Assess & Plan:     1. Active Problems:    Type 2 diabetes mellitus with diabetic polyneuropathy, without long-term current use of insulin (HCC)    Tremor of right hand    Unsteady gait    Venous insufficiency of both lower extremities    Sensorineural hearing loss (SNHL) of left ear    Acute cerebrovascular accident (CVA) ([address]    Dysarthria  Resolved Problems:    * No resolved hospital problems. *            Recommendations:    1. Considering all of the factors aboveincluding the patient's current medical status, social status/home envirnment, their functional needs and their ability to participate in a therapy program, I feel that they would best be served at a acute vs subacute   Rehabilitationlevel of care. It is my opinion that they will be able to tolerate and benefit from 3 hours of therapy a day. I reviewed the varous local options re these levels of care with the patient and family. I would like to see how he does today and OT. He also needs a speech-language pathology evaluation for his word finding deficits. 2. Nursing care to focus on bowel and bladder issues. 3. Vitamin B12 shot times one  4. Improve hydration and Nutrition by adding Proteinex and push PO fluids    Greater 50% of 50 minutes spent evaluating patient face to face. It was my pleasure toevaluate Neyda Diaz today. Please call [phone] with questions.     Tito Martinez, DO

## 2020-10-28 NOTE — PLAN OF CARE
Problem: Pain:  Goal: Pain level will decrease  Description: Pain level will decrease  Outcome: Met This Shift     Problem: Mobility - Impaired:  Goal: Mobility will improve  Description: Mobility will improve  Outcome: Ongoing     Problem: Pain:  Goal: Control of acute pain  Description: Control of acute pain  Outcome: Ongoing  Goal: Control of chronic pain  Description: Control of chronic pain  Outcome: Ongoing     Problem: IP BALANCE  Goal: LTG - patient will maintain standing balance to allow for completion of daily activities  Outcome: Ongoing     Problem: Skin Integrity:  Goal: Will show no infection signs and symptoms  Description: Will show no infection signs and symptoms  Outcome: Ongoing  Goal: Absence of new skin breakdown  Description: Absence of new skin breakdown  Outcome: Ongoing

## 2020-10-28 NOTE — PROGRESS NOTES
failure 01/06/2014    Morbid obesity ([address] 01/02/2014     Past Medical History:   Diagnosis Date    Acute renal failure ([address]     Anxiety     CAD S/P percutaneous coronary angioplasty 3/11/2014    Cardiomyopathy ([address]     Cerebrovascular accident (CVA) due to occlusion of left middle cerebral artery ([address] 08/2016    brainstem infarction from left MCA occlusion    Cerebrovascular disease 07/27/2016    Coronary angioplasty status     CVA (cerebral vascular accident) ([address] 11/12/2017    Dependent edema 9/14/2017    Diplopia 5/15/2017    Resolved with management of cataracts    Dupuytren contracture 1/2/2018    Dysphagia 8/18/2011    Dysphonia 8/18/2011    Essential hypertension 8/17/2016    Gallop rhythm     Gout 12/10/2016    Gout of big toe 08/2014    Right Great Toe    H/O fall     Headache     migraines    Heart failure ([address]     History of 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    History of heart failure 1/6/2014    History of myocardial infarction 3/11/2014    History of recurrent pneumonia 2/11/2014    Hx of bacterial pneumonia     Hypotension     Left carotid artery stenosis 8/23/2020    Left carotid artery stenosis 8/23/2020    Leg swelling     Macrocytosis without anemia 12/10/2016    Microalbuminuria due to type 2 diabetes mellitus ([address] 9/14/2018    Morbid obesity ([address] 1/2/2014    Myocardial infarction ([address]     Obesity     AYLIN (obstructive sleep apnea) 12/10/2016    Osteoarthritis     Right-sided muscle weakness 10/19/2016    S/P cardiac catheterization     Skin cyst 4/8/2016    Spasm 11/9/2016    Stented coronary artery     Tremor of right hand 5/15/2017    Type 2 diabetes mellitus with diabetic polyneuropathy, without long-term current use of insulin ([address]     Unsteady gait 5/15/2017    Weakness     Weight gain      Past Surgical History:   Procedure Laterality Within functional limits    Prognosis:  Individuals consulted  Consulted and agree with results and recommendations: Patient;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 Re-assessment:  Patient denies pain.       Therapy Time  SLP Individual Minutes  Time In: 5094  Time Out: 0289  Minutes: 20                 Signature: Electronically signed by MATT Schwartz on 10/28/2020 at 1:15 PM

## 2020-10-28 NOTE — CARE COORDINATION
Precert pending with [employer]  Electronically signed by Antoine aCrroll RN on 10/28/20 at 3:44 PM EDT

## 2020-10-28 NOTE — CARE COORDINATION
Precert started with [facility] [employer] [facility] for Acute Inpatient Rehab.  Electronically signed by Kalani Gardner RN on 10/28/20 at 11:27 AM EDT

## 2020-10-28 NOTE — PROGRESS NOTES
History:   Diagnosis Date    Acute renal failure ([address]     Anxiety     CAD S/P percutaneous coronary angioplasty 3/11/2014    Cardiomyopathy [facility])     Cerebrovascular accident (CVA) due to occlusion of left middle cerebral artery ([address] 08/2016    brainstem infarction from left MCA occlusion    Cerebrovascular disease 07/27/2016    Coronary angioplasty status     CVA (cerebral vascular accident) ([address] 11/12/2017    Dependent edema 9/14/2017    Diplopia 5/15/2017    Resolved with management of cataracts    Dupuytren contracture 1/2/2018    Dysphagia 8/18/2011    Dysphonia 8/18/2011    Essential hypertension 8/17/2016    Gallop rhythm     Gout 12/10/2016    Gout of big toe 08/2014    Right Great Toe    H/O fall     Headache     migraines    Heart failure ([address]     History of 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    History of heart failure 1/6/2014    History of myocardial infarction 3/11/2014    History of recurrent pneumonia 2/11/2014    Hx of bacterial pneumonia     Hypotension     Left carotid artery stenosis 8/23/2020    Left carotid artery stenosis 8/23/2020    Leg swelling     Macrocytosis without anemia 12/10/2016    Microalbuminuria due to type 2 diabetes mellitus ([address] 9/14/2018    Morbid obesity ([address] 1/2/2014    Myocardial infarction ([address]     Obesity     AYLIN (obstructive sleep apnea) 12/10/2016    Osteoarthritis     Right-sided muscle weakness 10/19/2016    S/P cardiac catheterization     Skin cyst 4/8/2016    Spasm 11/9/2016    Stented coronary artery     Tremor of right hand 5/15/2017    Type 2 diabetes mellitus with diabetic polyneuropathy, without long-term current use of insulin ([address]     Unsteady gait 5/15/2017    Weakness     Weight gain      Past Surgical History:   Procedure Laterality Date    CHOLECYSTECTOMY      COLONOSCOPY  01/15/2010    polyp, diverticulosis (DR ELAINE)    CORONARY ANGIOPLASTY WITH STENT PLACEMENT  2014    CYST REMOVAL  05/16/16    DR Kirby Mensah CYST    SHOULDER SURGERY Right 12/18/2015       Restrictions  Restrictions/Precautions: Fall Risk    SUBJECTIVE   General  Chart Reviewed: Yes  Family / Caregiver Present: No  Subjective  Subjective: \"I'm amazed with how I'm doing. \"    Pre-Session Pain Report  Pre Treatment Pain Screening  Pain at present: 0  Scale Used: Numeric Score  Intervention List: Patient able to continue with treatment  Pain Screening  Patient Currently in Pain: No       Post-Session Pain Report  Pain Assessment  Pain Assessment: 0-10  Pain Level: 0         OBJECTIVE        Bed mobility  Supine to Sit: Supervision  Sit to Supine: (DNT pt into chair.)    Transfers  Sit to Stand: Stand by assistance  Stand to sit: Stand by assistance  Bed to Chair: Stand by assistance  Comment: pt with good technique and safety awareness. Ambulation  Ambulation?: Yes  Ambulation 1  Surface: level tile  Device: Rolling Walker  Assistance: Stand by assistance  Quality of Gait: difficulty with turns d/t \"vertigo\"  Gait Deviations: Slow Elaine; Increased HOLLIE  Distance: 22' x2  Comments: pt reports he is feeling much better about his mobility. Neuromuscular Education  Neuromuscular Comments: gaze stabilization with head turns exercises while standing with [employer]. Activity Tolerance  Activity Tolerance: Patient Tolerated treatment well          ASSESSMENT   Assessment: pt with much improved mobility since Eval. pt confident in mobility and feels going home is a safe plan at this time.      Discharge Recommendations:  Patient would benefit from continued therapy after discharge    Goals  Short term goals  Short term goal 1: pt to STS with min A and maintain standing at [employer] with upright posture >1 minute  Short term goal 2: indep with HEP in order to improve RLE strength >/= 4/5  Long term goals  Long term goal 1: pt to be indep with bed mobility  Long term goal 2: pt to be SBA with transfers  Long term goal 3: pt to ambulate >50 ft with SBA and [employer]  Long term goal 4: pt to be assessed for stairs when appropriate  Patient Goals   Patient goals : to get stronger    PLAN    Times per week: 3-6  Safety Devices  Type of devices: All fall risk precautions in place, Call light within reach, Left in chair, Nurse notified(per RN pt did not need chair alarm placed at conclusion of tx.)     [facility] (6 CLICK) [address] Raw Score : 19      Therapy Time   Individual   Time In 0920   Time Out 0946   Minutes 26      Gait: 12  BM/trsf: 5  NM: [id number] Karie Hope, PTA, 10/28/20 at 9:59 AM         Definitions for assistance levels  Independent = pt does not require any physical supervision or assistance from another person for activity completion. Device may be needed.   Stand by assistance = pt requires verbal cues or instructions from another person, close to but not touching, to perform the activity  Minimal assistance= pt performs 75% or more of the activity; assistance is required to complete the activity  Moderate assistance= pt performs 50% of the activity; assistance is required to complete the activity  Maximal assistance = pt performs 25% of the activity; assistance is required to complete the activity  Dependent = pt requires total physical assistance to accomplish the task

## 2020-10-28 NOTE — DISCHARGE SUMMARY
therapeutic intervention that potentially could take place in the outpatient setting by primary care doctor in collaboration with other specialists. That is to prevent relapse, decompensation, rehospitalization and other medical implications. General appearance: alert, appears stated age and cooperative, no  acute distress. Morbidly obese. Skin: Skin color, texture, turgor normal. No rashes or lesions  HEENT: Head: Normocephalic, no lesions, without obvious abnormality. Neck: no adenopathy, no carotid bruit, no JVD, supple, symmetrical, trachea midline and thyroid not enlarged, symmetric, no tenderness/mass/nodules  Lungs: clear to auscultation bilaterally  Heart: regular rate and rhythm, S1, S2 normal, no murmur, click, rub or gallop  Abdomen: soft, non-tender; bowel sounds normal; no masses,  no organomegaly  Extremities: extremities normal, atraumatic, no cyanosis or edema  Neurologic: Patient has definite expressive aphasia. He has some cognitive loss. His right arm is 4/5 involving the proximal muscle group. His right leg is about 4/5. Please refer to a neuro note for details on his neurological status. Patient was seen by the following consultants while admitted to [facility]:   Consults:  [address] [place] [address]  IP CONSULT TO REHAB/TCU ADMISSION COORDINATOR  IP CONSULT TO PHYSICAL MEDICINE REHAB    Significant Diagnostic Studies:    Cta Head W Wo Contrast    Result Date: 10/26/2020  CTA HEAD AND CTA NECK No large vessel occlusion or significant stenosis EXAMINATION: CTA HEAD W WO CONTRAST, CTA NECK W WO CONTRAST, CT HEAD WO CONTRAST CTA HEAD DATE AND TIME:10/26/2020 6:20 AM CLINICAL HISTORY: Stroke  right sided weakness code bat  COMPARISON: None TECHNIQUE:Helical CTA of the head and neck was performed from the vertex to the aortic arch following the uneventful intravenous administration of 100 cc of nonionic contrast without incident. dissection. Carotid:   Right  Carotid Stenosis (% by NASCET Criteria):  Less than 50%  . Tiny amount of plaque proximal right internal carotid artery. Left  Carotid Stenosis (% by NASCET Criteria):  Less than 50%     Cervical Vertebral Arteries:    Patency: No stenosis aneurysm or dissection. Vertebral arteries are codominant. EXAMINATION: CTA HEAD W WO CONTRAST, DATE AND TIME:10/26/2020 6:20 AM CLINICAL HISTORY: Severe headache.  right sided weakness code bat  COMPARISON: None TECHNIQUE:Axial CT from skull base to vertex without  contrast..  All CT scans at this facility use dose modulation, iterative reconstruction, and/or weight based dosing when appropriate to reduce radiation dose to as low as reasonably achievable. FINDINGS CSF spaces: Ventricles are normal in size and position. Sulci are appropriate for age. Brain parenchyma: No  parenchymal mass,  mass effect,  signs of acute infarct, or extra-axial fluid. There is faint hyperdensity in the left middle cranial fossa that lies anterior to the distal left M1 segment. The possibility of clot versus artifact is  raised. Hemorrhage:No acute intracranial hemorrhage. Skull base: The bony calvarium and skull base are normal.   The visualized paranasal sinuses and mastoids are clear. IMPRESSION: NO ACUTE INTRACRANIAL PATHOLOGY.  THESE FINDINGS WERE COMMUNICATED TO THE EMERGENCY ROOM STAFF AT 10/26/2020 AT 6:35 AM.     Ct Head Wo Contrast    Result Date: 10/26/2020  CTA HEAD AND CTA NECK No large vessel occlusion or significant stenosis EXAMINATION: CTA HEAD W WO CONTRAST, CTA NECK W WO CONTRAST, CT HEAD WO CONTRAST CTA HEAD DATE AND TIME:10/26/2020 6:20 AM CLINICAL HISTORY: Stroke  right sided weakness code bat  COMPARISON: None TECHNIQUE:Helical CTA of the head and neck was performed from the vertex to the aortic arch following the uneventful intravenous administration of 100 cc of nonionic contrast without incident. 2-D images were reconstructed in the sagittal and coronal planes. Three Dimensional Maximum Intensity Projection (3D-MIP) images were created. All images were reviewed and primarily archived to PACS workstation. All CT scans at this facility use dose modulation, iterative reconstruction, and/or weight based dosing when appropriate to reduce radiation dose to as low as reasonably achievable. NASCET Criteria were utilized FINDINGS CTA Head     Intracranial ICAs: Normal flow in precavernous, cavernous, clinoid and supraclinoid segments of the internal carotid arteries bilaterally     Anterior cerebral arteries:No occlusion or significant stenosis. Middle cerebral arteries:No large vessel occlusion or significant stenosis. Specifically there is no left MCA clot. Posterior Circulation: No large vessel occlusion or significant stenosis. Basilar artery:No significant stenosis. Aneurysm No aneurysm or dissection in the anterior or posterior circulations. .     Neurocranium Negative     Dural sinus: Dural venous sinuses are unremarkable. EXAMINATION: CTA HEAD W WO CONTRAST, CTA NECK W WO CONTRAST, CT HEAD WO CONTRAST. CTA NECK DATE AND TIME:10/26/2020 6:20 AM CLINICAL HISTORY: Stroke symptoms   right sided weakness code bat  TECHNIQUE: TECHNIQUE:Helical CTA of the head and neck was performed from the vertex to the aortic arch following the uneventful intravenous administration of 100 cc of nonionic contrast without incident. 2-D images were reconstructed in the sagittal and  coronal planes. Three Dimensional Maximum Intensity Projection (3D-MIP) images were created. All images were reviewed and primarily archived to PACS workstation. All CT scans at this facility use dose modulation, iterative reconstruction, and/or weight  based dosing when appropriate to reduce radiation dose to as low as reasonably achievable.  NASCET Criteria were utilized Result / Findings: CTA NECK: Aortic Arch: No stenosis aneurysm or dissection. Carotid:   Right  Carotid Stenosis (% by NASCET Criteria):  Less than 50%  . Tiny amount of plaque proximal right internal carotid artery. Left  Carotid Stenosis (% by NASCET Criteria):  Less than 50%     Cervical Vertebral Arteries:    Patency: No stenosis aneurysm or dissection. Vertebral arteries are codominant. EXAMINATION: CTA HEAD W WO CONTRAST, DATE AND TIME:10/26/2020 6:20 AM CLINICAL HISTORY: Severe headache.  right sided weakness code bat  COMPARISON: None TECHNIQUE:Axial CT from skull base to vertex without  contrast..  All CT scans at this facility use dose modulation, iterative reconstruction, and/or weight based dosing when appropriate to reduce radiation dose to as low as reasonably achievable. FINDINGS CSF spaces: Ventricles are normal in size and position. Sulci are appropriate for age. Brain parenchyma: No  parenchymal mass,  mass effect,  signs of acute infarct, or extra-axial fluid. There is faint hyperdensity in the left middle cranial fossa that lies anterior to the distal left M1 segment. The possibility of clot versus artifact is  raised. Hemorrhage:No acute intracranial hemorrhage. Skull base: The bony calvarium and skull base are normal.   The visualized paranasal sinuses and mastoids are clear. IMPRESSION: NO ACUTE INTRACRANIAL PATHOLOGY.  THESE FINDINGS WERE COMMUNICATED TO THE EMERGENCY ROOM STAFF AT 10/26/2020 AT 6:35 AM.     Zeynep Cowan Neck W Wo Contrast    Result Date: 10/26/2020  CTA HEAD AND CTA NECK No large vessel occlusion or significant stenosis EXAMINATION: CTA HEAD W WO CONTRAST, CTA NECK W WO CONTRAST, CT HEAD WO CONTRAST CTA HEAD DATE AND TIME:10/26/2020 6:20 AM CLINICAL HISTORY: Stroke  right sided weakness code bat  COMPARISON: None TECHNIQUE:Helical CTA of the head and neck was performed from the vertex to the aortic arch following the uneventful intravenous administration of 100 cc of nonionic contrast without incident. 2-D images were reconstructed in the sagittal and coronal planes. Three Dimensional Maximum Intensity Projection (3D-MIP) images were created. All images were reviewed and primarily archived to PACS workstation. All CT scans at this facility use dose modulation, iterative reconstruction, and/or weight based dosing when appropriate to reduce radiation dose to as low as reasonably achievable. NASCET Criteria were utilized FINDINGS CTA Head     Intracranial ICAs: Normal flow in precavernous, cavernous, clinoid and supraclinoid segments of the internal carotid arteries bilaterally     Anterior cerebral arteries:No occlusion or significant stenosis. Middle cerebral arteries:No large vessel occlusion or significant stenosis. Specifically there is no left MCA clot. Posterior Circulation: No large vessel occlusion or significant stenosis. Basilar artery:No significant stenosis. Aneurysm No aneurysm or dissection in the anterior or posterior circulations. .     Neurocranium Negative     Dural sinus: Dural venous sinuses are unremarkable. EXAMINATION: CTA HEAD W WO CONTRAST, CTA NECK W WO CONTRAST, CT HEAD WO CONTRAST. CTA NECK DATE AND TIME:10/26/2020 6:20 AM CLINICAL HISTORY: Stroke symptoms   right sided weakness code bat  TECHNIQUE: TECHNIQUE:Helical CTA of the head and neck was performed from the vertex to the aortic arch following the uneventful intravenous administration of 100 cc of nonionic contrast without incident. 2-D images were reconstructed in the sagittal and  coronal planes. Three Dimensional Maximum Intensity Projection (3D-MIP) images were created. All images were reviewed and primarily archived to PACS workstation. All CT scans at this facility use dose modulation, iterative reconstruction, and/or weight  based dosing when appropriate to reduce radiation dose to as low as reasonably achievable.  NASCET Criteria were utilized Result / Findings: CTA NECK: Aortic Arch: No stenosis aneurysm or dissection. Carotid:   Right  Carotid Stenosis (% by NASCET Criteria):  Less than 50%  . Tiny amount of plaque proximal right internal carotid artery. Left  Carotid Stenosis (% by NASCET Criteria):  Less than 50%     Cervical Vertebral Arteries:    Patency: No stenosis aneurysm or dissection. Vertebral arteries are codominant. EXAMINATION: CTA HEAD W WO CONTRAST, DATE AND TIME:10/26/2020 6:20 AM CLINICAL HISTORY: Severe headache.  right sided weakness code bat  COMPARISON: None TECHNIQUE:Axial CT from skull base to vertex without  contrast..  All CT scans at this facility use dose modulation, iterative reconstruction, and/or weight based dosing when appropriate to reduce radiation dose to as low as reasonably achievable. FINDINGS CSF spaces: Ventricles are normal in size and position. Sulci are appropriate for age. Brain parenchyma: No  parenchymal mass,  mass effect,  signs of acute infarct, or extra-axial fluid. There is faint hyperdensity in the left middle cranial fossa that lies anterior to the distal left M1 segment. The possibility of clot versus artifact is  raised. Hemorrhage:No acute intracranial hemorrhage. Skull base: The bony calvarium and skull base are normal.   The visualized paranasal sinuses and mastoids are clear. IMPRESSION: NO ACUTE INTRACRANIAL PATHOLOGY. THESE FINDINGS WERE COMMUNICATED TO THE EMERGENCY ROOM STAFF AT 10/26/2020 AT 6:35 AM.     [facility] Chest Portable    Result Date: 10/26/2020  EXAMINATION: XR CHEST PORTABLE CLINICAL HISTORY: CODE BAT COMPARISONS: NOVEMBER 12, 2017, JULY 27, 2016 FINDINGS: Osseous structures intact. Cardiopericardial silhouette normal. Vasculature normal. Right lung clear. Ill-defined area increased opacity left lung base. LEFT LOWER LUNG ATELECTASIS/PNEUMONIA.     Mri Brain Wo Contrast    Result Date: 10/27/2020  EXAMINATION: MRI BRAIN WO CONTRAST CLINICAL HISTORY:  CVA COMPARISONS: NONE AVAILABLE TECHNIQUE: Multiplanar multisequence images of the brain were obtained without contrast. Diffusion perfusion imaging was obtained. FINDINGS:  There are no extra-axial collections. There is no evidence of hemorrhage. There are no areas of signal abnormality on perfusion diffusion imaging to suggest ischemia. The susceptibility images do not demonstrate evidence of hemosiderin deposition within  the brain parenchyma or the leptomeninges. There is preservation of the gray-white matter differentiation. There are a few scattered foci of T2/FLAIR increased signal in the subcortical and periventricular white matter without associated edema or mass effect. There is marked prominence of the sulci and ventricles indicating severe global cerebral atrophy and chronic involutional changes especially affecting the bilateral frontal lobes. The midline structures are intact, the corpus callosum is within normal limits. The region of the pineal gland and the sella turcica are unremarkable. There are no space-occupying lesions in the posterior fossa. The basilar cisterns are patent. The craniocervical junction is unremarkable. The visualized portions of the orbits are within normal limits, the globes are intact. The visualized portions of the paranasal sinuses are within normal limits. The calvarium and soft tissues are unremarkable. There are no acute intracranial changes, there is no evidence of hemorrhage or acute ischemia. There is prominence of sulci and ventricles especially involving the frontal lobes indicating chronic interstitial changes and severe cerebral atrophy. Few scattered foci of T2 FLAIR hyperintensity are noted throughout both hemispheres. The findings are most commonly associated with chronic microangiopathy.       Discharge Medications:       Jhoan Simmonsjanak   [place] Medication Instructions KVR:[id number]    Printed on:10/28/20 1032   Medication Information Alcohol Swabs PADS  DX: diabetes mellitus. Test 1 time(s) daily - Ok to substitute per insurance (1 each = 1 box)             allopurinol (ZYLOPRIM) 300 MG tablet  Take 1 tablet by mouth daily             ammonium lactate (AMLACTIN) 12 % cream  APPLY TO DRY CALLUS AREAS 1 TO 2 TIMES DAILY             Ascorbic Acid (VITAMIN C) 250 MG tablet  Take 250 mg by mouth daily             aspirin 81 MG EC tablet  Take 81 mg by mouth daily             atorvastatin (LIPITOR) 40 MG tablet  Take 1 tablet by mouth daily             blood glucose monitor kit and supplies  DX: diabetes mellitus. Test 1 time(s) daily - True Metrix requested by patient - [id number] Claudia Hope to substitute per insurance             blood glucose monitor strips  DX: diabetes mellitus. Use 1 time(s) daily - True Metrix requested by patient - [id number] Claudia Hope to substitute per insurance             carvedilol (COREG) 25 MG tablet  Take 1 tablet by mouth 2 times daily             clopidogrel (PLAVIX) 75 MG tablet  Take 1 tablet by mouth daily             Elastic Bandages & Supports (V-4 HIGH COMPRESSION HOSE) MISC  KNEE HIGH - 20-30 mmHg             glimepiride (AMARYL) 1 MG tablet  Take 1 tablet by mouth every morning (before breakfast)             ibuprofen (IBU) 800 MG tablet  Take 1 tablet by mouth every 8 hours as needed for Pain             Lancets MISC  DX: diabetes mellitus. Use 1 time(s) daily - Ok to substitute per insurance (1 each = 1 box)             lidocaine (LIDODERM) 5 %  Place 1 patch onto the skin every 12 hours              metFORMIN (GLUCOPHAGE) 500 MG tablet  Take 1 tablet by mouth 2 times daily (with meals)             Misc.  Devices (COMMODE BEDSIDE) MISC  Use daily as needed             MULTIPLE VITAMIN PO  Take 1 tablet by mouth daily              Probiotic Product (PROBIOTIC DAILY PO)  Take 1 tablet by mouth daily             torsemide (DEMADEX) 20 MG tablet  Take 1 tablet by mouth daily             vitamin B-12 (CYANOCOBALAMIN) 100 MCG tablet  Take 50 mcg by mouth daily                 Disposition:   Discharged to Rehab if approved by his insurance company otherwise patient will be discharged home. . Any [facility] needs that were indicated and/or required as been addressed and set up by Social Work. Condition at discharge: Pt was medically stable at the time of discharge. Significant improvement in clinical condition compared to initial condition at presentation to hospital    Activity: activity as tolerated, fall precautions. Total time taken for discharging this patient: 40 minutes. Greater than 70% of time was spent focused exclusively on this patient. Time was taken to review chart, discuss plans with consultants, reconciling medications, discussing plan answering questions with patient. Lennie Bryant  10/28/2020, 10:32 AM  ----------------------------------------------------------------------------------------------------------------------    [facility],     Please return to ER or call 911 if you develop any significant signs or symptoms.     I may not have addressed all of your medical illnesses or the abnormal blood work or imaging therefore please ask your PCP, Mauri Nye MD ,  to obtain [facility] record to follow up on all of the abnormal labs, imaging and findings that I have and have not addressed during your hospitalization.      Discharging you from the hospital does not mean that your medical care ends here and now. You may still need additional work up, investigation, monitoring, and treatment to be handled from this point on by outside providers including your PCP, Mauri Nye MD , Specialists and other healthcare providers.      Please review your list of discharge medications prior to resuming medications you might still have at home, as the medications you need to be taking, dosages or how often you must take them may have changed.  For medication questions, contact your retail pharmacy and your PCP, Mauri Nye MD .     ** I STRONGLY RECOMMEND that you follow up with Yoko Frederick MD within 3 to 5 days for a post hospitalization evaluation. This specific office visit is covered by your insurance, and is not the same as your annual doctor visit/ check up. This office visit is important, as it may prevent need for repeat and/or future hospitalizations. **    Your medical team at [facility]) appreciates the opportunity to work with you to get well! Sincerely,  Abby Martin Follow up with Dr. Yoko Frederick MD in the next 7 days or sooner if needed. Please return to ER or call 911 if you develop any significant signs or symptoms. I may or may not have addressed all of your symptoms, medical issues,  Illnesses, or all of the abnormal blood work or imaging therefore please ask your PCP and other specialists to obtain [facility] record entirely to follow up on all of your symptoms, illnesses, abnormal labs, imaging and findings that I have and have not addressed during your hospitalization. Discharging you from the hospital does not mean that your medical care ends here and now. You may still need to have additional work up, investigation, monitoring, surveillance, and treatment to be handled from this point on by in the out patient setting by  out patient providers including your PCP, Specialists and other healthcare providers. For medication questions, contact your retail pharmacy and your PCP. Your medical team at [facility] ([facility]) appreciates the opportunity to work with you to get well!     Abby Martin MD

## 2020-10-28 NOTE — PROGRESS NOTES
[facility] Neurology Daily Progress Note  Name: Boogie Robbins  Age: 72 y.o. Gender: male  CodeStatus: Full Code  Allergies: Bactrim [Sulfamethoxazole-Trimethoprim]    Chief Complaint:Extremity Weakness (right) and Headache    Primary Care Provider: Lorena Brooks MD  InpatientTreatment Team: Treatment Team: Attending Provider: Beatrice Fajardo MD; Utilization Reviewer: Kamila Fraser, RN; Consulting Physician: Stewart Hinson MD; Consulting Physician: Jackson Arceo DO; Registered Nurse: Kanu Gupta RN; Patient Care Tech: Adam Ahr; : Thania Neal RN  Admission Date: 10/26/2020      HPI Pt seen and examined for neuro follow up for acute CVA that presented with right-sided weakness and expressive aphasia. With history of CVA and right-sided weakness and aphasia at baseline. He reports that overall his expressive aphasia and dysarthria have improved since admission. He continues to have some right-sided weakness of both upper and lower extremities. No dysphagia. No headache or diplopia. choking, neck pain, dizziness  Vitals:    10/28/20 0715   BP: 133/72   Pulse: 67   Resp:    Temp: 97.7 °F (36.5 °C)   SpO2: 96%      Review of Systems   Constitutional: Negative for appetite change, chills, fatigue and fever. HENT: Negative for hearing loss and trouble swallowing. Eyes: Negative for visual disturbance. Respiratory: Negative for cough, chest tightness, shortness of breath and wheezing. Cardiovascular: Negative for chest pain, palpitations and leg swelling. Gastrointestinal: Negative for nausea and vomiting. Musculoskeletal: Positive for gait problem. Skin: Negative for color change and rash. Neurological: Positive for speech difficulty and weakness. Negative for dizziness, tremors, seizures, syncope, facial asymmetry, light-headedness, numbness and headaches. Psychiatric/Behavioral: Negative for agitation, confusion and hallucinations. The patient is not nervous/anxious. Physical Exam  Constitutional:       General: He is not in acute distress. Appearance: He is not diaphoretic. HENT:      Head: Normocephalic and atraumatic. Eyes:      General: No visual field deficit. Pupils: Pupils are equal, round, and reactive to light. Cardiovascular:      Rate and Rhythm: Normal rate and regular rhythm. Pulmonary:      Effort: Pulmonary effort is normal. No respiratory distress. Breath sounds: Normal breath sounds. Abdominal:      General: Bowel sounds are normal. There is no distension. Palpations: Abdomen is soft. Tenderness: There is no abdominal tenderness. Skin:     General: Skin is warm and dry. Neurological:      Mental Status: He is alert and oriented to person, place, and time. Cranial Nerves: No dysarthria or facial asymmetry. Motor: Weakness (right) present. No tremor, atrophy, abnormal muscle tone, seizure activity or pronator drift.       Gait: Gait abnormal.      Comments: Expressive aphasia               Medications:  Reviewed    Infusion Medications:    dextrose       Scheduled Medications:    thiamine  100 mg Oral Daily    folic acid  1 mg Oral Daily    sodium chloride flush  10 mL Intravenous 2 times per day    allopurinol  300 mg Oral Daily    aspirin  81 mg Oral Daily    atorvastatin  80 mg Oral Daily    carvedilol  25 mg Oral BID    clopidogrel  75 mg Oral Daily    glimepiride  1 mg Oral QAM AC    vitamin B-12  50 mcg Oral Daily    insulin lispro  0.03 Units/kg Subcutaneous TID WC    insulin lispro  0-6 Units Subcutaneous TID WC    insulin lispro  0-3 Units Subcutaneous Nightly     PRN Meds: sodium chloride flush, acetaminophen **OR** acetaminophen, polyethylene glycol, promethazine **OR** ondansetron, glucose, dextrose, glucagon (rDNA), dextrose, ALPRAZolam    Labs:   Recent Labs     10/26/20  0615 10/26/20  1944 10/27/20  0505   WBC 5.0  --  4.6*   HGB 14.4  --  13.1*   HCT 39.4*  --  38.1*    179 145     Recent Labs     10/26/20  0615 10/27/20  0505 10/28/20  0511   * 137 138   K 3.1* 3.2* 3.7   CL 92* 98 99   CO2 28 28 26   BUN 12 9 6*   CREATININE 0.72 0.73 0.80   CALCIUM 9.4 8.4* 9.1   PHOS 3.0  --   --      Recent Labs     10/26/20  0615 10/27/20  0505   AST 25 23   ALT 24 21   BILIDIR  --  0.3   BILITOT 0.4 0.7   ALKPHOS 64 58     Recent Labs     10/26/20  0615   INR 0.9     Recent Labs     10/26/20  0615   TROPONINI <0.010       Urinalysis:   Lab Results   Component Value Date    NITRU Negative 10/26/2020    WBCUA 0-2 10/26/2020    RBCUA 0-2 10/26/2020    BLOODU TRACE 10/26/2020    SPECGRAV 1.017 10/26/2020    GLUCOSEU Negative 10/26/2020       Radiology:   Most recent    EEG No procedure found. MRI of Brain No results found for this or any previous visit. Results for orders placed during the hospital encounter of 10/26/20   MRI BRAIN WO CONTRAST    Narrative EXAMINATION: MRI BRAIN WO CONTRAST     CLINICAL HISTORY:  CVA     COMPARISONS: NONE AVAILABLE    TECHNIQUE:    Multiplanar multisequence images of the brain were obtained without contrast. Diffusion perfusion imaging was obtained. FINDINGS:      There are no extra-axial collections. There is no evidence of hemorrhage. There are no areas of signal abnormality on perfusion diffusion imaging to suggest ischemia. The susceptibility images do not demonstrate evidence of hemosiderin deposition within   the brain parenchyma or the leptomeninges. There is preservation of the gray-white matter differentiation. There are a few scattered foci of T2/FLAIR increased signal in the subcortical and periventricular white matter without associated edema or mass effect. There is marked prominence of the   sulci and ventricles indicating severe global cerebral atrophy and chronic involutional changes especially affecting the bilateral frontal lobes. The midline structures are intact, the corpus callosum is within normal limits.  The region of the pineal gland and the sella turcica are unremarkable. There are no space-occupying lesions in the posterior fossa. The basilar cisterns are patent. The craniocervical junction is unremarkable. The visualized portions of the orbits are within normal limits, the globes are intact. The visualized portions of the paranasal sinuses are within normal limits. The calvarium and soft tissues are unremarkable. Impression There are no acute intracranial changes, there is no evidence of hemorrhage or acute ischemia. There is prominence of sulci and ventricles especially involving the frontal lobes indicating chronic interstitial changes and severe cerebral atrophy. Few   scattered foci of T2 FLAIR hyperintensity are noted throughout both hemispheres. The findings are most commonly associated with chronic microangiopathy. MRA of the Head and Neck: No results found for this or any previous visit. No results found for this or any previous visit. Results for orders placed during the hospital encounter of 11/12/17   MRA Head WO Contrast    Narrative EXAMINATION: MRI BRAIN WO CONTRAST, MRA NECK WO CONTRAST, MRA HEAD WO CONTRAST    CLINICAL HISTORY: Altered vision. History of stroke. Right-sided weakness. Headache. COMPARISONS: July 28, 2016 MRI brain. November 12, 2017 CT brain without contrast enhancement. FINDINGS: Complete MRI of the brain was obtained. There is age-related atrophy. There are very few, very small T2 hyperintense age-related white matter lesions, largest being in the left frontal lobe. There is no mass. There is no edema. There is no   hematoma. There is no hydrocephalus. The proximal cervical spinal cord is normal to the C3 level. There is no abnormal restricted diffusion. There is no abnormal paramagnetic signal.    Time-of-flight MR angiography of the head and neck was obtained.  Images of the brain were obtained centered on the [place] of [place] revealing no arterial occlusion or acquired stenosis. There is a large caliber posterior communicating artery on the   right. The P1 segment on the right is hypoplastic. There is no aneurysm. The A1 segment on the right is aplastic. There is a patent anterior communicating artery. Images of the neck show no stenosis or occlusion. There is no dissection. Vertebral   arteries are large caliber and have normal flow volume. CONCLUSION: ATROPHY. NO ACUTE PROCESS IN THE BRAIN. INCIDENTAL VARIATIONS OF NORMAL ANATOMY ON MR ANGIOGRAM, WITHOUT ACQUIRED STENOSIS AND WITHOUT ARTERIAL OCCLUSION. CT of the Head:   Results for orders placed during the hospital encounter of 10/26/20   CT HEAD WO CONTRAST    Impression CTA HEAD AND CTA NECK No large vessel occlusion or significant stenosis    EXAMINATION: CTA HEAD W WO CONTRAST, CTA NECK W WO CONTRAST, CT HEAD WO CONTRAST CTA HEAD    DATE AND TIME:10/26/2020 6:20 AM    CLINICAL HISTORY: Stroke  right sided weakness code bat      COMPARISON: None    TECHNIQUE:Helical CTA of the head and neck was performed from the vertex to the aortic arch following the uneventful intravenous administration of 100 cc of nonionic contrast without incident. 2-D images were reconstructed in the sagittal and coronal   planes. Three Dimensional Maximum Intensity Projection (3D-MIP) images were created. All images were reviewed and primarily archived to PACS workstation. All CT scans at this facility use dose modulation, iterative reconstruction, and/or weight based   dosing when appropriate to reduce radiation dose to as low as reasonably achievable. NASCET Criteria were utilized      FINDINGS CTA Head         Intracranial ICAs: Normal flow in precavernous, cavernous, clinoid and supraclinoid segments of the internal carotid arteries bilaterally        Anterior cerebral arteries:No occlusion or significant stenosis.         Middle cerebral arteries:No large vessel occlusion or reconstruction, and/or weight based dosing when appropriate to reduce radiation dose to as low as reasonably achievable. FINDINGS    CSF spaces: Ventricles are normal in size and position. Sulci are appropriate for age. Brain parenchyma: No  parenchymal mass,  mass effect,  signs of acute infarct, or extra-axial fluid. There is faint hyperdensity in the left middle cranial fossa that lies anterior to the distal left M1 segment. The possibility of clot versus artifact is   raised. Hemorrhage:No acute intracranial hemorrhage. Skull base: The bony calvarium and skull base are normal.   The visualized paranasal sinuses and mastoids are clear. IMPRESSION: NO ACUTE INTRACRANIAL PATHOLOGY. THESE FINDINGS WERE COMMUNICATED TO THE EMERGENCY ROOM STAFF AT 10/26/2020 AT 6:35 AM.           No results found for this or any previous visit. No results found for this or any previous visit. Carotid duplex: No results found for this or any previous visit. No results found for this or any previous visit. Results for orders placed during the hospital encounter of 20   US CAROTID ARTERY BILATERAL    Narrative    Patient MRN:  [id number]    : 1955    Age: 72 years    Gender: Male    Order Date:  2020 1:53 PM    EXAM: US CAROTID ARTERY BILATERAL    NUMBER OF IMAGES:  72    INDICATION: Z86.73 History of CVA (cerebrovascular accident) ICD10 , unsteady gait    COMPARISON: Carotid artery ultrasound 2017    FINDINGS:   Right side:  Proximal common carotid artery peak systolic velocity is 160 centimeters per second  Mid, common carotid artery peak systolic velocity is 281.9 centimeters per second. Distal common carotid artery peak systolic velocity is 417.5 centimeters per second    External carotid artery peak systolic velocity is 721 centimeters per second.  Right carotid artery bulb/bifurcation peak systolic velocity 68.8 cm/s    Proximal <40             <2:1           ---                 ---    50-69              125-225                  >2:1           ---                 ---    70-89               225-325          >100          >4:1           >5:1              ---    90%+                >325              >100          >4:1           >9:1           Damped resistive CCA    >95%               May be            May be      Damped      ---              Damped resistive CCA                        decreased      decreased  resistive CCA      Validated velocity measurements with angiographic measurements, velocity criteria are extrapolated from diameter data as defined by the Society of Radiologist in [address] [place] [address] Radiology 2003; [id number]. Echo No results found for this or any previous visit.           Assessment/Plan:    10/26/20:  Brainstem stroke or a left periventricular stroke.  Patient symptoms started at 2 AM and I was called at 6:20 AM with the results and therefore patient was not a candidate for TPA as we which is reaching 4-1/2 hours.  Large vessel evaluation CT angiograms were performed in the emergency room does not show any large clot or an M1 segment stenosis or clot and therefore patient was not a candidate for intra-arterial.  I therefore start him on heparin which he is taking for has not worsened since he is admitted. Wali Moran is aware that he came late this time as we had performed TPA on him in the past. Wali Moran had a brainstem stroke with some improvement.  He actually was walking independently though he said a few falls in the interim.  Is on aspirin and Plavix already and if he truly has a new stroke then he is not a antiplatelet responder and we may have to consider low-dose Xarelto with aspirin.     We will complete his studies today and keep him on heparin for now as we have no other choice at this is more so as he has cardiomyopathy.  Patient will require a ANDREW if there is a new

## 2020-10-28 NOTE — CARE COORDINATION
Met with patient at the bedside to confirm patients choice of \[facility]\"". Patient stated he had already spoke with Dr. Corina Rivera, his Rehab of choice is [facility]. I explained to him the process for preauth. Dr. Georgie Dewitt stated he is ready to discharge when preauth is obtained. CM will continue to follow.   Electronically signed by Kristie Powell RN on 10/28/2020 at 11:43 AM

## 2020-10-28 NOTE — CARE COORDINATION
Per Dr. Marshal Martinez, patient would like Acute Inpatient Rehab at [facility]. He placed order, referral was called to [address] [place] [address].   Electronically signed by Seema Smith RN on 10/28/2020 at 10:39 AM

## 2020-10-29 VITALS
BODY MASS INDEX: 38.16 KG/M2 | RESPIRATION RATE: 18 BRPM | SYSTOLIC BLOOD PRESSURE: 124 MMHG | HEART RATE: 69 BPM | DIASTOLIC BLOOD PRESSURE: 66 MMHG | OXYGEN SATURATION: 97 % | TEMPERATURE: 97.9 F | WEIGHT: 287.92 LBS | HEIGHT: 73 IN

## 2020-10-29 LAB
GLUCOSE BLD-MCNC: 138 MG/DL (ref 60–115)
GLUCOSE BLD-MCNC: 242 MG/DL (ref 60–115)
PERFORMED ON: ABNORMAL
PERFORMED ON: ABNORMAL

## 2020-10-29 PROCEDURE — 99231 SBSQ HOSP IP/OBS SF/LOW 25: CPT | Performed by: PHYSICAL MEDICINE & REHABILITATION

## 2020-10-29 PROCEDURE — 6370000000 HC RX 637 (ALT 250 FOR IP): Performed by: INTERNAL MEDICINE

## 2020-10-29 PROCEDURE — 97116 GAIT TRAINING THERAPY: CPT

## 2020-10-29 PROCEDURE — 97535 SELF CARE MNGMENT TRAINING: CPT

## 2020-10-29 RX ORDER — LANOLIN ALCOHOL/MO/W.PET/CERES
100 CREAM (GRAM) TOPICAL DAILY
Qty: 30 TABLET | Refills: 3 | Status: ON HOLD | OUTPATIENT
Start: 2020-10-30 | End: 2021-08-19

## 2020-10-29 RX ORDER — FOLIC ACID 1 MG/1
1 TABLET ORAL DAILY
Qty: 30 TABLET | Refills: 3 | Status: ON HOLD | OUTPATIENT
Start: 2020-10-30 | End: 2021-08-19

## 2020-10-29 RX ORDER — GLIMEPIRIDE 2 MG/1
2 TABLET ORAL
Qty: 30 TABLET | Refills: 1 | Status: SHIPPED | OUTPATIENT
Start: 2020-10-29 | End: 2020-11-06

## 2020-10-29 RX ADMIN — ATORVASTATIN CALCIUM 80 MG: 80 TABLET, FILM COATED ORAL at 09:56

## 2020-10-29 RX ADMIN — VITAM B12 50 MCG: 100 TAB at 09:56

## 2020-10-29 RX ADMIN — INSULIN LISPRO 4 UNITS: 100 INJECTION, SOLUTION INTRAVENOUS; SUBCUTANEOUS at 13:18

## 2020-10-29 RX ADMIN — CLOPIDOGREL BISULFATE 75 MG: 75 TABLET ORAL at 09:56

## 2020-10-29 RX ADMIN — FOLIC ACID 1 MG: 1 TABLET ORAL at 09:56

## 2020-10-29 RX ADMIN — ASPIRIN 81 MG: 81 TABLET, COATED ORAL at 09:56

## 2020-10-29 RX ADMIN — Medication 100 MG: at 09:56

## 2020-10-29 RX ADMIN — GLIMEPIRIDE 1 MG: 1 TABLET ORAL at 05:07

## 2020-10-29 RX ADMIN — INSULIN LISPRO 4 UNITS: 100 INJECTION, SOLUTION INTRAVENOUS; SUBCUTANEOUS at 09:57

## 2020-10-29 RX ADMIN — ALLOPURINOL 300 MG: 300 TABLET ORAL at 09:56

## 2020-10-29 RX ADMIN — CARVEDILOL 25 MG: 25 TABLET, FILM COATED ORAL at 09:56

## 2020-10-29 ASSESSMENT — PAIN DESCRIPTION - PAIN TYPE: TYPE: CHRONIC PAIN

## 2020-10-29 ASSESSMENT — PAIN SCALES - GENERAL
PAINLEVEL_OUTOF10: 3
PAINLEVEL_OUTOF10: 0

## 2020-10-29 ASSESSMENT — PAIN DESCRIPTION - ORIENTATION: ORIENTATION: RIGHT

## 2020-10-29 ASSESSMENT — PAIN DESCRIPTION - DESCRIPTORS: DESCRIPTORS: ACHING;HEADACHE

## 2020-10-29 NOTE — PROGRESS NOTES
Patient is doing better. Is able to ambulate with a walker and 1 person supervision and assist.  No chest pain or palpitation. No abdominal pain, nausea, vomiting, diarrhea, dysuria or hematuria. General appearance: alert, appears stated age and cooperative, no  acute distress.  Morbidly obese.   Skin: Skin color, texture, turgor normal. No rashes or lesions  HEENT: Head: Normocephalic, no lesions, without obvious abnormality. Neck: no adenopathy, no carotid bruit, no JVD, supple, symmetrical, trachea midline and thyroid not enlarged, symmetric, no tenderness/mass/nodules  Lungs: clear to auscultation bilaterally  Heart: regular rate and rhythm, S1, S2 normal, no murmur, click, rub or gallop  Abdomen: soft, non-tender; bowel sounds normal; no masses,  no organomegaly  Extremities: extremities normal, atraumatic, no cyanosis or edema  Neurologic: His right arm is 5/5 involving the proximal muscle group.  His right leg is about 4/5, much improved compared to admission status. HIs speech fluency is improved. Please refer to a neuro note for details on his neurological status. *CVA/TIA, as determined by neurologist.  I will follow his recommendations and guidance. Continue dual antiplatelet therapy.     *Hypertension.     *Obesity.     *Intermittent tobacco use.     *Daily alcohol consumption, scotch.     *Hypokalemia. Status post supplementation.     *Morbid obesity.     *CAD.  Reported 3 stents in the past.     *Other medical problems that are not listed above. Continue current treatment plan. No additional work-up, investigation or therapeutic intervention recommended by neurology. Pending on his insurance to approve him to go to the inpatient rehab. If his insurance  Disapproves him in pt rehab he does not want to go to nursing home.

## 2020-10-29 NOTE — CARE COORDINATION
Met with patient. Discussed denial for rehab. He confirms that he does not want HHC, only OP therapy. He states his wife will be here soon and he would like to discharge.

## 2020-10-29 NOTE — FLOWSHEET NOTE
2100- patient denies cp, sob, n/v. Patient A&Ox4,VSS. LS diminished. BS active in all 4 quadrants. Patient resting in bed at this time. Bed alarm on. Call light within reach. Will continue to monitor.

## 2020-10-29 NOTE — CARE COORDINATION
Met with patient while he sitting up in chair. Freedom of choice given. He informed that precert is still pending for rehab. 2nd IMM explained and discussed. Verbal consent obtained.

## 2020-10-29 NOTE — PROGRESS NOTES
Subjective: The patient complains of moderate  acute right sided weakness and aphasia partially relieved by rest, PT, OT and SLP and exacerbated by exertion. I am concerned about patients elevated BSs. Reviewed recent nursing note, \" 2100- patient denies cp, sob, n/v. Patient A&Ox4,VSS. LS diminished. BS active in all 4 quadrants. Patient resting in bed at this time. Bed alarm on. Call light within reach. Will continue to monitor \". ROS x10: The patient also complains of severely impaired mobility and activities of daily living. Otherwise no new problems with vision, hearing, nose, mouth, throat, dermal, cardiovascular, GI, , pulmonary, musculoskeletal, psychiatric or neurological. See Rehab consult on Rehab chart . Vital signs:  /66   Pulse 69   Temp 97.9 °F (36.6 °C) (Oral)   Resp 18   Ht 6' 1\" (1.854 m)   Wt 287 lb 14.7 oz (130.6 kg)   SpO2 97%   BMI 37.99 kg/m²   I/O:   PO/Intake:    fair PO intake, no problems observed or reported. Bowel/Bladder:  continent, no problems noted. General:  Patient is well developed, adequately nourished, non-obese and     well kempt. HEENT:    PERRLA, hearing intact to loud voice, external inspection of ear     and nose benign. Inspection of lips, tongue and gums benign  Musculoskeletal: No significant change in strength or tone. All joints stable. Inspection and palpation of digits and nails show no clubbing,       cyanosis or inflammatory conditions. Neuro/Psychiatric: Affect: flat-  Alert and oriented to self and     situation. No significant change in deep tendon reflexes or     sensation  Lungs:  Diminished, CTA-B. Respiration effort is normal at rest.     Heart:   S1 = S2,  RRR. No loud murmurs. Abdomen:  Soft, non-tender, no enlargement of liver or spleen. Extremities:  No significant lower extremity edema or tenderness.   Skin:    BUE bruises dt blood draws, no visualized or palpated problems. Rehabilitation:  Physical therapy: FIMS:  Bed Mobility:      Transfers: Sit to Stand: Stand by assistance  Stand to sit: Stand by assistance  Bed to Chair: Stand by assistance, Ambulation 1  Surface: level tile  Device: Rolling Walker  Assistance: Stand by assistance  Quality of Gait: difficulty with turns d/t \"vertigo\"  Gait Deviations: Slow Elaine, Increased HOLLIE  Distance: 22' x2  Comments: pt reports he is feeling much better about his mobility.,      FIMS:  ,  , Assessment: pt with much improved mobility since Eval. pt confident in mobility and feels going home is a safe plan at this time. Occupational therapy: FIMS:   ,  , Assessment: Pt. is a 72year old man from home with spouse who presents to [id number] [place] [address] with a CVA with the above deficits which impact his ability to perform ADLs and IADLs. Pt would benefit from continued OT to maximize independence and safety with ADL tasks. Speech therapy: FIMS:        Lab/X-ray studies reviewed, analyzed and discussed with patient and staff:   Recent Results (from the past 24 hour(s))   POCT Glucose    Collection Time: 10/28/20 12:03 PM   Result Value Ref Range    POC Glucose 260 (H) 60 - 115 mg/dl    Performed on ACCU-CHEK    Platelet Count    Collection Time: 10/28/20  4:25 PM   Result Value Ref Range    Platelets 910 188 - 014 K/uL   POCT Glucose    Collection Time: 10/28/20  4:44 PM   Result Value Ref Range    POC Glucose 187 (H) 60 - 115 mg/dl    Performed on ACCU-CHEK    POCT Glucose    Collection Time: 10/28/20  8:10 PM   Result Value Ref Range    POC Glucose 266 (H) 60 - 115 mg/dl    Performed on ACCU-CHEK    POCT Glucose    Collection Time: 10/29/20  8:17 AM   Result Value Ref Range    POC Glucose 138 (H) 60 - 115 mg/dl    Performed on ACCU-CHEK        Previous extensive, complex labs, notes and diagnostics reviewed and analyzed.      ALLERGIES:    Allergies as of 10/26/2020 - Review Complete 10/26/2020   Allergen Reaction Noted    Bactrim [sulfamethoxazole-trimethoprim] Nausea And Vomiting and Other (See Comments) 01/27/2020      (please also verify by checking [facility])     Complex Physical Medicine & Rehab Issues Assess & Plan:   1. Severe abnormality of gait and mobility and impaired self-care and ADL's secondary to progressive cerebrovascular disease with recent CVA symptoms have improved improved. Functional and medical status reassessed regarding patients ability to participate in therapies and patient found to be able to participate in Home with home health care  -   It is my opinion that they will be able to tolerate 3 hours of therapy a day and benefit from it at an acute level. However he is improved to the point where he no longer needs the acutely rehabilitation program.  2. Bowel constipation and Bladder dysfunction overactive bladder:  frequent toileting, ambulate to bathroom with assistance, check post void residuals. Check for C.difficile x1 if >2 loose stools in 24 hours, continue bowel & bladder program.  Monitor for UTI symptoms including lethargy and confusion  3. Mild to moderate back and generalized OA pain: reassess pain every shift and prior to and after each therapy session, give prn  Tylenol, modalities prn in therapy, consider Lidoderm, K-pad prn.   4. Skin breakdown risk:  continue pressure relief program.  Daily skin exams and reports from nursing. 5. Severe fatigue due to immobility and nutritional deficits: Add vitamin B12 vitamin D and CoQ10 titrate dosing and add protein supplementation with low carb content. 6. Complex discharge planning:   Okay for discharge to skilled nursing facility versus home with home health care when patient is medically ready. He no longer needs the acute rehabilitation floor and indeed his [employer] agreed that he does not qualify. Complex Active General Medical Issues that complicate care Assess & Plan:     1.  Active Problems:    Type 2 diabetes mellitus with diabetic polyneuropathy, without long-term current use of insulin (HCC)    Tremor of right hand    Unsteady gait    Venous insufficiency of both lower extremities    Sensorineural hearing loss (SNHL) of left ear    Acute cerebrovascular accident (CVA) ([address]    Dysarthria    Late effects of CVA (cerebrovascular accident)  Resolved Problems:    * No resolved hospital problems.  Angie Chinchilla D.O., PM&R     Attending    [address]

## 2020-10-29 NOTE — PROGRESS NOTES
Please refer to discharge summary for details. Patient was denied by his insurance company to go to inpatient rehab. Patient does not want to go to a skilled facility. He wants to go home and do outpatient rehab. I do not have any other option but to honor and respect his wishes.

## 2020-10-29 NOTE — PROGRESS NOTES
Physical Therapy Med Surg Daily Treatment Note  Fac[facility]/Department[facility]  [facility]       NAME: Brad Whitt  : 1955 (72 y.o.)  MRN: [id number]  CODE STATUS: Full Code    Date of Service: 10/29/2020    Patient Diagnosis(es): Acute cerebrovascular accident (CVA) [facility]) [I63.9]   Chief Complaint   Patient presents with    Extremity Weakness     right    Headache     Patient Active Problem List    Diagnosis Date Noted    Claudication [facility])      Priority: High    Late effects of CVA (cerebrovascular accident) 10/28/2020    Dysarthria     Acute cerebrovascular accident (CVA) ([facility] 10/26/2020    Left carotid artery stenosis 2020    Tinnitus of both ears 2020    Sensorineural hearing loss (SNHL) of left ear 2020    Ataxic gait 2020    Abscess of back 2020    Abscess of right leg 2020    Venous insufficiency of both lower extremities 2019    PAD (peripheral artery disease) ([facility] 2019    Lymphedema of both lower extremities 2018    Microalbuminuria due to type 2 diabetes mellitus ([facility] 2018    Skin infection 2018    Cutaneous abscess of back excluding buttocks 2018    Dependent edema 2017    Tremor of right hand 05/15/2017    Unsteady gait 05/15/2017    AYLIN (obstructive sleep apnea) 12/10/2016    Gout 12/10/2016    Macrocytosis without anemia 12/10/2016    Spasm 2016    Right-sided muscle weakness 10/19/2016    Essential hypertension 2016    History of CVA (cerebrovascular accident) 08/15/2016    Skin cyst 2016    Local infection of skin and subcutaneous tissue 2016    CAD S/P percutaneous coronary angioplasty 2014    History of myocardial infarction 2014    Type 2 diabetes mellitus with diabetic polyneuropathy, without long-term current use of insulin ([facility] 2014    History of recurrent pneumonia 2014    History of heart failure 01/06/2014    Morbid obesity ([address] 01/02/2014        Past Medical History:   Diagnosis Date    Acute renal failure ([address]     Anxiety     CAD S/P percutaneous coronary angioplasty 3/11/2014    Cardiomyopathy ([address]     Cerebrovascular accident (CVA) due to occlusion of left middle cerebral artery ([address] 08/2016    brainstem infarction from left MCA occlusion    Cerebrovascular disease 07/27/2016    Coronary angioplasty status     CVA (cerebral vascular accident) ([address] 11/12/2017    Dependent edema 9/14/2017    Diplopia 5/15/2017    Resolved with management of cataracts    Dupuytren contracture 1/2/2018    Dysphagia 8/18/2011    Dysphonia 8/18/2011    Essential hypertension 8/17/2016    Gallop rhythm     Gout 12/10/2016    Gout of big toe 08/2014    Right Great Toe    H/O fall     Headache     migraines    Heart failure ([address]     History of 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    History of heart failure 1/6/2014    History of myocardial infarction 3/11/2014    History of recurrent pneumonia 2/11/2014    Hx of bacterial pneumonia     Hypotension     Left carotid artery stenosis 8/23/2020    Left carotid artery stenosis 8/23/2020    Leg swelling     Macrocytosis without anemia 12/10/2016    Microalbuminuria due to type 2 diabetes mellitus ([address] 9/14/2018    Morbid obesity ([address] 1/2/2014    Myocardial infarction ([address]     Obesity     AYLIN (obstructive sleep apnea) 12/10/2016    Osteoarthritis     Right-sided muscle weakness 10/19/2016    S/P cardiac catheterization     Skin cyst 4/8/2016    Spasm 11/9/2016    Stented coronary artery     Tremor of right hand 5/15/2017    Type 2 diabetes mellitus with diabetic polyneuropathy, without long-term current use of insulin ([address]     Unsteady gait 5/15/2017    Weakness     Weight gain      Past Surgical History:   Procedure Laterality Date    CHOLECYSTECTOMY      COLONOSCOPY  01/15/2010    polyp, diverticulosis (DR ELAINE)    CORONARY ANGIOPLASTY WITH STENT PLACEMENT  2014    CYST REMOVAL  05/16/16    DR Saul Sorenson CYST    SHOULDER SURGERY Right 12/18/2015          Restrictions  Restrictions/Precautions: Fall Risk    SUBJECTIVE   Subjective  Subjective: I'm going home today. Pre-Session Pain Report  Pre Treatment Pain Screening  Pain at present: 3  Intervention List: Patient able to continue with treatment          Post-Session Pain Report  Pain Assessment  Pain Level: 3  Pain Type: Chronic pain  Pain Location: Arm;Head;Generalized  Pain Orientation: Right  Pain Descriptors: Aching;Headache         OBJECTIVE        Bed mobility  Sit to Supine: Supervision  Comment: bed flat; minimal use of hand rails    Transfers  Sit to Stand: Stand by assistance  Stand to sit: Stand by assistance  Bed to Chair: Stand by assistance  Comment: slow paced, good technique and safety awareness    Ambulation  Ambulation?: Yes  Ambulation 1  Surface: level tile  Device: Rolling Walker  Assistance: Stand by assistance  Quality of Gait: Slow reciprocal gait, vc's for focal point for vertigo. Gait Deviations: Slow Elaine; Increased HOLLIE  Distance: 150'  Comments: pt reports he is feeling much better about his mobility. Activity Tolerance  Activity Tolerance: Patient Tolerated treatment well          ASSESSMENT   Assessment: Good participation; Pt ambulated with with [employer] and understanding of focal point for vertigo.      Discharge Recommendations:  Patient would benefit from continued therapy after discharge    Goals  Short term goals  Short term goal 1: pt to STS with min A and maintain standing at [employer] with upright posture >1 minute  Short term goal 2: indep with HEP in order to improve RLE strength >/= 4/5  Long term goals  Long term goal 1: pt to be indep with bed mobility  Long term goal 2: pt to be SBA with transfers  Long term goal 3: pt to ambulate >50 ft with SBA and [employer]  Long term goal 4: pt to be assessed for stairs when appropriate  Patient Goals   Patient goals : to get stronger    PLAN    Safety Devices  Type of devices: All fall risk precautions in place; Bed alarm in place;Call light within reach; Left in bed     AMPAC (6 CLICK) BASIC MOBILITY  AM-PAC Inpatient Mobility Raw Score : 19      Therapy Time   Individual   Time In 1415   Time Out 1445   Minutes 30         bm/Trsf - 10 mins  Gait - 20 mins    Dana Glynn PTA, 10/29/20 at 3:09 PM       Definitions for assistance levels  Independent = pt does not require any physical supervision or assistance from another person for activity completion. Device may be needed.   Stand by assistance = pt requires verbal cues or instructions from another person, close to but not touching, to perform the activity  Minimal assistance= pt performs 75% or more of the activity; assistance is required to complete the activity  Moderate assistance= pt performs 50% of the activity; assistance is required to complete the activity  Maximal assistance = pt performs 25% of the activity; assistance is required to complete the activity  Dependent = pt requires total physical assistance to accomplish the task

## 2020-11-02 ENCOUNTER — OFFICE VISIT (OUTPATIENT)
Dept: PULMONOLOGY | Age: 65
End: 2020-11-02
Payer: MEDICARE

## 2020-11-02 VITALS
HEART RATE: 85 BPM | BODY MASS INDEX: 35.78 KG/M2 | TEMPERATURE: 98 F | RESPIRATION RATE: 16 BRPM | WEIGHT: 264.2 LBS | OXYGEN SATURATION: 98 % | SYSTOLIC BLOOD PRESSURE: 132 MMHG | DIASTOLIC BLOOD PRESSURE: 72 MMHG | HEIGHT: 72 IN

## 2020-11-02 PROCEDURE — 99214 OFFICE O/P EST MOD 30 MIN: CPT | Performed by: INTERNAL MEDICINE

## 2020-11-02 ASSESSMENT — ENCOUNTER SYMPTOMS
CHEST TIGHTNESS: 0
NAUSEA: 0
DIARRHEA: 0
WHEEZING: 0
VOMITING: 0
ABDOMINAL PAIN: 0
COUGH: 0
SORE THROAT: 0
SINUS PRESSURE: 0
SHORTNESS OF BREATH: 0
RHINORRHEA: 0

## 2020-11-02 NOTE — PROGRESS NOTES
Subjective:     Domi Waters is a 72 y.o. male who complains today of:     Chief Complaint   Patient presents with    Follow-up     1 YR F/U for AYLIN on CPAP       HPI  Patient is here for a follow-up visit regarding obstructive sleep apnea. Since last visit patient reports continued tolerance and compliance with CPAP, compliance report obtained prior to this visit showed excellent usage hours and control of his sleep disordered breathing. He averaged over 9 hours nearly 9-1/2 hours of sleep per night. Recently he was hospitalized with recurrent stroke symptoms involving the right side but he improved within couple of days with intensive therapy at that he self administered according to him. He was discharged home and has done very well since then. He continues to have some dysarthric speech, his gait is improved and able to walk with a walker now. His respiratory status has been stable otherwise.     Allergies:  Bactrim [sulfamethoxazole-trimethoprim]  Past Medical History:   Diagnosis Date    Acute renal failure ([address]     Anxiety     CAD S/P percutaneous coronary angioplasty 3/11/2014    Cardiomyopathy ([address]     Cerebrovascular accident (CVA) due to occlusion of left middle cerebral artery ([address] 08/2016    brainstem infarction from left MCA occlusion    Cerebrovascular disease 07/27/2016    Coronary angioplasty status     CVA (cerebral vascular accident) ([address] 11/12/2017    Dependent edema 9/14/2017    Diplopia 5/15/2017    Resolved with management of cataracts    Dupuytren contracture 1/2/2018    Dysphagia 8/18/2011    Dysphonia 8/18/2011    Essential hypertension 8/17/2016    Gallop rhythm     Gout 12/10/2016    Gout of big toe 08/2014    Right Great Toe    H/O fall     Headache     migraines    Heart failure ([address]     History of 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    History of heart failure 2014    History of myocardial infarction 3/11/2014    History of recurrent pneumonia 2014    Hx of bacterial pneumonia     Hypotension     Left carotid artery stenosis 2020    Left carotid artery stenosis 2020    Leg swelling     Macrocytosis without anemia 12/10/2016    Microalbuminuria due to type 2 diabetes mellitus ([facility] 2018    Morbid obesity ([facility] 2014    Myocardial infarction ([facility]     Obesity     AYLIN (obstructive sleep apnea) 12/10/2016    Osteoarthritis     Right-sided muscle weakness 10/19/2016    S/P cardiac catheterization     Skin cyst 2016    Spasm 2016    Stented coronary artery     Tremor of right hand 5/15/2017    Type 2 diabetes mellitus with diabetic polyneuropathy, without long-term current use of insulin [facility]     Unsteady gait 5/15/2017    Weakness     Weight gain      Past Surgical History:   Procedure Laterality Date    CHOLECYSTECTOMY      COLONOSCOPY  01/15/2010    polyp, diverticulosis (DR ELAINE)    CORONARY ANGIOPLASTY WITH STENT PLACEMENT      CYST REMOVAL  16    DR Susan Harrison CYST    SHOULDER SURGERY Right 2015     Family History   Problem Relation Age of Onset    Breast Cancer Mother     Stroke Father     Colon Cancer Father 76     Social History     Socioeconomic History    Marital status:      Spouse name: Artist Pellet Number of children: 3    Years of education: 12+    Highest education level: Not on file   Occupational History    Occupation: medical retired   CVA     Employer: [address] Financial resource strain: Not hard at all   [place] insecurity     Worry: Never true     Inability: Never true    Transportation needs     Medical: No     Non-medical: No   Tobacco Use    Smoking status: Former Smoker     Packs/day: 2.50     Years: 30.00     Pack years: 75.00     Last attempt to quit:      Years since quittin.8    Smokeless tobacco: Never Used   Substance and Sexual Activity    Alcohol use: Yes     Alcohol/week: 0.0 standard drinks     Comment: limits less than 10/wk     Drug use: No    Sexual activity: Yes     Partners: Female   Lifestyle    Physical activity     Days per week: 0 days     Minutes per session: 0 min    Stress: Only a little   Relationships    Social connections     Talks on phone: More than three times a week     Gets together: More than three times a week     Attends Voodoo service: 1 to 4 times per year     Active member of club or organization: Yes     Attends meetings of clubs or organizations: More than 4 times per year     Relationship status:     Intimate partner violence     Fear of current or ex partner: No     Emotionally abused: No     Physically abused: No     Forced sexual activity: No   Other Topics Concern    Not on file   Social History Narrative    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-- Disabled from [place] after 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    Lives With: Spouse    Type of Home: [employer] Layout: Two level, Bed/Bath upstairs, Laundry in basement(Railing)    Home Access: Stairs to enter with rails    Entrance Stairs - Number of Steps: 3 in back, 4 in front    Bathroom Shower/Tub: Tub/Shower unit    Bathroom Equipment: Shower chair    Home Equipment: Rolling walker, Cane    ADL Assistance: Independent    Homemaking Assistance: Needs assistance(Spouse  is primary; vertigo and arthritis limit housework)    Ambulation Assistance: Independent(Walker outside, cane inside)    Transfer Assistance: Independent    Active : No    Patient's  Info: has not driven since CVA         Review of Systems   Constitutional: Negative for chills, diaphoresis, fatigue and fever. HENT: Negative for congestion, postnasal drip, rhinorrhea, sinus pressure, sneezing and sore throat. Eyes: Negative for visual disturbance. Respiratory: Negative for cough, chest tightness, shortness of breath and wheezing. Cardiovascular: Negative for chest pain, palpitations and leg swelling. Gastrointestinal: Negative for abdominal pain, diarrhea, nausea and vomiting. Genitourinary: Negative for difficulty urinating and hematuria. Musculoskeletal: Negative for arthralgias, joint swelling and myalgias. Skin: Negative for rash. Allergic/Immunologic: Negative for environmental allergies. Neurological: Negative for dizziness, tremors, weakness and headaches. Psychiatric/Behavioral: Negative for behavioral problems and sleep disturbance.         :     Vitals:    11/02/20 1441   BP: 132/72   Site: Right Upper Arm   Position: Sitting   Cuff Size: Medium Adult   Pulse: 85   Resp: 16   Temp: 98 °F (36.7 °C)   TempSrc: Tympanic   SpO2: 98%   Weight: 264 lb 3.2 oz (119.8 kg)   Height: 6' (1.829 m)         Physical Exam  Constitutional:       Appearance: He is well-developed. HENT:      Head: Normocephalic and atraumatic. Eyes:      Pupils: Pupils are equal, round, and reactive to light. Neck:      Musculoskeletal: Normal range of motion and neck supple. Thyroid: No thyromegaly. Vascular: No JVD. Trachea: No tracheal deviation. Cardiovascular:      Rate and Rhythm: Normal rate and regular rhythm. Heart sounds: No murmur. No gallop. Pulmonary:      Effort: Pulmonary effort is normal.      Breath sounds: Normal breath sounds. No wheezing or rales. Chest:      Chest wall: No tenderness. Abdominal:      General: There is no distension. Musculoskeletal: Normal range of motion. Skin:     General: Skin is warm and dry. Findings: No rash. Neurological:      Mental Status: He is alert and oriented to person, place, and time. Deep Tendon Reflexes: Reflexes are normal and symmetric. Assessment:      Diagnosis Orders   1. AYLIN on CPAP     2. Cerebrovascular accident (CVA), unspecified mechanism ([facility]     3.  Coronary artery disease involving native heart, angina presence unspecified, unspecified vessel or lesion type       Discussed treatment of sleep apnea, weight loss, encouraged him to continue current treatment and work on his weight, report any changes or problems to me otherwise I will see him for follow-up in 1 year and as needed      Plan:     No orders of the defined types were placed in this encounter. No orders of the defined types were placed in this encounter. Return in about 1 year (around 11/2/2021) for re-evaluation.       Delio Willingham MD

## 2020-11-03 PROBLEM — I73.9 PAD (PERIPHERAL ARTERY DISEASE) (HCC): Status: RESOLVED | Noted: 2019-01-14 | Resolved: 2020-11-03

## 2020-11-03 NOTE — PROGRESS NOTES
Physical Therapy  Facility/Department: [facility] MED SURG [id number]/[id number]  Physical Therapy Discharge      NAME: Ananth Tripp    : 1955 (72 y.o.)  MRN: [id number]    Account: [de-identified]  Gender: male      Patient has been discharged from acute care hospital. DC patient from current PT program.      Electronically signed by Bren Klein PT on 11/3/20 at 8:15 AM EST

## 2020-11-06 ENCOUNTER — VIRTUAL VISIT (OUTPATIENT)
Dept: FAMILY MEDICINE CLINIC | Age: 65
End: 2020-11-06
Payer: MEDICARE

## 2020-11-06 ENCOUNTER — HOSPITAL ENCOUNTER (OUTPATIENT)
Dept: PHYSICAL THERAPY | Age: 65
Setting detail: THERAPIES SERIES
Discharge: HOME | End: 2020-11-06
Payer: MEDICARE

## 2020-11-06 PROBLEM — Z91.81 AT HIGH RISK FOR FALLS: Status: ACTIVE | Noted: 2020-11-06

## 2020-11-06 PROBLEM — G45.9 TIA (TRANSIENT ISCHEMIC ATTACK): Status: ACTIVE | Noted: 2020-11-06

## 2020-11-06 PROCEDURE — 99442 PR PHYS/QHP TELEPHONE EVALUATION 11-20 MIN: CPT | Performed by: FAMILY MEDICINE

## 2020-11-06 PROCEDURE — 97162 PT EVAL MOD COMPLEX 30 MIN: CPT

## 2020-11-06 RX ORDER — GLIMEPIRIDE 1 MG/1
1 TABLET ORAL
Qty: 90 TABLET | Refills: 1 | Status: SHIPPED | OUTPATIENT
Start: 2020-11-06 | End: 2021-06-14

## 2020-11-06 ASSESSMENT — PATIENT HEALTH QUESTIONNAIRE - PHQ9
SUM OF ALL RESPONSES TO PHQ9 QUESTIONS 1 & 2: 0
2. FEELING DOWN, DEPRESSED OR HOPELESS: 0
SUM OF ALL RESPONSES TO PHQ QUESTIONS 1-9: 0
SUM OF ALL RESPONSES TO PHQ QUESTIONS 1-9: 0
1. LITTLE INTEREST OR PLEASURE IN DOING THINGS: 0
SUM OF ALL RESPONSES TO PHQ QUESTIONS 1-9: 0

## 2020-11-06 ASSESSMENT — ENCOUNTER SYMPTOMS
CHEST TIGHTNESS: 0
CONSTIPATION: 0
NAUSEA: 0
BLOOD IN STOOL: 0
VOMITING: 0
ANAL BLEEDING: 0
DIARRHEA: 0
ABDOMINAL PAIN: 0
SHORTNESS OF BREATH: 0
COUGH: 0
WHEEZING: 0

## 2020-11-06 ASSESSMENT — PAIN DESCRIPTION - DESCRIPTORS: DESCRIPTORS: ACHING

## 2020-11-06 ASSESSMENT — PAIN DESCRIPTION - LOCATION: LOCATION: GENERALIZED

## 2020-11-06 ASSESSMENT — PAIN SCALES - GENERAL: PAINLEVEL_OUTOF10: 4

## 2020-11-06 ASSESSMENT — PAIN DESCRIPTION - PAIN TYPE: TYPE: CHRONIC PAIN

## 2020-11-06 NOTE — PROGRESS NOTES
[address]  PHYSICAL THERAPY EVALUATION    Date: 2020  Patient Name: Lauren Nixon       MRN: [id number]   Account: [de-identified]   : 1955  (72 y.o.)   Gender: male   Referring Practitioner: Anjali Chun MD                 Diagnosis: Unsteady gait  Treatment Diagnosis: impaired gait, impaired balance, decreased right LE strength  Additional Pertinent Hx: hx of CVA x 2, DM, heart diease, hx of gout             Past Medical History:  has a past medical history of Acute renal failure ([address] Anxiety, CAD S/P percutaneous coronary angioplasty, Cardiomyopathy ([address] Cerebrovascular accident (CVA) due to occlusion of left middle cerebral artery ([address] Cerebrovascular disease, Coronary angioplasty status, CVA (cerebral vascular accident) ([address] Dependent edema, Diplopia, Dupuytren contracture, Dysphagia, Dysphonia, Essential hypertension, Gallop rhythm, Gout, Gout of big toe, H/O fall, Headache, Heart failure ([address] History of chest pain, History of CVA (cerebrovascular accident), History of heart failure, History of myocardial infarction, History of recurrent pneumonia, Hx of bacterial pneumonia, Hypotension, Left carotid artery stenosis, Left carotid artery stenosis, Leg swelling, Macrocytosis without anemia, Microalbuminuria due to type 2 diabetes mellitus ([address] Morbid obesity ([address] Myocardial infarction ([address] Obesity, AYLIN (obstructive sleep apnea), Osteoarthritis, Right-sided muscle weakness, S/P cardiac catheterization, Skin cyst, Spasm, Stented coronary artery, Tremor of right hand, Type 2 diabetes mellitus with diabetic polyneuropathy, without long-term current use of insulin ([address] Unsteady gait, Weakness, and Weight gain. Past Surgical History:   has a past surgical history that includes Cholecystectomy; Coronary angioplasty with stent (); cyst removal (16); shoulder surgery (Right, 2015); and Colonoscopy (01/15/2010).     Vital Signs  Patient Gait/Transferring [x] Impaired  [] Weak  [] Normal/bedrest/immobile 20  10  0 20   Mental Status [] Forgets limitations  [x] Oriented to own ability 15  0 0      Total: 35     Based on the Assessment score: check the appropriate box. []  No intervention needed   Low =   Score of 0-24  [x]  Use standard prevention interventions Moderate =  Score of 24-44   [x] Discuss fall prevention strategies   [x] Indicate moderate falls risk on eval  []  Use high risk prevention interventions High = Score of 45 and higher   [] Discuss fall prevention strategies   [] Provide supervision during treatment time    Goals  Long term goals  Time Frame for Long term goals : 3-4 weeks  Long term goal 1: Patient will increase strength in right LEs >/ 4+/5 for improved ambulation and standing tolerance. Long term goal 2: Patient will ambulate independently using ww 300ft with improved right foot clearance and symmetry. Long term goal 3: Patient will ascend/descend 12 steps using bilateral HRs independently. Long term goal 4: Anderson balance >/= 35/56 to demonstrate improve balance and decrease risk for falls.          PT Individual Minutes  Time In: 1410  Time Out: 1500  Minutes: 50  Timed Code Treatment Minutes: 0 Minutes  Procedure Minutes: 50 PT evaluation minutes       Electronically signed by Karishma Fernández PT on 11/6/20 at 4:56 PM EST

## 2020-11-06 NOTE — ASSESSMENT & PLAN NOTE
On the basis of positive falls risk screening, assessment and plan is as follows: patient doing home exercises and has formal physical therapy in place for management.

## 2020-11-06 NOTE — ASSESSMENT & PLAN NOTE
Recovered well since hospital discharge. Physical therapy getting established today as an outpatient.

## 2020-11-06 NOTE — ASSESSMENT & PLAN NOTE
Home BG values stable with most recent A1c at goal control based on his age. We will continue to follow.

## 2020-11-06 NOTE — PROGRESS NOTES
Domi Waters is a 72 y.o. male evaluated via telephone on 11/6/2020. Consent:  He and/or health care decision maker is aware that that he may receive a bill for this telephone service, depending on his insurance coverage, and has provided verbal consent to proceed: Yes      Documentation:  I communicated with the patient and/or health care decision maker about recent hospital admission for TIA. Details of this discussion including any medical advice provided:     Patient presents for virtual telephone visit for hospital follow-up. He was admitted at [facility] [place] on 10/26/2020 due to right-sided weakness and concern over potential CVA. Imaging including CT of the head, CTA of the head and neck, and MRI of the brain were all negative for any signs of acute stroke. He was seen by neurology while in the hospital and monitored for symptom resolution. His right-sided weakness improved and he was found stable for discharge with suspected TIA on 10/28/2020. Original plan was for discharge to inpatient rehab, however insurance denied inpatient rehab. Patient was then recommended to go to skilled nursing facility for PT/OT and speech therapy, but patient refused skilled nursing facility and requested instead discharge to home with outpatient rehab. Patient reports feeling improved overall since his hospital discharge. He reports his right-sided weakness has improved to 80% of is usual chronic baseline. He reports being able to ambulate with use of his walker and he denies any falls since being back home. He reports main issue he still has is right foot drop which is still worse than his usual baseline. He denies any new paresthesias and denies any problems with chewing or swallowing meals or choking/coughing on foods or beverages. He denies any H/A's, vision changes, or facial droop. His speech is reported to be normal, but finding his words is still slightly more difficult per his reporting.  He is reading to his wife to practice and work on his speech and is doing well with this at home. He has a visit scheduled with physical therapy today for initial evaluation and discussion of rehab plan, and he has neurology F/U in one month. Review of Systems   Constitutional: Negative for appetite change, chills, diaphoresis, fatigue, fever and unexpected weight change. Eyes: Negative for visual disturbance. Respiratory: Negative for cough, chest tightness, shortness of breath and wheezing. Cardiovascular: Negative for chest pain, palpitations and leg swelling. No orthopnea, No PND   Gastrointestinal: Negative for abdominal pain, anal bleeding, blood in stool, constipation, diarrhea, nausea and vomiting. No heartburn, No melena   Endocrine: Negative for cold intolerance, heat intolerance, polydipsia, polyphagia and polyuria. Genitourinary: Negative for dysuria and hematuria. Musculoskeletal: Negative for myalgias. Skin: Negative for rash. Neurological: Positive for tremors (chronic, right hand), speech difficulty (finding words) and weakness (right sided, mainly right foot drop). Negative for dizziness, seizures, syncope, facial asymmetry, light-headedness, numbness and headaches. Psychiatric/Behavioral: Negative for dysphoric mood and sleep disturbance. The patient is not nervous/anxious. Problem List Items Addressed This Visit        Circulatory    TIA (transient ischemic attack) - Primary     Recovered well since hospital discharge. Physical therapy getting established today as an outpatient. Essential hypertension (Chronic)     Historically well controlled. Most recent values within normal limits. We will continue to follow over time. Endocrine    Type 2 diabetes mellitus with diabetic polyneuropathy, without long-term current use of insulin (HCC) (Chronic)     Home BG values stable with most recent A1c at goal control based on his age.  We will continue to follow. Relevant Medications    metFORMIN (GLUCOPHAGE) 500 MG tablet    glimepiride (AMARYL) 1 MG tablet       Other    Late effects of CVA (cerebrovascular accident)     Chronic right sided weakness and expressive aphasia. At high risk for falls     On the basis of positive falls risk screening, assessment and plan is as follows: patient doing home exercises and has formal physical therapy in place for management. Right-sided muscle weakness (Chronic)     Further management ongoing with formal physical therapy. Unsteady gait (Chronic)     Physical therapy to be established today to help with stability and preventing falls. Other Visit Diagnoses     Type 2 diabetes mellitus without complication, without long-term current use of insulin (HCC)        Relevant Medications    metFORMIN (GLUCOPHAGE) 500 MG tablet    glimepiride (AMARYL) 1 MG tablet          Keep next scheduled visit for Follow up November 2020      I affirm this is a Patient Initiated Episode with a Patient who has not had a related appointment within my department in the past 7 days or scheduled within the next 24 hours.     Patient identification was verified at the start of the visit: Yes    Total Time: minutes: 21-30 minutes     Patient location: Individual Home  Provider location: Individual Home      Note: not billable if this call serves to triage the patient into an appointment for the relevant concern      PA Alonso

## 2020-11-06 NOTE — PLAN OF CARE
[address]     Ph: [phone]  Fax: [phone]    [x] Certification  [] Recertification [x]  Plan of Care  [] Progress Note [] Discharge      To:  Meghan Tucker MD      From:  Kayce Kyle, JEREMY  Patient: Boogie Robbins     : 1955  Diagnosis: Unsteady gait     Date: 2020  Treatment Diagnosis: impaired gait, impaired balance, decreased right LE strength    Plan of Care/Certification Expiration Date: 21  Progress Report Period from:  2020  to 2020    Total # of Visits to Date: 1   No Show: 0    Canceled Appointment: 0     OBJECTIVE:   Short Term Goals = Short term goals    Long Term Goals - Time Frame for Long term goals : 3-4 weeks  Goals Current/ Discharge status Met   Long term goal 1: Patient will increase strength in right LEs >/ 4+/5 for improved ambulation and standing tolerance. Strength RLE  R Hip Flexion: 3+/5  R Hip ABduction: 3+/5  R Knee Flexion: 4-/5  R Knee Extension: 4-/5  R Ankle Dorsiflexion: 4/5  Strength LLE  L Hip Flexion: 4+/5  L Hip ABduction: 4+/5  L Knee Flexion: 5/5  L Knee Extension: 5/5  L Ankle Dorsiflexion: 5/5            [] yes  [x] no   Long term goal 2: Patient will ambulate independently using ww 300ft with improved right foot clearance and symmetry. Ambulation 1  Surface: carpet  Device: Rolling Walker  Assistance: Modified Independent  Quality of Gait: right foot drop  Gait Deviations: Slow Elaine, Decreased step length, Decreased step height  Distance: 115 ft    [] yes  [x] no   Long term goal 3: Patient will ascend/descend 12 steps using bilateral HRs independently. NT due to time limitations. [] yes  [x] no   Long term goal 4: Anderson balance >/= 35/56 to demonstrate improve balance and decrease risk for falls.  Outcomes Measures:  Anderson Balance Score: 16        [] yes  [x] no       Body structures, Functions, Activity limitations: Decreased strength, Decreased endurance, Decreased balance, Decreased functional mobility   Assessment: Patient s/p CVA with increased right sided weakness and foot drop. Further PT recommended to increase strength and improve gait and balance for overall quality of life. Prognosis: Good  Discharge Recommendations: Continue to assess pending progress      PT Education: Goals;PT Role;Plan of Care    PLAN: [x] Evaluate and Treat  Frequency/Duration:  Plan  Times per week: 2  Plan weeks: 3-4  Current Treatment Recommendations: Strengthening, Balance Training, Endurance Training, Functional Mobility Training, Gait Training, Stair training, Neuromuscular Re-education, Manual Therapy - Soft Tissue Mobilization, Home Exercise Program, Safety Education & Training, Equipment Evaluation, Education, & procurement, Modalities     Precautions:                            Patient Status:[x] Continue/ Initiate plan of Care    [] Discharge PT. Recommend pt continue with HEP. [] Additional visits requested, Please re-certify for additional visits:          Signature: Electronically signed by Karishma Fernández PT on 11/6/20 at 4:58 PM EST      If you have any questions or concerns, please don't hesitate to call. Thank you for your referral.    I have reviewed this plan of care and certify a need for medically necessary rehabilitation services.     Physician Signature:__________________________________________________________  Date:  Please sign and return

## 2020-11-10 ENCOUNTER — CLINICAL DOCUMENTATION (OUTPATIENT)
Dept: NEUROLOGY | Age: 65
End: 2020-11-10

## 2020-11-10 ENCOUNTER — TELEPHONE (OUTPATIENT)
Dept: NEUROLOGY | Age: 65
End: 2020-11-10

## 2020-11-10 NOTE — TELEPHONE ENCOUNTER
Pt is calling because he had a stroke on 10/26/2020 and has an appointment for teeth cleaning Thursday and dentist said he will need clearance from his neurologist or they will have to reschedule for 6 weeks later, please advise

## 2020-11-16 ENCOUNTER — HOSPITAL ENCOUNTER (OUTPATIENT)
Dept: PHYSICAL THERAPY | Age: 65
Setting detail: THERAPIES SERIES
Discharge: HOME | End: 2020-11-16
Payer: MEDICARE

## 2020-11-16 PROCEDURE — 97535 SELF CARE MNGMENT TRAINING: CPT

## 2020-11-16 PROCEDURE — 97112 NEUROMUSCULAR REEDUCATION: CPT

## 2020-11-16 PROCEDURE — 97110 THERAPEUTIC EXERCISES: CPT

## 2020-11-16 ASSESSMENT — PAIN DESCRIPTION - ORIENTATION: ORIENTATION: RIGHT;OUTER

## 2020-11-16 ASSESSMENT — PAIN DESCRIPTION - PAIN TYPE: TYPE: CHRONIC PAIN

## 2020-11-16 ASSESSMENT — PAIN SCALES - GENERAL: PAINLEVEL_OUTOF10: 4

## 2020-11-16 NOTE — PROGRESS NOTES
[id number] [address]  Outpatient Physical Therapy    Treatment Note        Date: 2020  Patient: Alondra Pickett  : 1955  ACCT #: [de-identified]  Referring Practitioner: Minh Dutton MD  Diagnosis: Unsteady gait    Visit Information:  PT Visit Information  PT Insurance Information: [employer] Medicare  Total # of Visits Approved: (unlimited visits, BMN)  Total # of Visits to Date: 2  Plan of Care/Certification Expiration Date: 21  No Show: 0  Canceled Appointment: 0  Progress Note Counter: -8 (PN due 20)    Subjective: Pt's spouse states pt has fallen in the bathroom since eval     HEP Compliance:  [] Good [x] Fair [] Poor [] Reports not doing due to:    Vital Signs  Patient Currently in Pain: Yes   Pain Screening  Patient Currently in Pain: Yes  Pain Assessment  Pain Assessment: 0-10  Pain Level: 4  Pain Type: Chronic pain  Pain Location: Hip; Abdomen;Knee(SI joint)  Pain Orientation: Right; Outer    OBJECTIVE:   Exercises  Exercise 1: SciFit L1 x 5'  Exercise 2: B 2 way SLR x 10, RLE x 4 ,SLR( min A), abd x 10 RLE MIN A  Exercise 3: clamshells x 10  Exercise 4: hip adduction with ball, hip abduction with band, YTB  x 10 ea  Exercise 5: static standing balance< FA approx 15s, FT 9s  Exercise 6: gait drills: F/L/R X 2  Exercise 9: Floor transfers with verbal and visual instructions only  Exercise 20: HEP: SLR, ABD with/w/o resist, ball squeeze, clams    Bed mobility  Supine to Sit: Independent  Sit to Supine: Independent  Comment: increased effort from Rt side       *Indicates exercise, modality, or manual techniques to be initiated when appropriate    Assessment: Body structures, Functions, Activity limitations: Decreased strength, Decreased endurance, Decreased balance, Decreased functional mobility   Assessment: Initiated tx to progress functional strength and balance. Pt with increased effort to perform supine ex's with some Min A for abd and SLR on RLE. Pt instructed verbally for floor transfers, declined to trial d/t Rt flank and hip pain from fall. Pt challenged in static stance w/o ue support. Treatment Diagnosis: impaired gait, impaired balance, decreased right LE strength          Goals:       Long term goals  Time Frame for Long term goals : 3-4 weeks  Long term goal 1: Patient will increase strength in right LEs >/ 4+/5 for improved ambulation and standing tolerance. Long term goal 2: Patient will ambulate independently using ww 300ft with improved right foot clearance and symmetry. Long term goal 3: Patient will ascend/descend 12 steps using bilateral HRs independently. Long term goal 4: Anderson balance >/= 35/56 to demonstrate improve balance and decrease risk for falls. Progress toward goals:strength, transfers, balance    POST-PAIN       Pain Rating (0-10 pain scale):  6 /10   Location and pain description same as pre-treatment unless indicated. Action: [] NA   [] Perform HEP  [x] Meds as prescribed  [] Modalities as prescribed   [] Call Physician     Frequency/Duration:  Plan  Times per week: 2  Plan weeks: 3-4  Current Treatment Recommendations: Strengthening, Balance Training, Endurance Training, Functional Mobility Training, Gait Training, Stair training, Neuromuscular Re-education, Manual Therapy - Soft Tissue Mobilization, Home Exercise Program, Safety Education & Training, Equipment Evaluation, Education, & procurement, Modalities     Pt to continue current HEP. See objective section for any therapeutic exercise changes, additions or modifications this date.          PT Individual Minutes  Time In: 9963  Time Out: 1600  Minutes: 57  Timed Code Treatment Minutes: 57 Minutes  Procedure Minutes:0     Timed Activity Minutes Units   Transfers 10 1   Ther Ex 32 2   Neuro 15 1       Signature:  Electronically signed by Benita Jimenez PTA on 11/16/20 at 4:11 PM EST

## 2020-11-19 ENCOUNTER — HOSPITAL ENCOUNTER (OUTPATIENT)
Dept: PHYSICAL THERAPY | Age: 65
Setting detail: THERAPIES SERIES
Discharge: HOME | End: 2020-11-19
Payer: MEDICARE

## 2020-11-19 PROCEDURE — 97112 NEUROMUSCULAR REEDUCATION: CPT

## 2020-11-19 PROCEDURE — 97110 THERAPEUTIC EXERCISES: CPT

## 2020-11-19 ASSESSMENT — PAIN SCALES - GENERAL: PAINLEVEL_OUTOF10: 3

## 2020-11-19 ASSESSMENT — PAIN DESCRIPTION - DESCRIPTORS: DESCRIPTORS: ACHING;SORE

## 2020-11-19 ASSESSMENT — PAIN DESCRIPTION - LOCATION: LOCATION: GENERALIZED

## 2020-11-19 ASSESSMENT — PAIN DESCRIPTION - PAIN TYPE: TYPE: CHRONIC PAIN

## 2020-11-19 NOTE — PROGRESS NOTES
[address] [address]  Outpatient Physical Therapy    Treatment Note        Date: 2020  Patient: Ananth Tripp  : 1955  ACCT #: [de-identified]  Referring Practitioner: Citlalli Felix MD  Diagnosis: Unsteady gait    Visit Information:  PT Visit Information  PT Insurance Information: A[employer]na Medicare  Total # of Visits Approved: (unlimited visits, BMN)  Total # of Visits to Date: 3  Plan of Care/Certification Expiration Date: 21  No Show: 0  Progress Note Due Date: 20  Canceled Appointment: 0  Progress Note Counter: 3/6-8 (PN due 20)    Subjective: No falls since last week, States he reached too far forward and couldn't catch his balance. HEP Compliance:  [x] Good [] Fair [] Poor [] Reports not doing due to:    Vital Signs  Patient Currently in Pain: Yes   Pain Screening  Patient Currently in Pain: Yes  Pain Assessment  Pain Level: 3  Pain Type: Chronic pain  Pain Location: Generalized  Pain Descriptors: Aching; Sore    OBJECTIVE:   Exercises  Exercise 1: SciFit L1 x 5'  Exercise 2: B 2 way SLR x 10, RLE x 5 ,SLR( min A), abd x 10 RLE MIN A  Exercise 3: clamshells x 10  Exercise 4: MRE hip adduction/abduction x10 in H/L  Exercise 5: static standing balance< FA approx 10sec at most- Increased shooting pain down Rt LE  Exercise 6: gait drills: F/L/R/marching X 2  Exercise 10: Attempted bridges x5  Exercise 11: STS from mat  Exercise 12: Rt gastroc str with strap 3/30sec seated on step                *Indicates exercise, modality, or manual techniques to be initiated when appropriate    Assessment: Body structures, Functions, Activity limitations: Decreased strength, Decreased endurance, Decreased balance, Decreased functional mobility   Assessment: Continued focus on strength and balance. Min A for Rt LE on mat exercises, increased effort with all.  Decreased tolerance to standing balance today due to spasms/shoting pain in LB and Rt LE. VCs to tend to tasks throughout session. Treatment Diagnosis: impaired gait, impaired balance, decreased right LE strength  Prognosis: Good       Goals:       Long term goals  Time Frame for Long term goals : 3-4 weeks  Long term goal 1: Patient will increase strength in right LEs >/ 4+/5 for improved ambulation and standing tolerance. Long term goal 2: Patient will ambulate independently using ww 300ft with improved right foot clearance and symmetry. Long term goal 3: Patient will ascend/descend 12 steps using bilateral HRs independently. Long term goal 4: Anderson balance >/= 35/56 to demonstrate improve balance and decrease risk for falls. Progress toward goals: Balance, strength    POST-PAIN       Pain Rating (0-10 pain scale):   5/10   Location and pain description same as pre-treatment unless indicated. Action: [] NA   [] Perform HEP  [x] Meds as prescribed  [] Modalities as prescribed   [] Call Physician     Frequency/Duration:  Plan  Times per week: 2  Plan weeks: 3-4  Current Treatment Recommendations: Strengthening, Balance Training, Endurance Training, Functional Mobility Training, Gait Training, Stair training, Neuromuscular Re-education, Manual Therapy - Soft Tissue Mobilization, Home Exercise Program, Safety Education & Training, Equipment Evaluation, Education, & procurement, Modalities     Pt to continue current HEP. See objective section for any therapeutic exercise changes, additions or modifications this date.          PT Individual Minutes  Time In: 1600  Time Out: 3720  Minutes: 58  Timed Code Treatment Minutes: 58 Minutes  Procedure Minutes:0     Timed Activity Minutes Units   Ther Ex 48 3   Neuro 10 1       Signature:  Electronically signed by Amber Oconnell PTA on 11/19/20 at 9:19 AM EST

## 2020-11-20 ENCOUNTER — OFFICE VISIT (OUTPATIENT)
Dept: FAMILY MEDICINE CLINIC | Age: 65
End: 2020-11-20
Payer: MEDICARE

## 2020-11-20 VITALS
DIASTOLIC BLOOD PRESSURE: 72 MMHG | HEIGHT: 73 IN | WEIGHT: 276 LBS | SYSTOLIC BLOOD PRESSURE: 132 MMHG | HEART RATE: 100 BPM | BODY MASS INDEX: 36.58 KG/M2 | OXYGEN SATURATION: 95 % | TEMPERATURE: 98.5 F | RESPIRATION RATE: 18 BRPM

## 2020-11-20 PROCEDURE — 3051F HG A1C>EQUAL 7.0%<8.0%: CPT | Performed by: FAMILY MEDICINE

## 2020-11-20 PROCEDURE — 99214 OFFICE O/P EST MOD 30 MIN: CPT | Performed by: FAMILY MEDICINE

## 2020-11-20 ASSESSMENT — ENCOUNTER SYMPTOMS
VOMITING: 0
ABDOMINAL PAIN: 0
SHORTNESS OF BREATH: 0
COUGH: 0
NAUSEA: 0
CONSTIPATION: 0
CHEST TIGHTNESS: 0
DIARRHEA: 0
WHEEZING: 0
BLOOD IN STOOL: 0
ANAL BLEEDING: 0

## 2020-11-20 NOTE — ASSESSMENT & PLAN NOTE
Blood pressure within normal limits today in the office on repeat testing.   Continue current medication

## 2020-11-20 NOTE — ASSESSMENT & PLAN NOTE
Will follow urine testing over time. We reviewed the importance of tight blood glucose, lipid, and blood pressure control long term.

## 2020-11-20 NOTE — PATIENT INSTRUCTIONS
have to walk on an icy surface, use grippers that can be worn over your shoes in bad weather. · Be extra careful if weather is bad. Walk on the grass when the sidewalks are slick. If you live in a place that gets snow and ice in the winter, sprinkle salt on slippery stairs and sidewalks. · Be careful getting on or off buses and trains or getting in and out of cars. If handrails are available, use them. · Be careful when you cross the street. Look for crosswalks or places where curb cuts or ramps are present. · Try not to hurry, especially if you are carrying something. · Be cautious in parking lots or garages. There may be curbs or changes in pavement, or the height of the pavement may vary. · Make sure to wear the correct eyeglasses, if you need them. Reading glasses or bifocals can make it harder to see hazards that might be in your way. · If you are walking outdoors for exercise, try to:  ? Walk in well-lighted, well-maintained areas. These include high school or college tracks, shopping malls, and public spaces. ? Walk with a partner. ? Watch out for cracked sidewalks, curbs, changes in the height of the pavement, exposed tree roots, and debris such as fallen leaves or branches. Where can you learn more? Go to https://ViajalapesathyaET Watereb.healthFriends Around. org and sign in to your [employer] account. Enter L335 in the [facility] box to learn more about \"Preventing Outdoor Falls: Care Instructions. \"     If you do not have an account, please click on the \"Sign Up Now\" link. Current as of: April 15, 2020               Content Version: 12.6  © 5749-5686 [employer]. Care instructions adapted under license by [facility] ([facility]). If you have questions about a medical condition or this instruction, always ask your healthcare professional. [address] any warranty or liability for your use of this information.          Patient Education        How to Get Up Safely After a Fall: Care Instructions  Your Care Instructions     If you have injuries, health problems, or other reasons that may make it easy for you to fall at home, it is a good idea to learn how to get up safely after a fall. Learning how to get up correctly can help you avoid making an injury worse. Also, knowing what to do if you cannot get up can help you stay safe until help arrives. Follow-up care is a key part of your treatment and safety. Be sure to make and go to all appointments, and call your doctor if you are having problems. It's also a good idea to know your test results and keep a list of the medicines you take. How can you care for yourself after a fall? If you think you can get up  First lie still for a few minutes and think about how you feel. If your body feels okay and you think you can get up safely, follow the rest of the steps below:  1. Look for a chair or other piece of furniture that is close to you. 2. Roll onto your side and rest. Roll by turning your head in the direction you want to roll, move your shoulder and arm, then hip and leg in the same direction. 3. Lie still for a moment to let your blood pressure adjust.  4. Slowly push your upper body up, lift your head, and take a moment to rest.  5. Slowly get up on your hands and knees, and crawl to the chair or other stable piece of furniture. 6. Put your hands on the chair. 7. Move one foot forward, and place it flat on the floor. Your other leg should be bent with the knee on the floor. 8. Rise slowly, turn your body, and sit in the chair. Stay seated for a bit and think about how you feel. Call for help. Even if you feel okay, let someone know what happened to you. You might not know that you have a serious injury. If you cannot get up  1. If you think you are injured after a fall or you cannot get up, try not to panic. 2. Call out for help.   3. If you have a phone within reach or you have an emergency call device, use it to call for help.  4. If you do not have a phone within reach, try to slide yourself toward it. If you cannot get to the phone, try to slide toward a door or window or a place where you think you can be heard. 5. Mason or use an object to make noise so someone might hear you. 6. If you can reach something that you can use for a pillow, place it under your head. Try to stay warm by covering yourself with a blanket or clothing while you wait for help. When should you call for help? Call 911 anytime you think you may need emergency care. For example, call if:    · You passed out (lost consciousness).     · You cannot get up after a fall.     · You have severe pain. Call your doctor now or seek immediate medical care if:    · You have new or worse pain.     · You are dizzy or lightheaded.     · You hit your head. Watch closely for changes in your health, and be sure to contact your doctor if:    · You do not get better as expected. Where can you learn more? Go to https://Javelin.LittleLives. org and sign in to your [employer] account. Enter V808 in the [facility] box to learn more about \"How to Get Up Safely After a Fall: Care Instructions. \"     If you do not have an account, please click on the \"Sign Up Now\" link. Current as of: April 15, 2020               Content Version: 12.6  © 8225-0189 [employer]. Care instructions adapted under license by [facility] ([facility]). If you have questions about a medical condition or this instruction, always ask your healthcare professional. [address] any warranty or liability for your use of this information.

## 2020-11-20 NOTE — ASSESSMENT & PLAN NOTE
Patient continues with PT/OT with improvement of transient right sided weakness worse than his usual baseline.

## 2020-11-20 NOTE — ASSESSMENT & PLAN NOTE
Patient to finish full course of PT/OT as recommended by therapist.  He was instructed to keep regular follow-up with neurology service as scheduled. I stressed with him and wife the importance of tight risk factor management including tight blood glucose control, tight blood pressure control, and tight lipid management.

## 2020-11-20 NOTE — PROGRESS NOTES
Subjective:      Patient ID: Jennifer Flynn is a 72 y.o. male who presents today for:  Chief Complaint   Patient presents with    Diabetes     Patient presents today for chronic disease check.  Hypertension       HPI     Patient presents for chronic diabetes, hypertension, CAD, and obesity management visit. He remains established with neurology due to his known history of CVA with subsequent right-sided muscle weakness, dysarthria, and unsteady gait. He remains in physical therapy for continued rehab owing to recent suspected TIA on 10/26/2020. He denies any falls since his most recent visit. He reports continued improvement in right-sided weakness which is steadily approaching his usual chronic baseline. He remains ambulatory with use of his walker and reports a mild fall last week where he scraped his left leg. He denies any problems with chewing or swallowing his meals or choking/coughing on foods or beverages. He still denies any headaches, vision changes, or facial droop. There is still reported word finding difficulty but no reported slurred speech or difficulty with speaking. Patient reports taking medications as prescribed since the most recent visit. They deny adhering to any specific lower carbohydrate or lower salt diet. They deny any routine exercise outside of normal day-to-day activity. They deny any polyuria or polydipsia, new onset vision changes, or new onset paresthesias. Patient denies any chest pain, dyspnea, palpitations, lightheadedness, dizziness, worsening lower extremity edema, or syncope. Patient denies any dyspnea at rest, persistent wheezing, worsening cough, chest tightness, or limitation in normal day-to-day activity due to breathing.     Past Medical History:   Diagnosis Date    Acute renal failure ([address]     Anxiety     CAD S/P percutaneous coronary angioplasty 3/11/2014    Cardiomyopathy [facility])     Cerebrovascular accident (CVA) due to occlusion of left middle cerebral artery ([address] 08/2016    brainstem infarction from left MCA occlusion    Cerebrovascular disease 07/27/2016    Coronary angioplasty status     CVA (cerebral vascular accident) ([address] 11/12/2017    Dependent edema 9/14/2017    Diplopia 5/15/2017    Resolved with management of cataracts    Dupuytren contracture 1/2/2018    Dysphagia 8/18/2011    Dysphonia 8/18/2011    Essential hypertension 8/17/2016    Gallop rhythm     Gout 12/10/2016    Gout of big toe 08/2014    Right Great Toe    H/O fall     Headache     migraines    Heart failure ([address]     History of 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    History of heart failure 1/6/2014    History of myocardial infarction 3/11/2014    History of recurrent pneumonia 2/11/2014    Hx of bacterial pneumonia     Hypotension     Left carotid artery stenosis 8/23/2020    Left carotid artery stenosis 8/23/2020    Leg swelling     Macrocytosis without anemia 12/10/2016    Microalbuminuria due to type 2 diabetes mellitus ([address] 9/14/2018    Morbid obesity ([address] 1/2/2014    Myocardial infarction ([address]     Obesity     AYLIN (obstructive sleep apnea) 12/10/2016    Osteoarthritis     Right-sided muscle weakness 10/19/2016    S/P cardiac catheterization     Skin cyst 4/8/2016    Spasm 11/9/2016    Stented coronary artery     Tremor of right hand 5/15/2017    Type 2 diabetes mellitus with diabetic polyneuropathy, without long-term current use of insulin ([address]     Unsteady gait 5/15/2017    Weakness     Weight gain      Past Surgical History:   Procedure Laterality Date    CHOLECYSTECTOMY      COLONOSCOPY  01/15/2010    polyp, diverticulosis (DR ELAINE)    CORONARY ANGIOPLASTY WITH STENT PLACEMENT  2014    CYST REMOVAL  05/16/16    DR Ekaterina Choi CYST    SHOULDER SURGERY Right 12/18/2015     Family History   Problem Relation Age of Onset    Breast Cancer Mother     Stroke Father     Colon Cancer Father 76     Social History     Socioeconomic History    Marital status:      Spouse name: Grady Davis Number of children: 3    Years of education: 12+    Highest education level: Not on file   Occupational History    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     Employer: [address] Financial resource strain: Not hard at all   [employer] insecurity     Worry: Never true     Inability: Never true   [employer] needs     Medical: No     Non-medical: No   Tobacco Use    Smoking status: Former Smoker     Packs/day: 2.50     Years: 30.00     Pack years: 75.00     Last attempt to quit:      Years since quittin.8    Smokeless tobacco: Never Used   Substance and Sexual Activity    Alcohol use: Yes     Alcohol/week: 0.0 standard drinks     Comment: limits less than 10/wk     Drug use: No    Sexual activity: Yes     Partners: Female   Lifestyle    Physical activity     Days per week: 0 days     Minutes per session: 0 min    Stress:  Only a little   Relationships    Social connections     Talks on phone: More than three times a week     Gets together: More than three times a week     Attends [employer] service: 1 to 4 times per year     Active member of club or organization: Yes     Attends meetings of clubs or organizations: More than 4 times per year     Relationship status:     Intimate partner violence     Fear of current or ex partner: No     Emotionally abused: No     Physically abused: No     Forced sexual activity: No   Other Topics Concern    Not on file   Social History Narrative    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 from [place] after 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    Lives With: Spouse    Type of Home: House    Home Layout: Two level, Bed/Bath upstairs, Laundry in basement(Railing)    Home Access: Stairs to enter with rails    Entrance Stairs - Number of Steps: 3 in back, 4 in front    Bathroom Shower/Tub: Take 81 mg by mouth daily      Probiotic Product (PROBIOTIC DAILY PO) Take 1 tablet by mouth daily      Ascorbic Acid (VITAMIN C) 250 MG tablet Take 250 mg by mouth daily      vitamin B-12 (CYANOCOBALAMIN) 100 MCG tablet Take 50 mcg by mouth daily      lidocaine (LIDODERM) 5 % Place 1 patch onto the skin every 12 hours       ammonium lactate (AMLACTIN) 12 % cream APPLY TO DRY CALLUS AREAS 1 TO 2 TIMES DAILY  5    Misc. Devices (COMMODE BEDSIDE) MISC Use daily as needed 1 each 0    MULTIPLE VITAMIN PO Take 1 tablet by mouth daily        No current facility-administered medications on file prior to visit. Allergies: Bactrim [sulfamethoxazole-trimethoprim]    Review of Systems   Constitutional: Negative for appetite change, chills, diaphoresis, fatigue, fever and unexpected weight change. Eyes: Negative for visual disturbance. Respiratory: Negative for cough, chest tightness, shortness of breath and wheezing. Cardiovascular: Negative for chest pain, palpitations and leg swelling. No orthopnea, No PND   Gastrointestinal: Negative for abdominal pain, anal bleeding, blood in stool, constipation, diarrhea, nausea and vomiting. No heartburn, No melena   Endocrine: Negative for cold intolerance, heat intolerance, polydipsia, polyphagia and polyuria. Genitourinary: Negative for dysuria and hematuria. Musculoskeletal: Negative for myalgias. Skin: Negative for rash. Neurological: Positive for weakness (right sided - upper and lower ext) and numbness (chronic, bilateral feet/toes). Negative for dizziness, syncope, light-headedness and headaches. Psychiatric/Behavioral: Negative for dysphoric mood. The patient is not nervous/anxious.         Objective:     /72 (Site: Right Upper Arm, Position: Sitting, Cuff Size: Large Adult)   Pulse 100   Temp 98.5 °F (36.9 °C) (Temporal)   Resp 18   Ht 6' 1\" (1.854 m)   Wt 276 lb (125.2 kg)   SpO2 95%   BMI 36.41 kg/m²     Physical Exam  Vitals signs reviewed. Constitutional:       General: He is not in acute distress. Appearance: He is well-developed. Neck:      Musculoskeletal: Neck supple. Thyroid: No thyromegaly. Vascular: No carotid bruit. Cardiovascular:      Rate and Rhythm: Normal rate and regular rhythm. Pulses:           Dorsalis pedis pulses are 2+ on the right side and 2+ on the left side. Posterior tibial pulses are 2+ on the right side and 2+ on the left side. Heart sounds: Normal heart sounds. No murmur. Pulmonary:      Effort: Pulmonary effort is normal. No respiratory distress. Breath sounds: Normal breath sounds. No wheezing. Abdominal:      General: Bowel sounds are normal. There is no distension. Palpations: Abdomen is soft. Tenderness: There is no abdominal tenderness. There is no right CVA tenderness, left CVA tenderness, guarding or rebound. Musculoskeletal: Normal range of motion. Right lower leg: No edema. Left lower leg: No edema. Feet:    Feet:      Right foot:      Protective Sensation: 10 sites tested. 7 sites sensed. Skin integrity: Callus and dry skin present. No ulcer, blister, skin breakdown, erythema or warmth. Left foot:      Protective Sensation: 10 sites tested. 7 sites sensed. Skin integrity: Callus and dry skin present. No ulcer, blister, skin breakdown, erythema or warmth. Lymphadenopathy:      Cervical: No cervical adenopathy. Skin:     General: Skin is warm. Findings: No rash. Neurological:      Mental Status: He is alert and oriented to person, place, and time. Sensory: Sensory deficit present.       Comments: Foot monofilament testing POSITIVE for neuropathy bilaterally   Psychiatric:         Mood and Affect: Mood normal.         Behavior: Behavior normal.         Ortho Exam (If Applicable)      Assessment & Plan:      Problem List Items Addressed This Visit        Circulatory    TIA (transient ischemic attack)     Patient continues with PT/OT with improvement of transient right sided weakness worse than his usual baseline. CAD S/P percutaneous coronary angioplasty (Chronic)     We reviewed the importance of tight blood glucose, lipid, and blood pressure control long term. Continue current medication         Relevant Orders    Comprehensive Metabolic Panel    Essential hypertension (Chronic)     Blood pressure within normal limits today in the office on repeat testing. Continue current medication         Relevant Orders    Comprehensive Metabolic Panel       Endocrine    Type 2 diabetes mellitus with diabetic polyneuropathy, without long-term current use of insulin (HCC) - Primary (Chronic)     Most recent A1c at goal control based on age of 72 years. Continue current medication         Relevant Orders    Hemoglobin A1C    Comprehensive Metabolic Panel    HM DIABETES FOOT EXAM (Completed)    Microalbuminuria due to type 2 diabetes mellitus (HCC) (Chronic)     Will follow urine testing over time. We reviewed the importance of tight blood glucose, lipid, and blood pressure control long term. Relevant Orders    Hemoglobin A1C    Comprehensive Metabolic Panel       Other    Morbid obesity ([facility]     Patient counseled on healthy dietary choices and the benefits of a lower salt and/or lower carbohydrate diet as appropriate. Patient also counseled on benefits of moderate intensity cardiovascular exercise for 20-30 minutes at least 3-5 days a week. Advice was given to make small changes over time, setting smaller achievable goals until recommended lifestyle changes are reached. History of CVA (cerebrovascular accident) (Chronic)     Patient to finish full course of PT/OT as recommended by therapist.  He was instructed to keep regular follow-up with neurology service as scheduled.   I stressed with him and wife the importance of tight risk factor management including tight blood glucose control, tight blood pressure control, and tight lipid management. Right-sided muscle weakness (Chronic)     Reported continued improvement as result of PT/OT. We will continue to follow over time. Patient was instructed to keep his follow-up visits with neurology as scheduled. Unsteady gait (Chronic)     Patient instructed to finish full course of PT/OT as recommended by therapists. He has chronic follow-up in place with neurology for management status post CVA and recent TIA. Return in about 4 months (around 3/20/2021) for Chronic Disease Check.     Paige Hensley MD

## 2020-11-20 NOTE — ASSESSMENT & PLAN NOTE
Patient instructed to finish full course of PT/OT as recommended by therapists. He has chronic follow-up in place with neurology for management status post CVA and recent TIA.

## 2020-11-20 NOTE — ASSESSMENT & PLAN NOTE
Reported continued improvement as result of PT/OT. We will continue to follow over time. Patient was instructed to keep his follow-up visits with neurology as scheduled.

## 2020-11-20 NOTE — ASSESSMENT & PLAN NOTE
We reviewed the importance of tight blood glucose, lipid, and blood pressure control long term.   Continue current medication

## 2020-11-23 ENCOUNTER — HOSPITAL ENCOUNTER (OUTPATIENT)
Dept: PHYSICAL THERAPY | Age: 65
Setting detail: THERAPIES SERIES
Discharge: HOME | End: 2020-11-23
Payer: MEDICARE

## 2020-11-30 ENCOUNTER — HOSPITAL ENCOUNTER (OUTPATIENT)
Dept: PHYSICAL THERAPY | Age: 65
Setting detail: THERAPIES SERIES
Discharge: HOME | End: 2020-11-30
Payer: MEDICARE

## 2020-11-30 NOTE — PROGRESS NOTES
[address]       Physical Therapy  Cancellation/No-show Note  Patient Name:  Boogie Robbins  :  1955   Date:  2020  Referring Practitioner: Meghan Tucker MD  Diagnosis: Unsteady gait    Visit Information:  PT Visit Information  PT Insurance Information: [employer] Medicare  Total # of Visits Approved: (unlimited visits, BMN)  Total # of Visits to Date: 3(corrected)  Plan of Care/Certification Expiration Date: 21  No Show: 0  Progress Note Due Date: 20  Canceled Appointment: 1  Progress Note Counter: 3/6-8 (PN due 20), cxl 2020    For today's appointment patient:  [x]  Cancelled  []  Rescheduled appointment  []  No-show   []  Called pt to remind of next appointment     Reason given by patient:  []  Patient ill  []  Conflicting appointment  []  No transportation    []  Conflict with work  []  No reason given  [x]  Other: Pt wants to be on Hold d/t Covid 19       Comments:       Signature: Electronically signed by Greg Murguia PTA on 20 at 9:16 AM EST

## 2020-12-17 ENCOUNTER — OFFICE VISIT (OUTPATIENT)
Dept: NEUROLOGY | Age: 65
End: 2020-12-17
Payer: MEDICARE

## 2020-12-17 VITALS
BODY MASS INDEX: 36.15 KG/M2 | HEART RATE: 98 BPM | WEIGHT: 274 LBS | SYSTOLIC BLOOD PRESSURE: 144 MMHG | DIASTOLIC BLOOD PRESSURE: 77 MMHG

## 2020-12-17 PROCEDURE — 99214 OFFICE O/P EST MOD 30 MIN: CPT | Performed by: NURSE PRACTITIONER

## 2020-12-17 ASSESSMENT — ENCOUNTER SYMPTOMS
CHEST TIGHTNESS: 0
PHOTOPHOBIA: 1
VOMITING: 0
NAUSEA: 0
WHEEZING: 0
COUGH: 0
TROUBLE SWALLOWING: 0
COLOR CHANGE: 0
SHORTNESS OF BREATH: 0

## 2020-12-17 NOTE — PROGRESS NOTES
Subjective:      Patient ID: Dwight Rodarte is a 72 y.o. male who presents today for:  Chief Complaint   Patient presents with    Follow-Up from Hospital     Pt states he had 3rd stroke in October. He states that he was at [employer] for about 45 min last week. He is doing excersises at home to help him get strength back. He did got to PT 3 times and then we went purple and he did not feel comfortable going there anymore so he stopped. Right foot has drop foot and he is trying to get it it better. HPI  Pt seen and examined in the office for hospital follow up. Pt was admitted to [facility] from 10/26/2020 to 10/28/2020 for TIA with history of CVA. Hospital records reviewed. Patient had presented to the emergency room on 10/26/2020 with right extremity weakness and headache. Patient was not a candidate for TPA given his timing. He was already on aspirin and Plavix at the time of presentation. He was initiated on heparin drip. CT angiogram was done and was without any evidence of MCA occlusion. Patient with also known history of cardiomyopathy. RI did not show any evidence of acute CVA. Echocardiogram was done with ejection fraction of 60% and no evidence of significant valvular abnormalities. No bubble study was done. LDL was 30. Patient was continued on dual antiplatelet therapy for TIA. Symptoms improved and patient was back to baseline prior to discharge. He does have history of right-sided weakness secondary to old CVA. Patient currently alert and oriented x3, no acute distress, cooperative. He is accompanied to today's appointment by his wife. Patient reports overall he is doing well. He was participating physical therapy after hospital discharge but has subsequently stopped this due to Covid and fear of contacting this virus at outpatient physical therapy. He reports no new focal deficits. No seizure activity. He is compliant with daily dual antiplatelet therapy and statin therapy without any unusual signs and symptoms of bleeding or bruising or myalgias. He voices no new or acute neuro complaints or concerns today.   Past Medical History:   Diagnosis Date    Acute renal failure ([address]     Anxiety     CAD S/P percutaneous coronary angioplasty 3/11/2014    Cardiomyopathy [facility])     Cerebrovascular accident (CVA) due to occlusion of left middle cerebral artery ([address] 08/2016    brainstem infarction from left MCA occlusion    Cerebrovascular disease 07/27/2016    Coronary angioplasty status     CVA (cerebral vascular accident) ([address] 11/12/2017    Dependent edema 9/14/2017    Diplopia 5/15/2017    Resolved with management of cataracts    Dupuytren contracture 1/2/2018    Dysphagia 8/18/2011    Dysphonia 8/18/2011    Essential hypertension 8/17/2016    Gallop rhythm     Gout 12/10/2016    Gout of big toe 08/2014    Right Great Toe    H/O fall     Headache     migraines    Heart failure ([address]     History of 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    History of heart failure 1/6/2014    History of myocardial infarction 3/11/2014    History of recurrent pneumonia 2/11/2014    Hx of bacterial pneumonia     Hypotension     Left carotid artery stenosis 8/23/2020    Left carotid artery stenosis 8/23/2020    Leg swelling     Macrocytosis without anemia 12/10/2016  Microalbuminuria due to type 2 diabetes mellitus ([facility] 2018    Morbid obesity ([facility] 2014    Myocardial infarction ([facility]     Obesity     AYLIN (obstructive sleep apnea) 12/10/2016    Osteoarthritis     Right-sided muscle weakness 10/19/2016    S/P cardiac catheterization     Skin cyst 2016    Spasm 2016    Stented coronary artery     Tremor of right hand 5/15/2017    Type 2 diabetes mellitus with diabetic polyneuropathy, without long-term current use of insulin ([facility])     Unsteady gait 5/15/2017    Weakness     Weight gain      Past Surgical History:   Procedure Laterality Date    CHOLECYSTECTOMY      COLONOSCOPY  01/15/2010    polyp, diverticulosis (DR ELAINE)    CORONARY ANGIOPLASTY WITH STENT PLACEMENT  2014    CYST REMOVAL  16    DR Lesli Wilkinson CYST    SHOULDER SURGERY Right 2015     Social History     Socioeconomic History    Marital status:      Spouse name: Linda Walters Number of children: 3    Years of education: 12+    Highest education level: Not on file   Occupational History    Occupation: medical retired   [employer]     Employer: [address] Financial resource strain: Not hard at all   [employer] insecurity     Worry: Never true     Inability: Never true    Transportation needs     Medical: No     Non-medical: No   Tobacco Use    Smoking status: Former Smoker     Packs/day: 2.50     Years: 30.00     Pack years: 75.00     Quit date:      Years since quittin.9    Smokeless tobacco: Never Used   Substance and Sexual Activity    Alcohol use: Yes     Alcohol/week: 0.0 standard drinks     Comment: limits less than 10/wk     Drug use: No    Sexual activity: Yes     Partners: Female   Lifestyle    Physical activity     Days per week: 0 days     Minutes per session: 0 min    Stress:  Only a little   Relationships    Social connections     Talks on phone: More than three times a week Eyes: Positive for photophobia. Negative for visual disturbance. Respiratory: Negative for cough, chest tightness, shortness of breath and wheezing. Cardiovascular: Negative for chest pain, palpitations and leg swelling. Gastrointestinal: Negative for nausea and vomiting. Musculoskeletal: Positive for gait problem. Skin: Negative for color change and rash. Neurological: Positive for weakness. Negative for dizziness, tremors, seizures, syncope, facial asymmetry, speech difficulty, light-headedness, numbness and headaches. Psychiatric/Behavioral: Negative for agitation, confusion and hallucinations. The patient is not nervous/anxious. Objective:   BP (!) 144/77 (Site: Right Upper Arm, Position: Sitting, Cuff Size: Large Adult)   Pulse 98   Wt 274 lb (124.3 kg)   BMI 36.15 kg/m²     Physical Exam  Vitals signs reviewed. Constitutional:       General: He is not in acute distress. Appearance: He is not ill-appearing or diaphoretic. HENT:      Head: Normocephalic and atraumatic. Eyes:      General: No visual field deficit. Extraocular Movements: Extraocular movements intact. Pupils: Pupils are equal, round, and reactive to light. Cardiovascular:      Rate and Rhythm: Normal rate and regular rhythm. Pulmonary:      Effort: Pulmonary effort is normal. No respiratory distress. Breath sounds: Normal breath sounds. Abdominal:      General: Bowel sounds are normal.      Palpations: Abdomen is soft. Skin:     General: Skin is warm and dry. Neurological:      Mental Status: He is alert and oriented to person, place, and time. Cranial Nerves: No cranial nerve deficit, dysarthria or facial asymmetry. Motor: Weakness present. No tremor, seizure activity or pronator drift. Coordination: Finger-Nose-Finger Test normal.      Gait: Gait abnormal.         No results found.     Lab Results   Component Value Date    WBC 4.6 10/27/2020    RBC 3.53 10/27/2020 HGB 13.1 10/27/2020    HCT 38.1 10/27/2020    .7 10/27/2020    MCH 37.0 10/27/2020    MCHC 34.4 10/27/2020    RDW 13.5 10/27/2020     10/28/2020    MPV 8.4 09/25/2015     Lab Results   Component Value Date     10/28/2020    K 3.7 10/28/2020    K 3.2 10/27/2020    CL 99 10/28/2020    CO2 26 10/28/2020    BUN 6 10/28/2020    CREATININE 0.80 10/28/2020    GFRAA >60.0 10/28/2020    LABGLOM >60.0 10/28/2020    GLUCOSE 131 10/28/2020    PROT 5.9 10/27/2020    LABALBU 3.6 10/27/2020    LABALBU DETECTED 10/27/2011    CALCIUM 9.1 10/28/2020    BILITOT 0.7 10/27/2020    ALKPHOS 58 10/27/2020    AST 23 10/27/2020    ALT 21 10/27/2020     Lab Results   Component Value Date    PROTIME 12.2 10/26/2020    PROTIME 12.4 11/12/2017    INR 0.9 10/26/2020     Lab Results   Component Value Date    TSH 4.080 11/12/2017    KBFYVPMN04 685 11/12/2017    FOLATE 11.8 11/12/2017     Lab Results   Component Value Date    TRIG 126 10/27/2020    HDL 40 10/27/2020    LDLCALC 30 10/27/2020     Lab Results   Component Value Date    LABAMPH Neg 10/26/2020    BARBSCNU Neg 10/26/2020    LABBENZ Neg 10/26/2020    LABMETH Neg 10/26/2020    OPIATESCREENURINE Neg 10/26/2020    PHENCYCLIDINESCREENURINE Neg 10/26/2020    ETOH 210 10/26/2020     No results found for: LITHIUM, DILFRTOT, VALPROATE    Assessment and Plan:      1. History of CVA (cerebrovascular accident)  2. Late effects of CVA (cerebrovascular accident)  3. Ataxic gait  4.  Hospital discharge follow-up -Patient admitted to [facility] on 10/26/2024 headache, increasing right-sided weakness and speech difficulties. MRI of the brain was negative for any acute CVA or findings. CTA of the head neck negative for any significant stenosis, occlusion or aneurysm. Patient was on dual antiplatelet therapy at the time of presentation. He was briefly placed on heparin drip. This was discontinued and his symptoms subsequently improved during his stay. Likely patient with TIA. We will continue dual antiplatelet therapy and statin at this time. Patient will need attention to risk factors. He will resume physical therapy once concern for contacting Covid is less. Return in about 6 months (around 6/30/2021), or if symptoms worsen or fail to improve.     Tracy Thomson, NEREYDA - CNP

## 2021-01-28 ENCOUNTER — CLINICAL DOCUMENTATION (OUTPATIENT)
Dept: PHYSICAL THERAPY | Age: 66
End: 2021-01-28

## 2021-03-09 ENCOUNTER — CLINICAL DOCUMENTATION (OUTPATIENT)
Dept: PHYSICAL THERAPY | Age: 66
End: 2021-03-09

## 2021-03-09 NOTE — PROGRESS NOTES
[facility]    [x]  [address] []  [address]     [address]    [address]  Ph: [phone]     Ph: [phone]  Fax: [phone]     Fax: [phone]    [] Certification  [] Recertification []  Plan of Care  [] Progress Note [x] Discharge    Date: 3/9/2021  Patient Name: Collins Rodriguez  : 1955  MRN: [id number]    To:   Dr. Vesta Rosas    From: Jimmy West PT     [x]   Patient has not been seen in PT since 20 and has not responded    to attempts to contact. Patient will be discharged. [x]   Patient was previously on hold since 20 and is now appropriate    for discharge. Please see last POC/ Progress Report for last measured    functional status. Comments:  Pt requested hold due to Jt Kim concerns. Will need new Rx if/ when appropriate to return to PT. Thank you for your referral and the opportunity to treat this patient. Please contact us with any questions or concerns.     Electronically signed by Jimmy West PT on 3/9/2021 at 12:40 PM

## 2021-04-30 ENCOUNTER — OFFICE VISIT (OUTPATIENT)
Dept: FAMILY MEDICINE CLINIC | Age: 66
End: 2021-04-30
Payer: MEDICARE

## 2021-04-30 VITALS
RESPIRATION RATE: 18 BRPM | HEART RATE: 100 BPM | WEIGHT: 274.2 LBS | TEMPERATURE: 97.3 F | HEIGHT: 73 IN | SYSTOLIC BLOOD PRESSURE: 130 MMHG | BODY MASS INDEX: 36.34 KG/M2 | OXYGEN SATURATION: 94 % | DIASTOLIC BLOOD PRESSURE: 72 MMHG

## 2021-04-30 DIAGNOSIS — Z12.11 SCREENING FOR COLON CANCER: ICD-10-CM

## 2021-04-30 DIAGNOSIS — E78.2 HYPERLIPIDEMIA, MIXED: ICD-10-CM

## 2021-04-30 DIAGNOSIS — H91.92 HEARING LOSS OF LEFT EAR, UNSPECIFIED HEARING LOSS TYPE: ICD-10-CM

## 2021-04-30 DIAGNOSIS — I25.10 CAD S/P PERCUTANEOUS CORONARY ANGIOPLASTY: Chronic | ICD-10-CM

## 2021-04-30 DIAGNOSIS — R80.9 MICROALBUMINURIA DUE TO TYPE 2 DIABETES MELLITUS (HCC): Chronic | ICD-10-CM

## 2021-04-30 DIAGNOSIS — E66.01 MORBID OBESITY (HCC): ICD-10-CM

## 2021-04-30 DIAGNOSIS — H93.8X2 SENSATION OF FULLNESS IN EAR, LEFT: ICD-10-CM

## 2021-04-30 DIAGNOSIS — E11.29 MICROALBUMINURIA DUE TO TYPE 2 DIABETES MELLITUS (HCC): Chronic | ICD-10-CM

## 2021-04-30 DIAGNOSIS — I10 ESSENTIAL HYPERTENSION: Chronic | ICD-10-CM

## 2021-04-30 DIAGNOSIS — H61.92 SKIN LESION OF LEFT EXTERNAL EAR: ICD-10-CM

## 2021-04-30 DIAGNOSIS — Z86.010 HISTORY OF COLONIC POLYPS: ICD-10-CM

## 2021-04-30 DIAGNOSIS — E11.42 TYPE 2 DIABETES MELLITUS WITH DIABETIC POLYNEUROPATHY, WITHOUT LONG-TERM CURRENT USE OF INSULIN (HCC): Primary | ICD-10-CM

## 2021-04-30 DIAGNOSIS — Z98.61 CAD S/P PERCUTANEOUS CORONARY ANGIOPLASTY: Chronic | ICD-10-CM

## 2021-04-30 DIAGNOSIS — I73.9 PAD (PERIPHERAL ARTERY DISEASE) (HCC): ICD-10-CM

## 2021-04-30 DIAGNOSIS — H61.23 BILATERAL IMPACTED CERUMEN: ICD-10-CM

## 2021-04-30 DIAGNOSIS — D75.89 MACROCYTOSIS WITHOUT ANEMIA: Chronic | ICD-10-CM

## 2021-04-30 PROBLEM — Z86.0100 HISTORY OF COLONIC POLYPS: Status: ACTIVE | Noted: 2021-04-30

## 2021-04-30 LAB — HBA1C MFR BLD: 7.5 %

## 2021-04-30 PROCEDURE — 99214 OFFICE O/P EST MOD 30 MIN: CPT | Performed by: FAMILY MEDICINE

## 2021-04-30 PROCEDURE — 83036 HEMOGLOBIN GLYCOSYLATED A1C: CPT | Performed by: FAMILY MEDICINE

## 2021-04-30 PROCEDURE — 3051F HG A1C>EQUAL 7.0%<8.0%: CPT | Performed by: FAMILY MEDICINE

## 2021-04-30 ASSESSMENT — ENCOUNTER SYMPTOMS
BLOOD IN STOOL: 0
CONSTIPATION: 0
WHEEZING: 0
SINUS PRESSURE: 0
COUGH: 0
SHORTNESS OF BREATH: 0
DIARRHEA: 0
ABDOMINAL PAIN: 0
VOMITING: 0
NAUSEA: 0
CHEST TIGHTNESS: 0
SORE THROAT: 0
SINUS PAIN: 0
ANAL BLEEDING: 0
RHINORRHEA: 0

## 2021-04-30 ASSESSMENT — PATIENT HEALTH QUESTIONNAIRE - PHQ9
SUM OF ALL RESPONSES TO PHQ QUESTIONS 1-9: 0
2. FEELING DOWN, DEPRESSED OR HOPELESS: 0
1. LITTLE INTEREST OR PLEASURE IN DOING THINGS: 0
SUM OF ALL RESPONSES TO PHQ QUESTIONS 1-9: 0
SUM OF ALL RESPONSES TO PHQ QUESTIONS 1-9: 0
SUM OF ALL RESPONSES TO PHQ9 QUESTIONS 1 & 2: 0

## 2021-04-30 NOTE — PROGRESS NOTES
Meenakshi Diaz (: 1955) is a 77 y.o. male, Established patient, who presents today for:    Chief Complaint   Patient presents with    Diabetes     Patient presents today for a chronic disease check     Skin Problem     Pt has a bump on inside of left ear x2 months, no pain on area. Would also like both ears looked at for wax. ASSESSMENT/PLAN    1. Type 2 diabetes mellitus with diabetic polyneuropathy, without long-term current use of insulin ([facility])  Assessment & Plan:  Borderline elevated A1c today in the office. Patient encouraged to double down on his efforts to eat a lower carbohydrate diet and remain active at home. He will continue with his current medication and we will recheck testing in 3 months to reassess. Orders:  -     POCT glycosylated hemoglobin (Hb A1C)  -     Comprehensive Metabolic Panel; Future  -     Lipid Panel; Future  2. Microalbuminuria due to type 2 diabetes mellitus ([facility] [address]  -     Comprehensive Metabolic Panel; Future  3. Essential hypertension  -     CBC Auto Differential; Future  -     Comprehensive Metabolic Panel; Future  4. Hyperlipidemia, mixed  -     Comprehensive Metabolic Panel; Future  -     Lipid Panel; Future  5. CAD S/P percutaneous coronary angioplasty  -     CBC Auto Differential; Future  -     Comprehensive Metabolic Panel; Future  -     Lipid Panel; Future  6. PAD (peripheral artery disease) ([facility])  -     Comprehensive Metabolic Panel; Future  -     Lipid Panel; Future  7. Macrocytosis without anemia  -     CBC Auto Differential; Future  8. Morbid obesity ([facility] [address]  Assessment & Plan:  Patient counseled on healthy dietary choices and the benefits of a lower salt and/or lower carbohydrate diet as appropriate. Patient also counseled on benefits of moderate intensity cardiovascular exercise for 20-30 minutes at least 3-5 days a week. Advice was given to make small changes over time, setting smaller achievable goals until recommended lifestyle changes are reached. 9. Skin lesion of left external ear  Assessment & Plan:  Potential actinic keratosis vs condyloma based on appearance on exam.  Patient referred to dermatology for further definitive management   Orders:  -     Ambulatory referral to Dermatology  10. Hearing loss of left ear, unspecified hearing loss type  -     AFL - Paul Rashid MD, Otolaryngology, [facility]  11. Sensation of fullness in ear, left  Comments:  Persistent despite cerumen removal in the office. Consider eustachian tube dysfunction. Patient referred to ENT for further evaluation  Orders:  -     Victoriano Ng MD, Otolaryngology, [facility]  12. Bilateral impacted cerumen  Comments:  Successful irrigation of impacted cerumen completed in office with water and hydrogen peroxide. Orders:  -     EAR CERUMEN REMOVAL  13. History of colonic polyps  -     [address] Bina Lowery MD, Gastroenterology, [place]  14. Screening for colon cancer  -     [address] Bina Lowery MD, Gastroenterology, [facility]          Return in 3 months (on 7/30/2021) for Annual Wellness Visit in 3 Months. SUBJECTIVE/OBJECTIVE:    HPI    Patient presents for chronic diabetes, hypertension, hyperlipidemia, CAD, and PAD follow-up. Patient reports taking medications as prescribed since the most recent visit. They deny adhering to any specific lower carbohydrate or lower salt diet. They deny any routine exercise outside of normal day-to-day activity. They deny any polyuria or polydipsia, new onset vision changes, or new onset paresthesias. Patient denies any chest pain, dyspnea, palpitations, lightheadedness, dizziness, worsening lower extremity edema, or syncope. Main concern reported today is left ear fullness and decreased hearing with tinnitus for the past week. No reported ear pain or drainage. No reported fever/chills/sweats, nasal congestion, postnasal drainage, or sore throat.   Patient reports that he has felt a small bump/nodule near the ear canal and is not sure if this is related. There is no reported injury to the external ear or recent exposure to loud noises. Current Outpatient Medications on File Prior to Visit   Medication Sig Dispense Refill    torsemide (DEMADEX) 20 MG tablet Take 1 tablet by mouth daily 90 tablet 1    metFORMIN (GLUCOPHAGE) 500 MG tablet Take 1 tablet by mouth 2 times daily (with meals) 180 tablet 1    glimepiride (AMARYL) 1 MG tablet Take 1 tablet by mouth every morning (before breakfast) 90 tablet 1    folic acid (FOLVITE) 1 MG tablet Take 1 tablet by mouth daily 30 tablet 3    vitamin B-1 100 MG tablet Take 1 tablet by mouth daily 30 tablet 3    atorvastatin (LIPITOR) 40 MG tablet Take 1 tablet by mouth daily 90 tablet 1    clopidogrel (PLAVIX) 75 MG tablet Take 1 tablet by mouth daily 90 tablet 3    carvedilol (COREG) 25 MG tablet Take 1 tablet by mouth 2 times daily 180 tablet 1    allopurinol (ZYLOPRIM) 300 MG tablet Take 1 tablet by mouth daily 90 tablet 3    Elastic Bandages & Supports (V-4 HIGH COMPRESSION HOSE) MISC KNEE HIGH - 20-30 mmHg 2 each 1    Alcohol Swabs PADS DX: diabetes mellitus. Test 1 time(s) daily - Ok to substitute per insurance (1 each = 1 box) 100 each 5    blood glucose monitor strips DX: diabetes mellitus. Use 1 time(s) daily - True Metrix requested by patient - [facility] to substitute per insurance 100 strip 5    Lancets MISC DX: diabetes mellitus. Use 1 time(s) daily - Ok to substitute per insurance (1 each = 1 box) 100 each 5    blood glucose monitor kit and supplies DX: diabetes mellitus.  Test 1 time(s) daily - True Metrix requested by patient - [facility] to substitute per insurance 1 kit 0    aspirin 81 MG EC tablet Take 81 mg by mouth daily      Probiotic Product (PROBIOTIC DAILY PO) Take 1 tablet by mouth daily      Ascorbic Acid (VITAMIN C) 250 MG tablet Take 250 mg by mouth daily      vitamin B-12 (CYANOCOBALAMIN) 100 MCG tablet Take 50 mcg by mouth daily      lidocaine (LIDODERM) 5 % Place 1 patch onto the skin every 12 hours       ammonium lactate (AMLACTIN) 12 % cream APPLY TO DRY CALLUS AREAS 1 TO 2 TIMES DAILY  5    Misc. Devices (COMMODE BEDSIDE) MISC Use daily as needed 1 each 0    MULTIPLE VITAMIN PO Take 1 tablet by mouth daily        No current facility-administered medications on file prior to visit. Allergies   Allergen Reactions    Bactrim [Sulfamethoxazole-Trimethoprim] Nausea And Vomiting and Other (See Comments)     Sugar count elevated, had troubles urinating        Review of Systems   Constitutional: Negative for appetite change, chills, diaphoresis, fatigue, fever and unexpected weight change. HENT: Positive for hearing loss (left ear). Negative for congestion, ear discharge, ear pain, postnasal drip, rhinorrhea, sinus pressure, sinus pain and sore throat. Eyes: Negative for visual disturbance. Respiratory: Negative for cough, chest tightness, shortness of breath and wheezing. Cardiovascular: Negative for chest pain, palpitations and leg swelling. No orthopnea, No PND   Gastrointestinal: Negative for abdominal pain, anal bleeding, blood in stool, constipation, diarrhea, nausea and vomiting. No heartburn, No melena   Endocrine: Negative for cold intolerance, heat intolerance, polydipsia, polyphagia and polyuria. Genitourinary: Negative for dysuria and hematuria. Musculoskeletal: Positive for arthralgias (left knee). Negative for myalgias. Skin: Negative for rash. Neurological: Negative for dizziness, syncope, light-headedness and headaches. Psychiatric/Behavioral: Negative for dysphoric mood. The patient is not nervous/anxious. Vitals:  /72 (Site: Right Upper Arm, Position: Sitting, Cuff Size: Large Adult)   Pulse 100   Temp 97.3 °F (36.3 °C) (Temporal)   Resp 18   Ht 6' 1\" (1.854 m)   Wt 274 lb 3.2 oz (124.4 kg)   SpO2 94%   BMI 36.18 kg/m²     Physical Exam  Vitals signs reviewed.    Constitutional: General: He is not in acute distress. Appearance: He is well-developed. HENT:      Head: Normocephalic and atraumatic. Right Ear: Tympanic membrane and ear canal normal. No middle ear effusion. There is impacted cerumen. Tympanic membrane is not erythematous. Left Ear: Tympanic membrane and ear canal normal.  No middle ear effusion. There is impacted cerumen. Tympanic membrane is not erythematous. Ears:        Comments: Impacted cerumen initially obscured view of TM's bilaterally. Following cerumen removal TM's are visualized. Neck:      Musculoskeletal: Neck supple. Thyroid: No thyromegaly. Vascular: No carotid bruit. Cardiovascular:      Rate and Rhythm: Normal rate and regular rhythm. Heart sounds: Normal heart sounds. No murmur. Pulmonary:      Effort: Pulmonary effort is normal. No respiratory distress. Breath sounds: Normal breath sounds. No wheezing. Abdominal:      General: Bowel sounds are normal. There is no distension. Palpations: Abdomen is soft. Tenderness: There is no abdominal tenderness. There is no right CVA tenderness, left CVA tenderness, guarding or rebound. Musculoskeletal: Normal range of motion. Right lower leg: No edema. Left lower leg: No edema. Lymphadenopathy:      Cervical: No cervical adenopathy. Skin:     General: Skin is warm. Findings: No rash. Neurological:      Mental Status: He is alert and oriented to person, place, and time. Psychiatric:         Mood and Affect: Mood normal.         Behavior: Behavior normal.                     Ortho Exam (If Applicable)     Results for POC orders placed in visit on 04/30/21   POCT glycosylated hemoglobin (Hb A1C)   Result Value Ref Range    Hemoglobin A1C 7.5 %              An electronic signature was used to authenticate this note.      Jr Ibanez MD

## 2021-05-05 ENCOUNTER — HOSPITAL ENCOUNTER (OUTPATIENT)
Dept: LAB | Age: 66
Discharge: HOME | End: 2021-05-05
Payer: MEDICARE

## 2021-05-05 DIAGNOSIS — I25.10 CAD S/P PERCUTANEOUS CORONARY ANGIOPLASTY: Chronic | ICD-10-CM

## 2021-05-05 DIAGNOSIS — D75.89 MACROCYTOSIS WITHOUT ANEMIA: Chronic | ICD-10-CM

## 2021-05-05 DIAGNOSIS — E11.29 MICROALBUMINURIA DUE TO TYPE 2 DIABETES MELLITUS (HCC): Chronic | ICD-10-CM

## 2021-05-05 DIAGNOSIS — E78.2 HYPERLIPIDEMIA, MIXED: ICD-10-CM

## 2021-05-05 DIAGNOSIS — I10 ESSENTIAL HYPERTENSION: Chronic | ICD-10-CM

## 2021-05-05 DIAGNOSIS — I73.9 PAD (PERIPHERAL ARTERY DISEASE) (HCC): ICD-10-CM

## 2021-05-05 DIAGNOSIS — E11.42 TYPE 2 DIABETES MELLITUS WITH DIABETIC POLYNEUROPATHY, WITHOUT LONG-TERM CURRENT USE OF INSULIN (HCC): ICD-10-CM

## 2021-05-05 DIAGNOSIS — Z98.61 CAD S/P PERCUTANEOUS CORONARY ANGIOPLASTY: Chronic | ICD-10-CM

## 2021-05-05 DIAGNOSIS — R80.9 MICROALBUMINURIA DUE TO TYPE 2 DIABETES MELLITUS (HCC): Chronic | ICD-10-CM

## 2021-05-05 LAB
ACANTHOCYTES: 0
ALBUMIN SERPL-MCNC: 4.7 G/DL (ref 3.5–4.6)
ALP BLD-CCNC: 75 U/L (ref 35–104)
ALT SERPL-CCNC: 32 U/L (ref 0–41)
ANION GAP SERPL CALCULATED.3IONS-SCNC: 17 MEQ/L (ref 9–15)
ANISOCYTOSIS: 0
AST SERPL-CCNC: 36 U/L (ref 0–40)
AUER RODS: 0
BASOPHILIC STIPPLING: 0
BASOPHILS ABSOLUTE: 0 K/UL (ref 0–0.2)
BASOPHILS RELATIVE PERCENT: 0.7 %
BILIRUB SERPL-MCNC: 0.9 MG/DL (ref 0.2–0.7)
BUN BLDV-MCNC: 10 MG/DL (ref 8–23)
BURR CELLS: 0
CABOT RINGS: 0
CALCIUM SERPL-MCNC: 10.5 MG/DL (ref 8.5–9.9)
CHLORIDE BLD-SCNC: 89 MEQ/L (ref 95–107)
CHOLESTEROL, TOTAL: 115 MG/DL (ref 0–199)
CO2: 29 MEQ/L (ref 20–31)
CREAT SERPL-MCNC: 0.9 MG/DL (ref 0.7–1.2)
DOHLE BODIES: 0
EOSINOPHILS ABSOLUTE: 0.1 K/UL (ref 0–0.7)
EOSINOPHILS RELATIVE PERCENT: 1.2 %
GFR AFRICAN AMERICAN: >60
GFR NON-AFRICAN AMERICAN: >60
GLOBULIN: 3.1 G/DL (ref 2.3–3.5)
GLUCOSE BLD-MCNC: 178 MG/DL (ref 70–99)
HAIRY CELLS: 0
HCT VFR BLD CALC: 42.7 % (ref 42–52)
HDLC SERPL-MCNC: 44 MG/DL (ref 40–59)
HEMOGLOBIN: 14.8 G/DL (ref 14–18)
HOWELL-JOLLY BODIES: 0
HYPERSEGMENTED NEUTROPHILS: 0
HYPOCHROMIA: 0
LDL CHOLESTEROL CALCULATED: 44 MG/DL (ref 0–129)
LYMPHOCYTES ABSOLUTE: 1.7 K/UL (ref 1–4.8)
LYMPHOCYTES RELATIVE PERCENT: 25.1 %
MACROCYTES: 0
MCH RBC QN AUTO: 37 PG (ref 27–31.3)
MCHC RBC AUTO-ENTMCNC: 34.7 % (ref 33–37)
MCV RBC AUTO: 106.7 FL (ref 80–100)
MICROCYTES: 0
MONOCYTES ABSOLUTE: 0.6 K/UL (ref 0.2–0.8)
MONOCYTES RELATIVE PERCENT: 8.4 %
NEUTROPHILS ABSOLUTE: 4.4 K/UL (ref 1.4–6.5)
NEUTROPHILS RELATIVE PERCENT: 64.6 %
OVALOCYTES: 0
PAPPENHEIMER BODIES: 0
PDW BLD-RTO: 13.6 % (ref 11.5–14.5)
PELGER HUET CELLS: 0 %
PLATELET # BLD: 189 K/UL (ref 130–400)
POIKILOCYTES: 0
POLYCHROMASIA: 0
POTASSIUM SERPL-SCNC: 3.7 MEQ/L (ref 3.4–4.9)
RBC # BLD: 4 M/UL (ref 4.7–6.1)
SCHISTOCYTES: 0
SICKLE CELLS: 0
SODIUM BLD-SCNC: 135 MEQ/L (ref 135–144)
SPHEROCYTES: 0
STOMATOCYTES: 0
TARGET CELLS: 0
TEAR DROP CELLS: 0
TOTAL PROTEIN: 7.8 G/DL (ref 6.3–8)
TOXIC GRANULATION: 0
TRIGL SERPL-MCNC: 135 MG/DL (ref 0–150)
VACUOLATED NEUTROPHILS: 0
WBC # BLD: 6.8 K/UL (ref 4.8–10.8)

## 2021-05-05 PROCEDURE — 80061 LIPID PANEL: CPT

## 2021-05-05 PROCEDURE — 85025 COMPLETE CBC W/AUTO DIFF WBC: CPT

## 2021-05-05 PROCEDURE — 36415 COLL VENOUS BLD VENIPUNCTURE: CPT

## 2021-05-05 PROCEDURE — 80053 COMPREHEN METABOLIC PANEL: CPT

## 2021-05-06 ENCOUNTER — OFFICE VISIT (OUTPATIENT)
Dept: FAMILY MEDICINE CLINIC | Age: 66
End: 2021-05-06
Payer: MEDICARE

## 2021-05-06 VITALS
HEART RATE: 100 BPM | BODY MASS INDEX: 36.31 KG/M2 | OXYGEN SATURATION: 97 % | TEMPERATURE: 98.3 F | WEIGHT: 274 LBS | HEIGHT: 73 IN

## 2021-05-06 DIAGNOSIS — L21.9 SEBORRHEIC DERMATITIS: ICD-10-CM

## 2021-05-06 DIAGNOSIS — D49.2 ABNORMAL SKIN GROWTH: Primary | ICD-10-CM

## 2021-05-06 PROCEDURE — 99214 OFFICE O/P EST MOD 30 MIN: CPT | Performed by: FAMILY MEDICINE

## 2021-05-06 ASSESSMENT — ENCOUNTER SYMPTOMS: COLOR CHANGE: 1

## 2021-05-06 NOTE — PROGRESS NOTES
  Skin Problem 4/30/2021 Per PCP NOTE         Pt has a bump on inside of left ear x2 months, no pain on area.

## 2021-05-06 NOTE — PROGRESS NOTES
Diagnosis Orders   1. Abnormal skin growth     2. Seborrheic dermatitis       Return for 30 min procedure L earlobe and possible R scalp. Patient Instructions   Likely using surgical curette to remove the ear lesion    Patient has been instructed on the nature of seborrhea. Pt will obtain Selsun blue shampoo. Patient will apply this shampoo to a wet scalp and massage in. Let the solution soak in the scalp for 2-3 minutes. Rinse off and bath as usual.  Avoid hair products with high alcohol content. If this is affecting the upper face and eyebrows you may also use the shampoo on this area. Pt will obtain a soft bristle brush (like a baby brush) to use on the scalp once done bathing, while the skin is still moist to remove the thickened, flaking skin. Utilize routinely, but once under control it may not be necessary to let the scalp soak. Improvement should be noted. Skin changes can lead behind the treatment. Subjective:      Patient ID: Melody Vicente is a 77 y.o. male who presents for:  Chief Complaint   Patient presents with    Skin Problem     Has a bump on the inside of the left ear has been there for about 2 months, also scalp lesion concern     Skin Exam    Health Maintenance     Will be completed by patient's primary care physician       Patient has a growing lesion in his left ear. No history of skin cancer. Has had to have cyst removed from his back in the past.    Wife states she is noticing when he cuts his hair he has white patches of flaking skin.   Sometimes she has a regulator      Current Outpatient Medications on File Prior to Visit   Medication Sig Dispense Refill    torsemide (DEMADEX) 20 MG tablet Take 1 tablet by mouth daily 90 tablet 1    metFORMIN (GLUCOPHAGE) 500 MG tablet Take 1 tablet by mouth 2 times daily (with meals) 180 tablet 1    glimepiride (AMARYL) 1 MG tablet Take 1 tablet by mouth every morning (before breakfast) 90 tablet 1    folic acid (FOLVITE) 1 MG tablet Take 1 tablet by mouth daily 30 tablet 3    vitamin B-1 100 MG tablet Take 1 tablet by mouth daily 30 tablet 3    atorvastatin (LIPITOR) 40 MG tablet Take 1 tablet by mouth daily 90 tablet 1    clopidogrel (PLAVIX) 75 MG tablet Take 1 tablet by mouth daily 90 tablet 3    carvedilol (COREG) 25 MG tablet Take 1 tablet by mouth 2 times daily 180 tablet 1    allopurinol (ZYLOPRIM) 300 MG tablet Take 1 tablet by mouth daily 90 tablet 3    Elastic Bandages & Supports (V-4 HIGH COMPRESSION HOSE) MISC KNEE HIGH - 20-30 mmHg 2 each 1    Alcohol Swabs PADS DX: diabetes mellitus. Test 1 time(s) daily - Ok to substitute per insurance (1 each = 1 box) 100 each 5    blood glucose monitor strips DX: diabetes mellitus. Use 1 time(s) daily - True Metrix requested by patient - [facility] to substitute per insurance 100 strip 5    Lancets MISC DX: diabetes mellitus. Use 1 time(s) daily - Ok to substitute per insurance (1 each = 1 box) 100 each 5    blood glucose monitor kit and supplies DX: diabetes mellitus. Test 1 time(s) daily - True Metrix requested by patient - [facility] to substitute per insurance 1 kit 0    aspirin 81 MG EC tablet Take 81 mg by mouth daily      Probiotic Product (PROBIOTIC DAILY PO) Take 1 tablet by mouth daily      Ascorbic Acid (VITAMIN C) 250 MG tablet Take 250 mg by mouth daily      vitamin B-12 (CYANOCOBALAMIN) 100 MCG tablet Take 50 mcg by mouth daily      lidocaine (LIDODERM) 5 % Place 1 patch onto the skin every 12 hours       ammonium lactate (AMLACTIN) 12 % cream APPLY TO DRY CALLUS AREAS 1 TO 2 TIMES DAILY  5    Misc. Devices (COMMODE BEDSIDE) MISC Use daily as needed 1 each 0    MULTIPLE VITAMIN PO Take 1 tablet by mouth daily        No current facility-administered medications on file prior to visit.       Past Medical History:   Diagnosis Date    [facility] renal failure ([facility] [address]     Anxiety     CAD S/P percutaneous coronary angioplasty 3/11/2014    Cardiomyopathy [facility])     Cerebrovascular accident (CVA) due to occlusion of left middle cerebral artery ([address] 08/2016    brainstem infarction from left MCA occlusion    Cerebrovascular disease 07/27/2016    Coronary angioplasty status     CVA (cerebral vascular accident) ([address] 11/12/2017    Dependent edema 9/14/2017    Diplopia 5/15/2017    Resolved with management of cataracts    Dupuytren contracture 1/2/2018    Dysphagia 8/18/2011    Dysphonia 8/18/2011    Essential hypertension 8/17/2016    Gallop rhythm     Gout 12/10/2016    Gout of big toe 08/2014    Right Great Toe    H/O fall     Headache     migraines    Heart failure ([address]     History of 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    History of heart failure 1/6/2014    History of myocardial infarction 3/11/2014    History of recurrent pneumonia 2/11/2014    Hx of bacterial pneumonia     Hyperlipidemia, mixed 4/30/2021    Hyperlipidemia, mixed 4/30/2021    Hypotension     Left carotid artery stenosis 8/23/2020    Left carotid artery stenosis 8/23/2020    Leg swelling     Macrocytosis without anemia 12/10/2016    Microalbuminuria due to type 2 diabetes mellitus ([address] 9/14/2018    Morbid obesity ([address] 1/2/2014    Myocardial infarction ([address]     Obesity     AYLIN (obstructive sleep apnea) 12/10/2016    Osteoarthritis     Right-sided muscle weakness 10/19/2016    S/P cardiac catheterization     Skin cyst 4/8/2016    Spasm 11/9/2016    Stented coronary artery     Tremor of right hand 5/15/2017    Type 2 diabetes mellitus with diabetic polyneuropathy, without long-term current use of insulin ([address]     Unsteady gait 5/15/2017    Weakness     Weight gain      Past Surgical History:   Procedure Laterality Date    CHOLECYSTECTOMY      COLONOSCOPY  01/15/2010    polyp, diverticulosis (DR ELAINE)    CORONARY ANGIOPLASTY WITH STENT PLACEMENT  2014    CYST REMOVAL  05/16/16 Bathroom Equipment: Shower chair    Home Equipment: Rolling walker, Cane    ADL Assistance: Independent    Homemaking Assistance: Needs assistance(Spouse  is primary; vertigo and arthritis limit housework)    Ambulation Assistance: Independent(Walker outside, cane inside)    Transfer Assistance: Independent    Active : No    Patient's  Info: has not driven since CVA     Family History   Problem Relation Age of Onset    Breast Cancer Mother     Stroke Father     Colon Cancer Father 76     Allergies:  Bactrim [sulfamethoxazole-trimethoprim]    Review of Systems   Constitutional: Negative for chills and fever. Skin: Positive for color change. Negative for wound. Allergic/Immunologic: Negative for environmental allergies, food allergies and immunocompromised state. Hematological: Negative for adenopathy. Does not bruise/bleed easily. Psychiatric/Behavioral: Negative for behavioral problems and sleep disturbance. Objective:   Pulse 100   Temp 98.3 °F (36.8 °C)   Ht 6' 1\" (1.854 m)   Wt 274 lb (124.3 kg)   SpO2 97%   BMI 36.15 kg/m²     Physical Exam  Constitutional:       General: He is not in acute distress. Appearance: Normal appearance. He is well-developed. He is not toxic-appearing. HENT:      Head: Normocephalic and atraumatic. Right Ear: Hearing and tympanic membrane normal.      Left Ear: Hearing and tympanic membrane normal.      Nose: Nose normal. No nasal deformity. Eyes:      General: Lids are normal.         Right eye: No discharge. Left eye: No discharge. Conjunctiva/sclera: Conjunctivae normal.      Pupils: Pupils are equal, round, and reactive to light. Neck:      Musculoskeletal: Full passive range of motion without pain. Thyroid: No thyroid mass or thyromegaly. Vascular: No JVD. Trachea: Trachea and phonation normal.   Cardiovascular:      Rate and Rhythm: Normal rate and regular rhythm.    Pulmonary:      Effort: No accessory muscle usage or respiratory distress. Musculoskeletal:      Comments: FROM all large muscle groups and joints as witnessed when walking to exam table, getting on, and getting off the exam table. Skin:     General: Skin is warm and dry. Findings: No rash. Comments: Left inner ear pinna near the opening of the canal has a 4 mm pedunculated verrucous lesion. Right parietal superior aspect of the scalp has a 2 cm soft flaking lesion in the scalp   Neurological:      Mental Status: He is alert. Motor: No tremor or atrophy. Gait: Gait normal.   Psychiatric:         Speech: Speech normal.         Behavior: Behavior normal.         Thought Content: Thought content normal.         No results found for this visit on 05/06/21.     Recent Results (from the past 2016 hour(s))   POCT glycosylated hemoglobin (Hb A1C)    Collection Time: 04/30/21  2:28 PM   Result Value Ref Range    Hemoglobin A1C 7.5 %   CBC Auto Differential    Collection Time: 05/05/21  2:53 PM   Result Value Ref Range    WBC 6.8 4.8 - 10.8 K/uL    RBC 4.00 (L) 4.70 - 6.10 M/uL    Hemoglobin 14.8 14.0 - 18.0 g/dL    Hematocrit 42.7 42.0 - 52.0 %    .7 (H) 80.0 - 100.0 fL    MCH 37.0 (H) 27.0 - 31.3 pg    MCHC 34.7 33.0 - 37.0 %    RDW 13.6 11.5 - 14.5 %    Platelets 329 536 - 191 K/uL    Neutrophils % 64.6 %    Lymphocytes % 25.1 %    Monocytes % 8.4 %    Eosinophils % 1.2 %    Basophils % 0.7 %    Neutrophils Absolute 4.4 1.4 - 6.5 K/uL    Lymphocytes Absolute 1.7 1.0 - 4.8 K/uL    Monocytes Absolute 0.6 0.2 - 0.8 K/uL    Eosinophils Absolute 0.1 0.0 - 0.7 K/uL    Basophils Absolute 0.0 0.0 - 0.2 K/uL    Toxic Granulation 0     Dohle Bodies 0     Adry Rods 0     Pelger Huet Cells 0 %    Hairy Cells 0     Hypersegmented Neutrophils 0     Vacuolated Neutrophils 0     Anisocytosis 0     Macrocytes 0     Microcytes 0     Polychromasia 0     Hypochromia 0     Poikilocytes 0     Schistocytes 0     Acanthocytes 0     Gabriel Cells 0     Ovalocytes 0     Spherocytes 0     Stomatocytes 0     Target Cells 0     Tear Drop Cells 0     Basophilic Stippling 0     Levi-Jolly Bodies 0     Sickle Cells 0     Cabot Rings 0     Pappenheimer Bodies 0    Comprehensive Metabolic Panel    Collection Time: 05/05/21  2:53 PM   Result Value Ref Range    Sodium 135 135 - 144 mEq/L    Potassium 3.7 3.4 - 4.9 mEq/L    Chloride 89 (L) 95 - 107 mEq/L    CO2 29 20 - 31 mEq/L    Anion Gap 17 (H) 9 - 15 mEq/L    Glucose 178 (H) 70 - 99 mg/dL    BUN 10 8 - 23 mg/dL    CREATININE 0.90 0.70 - 1.20 mg/dL    GFR Non-African American >60.0 >60    GFR  >60.0 >60    Calcium 10.5 (H) 8.5 - 9.9 mg/dL    Total Protein 7.8 6.3 - 8.0 g/dL    Albumin 4.7 (H) 3.5 - 4.6 g/dL    Total Bilirubin 0.9 (H) 0.2 - 0.7 mg/dL    Alkaline Phosphatase 75 35 - 104 U/L    ALT 32 0 - 41 U/L    AST 36 0 - 40 U/L    Globulin 3.1 2.3 - 3.5 g/dL   Lipid Panel    Collection Time: 05/05/21  2:53 PM   Result Value Ref Range    Cholesterol, Total 115 0 - 199 mg/dL    Triglycerides 135 0 - 150 mg/dL    HDL 44 40 - 59 mg/dL    LDL Calculated 44 0 - 129 mg/dL           Assessment:       Diagnosis Orders   1. Abnormal skin growth     2. Seborrheic dermatitis           No orders of the defined types were placed in this encounter. Plan:   Return for 30 min procedure L earlobe and possible R scalp. Patient Instructions   Likely using surgical curette to remove the ear lesion    Patient has been instructed on the nature of seborrhea. Pt will obtain Selsun blue shampoo. Patient will apply this shampoo to a wet scalp and massage in. Let the solution soak in the scalp for 2-3 minutes. Rinse off and bath as usual.  Avoid hair products with high alcohol content. If this is affecting the upper face and eyebrows you may also use the shampoo on this area.   Pt will obtain a soft bristle brush (like a baby brush) to use on the scalp once done bathing, while the skin is still moist to remove the thickened, flaking skin. Utilize routinely, but once under control it may not be necessary to let the scalp soak. Improvement should be noted. Skin changes can lead behind the treatment. This note was partially created with the assistance of dictation. This may lead to grammatical or spelling errors. Alo Michaels M.D.

## 2021-05-07 ENCOUNTER — TELEPHONE (OUTPATIENT)
Dept: ENDOSCOPY | Age: 66
End: 2021-05-07

## 2021-05-07 NOTE — TELEPHONE ENCOUNTER
Your patient is in the process of being scheduled for a colonoscopy, how long is your patient able to withhold from Plavix?  Thank you

## 2021-05-07 NOTE — TELEPHONE ENCOUNTER
Your patient Ernico Costa is in the process of being scheduled for a colonoscopy how long is he able to withhold from Plavix

## 2021-05-10 NOTE — TELEPHONE ENCOUNTER
Liver Biopsy Discharge Instructions    Activity  · Go home and rest quietly for the remainder of the day.   · If you had sedation, DO NOT drive, operate machinery or make any legal decisions for 24 hours. Have a responsible adult stay with you for the remainder of the day and/or check on you throughout the day.  · Avoid heavy lifting or straining for 1 to 2 days.   · You may return to work if your job does not require you to lift heavy objects or strenuous work; otherwise remain off work for 48 hours.  · DO NOT smoke for 24 hours after the procedure.    Diet  · You may eat and drink as usual unless told otherwise.    Pain/Medicine  · Do not take any aspirin, ibuprofen or any medicine that contains aspirin for one week to 10 days after the test. Aspiring increases the chance of bleeding.  · Some tenderness around the biopsy site of your biopsy is normal.  · You may take Tylenol (one or two Regular or Extra Strength tablets every four to six hours) for mild discomfort at the biopsy site. If you develop severe pain, swelling or redness at the biopsy site, call your doctor or the x-ray department.    Care of the Wound  · Check your bandage over the biopsy site for excess drainage or bleeding every 2 to 4 hours for the first 24 hours.  · Keep the bandage dry for 24 hours. You may take a shower 24 hours after the test is done.   · Wait for three days before you take a tub bath or swim.    Call your doctor if you have:  · A temperature greater than 101 F  · Pain that is not better by taking pain medication  · Active or persistent bleeding  · Increased swelling, excessive soreness or bruising at the site (Some soreness after the local anesthetic has worn off is to be expected for 12 to 24 hours.)  · Chest pain, shoulder pain, or shortness of breath  · Extreme weakness    Follow-up:  · Call your doctor for a follow-up appointment. The results of the test will be given to your doctor from the hospital in about three working  Patient should be seen, almost been a year since last OV days.    Questions?  If you have any questions or problems after you are at home, call your doctor or the Radiology Nursing Department

## 2021-05-11 DIAGNOSIS — Z01.818 PRE-OP TESTING: Primary | ICD-10-CM

## 2021-05-25 ENCOUNTER — PROCEDURE VISIT (OUTPATIENT)
Dept: FAMILY MEDICINE CLINIC | Age: 66
End: 2021-05-25
Payer: MEDICARE

## 2021-05-25 VITALS
HEART RATE: 94 BPM | WEIGHT: 274 LBS | OXYGEN SATURATION: 98 % | HEIGHT: 73 IN | BODY MASS INDEX: 36.31 KG/M2 | TEMPERATURE: 98.3 F

## 2021-05-25 DIAGNOSIS — L21.9 SEBORRHEIC DERMATITIS: Primary | ICD-10-CM

## 2021-05-25 DIAGNOSIS — D49.2 ABNORMAL SKIN GROWTH: ICD-10-CM

## 2021-05-25 PROCEDURE — 11307 SHAVE SKIN LESION 1.1-2.0 CM: CPT | Performed by: FAMILY MEDICINE

## 2021-05-25 PROCEDURE — 11310 SHAVE SKIN LESION 0.5 CM/<: CPT | Performed by: FAMILY MEDICINE

## 2021-05-25 ASSESSMENT — ENCOUNTER SYMPTOMS: COLOR CHANGE: 1

## 2021-05-25 NOTE — PROGRESS NOTES
Diagnosis Orders   1. Seborrheic dermatitis     2. Abnormal skin growth  Specimen to Pathology Outpatient    90313 - MT SHAV SKIN LES 11-20MM REMAINDR BODY    Specimen to Pathology Outpatient    74345 - MT SHAV SKIN LES <5MM FACE,FACIAL         Orders Placed This Encounter   Procedures    Specimen to Pathology Outpatient     Margins requested on all specimens  R/O ACTINIC keratosis versus early squamous cell versus seborrheic dermatitis  Location right scalp     Standing Status:   Future     Standing Expiration Date:   5/25/2022     Order Specific Question:   PREVIOUS BIOPSY     Answer:   No     Order Specific Question:   PREOP DIAGNOSIS     Answer:   Abnormal skin growth     Order Specific Question:   FROZEN SECTION - NO OR YES/SPECIMEN     Answer:   No    Specimen to Pathology Outpatient     Margins requested on all specimens  R/O squamous cell cancer versus verrucous  Location left inner pinna of the ear     Standing Status:   Future     Standing Expiration Date:   5/25/2022     Order Specific Question:   PREVIOUS BIOPSY     Answer:   No     Order Specific Question:   PREOP DIAGNOSIS     Answer:   Abnormal skin growth     Order Specific Question:   FROZEN SECTION - NO OR YES/SPECIMEN     Answer:   No    79647 - MT SHAV SKIN LES 11-20MM REMAINDR BODY     Right scalp    17040 - MT SHAV SKIN LES <5MM FACE,FACIAL     Left inner pinna of the ear       Juliette Castanon was seen today for skin problem, procedure and health maintenance. Diagnoses and all orders for this visit:    Seborrheic dermatitis    Abnormal skin growth  -     Specimen to Pathology Outpatient; Future  -     [id number] - MT SHAV SKIN LES 11-20MM REMAINDR BODY  -     Specimen to Pathology Outpatient; Future  -     [id number] - MT SHAV SKIN LES <5MM FACE,FACIAL        Return in about 6 months (around 11/25/2021) for 15 min skin check.     Patient Instructions   Incision/ Shave biopsy/ Laceration repair    -Clean surgical area with antibacterial soap and water once with infection. If the wound is infection pus will drain from the site. If this treatment was for a large wart you may note that a plug of skin may fall out of the area that was treated. That is the center of the wart and it is appropriate for it to come out. If exposed skin remains, treat that area as you would a ruptured blister as mentioned above. Bacitracin sample supplied                Patient seborrheic dermatitis seems to be responding to the [facility] he has been using. They have been using it routinely and most of the scalp is responded with the exception of one thickened area on the right side. The mass remains in his ear. Current Outpatient Medications on File Prior to Visit   Medication Sig Dispense Refill    atorvastatin (LIPITOR) 40 MG tablet Take 1 tablet by mouth daily 90 tablet 1    torsemide (DEMADEX) 20 MG tablet Take 1 tablet by mouth daily 90 tablet 1    metFORMIN (GLUCOPHAGE) 500 MG tablet Take 1 tablet by mouth 2 times daily (with meals) 180 tablet 1    glimepiride (AMARYL) 1 MG tablet Take 1 tablet by mouth every morning (before breakfast) 90 tablet 1    folic acid (FOLVITE) 1 MG tablet Take 1 tablet by mouth daily 30 tablet 3    vitamin B-1 100 MG tablet Take 1 tablet by mouth daily 30 tablet 3    clopidogrel (PLAVIX) 75 MG tablet Take 1 tablet by mouth daily 90 tablet 3    carvedilol (COREG) 25 MG tablet Take 1 tablet by mouth 2 times daily 180 tablet 1    allopurinol (ZYLOPRIM) 300 MG tablet Take 1 tablet by mouth daily 90 tablet 3    Elastic Bandages & Supports (V-4 HIGH COMPRESSION HOSE) MISC KNEE HIGH - 20-30 mmHg 2 each 1    Alcohol Swabs PADS DX: diabetes mellitus. Test 1 time(s) daily - Ok to substitute per insurance (1 each = 1 box) 100 each 5    blood glucose monitor strips DX: diabetes mellitus. Use 1 time(s) daily - True Metrix requested by patient - Fabian Grady to substitute per insurance 100 strip 5    Lancets MISC DX: diabetes mellitus.  Use 1 time(s) Pupils: Pupils are equal, round, and reactive to light. Neck:      Thyroid: No thyroid mass or thyromegaly. Vascular: No JVD. Trachea: Trachea and phonation normal.   Cardiovascular:      Rate and Rhythm: Normal rate and regular rhythm. Pulmonary:      Effort: No accessory muscle usage or respiratory distress. Musculoskeletal:      Cervical back: Full passive range of motion without pain. Comments: FROM all large muscle groups and joints as witnessed when walking to exam table, getting on, and getting off the exam table. Skin:     General: Skin is warm and dry. Findings: No rash. Comments: Right superior parietal scalp has a 12 mm x 14 mm raised thickened irregular area with vasculature noted some areas of flake are rough. Left ear inner pinna irregular dense hyperkeratotic lesion approximately 4 mm   Neurological:      Mental Status: He is alert. Motor: No tremor or atrophy. Gait: Gait normal.   Psychiatric:         Speech: Speech normal.         Behavior: Behavior normal.         Thought Content: Thought content normal.           PROCEDURE: Right superior parietal scalp  Informed consent was given by the patient. Risks including infection, bleeding and scarring were discussed. No guarantee how the scar will look. Patient skin was prepped with alcohol. Wound was anesthetized using 1.5 cc of 1% lidocaine with epinephrine for anesthesia. A Dermablade was used to obtain the complete removal of the visible lesion. Drysol was used at the base of site. Bacitracin ointment and bandage were placed on the wound. Estimated blood loss less than 1 cc    Post op wound care instructions were given to the patient. He/She will f/u in 2 weeks or sooner if needed for problems. PROCEDURE: Left ear inner pinna  The shave  of the lesion was done using 0.2 cc of 1% lidocaine with epinephrine for anesthesia.   A Curette used for excision  was used to obtain the complete removal of the visible lesion. Drysol was used at the site for hemostasis. Good bleeding control was obtained. Less than 1 cc estimated blood loss. The wound was then bandaged with Bacitracin ointment and Bacitracin only placed  Wound care instructions were given to the patient and the patient was also informed and will be printed in after visit summary. Cain Plascencia was seen today for skin problem, procedure and health maintenance. Diagnoses and all orders for this visit:    Seborrheic dermatitis    Abnormal skin growth  -     Specimen to Pathology Outpatient; Future  -     [id number] - MI SHAV SKIN LES 11-20MM REMAINDR BODY  -     Specimen to Pathology Outpatient; Future  -     [id number] - MI SHAV SKIN LES <5MM FACE,FACIAL        Return in about 6 months (around 11/25/2021) for 15 min skin check. Patient Instructions   Incision/ Shave biopsy/ Laceration repair    -Clean surgical area with antibacterial soap and water once daily.    -Keep surgical site moist with vaseline or antibiotic ointment (single, not triple-Neosporin) and apply a fresh bandage daily until a solid scab forms or if the wound is at risk for trauma or dirt.   -Follow up immediately if any growing redness (minimal redness or pale pink is normal along wound edges) surrounds the surgical site or if dripping drainage occurs at surgical site. Once a solid scab forms no more bandage needed. A wet scab can look yellow. This is not infection, just moisture.  -Once the lesion is healed be sure to apply sunscreen to the area to prevent burn of the newer and more delicate skin during the first 6 months of healing.    -If the scar begins to be raised you may massage the area firmly twice a day to help break down scar tissue and help the area become a flat scar. There is some evidence that Mederma applied to a scar daily for the first few months can help shrink and fade it more quickly then without intervention.      Cryotherapy instructions    Post op instructions given. A printed copy provided. It is best to leave blisters alone if they form for the first 1-3 days to allow the desired damaged tissue (precancer lesion, wart, or whatever lesion is being removed) to separate from healthy tissue. They should be covered with a bandage to prevent blister breakage and dirt exposure. The wounds should remain dry while there is a blister, therefore if this is a sweaty location like the foot you may need to change socks multiple times per day. When the blister(s) pop or patient removes the top as instructed between day 3-5, apply antibiotic (NOT triple antibiotic, one brand is Neosporin) ointment and a bandage to affected area(s). The ointment should be applied to the open area as long as it is not covered with skin. Exposed tissue is meant to be moist.  Once a scab is formed the patient may stop applying ointment. The scab may appear yellow while moist, don't confuse this with infection. If the wound is infection pus will drain from the site. If this treatment was for a large wart you may note that a plug of skin may fall out of the area that was treated. That is the center of the wart and it is appropriate for it to come out. If exposed skin remains, treat that area as you would a ruptured blister as mentioned above. Bacitracin sample supplied          Kacey Reagan M.D. This note was partially created with the assistance of dictation. This may lead to grammatical or spelling errors.

## 2021-05-25 NOTE — PATIENT INSTRUCTIONS
Incision/ Shave biopsy/ Laceration repair    -Clean surgical area with antibacterial soap and water once daily.    -Keep surgical site moist with vaseline or antibiotic ointment (single, not triple-Neosporin) and apply a fresh bandage daily until a solid scab forms or if the wound is at risk for trauma or dirt.   -Follow up immediately if any growing redness (minimal redness or pale pink is normal along wound edges) surrounds the surgical site or if dripping drainage occurs at surgical site. Once a solid scab forms no more bandage needed. A wet scab can look yellow. This is not infection, just moisture.  -Once the lesion is healed be sure to apply sunscreen to the area to prevent burn of the newer and more delicate skin during the first 6 months of healing.    -If the scar begins to be raised you may massage the area firmly twice a day to help break down scar tissue and help the area become a flat scar. There is some evidence that Mederma applied to a scar daily for the first few months can help shrink and fade it more quickly then without intervention. Cryotherapy instructions    Post op instructions given. A printed copy provided. It is best to leave blisters alone if they form for the first 1-3 days to allow the desired damaged tissue (precancer lesion, wart, or whatever lesion is being removed) to separate from healthy tissue. They should be covered with a bandage to prevent blister breakage and dirt exposure. The wounds should remain dry while there is a blister, therefore if this is a sweaty location like the foot you may need to change socks multiple times per day. When the blister(s) pop or patient removes the top as instructed between day 3-5, apply antibiotic (NOT triple antibiotic, one brand is Neosporin) ointment and a bandage to affected area(s). The ointment should be applied to the open area as long as it is not covered with skin.   Exposed tissue is meant to be moist.  Once a scab is

## 2021-05-25 NOTE — PROGRESS NOTES
Prior to the start of the procedure known as Biopsy an informed consent has been signed and scanned into patients EMR. After the patient is draped and prepped and before the start of the procedure  Dr. Janett Hussein and attending staff Archana Aviles. Elza Gipson both must perform a verbal confirmation using patients Name   and  The patient should participate in this. Dr. Janett Hussein will rajesh the surgical site if needed for documenting superior and inferior aspects. \"correct site. \" will be verified on container, and order. These above steps will be documented into the patients EMR chart.

## 2021-06-12 DIAGNOSIS — M10.9 GOUT OF MULTIPLE SITES, UNSPECIFIED CAUSE, UNSPECIFIED CHRONICITY: Chronic | ICD-10-CM

## 2021-06-12 DIAGNOSIS — R60.0 BILATERAL LOWER EXTREMITY EDEMA: ICD-10-CM

## 2021-06-12 DIAGNOSIS — E11.42 TYPE 2 DIABETES MELLITUS WITH DIABETIC POLYNEUROPATHY, WITHOUT LONG-TERM CURRENT USE OF INSULIN (HCC): Chronic | ICD-10-CM

## 2021-06-14 ENCOUNTER — TELEPHONE (OUTPATIENT)
Dept: FAMILY MEDICINE CLINIC | Age: 66
End: 2021-06-14

## 2021-06-14 DIAGNOSIS — Z23 NEED FOR VACCINATION: Primary | ICD-10-CM

## 2021-06-14 RX ORDER — ALLOPURINOL 300 MG/1
300 TABLET ORAL DAILY
Qty: 90 TABLET | Refills: 0 | Status: SHIPPED | OUTPATIENT
Start: 2021-06-14 | End: 2021-09-23

## 2021-06-14 RX ORDER — GLIMEPIRIDE 1 MG/1
1 TABLET ORAL
Qty: 90 TABLET | Refills: 0 | Status: SHIPPED | OUTPATIENT
Start: 2021-06-14 | End: 2021-09-07

## 2021-06-14 RX ORDER — TORSEMIDE 20 MG/1
20 TABLET ORAL DAILY
Qty: 90 TABLET | Refills: 0 | Status: SHIPPED | OUTPATIENT
Start: 2021-06-14 | End: 2021-09-14

## 2021-06-14 NOTE — TELEPHONE ENCOUNTER
Short term refill has been sent to pharmacy. Please call patient to schedule Annual Wellness Visit appointment in the next month.

## 2021-06-14 NOTE — TELEPHONE ENCOUNTER
Patient calling to request a prescription for Shingrix shot. His first prescription  before he was able to get the shot.      Please sent to [facility] in [place].

## 2021-06-28 ENCOUNTER — OFFICE VISIT (OUTPATIENT)
Dept: CARDIOLOGY CLINIC | Age: 66
End: 2021-06-28
Payer: MEDICARE

## 2021-06-28 VITALS
OXYGEN SATURATION: 98 % | SYSTOLIC BLOOD PRESSURE: 142 MMHG | BODY MASS INDEX: 36.15 KG/M2 | HEIGHT: 73 IN | HEART RATE: 97 BPM | DIASTOLIC BLOOD PRESSURE: 80 MMHG

## 2021-06-28 DIAGNOSIS — Z98.61 CAD S/P PERCUTANEOUS CORONARY ANGIOPLASTY: Primary | ICD-10-CM

## 2021-06-28 DIAGNOSIS — I25.10 CAD S/P PERCUTANEOUS CORONARY ANGIOPLASTY: Primary | ICD-10-CM

## 2021-06-28 DIAGNOSIS — G45.9 TIA (TRANSIENT ISCHEMIC ATTACK): ICD-10-CM

## 2021-06-28 PROCEDURE — 99214 OFFICE O/P EST MOD 30 MIN: CPT | Performed by: INTERNAL MEDICINE

## 2021-06-28 ASSESSMENT — ENCOUNTER SYMPTOMS
CHEST TIGHTNESS: 0
SHORTNESS OF BREATH: 0

## 2021-06-28 NOTE — PROGRESS NOTES
[facility] CARDIOLOGY OFFICE FOLLOW-UP      Patient: Brad Whitt  YOB: 1955  MRN: [id number]    Chief Complaint:  Chief Complaint   Patient presents with    1 Year Follow Up     hx of 3 strokes    Coronary Artery Disease    Other     hx of Heart attack         Subjective/HPI:  6/28/21: Patient presents today for follow up of CMP. Patient is diabetic. He is on Chronic Plavix therapy. Occasional noncardiac chest pain. No ankle edema. Blood sugars stable. Also has mild tremors of the right hand which is stable. Last echo showed preserved LV ejection fraction. He has significant cardiomyopathy which resolved after angioplasty of the LAD. Has CVA many years ago. He is on disability. Accompanied by his wife. Occasional dizziness still. He has visual issues after the stroke. Also has obstructive sleep apnea. He will see me in 1 year    7/7/2020: Patient presents today for follow-up of coronary artery disease and ischemic cardiomyopathy. Had stent to the LAD almost 7 years ago. Severe ischemic cardiomyopathy which has resolved. In the interim he has been on disability. He had CVA 2 years after his cardiac event. Is diabetic. Overall doing well. He still has dizziness. Blood sugars have been controlled. Is compliant with his diet and medications. No chest pain congestive heart failure.   Disability parking will be renewed for another 5 years        7/10/19: Patient presents today for follow-up of coronary artery disease and ischemic cardiomyopathy.  Had stent to the LAD.  Cardiomyopathy has improved substantially after angioplasty.  Had 2 strokes. Jaison Kerr had  2 falls which were mechanical falls.  Had peripheral angioplasty by Dr. conley.  Has varicose veins with chronic pigmentary changes. [facility] is diabetic morbidly obese. [facility] is retired. Lennox Teo any chest pain.  Moderately short of breath.  Had sleep apnea and uses a mask on chronic Plavix therapy. [facility] wears COLUMBA hose which helps the swelling.  He will see me in 1 year.     7/11/18: Patient presents today for follow-up of coronary artery disease. Had LAD stent. Cardiomyopathy with improved significantly. Had 2 strokes. Currently not working. Problems with vision. Diabetes under good control. Hypertension under good control. See me in one year.     6/28/17: For follow-up of CAD. He had cardiomyopathy. He had LAD stenting done has improved significantly. From a cardiac standpoint we'll. Unfortunately he had a CVA about a year ago and has had significant set back. Problems with vision DM excellent Hb 1C in the fives. Has no chest pain no congestive heart failure symptoms. Is going to get a cataract surgery next week.     2/15/2017: For follow-up of CAD. His last echo showed ejection fraction to be near normal at 45%. He has left bundle branch block and septal wall motion abnormality. He had a mechanical fall and hurt his nose. Not going see the emergency room. He has mild visual issues in the right eye for which she is going to see an eye doctor in the near future. He sees Dr. Lizeth Parker.  He has gained a significant amount of strength in the right lower extremity. Continue same medications and see me in 6 months           Past Medical History:   Diagnosis Date    Acute renal failure ([address]     Anxiety     CAD S/P percutaneous coronary angioplasty 3/11/2014    Cardiomyopathy ([address]     Cerebrovascular accident (CVA) due to occlusion of left middle cerebral artery ([address] 08/2016    brainstem infarction from left MCA occlusion    Cerebrovascular disease 07/27/2016    Coronary angioplasty status     CVA (cerebral vascular accident) ([address] 11/12/2017    Dependent edema 9/14/2017    Diplopia 5/15/2017    Resolved with management of cataracts    Dupuytren contracture 1/2/2018    Dysphagia 8/18/2011    Dysphonia 8/18/2011    Essential hypertension 8/17/2016    Gallop rhythm     Gout 12/10/2016    Gout of big toe 08/2014    Right Great Toe    H/O fall     Headache     migraines    [facility] failure [facility]     History of 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    History of heart failure 1/6/2014    History of myocardial infarction 3/11/2014    History of recurrent pneumonia 2/11/2014    Hx of bacterial pneumonia     Hyperlipidemia, mixed 4/30/2021    Hyperlipidemia, mixed 4/30/2021    Hypotension     Left carotid artery stenosis 8/23/2020    Left carotid artery stenosis 8/23/2020    Leg swelling     Macrocytosis without anemia 12/10/2016    Microalbuminuria due to type 2 diabetes mellitus ([facility] 9/14/2018    Morbid obesity ([facility] 1/2/2014    Myocardial infarction ([facility]     Obesity     AYLIN (obstructive sleep apnea) 12/10/2016    Osteoarthritis     Right-sided muscle weakness 10/19/2016    S/P cardiac catheterization     Skin cyst 4/8/2016    Spasm 11/9/2016    Stented coronary artery     Tremor of right hand 5/15/2017    Type 2 diabetes mellitus with diabetic polyneuropathy, without long-term current use of insulin ([facility])     Unsteady gait 5/15/2017    Weakness     Weight gain        Past Surgical History:   Procedure Laterality Date    CHOLECYSTECTOMY      COLONOSCOPY  01/15/2010    polyp, diverticulosis (DR ELAINE)    CORONARY ANGIOPLASTY WITH STENT PLACEMENT  2014    CYST REMOVAL  05/16/16    DR Marcelina Schaumann CYST    SHOULDER SURGERY Right 12/18/2015       Family History   Problem Relation Age of Onset    Breast Cancer Mother     Stroke Father     Colon Cancer Father 76       Social History     Socioeconomic History    Marital status:      Spouse name: Tera Reynolds Number of children: 3    Years of education: 12+    Highest education level: Not on file   Occupational History    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     Employer: [employer] [place]   Tobacco Use    Smoking status: Former Smoker     Packs/day: 2.50     Years: times a week    Attends Christian Services: 1 to 4 times per year   [address] or Organizations: Yes    Attends Club or Organization Meetings: More than 4 times per year    Marital Status:    Intimate Partner Violence: Not At Risk    Fear of Current or Ex-Partner: No    Emotionally Abused: No    Physically Abused: No    Sexually Abused: No       Allergies   Allergen Reactions    Bactrim [Sulfamethoxazole-Trimethoprim] Nausea And Vomiting and Other (See Comments)     Sugar count elevated, had troubles urinating       Current Outpatient Medications   Medication Sig Dispense Refill    torsemide (DEMADEX) 20 MG tablet Take 1 tablet by mouth daily 90 tablet 0    glimepiride (AMARYL) 1 MG tablet Take 1 tablet by mouth every morning (before breakfast) 90 tablet 0    allopurinol (ZYLOPRIM) 300 MG tablet Take 1 tablet by mouth daily 90 tablet 0    atorvastatin (LIPITOR) 40 MG tablet Take 1 tablet by mouth daily 90 tablet 1    metFORMIN (GLUCOPHAGE) 500 MG tablet Take 1 tablet by mouth 2 times daily (with meals) 348 tablet 1    folic acid (FOLVITE) 1 MG tablet Take 1 tablet by mouth daily 30 tablet 3    vitamin B-1 100 MG tablet Take 1 tablet by mouth daily 30 tablet 3    clopidogrel (PLAVIX) 75 MG tablet Take 1 tablet by mouth daily 90 tablet 3    carvedilol (COREG) 25 MG tablet Take 1 tablet by mouth 2 times daily 180 tablet 1    Elastic Bandages & Supports (V-4 HIGH COMPRESSION HOSE) MISC KNEE HIGH - 20-30 mmHg 2 each 1    Alcohol Swabs PADS DX: diabetes mellitus. Test 1 time(s) daily - Ok to substitute per insurance (1 each = 1 box) 100 each 5    blood glucose monitor strips DX: diabetes mellitus. Use 1 time(s) daily - True Metrix requested by patient - [id number] Claudia Hope to substitute per insurance 100 strip 5    Lancets MISC DX: diabetes mellitus. Use 1 time(s) daily - Ok to substitute per insurance (1 each = 1 box) 100 each 5    blood glucose monitor kit and supplies DX: diabetes mellitus. Test 1 time(s) daily - True Metrix requested by patient - Yael Baxter to substitute per insurance 1 kit 0    aspirin 81 MG EC tablet Take 81 mg by mouth daily      Probiotic Product (PROBIOTIC DAILY PO) Take 1 tablet by mouth daily      Ascorbic Acid (VITAMIN C) 250 MG tablet Take 250 mg by mouth daily      vitamin B-12 (CYANOCOBALAMIN) 100 MCG tablet Take 50 mcg by mouth daily      lidocaine (LIDODERM) 5 % Place 1 patch onto the skin every 12 hours       ammonium lactate (AMLACTIN) 12 % cream APPLY TO DRY CALLUS AREAS 1 TO 2 TIMES DAILY  5    Misc. Devices (COMMODE BEDSIDE) MISC Use daily as needed 1 each 0    MULTIPLE VITAMIN PO Take 1 tablet by mouth daily       Clobetasol Propionate 0.05 % LIQD Apply 5 drops topically nightly 125 mL 1    cephALEXin (KEFLEX) 500 MG capsule Take 1 capsule by mouth 3 times daily for 7 days 21 capsule 0    hydrOXYzine (ATARAX) 25 MG tablet Take 1 tablet by mouth 2 times daily as needed (dizziness) 60 tablet 1     No current facility-administered medications for this visit. Review of Systems:   Review of Systems   Constitutional: Negative for activity change, appetite change, diaphoresis, fatigue and unexpected weight change. HENT: Negative for facial swelling, nosebleeds, trouble swallowing and voice change. Respiratory: Negative for apnea, chest tightness, shortness of breath and wheezing. Cardiovascular: Positive for leg swelling. Negative for chest pain and palpitations. Gastrointestinal: Negative for abdominal distention, anal bleeding, blood in stool, nausea and vomiting. Genitourinary: Negative for decreased urine volume and dysuria. Musculoskeletal: Negative for gait problem and myalgias. Skin: Negative for color change, pallor, rash and wound. Neurological: Positive for dizziness and light-headedness. Negative for syncope, facial asymmetry, weakness, numbness and headaches.         Does have striggers due to strokes   Hematological: Does not

## 2021-06-30 ENCOUNTER — OFFICE VISIT (OUTPATIENT)
Dept: NEUROLOGY | Age: 66
End: 2021-06-30
Payer: MEDICARE

## 2021-06-30 VITALS
BODY MASS INDEX: 36.81 KG/M2 | HEART RATE: 100 BPM | WEIGHT: 279 LBS | DIASTOLIC BLOOD PRESSURE: 82 MMHG | SYSTOLIC BLOOD PRESSURE: 138 MMHG

## 2021-06-30 DIAGNOSIS — M54.16 LUMBAR RADICULOPATHY: ICD-10-CM

## 2021-06-30 DIAGNOSIS — G45.9 TIA (TRANSIENT ISCHEMIC ATTACK): ICD-10-CM

## 2021-06-30 DIAGNOSIS — R42 DIZZINESS: Primary | ICD-10-CM

## 2021-06-30 DIAGNOSIS — I69.90 LATE EFFECTS OF CVA (CEREBROVASCULAR ACCIDENT): ICD-10-CM

## 2021-06-30 DIAGNOSIS — R26.0 ATAXIC GAIT: ICD-10-CM

## 2021-06-30 PROCEDURE — 99214 OFFICE O/P EST MOD 30 MIN: CPT | Performed by: PSYCHIATRY & NEUROLOGY

## 2021-06-30 RX ORDER — HYDROXYZINE HYDROCHLORIDE 25 MG/1
25 TABLET, FILM COATED ORAL 2 TIMES DAILY PRN
Qty: 60 TABLET | Refills: 1 | Status: SHIPPED | OUTPATIENT
Start: 2021-06-30 | End: 2021-07-10

## 2021-06-30 ASSESSMENT — ENCOUNTER SYMPTOMS
PHOTOPHOBIA: 0
TROUBLE SWALLOWING: 0
SHORTNESS OF BREATH: 0
NAUSEA: 0
CHOKING: 0
VOMITING: 0
BACK PAIN: 1
COLOR CHANGE: 0

## 2021-06-30 NOTE — PROGRESS NOTES
Subjective:      Patient ID: Ananth Tripp is a 77 y.o. male who presents today for:  Chief Complaint   Patient presents with    Follow-up     Pt states that he says he had an overload on friday 6/11, he says he will get car sick, and also have headaches, veritgo episodes usually daily some days better than others. In [place] he had a big fall, and another in june, he says his right leg seems to ambush him. He says these episodes of veritgo make him very frustrated. He says he went to [place] and there was a lot of people there he had to leave because the sound from all the people was just to much for him to handle.  Other     He says that it usually takes him 3 to 5 days to recover from his said overloads. He says that he can't tell whether things are raised up or flat, or things look like they are an incline or decline but are actually just flat surface, looking at certain khris bothers him. HPI 17-year-old right-handed gentleman with a history of brainstem stroke with central ataxia. he has good days and bad days. He has considerable tinnitus. No true vertigo has been described. Last seen he was having some falls. Vascular ultrasounds are normal.  Patient still has carsickness occasional headaches and he has had a few episodes of vertigo. Still continues to have vertiginous spells several days a month. He did not respond to meclizine it made him sleep. He also has had 3 falls. He bao his right leg. He had a lumbar laminectomy in 2011 and has spina bifida as well. He uses a walker to walk  he reportedly had 3 falls and has significant back issues as well. This has not recently evaluated has had surgical interventions. The dizziness appears to be on and off. He has bouts of dizziness lasting for a few days when this occurs and then he feels better. He has not responded any current line of treatment there is no major headache with this to suggest a vestibular headache.     Past Medical History:   Diagnosis Date    Acute renal failure ([address]     Anxiety     CAD S/P percutaneous coronary angioplasty 3/11/2014    Cardiomyopathy [facility])     Cerebrovascular accident (CVA) due to occlusion of left middle cerebral artery ([address] 08/2016    brainstem infarction from left MCA occlusion    Cerebrovascular disease 07/27/2016    Coronary angioplasty status     CVA (cerebral vascular accident) ([address] 11/12/2017    Dependent edema 9/14/2017    Diplopia 5/15/2017    Resolved with management of cataracts    Dupuytren contracture 1/2/2018    Dysphagia 8/18/2011    Dysphonia 8/18/2011    Essential hypertension 8/17/2016    Gallop rhythm     Gout 12/10/2016    Gout of big toe 08/2014    Right Great Toe    H/O fall     Headache     migraines    Heart failure ([address]     History of 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    History of heart failure 1/6/2014    History of myocardial infarction 3/11/2014    History of recurrent pneumonia 2/11/2014    Hx of bacterial pneumonia     Hyperlipidemia, mixed 4/30/2021    Hyperlipidemia, mixed 4/30/2021    Hypotension     Left carotid artery stenosis 8/23/2020    Left carotid artery stenosis 8/23/2020    Leg swelling     Macrocytosis without anemia 12/10/2016    Microalbuminuria due to type 2 diabetes mellitus ([address] 9/14/2018    Morbid obesity ([address] 1/2/2014    Myocardial infarction ([address]     Obesity     AYLIN (obstructive sleep apnea) 12/10/2016    Osteoarthritis     Right-sided muscle weakness 10/19/2016    S/P cardiac catheterization     Skin cyst 4/8/2016    Spasm 11/9/2016    Stented coronary artery     Tremor of right hand 5/15/2017    Type 2 diabetes mellitus with diabetic polyneuropathy, without long-term current use of insulin ([address]     Unsteady gait 5/15/2017    Weakness     Weight gain      Past Surgical History:   Procedure Laterality Date    CHOLECYSTECTOMY      COLONOSCOPY  01/15/2010    polyp, diverticulosis (DR ELAINE)    CORONARY ANGIOPLASTY WITH STENT PLACEMENT  2014    CYST REMOVAL  16    DR Jose Eduardo Larios CYST    SHOULDER SURGERY Right 2015     Social History     Socioeconomic History    Marital status:      Spouse name: Sony Kat Number of children: 3    Years of education: 12+    Highest education level: Not on file   Occupational History    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     Employer: [employer] [place] [place]   Tobacco Use    Smoking status: Former Smoker     Packs/day: 2.50     Years: 30.00     Pack years: 75.00     Quit date:      Years since quittin.5    Smokeless tobacco: Never Used   Vaping Use    Vaping Use: Never used   Substance and Sexual Activity    Alcohol use:  Yes     Alcohol/week: 0.0 standard drinks     Comment: limits less than 10/wk     Drug use: No    Sexual activity: Yes     Partners: Female   Other Topics Concern    Not on file   Social History Narrative    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-- Disabled from [place] after  CVA    Lives With: Spouse    Type of Home: House    Home Layout: Two level, Bed/Bath upstairs, Laundry in basement(Railing)    Home Access: Stairs to enter with rails    Entrance Stairs - Number of Steps: 3 in back, 4 in front    Bathroom Shower/Tub: Tub/Shower unit    Bathroom Equipment: Shower chair    Home Equipment: Rolling walker, Cane    ADL Assistance: Independent    Homemaking Assistance: Needs assistance(Spouse  is primary; vertigo and arthritis limit housework)    Ambulation Assistance: Independent(Walker outside, cane inside)    Transfer Assistance: Independent    Active : No    Patient's  Info: has not driven since CVA     Social Determinants of Health     Financial Resource Strain: Low Risk     Difficulty of Paying Living Expenses: Not hard at all   Food Insecurity: No Food Insecurity    Worried About Running Out of Food in the Last Year: Never true    Ran Out of Food in the Last Year: Never true   Transportation Needs: No Transportation Needs    Lack of Transportation (Medical): No    Lack of Transportation (Non-Medical): No   Physical Activity: Inactive    Days of Exercise per Week: 0 days    Minutes of Exercise per Session: 0 min   Stress: No Stress Concern Present    Feeling of Stress : Only a little   Social Connections: Socially Integrated    Frequency of Communication with Friends and Family: More than three times a week    Frequency of Social Gatherings with Friends and Family: More than three times a week    Attends [employer] Services: 1 to 4 times per year   [employer] or Organizations:  Yes    Attends Club or Organization Meetings: More than 4 times per year    Marital Status:    Intimate Partner Violence: Not At Risk    Fear of Current or Ex-Partner: No    Emotionally Abused: No    Physically Abused: No    Sexually Abused: No     Family History   Problem Relation Age of Onset    Breast Cancer Mother     Stroke Father     Colon Cancer Father 76     Allergies   Allergen Reactions    Bactrim [Sulfamethoxazole-Trimethoprim] Nausea And Vomiting and Other (See Comments)     Sugar count elevated, had troubles urinating       Current Outpatient Medications   Medication Sig Dispense Refill    torsemide (DEMADEX) 20 MG tablet Take 1 tablet by mouth daily 90 tablet 0    glimepiride (AMARYL) 1 MG tablet Take 1 tablet by mouth every morning (before breakfast) 90 tablet 0    allopurinol (ZYLOPRIM) 300 MG tablet Take 1 tablet by mouth daily 90 tablet 0    atorvastatin (LIPITOR) 40 MG tablet Take 1 tablet by mouth daily 90 tablet 1    metFORMIN (GLUCOPHAGE) 500 MG tablet Take 1 tablet by mouth 2 times daily (with meals) 949 tablet 1    folic acid (FOLVITE) 1 MG tablet Take 1 tablet by mouth daily 30 tablet 3    vitamin B-1 100 MG tablet Take 1 tablet by mouth daily 30 tablet 3    clopidogrel (PLAVIX) 75 MG tablet Take 1 tablet by mouth daily 90 tablet 3    carvedilol (COREG) 25 MG tablet Take 1 tablet by mouth 2 times daily 180 tablet 1    Elastic Bandages & Supports (V-4 HIGH COMPRESSION HOSE) MISC KNEE HIGH - 20-30 mmHg 2 each 1    Alcohol Swabs PADS DX: diabetes mellitus. Test 1 time(s) daily - Ok to substitute per insurance (1 each = 1 box) 100 each 5    blood glucose monitor strips DX: diabetes mellitus. Use 1 time(s) daily - True Metrix requested by patient - [id number] Claudia Hope to substitute per insurance 100 strip 5    Lancets MISC DX: diabetes mellitus. Use 1 time(s) daily - Ok to substitute per insurance (1 each = 1 box) 100 each 5    blood glucose monitor kit and supplies DX: diabetes mellitus. Test 1 time(s) daily - True Metrix requested by patient - [id number] Claudia Hope to substitute per insurance 1 kit 0    aspirin 81 MG EC tablet Take 81 mg by mouth daily      Probiotic Product (PROBIOTIC DAILY PO) Take 1 tablet by mouth daily      Ascorbic Acid (VITAMIN C) 250 MG tablet Take 250 mg by mouth daily      vitamin B-12 (CYANOCOBALAMIN) 100 MCG tablet Take 50 mcg by mouth daily      lidocaine (LIDODERM) 5 % Place 1 patch onto the skin every 12 hours       ammonium lactate (AMLACTIN) 12 % cream APPLY TO DRY CALLUS AREAS 1 TO 2 TIMES DAILY  5    Misc. Devices (COMMODE BEDSIDE) MISC Use daily as needed 1 each 0    MULTIPLE VITAMIN PO Take 1 tablet by mouth daily        No current facility-administered medications for this visit. Review of Systems   Constitutional: Negative for fever. HENT: Negative for ear pain, tinnitus and trouble swallowing. Eyes: Negative for photophobia and visual disturbance. Respiratory: Negative for choking and shortness of breath. Cardiovascular: Negative for chest pain and palpitations. Gastrointestinal: Negative for nausea and vomiting. Musculoskeletal: Positive for back pain and gait problem.  Negative for joint swelling, myalgias, neck pain and neck stiffness. Skin: Negative for color change. Allergic/Immunologic: Negative for food allergies. Neurological: Positive for weakness. Negative for dizziness, tremors, seizures, syncope, facial asymmetry, speech difficulty, light-headedness, numbness and headaches. Psychiatric/Behavioral: Negative for behavioral problems, confusion, hallucinations and sleep disturbance. Objective:   /82 (Site: Left Upper Arm, Position: Sitting, Cuff Size: Large Adult)   Pulse 100   Wt 279 lb (126.6 kg)   BMI 36.81 kg/m²     Physical Exam  Vitals reviewed. Eyes:      Pupils: Pupils are equal, round, and reactive to light. Cardiovascular:      Rate and Rhythm: Normal rate and regular rhythm. Heart sounds: No murmur heard. Pulmonary:      Effort: Pulmonary effort is normal.      Breath sounds: Normal breath sounds. Abdominal:      General: Bowel sounds are normal.   Musculoskeletal:         General: Normal range of motion. Cervical back: Normal range of motion. Skin:     General: Skin is warm. Neurological:      Mental Status: He is alert and oriented to person, place, and time. Cranial Nerves: No cranial nerve deficit. Sensory: No sensory deficit. Motor: No abnormal muscle tone. Coordination: Coordination normal.      Deep Tendon Reflexes: Reflexes are normal and symmetric. Babinski sign absent on the right side. Babinski sign absent on the left side. Comments: Gait ataxia with difficulty walking with dizzy spells notable. Patient has decreased reflexes in the lower extremities bilaterally and walks with a walker   Psychiatric:         Mood and Affect: Mood normal.         No results found.     Lab Results   Component Value Date    WBC 6.8 05/05/2021    RBC 4.00 05/05/2021    HGB 14.8 05/05/2021    HCT 42.7 05/05/2021    .7 05/05/2021    MCH 37.0 05/05/2021    MCHC 34.7 05/05/2021    RDW 13.6 05/05/2021     05/05/2021    MPV 8.4 09/25/2015 Lab Results   Component Value Date     05/05/2021    K 3.7 05/05/2021    K 3.2 10/27/2020    CL 89 05/05/2021    CO2 29 05/05/2021    BUN 10 05/05/2021    CREATININE 0.90 05/05/2021    GFRAA >60.0 05/05/2021    LABGLOM >60.0 05/05/2021    GLUCOSE 178 05/05/2021    PROT 7.8 05/05/2021    LABALBU 4.7 05/05/2021    LABALBU DETECTED 10/27/2011    CALCIUM 10.5 05/05/2021    BILITOT 0.9 05/05/2021    ALKPHOS 75 05/05/2021    AST 36 05/05/2021    ALT 32 05/05/2021     Lab Results   Component Value Date    PROTIME 12.2 10/26/2020    PROTIME 12.4 11/12/2017    INR 0.9 10/26/2020     Lab Results   Component Value Date    TSH 4.080 11/12/2017    [id number] 685 11/12/2017    FOLATE 11.8 11/12/2017     Lab Results   Component Value Date    TRIG 135 05/05/2021    HDL 44 05/05/2021    LDLCALC 44 05/05/2021     Lab Results   Component Value Date    LABAMPH Neg 10/26/2020    BARBSCNU Neg 10/26/2020    LABBENZ Neg 10/26/2020    LABMETH Neg 10/26/2020    OPIATESCREENURINE Neg 10/26/2020    PHENCYCLIDINESCREENURINE Neg 10/26/2020    ETOH 210 10/26/2020     No results found for: LITHIUM, DILFRTOT, VALPROATE    Assessment:       Diagnosis Orders   1. Dizziness     2. Ataxic gait     3. Late effects of CVA (cerebrovascular accident)     4. TIA (transient ischemic attack)     Dizziness which has been going on for many years. Patient has oculovestibular mismatch and has not responded to [place]. We have tried him on multiple medications in the past as well. He still continues to have dizziness he had 4 falls but some of this could be his lower back. He had surgeries done. Recommended an MRI of the lumbosacral spine pt had  dizziness which occurs in bouts of several days in a month I recommended hydroxyzine to see if that would help. He will take it only when he has the attack. Plan:      No orders of the defined types were placed in this encounter.     No orders of the defined types were placed in this encounter. No follow-ups on file.       Roma Ames MD

## 2021-07-01 ENCOUNTER — OFFICE VISIT (OUTPATIENT)
Dept: FAMILY MEDICINE CLINIC | Age: 66
End: 2021-07-01
Payer: MEDICARE

## 2021-07-01 VITALS — TEMPERATURE: 98.3 F | BODY MASS INDEX: 36.98 KG/M2 | WEIGHT: 279 LBS | HEIGHT: 73 IN

## 2021-07-01 DIAGNOSIS — L30.8 PSORIASIFORM DERMATITIS: ICD-10-CM

## 2021-07-01 DIAGNOSIS — L28.0 LICHENOID DERMATITIS: Primary | ICD-10-CM

## 2021-07-01 DIAGNOSIS — L73.9 FOLLICULITIS: ICD-10-CM

## 2021-07-01 PROCEDURE — 99214 OFFICE O/P EST MOD 30 MIN: CPT | Performed by: FAMILY MEDICINE

## 2021-07-01 RX ORDER — CLOBETASOL PROPIONATE 0.05 G/ML
5 SPRAY TOPICAL NIGHTLY
Qty: 125 ML | Refills: 1 | Status: ON HOLD | OUTPATIENT
Start: 2021-07-01 | End: 2021-08-19

## 2021-07-01 RX ORDER — CEPHALEXIN 500 MG/1
500 CAPSULE ORAL 3 TIMES DAILY
Qty: 21 CAPSULE | Refills: 0 | Status: SHIPPED | OUTPATIENT
Start: 2021-07-01 | End: 2021-07-08

## 2021-07-01 ASSESSMENT — ENCOUNTER SYMPTOMS
VOMITING: 0
FACIAL SWELLING: 0
ANAL BLEEDING: 0
COLOR CHANGE: 0
ABDOMINAL DISTENTION: 0
COLOR CHANGE: 0
WHEEZING: 0
BLOOD IN STOOL: 0
APNEA: 0
VOICE CHANGE: 0
TROUBLE SWALLOWING: 0
NAUSEA: 0

## 2021-07-01 NOTE — PATIENT INSTRUCTIONS
We will continue shampoo regimen. Patient will add clobetasol drops to the directly affected areas of the scalp until saturated nightly. Expect 2 weeks before resolution to occur. Oral antibiotics for folliculitis of the posterior scalp to help decrease inflammation and irritation.   Hesitate to drain this wound in this location due to the likelihood patient CPAP strap will cause ulceration to the healing wound

## 2021-07-01 NOTE — PROGRESS NOTES
Diagnosis Orders   1. Lichenoid dermatitis  Clobetasol Propionate 0.05 % LIQD   2. Psoriasiform dermatitis  Clobetasol Propionate 0.05 % LIQD   3. Folliculitis  cephALEXin (KEFLEX) 500 MG capsule     Return in about 1 year (around 7/1/2022) for 15 min skin check. Patient Instructions   We will continue shampoo regimen. Patient will add clobetasol drops to the directly affected areas of the scalp until saturated nightly. Expect 2 weeks before resolution to occur. Oral antibiotics for folliculitis of the posterior scalp to help decrease inflammation and irritation. Hesitate to drain this wound in this location due to the likelihood patient CPAP strap will cause ulceration to the healing wound      Subjective:      Patient ID: Elan Graves is a 77 y.o. male who presents for:  Chief Complaint   Patient presents with    Skin Problem     still flaking on scalp - lesion on neck in the same area    [facility] maintenance will be addressed by patient's primary care physician       Patient states he has a new lesion on the back of the scalp. Has not had one like this in a long time. Painful. His CPAP strap rubs over it repetitively. He and his wife are utilizing Ambien for his scalp and it is helping alleviate some of his symptoms. Now he is noting more concentrated areas. His ear has healed up well.       Current Outpatient Medications on File Prior to Visit   Medication Sig Dispense Refill    hydrOXYzine (ATARAX) 25 MG tablet Take 1 tablet by mouth 2 times daily as needed (dizziness) 60 tablet 1    torsemide (DEMADEX) 20 MG tablet Take 1 tablet by mouth daily 90 tablet 0    glimepiride (AMARYL) 1 MG tablet Take 1 tablet by mouth every morning (before breakfast) 90 tablet 0    allopurinol (ZYLOPRIM) 300 MG tablet Take 1 tablet by mouth daily 90 tablet 0    atorvastatin (LIPITOR) 40 MG tablet Take 1 tablet by mouth daily 90 tablet 1    metFORMIN (GLUCOPHAGE) 500 MG tablet Take 1 tablet by mouth 2 times daily (with meals) 977 tablet 1    folic acid (FOLVITE) 1 MG tablet Take 1 tablet by mouth daily 30 tablet 3    vitamin B-1 100 MG tablet Take 1 tablet by mouth daily 30 tablet 3    clopidogrel (PLAVIX) 75 MG tablet Take 1 tablet by mouth daily 90 tablet 3    carvedilol (COREG) 25 MG tablet Take 1 tablet by mouth 2 times daily 180 tablet 1    Elastic Bandages & Supports (V-4 HIGH COMPRESSION HOSE) MISC KNEE HIGH - 20-30 mmHg 2 each 1    Alcohol Swabs PADS DX: diabetes mellitus. Test 1 time(s) daily - Ok to substitute per insurance (1 each = 1 box) 100 each 5    blood glucose monitor strips DX: diabetes mellitus. Use 1 time(s) daily - True Metrix requested by patient - [id number] Claudia Hope to substitute per insurance 100 strip 5    Lancets MISC DX: diabetes mellitus. Use 1 time(s) daily - Ok to substitute per insurance (1 each = 1 box) 100 each 5    blood glucose monitor kit and supplies DX: diabetes mellitus. Test 1 time(s) daily - True Metrix requested by patient - [id number] Claudia Hope to substitute per insurance 1 kit 0    aspirin 81 MG EC tablet Take 81 mg by mouth daily      Probiotic Product (PROBIOTIC DAILY PO) Take 1 tablet by mouth daily      Ascorbic Acid (VITAMIN C) 250 MG tablet Take 250 mg by mouth daily      vitamin B-12 (CYANOCOBALAMIN) 100 MCG tablet Take 50 mcg by mouth daily      lidocaine (LIDODERM) 5 % Place 1 patch onto the skin every 12 hours       ammonium lactate (AMLACTIN) 12 % cream APPLY TO DRY CALLUS AREAS 1 TO 2 TIMES DAILY  5    Misc. Devices (COMMODE BEDSIDE) MISC Use daily as needed 1 each 0    MULTIPLE VITAMIN PO Take 1 tablet by mouth daily        No current facility-administered medications on file prior to visit.      Past Medical History:   Diagnosis Date    Acute renal failure ([address]     Anxiety     CAD S/P percutaneous coronary angioplasty 3/11/2014    Cardiomyopathy 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     Employer: [employer]   Tobacco Use    Smoking status: Former Smoker     Packs/day: 2.50     Years: 30.00     Pack years: 75.00     Quit date:      Years since quittin.5    Smokeless tobacco: Never Used   Vaping Use    Vaping Use: Never used   Substance and Sexual Activity    Alcohol use: Yes     Alcohol/week: 0.0 standard drinks     Comment: limits less than 10/wk     Drug use: No    Sexual activity: Yes     Partners: Female   Other Topics Concern    Not on file   Social History Narrative    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    Lives With: Spouse    Type of Home: House    Home Layout: Two level, Bed/Bath upstairs, Laundry in basement(Railing)    Home Access: Stairs to enter with rails    Entrance Stairs - Number of Steps: 3 in back, 4 in front    Bathroom Shower/Tub: Tub/Shower unit    Bathroom Equipment: Shower chair    Home Equipment: Rolling walker, Cane    ADL Assistance: Independent    Homemaking Assistance: Needs assistance(Spouse  is primary; vertigo and arthritis limit housework)    Ambulation Assistance: Independent(Walker outside, cane inside)    Transfer Assistance: Independent    Active : No    Patient's  Info: has not driven since CVA     Social Determinants of Health     Financial Resource Strain: Low Risk     Difficulty of Paying Living Expenses: Not hard at all   Food Insecurity: No Food Insecurity    Worried About [address] [email or web address] in the Last Year: Never true    [facility] in the Last Year: Never true   Transportation Needs: No Transportation Needs    Lack of Transportation (Medical): No    Lack of Transportation (Non-Medical):  No   Physical Activity: Inactive    Days of Exercise per Week: 0 days    Minutes of Exercise per Session: 0 min   Stress: No Stress Concern Present    Feeling of Stress : Only a little   Social Connections: Socially Integrated    Frequency of Communication with Friends and Family: More than three times a week    Frequency of Social Gatherings with Friends and Family: More than three times a week    Attends Moravian Services: 1 to 4 times per year   [employer] or Organizations: Yes    Attends Club or Organization Meetings: More than 4 times per year    Marital Status:    Intimate Partner Violence: Not At Risk    Fear of Current or Ex-Partner: No    Emotionally Abused: No    Physically Abused: No    Sexually Abused: No     Family History   Problem Relation Age of Onset    Breast Cancer Mother     Stroke Father     Colon Cancer Father 76     Allergies:  Bactrim [sulfamethoxazole-trimethoprim]    Review of Systems   Constitutional: Negative for chills and fever. Skin: Positive for rash and wound. Negative for color change. Allergic/Immunologic: Negative for environmental allergies, food allergies and immunocompromised state. Hematological: Negative for adenopathy. Does not bruise/bleed easily. Psychiatric/Behavioral: Negative for behavioral problems and sleep disturbance. Objective:   Temp 98.3 °F (36.8 °C)   Ht 6' 1\" (1.854 m)   Wt 279 lb (126.6 kg)   BMI 36.81 kg/m²     Physical Exam  Constitutional:       General: He is not in acute distress. Appearance: Normal appearance. He is well-developed. He is not toxic-appearing. HENT:      Head: Normocephalic and atraumatic. Right Ear: Hearing and tympanic membrane normal.      Left Ear: Hearing and tympanic membrane normal.      Nose: Nose normal. No nasal deformity. Eyes:      General: Lids are normal.         Right eye: No discharge. Left eye: No discharge.       Conjunctiva/sclera: Conjunctivae normal.      Pupils: Pupils are equal, round, and reactive to light. Neck:      Thyroid: No thyroid mass or thyromegaly. Vascular: No JVD. Trachea: Trachea and phonation normal.   Cardiovascular:      Rate and Rhythm: Normal rate and regular rhythm. Pulmonary:      Effort: No accessory muscle usage or respiratory distress. Musculoskeletal:      Cervical back: Full passive range of motion without pain. Comments: FROM all large muscle groups and joints as witnessed when walking to exam table, getting on, and getting off the exam table. Skin:     General: Skin is warm and dry. Findings: No rash. Comments: Refer to pictures below for scalp lesions. Posterior neck/scalp has erythematous follicle with pustule noted. Neurological:      Mental Status: He is alert. Motor: No tremor or atrophy. Gait: Gait normal.   Psychiatric:         Speech: Speech normal.         Behavior: Behavior normal.         Thought Content: Thought content normal.                         No results found for this visit on 07/01/21.     Recent Results (from the past 2016 hour(s))   POCT glycosylated hemoglobin (Hb A1C)    Collection Time: 04/30/21  2:28 PM   Result Value Ref Range    Hemoglobin A1C 7.5 %   CBC Auto Differential    Collection Time: 05/05/21  2:53 PM   Result Value Ref Range    WBC 6.8 4.8 - 10.8 K/uL    RBC 4.00 (L) 4.70 - 6.10 M/uL    Hemoglobin 14.8 14.0 - 18.0 g/dL    Hematocrit 42.7 42.0 - 52.0 %    .7 (H) 80.0 - 100.0 fL    MCH 37.0 (H) 27.0 - 31.3 pg    MCHC 34.7 33.0 - 37.0 %    RDW 13.6 11.5 - 14.5 %    Platelets 950 156 - 301 K/uL    Neutrophils % 64.6 %    Lymphocytes % 25.1 %    Monocytes % 8.4 %    Eosinophils % 1.2 %    Basophils % 0.7 %    Neutrophils Absolute 4.4 1.4 - 6.5 K/uL    Lymphocytes Absolute 1.7 1.0 - 4.8 K/uL    Monocytes Absolute 0.6 0.2 - 0.8 K/uL    Eosinophils Absolute 0.1 0.0 - 0.7 K/uL    Basophils Absolute 0.0 0.0 - 0.2 K/uL    Toxic Granulation 0     Dohle Bodies 0     Adry Rods 0 Pelger Huet Cells 0 %    Hairy Cells 0     Hypersegmented Neutrophils 0     Vacuolated Neutrophils 0     Anisocytosis 0     Macrocytes 0     Microcytes 0     Polychromasia 0     Hypochromia 0     Poikilocytes 0     Schistocytes 0     Acanthocytes 0     Mentone Cells 0     Ovalocytes 0     Spherocytes 0     Stomatocytes 0     Target Cells 0     Tear Drop Cells 0     Basophilic Stippling 0     Levi-Jolly Bodies 0     Sickle Cells 0     Cabot Rings 0     Pappenheimer Bodies 0    Comprehensive Metabolic Panel    Collection Time: 05/05/21  2:53 PM   Result Value Ref Range    Sodium 135 135 - 144 mEq/L    Potassium 3.7 3.4 - 4.9 mEq/L    Chloride 89 (L) 95 - 107 mEq/L    CO2 29 20 - 31 mEq/L    Anion Gap 17 (H) 9 - 15 mEq/L    Glucose 178 (H) 70 - 99 mg/dL    BUN 10 8 - 23 mg/dL    CREATININE 0.90 0.70 - 1.20 mg/dL    GFR Non-African American >60.0 >60    GFR  >60.0 >60    Calcium 10.5 (H) 8.5 - 9.9 mg/dL    Total Protein 7.8 6.3 - 8.0 g/dL    Albumin 4.7 (H) 3.5 - 4.6 g/dL    Total Bilirubin 0.9 (H) 0.2 - 0.7 mg/dL    Alkaline Phosphatase 75 35 - 104 U/L    ALT 32 0 - 41 U/L    AST 36 0 - 40 U/L    Globulin 3.1 2.3 - 3.5 g/dL   Lipid Panel    Collection Time: 05/05/21  2:53 PM   Result Value Ref Range    Cholesterol, Total 115 0 - 199 mg/dL    Triglycerides 135 0 - 150 mg/dL    HDL 44 40 - 59 mg/dL    LDL Calculated 44 0 - 129 mg/dL           Assessment:       Diagnosis Orders   1. Lichenoid dermatitis  Clobetasol Propionate 0.05 % LIQD   2. Psoriasiform dermatitis  Clobetasol Propionate 0.05 % LIQD   3. Folliculitis  cephALEXin (KEFLEX) 500 MG capsule         No orders of the defined types were placed in this encounter. Plan:   Return in about 1 year (around 7/1/2022) for 15 min skin check. Patient Instructions   We will continue shampoo regimen. Patient will add clobetasol drops to the directly affected areas of the scalp until saturated nightly.   Expect 2 weeks before resolution to occur.    Oral antibiotics for folliculitis of the posterior scalp to help decrease inflammation and irritation. Hesitate to drain this wound in this location due to the likelihood patient CPAP strap will cause ulceration to the healing wound      This note was partially created with the assistance of dictation. This may lead to grammatical or spelling errors. Alo Sr M.D.

## 2021-07-06 ENCOUNTER — TELEPHONE (OUTPATIENT)
Dept: FAMILY MEDICINE CLINIC | Age: 66
End: 2021-07-06

## 2021-07-06 NOTE — TELEPHONE ENCOUNTER
Pt was prescribed Clobetasol Propionate from Dr Christie Donohue. Pt said it was $115 for that script. Pt wants to know if there is something else that is similar but cheaper?         Pt- [phone]

## 2021-07-07 ENCOUNTER — TELEPHONE (OUTPATIENT)
Dept: GASTROENTEROLOGY | Age: 66
End: 2021-07-07

## 2021-07-07 ENCOUNTER — OFFICE VISIT (OUTPATIENT)
Dept: GASTROENTEROLOGY | Age: 66
End: 2021-07-07

## 2021-07-07 ENCOUNTER — TELEPHONE (OUTPATIENT)
Dept: FAMILY MEDICINE CLINIC | Age: 66
End: 2021-07-07

## 2021-07-07 VITALS
RESPIRATION RATE: 18 BRPM | HEIGHT: 73 IN | BODY MASS INDEX: 35.92 KG/M2 | OXYGEN SATURATION: 96 % | WEIGHT: 271 LBS | HEART RATE: 93 BPM

## 2021-07-07 DIAGNOSIS — Z86.010 HISTORY OF COLON POLYPS: Primary | ICD-10-CM

## 2021-07-07 DIAGNOSIS — Z86.010 H/O ADENOMATOUS POLYP OF COLON: ICD-10-CM

## 2021-07-07 PROCEDURE — 99999 PR OFFICE/OUTPT VISIT,PROCEDURE ONLY: CPT | Performed by: SPECIALIST

## 2021-07-07 ASSESSMENT — ENCOUNTER SYMPTOMS
BLOOD IN STOOL: 0
EYES NEGATIVE: 1
ABDOMINAL DISTENTION: 0
RECTAL PAIN: 0
DIARRHEA: 0
RESPIRATORY NEGATIVE: 1
NAUSEA: 0
ANAL BLEEDING: 0
ABDOMINAL PAIN: 0
CONSTIPATION: 0
GASTROINTESTINAL NEGATIVE: 1
VOMITING: 0

## 2021-07-07 NOTE — TELEPHONE ENCOUNTER
Pt is scheduled for colonoscopy on 8/2/21 with Dr. Ashu Urbano. He is currently taking Plavix. How long should he hold blood thinner prior to procedure?

## 2021-07-07 NOTE — PROGRESS NOTES
Gastroenterology Clinic Visit    Brad Whitt  [id number]  Chief Complaint   Patient presents with    Consultation       HPI: 77 y.o. male presents to the clinic with h/o colon polyp, patient is here to arrange surveillance colonoscopy, reports no abdominal pain no change in bowel habits no rectal bleeding,  no nausea no vomiting, no history of weight loss. History does not smoke drinks alcohol occasionally history Father had colon cancer Brother had colon polyps. Surgical history cholecystectomy and orthopedic surgeries, back surgery    Review of Systems   Constitutional: Negative. HENT: Negative. Eyes: Negative. Respiratory: Negative. Cardiovascular:        History of coronary artery disease status post stenting cardiac status is stable   Gastrointestinal: Negative. Negative for abdominal distention, abdominal pain, anal bleeding, blood in stool, constipation, diarrhea, nausea, rectal pain and vomiting. No GI issues   Genitourinary: Negative. Musculoskeletal: Negative. Neurological: Negative. History of CVA in the past with some residual deficit   Psychiatric/Behavioral: Negative.          Past Medical History:   Diagnosis Date    Acute renal failure ([address]     Anxiety     CAD S/P percutaneous coronary angioplasty 3/11/2014    Cardiomyopathy [facility])     Cerebrovascular accident (CVA) due to occlusion of left middle cerebral artery ([address] 08/2016    brainstem infarction from left MCA occlusion    Cerebrovascular disease 07/27/2016    Coronary angioplasty status     CVA (cerebral vascular accident) ([address] 11/12/2017    Dependent edema 9/14/2017    Diplopia 5/15/2017    Resolved with management of cataracts    Dupuytren contracture 1/2/2018    Dysphagia 8/18/2011    Dysphonia 8/18/2011    Essential hypertension 8/17/2016    Gallop rhythm     Gout 12/10/2016    Gout of big toe 08/2014    Right Great Toe    H/O fall     Headache     migraines    Heart failure ([address]  History of 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    History of heart failure 1/6/2014    History of myocardial infarction 3/11/2014    History of recurrent pneumonia 2/11/2014    Hx of bacterial pneumonia     Hyperlipidemia, mixed 4/30/2021    Hyperlipidemia, mixed 4/30/2021    Hypotension     Left carotid artery stenosis 8/23/2020    Left carotid artery stenosis 8/23/2020    Leg swelling     Macrocytosis without anemia 12/10/2016    Microalbuminuria due to type 2 diabetes mellitus ([address] 9/14/2018    Morbid obesity ([facility] 1/2/2014    Myocardial infarction ([facility]     Obesity     AYLIN (obstructive sleep apnea) 12/10/2016    Osteoarthritis     Right-sided muscle weakness 10/19/2016    S/P cardiac catheterization     Skin cyst 4/8/2016    Spasm 11/9/2016    Stented coronary artery     Tremor of right hand 5/15/2017    Type 2 diabetes mellitus with diabetic polyneuropathy, without long-term current use of insulin ([facility]     Unsteady gait 5/15/2017    Weakness     Weight gain       Past Surgical History:   Procedure Laterality Date    CHOLECYSTECTOMY      COLONOSCOPY  01/15/2010    polyp, diverticulosis (DR ELAINE)    CORONARY ANGIOPLASTY WITH STENT PLACEMENT  2014    CYST REMOVAL  05/16/16    DR Jose Eduardo Larios CYST    SHOULDER SURGERY Right 12/18/2015     Current Outpatient Medications on File Prior to Visit   Medication Sig Dispense Refill    Clobetasol Propionate 0.05 % LIQD Apply 5 drops topically nightly 125 mL 1    hydrOXYzine (ATARAX) 25 MG tablet Take 1 tablet by mouth 2 times daily as needed (dizziness) 60 tablet 1    torsemide (DEMADEX) 20 MG tablet Take 1 tablet by mouth daily 90 tablet 0    glimepiride (AMARYL) 1 MG tablet Take 1 tablet by mouth every morning (before breakfast) 90 tablet 0    allopurinol (ZYLOPRIM) 300 MG tablet Take 1 tablet by mouth daily 90 tablet 0    atorvastatin (LIPITOR) 40 MG tablet Take 1 tablet by mouth daily 90 tablet 1    metFORMIN (GLUCOPHAGE) 500 MG tablet Take 1 tablet by mouth 2 times daily (with meals) 850 tablet 1    folic acid (FOLVITE) 1 MG tablet Take 1 tablet by mouth daily 30 tablet 3    vitamin B-1 100 MG tablet Take 1 tablet by mouth daily 30 tablet 3    clopidogrel (PLAVIX) 75 MG tablet Take 1 tablet by mouth daily 90 tablet 3    carvedilol (COREG) 25 MG tablet Take 1 tablet by mouth 2 times daily 180 tablet 1    Elastic Bandages & Supports (V-4 HIGH COMPRESSION HOSE) MISC KNEE HIGH - 20-30 mmHg 2 each 1    Alcohol Swabs PADS DX: diabetes mellitus. Test 1 time(s) daily - Ok to substitute per insurance (1 each = 1 box) 100 each 5    blood glucose monitor strips DX: diabetes mellitus. Use 1 time(s) daily - True Metrix requested by patient - Lacie Beyer to substitute per insurance 100 strip 5    Lancets MISC DX: diabetes mellitus. Use 1 time(s) daily - Ok to substitute per insurance (1 each = 1 box) 100 each 5    blood glucose monitor kit and supplies DX: diabetes mellitus. Test 1 time(s) daily - True Metrix requested by patient - Lacie Beyer to substitute per insurance 1 kit 0    aspirin 81 MG EC tablet Take 81 mg by mouth daily      Probiotic Product (PROBIOTIC DAILY PO) Take 1 tablet by mouth daily      Ascorbic Acid (VITAMIN C) 250 MG tablet Take 250 mg by mouth daily      vitamin B-12 (CYANOCOBALAMIN) 100 MCG tablet Take 50 mcg by mouth daily      ammonium lactate (AMLACTIN) 12 % cream APPLY TO DRY CALLUS AREAS 1 TO 2 TIMES DAILY  5    Misc. Devices (COMMODE BEDSIDE) MISC Use daily as needed 1 each 0    MULTIPLE VITAMIN PO Take 1 tablet by mouth daily       cephALEXin (KEFLEX) 500 MG capsule Take 1 capsule by mouth 3 times daily for 7 days 21 capsule 0    lidocaine (LIDODERM) 5 % Place 1 patch onto the skin every 12 hours        No current facility-administered medications on file prior to visit.      Family History   Problem Relation Age of Onset    Breast Cancer Mother     Stroke Father     Colon Cancer Father 76      Social History     Socioeconomic History    Marital status:      Spouse name: Rolando Onofre Number of children: 3    Years of education: 12+    Highest education level: None   Occupational History    Occupation: medical retired   CV[employer]     Employer: [employer]   Tobacco Use    Smoking status: Former Smoker     Packs/day: 2.50     Years: 30.00     Pack years: 75.00     Quit date:      Years since quittin.5    Smokeless tobacco: Never Used   Vaping Use    Vaping Use: Never used   Substance and Sexual Activity    Alcohol use: Yes     Alcohol/week: 0.0 standard drinks     Comment: limits less than 10/wk     Drug use: No    Sexual activity: Yes     Partners: Female   Other Topics Concern    None   Social History Narrative    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    Lives With: Spouse    Type of Home: House    Home Layout: Two level, Bed/Bath upstairs, Laundry in basement(Railing)    Home Access: Stairs to enter with rails    Entrance Stairs - Number of Steps: 3 in back, 4 in front    Bathroom Shower/Tub: Tub/Shower unit    Bathroom Equipment: Shower chair    Home Equipment: Rolling walker, Cane    ADL Assistance: Independent    Homemaking Assistance: Needs assistance(Spouse  is primary; vertigo and arthritis limit housework)    Ambulation Assistance: Independent(Walker outside, cane inside)    Transfer Assistance: Independent    Active : No    Patient's  Info: has not driven since CVA     Social Determinants of Health     Financial Resource Strain: Low Risk     Difficulty of Paying Living Expenses: Not hard at all   Food Insecurity: No Food Insecurity    Worried About [address] in the Last Year: Never true    [facility] in the Last Year: Never true   Transportation Needs: No Transportation Needs    Lack of Transportation (Medical):  No  Lack of Transportation (Non-Medical): No   Physical Activity: Inactive    Days of Exercise per Week: 0 days    Minutes of Exercise per Session: 0 min   Stress: No Stress Concern Present    Feeling of Stress : Only a little   Social Connections: Socially Integrated    Frequency of Communication with Friends and Family: More than three times a week    Frequency of Social Gatherings with Friends and Family: More than three times a week    Attends Mosque Services: 1 to 4 times per year   [employer] or Organizations: Yes    Attends Club or Organization Meetings: More than 4 times per year    Marital Status:    Intimate Partner Violence: Not At Risk    Fear of Current or Ex-Partner: No    Emotionally Abused: No    Physically Abused: No    Sexually Abused: No       Pulse 93, resp. rate 18, height 6' 1\" (1.854 m), weight 271 lb (122.9 kg), SpO2 96 %. Physical Exam  Constitutional:       Appearance: He is well-developed. HENT:      Head: Normocephalic. Eyes:      Pupils: Pupils are equal, round, and reactive to light. Cardiovascular:      Rate and Rhythm: Normal rate and regular rhythm. Heart sounds: Normal heart sounds. Pulmonary:      Effort: Pulmonary effort is normal.      Breath sounds: Normal breath sounds. Abdominal:      General: Bowel sounds are normal.      Palpations: Abdomen is soft. Comments: Obese, soft, nontender no palpable mass   Skin:     General: Skin is warm and dry. Neurological:      Mental Status: He is alert.          Laboratory, Pathology, Radiology reviewed indetail with relevant important investigations summarized below:  Lab Results   Component Value Date    WBC 6.8 05/05/2021    HGB 14.8 05/05/2021    HCT 42.7 05/05/2021    .7 (H) 05/05/2021     05/05/2021     Lab Results   Component Value Date    ALT 32 05/05/2021    AST 36 05/05/2021    ALKPHOS 75 05/05/2021    BILITOT 0.9 (H) 05/05/2021       No results found.    Endoscopic investigations:     Assessmentand Plan:  77 y.o. male with history of colon polyps. For surveillance colonoscopy. To hold aspirin 4 days prior to the colonoscopy. Patient to hold Plavix 4 days prior to the procedure. Diagnosis Orders   1. History of colon polyps  Endoscopy, colon, diagnostic   2. H/O adenomatous polyp of colon  Endoscopy, colon, diagnostic     No follow-ups on file. Jerman Rivera MD   Staff Gastroenterologist  [facility]    Please note this report has been partially produced using speech recognition software and may cause contain errors related to thatsystem including grammar, punctuation and spelling as well as words and phrases that may seem inappropriate. If there are questions or concerns please feel free to contact me to clarify.

## 2021-07-09 ENCOUNTER — TELEPHONE (OUTPATIENT)
Dept: NEUROLOGY | Age: 66
End: 2021-07-09

## 2021-07-22 ENCOUNTER — TELEPHONE (OUTPATIENT)
Dept: GASTROENTEROLOGY | Age: 66
End: 2021-07-22

## 2021-08-09 ENCOUNTER — HOSPITAL ENCOUNTER (OUTPATIENT)
Dept: MRI IMAGING | Age: 66
Discharge: HOME | End: 2021-08-11
Payer: MEDICARE

## 2021-08-09 DIAGNOSIS — M54.16 LUMBAR RADICULOPATHY: ICD-10-CM

## 2021-08-09 PROCEDURE — 72148 MRI LUMBAR SPINE W/O DYE: CPT

## 2021-08-17 ENCOUNTER — TELEPHONE (OUTPATIENT)
Dept: NEUROLOGY | Age: 66
End: 2021-08-17

## 2021-08-19 ENCOUNTER — ANESTHESIA (OUTPATIENT)
Dept: ENDOSCOPY | Age: 66
End: 2021-08-19
Payer: MEDICARE

## 2021-08-19 ENCOUNTER — ANCILLARY PROCEDURE (OUTPATIENT)
Dept: ENDOSCOPY | Age: 66
End: 2021-08-19
Attending: SPECIALIST
Payer: MEDICARE

## 2021-08-19 ENCOUNTER — ANESTHESIA EVENT (OUTPATIENT)
Dept: ENDOSCOPY | Age: 66
End: 2021-08-19
Payer: MEDICARE

## 2021-08-19 ENCOUNTER — HOSPITAL ENCOUNTER (OUTPATIENT)
Age: 66
Setting detail: OUTPATIENT SURGERY
Discharge: HOME | End: 2021-08-19
Attending: SPECIALIST | Admitting: SPECIALIST
Payer: MEDICARE

## 2021-08-19 VITALS
BODY MASS INDEX: 35.92 KG/M2 | RESPIRATION RATE: 16 BRPM | HEIGHT: 73 IN | DIASTOLIC BLOOD PRESSURE: 81 MMHG | TEMPERATURE: 98.2 F | SYSTOLIC BLOOD PRESSURE: 141 MMHG | HEART RATE: 82 BPM | OXYGEN SATURATION: 96 % | WEIGHT: 271 LBS

## 2021-08-19 VITALS
RESPIRATION RATE: 8 BRPM | DIASTOLIC BLOOD PRESSURE: 69 MMHG | SYSTOLIC BLOOD PRESSURE: 128 MMHG | OXYGEN SATURATION: 99 %

## 2021-08-19 LAB
GLUCOSE BLD-MCNC: 136 MG/DL (ref 60–115)
PERFORMED ON: ABNORMAL

## 2021-08-19 PROCEDURE — 2500000003 HC RX 250 WO HCPCS: Performed by: NURSE ANESTHETIST, CERTIFIED REGISTERED

## 2021-08-19 PROCEDURE — 2580000003 HC RX 258: Performed by: SPECIALIST

## 2021-08-19 PROCEDURE — 2580000003 HC RX 258: Performed by: NURSE ANESTHETIST, CERTIFIED REGISTERED

## 2021-08-19 PROCEDURE — 3700000000 HC ANESTHESIA ATTENDED CARE: Performed by: SPECIALIST

## 2021-08-19 PROCEDURE — 88305 TISSUE EXAM BY PATHOLOGIST: CPT

## 2021-08-19 PROCEDURE — 7100000010 HC PHASE II RECOVERY - FIRST 15 MIN: Performed by: SPECIALIST

## 2021-08-19 PROCEDURE — 45380 COLONOSCOPY AND BIOPSY: CPT | Performed by: SPECIALIST

## 2021-08-19 PROCEDURE — 7100000011 HC PHASE II RECOVERY - ADDTL 15 MIN: Performed by: SPECIALIST

## 2021-08-19 PROCEDURE — 3609027000 HC COLONOSCOPY: Performed by: SPECIALIST

## 2021-08-19 PROCEDURE — 45385 COLONOSCOPY W/LESION REMOVAL: CPT | Performed by: SPECIALIST

## 2021-08-19 PROCEDURE — 2709999900 HC NON-CHARGEABLE SUPPLY: Performed by: SPECIALIST

## 2021-08-19 PROCEDURE — 6370000000 HC RX 637 (ALT 250 FOR IP): Performed by: SPECIALIST

## 2021-08-19 PROCEDURE — 3700000001 HC ADD 15 MINUTES (ANESTHESIA): Performed by: SPECIALIST

## 2021-08-19 RX ORDER — SODIUM CHLORIDE 9 MG/ML
INJECTION, SOLUTION INTRAVENOUS CONTINUOUS PRN
Status: DISCONTINUED | OUTPATIENT
Start: 2021-08-19 | End: 2021-08-19 | Stop reason: SDUPTHER

## 2021-08-19 RX ORDER — LIDOCAINE HYDROCHLORIDE 20 MG/ML
INJECTION, SOLUTION INFILTRATION; PERINEURAL PRN
Status: DISCONTINUED | OUTPATIENT
Start: 2021-08-19 | End: 2021-08-19 | Stop reason: SDUPTHER

## 2021-08-19 RX ORDER — 0.9 % SODIUM CHLORIDE 0.9 %
500 INTRAVENOUS SOLUTION INTRAVENOUS ONCE
Status: COMPLETED | OUTPATIENT
Start: 2021-08-19 | End: 2021-08-19

## 2021-08-19 RX ORDER — PROPOFOL 10 MG/ML
INJECTION, EMULSION INTRAVENOUS CONTINUOUS PRN
Status: DISCONTINUED | OUTPATIENT
Start: 2021-08-19 | End: 2021-08-19 | Stop reason: SDUPTHER

## 2021-08-19 RX ORDER — ONDANSETRON 2 MG/ML
4 INJECTION INTRAMUSCULAR; INTRAVENOUS
Status: DISCONTINUED | OUTPATIENT
Start: 2021-08-19 | End: 2021-08-19 | Stop reason: HOSPADM

## 2021-08-19 RX ORDER — MAGNESIUM HYDROXIDE 1200 MG/15ML
LIQUID ORAL PRN
Status: DISCONTINUED | OUTPATIENT
Start: 2021-08-19 | End: 2021-08-19 | Stop reason: ALTCHOICE

## 2021-08-19 RX ORDER — GLYCOPYRROLATE 1 MG/5 ML
SYRINGE (ML) INTRAVENOUS PRN
Status: DISCONTINUED | OUTPATIENT
Start: 2021-08-19 | End: 2021-08-19 | Stop reason: SDUPTHER

## 2021-08-19 RX ADMIN — PROPOFOL 140 MCG/KG/MIN: 10 INJECTION, EMULSION INTRAVENOUS at 10:35

## 2021-08-19 RX ADMIN — SODIUM CHLORIDE 500 ML: 9 INJECTION, SOLUTION INTRAVENOUS at 09:03

## 2021-08-19 RX ADMIN — Medication 0.2 MG: at 10:38

## 2021-08-19 RX ADMIN — SODIUM CHLORIDE: 9 INJECTION, SOLUTION INTRAVENOUS at 10:23

## 2021-08-19 RX ADMIN — LIDOCAINE HYDROCHLORIDE 40 MG: 20 INJECTION, SOLUTION INFILTRATION; PERINEURAL at 10:35

## 2021-08-19 NOTE — ANESTHESIA POSTPROCEDURE EVALUATION
Department of Anesthesiology  Postprocedure Note    Patient: Leopoldo Zee  MRN: [id number]  YOB: 1955  Date of evaluation: 8/19/2021  Time:  11:26 AM     Procedure Summary     Date: 08/19/21 Room / Location: [facility] OR [address] [place] [address]    Anesthesia Start: 1031 Anesthesia Stop:     Procedure: COLORECTAL CANCER SCREENING, HIGH RISK (N/A ) Diagnosis: (history of adenomatous polyp of colon;  Z86.010)    Surgeons: Tootie Roca MD Responsible Provider: NEREYDA Mac CRNA    Anesthesia Type: MAC ASA Status: 2          Anesthesia Type: No value filed. Benji Phase I: Benji Score: 10    Benji Phase II:      Last vitals: Reviewed and per EMR flowsheets.        Anesthesia Post Evaluation

## 2021-08-19 NOTE — ANESTHESIA PRE PROCEDURE
Department of Anesthesiology  Preprocedure Note       Name:  Merritt Villalobos   Age:  77 y.o.  :  1955                                          MRN:  [id number]         Date:  2021      Surgeon: Alberto Johnson):  Phan Mccann MD    Procedure: Procedure(s):  COLORECTAL CANCER SCREENING, HIGH RISK    Medications prior to admission:   Prior to Admission medications    Medication Sig Start Date End Date Taking? Authorizing Provider   torsemide (DEMADEX) 20 MG tablet Take 1 tablet by mouth daily 21  Yes Immanuel Cook MD   glimepiride (AMARYL) 1 MG tablet Take 1 tablet by mouth every morning (before breakfast) 21  Yes Immanuel Cook MD   allopurinol (ZYLOPRIM) 300 MG tablet Take 1 tablet by mouth daily 21  Yes Immanuel Cook MD   atorvastatin (LIPITOR) 40 MG tablet Take 1 tablet by mouth daily 5/10/21  Yes Immanuel Cook MD   metFORMIN (GLUCOPHAGE) 500 MG tablet Take 1 tablet by mouth 2 times daily (with meals) 20  Yes Immanuel Cook MD   clopidogrel (PLAVIX) 75 MG tablet Take 1 tablet by mouth daily 9/15/20  Yes Immanuel Cook MD   aspirin 81 MG EC tablet Take 81 mg by mouth daily   Yes Historical Provider, MD   Probiotic Product (PROBIOTIC DAILY PO) Take 1 tablet by mouth daily   Yes Historical Provider, MD   vitamin B-12 (CYANOCOBALAMIN) 100 MCG tablet Take 50 mcg by mouth daily   Yes Historical Provider, MD   lidocaine (LIDODERM) 5 % Place 1 patch onto the skin every 12 hours  18  Yes Historical Provider, MD   ammonium lactate (AMLACTIN) 12 % cream APPLY TO DRY CALLUS AREAS 1 TO 2 TIMES DAILY 17  Yes Historical Provider, MD   MULTIPLE VITAMIN PO Take 1 tablet by mouth daily    Yes Historical Provider, MD   carvedilol (COREG) 25 MG tablet Take 1 tablet by mouth 2 times daily 21   Immanuel Cook MD   Elastic Bandages & Supports (V-4 HIGH COMPRESSION HOSE) MISC KNEE HIGH - 20-30 mmHg 20   Immanuel Cook MD   Alcohol Swabs PADS DX: diabetes mellitus. Test 1 time(s) daily - Ok to substitute per insurance (1 each = 1 box) 5/14/20   Jhonny Hashimoto, MD   blood glucose monitor strips DX: diabetes mellitus. Use 1 time(s) daily - True Metrix requested by patient - [id number] to substitute per insurance 5/14/20   Jhonny Hashimoto, MD   Lancets MISC DX: diabetes mellitus. Use 1 time(s) daily - Ok to substitute per insurance (1 each = 1 box) 5/14/20   Jhonny Hashimoto, MD   blood glucose monitor kit and supplies DX: diabetes mellitus. Test 1 time(s) daily - True Metrix requested by patient - [id number] to substitute per insurance 5/14/20   Jhonny Hashimoto, MD   [facility] Devices (COMMODE BEDSIDE) MISC Use daily as needed 8/22/16   Joseph Ga MD       Current medications:    Current Facility-Administered Medications   Medication Dose Route Frequency Provider Last Rate Last Admin    0.9 % sodium chloride bolus  500 mL Intravenous Once Sudarshan Jensen  mL/hr at 08/19/21 0903 500 mL at 08/19/21 0232       Allergies:     Allergies   Allergen Reactions    Bactrim [Sulfamethoxazole-Trimethoprim] Nausea And Vomiting and Other (See Comments)     Sugar count elevated, had troubles urinating       Problem List:    Patient Active Problem List   Diagnosis Code    Morbid obesity ([facility] E66.01    History of heart failure Z86.79    History of recurrent pneumonia Z87.01    CAD S/P percutaneous coronary angioplasty I25.10, Z98.61    History of myocardial infarction I25.2    Type 2 diabetes mellitus with diabetic polyneuropathy, without long-term current use of insulin (HCC) E11.42    Skin cyst L72.9    Local infection of skin and subcutaneous tissue L08.9    History of CVA (cerebrovascular accident) Z80.78    Essential hypertension I10    Right-sided muscle weakness M62.81    Spasm R25.2    AYLIN (obstructive sleep apnea) G47.33    Gout M10.9    Macrocytosis without anemia D75.89    Tremor of right hand R25.1    Unsteady gait R26.81    Dependent edema R60.9    Cutaneous abscess of back excluding buttocks L02.212    Skin infection L08.9    Microalbuminuria due to type 2 diabetes mellitus ([facility]) E11.29, R80.9    Lymphedema of both lower extremities I89.0    PAD (peripheral artery disease) ([facility]) I73.9    Venous insufficiency of both lower extremities I87.2    Claudication ([facility]) I73.9    Abscess of back L02.212    Abscess of right leg L02.415    Tinnitus of both ears H93.13    Sensorineural hearing loss (SNHL) of left ear H90.5    Ataxic gait R26.0    Left carotid artery stenosis I65.22    Dysarthria R47.1    Late effects of CVA (cerebrovascular accident) I69.90    TIA (transient ischemic attack) G45.9    At high risk for falls Z91.81    Hyperlipidemia, mixed E78.2    Skin lesion of left external ear H61.91    Hearing loss of left ear H91.92    History of colonic polyps Z86.010    Lumbar radiculopathy M54.16    Dizziness R42       Past Medical History:        Diagnosis Date    Acute renal failure ([facility])     Anxiety     CAD S/P percutaneous coronary angioplasty 3/11/2014    Cardiomyopathy ([address]     Cerebrovascular accident (CVA) due to occlusion of left middle cerebral artery ([address] 08/2016    brainstem infarction from left MCA occlusion    Cerebrovascular disease 07/27/2016    Colon polyp     Coronary angioplasty status     CVA (cerebral vascular accident) ([address] 11/12/2017    Dependent edema 9/14/2017    Diplopia 5/15/2017    Resolved with management of cataracts    Dupuytren contracture 1/2/2018    Dysphagia 8/18/2011    Dysphonia 8/18/2011    Essential hypertension 8/17/2016    Gallop rhythm     Gout 12/10/2016    Gout of big toe 08/2014    Right Great Toe    H/O fall     Headache     migraines    Heart failure ([address]     History of 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    History of heart failure 1/6/2014    History of myocardial infarction 3/11/2014    History of recurrent pneumonia 2014    Hx of bacterial pneumonia     Hyperlipidemia, mixed 2021    Hyperlipidemia, mixed 2021    Hypotension     Left carotid artery stenosis 2020    Left carotid artery stenosis 2020    Leg swelling     Macrocytosis without anemia 12/10/2016    Microalbuminuria due to type 2 diabetes mellitus ([address] 2018    Morbid obesity ([address] 2014    Myocardial infarction ([address]     Obesity     AYLIN (obstructive sleep apnea) 12/10/2016    Osteoarthritis     Right-sided muscle weakness 10/19/2016    S/P cardiac catheterization     Sciatica     Skin cyst 2016    Spasm 2016    Spina bifida ([address]     Stented coronary artery     Tremor of right hand 5/15/2017    Type 2 diabetes mellitus with diabetic polyneuropathy, without long-term current use of insulin ([address]     Unsteady gait 5/15/2017    Weakness     Weight gain        Past Surgical History:        Procedure Laterality Date    ANKLE SURGERY Left     BACK SURGERY      lumbar laminectomy    CHOLECYSTECTOMY      COLONOSCOPY  01/15/2010    polyp, diverticulosis (DR ELAINE)    CORONARY ANGIOPLASTY WITH STENT PLACEMENT      3 stents    CYST REMOVAL  2016    DR Pilar Ayala,  L SCROTAL CYST, R thigh, L ear, back    SHOULDER SURGERY Bilateral 2015    left once right twice- to remove bone spurs    TONSILLECTOMY         Social History:    Social History     Tobacco Use    Smoking status: Current Some Day Smoker     Packs/day: 2.50     Years: 30.00     Pack years: 75.00     Types: Cigarettes     Last attempt to quit:      Years since quittin.6    Smokeless tobacco: Never Used    Tobacco comment: varies   Substance Use Topics    Alcohol use:  Yes     Alcohol/week: 0.0 standard drinks     Comment: limits less than 10/wk varies                                Ready to quit: Not Answered  Counseling given: Not Answered  Comment: varies      Vital Signs (Current):   Vitals:    08/19/21 0859   BP: 137/68   Pulse: 90   Resp: 16   Temp: 36.8 °C (98.2 °F)   TempSrc: Temporal   SpO2: 98%   Weight: 271 lb (122.9 kg)   Height: 6' 0.5\" (1.842 m)                                              BP Readings from Last 3 Encounters:   08/19/21 137/68   06/30/21 138/82   06/28/21 (!) 142/80       NPO Status: Time of last liquid consumption: 0030                        Time of last solid consumption: 1500                        Date of last liquid consumption: 08/19/21                        Date of last solid food consumption: 08/17/21    BMI:   Wt Readings from Last 3 Encounters:   08/19/21 271 lb (122.9 kg)   07/07/21 271 lb (122.9 kg)   07/01/21 279 lb (126.6 kg)     Body mass index is 36.25 kg/m². CBC:   Lab Results   Component Value Date    WBC 6.8 05/05/2021    RBC 4.00 05/05/2021    HGB 14.8 05/05/2021    HCT 42.7 05/05/2021    .7 05/05/2021    RDW 13.6 05/05/2021     05/05/2021       CMP:   Lab Results   Component Value Date     05/05/2021    K 3.7 05/05/2021    K 3.2 10/27/2020    CL 89 05/05/2021    CO2 29 05/05/2021    BUN 10 05/05/2021    CREATININE 0.90 05/05/2021    GFRAA >60.0 05/05/2021    LABGLOM >60.0 05/05/2021    GLUCOSE 178 05/05/2021    PROT 7.8 05/05/2021    CALCIUM 10.5 05/05/2021    BILITOT 0.9 05/05/2021    ALKPHOS 75 05/05/2021    AST 36 05/05/2021    ALT 32 05/05/2021       POC Tests:   Recent Labs     08/19/21  0842   POCGLU 136*       Coags:   Lab Results   Component Value Date    PROTIME 12.2 10/26/2020    PROTIME 12.4 11/12/2017    INR 0.9 10/26/2020    APTT 28.6 10/26/2020       HCG (If Applicable): No results found for: PREGTESTUR, PREGSERUM, HCG, HCGQUANT     ABGs: No results found for: PHART, PO2ART, DWI4BCT, GPW7HVS, BEART, W4MBXDUX     Type & Screen (If Applicable):  No results found for: LABABO, LABRH    Drug/Infectious Status (If Applicable):  No results found for: HIV, HEPCAB    COVID-19 Screening (If Applicable):  No results found for: [address] [facility]        Anesthesia Evaluation  Patient summary reviewed and Nursing notes reviewed  Airway: Mallampati: II  TM distance: >3 FB   Neck ROM: full  Mouth opening: > = 3 FB Dental: normal exam         Pulmonary:normal exam    (+) sleep apnea:                             Cardiovascular:    (+) hypertension:, past MI:, CAD:,                   Neuro/Psych:   (+) CVA:, neuromuscular disease:, TIA, headaches:,             GI/Hepatic/Renal: Neg GI/Hepatic/Renal ROS            Endo/Other:    (+) Diabetes, . Abdominal:             Vascular: Other Findings:             Anesthesia Plan      MAC     ASA 3             Anesthetic plan and risks discussed with patient. Plan discussed with CRNA.                 NEREYDA Morrell - CRNA   8/19/2021

## 2021-08-19 NOTE — ANESTHESIA POSTPROCEDURE EVALUATION
Department of Anesthesiology  Postprocedure Note    Patient: Dwight Rodarte  MRN: [id number]  YOB: 1955  Date of evaluation: 8/19/2021  Time:  11:26 AM     Procedure Summary     Date: 08/19/21 Room / Location: [facility] OR [address] [place] [address]    Anesthesia Start: 1031 Anesthesia Stop:     Procedure: COLORECTAL CANCER SCREENING, HIGH RISK (N/A ) Diagnosis: (history of adenomatous polyp of colon;  Z86.010)    Surgeons: Yoli Espinosa MD Responsible Provider: NEREYDA Mena CRNA    Anesthesia Type: MAC ASA Status: 2          Anesthesia Type: No value filed. Benji Phase I: Benji Score: 10    Benji Phase II:      Last vitals: Reviewed and per EMR flowsheets.        Anesthesia Post Evaluation    Patient location during evaluation: PACU  Patient participation: complete - patient participated  Level of consciousness: awake and alert  Airway patency: patent  Nausea & Vomiting: no nausea and no vomiting  Complications: no  Cardiovascular status: blood pressure returned to baseline and hemodynamically stable  Respiratory status: acceptable  Hydration status: euvolemic

## 2021-08-19 NOTE — PROGRESS NOTES
Before discharge, pt felt urge to have bowel movement. Used walker to amb. To BR. Pt pulled nurse assist cord and had small amt. Blood in toilet, approx. 1-2 tbsp. Had some formed pieces of stool floating on surface of water in commode. Dr. Megan Moran notified of this and reassured pt and wife. Pt told hold Plavix for 2 more days.  may resume Sun.

## 2021-08-19 NOTE — H&P
Patient Name: Mace Halsted  : 1955  MRN: [id number]  DATE: 21      ENDOSCOPY  History and Physical    Procedure:    [x] Diagnostic Colonoscopy       [] Screening Colonoscopy  [] EGD      [] ERCP      [] EUS       [] Other    [x] Previous office notes/History and Physical reviewed from the patients chart. Please see EMR for further details of HPI. I have examined the patient's status immediately prior to the procedure and:      Indications/HPI:    []Abdominal Pain  []Cancer- GI/Lung  []Fhx of colon CA/polyps  []History of Polyps  []Nesbitts   []Melena  []Abnormal Imaging  []Dysphagia    []Persistent Pneumonia  []Anemia  []Food Impaction  [x]History of Polyps  []GI Bleed  []Pulmonary nodule/Mass  []Change in bowel habits []Heartburn/Reflux  []Rectal Bleed (BRBPR)  []Chest Pain - Non Cardiac []Heme (+) Stoo  l[]Ulcers  []Constipation  []Hemoptysis   []Varices  []Diarrhea  []Hypoxemia  []Nausea/Vomiting  []Screening   []Crohns/Colitis  []Other:    Anesthesia:   [x] MAC [] Moderate Sedation   [] General   [] None     ROS: 12 pt Review of Symptoms was negative unless mentioned above    Medications:   Prior to Admission medications    Medication Sig Start Date End Date Taking?  Authorizing Provider   torsemide (DEMADEX) 20 MG tablet Take 1 tablet by mouth daily 21  Yes Amada Khan MD   glimepiride (AMARYL) 1 MG tablet Take 1 tablet by mouth every morning (before breakfast) 21  Yes Amada Khan MD   allopurinol (ZYLOPRIM) 300 MG tablet Take 1 tablet by mouth daily 21  Yes Amada Khan MD   atorvastatin (LIPITOR) 40 MG tablet Take 1 tablet by mouth daily 5/10/21  Yes Amada Khan MD   metFORMIN (GLUCOPHAGE) 500 MG tablet Take 1 tablet by mouth 2 times daily (with meals) 20  Yes Amada Khan MD   clopidogrel (PLAVIX) 75 MG tablet Take 1 tablet by mouth daily 9/15/20  Yes Amada Khan MD   aspirin 81 MG EC tablet Take 81 mg by mouth daily   Yes Historical Provider, MD   Probiotic Product (PROBIOTIC DAILY PO) Take 1 tablet by mouth daily   Yes Historical Provider, MD   vitamin B-12 (CYANOCOBALAMIN) 100 MCG tablet Take 50 mcg by mouth daily   Yes Historical Provider, MD   lidocaine (LIDODERM) 5 % Place 1 patch onto the skin every 12 hours  9/8/18  Yes Historical Provider, MD   ammonium lactate (AMLACTIN) 12 % cream APPLY TO DRY CALLUS AREAS 1 TO 2 TIMES DAILY 8/21/17  Yes Historical Provider, MD   MULTIPLE VITAMIN PO Take 1 tablet by mouth daily    Yes Historical Provider, MD   carvedilol (COREG) 25 MG tablet Take 1 tablet by mouth 2 times daily 8/19/21   Lorenzo Lin MD   Elastic Bandages & Supports (V-4 HIGH COMPRESSION HOSE) MISC KNEE HIGH - 20-30 mmHg 5/18/20   Lorenzo Lin MD   Alcohol Swabs PADS DX: diabetes mellitus. Test 1 time(s) daily - Ok to substitute per insurance (1 each = 1 box) 5/14/20   Lorenzo Lin MD   blood glucose monitor strips DX: diabetes mellitus. Use 1 time(s) daily - True Metrix requested by patient - [id number] to substitute per insurance 5/14/20   Lorenzo Lin MD   Lancets MISC DX: diabetes mellitus. Use 1 time(s) daily - Ok to substitute per insurance (1 each = 1 box) 5/14/20   Lorenzo Lin MD   blood glucose monitor kit and supplies DX: diabetes mellitus. Test 1 time(s) daily - True Metrix requested by patient - [id number] to substitute per insurance 5/14/20   Lorenzo Lin MD   Misc. Devices (COMMODE BEDSIDE) MISC Use daily as needed 8/22/16   Danny Medley MD       Allergies:    Allergies   Allergen Reactions    Bactrim [Sulfamethoxazole-Trimethoprim] Nausea And Vomiting and Other (See Comments)     Sugar count elevated, had troubles urinating        History of allergic reaction to anesthesia:  No    Past Medical History:  Past Medical History:   Diagnosis Date    Acute renal failure ([facility] [address]     Anxiety     CAD S/P percutaneous coronary angioplasty 3/11/2014    Cardiomyopathy ([facility]     Cerebrovascular accident (CVA) due to occlusion of left middle cerebral artery ([address] 08/2016    brainstem infarction from left MCA occlusion    Cerebrovascular disease 07/27/2016    Colon polyp     Coronary angioplasty status     CVA (cerebral vascular accident) ([address] 11/12/2017    Dependent edema 9/14/2017    Diplopia 5/15/2017    Resolved with management of cataracts    Dupuytren contracture 1/2/2018    Dysphagia 8/18/2011    Dysphonia 8/18/2011    Essential hypertension 8/17/2016    Gallop rhythm     Gout 12/10/2016    Gout of big toe 08/2014    Right Great Toe    H/O fall     Headache     migraines    Heart failure ([address]     History of 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    History of heart failure 1/6/2014    History of myocardial infarction 3/11/2014    History of recurrent pneumonia 2/11/2014    Hx of bacterial pneumonia     Hyperlipidemia, mixed 4/30/2021    Hyperlipidemia, mixed 4/30/2021    Hypotension     Left carotid artery stenosis 8/23/2020    Left carotid artery stenosis 8/23/2020    Leg swelling     Macrocytosis without anemia 12/10/2016    Microalbuminuria due to type 2 diabetes mellitus ([address] 9/14/2018    Morbid obesity ([address] 1/2/2014    Myocardial infarction ([address]     Obesity     AYLIN (obstructive sleep apnea) 12/10/2016    Osteoarthritis     Right-sided muscle weakness 10/19/2016    S/P cardiac catheterization     Sciatica     Skin cyst 4/8/2016    Spasm 11/9/2016    Spina bifida ([address]     Stented coronary artery     Tremor of right hand 5/15/2017    Type 2 diabetes mellitus with diabetic polyneuropathy, without long-term current use of insulin ([address]     Unsteady gait 5/15/2017    Weakness     Weight gain        Past Surgical History:  Past Surgical History:   Procedure Laterality Date    ANKLE SURGERY Left     BACK SURGERY      lumbar laminectomy    CHOLECYSTECTOMY      COLONOSCOPY 01/15/2010    polyp, diverticulosis (DR ELAINE)    CORONARY ANGIOPLASTY WITH STENT PLACEMENT      3 stents    CYST REMOVAL  2016    DR Jose L Olvera,  L SCROTAL CYST, R thigh, L ear, back    SHOULDER SURGERY Bilateral 2015    left once right twice- to remove bone spurs    TONSILLECTOMY         Social History:  Social History     Tobacco Use    Smoking status: Current Some Day Smoker     Packs/day: 2.50     Years: 30.00     Pack years: 75.00     Types: Cigarettes     Last attempt to quit:      Years since quittin.6    Smokeless tobacco: Never Used    Tobacco comment: varies   Vaping Use    Vaping Use: Never used   Substance Use Topics    Alcohol use: Yes     Alcohol/week: 0.0 standard drinks     Comment: limits less than 10/wk varies    Drug use: No       Vital Signs:   Vitals:    21 0859   BP: 137/68   Pulse: 90   Resp: 16   Temp: 98.2 °F (36.8 °C)   SpO2: 98%        Physical Exam:  Cardiac:  [x]WNL  []Comments:  Pulmonary:  [x]WNL   []Comments:   Neuro/Mental Status:  [x]WNL  []Comments:  Abdominal:  [x]WNL    []Comments:  Other:   []WNL  []Comments:    Informed Consent:  The risks and benefits of the procedure have been discussed with either the patient or if they cannot consent, their representative. Assessment:  Patient examined and appropriate for planned sedation and procedure. Plan:  Proceed with planned sedation and procedure as above.     Alger Soulier, MD  10:26 AM

## 2021-08-20 ENCOUNTER — HOSPITAL ENCOUNTER (OUTPATIENT)
Age: 66
Setting detail: OBSERVATION
Discharge: HOME | End: 2021-08-21
Attending: INTERNAL MEDICINE | Admitting: INTERNAL MEDICINE
Payer: MEDICARE

## 2021-08-20 ENCOUNTER — ANESTHESIA EVENT (OUTPATIENT)
Dept: ENDOSCOPY | Age: 66
End: 2021-08-20
Payer: MEDICARE

## 2021-08-20 ENCOUNTER — ANCILLARY PROCEDURE (OUTPATIENT)
Dept: ENDOSCOPY | Age: 66
End: 2021-08-20
Payer: MEDICARE

## 2021-08-20 ENCOUNTER — ANESTHESIA (OUTPATIENT)
Dept: ENDOSCOPY | Age: 66
End: 2021-08-20
Payer: MEDICARE

## 2021-08-20 VITALS — DIASTOLIC BLOOD PRESSURE: 61 MMHG | OXYGEN SATURATION: 100 % | SYSTOLIC BLOOD PRESSURE: 126 MMHG

## 2021-08-20 DIAGNOSIS — Z86.010 H/O ADENOMATOUS POLYP OF COLON: ICD-10-CM

## 2021-08-20 DIAGNOSIS — K62.5 RECTAL BLEEDING: Primary | ICD-10-CM

## 2021-08-20 DIAGNOSIS — Z86.010 HISTORY OF COLON POLYPS: ICD-10-CM

## 2021-08-20 PROBLEM — E78.5 HLD (HYPERLIPIDEMIA): Status: ACTIVE | Noted: 2021-04-30

## 2021-08-20 LAB
ABO/RH: NORMAL
ALBUMIN SERPL-MCNC: 4 G/DL (ref 3.5–4.6)
ALBUMIN SERPL-MCNC: 4.2 G/DL (ref 3.5–4.6)
ALP BLD-CCNC: 62 U/L (ref 35–104)
ALP BLD-CCNC: 65 U/L (ref 35–104)
ALT SERPL-CCNC: 23 U/L (ref 0–41)
ALT SERPL-CCNC: 27 U/L (ref 0–41)
ANION GAP SERPL CALCULATED.3IONS-SCNC: 15 MEQ/L (ref 9–15)
ANION GAP SERPL CALCULATED.3IONS-SCNC: 15 MEQ/L (ref 9–15)
ANTIBODY SCREEN: NORMAL
APTT: 28.4 SEC (ref 24.4–36.8)
AST SERPL-CCNC: 22 U/L (ref 0–40)
AST SERPL-CCNC: 25 U/L (ref 0–40)
BACTERIA: NEGATIVE /HPF
BASOPHILS ABSOLUTE: 0.1 K/UL (ref 0–0.2)
BASOPHILS RELATIVE PERCENT: 1 %
BILIRUB SERPL-MCNC: 0.9 MG/DL (ref 0.2–0.7)
BILIRUB SERPL-MCNC: 0.9 MG/DL (ref 0.2–0.7)
BILIRUBIN URINE: NEGATIVE
BLOOD, URINE: ABNORMAL
BUN BLDV-MCNC: 10 MG/DL (ref 8–23)
BUN BLDV-MCNC: 6 MG/DL (ref 8–23)
CALCIUM SERPL-MCNC: 9.1 MG/DL (ref 8.5–9.9)
CALCIUM SERPL-MCNC: 9.4 MG/DL (ref 8.5–9.9)
CHLORIDE BLD-SCNC: 90 MEQ/L (ref 95–107)
CHLORIDE BLD-SCNC: 93 MEQ/L (ref 95–107)
CLARITY: CLEAR
CO2: 22 MEQ/L (ref 20–31)
CO2: 24 MEQ/L (ref 20–31)
COLOR: YELLOW
CREAT SERPL-MCNC: 0.67 MG/DL (ref 0.7–1.2)
CREAT SERPL-MCNC: 0.83 MG/DL (ref 0.7–1.2)
EOSINOPHILS ABSOLUTE: 0.1 K/UL (ref 0–0.7)
EOSINOPHILS RELATIVE PERCENT: 0.7 %
EPITHELIAL CELLS, UA: NORMAL /HPF
GFR AFRICAN AMERICAN: >60
GFR AFRICAN AMERICAN: >60
GFR NON-AFRICAN AMERICAN: >60
GFR NON-AFRICAN AMERICAN: >60
GLOBULIN: 2.7 G/DL (ref 2.3–3.5)
GLOBULIN: 2.9 G/DL (ref 2.3–3.5)
GLUCOSE BLD-MCNC: 134 MG/DL (ref 70–99)
GLUCOSE BLD-MCNC: 144 MG/DL (ref 60–115)
GLUCOSE BLD-MCNC: 146 MG/DL (ref 70–99)
GLUCOSE BLD-MCNC: 148 MG/DL (ref 60–115)
GLUCOSE BLD-MCNC: 151 MG/DL (ref 60–115)
GLUCOSE BLD-MCNC: 157 MG/DL (ref 60–115)
GLUCOSE URINE: NEGATIVE MG/DL
HCT VFR BLD CALC: 35.6 % (ref 42–52)
HCT VFR BLD CALC: 38.1 % (ref 42–52)
HCT VFR BLD CALC: 41.2 % (ref 42–52)
HEMOGLOBIN: 12.4 G/DL (ref 14–18)
HEMOGLOBIN: 13.2 G/DL (ref 14–18)
HEMOGLOBIN: 14.3 G/DL (ref 14–18)
INR BLD: 1
KETONES, URINE: 15 MG/DL
LEUKOCYTE ESTERASE, URINE: NEGATIVE
LYMPHOCYTES ABSOLUTE: 1.2 K/UL (ref 1–4.8)
LYMPHOCYTES RELATIVE PERCENT: 14.7 %
MACROCYTES: ABNORMAL
MAGNESIUM: 1.3 MG/DL (ref 1.7–2.4)
MCH RBC QN AUTO: 38.2 PG (ref 27–31.3)
MCHC RBC AUTO-ENTMCNC: 34.7 % (ref 33–37)
MCV RBC AUTO: 110.1 FL (ref 80–100)
MONOCYTES ABSOLUTE: 0.5 K/UL (ref 0.2–0.8)
MONOCYTES RELATIVE PERCENT: 5.8 %
NEUTROPHILS ABSOLUTE: 6.5 K/UL (ref 1.4–6.5)
NEUTROPHILS RELATIVE PERCENT: 77.8 %
NITRITE, URINE: NEGATIVE
PDW BLD-RTO: 13.5 % (ref 11.5–14.5)
PERFORMED ON: ABNORMAL
PH UA: 7.5 (ref 5–9)
PLATELET # BLD: 167 K/UL (ref 130–400)
PLATELET SLIDE REVIEW: NORMAL
POTASSIUM REFLEX MAGNESIUM: 3.4 MEQ/L (ref 3.4–4.9)
POTASSIUM SERPL-SCNC: 3.7 MEQ/L (ref 3.4–4.9)
PROTEIN UA: NEGATIVE MG/DL
PROTHROMBIN TIME: 13.1 SEC (ref 12.3–14.9)
RBC # BLD: 3.74 M/UL (ref 4.7–6.1)
RBC UA: NORMAL /HPF (ref 0–2)
SODIUM BLD-SCNC: 129 MEQ/L (ref 135–144)
SODIUM BLD-SCNC: 130 MEQ/L (ref 135–144)
SPECIFIC GRAVITY UA: 1.01 (ref 1–1.03)
TOTAL PROTEIN: 6.7 G/DL (ref 6.3–8)
TOTAL PROTEIN: 7.1 G/DL (ref 6.3–8)
URINE REFLEX TO CULTURE: ABNORMAL
UROBILINOGEN, URINE: 0.2 E.U./DL
WBC # BLD: 8.3 K/UL (ref 4.8–10.8)
WBC UA: NORMAL /HPF (ref 0–5)

## 2021-08-20 PROCEDURE — 45385 COLONOSCOPY W/LESION REMOVAL: CPT | Performed by: INTERNAL MEDICINE

## 2021-08-20 PROCEDURE — 3700000001 HC ADD 15 MINUTES (ANESTHESIA): Performed by: INTERNAL MEDICINE

## 2021-08-20 PROCEDURE — 85730 THROMBOPLASTIN TIME PARTIAL: CPT

## 2021-08-20 PROCEDURE — 2580000003 HC RX 258: Performed by: INTERNAL MEDICINE

## 2021-08-20 PROCEDURE — 85018 HEMOGLOBIN: CPT

## 2021-08-20 PROCEDURE — 83735 ASSAY OF MAGNESIUM: CPT

## 2021-08-20 PROCEDURE — 99203 OFFICE O/P NEW LOW 30 MIN: CPT | Performed by: INTERNAL MEDICINE

## 2021-08-20 PROCEDURE — 6360000002 HC RX W HCPCS: Performed by: NURSE ANESTHETIST, CERTIFIED REGISTERED

## 2021-08-20 PROCEDURE — 2580000003 HC RX 258: Performed by: NURSE ANESTHETIST, CERTIFIED REGISTERED

## 2021-08-20 PROCEDURE — C1713 ANCHOR/SCREW BN/BN,TIS/BN: HCPCS | Performed by: INTERNAL MEDICINE

## 2021-08-20 PROCEDURE — G0378 HOSPITAL OBSERVATION PER HR: HCPCS

## 2021-08-20 PROCEDURE — 2580000003 HC RX 258: Performed by: STUDENT IN AN ORGANIZED HEALTH CARE EDUCATION/TRAINING PROGRAM

## 2021-08-20 PROCEDURE — 85610 PROTHROMBIN TIME: CPT

## 2021-08-20 PROCEDURE — 2500000003 HC RX 250 WO HCPCS: Performed by: NURSE ANESTHETIST, CERTIFIED REGISTERED

## 2021-08-20 PROCEDURE — 2709999900 HC NON-CHARGEABLE SUPPLY: Performed by: INTERNAL MEDICINE

## 2021-08-20 PROCEDURE — 88305 TISSUE EXAM BY PATHOLOGIST: CPT

## 2021-08-20 PROCEDURE — 7100000010 HC PHASE II RECOVERY - FIRST 15 MIN: Performed by: INTERNAL MEDICINE

## 2021-08-20 PROCEDURE — 81001 URINALYSIS AUTO W/SCOPE: CPT

## 2021-08-20 PROCEDURE — 36415 COLL VENOUS BLD VENIPUNCTURE: CPT

## 2021-08-20 PROCEDURE — 6370000000 HC RX 637 (ALT 250 FOR IP): Performed by: INTERNAL MEDICINE

## 2021-08-20 PROCEDURE — 85025 COMPLETE CBC W/AUTO DIFF WBC: CPT

## 2021-08-20 PROCEDURE — 85014 HEMATOCRIT: CPT

## 2021-08-20 PROCEDURE — 45382 COLONOSCOPY W/CONTROL BLEED: CPT | Performed by: INTERNAL MEDICINE

## 2021-08-20 PROCEDURE — 86901 BLOOD TYPING SEROLOGIC RH(D): CPT

## 2021-08-20 PROCEDURE — 2720000010 HC SURG SUPPLY STERILE: Performed by: INTERNAL MEDICINE

## 2021-08-20 PROCEDURE — 2580000003 HC RX 258: Performed by: NURSE PRACTITIONER

## 2021-08-20 PROCEDURE — 3609027000 HC COLONOSCOPY: Performed by: INTERNAL MEDICINE

## 2021-08-20 PROCEDURE — 86900 BLOOD TYPING SEROLOGIC ABO: CPT

## 2021-08-20 PROCEDURE — 99282 EMERGENCY DEPT VISIT SF MDM: CPT

## 2021-08-20 PROCEDURE — 3700000000 HC ANESTHESIA ATTENDED CARE: Performed by: INTERNAL MEDICINE

## 2021-08-20 PROCEDURE — 80053 COMPREHEN METABOLIC PANEL: CPT

## 2021-08-20 PROCEDURE — 86850 RBC ANTIBODY SCREEN: CPT

## 2021-08-20 PROCEDURE — 6370000000 HC RX 637 (ALT 250 FOR IP): Performed by: STUDENT IN AN ORGANIZED HEALTH CARE EDUCATION/TRAINING PROGRAM

## 2021-08-20 RX ORDER — MIDAZOLAM HYDROCHLORIDE 2 MG/2ML
INJECTION, SOLUTION INTRAMUSCULAR; INTRAVENOUS
Status: COMPLETED
Start: 2021-08-20 | End: 2021-08-20

## 2021-08-20 RX ORDER — ONDANSETRON 4 MG/1
4 TABLET, ORALLY DISINTEGRATING ORAL EVERY 8 HOURS PRN
Status: DISCONTINUED | OUTPATIENT
Start: 2021-08-20 | End: 2021-08-21 | Stop reason: HOSPADM

## 2021-08-20 RX ORDER — ONDANSETRON 2 MG/ML
4 INJECTION INTRAMUSCULAR; INTRAVENOUS EVERY 6 HOURS PRN
Status: DISCONTINUED | OUTPATIENT
Start: 2021-08-20 | End: 2021-08-21 | Stop reason: HOSPADM

## 2021-08-20 RX ORDER — MAGNESIUM HYDROXIDE 1200 MG/15ML
LIQUID ORAL PRN
Status: DISCONTINUED | OUTPATIENT
Start: 2021-08-20 | End: 2021-08-20 | Stop reason: ALTCHOICE

## 2021-08-20 RX ORDER — DEXTROSE MONOHYDRATE 50 MG/ML
100 INJECTION, SOLUTION INTRAVENOUS PRN
Status: DISCONTINUED | OUTPATIENT
Start: 2021-08-20 | End: 2021-08-21 | Stop reason: HOSPADM

## 2021-08-20 RX ORDER — MIDAZOLAM HYDROCHLORIDE 1 MG/ML
INJECTION INTRAMUSCULAR; INTRAVENOUS PRN
Status: DISCONTINUED | OUTPATIENT
Start: 2021-08-20 | End: 2021-08-20 | Stop reason: SDUPTHER

## 2021-08-20 RX ORDER — LIDOCAINE HYDROCHLORIDE 20 MG/ML
INJECTION, SOLUTION INFILTRATION; PERINEURAL PRN
Status: DISCONTINUED | OUTPATIENT
Start: 2021-08-20 | End: 2021-08-20 | Stop reason: SDUPTHER

## 2021-08-20 RX ORDER — ACETAMINOPHEN 650 MG/1
650 SUPPOSITORY RECTAL EVERY 6 HOURS PRN
Status: DISCONTINUED | OUTPATIENT
Start: 2021-08-20 | End: 2021-08-21 | Stop reason: HOSPADM

## 2021-08-20 RX ORDER — SODIUM CHLORIDE 9 MG/ML
INJECTION, SOLUTION INTRAVENOUS CONTINUOUS
Status: DISPENSED | OUTPATIENT
Start: 2021-08-20 | End: 2021-08-20

## 2021-08-20 RX ORDER — ACETAMINOPHEN 325 MG/1
650 TABLET ORAL EVERY 6 HOURS PRN
Status: DISCONTINUED | OUTPATIENT
Start: 2021-08-20 | End: 2021-08-21 | Stop reason: HOSPADM

## 2021-08-20 RX ORDER — SODIUM CHLORIDE 0.9 % (FLUSH) 0.9 %
5-40 SYRINGE (ML) INJECTION EVERY 12 HOURS SCHEDULED
Status: DISCONTINUED | OUTPATIENT
Start: 2021-08-20 | End: 2021-08-21 | Stop reason: HOSPADM

## 2021-08-20 RX ORDER — SODIUM CHLORIDE 0.9 % (FLUSH) 0.9 %
5-40 SYRINGE (ML) INJECTION PRN
Status: DISCONTINUED | OUTPATIENT
Start: 2021-08-20 | End: 2021-08-21 | Stop reason: HOSPADM

## 2021-08-20 RX ORDER — SODIUM CHLORIDE 9 MG/ML
25 INJECTION, SOLUTION INTRAVENOUS PRN
Status: DISCONTINUED | OUTPATIENT
Start: 2021-08-20 | End: 2021-08-21 | Stop reason: HOSPADM

## 2021-08-20 RX ORDER — SODIUM CHLORIDE, SODIUM LACTATE, POTASSIUM CHLORIDE, CALCIUM CHLORIDE 600; 310; 30; 20 MG/100ML; MG/100ML; MG/100ML; MG/100ML
INJECTION, SOLUTION INTRAVENOUS CONTINUOUS PRN
Status: DISCONTINUED | OUTPATIENT
Start: 2021-08-20 | End: 2021-08-20 | Stop reason: SDUPTHER

## 2021-08-20 RX ORDER — PROPOFOL 10 MG/ML
INJECTION, EMULSION INTRAVENOUS PRN
Status: DISCONTINUED | OUTPATIENT
Start: 2021-08-20 | End: 2021-08-20 | Stop reason: SDUPTHER

## 2021-08-20 RX ORDER — DEXTROSE MONOHYDRATE 25 G/50ML
12.5 INJECTION, SOLUTION INTRAVENOUS PRN
Status: DISCONTINUED | OUTPATIENT
Start: 2021-08-20 | End: 2021-08-21 | Stop reason: HOSPADM

## 2021-08-20 RX ORDER — NICOTINE POLACRILEX 4 MG
15 LOZENGE BUCCAL PRN
Status: DISCONTINUED | OUTPATIENT
Start: 2021-08-20 | End: 2021-08-21 | Stop reason: HOSPADM

## 2021-08-20 RX ORDER — 0.9 % SODIUM CHLORIDE 0.9 %
1000 INTRAVENOUS SOLUTION INTRAVENOUS ONCE
Status: COMPLETED | OUTPATIENT
Start: 2021-08-20 | End: 2021-08-20

## 2021-08-20 RX ADMIN — LIDOCAINE HYDROCHLORIDE 50 MG: 20 INJECTION, SOLUTION INFILTRATION; PERINEURAL at 13:56

## 2021-08-20 RX ADMIN — SODIUM CHLORIDE: 9 INJECTION, SOLUTION INTRAVENOUS at 05:34

## 2021-08-20 RX ADMIN — SODIUM CHLORIDE 1000 ML: 9 INJECTION, SOLUTION INTRAVENOUS at 01:47

## 2021-08-20 RX ADMIN — Medication 10 ML: at 21:37

## 2021-08-20 RX ADMIN — PROPOFOL 150 MG: 10 INJECTION, EMULSION INTRAVENOUS at 13:56

## 2021-08-20 RX ADMIN — MIDAZOLAM HYDROCHLORIDE 2 MG: 1 INJECTION INTRAMUSCULAR; INTRAVENOUS at 13:49

## 2021-08-20 RX ADMIN — POLYETHYLENE GLYCOL-3350 AND ELECTROLYTES 4000 ML: 236; 6.74; 5.86; 2.97; 22.74 POWDER, FOR SOLUTION ORAL at 04:48

## 2021-08-20 RX ADMIN — SODIUM CHLORIDE, POTASSIUM CHLORIDE, SODIUM LACTATE AND CALCIUM CHLORIDE: 600; 310; 30; 20 INJECTION, SOLUTION INTRAVENOUS at 13:49

## 2021-08-20 RX ADMIN — PROPOFOL 150 MG: 10 INJECTION, EMULSION INTRAVENOUS at 14:06

## 2021-08-20 ASSESSMENT — PAIN SCALES - GENERAL
PAINLEVEL_OUTOF10: 6
PAINLEVEL_OUTOF10: 3
PAINLEVEL_OUTOF10: 3

## 2021-08-20 ASSESSMENT — ENCOUNTER SYMPTOMS
RECTAL PAIN: 0
ABDOMINAL PAIN: 0
COUGH: 0
PHOTOPHOBIA: 0
CONSTIPATION: 0
EYES NEGATIVE: 1
CHEST TIGHTNESS: 0
NAUSEA: 0
BLOOD IN STOOL: 0
ABDOMINAL DISTENTION: 0
WHEEZING: 0
BLOATING: 1
TROUBLE SWALLOWING: 0
DIARRHEA: 0
SORE THROAT: 0
VOMITING: 0
ANAL BLEEDING: 1
ABDOMINAL PAIN: 1
SHORTNESS OF BREATH: 0
BLOOD IN STOOL: 1

## 2021-08-20 ASSESSMENT — PULMONARY FUNCTION TESTS
PIF_VALUE: 0
PIF_VALUE: 1
PIF_VALUE: 0

## 2021-08-20 ASSESSMENT — PAIN DESCRIPTION - PAIN TYPE: TYPE: ACUTE PAIN

## 2021-08-20 ASSESSMENT — PAIN DESCRIPTION - DESCRIPTORS: DESCRIPTORS: CRAMPING

## 2021-08-20 ASSESSMENT — PAIN DESCRIPTION - LOCATION: LOCATION: ABDOMEN

## 2021-08-20 ASSESSMENT — PAIN DESCRIPTION - FREQUENCY: FREQUENCY: INTERMITTENT

## 2021-08-20 NOTE — PROGRESS NOTES
Spoke with YOVANI Queen to notify him the patient blew his nose and had bright blood with some clots in the tissue. Nasal trumpet had been placed per the CRNA during the colonoscopy. Dionne Camejo stated to have the nurse from the floor call primary care physician if it were to continue when transferred back to the floor.

## 2021-08-20 NOTE — ED TRIAGE NOTES
Arrived to ER for c/o rectal bleeding. States had colonoscopy today by Dr Elizabeth Mcintyre for routine screening and had small amount of rectal bleeding and was d/c'd post procedure. States went home and after eating lunch had a BM with congealed blood and small amount of fresh blood, then had approx 4 other episodes of bleeding. Attempted to call office after 1 st episode but was unable to get a hold of Dr Elizabeth Mcintrye. States removed 14 polyps during colonoscopy today. Denies lightheaded/ dizzy, CP, SOB.

## 2021-08-20 NOTE — H&P
DEPARTMENT OF HOSPITAL MEDICINE    HISTORY AND PHYSICAL EXAM    PATIENT NAME:  Krystal Dove    MRN:  [id number]  SERVICE DATE:  8/20/2021   SERVICE TIME:  4:33 AM    Primary Care Physician: Ashok Rivera MD     SUBJECTIVE  CHIEF COMPLAINT:  Rectal bleeding    HPI:  Krystal Dove is a 77 y.o., , male who  has a past medical history of Acute renal failure ([address] Anxiety, CAD S/P percutaneous coronary angioplasty, Cardiomyopathy ([address] Cerebrovascular accident (CVA) due to occlusion of left middle cerebral artery ([address] Cerebrovascular disease, Colon polyp, Coronary angioplasty status, CVA (cerebral vascular accident) ([address] Dependent edema, Diplopia, Dupuytren contracture, Dysphagia, Dysphonia, Essential hypertension, Gallop rhythm, Gout, Gout of big toe, H/O fall, Headache, Heart failure ([address] History of chest pain, History of CVA (cerebrovascular accident), History of heart failure, History of myocardial infarction, History of recurrent pneumonia, Hx of bacterial pneumonia, Hyperlipidemia, mixed, Hyperlipidemia, mixed, Hypotension, Left carotid artery stenosis, Left carotid artery stenosis, Leg swelling, Macrocytosis without anemia, Microalbuminuria due to type 2 diabetes mellitus ([address] Morbid obesity ([address] Myocardial infarction ([address] Obesity, AYLIN (obstructive sleep apnea), Osteoarthritis, Right-sided muscle weakness, S/P cardiac catheterization, Sciatica, Skin cyst, Spasm, Spina bifida ([address] Stented coronary artery, Tremor of right hand, Type 2 diabetes mellitus with diabetic polyneuropathy, without long-term current use of insulin ([address] Unsteady gait, Weakness, and Weight gain. that is hospitalized for GI bleed s/p colonoscopy w polyp x 14 removal.     GI Problem  The primary symptoms include fatigue, abdominal pain and melena. Primary symptoms do not include fever, nausea, vomiting, diarrhea or dysuria. The illness began yesterday. The onset was sudden.  The problem has not changed since onset.  The abdominal pain is located in the LLQ and RLQ. The abdominal pain does not radiate. The severity of the abdominal pain is 5/10. The abdominal pain is relieved by bowel movements. The illness is also significant for bloating. The illness does not include chills or constipation. Associated medical issues comments: polypectomy yesterday. .      Briefly, he had colonoscopy yesterday. Developed lower abdominal pain and sudden urge to defecate. States BM was all blood -  Filled back of toilet bowl and had 2 coagulated masses of blood in toilet. Since discharge he has had multiple similar episodes. Abdominal pain improves afterwards, He is not dizzy or lightheaded. Has had no fever or chills. Hgb is stable at 14.3   VS stable. He is admitted for monitoring w/ plan for repeat colonoscopy today to identify site of bleeding and repair.           PAST MEDICAL HISTORY:    Past Medical History:   Diagnosis Date    Acute renal failure ([address]     Anxiety     CAD S/P percutaneous coronary angioplasty 3/11/2014    Cardiomyopathy ([address]     Cerebrovascular accident (CVA) due to occlusion of left middle cerebral artery ([address] 08/2016    brainstem infarction from left MCA occlusion    Cerebrovascular disease 07/27/2016    Colon polyp     Coronary angioplasty status     CVA (cerebral vascular accident) ([address] 11/12/2017    Dependent edema 9/14/2017    Diplopia 5/15/2017    Resolved with management of cataracts    Dupuytren contracture 1/2/2018    Dysphagia 8/18/2011    Dysphonia 8/18/2011    Essential hypertension 8/17/2016    Gallop rhythm     Gout 12/10/2016    Gout of big toe 08/2014    Right Great Toe    H/O fall     Headache     migraines    Heart failure ([address]     History of 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    History of heart failure 1/6/2014    History of myocardial infarction medical retired   CVA     Employer: [employer]   Tobacco Use    Smoking status: Current Some Day Smoker     Packs/day: 2.50     Years: 30.00     Pack years: 75.00     Types: Cigarettes     Last attempt to quit:      Years since quittin.6    Smokeless tobacco: Never Used    Tobacco comment: varies   Vaping Use    Vaping Use: Never used   Substance and Sexual Activity    Alcohol use: Yes     Alcohol/week: 0.0 standard drinks     Comment: limits less than 10/wk varies    Drug use: No    Sexual activity: Yes     Partners: Female   Other Topics Concern    Not on file   Social History Narrative    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-- Disabled from [place] after 2016 CVA    Lives With: Spouse    Type of Home: House    Home Layout: Two level, Bed/Bath upstairs, Laundry in basement(Railing)    Home Access: Stairs to enter with rails    Entrance Stairs - Number of Steps: 3 in back, 4 in front    Bathroom Shower/Tub: Tub/Shower unit    Bathroom Equipment: Shower chair    Home Equipment: Rolling walker, Cane    ADL Assistance: Independent    Homemaking Assistance: Needs assistance(Spouse  is primary; vertigo and arthritis limit housework)    Ambulation Assistance: Independent(Walker outside, cane inside)    Transfer Assistance: Independent    Active : No    Patient's  Info: has not driven since CVA     Social Determinants of Health     Financial Resource Strain: Low Risk     Difficulty of Paying Living Expenses: Not hard at all   Food Insecurity: No Food Insecurity    Worried About [facility] in the Last Year: Never true    [facility] in the Last Year: Never true   Transportation Needs: No Transportation Needs    Lack of Transportation (Medical): No    Lack of Transportation (Non-Medical): No   Physical Activity: Inactive    Days of Exercise per Week: 0 days    Minutes of Exercise per Session: 0 min   Stress: No Stress Concern Present    Feeling of Stress :  Only a little   Social Connections: Socially Integrated    Frequency of Communication with Friends and Family: More than three times a week    Frequency of Social Gatherings with Friends and Family: More than three times a week    Attends Worship Services: 1 to 4 times per year   [employer] or Organizations: Yes    Attends Club or Organization Meetings: More than 4 times per year    Marital Status:    Intimate Partner Violence: Not At Risk    Fear of Current or Ex-Partner: No    Emotionally Abused: No    Physically Abused: No    Sexually Abused: No     MEDICATIONS:   Prior to Admission medications    Medication Sig Start Date End Date Taking? Authorizing Provider   carvedilol (COREG) 25 MG tablet Take 1 tablet by mouth 2 times daily 8/19/21   Padmini Zacarias MD   torsemide (DEMADEX) 20 MG tablet Take 1 tablet by mouth daily 6/14/21   Padmini Zacarias MD   glimepiride (AMARYL) 1 MG tablet Take 1 tablet by mouth every morning (before breakfast) 6/14/21   Padmini Zacarias MD   allopurinol (ZYLOPRIM) 300 MG tablet Take 1 tablet by mouth daily 6/14/21   Padmini Zacarias MD   atorvastatin (LIPITOR) 40 MG tablet Take 1 tablet by mouth daily 5/10/21   Padmini Zacarias MD   metFORMIN (GLUCOPHAGE) 500 MG tablet Take 1 tablet by mouth 2 times daily (with meals) 11/6/20   Padmini Zacarias MD   clopidogrel (PLAVIX) 75 MG tablet Take 1 tablet by mouth daily 9/15/20   Padmini Zacarias MD   Elastic Bandages & Supports (V-4 HIGH COMPRESSION HOSE) MISC KNEE HIGH - 20-30 mmHg 5/18/20   Padmini Zacarias MD   Alcohol Swabs PADS DX: diabetes mellitus. Test 1 time(s) daily - Ok to substitute per insurance (1 each = 1 box) 5/14/20   Padmini Zacarias MD   blood glucose monitor strips DX: diabetes mellitus. Use 1 time(s) daily - True Metrix requested by patient - Luz Estrada to substitute per insurance 5/14/20   Padmini Zacarias MD   Lancets MISC DX: diabetes mellitus.  Use 1 time(s) daily - Ok to substitute per insurance (1 each = 1 box) 5/14/20   Jillian Adams MD   blood glucose monitor kit and supplies DX: diabetes mellitus. Test 1 time(s) daily - True Metrix requested by patient - [id number] to substitute per insurance 5/14/20   Jillian Adams MD   aspirin 81 MG EC tablet Take 81 mg by mouth daily    Historical Provider, MD   Probiotic Product (PROBIOTIC DAILY PO) Take 1 tablet by mouth daily    Historical Provider, MD   vitamin B-12 (CYANOCOBALAMIN) 100 MCG tablet Take 50 mcg by mouth daily    Historical Provider, MD   lidocaine (LIDODERM) 5 % Place 1 patch onto the skin every 12 hours  9/8/18   Historical Provider, MD   ammonium lactate (AMLACTIN) 12 % cream APPLY TO DRY CALLUS AREAS 1 TO 2 TIMES DAILY 8/21/17   Historical Provider, MD   Misc. Devices (COMMODE BEDSIDE) MISC Use daily as needed 8/22/16   Paramjit Dye MD   MULTIPLE VITAMIN PO Take 1 tablet by mouth daily     Historical Provider, MD       ALLERGIES: Bactrim [sulfamethoxazole-trimethoprim]    REVIEW OF SYSTEM:   Review of Systems   Constitutional: Positive for fatigue. Negative for chills, diaphoresis and fever. HENT: Positive for congestion. Negative for sore throat and trouble swallowing. Eyes: Negative. Respiratory: Negative for cough, chest tightness, shortness of breath and wheezing. Cardiovascular: Negative for chest pain and palpitations. Gastrointestinal: Positive for abdominal pain, bloating, blood in stool and melena. Negative for constipation, diarrhea, nausea and vomiting. Endocrine: Negative. Genitourinary: Negative for dysuria, flank pain and urgency. Musculoskeletal: Negative. Skin: Negative. Neurological: Negative for dizziness, seizures, syncope, speech difficulty, weakness, numbness and headaches. Equilibrium issues since stroke   Psychiatric/Behavioral: Negative. OBJECTIVE  PHYSICAL EXAM:   Physical Exam  Vitals and nursing note reviewed. Constitutional:       Appearance: He is obese. HENT:      Head: Normocephalic and atraumatic. Nose: Nose normal.      Mouth/Throat:      Mouth: Mucous membranes are moist.      Pharynx: Oropharynx is clear. Eyes:      Conjunctiva/sclera: Conjunctivae normal.   Neck:      Vascular: No carotid bruit. Cardiovascular:      Rate and Rhythm: Normal rate and regular rhythm. Pulses: Normal pulses. Heart sounds: Normal heart sounds. No murmur heard. Pulmonary:      Effort: Pulmonary effort is normal.      Breath sounds: Normal breath sounds. Abdominal:      General: There is distension. Tenderness: There is abdominal tenderness. Genitourinary:     Rectum: Guaiac result positive. Musculoskeletal:      Cervical back: No tenderness. Right lower leg: Edema present. Left lower leg: Edema present. Lymphadenopathy:      Cervical: No cervical adenopathy. Skin:     General: Skin is warm and dry. Capillary Refill: Capillary refill takes less than 2 seconds. Neurological:      Mental Status: He is alert and oriented to person, place, and time. Psychiatric:         Mood and Affect: Mood normal.         Behavior: Behavior normal.          BP (!) 149/82   Pulse 89   Temp 99.1 °F (37.3 °C) (Oral)   Resp 17   Ht 6' 1\" (1.854 m)   Wt 271 lb (122.9 kg)   SpO2 98%   BMI 35.75 kg/m²     DATA:     Diagnostic tests reviewed for today's visit:    Most recent labs and imaging results reviewed.      LABS:    Recent Results (from the past 24 hour(s))   POCT Glucose    Collection Time: 08/19/21  8:42 AM   Result Value Ref Range    POC Glucose 136 (H) 60 - 115 mg/dl    Performed on ACCU-CHEK    Comprehensive Metabolic Panel    Collection Time: 08/20/21  1:45 AM   Result Value Ref Range    Sodium 129 (L) 135 - 144 mEq/L    Potassium 3.7 3.4 - 4.9 mEq/L    Chloride 90 (L) 95 - 107 mEq/L    CO2 24 20 - 31 mEq/L    Anion Gap 15 9 - 15 mEq/L    Glucose 146 (H) 70 - 99 mg/dL    BUN 10 8 - 23 mg/dL    CREATININE 0.83 0.70 - 1.20 mg/dL    GFR Non- >60.0 >60    GFR  >60.0 >60    Calcium 9.4 8.5 - 9.9 mg/dL    Total Protein 7.1 6.3 - 8.0 g/dL    Albumin 4.2 3.5 - 4.6 g/dL    Total Bilirubin 0.9 (H) 0.2 - 0.7 mg/dL    Alkaline Phosphatase 65 35 - 104 U/L    ALT 27 0 - 41 U/L    AST 25 0 - 40 U/L    Globulin 2.9 2.3 - 3.5 g/dL   CBC Auto Differential    Collection Time: 08/20/21  1:45 AM   Result Value Ref Range    WBC 8.3 4.8 - 10.8 K/uL    RBC 3.74 (L) 4.70 - 6.10 M/uL    Hemoglobin 14.3 14.0 - 18.0 g/dL    Hematocrit 41.2 (L) 42.0 - 52.0 %    .1 (H) 80.0 - 100.0 fL    MCH 38.2 (H) 27.0 - 31.3 pg    MCHC 34.7 33.0 - 37.0 %    RDW 13.5 11.5 - 14.5 %    Platelets 457 962 - 693 K/uL    PLATELET SLIDE REVIEW Normal     Neutrophils % 77.8 %    Lymphocytes % 14.7 %    Monocytes % 5.8 %    Eosinophils % 0.7 %    Basophils % 1.0 %    Neutrophils Absolute 6.5 1.4 - 6.5 K/uL    Lymphocytes Absolute 1.2 1.0 - 4.8 K/uL    Monocytes Absolute 0.5 0.2 - 0.8 K/uL    Eosinophils Absolute 0.1 0.0 - 0.7 K/uL    Basophils Absolute 0.1 0.0 - 0.2 K/uL    Macrocytes 2+    TYPE AND SCREEN    Collection Time: 08/20/21  1:45 AM   Result Value Ref Range    ABO/Rh A POS     Antibody Screen NEG    APTT    Collection Time: 08/20/21  1:45 AM   Result Value Ref Range    aPTT 28.4 24.4 - 36.8 sec   Protime-INR    Collection Time: 08/20/21  1:45 AM   Result Value Ref Range    Protime 13.1 12.3 - 14.9 sec    INR 1.0        IMAGING:  No results found. VTE Prophylaxis: Held d/t bleeding. ASSESSMENT AND PLAN  Principal Problem:    Rectal bleed  - after colonoscopy and polyp removal yesterday. Has not subsided. H/H remains stable. Multiple episodes bloody BMs since discharge. Plan: admit   GI to see  GoLytely. Monitor H/H q 6 hrs. Active Problems:    CAD No chest pain  S/p stent placement x 3  On plavix but has held for procedure. Occasional SOB and has LE edema.    Takes coreg, demadex,  Statin  Asa/plavix  Plan: restart meds when taking POs - holding asa/plavix for now    DM2 metformin, amaryl       Plan: NPO for procedure   SSI for now. Resume oral meds at discharge. HLD stable on statin     Known CAD, past stroke x 2, DM2.    Plan: continue statin        Plan of care discussed with: patient    SIGNATURE: NEREYDA Monson - CNP  DATE: August 20, 2021  TIME: 4:33 AM   Rodger Ormond, MD  - supervising physician

## 2021-08-20 NOTE — ANESTHESIA POSTPROCEDURE EVALUATION
Department of Anesthesiology  Postprocedure Note    Patient: Elvin Raza  MRN: [id number]  YOB: 1955  Date of evaluation: 8/20/2021  Time:  2:30 PM     Procedure Summary     Date: 08/20/21 Room / Location: [address] [place] [address]    Anesthesia Start: 1571 Anesthesia Stop: 1430    Procedure: COLONOSCOPY DIAGNOSTIC (N/A ) Diagnosis: (rectal bleeding post colonoscopy)    Surgeons: Juan Antonio Jack MD Responsible Provider: NEREYDA Hand CRNA    Anesthesia Type: MAC ASA Status: 3          Anesthesia Type: MAC    Benji Phase I:      Benji Phase II:      Last vitals: Reviewed and per EMR flowsheets.        Anesthesia Post Evaluation    Patient location during evaluation: bedside  Patient participation: complete - patient participated  Level of consciousness: awake and awake and alert  Pain score: 0  Airway patency: patent  Nausea & Vomiting: no nausea and no vomiting  Complications: no  Cardiovascular status: blood pressure returned to baseline and hemodynamically stable  Respiratory status: acceptable and face mask  Hydration status: euvolemic

## 2021-08-20 NOTE — CONSULTS
Consult to Gastroenterology  Consult performed by: Silvana Garner MD  Consult ordered by: NEREYDA Davis - CNP      Patient Name: Cecilia Quijano  Admit Date: 2021  1:12 AM  MR #: [id number]  : 1955    Attending Physician: Bobo Whitfield MD  Reason for consult: GI bleed s/p colonoscopy  History Obtained From:  patient, electronic medical record, staff nurse  History of Presenting Illness:      Cecilia Quijano is a 77 y.o. male on hospital day 0 with PMH CAD s/p multiple stents, CVA, diabetes, colon polyps, HTN, HF, HLD, left carotid artery stenosis, MI, admitted with a history of colonoscopy yesterday with 14 polyps removed. GI consulted for GI bleed s/p colonoscopy. Patient reports multiple bright red bowel movements per rectum increasing in amount and frequency s/p colonoscopy yesterday 2021. He did not resume his aspirin or Plavix post procedure. Hgb on admit 14.3, , . Hgb recheck 13.2. Previous endoscopic history:   Colonoscopy 21: Hemorrhoids, other - K64.8, multiple colon polyps (14)  Diverticulosis of large intestine. Colonoscopy 1/15/2010: 1 colon polyp-tubular adenoma, hemorrhoids, diverticulosis.     History:      Past Medical History:   Diagnosis Date    Acute renal failure ([address]     Anxiety     CAD S/P percutaneous coronary angioplasty 3/11/2014    Cardiomyopathy [facility])     Cerebrovascular accident (CVA) due to occlusion of left middle cerebral artery ([address] 2016    brainstem infarction from left MCA occlusion    Cerebrovascular disease 2016    Colon polyp     Coronary angioplasty status     CVA (cerebral vascular accident) ([address] 2017    Dependent edema 2017    Diplopia 5/15/2017    Resolved with management of cataracts    Dupuytren contracture 2018    Dysphagia 2011    Dysphonia 2011    Essential hypertension 2016    Gallop rhythm     Gout 12/10/2016    Gout of big toe 2014    Right Great Toe    H/O fall     Headache     migraines    Heart failure (HCC)     History of 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    History of heart failure 1/6/2014    History of myocardial infarction 3/11/2014    History of recurrent pneumonia 2/11/2014    Hx of bacterial pneumonia     Hyperlipidemia, mixed 4/30/2021    Hyperlipidemia, mixed 4/30/2021    Hypotension     Left carotid artery stenosis 8/23/2020    Left carotid artery stenosis 8/23/2020    Leg swelling     Macrocytosis without anemia 12/10/2016    Microalbuminuria due to type 2 diabetes mellitus ([address] 9/14/2018    Morbid obesity ([address] 1/2/2014    Myocardial infarction ([address]     Obesity     AYLIN (obstructive sleep apnea) 12/10/2016    Osteoarthritis     Right-sided muscle weakness 10/19/2016    S/P cardiac catheterization     Sciatica     Skin cyst 4/8/2016    Spasm 11/9/2016    Spina bifida ([address]     Stented coronary artery     Tremor of right hand 5/15/2017    Type 2 diabetes mellitus with diabetic polyneuropathy, without long-term current use of insulin ([address]     Unsteady gait 5/15/2017    Weakness     Weight gain      Past Surgical History:   Procedure Laterality Date    ANKLE SURGERY Left     BACK SURGERY      lumbar laminectomy    CHOLECYSTECTOMY      COLONOSCOPY  01/15/2010    polyp, diverticulosis (DR ELAINE)    COLONOSCOPY N/A 8/19/2021    COLORECTAL CANCER SCREENING, HIGH RISK with polypectomies  performed by Phan Mccann MD at [facility] 8/20/2021    COLONOSCOPY with polypectomies DIAGNOSTIC performed by Chloe Hopkins MD at [address] [place] [address]  2014    3 stents    CYST REMOVAL  05/16/2016    DR Memo Jang,  L SCROTAL CYST, R thigh, L ear, back    SHOULDER SURGERY Bilateral 12/18/2015    left once right twice- to remove bone spurs    TONSILLECTOMY Family History  Family History   Problem Relation Age of Onset   [employer] Breast Cancer Mother     Stroke Father     Colon Cancer Father 76     Social History     Socioeconomic History    Marital status:      Spouse name: [place] Number of children: 3    Years of education: 12+    Highest education level: Not on file   Occupational History    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     Employer: [employer]   Tobacco Use    Smoking status: Current Some Day Smoker     Packs/day: 2.50     Years: 30.00     Pack years: 75.00     Types: Cigarettes     Last attempt to quit:      Years since quittin.6    Smokeless tobacco: Never Used    Tobacco comment: varies   Vaping Use    Vaping Use: Never used   Substance and Sexual Activity    Alcohol use:  Yes     Alcohol/week: 0.0 standard drinks     Comment: limits less than 10/wk varies    Drug use: No    Sexual activity: Yes     Partners: Female   Other Topics Concern    Not on file   Social History Narrative    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-- Disabled from [place] after 2016 CVA    Lives With: Spouse    Type of Home: House    Home Layout: Two level, Bed/Bath upstairs, Laundry in basement(Railing)    Home Access: Stairs to enter with rails    Entrance Stairs - Number of Steps: 3 in back, 4 in front    Bathroom Shower/Tub: Tub/Shower unit    Bathroom Equipment: Shower chair    Home Equipment: Rolling walker, Cane    ADL Assistance: Independent    Homemaking Assistance: Needs assistance(Spouse  is primary; vertigo and arthritis limit housework)    Ambulation Assistance: Independent(Walker outside, cane inside)    Transfer Assistance: Independent    Active : No    Patient's  Info: has not driven since CVA     Social Determinants of Health     Financial Resource Strain: Low Risk     Difficulty of Paying Living Expenses: Not hard at all   Food Insecurity: No Food Insecurity    Worried About [employer] [employer] in the Last Year: Never true    Ran Out of Food in the Last Year: Never true   Transportation Needs: No Transportation Needs    Lack of Transportation (Medical): No    Lack of Transportation (Non-Medical): No   Physical Activity: Inactive    Days of Exercise per Week: 0 days    Minutes of Exercise per Session: 0 min   Stress: No Stress Concern Present    Feeling of Stress : Only a little   Social Connections: Socially Integrated    Frequency of Communication with Friends and Family: More than three times a week    Frequency of Social Gatherings with Friends and Family: More than three times a week    Attends Scientologist Services: 1 to 4 times per year   [employer] or Organizations:  Yes    Attends Club or Organization Meetings: More than 4 times per year    Marital Status:    Intimate Partner Violence: Not At Risk    Fear of Current or Ex-Partner: No    Emotionally Abused: No    Physically Abused: No    Sexually Abused: No        Home Medications:      Medications Prior to Admission: carvedilol (COREG) 25 MG tablet, Take 1 tablet by mouth 2 times daily  torsemide (DEMADEX) 20 MG tablet, Take 1 tablet by mouth daily  glimepiride (AMARYL) 1 MG tablet, Take 1 tablet by mouth every morning (before breakfast)  allopurinol (ZYLOPRIM) 300 MG tablet, Take 1 tablet by mouth daily  atorvastatin (LIPITOR) 40 MG tablet, Take 1 tablet by mouth daily  metFORMIN (GLUCOPHAGE) 500 MG tablet, Take 1 tablet by mouth 2 times daily (with meals)  clopidogrel (PLAVIX) 75 MG tablet, Take 1 tablet by mouth daily  aspirin 81 MG EC tablet, Take 81 mg by mouth daily  Probiotic Product (PROBIOTIC DAILY PO), Take 1 tablet by mouth daily  vitamin B-12 (CYANOCOBALAMIN) 100 MCG tablet, Take 50 mcg by mouth daily  ammonium lactate (AMLACTIN) 12 % cream, APPLY TO DRY CALLUS AREAS 1 TO 2 TIMES DAILY  MULTIPLE VITAMIN PO, Take 1 tablet by mouth daily   Elastic Bandages & Supports (V-4 HIGH COMPRESSION HOSE) MISC, KNEE HIGH - 20-30 mmHg  Alcohol Swabs PADS, DX: diabetes mellitus. Test 1 time(s) daily - Ok to substitute per insurance (1 each = 1 box)  blood glucose monitor strips, DX: diabetes mellitus. Use 1 time(s) daily - True Metrix requested by patient - Salina Dunbar to substitute per insurance  Lancets MISC, DX: diabetes mellitus. Use 1 time(s) daily - Ok to substitute per insurance (1 each = 1 box)  blood glucose monitor kit and supplies, DX: diabetes mellitus. Test 1 time(s) daily - True Metrix requested by patient - Salina Dunbar to substitute per insurance  lidocaine (LIDODERM) 5 %, Place 1 patch onto the skin every 12 hours   Misc. Devices (COMMODE BEDSIDE) MISC, Use daily as needed    Current Hospital Medications:   Scheduled Meds:   sodium chloride flush  5-40 mL Intravenous 2 times per day    insulin lispro  0-12 Units Subcutaneous TID WC    insulin lispro  0-6 Units Subcutaneous Nightly     Continuous Infusions:   sodium chloride      dextrose       PRN Meds:.sodium chloride flush, sodium chloride, ondansetron **OR** ondansetron, acetaminophen **OR** acetaminophen, glucose, dextrose, glucagon (rDNA), dextrose   sodium chloride      dextrose        Allergies: Allergies   Allergen Reactions    Bactrim [Sulfamethoxazole-Trimethoprim] Nausea And Vomiting and Other (See Comments)     Sugar count elevated, had troubles urinating      Review of Systems:       [x] CV, Resp, Neuro, , and all other systems reviewed and negative other than listed in HPI. Objective Findings:     Vitals:   Vitals:    08/20/21 1333 08/20/21 1430 08/20/21 1435 08/20/21 1440   BP: (!) 173/81 131/72 (!) 149/68 (!) 151/67   Pulse: 90 90 88 86   Resp: 18 20 20 20   Temp: 98.2 °F (36.8 °C)      TempSrc: Temporal      SpO2: 98% 99% 95% 95%   Weight:       Height:            Physical Examination:  General: Alert and oriented x3, in no acute distress, patient on commode during exam, watery/bloody stool on the floor noted. HEENT: Normocephalic, no scleral icterus.   Neck: No JVD. Heart: Regular, no murmur, no rub/gallop. No RV heave. Lungs: Clear to ascultation, no rales/wheezing/rhonchi. Good chest wall excursion. Abdomen: Appearance: No distension, central obesity, Soft , no tenderness, Bowel sounds normal  Extremities: No clubbing/cyanosis, no edema. Skin: Warm, dry, normal turgor, no rash, no bruise, no petichiae. Neuro: No myoclonus or tremor. Psych: Normal affect    Results/ Medications reviewed 8/20/2021, 10:15 PM     Laboratory, Microbiology, Pathology, Radiology, Cardiology, Medications and Transcriptions reviewed  Scheduled Meds:   sodium chloride flush  5-40 mL Intravenous 2 times per day    insulin lispro  0-12 Units Subcutaneous TID WC    insulin lispro  0-6 Units Subcutaneous Nightly     Continuous Infusions:   sodium chloride      dextrose         Recent Labs     08/20/21  0145 08/20/21  1138 08/20/21  1731   WBC 8.3  --   --    HGB 14.3 13.2* 12.4*   HCT 41.2* 38.1* 35.6*   .1*  --   --      --   --      Recent Labs     08/20/21  0145 08/20/21  1138   * 130*   K 3.7 3.4   CL 90* 93*   CO2 24 22   BUN 10 6*   CREATININE 0.83 0.67*     Recent Labs     08/20/21  0145 08/20/21  1138   AST 25 22   ALT 27 23   BILITOT 0.9* 0.9*   ALKPHOS 65 62     No results for input(s): LIPASE, AMYLASE in the last 72 hours. Recent Labs     08/20/21  0145 08/20/21  1138   PROT 7.1 6.7   INR 1.0  --      MRI LUMBAR SPINE WO CONTRAST    Result Date: 8/10/2021  EXAMINATION:  MRI LUMBAR SPINE WO CONTRAST CLINICAL HISTORY:  Lumbar radiculopathy. TECHNIQUE: Routine lumbosacral spine MR protocol without gadolinium. COMPARISON: Lumbar spine radiograph 12/3/2019. RESULT: LUMBAR: Counting reference:  Lumbosacral junction. For the purposes of this report,  transitional anatomy at L5-S1, there is lumbarization of S1 vertebral body. Alignment:   Mild levoscoliosis centered at L3-L4. Lumbar lordosis is maintained. Vertebral body heights and disc spaces are maintained. There is grade 1 anterolisthesis of L5 on S1. Bone marrow signal/fracture:   No evidence of pathologic marrow infiltration. No evidence of prior fracture. Conus: The conus terminates at L1, and is within normal limits of signal intensity and morphology. Paraspinal soft tissues: There is moderate fatty atrophy of the paraspinal muscles. Multilevel degenerative changes of the lumbar spine including endplate remodeling, facet degenerative changes, ligamentum flavum hypertrophy and disc bulges. T12-L1:  Canal and foramina are patent. L1-L2:    Mild facet degenerative changes and ligamentum flavum hypertrophy without significant canal stenosis. Neural foramen are patent. L2-L3:    Small symmetric disc bulge, mild facet degenerative changes and ligamentum flavum hypertrophy without significant canal stenosis. Neural foramen are patent. L3-L4:    Multilevel degenerative disc bulge, facet degenerative changes and ligamentum flavum hypertrophy with mild flattening of the ventral thecal sac. There is no significant canal stenosis. Neural foramen are patent. L4-L5:    Disc bulge slightly asymmetric to the left, mild ligamentum flavum hypertrophy and moderate facet degenerative changes with flattening of the ventral thecal sac. There is no significant canal stenosis. There is mild narrowing of the left subarticular recess. L5-S1:    Disc bulge asymmetric to the right with superimposed right paracentral disc protrusion, mild ligament flavum hypertrophy and right greater than left facet degenerative changes with mild canal stenosis. There is central annular fissure tear. There is moderate right and mild left neural foraminal narrowing. There is abutment of the exiting right L5 nerve root. There is moderate narrowing of the right subarticular recess with mass effect on the traversing right S1 nerve root. Sacrum and iliac wings:   The visualized sacrum and iliac wings are within normal limits.   The presacral soft tissues are normal in appearance. Multilevel degenerative changes of the lumbar spine with up to mild canal stenosis and moderate neural foraminal narrowing most prominent at L5-S1. Subarticular recess narrowing at right L5-S1 with mass effect on the traversing right S1 nerve root and abutment of the exiting right L5 nerve root. Anatomic Thoracic/Lumbar Variant: Transitional anatomy at L5-S1, with lumbarization of the S1 vertebral body       Impression:   77 y.o. male presents to the hospital with lower GI bleed status post colonoscopy with polypectomy yesterday, 8/19/2021. Patient with bright red blood per rectum increasing in frequency and quantity at home. Plan:   Acute lower GI  1. Acute blood loss/Anemia  - Likely Lower GI source S/P polypectomy     - Plan for colonoscopy today. Give colyte prep liquid. - Monitor H/H and transfuse accordingly  - Continue IVF for hypotension    Comments: Thank you for allowing us to participate in the care of this patient. Will continue to follow. Please call if questions or concerns arise. Electronically signed by Erica Abreu MD on 8/20/2021 at 10:15 PM    Please note this report has been partially produced using speech recognition software and may cause contain errors related to that system including grammar, punctuation and spelling as well as words and phrases that may seem inappropriate. If there are questions or concerns please feel free to contact me to clarify.

## 2021-08-20 NOTE — ED PROVIDER NOTES
[address] [facility] ED  EMERGENCY DEPARTMENT ENCOUNTER      Pt Name: Jaquan Centeno  MRN: [id number]  [place] 1955  Date of evaluation: 8/20/2021  Provider: Dell Garcia Dr       Chief Complaint   Patient presents with    Rectal Bleeding         HISTORY OF PRESENT ILLNESS   (Location/Symptom, Timing/Onset, Context/Setting, Quality, Duration, Modifying Factors, Severity)  Note limiting factors. Jaquan Centeno is a 77 y.o. male who per chart reviews past medical history of CAD MI type 2 diabetes CAD hypertension presents to the emergency department evaluation of rectal bleeding. Patient had colonoscopy with Dr. Enrike Stewart earlier today around noon. He states 14 polyps were removed. Prior to leaving the colonoscopy suite patient had one episode of bright red blood per rectum with clotting. He states he has had about 7 episodes throughout the evening since returning home. He states he is passing a lot of gas. He states no abdominal pain however he feels that the amount of force from the gas he is passing is somewhat painful. He states that with each episode of bleeding it appears that the blood is \"fresher. \"  He denies fever chills abdominal pain nausea vomiting diarrhea constipation rectal pain dizziness shortness of breath. HPI    Nursing Notes were reviewed. REVIEW OF SYSTEMS    (2-9 systems for level 4, 10 or more for level 5)     Review of Systems   Constitutional: Negative for chills, fatigue and fever. HENT: Negative for congestion. Eyes: Negative for photophobia. Respiratory: Negative for cough, shortness of breath and wheezing. Cardiovascular: Negative for chest pain and palpitations. Gastrointestinal: Positive for anal bleeding. Negative for abdominal distention, abdominal pain, blood in stool, constipation, diarrhea, nausea, rectal pain and vomiting. Genitourinary: Negative for dysuria, frequency and hematuria.    Musculoskeletal: Negative for myalgias. Allergic/Immunologic: Negative for immunocompromised state. Neurological: Negative for dizziness, weakness and headaches. All other systems reviewed and are negative. Except as noted above the remainder of the review of systems was reviewed and negative.        PAST MEDICAL HISTORY     Past Medical History:   Diagnosis Date    Acute renal failure ([address]     Anxiety     CAD S/P percutaneous coronary angioplasty 3/11/2014    Cardiomyopathy ([address]     Cerebrovascular accident (CVA) due to occlusion of left middle cerebral artery ([address] 08/2016    brainstem infarction from left MCA occlusion    Cerebrovascular disease 07/27/2016    Colon polyp     Coronary angioplasty status     CVA (cerebral vascular accident) ([address] 11/12/2017    Dependent edema 9/14/2017    Diplopia 5/15/2017    Resolved with management of cataracts    Dupuytren contracture 1/2/2018    Dysphagia 8/18/2011    Dysphonia 8/18/2011    Essential hypertension 8/17/2016    Gallop rhythm     Gout 12/10/2016    Gout of big toe 08/2014    Right Great Toe    H/O fall     Headache     migraines    Heart failure ([address]     History of 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    History of heart failure 1/6/2014    History of myocardial infarction 3/11/2014    History of recurrent pneumonia 2/11/2014    Hx of bacterial pneumonia     Hyperlipidemia, mixed 4/30/2021    Hyperlipidemia, mixed 4/30/2021    Hypotension     Left carotid artery stenosis 8/23/2020    Left carotid artery stenosis 8/23/2020    Leg swelling     Macrocytosis without anemia 12/10/2016    Microalbuminuria due to type 2 diabetes mellitus ([address] 9/14/2018    Morbid obesity ([address] 1/2/2014    Myocardial infarction ([address]     Obesity     AYLIN (obstructive sleep apnea) 12/10/2016    Osteoarthritis     Right-sided muscle weakness 10/19/2016    S/P cardiac catheterization     Sciatica     Skin cyst 4/8/2016    Spasm 11/9/2016    Spina [facility]ida [facility]     Stented coronary artery     Tremor of right hand 5/15/2017    Type 2 diabetes mellitus with diabetic polyneuropathy, without long-term current use of insulin ([facility]     Unsteady gait 5/15/2017    Weakness     Weight gain          SURGICAL HISTORY       Past Surgical History:   Procedure Laterality Date    ANKLE SURGERY Left     BACK SURGERY      lumbar laminectomy    CHOLECYSTECTOMY      COLONOSCOPY  01/15/2010    polyp, diverticulosis (DR ELAINE)    COLONOSCOPY N/A 8/19/2021    COLORECTAL CANCER SCREENING, HIGH RISK with polypectomies  performed by Shaquille Simon MD at [address] [place] [address]  2014    3 stents    CYST REMOVAL  05/16/2016    DR Chang Betancourt,  L SCROTAL CYST, R thigh, L ear, back    SHOULDER SURGERY Bilateral 12/18/2015    left once right twice- to remove bone spurs    TONSILLECTOMY           CURRENT MEDICATIONS       Previous Medications    ALCOHOL SWABS PADS    DX: diabetes mellitus. Test 1 time(s) daily - Ok to substitute per insurance (1 each = 1 box)    ALLOPURINOL (ZYLOPRIM) 300 MG TABLET    Take 1 tablet by mouth daily    AMMONIUM LACTATE (AMLACTIN) 12 % CREAM    APPLY TO DRY CALLUS AREAS 1 TO 2 TIMES DAILY    ASPIRIN 81 MG EC TABLET    Take 81 mg by mouth daily    ATORVASTATIN (LIPITOR) 40 MG TABLET    Take 1 tablet by mouth daily    BLOOD GLUCOSE MONITOR KIT AND SUPPLIES    DX: diabetes mellitus. Test 1 time(s) daily - True Metrix requested by patient - Yael Baxter to substitute per insurance    BLOOD GLUCOSE MONITOR STRIPS    DX: diabetes mellitus.  Use 1 time(s) daily - True Metrix requested by patient - Ok to substitute per insurance    CARVEDILOL (COREG) 25 MG TABLET    Take 1 tablet by mouth 2 times daily    CLOPIDOGREL (PLAVIX) 75 MG TABLET    Take 1 tablet by mouth daily    ELASTIC BANDAGES & SUPPORTS (V-4 HIGH COMPRESSION [place]) [facility]    KNEE HIGH - 20-30 mmHg    GLIMEPIRIDE (AMARYL) 1 MG TABLET    Take 1 tablet by mouth every morning (before breakfast)    LANCETS MISC    DX: diabetes mellitus. Use 1 time(s) daily - Ok to substitute per insurance (1 each = 1 box)    LIDOCAINE (LIDODERM) 5 %    Place 1 patch onto the skin every 12 hours     METFORMIN (GLUCOPHAGE) 500 MG TABLET    Take 1 tablet by mouth 2 times daily (with meals)    MISC. DEVICES (COMMODE BEDSIDE) MISC    Use daily as needed    MULTIPLE VITAMIN PO    Take 1 tablet by mouth daily     PROBIOTIC PRODUCT (PROBIOTIC DAILY PO)    Take 1 tablet by mouth daily    TORSEMIDE (DEMADEX) 20 MG TABLET    Take 1 tablet by mouth daily    VITAMIN B-12 (CYANOCOBALAMIN) 100 MCG TABLET    Take 50 mcg by mouth daily       ALLERGIES     Bactrim [sulfamethoxazole-trimethoprim]    FAMILY HISTORY       Family History   Problem Relation Age of Onset    Breast Cancer Mother     Stroke Father     Colon Cancer Father 76          SOCIAL HISTORY       Social History     Socioeconomic History    Marital status:      Spouse name: Tera Reynolds Number of children: 3    Years of education: 12+    Highest education level: None   Occupational History    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     Employer: [employer]   Tobacco Use    Smoking status: Current Some Day Smoker     Packs/day: 2.50     Years: 30.00     Pack years: 75.00     Types: Cigarettes     Last attempt to quit: 2001     Years since quittin.6    Smokeless tobacco: Never Used    Tobacco comment: varies   Vaping Use    Vaping Use: Never used   Substance and Sexual Activity    Alcohol use:  Yes     Alcohol/week: 0.0 standard drinks     Comment: limits less than 10/wk varies    Drug use: No    Sexual activity: Yes     Partners: Female   Other Topics Concern    None   Social History Narrative    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 from [employer] after 2016 CVA    Lives With: Spouse    Type of Home: [address] [place] [address]: [facility] [place] level, Bed/Bath upstairs, Laundry in basement(Railing)    Home Access: Stairs to enter with rails    Entrance Stairs - Number of Steps: 3 in back, 4 in front    Bathroom Shower/Tub: Tub/Shower unit    [place] Equipment: Shower chair    Home Equipment: Rolling walker, Cane    ADL Assistance: Independent    Homemaking Assistance: Needs assistance(Spouse  is primary; vertigo and arthritis limit housework)    Ambulation Assistance: Independent(Walker outside, cane inside)    Transfer Assistance: Independent    Active : No    Patient's  Info: has not driven since CVA     Social Determinants of Health     Financial Resource Strain: Low Risk     Difficulty of Paying Living Expenses: Not hard at all   Food Insecurity: No Food Insecurity    Worried About [address] in the Last Year: Never true    0 [employer] in the Last Year: Never true   Transportation Needs: No Transportation Needs    Lack of Transportation (Medical): No    Lack of Transportation (Non-Medical): No   Physical Activity: Inactive    Days of Exercise per Week: 0 days    Minutes of Exercise per Session: 0 min   Stress: No Stress Concern Present    Feeling of Stress : Only a little   Social Connections: Socially Integrated    Frequency of Communication with Friends and Family: More than three times a week    Frequency of Social Gatherings with Friends and Family: More than three times a week    Attends Orthodoxy Services: 1 to 4 times per year   [employer] or Organizations:  Yes    Attends Club or Organization Meetings: More than 4 times per year    Marital Status:    Intimate Partner Violence: Not At Risk    Fear of Current or Ex-Partner: No    Emotionally Abused: No    Physically Abused: No    Sexually Abused: No       SCREENINGS                        PHYSICAL EXAM    (up to 7 for level 4, 8 or more for level 5)     ED Triage Vitals [08/20/21 0109]   BP Temp Temp Source Pulse Resp SpO2 Height Weight   (!) 180/87 99.1 °F (37.3 °C) Oral 104 18 99 % 6' 1\" (1.854 m) 271 lb (122.9 kg)       Physical Exam  Constitutional:       General: He is not in acute distress. Appearance: He is well-developed. He is not ill-appearing, toxic-appearing or diaphoretic. HENT:      Head: Normocephalic and atraumatic. Nose: Nose normal.      Mouth/Throat:      Mouth: Mucous membranes are moist.   Eyes:      Pupils: Pupils are equal, round, and reactive to light. Cardiovascular:      Rate and Rhythm: Normal rate and regular rhythm. Heart sounds: No murmur heard. No friction rub. No gallop. Pulmonary:      Effort: Pulmonary effort is normal.      Breath sounds: Normal breath sounds. No wheezing, rhonchi or rales. Abdominal:      General: Bowel sounds are normal. There is no distension. Palpations: Abdomen is soft. There is no mass. Tenderness: There is no abdominal tenderness. There is no right CVA tenderness, left CVA tenderness, guarding or rebound. Hernia: No hernia is present. Genitourinary:     Rectum: No tenderness or external hemorrhoid. Comments: Moderate amount of bright red blood surrounding rectum   Musculoskeletal:         General: No swelling. Cervical back: Normal range of motion. Skin:     General: Skin is warm and dry. Capillary Refill: Capillary refill takes less than 2 seconds. Neurological:      General: No focal deficit present. Mental Status: He is alert and oriented to person, place, and time.          DIAGNOSTIC RESULTS     EKG: All EKG's are interpreted by the Emergency Department Physician who either signs or Co-signs this chart in the absence of a cardiologist.        RADIOLOGY:   Non-plain film images such as CT, Ultrasound and MRI are read by the radiologist. Plain radiographic images are visualized and preliminarily interpreted by the emergency physician with the below findings:      Interpretation per the Radiologist below, if available at the time of this note:    No orders to display         ED BEDSIDE ULTRASOUND:   Performed by ED Physician - none    LABS:  Labs Reviewed   COMPREHENSIVE METABOLIC PANEL - Abnormal; Notable for the following components:       Result Value    Sodium 129 (*)     Chloride 90 (*)     Glucose 146 (*)     Total Bilirubin 0.9 (*)     All other components within normal limits   CBC WITH AUTO DIFFERENTIAL - Abnormal; Notable for the following components:    RBC 3.74 (*)     Hematocrit 41.2 (*)     .1 (*)     MCH 38.2 (*)     All other components within normal limits   APTT   PROTIME-INR   URINE RT REFLEX TO CULTURE   TYPE AND SCREEN       All other labs were within normal range or not returned as of this dictation. EMERGENCY DEPARTMENT COURSE and DIFFERENTIAL DIAGNOSIS/MDM:   Vitals:    Vitals:    08/20/21 0109   BP: (!) 180/87   Pulse: 104   Resp: 18   Temp: 99.1 °F (37.3 °C)   TempSrc: Oral   SpO2: 99%   Weight: 271 lb (122.9 kg)   Height: 6' 1\" (1.854 m)       MDM     Patient is a 78-year-old male presents to the ED for evaluation of rectal bleeding status post colonoscopy. He is afebrile and hemodynamically stable. He was given 1 L IV normal saline in the ED. Labs unremarkable. Hemoglobin and hematocrit are stable at 14.3 and 41.2 respectively. Patient is generally asymptomatic. He has had multiple episodes of bright red blood per rectum while in the ED. Patient likely needs repeat colonoscopy. Spoke with Dr. Lexine Peabody who agrees with admission. Dr. Darvin Delaney accepts. Will start on bowel prep and clear liquid diet only while in the ED. Patient is stable for admission      REASSESSMENT          CRITICAL CARE TIME   Total Critical Care time was 0 minutes, excluding separately reportable procedures. There was a high probability of clinically significant/life threatening deterioration in the patient's condition which required my urgent intervention.       CONSULTS:  None    PROCEDURES:  Unless otherwise noted below, none     Procedures        FINAL IMPRESSION      1. Rectal bleeding          DISPOSITION/PLAN   DISPOSITION Admitted 08/20/2021 03:55:28 AM      PATIENT REFERRED TO:  No follow-up provider specified. DISCHARGE MEDICATIONS:  New Prescriptions    No medications on file     Controlled Substances Monitoring:     RX Monitoring 10/30/2018   Attestation The Prescription Monitoring Report for this patient was reviewed today. Periodic Controlled Substance Monitoring No signs of potential drug abuse or diversion identified.        (Please note that portions of this note were completed with a voice recognition program.  Efforts were made to edit the dictations but occasionally words are mis-transcribed.)    Stephie Elizondo PA-C (electronically signed)             Stephie Elizondo PA-C  08/20/21 0087

## 2021-08-20 NOTE — CARE COORDINATION
MET WITH PATIENT, FREEDOM OF CHOICE OFFERED. PATIENT OBSERVATION STATUS. FROM HOME WITH SPOUSE, HAS WALKER. STATES PLAN IS TO RETURN HOME WHEN STABLE. DENIES FURTHER NEEDS.

## 2021-08-20 NOTE — ANESTHESIA PRE PROCEDURE
monitor strips DX: diabetes mellitus. Use 1 time(s) daily - True Metrix requested by patient - Yael Baxter to substitute per insurance 5/14/20   Telly Zimmerman MD   Lancets MISC DX: diabetes mellitus. Use 1 time(s) daily - Ok to substitute per insurance (1 each = 1 box) 5/14/20   Telly Zimmerman MD   blood glucose monitor kit and supplies DX: diabetes mellitus. Test 1 time(s) daily - True Metrix requested by patient - Yael Baxter to substitute per insurance 5/14/20   Telly Zimmerman MD   lidocaine (LIDODERM) 5 % Place 1 patch onto the skin every 12 hours  9/8/18   Historical Provider, MD   Misc.  Devices (COMMODE BEDSIDE) MISC Use daily as needed 8/22/16   Sarahi Allan MD       Current medications:    Current Facility-Administered Medications   Medication Dose Route Frequency Provider Last Rate Last Admin    sodium chloride flush 0.9 % injection 5-40 mL  5-40 mL Intravenous 2 times per day Rhoda Bell, APRN - CNP        sodium chloride flush 0.9 % injection 5-40 mL  5-40 mL Intravenous PRN Rhoda Bell, APRN - CNP        0.9 % sodium chloride infusion  25 mL Intravenous PRN Rhoda Bell, APRN - CNP        ondansetron (ZOFRAN-ODT) disintegrating tablet 4 mg  4 mg Oral Q8H PRN Rhoda Bell, APRN - CNP        Or    ondansetron [facility]) injection 4 mg  4 mg Intravenous Q6H PRN Rhoda Bell, APRN - CNP        acetaminophen (TYLENOL) tablet 650 mg  650 mg Oral Q6H PRN Rhoda Bell, APRN - CNP        Or    acetaminophen (TYLENOL) suppository 650 mg  650 mg Rectal Q6H PRN Rhoda Bell, APRN - CNP        0.9 % sodium chloride infusion   Intravenous Continuous Rhoda Bell, APRN -  mL/hr at 08/20/21 0534 New Bag at 08/20/21 0534    insulin lispro (HUMALOG) injection vial 0-12 Units  0-12 Units Subcutaneous TID NEREYDA Delgado - CNP        insulin lispro (HUMALOG) injection vial 0-6 Units  0-6 Units Subcutaneous Nightly Dane Pool, APRN - CNP        glucose (GLUTOSE) 40 % oral gel 15 g  15 g Oral PRN Shy Basil, APRN - CNP        dextrose 50 % IV solution  12.5 g Intravenous PRN Shy Basil, APRN - CNP        glucagon (rDNA) injection 1 mg  1 mg Intramuscular PRN Shy Basil, APRN - CNP        dextrose 5 % solution  100 mL/hr Intravenous PRN Shy Basil, APRN - CNP           Allergies:     Allergies   Allergen Reactions    Bactrim [Sulfamethoxazole-Trimethoprim] Nausea And Vomiting and Other (See Comments)     Sugar count elevated, had troubles urinating       Problem List:    Patient Active Problem List   Diagnosis Code    Morbid obesity ([facility] E66.01    History of heart failure Z86.79    History of recurrent pneumonia Z87.01    CAD (coronary artery disease) I25.10    History of myocardial infarction I25.2    DM2 (diabetes mellitus, type 2) ([facility] E11.9    Skin cyst L72.9    Local infection of skin and subcutaneous tissue L08.9    History of CVA (cerebrovascular accident) Z80.78    Essential hypertension I10    Right-sided muscle weakness M62.81    Spasm R25.2    AYLIN (obstructive sleep apnea) G47.33    Gout M10.9    Macrocytosis without anemia D75.89    Tremor of right hand R25.1    Unsteady gait R26.81    Dependent edema R60.9    Cutaneous abscess of back excluding buttocks L02.212    Skin infection L08.9    Microalbuminuria due to type 2 diabetes mellitus (HCC) E11.29, R80.9    Lymphedema of both lower extremities I89.0    PAD (peripheral artery disease) ([facility] I73.9    Venous insufficiency of both lower extremities I87.2    Claudication ([facility]) I73.9    Abscess of back L02.212    Abscess of right leg L02.415    Tinnitus of both ears H93.13    Sensorineural hearing loss (SNHL) of left ear H90.5    Ataxic gait R26.0    Left carotid artery stenosis I65.22    Dysarthria R47.1    Late effects of CVA (cerebrovascular accident) I69.90 Myocardial infarction ([facility]     Obesity     AYLIN (obstructive sleep apnea) 12/10/2016    Osteoarthritis     Right-sided muscle weakness 10/19/2016    S/P cardiac catheterization     Sciatica     Skin cyst 2016    Spasm 2016    Spina bifida ([facility]     Stented coronary artery     Tremor of right hand 5/15/2017    Type 2 diabetes mellitus with diabetic polyneuropathy, without long-term current use of insulin (HCC)     Unsteady gait 5/15/2017    Weakness     Weight gain        Past Surgical History:        Procedure Laterality Date    ANKLE SURGERY Left     BACK SURGERY      lumbar laminectomy    CHOLECYSTECTOMY      COLONOSCOPY  01/15/2010    polyp, diverticulosis (DR ELAINE)    COLONOSCOPY N/A 2021    COLORECTAL CANCER SCREENING, HIGH RISK with polypectomies  performed by Yoli Espinosa MD at [address] [place] [address]  2014    3 stents    CYST REMOVAL  2016    DR Pilar Ayala,  L SCROTAL CYST, R thigh, L ear, back    SHOULDER SURGERY Bilateral 2015    left once right twice- to remove bone spurs    TONSILLECTOMY         Social History:    Social History     Tobacco Use    Smoking status: Current Some Day Smoker     Packs/day: 2.50     Years: 30.00     Pack years: 75.00     Types: Cigarettes     Last attempt to quit:      Years since quittin.6    Smokeless tobacco: Never Used    Tobacco comment: varies   Substance Use Topics    Alcohol use:  Yes     Alcohol/week: 0.0 standard drinks     Comment: limits less than 10/wk varies                                Ready to quit: Not Answered  Counseling given: Not Answered  Comment: varies      Vital Signs (Current):   Vitals:    21 0109 21 0400 21 0445 21 0719   BP: (!) 180/87 (!) 149/82 (!) 163/79 (!) 145/78   Pulse: 104 89 93 87   Resp: 18 17 16    Temp: 37.3 °C (99.1 °F)  37.2 °C (99 °F) 37.1 °C (98.8 °F)   TempSrc: Oral  Oral    SpO2: 99% 98% 99% 99% Weight: 271 lb (122.9 kg)      Height: 6' 1\" (1.854 m)                                                 BP Readings from Last 3 Encounters:   08/20/21 (!) 145/78   08/19/21 (!) 141/81   08/19/21 128/69       NPO Status:                                                                                 BMI:   Wt Readings from Last 3 Encounters:   08/20/21 271 lb (122.9 kg)   08/19/21 271 lb (122.9 kg)   07/07/21 271 lb (122.9 kg)     Body mass index is 35.75 kg/m².     CBC:   Lab Results   Component Value Date    WBC 8.3 08/20/2021    RBC 3.74 08/20/2021    HGB 13.2 08/20/2021    HCT 38.1 08/20/2021    .1 08/20/2021    RDW 13.5 08/20/2021     08/20/2021       CMP:   Lab Results   Component Value Date     08/20/2021    K 3.4 08/20/2021    CL 93 08/20/2021    CO2 22 08/20/2021    BUN 6 08/20/2021    CREATININE 0.67 08/20/2021    GFRAA >60.0 08/20/2021    LABGLOM >60.0 08/20/2021    GLUCOSE 134 08/20/2021    PROT 6.7 08/20/2021    CALCIUM 9.1 08/20/2021    BILITOT 0.9 08/20/2021    ALKPHOS 62 08/20/2021    AST 22 08/20/2021    ALT 23 08/20/2021       POC Tests:   Recent Labs     08/20/21  1115   POCGLU 144*       Coags:   Lab Results   Component Value Date    PROTIME 13.1 08/20/2021    PROTIME 12.4 11/12/2017    INR 1.0 08/20/2021    APTT 28.4 08/20/2021       HCG (If Applicable): No results found for: PREGTESTUR, PREGSERUM, HCG, HCGQUANT     ABGs: No results found for: PHART, PO2ART, JHP9JKJ, OGB7TBM, BEART, [id number]     Type & Screen (If Applicable):  No results found for: LABABO, LABRH    Drug/Infectious Status (If Applicable):  No results found for: HIV, HEPCAB    COVID-19 Screening (If Applicable): No results found for: COVID19        Anesthesia Evaluation  Patient summary reviewed and Nursing notes reviewed  Airway: Mallampati: III  TM distance: >3 FB   Neck ROM: full  Mouth opening: > = 3 FB Dental: normal exam         Pulmonary:normal exam    (+) sleep apnea: Cardiovascular:    (+) hypertension:, past MI:, CAD:,                   Neuro/Psych:   (+) CVA:, neuromuscular disease:, TIA, headaches:,             GI/Hepatic/Renal: Neg GI/Hepatic/Renal ROS            Endo/Other:    (+) Diabetes, . Abdominal:   (+) obese,           Vascular: Other Findings:             Anesthesia Plan      MAC     ASA 3       Induction: intravenous. Anesthetic plan and risks discussed with patient. Use of blood products discussed with patient whom. Plan discussed with CRNA.                   NEREYDA Gore - YOVANI   8/20/2021

## 2021-08-21 VITALS
DIASTOLIC BLOOD PRESSURE: 72 MMHG | HEART RATE: 76 BPM | OXYGEN SATURATION: 98 % | WEIGHT: 271 LBS | BODY MASS INDEX: 35.92 KG/M2 | SYSTOLIC BLOOD PRESSURE: 140 MMHG | HEIGHT: 73 IN | TEMPERATURE: 98.6 F | RESPIRATION RATE: 20 BRPM

## 2021-08-21 LAB
ALBUMIN SERPL-MCNC: 3.4 G/DL (ref 3.5–4.6)
ALP BLD-CCNC: 51 U/L (ref 35–104)
ALT SERPL-CCNC: 19 U/L (ref 0–41)
ANION GAP SERPL CALCULATED.3IONS-SCNC: 13 MEQ/L (ref 9–15)
AST SERPL-CCNC: 26 U/L (ref 0–40)
BASOPHILS ABSOLUTE: 0 K/UL (ref 0–0.2)
BASOPHILS RELATIVE PERCENT: 0.4 %
BILIRUB SERPL-MCNC: 0.6 MG/DL (ref 0.2–0.7)
BUN BLDV-MCNC: 4 MG/DL (ref 8–23)
CALCIUM SERPL-MCNC: 9.2 MG/DL (ref 8.5–9.9)
CHLORIDE BLD-SCNC: 98 MEQ/L (ref 95–107)
CO2: 22 MEQ/L (ref 20–31)
CREAT SERPL-MCNC: 0.66 MG/DL (ref 0.7–1.2)
EOSINOPHILS ABSOLUTE: 0.1 K/UL (ref 0–0.7)
EOSINOPHILS RELATIVE PERCENT: 1.9 %
GFR AFRICAN AMERICAN: >60
GFR NON-AFRICAN AMERICAN: >60
GLOBULIN: 2.6 G/DL (ref 2.3–3.5)
GLUCOSE BLD-MCNC: 123 MG/DL (ref 70–99)
GLUCOSE BLD-MCNC: 211 MG/DL (ref 60–115)
HCT VFR BLD CALC: 34.3 % (ref 42–52)
HCT VFR BLD CALC: 34.7 % (ref 42–52)
HCT VFR BLD CALC: 36.3 % (ref 42–52)
HEMOGLOBIN: 11.7 G/DL (ref 14–18)
HEMOGLOBIN: 12 G/DL (ref 14–18)
HEMOGLOBIN: 12.4 G/DL (ref 14–18)
IRON SATURATION: 20 % (ref 11–46)
IRON: 50 UG/DL (ref 59–158)
LYMPHOCYTES ABSOLUTE: 1.3 K/UL (ref 1–4.8)
LYMPHOCYTES RELATIVE PERCENT: 24.7 %
MAGNESIUM: 1.5 MG/DL (ref 1.7–2.4)
MCH RBC QN AUTO: 37.6 PG (ref 27–31.3)
MCHC RBC AUTO-ENTMCNC: 34.2 % (ref 33–37)
MCV RBC AUTO: 110.1 FL (ref 80–100)
MONOCYTES ABSOLUTE: 0.4 K/UL (ref 0.2–0.8)
MONOCYTES RELATIVE PERCENT: 7 %
NEUTROPHILS ABSOLUTE: 3.3 K/UL (ref 1.4–6.5)
NEUTROPHILS RELATIVE PERCENT: 66 %
PDW BLD-RTO: 13.6 % (ref 11.5–14.5)
PERFORMED ON: ABNORMAL
PLATELET # BLD: 148 K/UL (ref 130–400)
POTASSIUM REFLEX MAGNESIUM: 3.5 MEQ/L (ref 3.4–4.9)
RBC # BLD: 3.12 M/UL (ref 4.7–6.1)
SODIUM BLD-SCNC: 133 MEQ/L (ref 135–144)
TOTAL IRON BINDING CAPACITY: 254 UG/DL (ref 178–450)
TOTAL PROTEIN: 6 G/DL (ref 6.3–8)
WBC # BLD: 5.1 K/UL (ref 4.8–10.8)

## 2021-08-21 PROCEDURE — 96366 THER/PROPH/DIAG IV INF ADDON: CPT

## 2021-08-21 PROCEDURE — 80053 COMPREHEN METABOLIC PANEL: CPT

## 2021-08-21 PROCEDURE — 6370000000 HC RX 637 (ALT 250 FOR IP): Performed by: INTERNAL MEDICINE

## 2021-08-21 PROCEDURE — 96365 THER/PROPH/DIAG IV INF INIT: CPT

## 2021-08-21 PROCEDURE — 85014 HEMATOCRIT: CPT

## 2021-08-21 PROCEDURE — 85025 COMPLETE CBC W/AUTO DIFF WBC: CPT

## 2021-08-21 PROCEDURE — 99203 OFFICE O/P NEW LOW 30 MIN: CPT | Performed by: INTERNAL MEDICINE

## 2021-08-21 PROCEDURE — 36415 COLL VENOUS BLD VENIPUNCTURE: CPT

## 2021-08-21 PROCEDURE — 2700000000 HC OXYGEN THERAPY PER DAY

## 2021-08-21 PROCEDURE — 83550 IRON BINDING TEST: CPT

## 2021-08-21 PROCEDURE — 83540 ASSAY OF IRON: CPT

## 2021-08-21 PROCEDURE — G0378 HOSPITAL OBSERVATION PER HR: HCPCS

## 2021-08-21 PROCEDURE — 6370000000 HC RX 637 (ALT 250 FOR IP): Performed by: NURSE PRACTITIONER

## 2021-08-21 PROCEDURE — 6360000002 HC RX W HCPCS: Performed by: INTERNAL MEDICINE

## 2021-08-21 PROCEDURE — 85018 HEMOGLOBIN: CPT

## 2021-08-21 PROCEDURE — 83735 ASSAY OF MAGNESIUM: CPT

## 2021-08-21 RX ORDER — MAGNESIUM SULFATE IN WATER 40 MG/ML
2000 INJECTION, SOLUTION INTRAVENOUS ONCE
Status: COMPLETED | OUTPATIENT
Start: 2021-08-21 | End: 2021-08-21

## 2021-08-21 RX ORDER — LANOLIN ALCOHOL/MO/W.PET/CERES
800 CREAM (GRAM) TOPICAL ONCE
Status: COMPLETED | OUTPATIENT
Start: 2021-08-21 | End: 2021-08-21

## 2021-08-21 RX ADMIN — Medication 800 MG: at 08:55

## 2021-08-21 RX ADMIN — MAGNESIUM SULFATE HEPTAHYDRATE 2000 MG: 40 INJECTION, SOLUTION INTRAVENOUS at 09:06

## 2021-08-21 RX ADMIN — INSULIN LISPRO 4 UNITS: 100 INJECTION, SOLUTION INTRAVENOUS; SUBCUTANEOUS at 12:25

## 2021-08-21 NOTE — PROGRESS NOTES
Gastroenterology Progress Note      Steven Diaz   Hospitalization Day:0    Chief C/o: GI bleed, S/P colonoscopy w/polypectomy    SUBJECTIVE: Patient denies nausea/vomiting, hematemesis, hematochezia or melena. States he has not had a bowel movement since his colonoscopy yesterday. Patient S/P repeat colonoscopy 2021 with multiple polypectomy sites with stigmata of recent bleed, all closed with hemostatic clips. Hgb this morning stable at 11.7. Physical    VITALS:  BP (!) 140/72   Pulse 76   Temp 98.6 °F (37 °C) (Oral)   Resp 20   Ht 6' 1\" (1.854 m)   Wt 271 lb (122.9 kg)   SpO2 98%   BMI 35.75 kg/m²   TEMPERATURE:  Current - Temp: 98.6 °F (37 °C); Max - Temp  Av.5 °F (36.9 °C)  Min: 98.2 °F (36.8 °C)  Max: 98.6 °F (37 °C)    General-alert and oriented x3, in no acute distress  Eyes- anicteric sclera, no pallor  Cardiovascular- RRR without murmur  Lungs- clear to auscultation bilaterally  Abdomen soft, nondistended, central obesity, nontender, Bowel sounds normal   Extremities- + BLE edema  Skin- without jaundice    Data      Recent Labs     21  01421  1138 21  1731 21  2357 21  0844   WBC 8.3  --   --   --  5.1   HGB 14.3   < > 12.4* 12.0* 11.7*   HCT 41.2*   < > 35.6* 34.7* 34.3*   .1*  --   --   --  110.1*     --   --   --  148    < > = values in this interval not displayed. Recent Labs     21  1138   * 130*   K 3.7 3.4   CL 90* 93*   CO2 24 22   BUN 10 6*   CREATININE 0.83 0.67*     Recent Labs     21  1138   AST 25 22   ALT 27 23   BILITOT 0.9* 0.9*   ALKPHOS 65 62     No results for input(s): LIPASE, AMYLASE in the last 72 hours. Recent Labs     21  0145   PROTIME 13.1   INR 1.0       ASSESSMENT :  77 y.o. male presents to the hospital with lower GI bleed status post colonoscopy with polypectomy yesterday, 2021.   Patient with bright red blood per rectum increasing in frequency and quantity at home. Repeat colonoscopy 8/20/2021 with multiple polypectomy sites with stigmata of recent bleed, all closed with hemostatic clips. Hgb this morning stable at 11.7. No bowel further bowel movements overnight. PLAN:  -Surveillance colonoscopy to be based on polyp histology.  -Instruct patient to monitor all stool color/consistency and to come back to the hospital if bright red blood returns/increases in frequency. -OK to D/C from GI standpoint. GI to sign off. Thank you for allowing me to participate in the care of your patient.   Feel free to contact me with any questions or concerns    Gautam Geller MD

## 2021-08-21 NOTE — PROGRESS NOTES
Patient assessment complete. Patient states he has not had any bowel movements since before the procedure yesterday and just was having a lot of gas last night. Will continue to monitor.

## 2021-09-03 NOTE — PLAN OF CARE
Pt reports that she swallowed a pin that was a part of her shoe. Pt refuses to remove the backing of the pin from her mouth at this time. Pt is now endorsing feelings of wanting to hurt herself. Rosa, , asked to sit with pt and room emptied of all cords and plastic bags other than her call light. Dr. Alison diaz. Extension: 5/5  L Ankle Dorsiflexion: 4+/5, 5/5    [x] yes  [] no   Long term goal 2: Anderson >/= 38/56 to demonstrate improved balance and decreased risk for falls  Anderson Balance Score: 43   [x] yes  [] no   Long term goal 3: Ambulate >/= 150' with least restrictive device independently with decreased startle reflex and LOB   Pt ambulates with str cane with increased anxiety and GI discomfort with ambulation with str cane, improved tolerance vs last trial.  No episodes of LOB. 20' [] yes  [] no   Long term goal 4: Up/ down 4-6\" steps with 1 HR independently Independent with 1 HR with reciprocal pattern up and non-reciprocal down  [] yes  [] no       Assessment: Pt is independent with given HEP and compensatory strategies. Pt approaching functional plateau at this time and appropriate for discharge. Pt in agreement. New Education Provided:  HEP, compensatory strategies   G-Codes  PT G-Codes  Functional Assessment Tool Used: Anderson and clinical judgment   Score: 43/56  Functional Limitation: Mobility: Walking and moving around  Mobility: Walking and Moving Around Current Status (): At least 20 percent but less than 40 percent impaired, limited or restricted  Mobility: Walking and Moving Around Goal Status (): At least 20 percent but less than 40 percent impaired, limited or restricted    PLAN: [] Evaluate and Treat  Frequency/Duration:   Discharge PT     Precautions:             ?     Patient Status:[] Continue/ Initiate plan of Care    [x] Discharge PT. Recommend pt continue with HEP. [] Additional visits requested, Please re-certify for additional visits:        Signature: Electronically signed by Vaughn Kern PT on 10/2/18 at 3:44 PM    If you have any questions or concerns, please don't hesitate to call. Thank you for your referral.    I have reviewed this plan of care and certify a need for medically necessary rehabilitation services.     Physician

## 2021-09-13 DIAGNOSIS — R60.0 BILATERAL LOWER EXTREMITY EDEMA: ICD-10-CM

## 2021-09-14 RX ORDER — TORSEMIDE 20 MG/1
20 TABLET ORAL DAILY
Qty: 90 TABLET | Refills: 1 | Status: SHIPPED | OUTPATIENT
Start: 2021-09-14 | End: 2022-03-14

## 2021-10-05 ENCOUNTER — HOSPITAL ENCOUNTER (OUTPATIENT)
Dept: LAB | Age: 66
Discharge: HOME | End: 2021-10-05
Payer: MEDICARE

## 2021-10-05 ENCOUNTER — OFFICE VISIT (OUTPATIENT)
Dept: FAMILY MEDICINE CLINIC | Age: 66
End: 2021-10-05
Payer: MEDICARE

## 2021-10-05 VITALS
HEIGHT: 73 IN | DIASTOLIC BLOOD PRESSURE: 78 MMHG | TEMPERATURE: 97.7 F | SYSTOLIC BLOOD PRESSURE: 132 MMHG | HEART RATE: 93 BPM | BODY MASS INDEX: 36.66 KG/M2 | OXYGEN SATURATION: 96 % | WEIGHT: 276.6 LBS

## 2021-10-05 DIAGNOSIS — I10 ESSENTIAL HYPERTENSION: Chronic | ICD-10-CM

## 2021-10-05 DIAGNOSIS — D50.9 IRON DEFICIENCY ANEMIA, UNSPECIFIED IRON DEFICIENCY ANEMIA TYPE: ICD-10-CM

## 2021-10-05 DIAGNOSIS — Z12.5 SCREENING FOR PROSTATE CANCER: ICD-10-CM

## 2021-10-05 DIAGNOSIS — I25.10 CORONARY ARTERY DISEASE INVOLVING NATIVE CORONARY ARTERY OF NATIVE HEART WITHOUT ANGINA PECTORIS: ICD-10-CM

## 2021-10-05 DIAGNOSIS — E11.42 TYPE 2 DIABETES MELLITUS WITH DIABETIC POLYNEUROPATHY, WITHOUT LONG-TERM CURRENT USE OF INSULIN (HCC): ICD-10-CM

## 2021-10-05 DIAGNOSIS — I73.9 PAD (PERIPHERAL ARTERY DISEASE) (HCC): ICD-10-CM

## 2021-10-05 DIAGNOSIS — E11.29 MICROALBUMINURIA DUE TO TYPE 2 DIABETES MELLITUS (HCC): Chronic | ICD-10-CM

## 2021-10-05 DIAGNOSIS — E66.01 MORBID OBESITY (HCC): ICD-10-CM

## 2021-10-05 DIAGNOSIS — E78.2 MIXED HYPERLIPIDEMIA: ICD-10-CM

## 2021-10-05 DIAGNOSIS — Z72.0 TOBACCO USE: ICD-10-CM

## 2021-10-05 DIAGNOSIS — M10.9 GOUT OF MULTIPLE SITES, UNSPECIFIED CAUSE, UNSPECIFIED CHRONICITY: Chronic | ICD-10-CM

## 2021-10-05 DIAGNOSIS — E83.42 HYPOMAGNESEMIA: ICD-10-CM

## 2021-10-05 DIAGNOSIS — R80.9 MICROALBUMINURIA DUE TO TYPE 2 DIABETES MELLITUS (HCC): Chronic | ICD-10-CM

## 2021-10-05 DIAGNOSIS — Z91.81 AT HIGH RISK FOR FALLS: ICD-10-CM

## 2021-10-05 DIAGNOSIS — Z53.20 LUNG CANCER SCREENING DECLINED BY PATIENT: ICD-10-CM

## 2021-10-05 DIAGNOSIS — Z00.00 ROUTINE GENERAL MEDICAL EXAMINATION AT A HEALTH CARE FACILITY: Primary | ICD-10-CM

## 2021-10-05 DIAGNOSIS — Z23 NEED FOR VACCINATION: ICD-10-CM

## 2021-10-05 PROBLEM — I89.0 LYMPHEDEMA OF BOTH LOWER EXTREMITIES: Chronic | Status: ACTIVE | Noted: 2018-11-21

## 2021-10-05 PROBLEM — H93.13 TINNITUS OF BOTH EARS: Chronic | Status: ACTIVE | Noted: 2020-07-01

## 2021-10-05 PROBLEM — L02.415 ABSCESS OF RIGHT LEG: Status: RESOLVED | Noted: 2020-01-06 | Resolved: 2021-10-05

## 2021-10-05 PROBLEM — H91.92 HEARING LOSS OF LEFT EAR: Status: RESOLVED | Noted: 2021-04-30 | Resolved: 2021-10-05

## 2021-10-05 PROBLEM — L02.212 ABSCESS OF BACK: Status: RESOLVED | Noted: 2020-01-06 | Resolved: 2021-10-05

## 2021-10-05 LAB
ALBUMIN SERPL-MCNC: 4.4 G/DL (ref 3.5–4.6)
ALP BLD-CCNC: 65 U/L (ref 35–104)
ALT SERPL-CCNC: 27 U/L (ref 0–41)
ANION GAP SERPL CALCULATED.3IONS-SCNC: 11 MEQ/L (ref 9–15)
AST SERPL-CCNC: 29 U/L (ref 0–40)
BASOPHILS ABSOLUTE: 0 K/UL (ref 0–0.2)
BASOPHILS RELATIVE PERCENT: 1 %
BILIRUB SERPL-MCNC: 0.5 MG/DL (ref 0.2–0.7)
BUN BLDV-MCNC: 10 MG/DL (ref 8–23)
CALCIUM SERPL-MCNC: 9.3 MG/DL (ref 8.5–9.9)
CHLORIDE BLD-SCNC: 98 MEQ/L (ref 95–107)
CHOLESTEROL, TOTAL: 107 MG/DL (ref 0–199)
CO2: 25 MEQ/L (ref 20–31)
CREAT SERPL-MCNC: 0.82 MG/DL (ref 0.7–1.2)
CREATININE URINE: 136.1 MG/DL
EOSINOPHILS ABSOLUTE: 0.1 K/UL (ref 0–0.7)
EOSINOPHILS RELATIVE PERCENT: 2 %
FERRITIN: 117 NG/ML (ref 30–400)
GFR AFRICAN AMERICAN: >60
GFR NON-AFRICAN AMERICAN: >60
GLOBULIN: 2.6 G/DL (ref 2.3–3.5)
GLUCOSE BLD-MCNC: 124 MG/DL (ref 70–99)
HBA1C MFR BLD: 6.3 %
HCT VFR BLD CALC: 43.1 % (ref 42–52)
HDLC SERPL-MCNC: 40 MG/DL (ref 40–59)
HEMOGLOBIN: 14.9 G/DL (ref 14–18)
IRON SATURATION: 36 % (ref 11–46)
IRON: 124 UG/DL (ref 59–158)
LDL CHOLESTEROL CALCULATED: 39 MG/DL (ref 0–129)
LYMPHOCYTES ABSOLUTE: 1.2 K/UL (ref 1–4.8)
LYMPHOCYTES RELATIVE PERCENT: 26 %
MAGNESIUM: 1.6 MG/DL (ref 1.7–2.4)
MCH RBC QN AUTO: 38.3 PG (ref 27–31.3)
MCHC RBC AUTO-ENTMCNC: 34.5 % (ref 33–37)
MCV RBC AUTO: 110.8 FL (ref 80–100)
MICROALBUMIN UR-MCNC: 10.8 MG/DL
MICROALBUMIN/CREAT UR-RTO: 79.4 MG/G (ref 0–30)
MONOCYTES ABSOLUTE: 0.6 K/UL (ref 0.2–0.8)
MONOCYTES RELATIVE PERCENT: 12 %
NEUTROPHILS ABSOLUTE: 2.7 K/UL (ref 1.4–6.5)
NEUTROPHILS RELATIVE PERCENT: 59 %
PDW BLD-RTO: 14.2 % (ref 11.5–14.5)
PLATELET # BLD: 201 K/UL (ref 130–400)
PLATELET SLIDE REVIEW: ADEQUATE
POTASSIUM SERPL-SCNC: 4.1 MEQ/L (ref 3.4–4.9)
PROSTATE SPECIFIC ANTIGEN: 1.5 NG/ML (ref 0–4)
RBC # BLD: 3.89 M/UL (ref 4.7–6.1)
RBC # BLD: NORMAL 10*6/UL
SODIUM BLD-SCNC: 134 MEQ/L (ref 135–144)
TOTAL IRON BINDING CAPACITY: 348 UG/DL (ref 178–450)
TOTAL PROTEIN: 7 G/DL (ref 6.3–8)
TRIGL SERPL-MCNC: 140 MG/DL (ref 0–150)
URIC ACID, SERUM: 5.4 MG/DL (ref 3.4–7)
WBC # BLD: 4.6 K/UL (ref 4.8–10.8)

## 2021-10-05 PROCEDURE — 80061 LIPID PANEL: CPT

## 2021-10-05 PROCEDURE — 82043 UR ALBUMIN QUANTITATIVE: CPT

## 2021-10-05 PROCEDURE — 83036 HEMOGLOBIN GLYCOSYLATED A1C: CPT | Performed by: FAMILY MEDICINE

## 2021-10-05 PROCEDURE — 82570 ASSAY OF URINE CREATININE: CPT

## 2021-10-05 PROCEDURE — 83550 IRON BINDING TEST: CPT

## 2021-10-05 PROCEDURE — 36415 COLL VENOUS BLD VENIPUNCTURE: CPT

## 2021-10-05 PROCEDURE — 82728 ASSAY OF FERRITIN: CPT

## 2021-10-05 PROCEDURE — 83735 ASSAY OF MAGNESIUM: CPT

## 2021-10-05 PROCEDURE — 84550 ASSAY OF BLOOD/URIC ACID: CPT

## 2021-10-05 PROCEDURE — 85025 COMPLETE CBC W/AUTO DIFF WBC: CPT

## 2021-10-05 PROCEDURE — G0008 ADMIN INFLUENZA VIRUS VAC: HCPCS | Performed by: FAMILY MEDICINE

## 2021-10-05 PROCEDURE — 80053 COMPREHEN METABOLIC PANEL: CPT

## 2021-10-05 PROCEDURE — G0103 PSA SCREENING: HCPCS

## 2021-10-05 PROCEDURE — 84153 ASSAY OF PSA TOTAL: CPT

## 2021-10-05 PROCEDURE — 83540 ASSAY OF IRON: CPT

## 2021-10-05 PROCEDURE — G0438 PPPS, INITIAL VISIT: HCPCS | Performed by: FAMILY MEDICINE

## 2021-10-05 PROCEDURE — 90694 VACC AIIV4 NO PRSRV 0.5ML IM: CPT | Performed by: FAMILY MEDICINE

## 2021-10-05 ASSESSMENT — PATIENT HEALTH QUESTIONNAIRE - PHQ9
1. LITTLE INTEREST OR PLEASURE IN DOING THINGS: 0
SUM OF ALL RESPONSES TO PHQ9 QUESTIONS 1 & 2: 0
SUM OF ALL RESPONSES TO PHQ QUESTIONS 1-9: 0
2. FEELING DOWN, DEPRESSED OR HOPELESS: 0

## 2021-10-05 ASSESSMENT — LIFESTYLE VARIABLES
HAS A RELATIVE, FRIEND, DOCTOR, OR ANOTHER HEALTH PROFESSIONAL EXPRESSED CONCERN ABOUT YOUR DRINKING OR SUGGESTED YOU CUT DOWN: 0
HOW OFTEN DURING THE LAST YEAR HAVE YOU FAILED TO DO WHAT WAS NORMALLY EXPECTED FROM YOU BECAUSE OF DRINKING: 0
HOW OFTEN DURING THE LAST YEAR HAVE YOU HAD A FEELING OF GUILT OR REMORSE AFTER DRINKING: 0
HOW OFTEN DURING THE LAST YEAR HAVE YOU BEEN UNABLE TO REMEMBER WHAT HAPPENED THE NIGHT BEFORE BECAUSE YOU HAD BEEN DRINKING: 0
HOW OFTEN DO YOU HAVE SIX OR MORE DRINKS ON ONE OCCASION: 0
HAVE YOU OR SOMEONE ELSE BEEN INJURED AS A RESULT OF YOUR DRINKING: 0
HOW OFTEN DURING THE LAST YEAR HAVE YOU NEEDED AN ALCOHOLIC DRINK FIRST THING IN THE MORNING TO GET YOURSELF GOING AFTER A NIGHT OF HEAVY DRINKING: 0
HOW MANY STANDARD DRINKS CONTAINING ALCOHOL DO YOU HAVE ON A TYPICAL DAY: 0
HOW OFTEN DURING THE LAST YEAR HAVE YOU FOUND THAT YOU WERE NOT ABLE TO STOP DRINKING ONCE YOU HAD STARTED: 0

## 2021-10-05 NOTE — PROGRESS NOTES
Vaccine Information Sheet, \"Influenza - Inactivated\"  given to Latonia Luna, or parent/legal guardian of  Latonia Luna and verbalized understanding. Patient responses:    Have you ever had a reaction to a flu vaccine? No  Are you able to eat eggs without adverse effects? Yes  Do you have any current illness? No  Have you ever had Guillian [place] Syndrome? No    Flu vaccine given per order. Please see immunization tab.

## 2021-10-05 NOTE — PATIENT INSTRUCTIONS
Personalized Preventive Plan for Cindi Simms - 10/5/2021  Medicare offers a range of preventive health benefits. Some of the tests and screenings are paid in full while other may be subject to a deductible, co-insurance, and/or copay. Some of these benefits include a comprehensive review of your medical history including lifestyle, illnesses that may run in your family, and various assessments and screenings as appropriate. After reviewing your medical record and screening and assessments performed today your provider may have ordered immunizations, labs, imaging, and/or referrals for you. A list of these orders (if applicable) as well as your Preventive Care list are included within your After Visit Summary for your review. Other Preventive Recommendations:    · A preventive eye exam performed by an eye specialist is recommended every 1-2 years to screen for glaucoma; cataracts, macular degeneration, and other eye disorders. · A preventive dental visit is recommended every 6 months. · Try to get at least 150 minutes of exercise per week or 10,000 steps per day on a pedometer . · Order or download the FREE \"Exercise & Physical Activity: Your Everyday Guide\" from [employer]. Call 0-286.238.2085 or search [employer] online. · You need 5439-7038 mg of calcium and 0884-1103 IU of vitamin D per day. It is possible to meet your calcium requirement with diet alone, but a vitamin D supplement is usually necessary to meet this goal.  · When exposed to the sun, use a sunscreen that protects against both UVA and UVB radiation with an SPF of 30 or greater. Reapply every 2 to 3 hours or after sweating, drying off with a towel, or swimming. · Always wear a seat belt when traveling in a car. Always wear a helmet when riding a bicycle or motorcycle. Heart-Healthy Diet   Sodium, Fat, and Cholesterol Controlled Diet       What Is a Heart Healthy Diet?    A heart-healthy diet is one that limits sodium , certain types of fat , and cholesterol . This type of diet is recommended for:   People with any form of cardiovascular disease (eg, coronary heart disease , peripheral vascular disease , previous heart attack , previous stroke )   People with risk factors for cardiovascular disease, such as high blood pressure , high cholesterol , or diabetes   Anyone who wants to lower their risk of developing cardiovascular disease   Sodium    Sodium is a mineral found in many foods. In general, most people consume much more sodium than they need. Diets high in sodium can increase blood pressure and lead to edema (water retention). On a heart-healthy diet, you should consume no more than 2,300 mg (milligrams) of sodium per dayabout the amount in one teaspoon of table salt. The foods highest in sodium include table salt (about 50% sodium), processed foods, convenience foods, and preserved foods. Cholesterol    Cholesterol is a fat-like, waxy substance in your blood. Our bodies make some cholesterol. It is also found in animal products, with the highest amounts in fatty meat, egg yolks, whole milk, cheese, shellfish, and organ meats. On a heart-healthy diet, you should limit your cholesterol intake to less than 200 mg per day. It is normal and important to have some cholesterol in your bloodstream. But too much cholesterol can cause plaque to build up within your arteries, which can eventually lead to a heart attack or stroke. The two types of cholesterol that are most commonly referred to are:   Low-density lipoprotein (LDL) cholesterol  Also known as bad cholesterol, this is the cholesterol that tends to build up along your arteries. Bad cholesterol levels are increased by eating fats that are saturated or hydrogenated. Optimal level of this cholesterol is less than 100. Over 130 starts to get risky for heart disease.    High-density lipoprotein (HDL) cholesterol  Also known as good cholesterol, this type of cholesterol actually carries cholesterol away from your arteries and may, therefore, help lower your risk of having a heart attack. You want this level to be high (ideally greater than 60). It is a risk to have a level less than 40. You can raise this good cholesterol by eating olive oil, canola oil, avocados, or nuts. Exercise raises this level, too. Fat    Fat is calorie dense and packs a lot of calories into a small amount of food. Even though fats should be limited due to their high calorie content, not all fats are bad. In fact, some fats are quite healthful. Fat can be broken down into four main types. The good-for-you fats are:   Monounsaturated fat  found in oils such as olive and canola, avocados, and nuts and natural nut butters; can decrease cholesterol levels, while keeping levels of HDL cholesterol high   Polyunsaturated fat  found in oils such as safflower, sunflower, soybean, corn, and sesame; can decrease total cholesterol and LDL cholesterol   Omega-3 fatty acids  particularly those found in fatty fish (such as salmon, trout, tuna, mackerel, herring, and sardines); can decrease risk of arrhythmias, decrease triglyceride levels, and slightly lower blood pressure   The fats that you want to limit are:   Saturated fat  found in animal products, many fast foods, and a few vegetables; increases total blood cholesterol, including LDL levels   Animal fats that are saturated include: butter, lard, whole-milk dairy products, meat fat, and poultry skin   Vegetable fats that are saturated include: hydrogenated shortening, palm oil, coconut oil, cocoa butter   Hydrogenated or trans fat  found in margarine and vegetable shortening, most shelf stable snack foods, and fried foods; increases LDL and decreases HDL     It is generally recommended that you limit your total fat for the day to less than 30% of your total calories.  If you follow an 1800-calorie heart healthy diet, for example, this would mean 60 grams of fat or less per day. Saturated fat and trans fat in your diet raises your blood cholesterol the most, much more than dietary cholesterol does. For this reason, on a heart-healthy diet, less than 7% of your calories should come from saturated fat and ideally 0% from trans fat. On an 1800-calorie diet, this translates into less than 14 grams of saturated fat per day, leaving 46 grams of fat to come from mono- and polyunsaturated fats.    Food Choices on a Heart Healthy Diet   Food Category   Foods Recommended   Foods to Avoid   Grains   Breads and rolls without salted tops Most dry and cooked cereals Unsalted crackers and breadsticks Low-sodium or homemade breadcrumbs or stuffing All rice and pastas   Breads, rolls, and crackers with salted tops High-fat baked goods (eg, muffins, donuts, pastries) Quick breads, self-rising flour, and biscuit mixes Regular bread crumbs Instant hot cereals Commercially prepared rice, pasta, or stuffing mixes   Vegetables   Most fresh, frozen, and low-sodium canned vegetables Low-sodium and salt-free vegetable juices Canned vegetables if unsalted or rinsed   Regular canned vegetables and juices, including sauerkraut and pickled vegetables Frozen vegetables with sauces Commercially prepared potato and vegetable mixes   Fruits   Most fresh, frozen, and canned fruits All fruit juices   Fruits processed with salt or sodium   Milk   Nonfat or low-fat (1%) milk Nonfat or low-fat yogurt Cottage cheese, low-fat ricotta, cheeses labeled as low-fat and low-sodium   Whole milk Reduced-fat (2%) milk Malted and chocolate milk Full fat yogurt Most cheeses (unless low-fat and low salt) Buttermilk (no more than 1 cup per week)   Meats and Beans   Lean cuts of fresh or frozen beef, veal, lamb, or pork (look for the word loin) Fresh or frozen poultry without the skin Fresh or frozen fish and some shellfish Egg whites and egg substitutes (Limit whole eggs to three per week) Tofu Nuts or seeds (unsalted, dry-roasted), low-sodium peanut butter Dried peas, beans, and lentils   Any smoked, cured, salted, or canned meat, fish, or poultry (including rocha, chipped beef, cold cuts, hot dogs, sausages, sardines, and anchovies) Poultry skins Breaded and/or fried fish or meats Canned peas, beans, and lentils Salted nuts   Fats and Oils   Olive oil and canola oil Low-sodium, low-fat salad dressings and mayonnaise   Butter, margarine, coconut and palm oils, rocha fat   Snacks, Sweets, and Condiments   Low-sodium or unsalted versions of broths, soups, soy sauce, and condiments Pepper, herbs, and spices; vinegar, lemon, or lime juice Low-fat frozen desserts (yogurt, sherbet, fruit bars) Sugar, cocoa powder, honey, syrup, jam, and preserves Low-fat, trans-fat free cookies, cakes, and pies Deng and animal crackers, fig bars, cynthia snaps   High-fat desserts Broth, soups, gravies, and sauces, made from instant mixes or other high-sodium ingredients Salted snack foods Canned olives Meat tenderizers, seasoning salt, and most flavored vinegars   Beverages   Low-sodium carbonated beverages Tea and coffee in moderation Soy milk   Commercially softened water   Suggestions   Make whole grains, fruits, and vegetables the base of your diet. Choose heart-healthy fats such as canola, olive, and flaxseed oil, and foods high in heart-healthy fats, such as nuts, seeds, soybeans, tofu, and fish. Eat fish at least twice per week; the fish highest in omega-3 fatty acids and lowest in mercury include salmon, herring, mackerel, sardines, and canned chunk light tuna. If you eat fish less than twice per week or have high triglycerides, talk to your doctor about taking fish oil supplements. Read food labels.    For products low in fat and cholesterol, look for fat free, low-fat, cholesterol free, saturated fat free, and trans fat freeAlso scan the Nutrition Facts Label, which lists saturated fat, trans fat, and cholesterol amounts. For products low in sodium, look for sodium free, very low sodium, low sodium, no added salt, and unsalted   Skip the salt when cooking or at the table; if food needs more flavor, get creative and try out different herbs and spices. Garlic and onion also add substantial flavor to foods. Trim any visible fat off meat and poultry before cooking, and drain the fat off after saldana. Use cooking methods that require little or no added fat, such as grilling, boiling, baking, poaching, broiling, roasting, steaming, stir-frying, and sauting. Avoid fast food and convenience food. They tend to be high in saturated and trans fat and have a lot of added salt. Talk to a registered dietitian for individualized diet advice. Last Reviewed: March 2011 Guero Arciniega MS, MPH, RD   Updated: 3/29/2011   ·   Patient information: Weight loss treatments    INTRODUCTION  Obesity is a major international problem, and Americans are among the heaviest people in the world. The percentage of obese people in the [place] has risen steadily. Many people find that although they initially lose weight by dieting, they quickly regain the weight after the diet ends. Because it so hard to keep weight off over time, it is important to have as much information and support as possible before starting a diet. You are most likely to be successful in losing weight and keeping it off when you believe that your body weight can be controlled. STARTING A WEIGHT LOSS PROGRAM  Some people like to talk to their doctor or nurse to get help choosing the best plan, monitoring progress, and getting advice and support along the way. To know what treatment (or combination of treatments) will work best, determine your body mass index (BMI) and waist circumference (measurement). The BMI is calculated from your height and weight.   A person with a BMI between 25 and 29.9 is considered overweight   A person with a BMI of 30 or greater is considered to be obese  A waist circumference greater than 35 inches (88 cm) in women and 40 inches (102 cm) in men increases the risk of obesity-related complications, such as heart disease and diabetes. People who are obese and who have a larger waist size may need more aggressive weight loss treatment than others. Talk to your doctor or nurse for advice. Types of treatment  Based on your measurements and your medical history, your doctor or nurse can determine what combination of weight loss treatments would work best for you. Treatments may include changes in lifestyle, exercise, dieting, and, in some cases, weight loss medicines or weight loss surgery. Weight loss surgery, also called bariatric surgery, is reserved for people with severe obesity who have not responded to other weight loss treatments. SETTING A WEIGHT LOSS GOAL  It is important to set a realistic weight loss goal. Your first goal should be to avoid gaining more weight and staying at your current weight (or within 5 percent). Many people have a \"dream\" weight that is difficult or impossible to achieve. People at high risk of developing diabetes who are able to lose 5 percent of their body weight and maintain this weight will reduce their risk of developing diabetes by about 50 percent and reduce their blood pressure. This is a success. Losing more than 15 percent of your body weight and staying at this weight is an extremely good result, even if you never reach your \"dream\" or \"ideal\" weight. LIFESTYLE CHANGES  Programs that help you to change your lifestyle are usually run by psychologists or other professionals. The goals of lifestyle changes are to help you change your eating habits, become more active, and be more aware of how much you eat and exercise, helping you to make healthier choices. This type of treatment can be broken down into three steps:   The triggers that make you want to eat   Eating   What happens after you eat  Triggers to eat  Determining what triggers you to eat involves figuring out what foods you eat and where and when you eat. To figure out what triggers you to eat, keep a record for a few days of everything you eat, the places where you eat, how often you eat, and the emotions you were feeling when you ate. For some people, the trigger is related to a certain time of day or night. For others, the trigger is related to a certain place, like sitting at a desk working. Eating  You can change your eating habits by breaking the chain of events between the trigger for eating and eating itself. There are many ways to do this. For instance, you can:  Limit where you eat to a few places (eg, dining room)   Restrict the number of utensils (eg, only a fork) used for eating   Drink a sip of water between each bite   Chew your food a certain number of times   Get up and stop eating every few minutes  What happens after you eat  Rewarding yourself for good eating behaviors can help you to develop better habits. This is not a reward for weight loss; instead, it is a reward for changing unhealthy behaviors. Do not use food as a reward. Some people find money, clothing, or personal care (eg, a hair cut, manicure, or massage) to be effective rewards. Treat yourself immediately after making better eating choices to reinforce the value of the good behavior. You need to have clear behavior goals, and you must have a time frame for reaching your goals. Reward small changes along the way to your final goal.  Other factors that contribute to successful weight loss  Changing your behavior involves more than just changing unhealthy eating habits; it also involves finding people around you to support your weight loss, reducing stress, and learning to be strong when tempted by food. Establish a \"elaine\" system  Having a friend or family member available to provide support and reinforce good behavior is very helpful.  The support person needs to understand your goals. Learn to be strong  Learning to be strong when tempted by food is an important part of losing weight. As an example, you will need to learn how to say \"no\" and continue to say no when urged to eat at parties and social gatherings. Develop strategies for events before you go, such as eating before you go or taking low-calorie snacks and drinks with you. Develop a support system  Having a support system is helpful when losing weight. This is why many [employer] groups are successful. Family support is also essential; if your family does not support your efforts to lose weight, this can slow your progress or even keep you from losing weight. Positive thinking  People often have conversations with themselves in their head; these conversations can be positive or negative. If you eat a piece of cake that was not planned, you may respond by thinking, \"Oh, you stupid idiot, you've blown your diet! \" and as a result, you may eat more cake. A positive thought for the same event could be, \"Well, I ate cake when it was not on my plan. Now I should do something to get back on track. \" A positive approach is much more likely to be successful than a negative one. Reduce stress  Although stress is a part of everyday life, it can trigger uncontrolled eating in some people. It is important to find a way to get through these difficult times without eating or by eating low-calorie food, like raw vegetables. It may be helpful to imagine a relaxing place that allows you to temporarily escape from stress. With deep breaths and closed eyes, you can imagine this relaxing place for a few minutes. Self-help programs  Self-help programs like [place] Watchers®, Overeaters Anonymous®, and Take Off [place] (TOPS)© work for some people. As with all weight loss programs, you are most likely to be successful with these plans if you make long-term changes in how you eat.   CHOOSING A DIET  A calorie is a unit of energy found in food. Your body needs calories to function. The goal of any diet is to burn up more calories than you eat. How quickly you lose weight depends upon several factors, such as your age, gender, and starting weight. Older people have a slower metabolism than young people, so they lose weight more slowly. Men lose more weight than women of similar height and weight when dieting because they use more energy. People who are extremely overweight lose weight more quickly than those who are only mildly overweight. Try not to drink alcohol or drinks with added sugar, and most sweets (candy, cakes, cookies), since they rarely contain important nutrients. Portion-controlled diets  One simple way to diet is to buy packaged foods, like frozen low-calorie meals or meal-replacement canned drinks. A typical meal plan for one day may include:  A meal-replacement drink or breakfast bar for breakfast   A meal-replacement drink or a frozen low-calorie (250 to 350 calories) meal for lunch   A frozen low-calorie meal or other prepackaged, calorie-controlled meal, along with extra vegetables for dinner  This would give you 1000 to 1500 calories per day. Low-fat diet  To reduce the amount of fat in your diet, you can:  Eat low-fat foods. Low-fat foods are those that contain less than 30 percent of calories from fat. Fat is listed on the food facts label (figure 1). Count fat grams. For a 1500 calorie diet, this would mean about 45 g or fewer of fat per day. Low-carbohydrate diet  Low- and very-low-carbohydrate diets (eg, Atkins diet, [employer]) have become popular ways to lose weight quickly.   With a very-low-carbohydrate diet, you eat between 0 and 60 grams of carbohydrates per day (a standard diet contains 200 to 300 grams of carbohydrates)   With a low-carbohydrate diet, you eat between 60 and 130 grams of carbohydrates per day  Carbohydrates are found in fruits, vegetables, and grains (including breads, rice, pasta, and cereal), alcoholic beverages, and in dairy products. Meat and fish do not contain carbohydrates. Side effects of very-low-carbohydrate diets can include constipation, headache, bad breath, muscle cramps, diarrhea, and weakness. Mediterranean diet  The term \"Mediterranean diet\" refers to a way of eating that is common in [place] regions around the [facility]. Although there is some variation in Mediterranean diets, there are some similarities. Most Mediterranean diets include:  A high level of monounsaturated fats (from olive or canola oil, walnuts, pecans, almonds) and a low level of saturated fats (from butter)   A high amount of vegetables, fruits, legumes, and grains (7 to 10 servings of fruits and vegetables per day)   A moderate amount of milk and dairy products, mostly in the form of cheese. Use low-fat dairy products (skim milk, fat-free yogurt, low-fat cheese). A relatively low amount of red meat and meat products. Substitute fish or poultry for red meat. For those who drink alcohol, a modest amount (mainly as red wine) may help to protect against cardiovascular disease. A modest amount is up to one (4 ounce) glass per day for women and up to two glasses per day for men. Which diet is best?  Studies have compared different diets, including:  Very-low-carbohydrate (Atkins)   Macronutrient balance controlling glycemic load (Zone®)   Reduced-calorie (Weight Watchers®)   Very-low-fat ([place])  No one diet is \"best\" for weight loss. Any diet will help you to lose weight if you stick with the diet. Therefore, it is important to choose a diet that includes foods you like. Fad diets  Fad diets often promise quick weight loss (more than 1 to 2 pounds per week) and may claim that you do not need to exercise or give up favorite foods. Some fad diets cost a lot of money, because you have to pay for seminars or pills.  Fad diets generally lack any scientific evidence that they are safe and effective, but instead rely on \"before\" and \"after\" photos or testimonials. Diets that sound too good to be true usually are. These plans are a waste of time and money and are not recommended. A doctor, nurse, or nutritionist can help you find a safe and effective way to lose weight and keep it off. WEIGHT LOSS [place] a weight loss medicine may be helpful when used in combination with diet, exercise, and lifestyle changes. However, it is important to understand the risks and benefits of these medicines. It is also important to be realistic about your goal weight using a weight loss medicine; you may not reach your \"dream\" weight, but you may be able to reduce your risk of diabetes or heart disease. Weight loss medicines may be recommended for people who have not been able to lose weight with diet and exercise who have a:  BMI of 30 or more    BMI between 27 and 29.9 and have other medical problems, such as diabetes, high cholesterol, or high blood pressure  Two weight loss medicines are approved in the [place] for long-term use. These are sibutramine and orlistat. Other weight loss medicines (phentermine, diethylpropion) are available but are only approved for short-term use (up to 12 weeks). Sibutramine  Sibutramine (Meridia®, Reductil®) is a medicine that reduces your appetite. In people who take the medicine for one year, the average weight loss is 10 percent of the initial body weight (5 percent more than those who took a placebo treatment). Side effects of sibutramine include insomnia, dry mouth, and constipation. Increases in blood pressure can occur. Therefore, blood pressure is usually monitored during treatment. There is no evidence that sibutramine causes heart or lung problems (like dexfenfluramine and fenfluramine (Phen/Fen)).  However, experts agree that sibutramine should not used by people with coronary heart disease, heart failure, uncontrolled hypertension, stroke, irregular heart rhythms, or peripheral vascular disease (poor circulation in the legs). Orlistat  Orlistat (Xenical® 120 mg capsules) is a medicine that reduces the amount of fat your body absorbs from the foods you eat. A lower-dose version is now available without a prescription (Nilay® 60 mg capsules) in many countries, including the [place]. The medicine is recommended three times per day, taken with a meal; you can skip a dose if you skip a meal or if the meal contains no fat. After one year of treatment with orlistat, the average weight loss is approximately 8 to 10 percent of initial body weight (4 percent more than in those who took a placebo). Cholesterol levels often improve, and blood pressure sometimes falls. In people with diabetes, orlistat may help control blood sugar levels. Side effects occur in 15 to 10 percent of people and may include stomach cramps, gas, diarrhea, leakage of stool, or oily stools. These problems are more likely when you take orlistat with a high-fat meal (if more than 30 percent of calories in the meal are from fat). Side effects usually improve as you learn to avoid high-fat foods. Severe liver injury has been reported rarely in patients taking orlistat, but it is not known if orlistat caused the liver problems. Diet supplements  Diet supplements are widely used by people who are trying to lose weight, although the safety and efficacy of these supplements are often unproven. A few of the more common diet supplements are discussed below; none of these are recommended because they have not been studied carefully, and there is no proof they are safe or effective. Chitosan and wheat dextrin are ineffective for weight loss, and their use is not recommended. Ephedra, a compound related to ephedrine, is no longer available in the [place] due to safety concerns. Many nonprescription diet pills previously contained ephedra. Although some studies have shown that ephedra helps with weight loss, there can be serious side effects (psychiatric symptoms, palpitations, and stomach upset), including death. There are not enough data about the safety and efficacy of chromium, ginseng, glucomannan, green tea, hydroxycitric acid, L carnitine, psyllium, pyruvate supplements, Traill wort, and conjugated linoleic acid. Two supplements from [facility], [address] [place] (also known as the [place] Davalos 15 pill) and Herbathin dietary supplement, have been shown to contain prescription drugs. Hoodia gordonii is a dietary supplement derived from a plant in [place]. It is not recommended because there is no proof that it is safe or effective. Bitter orange (Citrus aurantium) can increase your heart rate and blood pressure and is not recommended. SHOULD I HAVE SURGERY TO LOSE WEIGHT?  Weight loss surgery is recommended ONLY for people with one of the following:  Severe obesity (body mass index above 40) (calculator 1 and calculator 2) who have not responded to diet, exercise, or weight loss medicines   Body mass index between 35 and 40, along with a serious medical problem (including diabetes, severe joint pain, or sleep apnea) that would improve with weight loss  You should be sure that you understand the potential risks and benefits of weight loss surgery. You must be motivated and willing to make lifelong changes in how you eat to reach and maintain a healthier weight after surgery. You must also be realistic about weight loss after surgery (see 'Effectiveness of weight loss surgery' below). PREPARING FOR WEIGHT LOSS SURGERY  Most people who have weight loss surgery will meet with several specialists before surgery is scheduled. This often includes a dietitian, mental health counselor, a doctor who specializes in care of obese people, and a surgeon who performs weight loss surgery (bariatric surgeon).  You may need to work with these providers for several weeks or months before surgery. The nutritionist will explain what and how much you will be able to eat after surgery. You may also need to lose a small amount of weight before surgery. The mental health specialist will help you to cope with stress and other factors that can make it harder to lose weight or trigger you to eat   The medical doctor will determine whether you need other tests, counseling, or treatment before surgery. He or she might also help you begin a medical weight loss program so that you can lose some weight before surgery. The bariatric surgeon will meet with you to discuss the surgeries available to treat obesity. He or she will also make sure you are a good candidate for surgery. TYPES OF WEIGHT LOSS SURGERY  There are several types of weight loss surgeries, the most common being lap banding, gastric bypass, and gastric sleeve. Lap banding  Laparoscopic adjustable gastric banding (LAGB), or lap banding, is a surgery that uses an adjustable band around the opening to the stomach (figure 1). This reduces the amount of food that you can eat at one time. Lap banding is done through small incisions, with a laparoscope. The band can be adjusted after surgery, allowing you to eat more or less food. Adjustments to the size and tightness of the band are made by using a needle to add or remove fluid from a port (a small container under the skin that is connected to the band). Adding fluid to the band makes it tighter which restricts the amount of food you can eat and may help you to lose more weight. Lap banding is a popular choice because it is relatively simple to perform, can be adjusted or removed, and has a low risk of serious complications immediately after surgery. However, weight loss with the lap band depends on your ability to follow the program closely. You will need to prepare nutritious meals that [facility] SYSTEM with\" the band, not against it.  For example, the lap band will not work well if you eat or drink a large amount of liquid calories (like ice cream). The band will not help you to feel full when you eat/drink liquid calories. Weight loss ranges from 45 to 75 percent after two years. As an example, a person who is 120 pounds overweight could expect to lose approximately 54 to 90 pounds in the two years after lap banding. Gastric bypass  Rashel-en-Y gastric bypass, also called gastric bypass, helps you to lose weight by reducing the amount of food you can eat and reducing the number of calories and nutrients you absorb from the food you eat. To perform gastric bypass, a surgeon creates a small stomach pouch by dividing the stomach and attaching it to the small intestine. This helps you to lose weight in two ways: The smaller stomach can hold less food than before surgery. This causes you to feel full after eating a very small amount of food or liquid. Over time, the pouch might stretch, allowing you to eat more food. The body absorbs fewer calories, since food bypasses most of the stomach as well as the upper small intestine. This new arrangement seems to decrease your appetite and change how you break down foods by changing the release of various hormones. Gastric bypass can be performed as open surgery (through an incision on the abdomen) or laparoscopically, which uses smaller incisions and smaller instruments. Both the laparoscopic and open techniques have risks and benefits. You and your surgeon should work together to decide which surgery, if any, is right for you. Gastric bypass has a high success rate, and people lose an average of 62 to 68 percent of their excess body weight in the first year. Weight loss typically levels off after one to two years, with an overall excess weight loss between 50 and 75 percent. For a person who is 120 pounds overweight, an average of 60 to 90 pounds of weight loss would be expected.   Gastric sleeve  Gastric sleeve, also known as sleeve gastrectomy, is a surgery that reduces the size of the stomach and makes it into a narrow tube (figure 3). The new stomach is much smaller and produces less of the hormone (ghrelin) that causes hunger, helping you feel satisfied with less food. Sleeve gastrectomy is safer than gastric bypass because the intestines are not rearranged, and there is less chance of malnutrition. It also appears to control hunger better than lap banding. It might be safer than the lap banding because no foreign materials are used. The gastric sleeve has a good success rate, and people lose an average of 33 percent of their excess body weight in the first year. For a person who is 120 pounds overweight, this would mean losing about 40 pounds in the first year. WEIGHT LOSS SURGERY COMPLICATIONS  A variety of complications can occur with weight loss surgery. The risks of surgery depend upon which surgery you have and any medical problems you had before surgery. Some of the more common early surgical complications (one to six weeks after surgery) include:  Bleeding   Infection   Blockage or tear in the bowels   Need for further surgery  Important medical complications after surgery can include blood clots in the legs or lungs, heart attack, pneumonia, and urinary tract infection. Complications are less likely when surgery is performed in centers that are experienced in weight loss surgery. In general, centers with experience in weight loss surgery have:  Board-certified doctors and surgeons   A team of support staff (dietitians, counselors, nurses)   Long-term follow-up after surgery   Hospital staff experienced with the care of weight loss patients. This includes nurses who are trained in the care of patients immediately after surgery and anesthesiologists who are experienced in caring for the morbidly obese.   EFFECTIVENESS OF WEIGHT LOSS SURGERY  The goal of weight loss surgery is to reduce the risk of illness or death associated with obesity. Weight loss surgery can also help you to feel and look better, reduce the amount of money you spend on medicines, and cut down on sick days. As an example, weight loss surgery can improve health problems related to obesity (diabetes, high blood pressure, high cholesterol, sleep apnea) to the point that you need less or no medicine. Finally, weight loss surgery might reduce your risk of developing heart disease, cancer, and certain infections. AFTER WEIGHT LOSS SURGERY  You will need to stay in the hospital until your team feels that it is safe for you to leave (on average, one to three days). Do not drive if you are taking prescription pain medicine. Begin exercising as soon as possible once you have healed; most weight loss centers will design an exercise program for you. Once you are home, it is important to eat and drink exactly what your doctor and dietitian recommend. You will see your doctor, nurse, and dietitian on a regular basis after surgery to monitor your health, diet, and weight loss. You will be able to slowly increase how much you eat over time, although it will always be important to:  Eat small, frequent meals and not skip meals   Chew your food slowly and completely   Avoid eating while \"distracted\" (such as eating while watching TV)   Stop eating when you feel full   Drink liquids at least 30 minutes before or after eating   Avoid foods high in fat or sugar   Take vitamin supplements, as recommended  It can take several months to learn to listen to your body so that you know when you are hungry and when you are full. You may dislike foods you previously loved, and you may begin to prefer new foods. This can be a frustrating process for some people, so talk to your dietitian if you are having trouble. It usually takes between one and two years to lose weight after surgery.  After reaching their goal weight, some people have plastic surgery (called \"body contouring\") to remove excess skin from the body, particularly in the abdominal area. Before you decide to have weight loss surgery, you must commit to staying healthy for life. This includes following up with your healthcare team, exercising most days of the week, and eating a sensible diet every day. It can be difficult to develop new eating and exercise habits after weight loss surgery, and you will have to work hard to stick to your goals. Recovering from surgery and losing weight can be stressful and emotional, and it is important to have the support of family and friends. Working with a , therapist, or support group can help you through the ups and downs. WHERE TO GET MORE INFORMATION  Your healthcare provider is the best source of information for questions and concerns related to your medical problem. This article will be updated as needed every four months on our Web site ([email or web address])  ·     [address] a [facility]       As we get older, changes in balance, gait, strength, vision, hearing, and cognition make even the most youthful senior more prone to accidents. Falls are one of the leading health risks for older people. This increased risk of falling is related to:   Aging process (eg, decreased muscle strength, slowed reflexes)   Higher incidence of chronic health problems (eg, arthritis, diabetes) that may limit mobility, agility or sensory awareness   Side effects of medicine (eg, dizziness, blurred vision)especially medicines like prescription pain medicines and drugs used to treat mental health conditions   Depending on the brittleness of your bones, the consequences of a fall can be serious and long lasting. Home Life   Research by the Association of Aging [facility]) shows that some home accidents among older adults can be prevented by making simple lifestyle changes and basic modifications and repairs to the home environment.  Here are some lifestyle changes that experts recommend: Have your hearing and vision checked regularly. Be sure to wear prescription glasses that are right for you. Speak to your doctor or pharmacist about the possible side effects of your medicines. A number of medicines can cause dizziness. If you have problems with sleep, talk to your doctor. Limit your intake of alcohol. If necessary, use a cane or walker to help maintain your balance. Wear supportive, rubber-soled shoes, even at home. If you live in a region that gets wintry weather, you may want to put special cleats on your shoes to prevent you from slipping on the snow and ice. Exercise regularly to help maintain muscle tone, agility, and balance. Always hold the banister when going up or down stairs. Also, use  bars when getting in or out of the bath or shower, or using the toilet. To avoid dizziness, get up slowly from a lying down position. Sit up first, dangling your legs for a minute or two before rising to a standing position. Overall Home Safety Check   According to the Consumer Product Safety Commision's \"Older Consumer Home Safety Checklist,\" it is important to check for potential hazards in each room. And remember, proper lighting is an essential factor in home safety. If you cannot see clearly, you are more likely to fall. Important questions to ask yourself include:   Are lamp, electric, extension, and telephone cords placed out of the flow of traffic and maintained in good condition? Have frayed cords been replaced? Are all small rugs and runners slip resistant? If not, you can secure them to the floor with a special double-sided carpet tape. Are smoke detectors properly locatedone on every floor of your home and one outside of every sleeping area? Are they in good working order? Are batteries replaced at least once a year? Do you have a well-maintained carbon monoxide detector outside every sleeping are in your home?    Does your furniture layout leave plenty of space to maneuver between and around chairs, tables, beds, and sofas? Are hallways, stairs and passages between rooms well lit? Can you reach a lamp without getting out of bed? Are floor surfaces well maintained? Shag rugs, high-pile carpeting, tile floors, and polished wood floors can be particularly slippery. Stairs should always have handrails and be carpeted or fitted with a non-skid tread. Is your telephone easily reachable. Is the cord safely tucked away? Room by Room   According to the Association of Aging, bathrooms and nando are the two most potentially hazardous rooms in your home. In the Kitchen    Be sure your stove is in proper working order and always make sure burners and the oven are off before you go out or go to sleep. Keep pots on the back burners, turn handles away from the front of the stove, and keep stove clean and free of grease build-up. Kitchen ventilation systems and range exhausts should be working properly. Keep flammable objects such as towels and pot holders away from the cooking area except when in use. Make sure kitchen curtains are tied back. Move cords and appliances away from the sink and hot surfaces. If extension cords are needed, install wiring guides so they do not hang over the sink, range, or working areas. Look for coffee pots, kettles and toaster ovens with automatic shut-offs. Keep a mop handy in the kitchen so you can wipe up spills instantly. You should also have a small fire extinguisher. Arrange your kitchen with frequently used items on lower shelves to avoid the need to stand on a stepstool to reach them. Make sure countertops are well-lit to avoid injuries while cutting and preparing food. In the Bathroom    Use a non-slip mat or decals in the tub and shower, since wet, soapy tile or porcelain surfaces are extremely slippery. Make sure bathroom rugs are non-skid or tape them firmly to the floor.  Bathtubs should have at least one, preferably two, grab bars, firmly attached to structural supports in the wall. (Do not use built-in soap holders or glass shower doors as grab bars.)    Tub seats fitted with non-slip material on the legs allow you to wash sitting down. For people with limited mobility, bathtub transfer benches allow you to slide safely into the tub. Raised toilet seats and toilet safety rails are helpful for those with knee or hip problems. In the [place]    Make sure you use a nightlight and that the area around your bed is clear of potential obstacles. Be careful with electric blankets and never go to sleep with a heating pad, which can cause serious burns even if on a low setting. Use fire-resistant mattress covers and pillows, and NEVER smoke in bed. Keep a phone next to the bed that is programmed to dial 911 at the push of a button. If you have a chronic condition, you may want to sign on with an automatic call-in service. Typically the system includes a small pendant that connects directly to an emergency medical voice-response system. You should also make arrangements to stay in contact with someonefriend, neighbor, family memberon a regular schedule. Fire Prevention   According to the [employer]. (Smoke Alarms for Every) [address] [place] [address], senior citizens are one of the two highest risk groups for death and serious injuries due to residential fires. When cooking, wear short-sleeved items, never a bulky long-sleeved robe. The [facility]'s Safety Checklist for Older Consumers emphasizes the importance of checking basements, garages, workshops and storage areas for fire hazards, such as volatile liquids, piles of old rags or clothing and overloaded circuits. Never smoke in bed or when lying down on a couch or recliner chair. Small portable electric or kerosene heaters are responsible for many home fires and should be used cautiously if at all.  If you do use one, be sure to keep them away from flammable materials. In case of fire, make sure you have a pre-established emergency exit plan. Have a professional check your fireplace and other fuel-burning appliances yearly. Hel[employer]   Baby boomers entering the bhardwaj years will continue to see the development of new products to help older adults live safely and independently in spite of age-related changes. Making Life More Livable  , by [employer], lists over 1,000 products for \"living well in the mature years,\" such as bathing and mobility aids, household security devices, ergonomically designed knives and peelers, and faucet valves and knobs for temperature control. Medical supply stores and organizations are good sources of information about products that improve your quality of life and insure your safety. Last Reviewed: November 2009 Samson Marcum MD   Updated: 3/7/2011     ·   Patient Education        Preventing Outdoor Falls: Care Instructions  Your Care Instructions     Worries about falls don't need to keep you indoors. Outdoor activities like walking have big benefits for your health. You will need to watch your step and learn a few safety measures. If you are worried about having a fall outdoors, ask your doctor about exercises, classes, or physical therapy that may help. You can learn ways to gain strength, flexibility, and balance. Ask if it might help to use a cane or walker. You can make your time outdoors safer with a few simple measures. Follow-up care is a key part of your treatment and safety. Be sure to make and go to all appointments, and call your doctor if you are having problems. It's also a good idea to know your test results and keep a list of the medicines you take. How can you prevent falls outdoors? Wear shoes with firm soles and low heels. If you have to walk on an icy surface, use grippers that can be worn over your shoes in bad weather. Be extra careful if weather is bad.  Walk on the grass when the sidewalks are slick. If you live in a place that gets snow and ice in the winter, sprinkle salt on slippery stairs and sidewalks. Be careful getting on or off buses and trains or getting in and out of cars. If handrails are available, use them. Be careful when you cross the street. Look for crosswalks or places where curb cuts or ramps are present. Try not to hurry, especially if you are carrying something. Be cautious in parking lots or garages. There may be curbs or changes in pavement, or the height of the pavement may vary. Make sure to wear the correct eyeglasses, if you need them. Reading glasses or bifocals can make it harder to see hazards that might be in your way. If you are walking outdoors for exercise, try to: Walk in well-lighted, well-maintained areas. These include high school or college tracks, shopping malls, and public spaces. Walk with a partner. Watch out for cracked sidewalks, curbs, changes in the height of the pavement, exposed tree roots, and debris such as fallen leaves or branches. Where can you learn more? Go to https://IcanbesponsoredpeRothman [email or web address].Accountable. org and sign in to your [employer] account. Enter C357 in the [employer] box to learn more about \"Preventing Outdoor Falls: Care Instructions. \"     If you do not have an account, please click on the \"Sign Up Now\" link. Current as of: December 7, 2020               Content Version: 13.0  © 2006-2021 Healthwise, Incorporated. Care instructions adapted under license by [facility] ([facility]). If you have questions about a medical condition or this instruction, always ask your healthcare professional. [place] [address] any warranty or liability for your use of this information.          Patient Education        How to Get Up Safely After a Fall: Care Instructions  Your Care Instructions     If you have injuries, health problems, or other reasons that may make it easy for you to fall at home, it is a good idea to learn how to get up safely after a fall. Learning how to get up correctly can help you avoid making an injury worse. Also, knowing what to do if you cannot get up can help you stay safe until help arrives. Follow-up care is a key part of your treatment and safety. Be sure to make and go to all appointments, and call your doctor if you are having problems. It's also a good idea to know your test results and keep a list of the medicines you take. How can you care for yourself after a fall? If you think you can get up  First lie still for a few minutes and think about how you feel. If your body feels okay and you think you can get up safely, follow the rest of the steps below:  Look for a chair or other piece of furniture that is close to you. Roll onto your side and rest. Roll by turning your head in the direction you want to roll, move your shoulder and arm, then hip and leg in the same direction. Lie still for a moment to let your blood pressure adjust.  Slowly push your upper body up, lift your head, and take a moment to rest.  Slowly get up on your hands and knees, and crawl to the chair or other stable piece of furniture. Put your hands on the chair. Move one foot forward, and place it flat on the floor. Your other leg should be bent with the knee on the floor. Rise slowly, turn your body, and sit in the chair. Stay seated for a bit and think about how you feel. Call for help. Even if you feel okay, let someone know what happened to you. You might not know that you have a serious injury. If you cannot get up  If you think you are injured after a fall or you cannot get up, try not to panic. Call out for help. If you have a phone within reach or you have an emergency call device, use it to call for help. If you do not have a phone within reach, try to slide yourself toward it. If you cannot get to the phone, try to slide toward a door or window or a place where you think you can be heard.   [place] or use an object to make noise so someone might hear you. If you can reach something that you can use for a pillow, place it under your head. Try to stay warm by covering yourself with a blanket or clothing while you wait for help. When should you call for help? Call 911 anytime you think you may need emergency care. For example, call if:    You passed out (lost consciousness).     You cannot get up after a fall.     You have severe pain. Call your doctor now or seek immediate medical care if:    You have new or worse pain.     You are dizzy or lightheaded.     You hit your head. Watch closely for changes in your health, and be sure to contact your doctor if:    You do not get better as expected. Where can you learn more? Go to https://HighGroundpeGengo.InvestCloud. org and sign in to your [employer] account. Enter X542 in the SomnoMed box to learn more about \"How to Get Up Safely After a Fall: Care Instructions. \"     If you do not have an account, please click on the \"Sign Up Now\" link. Current as of: December 7, 2020               Content Version: 13.0  © 2006-2021 [employer]. Care instructions adapted under license by [facility] ([facility]). If you have questions about a medical condition or this instruction, always ask your healthcare professional. [address] any warranty or liability for your use of this information. Patient Education        Diabetes and Preventing Falls: Care Instructions  Overview     Complications of diabetessuch as nerve damage, foot problems, and reduced visionmay increase your risk of a fall. Some of your medicines also may add to your risk. By making your home safer, you can lower your risk of falling. Doing things to prevent diabetes complications may also help to lower your risk. You can make your home safer with a few simple measures. Follow-up care is a key part of your treatment and safety.  Be sure to make and go to all appointments, and call your doctor if you are having problems. It's also a good idea to know your test results and keep a list of the medicines you take. How can you care for yourself at home? Taking care of yourself  Keep your blood sugar at a target level (which you set with your doctor). Exercise regularly to improve your strength, muscle tone, and balance. Walk if you can. Swimming may be a good choice if you cannot walk easily. Have your vision checked as often as your doctor recommends. It is usually once a year or more often if you have eye problems. Know the side effects of the medicines you take. Ask your doctor or pharmacist whether the medicines you take can affect your balance. Sleeping pills or sedatives can affect your balance. Limit the amount of alcohol you drink. Alcohol can impair your balance and other senses. Have your doctor check your feet during each visit. If you have a foot problem, see your doctor. Preventing falls at home  Remove raised doorway thresholds, throw rugs, and clutter. Repair loose carpet or raised areas in the floor. Move furniture and electrical cords to keep them out of walking paths. Use nonskid floor wax, and wipe up spills right away, especially on ceramic tile floors. If you use a walker or cane, put rubber tips on it. If you use crutches, clean the bottoms of them regularly with an abrasive pad, such as steel wool. Keep your house well lit, especially stairways, porches, and outside walkways. Use night-lights in areas such as hallways and bathrooms. Add extra light switches or use remote switches (such as switches that go on or off when you clap your hands) to make it easier to turn lights on if you have to get up during the night. Install sturdy handrails on stairways. Put grab bars near your shower, bathtub, and toilet. Store household items on low shelves so that you do not have to climb or reach high.  Or use a reaching device that you can get at a medical supply store. If you have to climb for something, use a step stool with handrails, or ask someone to get it for you. Keep a cordless phone and a flashlight with new batteries by your bed. If possible, put a phone in each of the main rooms of your house, or carry a cell phone in case you fall and cannot reach a phone. Or you can wear a device around your neck or wrist. You push a button that sends a signal for help. Wear low-heeled shoes that fit well and give your feet good support. Use footwear with nonskid soles. Check the heels and soles of your shoes for wear. Repair or replace worn heels or soles. Do not wear socks without shoes on wood floors. Walk on the grass when the sidewalks are slippery. If you live in an area that gets snow and ice in the winter, sprinkle salt on slippery steps and sidewalks. Where can you learn more? Go to https://Curex.Co.HipChat. org and sign in to your Fanbouts account. Enter N477 in the LeadPages box to learn more about \"Diabetes and Preventing Falls: Care Instructions. \"     If you do not have an account, please click on the \"Sign Up Now\" link. Current as of: December 7, 2020               Content Version: 13.0  © 2006-2021 Healthwise, Incorporated. Care instructions adapted under license by [facility] ([facility]). If you have questions about a medical condition or this instruction, always ask your healthcare professional. [place] [address] any warranty or liability for your use of this information. Patient Education        Learning About Diabetes and Exercise  Can you exercise if you have diabetes? When you have diabetes, it's important to get regular exercise. It can help you manage your blood sugar level. You can still play sports, run, ride a bike, swim, and do other activities when you have diabetes. How does exercise help when you have diabetes? Getting regular exercise can help control your blood sugar.   Your body turns the food you eat into glucose, a type of sugar. You need this sugar for energy. When you have diabetes, the sugar builds up in your blood. But when you exercise, your body uses sugar. This helps keep it from building up in your blood and results in lower blood sugar and better control of diabetes. Exercise may help you in other ways too. It can help you reach and stay at a healthy weight. It also helps improve blood pressure and cholesterol, which can reduce the risk of heart disease. Exercise can make you feel stronger and happier. It can help you relax and sleep better. And it can give you confidence in other things you do. Exercising safely when you have diabetes  Before you start a new exercise program, talk to your doctor about how and when to exercise. Some types of exercise can be harmful if your diabetes is causing other problems, such as problems with your feet. Your doctor can tell you what types of exercise are good choices for you. Here are some general safety tips. Take steps to avoid blood sugar problems. Check your blood sugar before and after you exercise. Ask your doctor what blood sugar range is safe for you when you exercise. If you take medicine or insulin that lowers blood sugar, check your blood sugar before you exercise. If your blood sugar is less than 90 mg/dL, you may need to eat a carbohydrate snack first.  Be careful when you exercise if your blood sugar is too high. Make sure to drink plenty of water. Try to exercise at about the same time each day. This may help keep your blood sugar steady. If you want to exercise more, slowly increase how hard or long you exercise. Have someone with you when you exercise. Or exercise at a gym. You may need help if your blood sugar drops too low. Keep some quick-sugar food with you. You may get symptoms of low blood sugar during exercise or up to 24 hours later. Use proper footwear and the right equipment.   Pay attention to Learning About Benefits From Quitting Smoking  How does quitting smoking make you healthier? If you're thinking about quitting smoking, you may have a few reasons to be smoke-free. Your health may be one of them. When you quit smoking, you lower your risks for cancer, lung disease, heart attack, stroke, blood vessel disease, and blindness from macular degeneration. When you're smoke-free, you get sick less often, and you heal faster. You are less likely to get colds, flu, bronchitis, and pneumonia. As a nonsmoker, you may find that your mood is better and you are less stressed. When and how will you feel healthier? Quitting has real health benefits that start from day 1 of being smoke-free. And the longer you stay smoke-free, the healthier you get and the better you feel. The first hours  After just 20 minutes, your blood pressure and heart rate go down. That means there's less stress on your heart and blood vessels. Within 12 hours, the level of carbon monoxide in your blood drops back to normal. That makes room for more oxygen. With more oxygen in your body, you may notice that you have more energy than when you smoked. After 2 weeks  Your lungs start to work better. Your risk of heart attack starts to drop. After 1 month  When your lungs are clear, you cough less and breathe deeper, so it's easier to be active. Your sense of taste and smell return. That means you can enjoy food more than you have since you started smoking. Over the years  Over the years, your risks of heart disease, heart attack, and stroke are lower. After 10 years, your risk of dying from lung cancer is cut by about half. And your risk for many other types of cancer is lower too. How would quitting help others in your life? When you quit smoking, you improve the health of everyone who now breathes in your smoke. Their heart, lung, and cancer risks drop, much like yours. They are sick less.  For babies and small children, living smoke-free means they're less likely to have ear infections, pneumonia, and bronchitis. If you're a woman who is or will be pregnant someday, quitting smoking means a healthier . Children who are close to you are less likely to become adult smokers. Where can you learn more? Go to https://chpepiceweb.healthWhiteyboard. org and sign in to your [employer] (MXD[employer]D) account. Enter 201 [id number] in the ECOtality box to learn more about \"Learning About Benefits From Quitting Smoking. \"     If you do not have an account, please click on the \"Sign Up Now\" link. Current as of: 2021               Content Version: 13.0   [employer]. Care instructions adapted under license by [facility] ([facility]). If you have questions about a medical condition or this instruction, always ask your healthcare professional. [place] [address] any warranty or liability for your use of this information. Patient Education        Stopping Smoking: Care Instructions  Your Care Instructions     Cigarette smokers crave the nicotine in cigarettes. Giving it up is much harder than simply changing a habit. Your body has to stop craving the nicotine. It is hard to quit, but you can do it. There are many tools that people use to quit smoking. You may find that combining tools works best for you. There are several steps to quitting. First you get ready to quit. Then you get support to help you. After that, you learn new skills and behaviors to become a nonsmoker. For many people, a necessary step is getting and using medicine. Your doctor will help you set up the plan that best meets your needs. You may want to attend a smoking cessation program to help you quit smoking. When you choose a program, look for one that has proven success. Ask your doctor for ideas.  You will greatly increase your chances of success if you take medicine as well as get counseling or join a cessation program.  Some of the changes you feel when you first quit tobacco are uncomfortable. Your body will miss the nicotine at first, and you may feel short-tempered and grumpy. You may have trouble sleeping or concentrating. Medicine can help you deal with these symptoms. You may struggle with changing your smoking habits and rituals. The last step is the tricky one: Be prepared for the smoking urge to continue for a time. This is a lot to deal with, but keep at it. You will feel better. Follow-up care is a key part of your treatment and safety. Be sure to make and go to all appointments, and call your doctor if you are having problems. It's also a good idea to know your test results and keep a list of the medicines you take. How can you care for yourself at home? Ask your family, friends, and coworkers for support. You have a better chance of quitting if you have help and support. Join a support group, such as Nicotine Anonymous, for people who are trying to quit smoking. Consider signing up for a smoking cessation program, such as the American Lung Association's Freedom from Smoking program.  Get text messaging support. Go to the website at www.smokefree. gov to sign up for the [facility] program.  Set a quit date. Pick your date carefully so that it is not right in the middle of a big deadline or stressful time. Once you quit, do not even take a puff. Get rid of all ashtrays and lighters after your last cigarette. Clean your house and your clothes so that they do not smell of smoke. Learn how to be a nonsmoker. Think about ways you can avoid those things that make you reach for a cigarette. Avoid situations that put you at greatest risk for smoking. For some people, it is hard to have a drink with friends without smoking. For others, they might skip a coffee break with coworkers who smoke. Change your daily routine. Take a different route to work or eat a meal in a different place. Cut down on stress.  Calm yourself or release tension by doing an activity you enjoy, such as reading a book, taking a hot bath, or gardening. Talk to your doctor or pharmacist about nicotine replacement therapy, which replaces the nicotine in your body. You still get nicotine but you do not use tobacco. Nicotine replacement products help you slowly reduce the amount of nicotine you need. These products come in several forms, many of them available over-the-counter:  Nicotine patches  Nicotine gum and lozenges  Nicotine inhaler  Ask your doctor about bupropion (Wellbutrin) or varenicline (Chantix), which are prescription medicines. They do not contain nicotine. They help you by reducing withdrawal symptoms, such as stress and anxiety. Some people find hypnosis, acupuncture, and massage helpful for ending the smoking habit. Eat a healthy diet and get regular exercise. Having healthy habits will help your body move past its craving for nicotine. Be prepared to keep trying. Most people are not successful the first few times they try to quit. Do not get mad at yourself if you smoke again. Make a list of things you learned and think about when you want to try again, such as next week, next month, or next year. Where can you learn more? Go to https://Lombardi SoftwarepeCocodrilo Dog.Watchwith. org and sign in to your imagine account. Enter U958 in the Bellabeat box to learn more about \"Stopping Smoking: Care Instructions. \"     If you do not have an account, please click on the \"Sign Up Now\" link. Current as of: February 11, 2021               Content Version: 13.0  © 2006-2021 Healthwise, Incorporated. Care instructions adapted under license by [facility] ([facility]). If you have questions about a medical condition or this instruction, always ask your healthcare professional. [address] any warranty or liability for your use of this information.

## 2021-10-05 NOTE — ASSESSMENT & PLAN NOTE
Patient pre-contemplative and is aware that smoking cessation would be the best thing for overall health. We reviewed cessation aids including medication and nicotine replacement therapy. Will continue to encourage complete smoking cessation at subsequent visits.

## 2021-10-05 NOTE — PROGRESS NOTES
Medicare Annual Wellness Visit  Name: Darrin Monique Date: 10/5/2021   MRN: [id number] Sex: Male   Age: 77 y.o. Ethnicity: Non- / Non    : 1955 Race: White (non-)      Maria D Diaz is here for Medicare AWV    Screenings for behavioral, psychosocial and functional/safety risks, and cognitive dysfunction are all negative except as indicated below. These results, as well as other patient data from the [address] form, are documented in Flowsheets linked to this Encounter. Allergies   Allergen Reactions    Bactrim [Sulfamethoxazole-Trimethoprim] Nausea And Vomiting and Other (See Comments)     Sugar count elevated, had troubles urinating         Prior to Visit Medications    Medication Sig Taking? Authorizing Provider   VITAMIN D PO  Yes Historical Provider, MD   allopurinol (ZYLOPRIM) 300 MG tablet Take 1 tablet by mouth daily Yes Angel Durham MD   torsemide (DEMADEX) 20 MG tablet Take 1 tablet by mouth daily Yes Angel Durham MD   glimepiride (AMARYL) 1 MG tablet Take 1 tablet by mouth every morning (before breakfast) Yes Angel Durham MD   carvedilol (COREG) 25 MG tablet Take 1 tablet by mouth 2 times daily Yes Angel Durham MD   atorvastatin (LIPITOR) 40 MG tablet Take 1 tablet by mouth daily Yes Angel Durham MD   metFORMIN (GLUCOPHAGE) 500 MG tablet Take 1 tablet by mouth 2 times daily (with meals) Yes Angel Durham MD   clopidogrel (PLAVIX) 75 MG tablet Take 1 tablet by mouth daily Yes Angel Durham MD   Elastic Bandages & Supports (V-4 HIGH COMPRESSION HOSE) MISC KNEE HIGH - 20-30 mmHg Yes Angel Durham MD   Alcohol Swabs PADS DX: diabetes mellitus. Test 1 time(s) daily - Ok to substitute per insurance (1 each = 1 box) Yes Angel Durham MD   blood glucose monitor strips DX: diabetes mellitus.  Use 1 time(s) daily - True Metrix requested by patient - Ok to substitute per insurance Yes Angel Durham MD   Lancets MISC DX: diabetes mellitus. Use 1 time(s) daily - Ok to substitute per insurance (1 each = 1 box) Yes [place], MD   blood glucose monitor kit and supplies DX: diabetes mellitus. Test 1 time(s) daily - True Metrix requested by patient - [id number] [address] Hope to substitute per insurance Yes [place], MD   aspirin 81 MG EC tablet Take 81 mg by mouth daily Yes Historical Provider, MD   Probiotic Product (PROBIOTIC DAILY PO) Take 1 tablet by mouth daily Yes Historical Provider, MD   vitamin B-12 (CYANOCOBALAMIN) 100 MCG tablet Take 50 mcg by mouth daily Yes Historical Provider, MD   ammonium lactate (AMLACTIN) 12 % cream APPLY TO DRY CALLUS AREAS 1 TO 2 TIMES DAILY Yes Historical Provider, MD   Misc.  Devices (COMMODE BEDSIDE) MISC Use daily as needed Yes Mi Lewis MD   MULTIPLE VITAMIN PO Take 1 tablet by mouth daily  Yes Historical Provider, MD         Past Medical History:   Diagnosis Date    Abscess of back 1/6/2020    Abscess of right leg 1/6/2020    Acute renal failure ([address]     Anxiety     CAD S/P percutaneous coronary angioplasty 3/11/2014    Cardiomyopathy ([address]     Cerebrovascular accident (CVA) due to occlusion of left middle cerebral artery ([address] 08/2016    brainstem infarction from left MCA occlusion    Cerebrovascular disease 07/27/2016    Colon polyp     Coronary angioplasty status     CVA (cerebral vascular accident) ([address] 11/12/2017    Dependent edema 9/14/2017    Diplopia 5/15/2017    Resolved with management of cataracts    Dupuytren contracture 1/2/2018    Dysphagia 8/18/2011    Dysphonia 8/18/2011    Essential hypertension 8/17/2016    Gallop rhythm     Gout 12/10/2016    Gout of big toe 08/2014    Right Great Toe    H/O fall     Headache     migraines    Heart failure ([address]     History of 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    History of heart failure 1/6/2014    History of myocardial infarction 3/11/2014    History of recurrent pneumonia 2/11/2014    Hx of bacterial pneumonia     Hyperlipidemia 4/30/2021    Hyperlipidemia, mixed 4/30/2021    Hyperlipidemia, mixed 4/30/2021    Hypotension     Left carotid artery stenosis 8/23/2020    Left carotid artery stenosis 8/23/2020    Leg swelling     Macrocytosis without anemia 12/10/2016    Microalbuminuria due to type 2 diabetes mellitus ([address] 9/14/2018    Morbid obesity ([address] 1/2/2014    Myocardial infarction ([address]     Obesity     AYLIN (obstructive sleep apnea) 12/10/2016    Osteoarthritis     Right-sided muscle weakness 10/19/2016    S/P cardiac catheterization     Sciatica     Skin cyst 4/8/2016    Spasm 11/9/2016    Spina bifida ([address]     Stented coronary artery     Tremor of right hand 5/15/2017    Type 2 diabetes mellitus with diabetic polyneuropathy, without long-term current use of insulin ([address]     Unsteady gait 5/15/2017    Weakness     Weight gain        Past Surgical History:   Procedure Laterality Date    ANKLE SURGERY Left     BACK SURGERY      lumbar laminectomy    CHOLECYSTECTOMY      COLONOSCOPY  01/15/2010    polyp, diverticulosis (DR ELAINE)    COLONOSCOPY N/A 08/19/2021    COLORECTAL CANCER SCREENING, HIGH RISK with polypectomies  performed by Mallory Betancur MD at [facility] 08/20/2021    polyps x 3, 3y repeat (DR Lindy Ames)  COLONOSCOPY with polypectomies DIAGNOSTIC performed by Torrie aMravilla MD at [address] [place] [address]  2014    3 stents    CYST REMOVAL  05/16/2016    DR Samantha Alegria,  L SCROTAL CYST, R thigh, L ear, back    SHOULDER SURGERY Bilateral 12/18/2015    left once right twice- to remove bone spurs    TONSILLECTOMY           Family History   Problem Relation Age of Onset    Breast Cancer Mother     Stroke Father     Colon Cancer Father 76       CareTeam (Including outside providers/suppliers regularly involved in seconds? (Complete if either Fall Risk answers are Yes): no  2 or more falls in past year?: (!) yes  Fall with injury in past year?: no  Fall Risk Interventions:    · Home safety tips provided  · Patient declines any further evaluation/treatment for this issue       Substance History:  Social History     Tobacco History     Smoking Status  Current Some Day Smoker Last attempt to quit  1/1/2001 Smoking Frequency  2.5 packs/day for 30 years (76 pk yrs) Smoking Tobacco Type  Cigarettes    Smokeless Tobacco Use  Never Used    Tobacco Comment  varies          Alcohol History     Alcohol Use Status  Yes Amount  0.0 standard drinks of alcohol/wk Comment  limits less than 10/wk varies          Drug Use     Drug Use Status  No          Sexual Activity     Sexually Active  Yes Partners  Female               Alcohol Screening:       A score of 8 or more is associated with harmful or hazardous drinking. A score of 13 or more in women, and 15 or more in men, is likely to indicate alcohol dependence.   Substance Abuse Interventions:  · Tobacco abuse:  tobacco cessation tips and resources provided, patient is not ready to work toward tobacco cessation at this time     Health Habits/Nutrition:  Health Habits/Nutrition  Do you exercise for at least 20 minutes 2-3 times per week?: Yes  Have you lost any weight without trying in the past 3 months?: No  Do you eat only one meal per day?: No  Have you seen the dentist within the past year?: Yes  Body mass index: (!) 36.49  Health Habits/Nutrition Interventions:  · Nutritional issues:  educational materials for healthy, well-balanced diet provided, educational materials to promote weight loss provided    Hearing/Vision:  No exam data present  Hearing/Vision  Do you or your family notice any trouble with your hearing that hasn't been managed with hearing aids?: (!) Yes  Do you have difficulty driving, watching TV, or doing any of your daily activities because of your eyesight?: No  Have you had an eye exam within the past year?: Appointment is scheduled  Hearing/Vision Interventions:  · Hearing concerns:  patient declines any further evaluation/treatment for hearing issues    Safety:  Safety  Do you have working smoke detectors?: Yes  Have all throw rugs been removed or fastened?: Yes  Do you have non-slip mats or surfaces in all bathtubs/showers?: Yes  Do all of your stairways have a railing or banister?: (!) No (no railings in basement)  Are your doorways, halls and stairs free of clutter?: Yes  Do you always fasten your seatbelt when you are in a car?: Yes  Safety Interventions:  · Home safety tips provided    ADL:  ADLs  In the past 7 days, did you need help from others to perform any of the following everyday activities? Eating, dressing, grooming, bathing, toileting, or walking/balance?: None  In the past 7 days, did you need help from others to take care of any of the following?  Laundry, housekeeping, banking/finances, shopping, telephone use, food preparation, transportation, or taking medications?: [employer], Transportation  ADL Interventions:  · Patient declines any further evaluation/treatment for this issue    Personalized Preventive Plan   Current Health Maintenance Status  Immunization History   Administered Date(s) Administered    COVID-19, Moderna, PF, 100mcg/0.5mL 03/09/2021, 04/06/2021    Influenza 09/01/2015    Influenza Virus Vaccine 09/26/2020    Influenza, High-dose, Scot Spark, 65 yrs +, IM (Fluzone) 09/26/2020    Influenza, Quadv, IM, (6 mo and older Fluzone, Flulaval, Fluarix and 3 yrs and older Afluria) 09/14/2017, 09/13/2018, 09/16/2019    Influenza, Quadv, IM, PF (6 mo and older Fluzone, Flulaval, Fluarix, and 3 yrs and older Afluria) 10/19/2016    Influenza, Quadv, adjuvanted, 65 yrs +, IM, PF (Fluad) 10/05/2021    Pneumococcal Polysaccharide ([id number]) 10/08/2012, 12/14/2017    Tdap (Boostrix, Adacel) 08/17/2016    Zoster Live (Zostavax) 02/13/2017 Health Maintenance   Topic Date Due    Shingles Vaccine (2 of 3) 04/10/2017    Annual Wellness Visit (AWV)  07/28/2021    Diabetic microalbuminuria test  08/07/2021    Diabetic foot exam  11/20/2021    Lipid screen  05/05/2022    Potassium monitoring  08/21/2022    Creatinine monitoring  08/21/2022    Diabetic retinal exam  09/22/2022    A1C test (Diabetic or Prediabetic)  10/05/2022    Pneumococcal 65+ years Vaccine (1 of 1 - PPSV23) 12/14/2022    Colon cancer screen colonoscopy  08/20/2024    DTaP/Tdap/Td vaccine (2 - Td or Tdap) 08/17/2026    Flu vaccine  Completed    COVID-19 Vaccine  Completed    AAA screen  Completed    Hepatitis C screen  Completed    Hepatitis A vaccine  Aged Out    Hib vaccine  Aged Out    Meningococcal (ACWY) vaccine  Aged Out    Low dose CT lung screening  Discontinued     Recommendations for Preventive Services Due: see orders and patient instructions/AVS.  . Recommended screening schedule for the next 5-10 years is provided to the patient in written form: see Patient Instructions/AVS.    Krystal Claudio was seen today for medicare awv. Diagnoses and all orders for this visit:    1. Routine general medical examination at a health care facility  2. At high risk for falls  Assessment & Plan:  Patient declines formal physical therapy for help with balance and improving gait   3. Need for vaccination  -     INFLUENZA, QUADV, ADJUVANTED, 72 YRS =, IM, PF, PREFILL SYR, 0.5ML (FLUAD)  4. Screening for prostate cancer  -     PSA screening; Future  5. Lung cancer screening declined by patient  Comments:  Reviewed with patient the risks of delayed or advanced stage diagnosis including serious morbidity and mortality. He was advised to call if he changes his mind  6. Type 2 diabetes mellitus with diabetic polyneuropathy, without long-term current use of insulin (HCC)  -     POCT glycosylated hemoglobin (Hb A1C)  -     Comprehensive Metabolic Panel;  Future  -     Lipid Panel; Future  -     Microalbumin / Creatinine Urine Ratio; Future  7. Microalbuminuria due to type 2 diabetes mellitus ([facility]  -     Comprehensive Metabolic Panel; Future  -     Microalbumin / Creatinine Urine Ratio; Future  8. Essential hypertension  -     Comprehensive Metabolic Panel; Future  9. Mixed hyperlipidemia  -     Comprehensive Metabolic Panel; Future  -     Lipid Panel; Future  10. Coronary artery disease involving native coronary artery of native heart without angina pectoris  -     Lipid Panel; Future  11. PAD (peripheral artery disease) ([facility])  -     Lipid Panel; Future  12. Iron deficiency anemia, unspecified iron deficiency anemia type  -     CBC Auto Differential; Future  -     Iron And Tibc; Future  -     Ferritin; Future  13. Hypomagnesemia  -     Magnesium; Future  14. Gout of multiple sites, unspecified cause, unspecified chronicity  -     Uric Acid; Future  15. Tobacco use  Assessment & Plan:  Patient pre-contemplative and is aware that smoking cessation would be the best thing for overall health. We reviewed cessation aids including medication and nicotine replacement therapy. Will continue to encourage complete smoking cessation at subsequent visits. 16. Morbid obesity ([facility]  Assessment & Plan:  Patient counseled on healthy dietary choices and the benefits of a lower salt and/or lower carbohydrate diet as appropriate. Patient also counseled on benefits of moderate intensity cardiovascular exercise for 20-30 minutes at least 3-5 days a week. Advice was given to make small changes over time, setting smaller achievable goals until recommended lifestyle changes are reached.             Return in 6 months for chronic disease check

## 2021-10-06 NOTE — RESULT ENCOUNTER NOTE
Please notify patient that recent lab work shows the followin. His blood counts show an improved hemoglobin level now within the normal range along with normal iron levels2. His kidney and liver function testing is normal, his cholesterol testing is normal, his uric acid level is normal, and his prostate screening is normal3. His urine microalbumin level remains elevated. This is something we will continue to follow over time on a yearly basis. He should continue making his best efforts to eat a healthy diabetic-friendly, low carbohydrate diet4. His magnesium level is improved and only borderline low.  We will simply continue to follow over time

## 2022-03-01 ENCOUNTER — OFFICE VISIT (OUTPATIENT)
Dept: PULMONOLOGY | Age: 67
End: 2022-03-01
Payer: MEDICARE

## 2022-03-01 VITALS
BODY MASS INDEX: 38.17 KG/M2 | SYSTOLIC BLOOD PRESSURE: 128 MMHG | TEMPERATURE: 97.9 F | HEART RATE: 106 BPM | OXYGEN SATURATION: 97 % | RESPIRATION RATE: 18 BRPM | HEIGHT: 72 IN | WEIGHT: 281.8 LBS | DIASTOLIC BLOOD PRESSURE: 74 MMHG

## 2022-03-01 DIAGNOSIS — R09.81 NASAL CONGESTION: ICD-10-CM

## 2022-03-01 DIAGNOSIS — Z87.891 PERSONAL HISTORY OF TOBACCO USE: ICD-10-CM

## 2022-03-01 DIAGNOSIS — F17.210 CIGARETTE NICOTINE DEPENDENCE, UNCOMPLICATED: ICD-10-CM

## 2022-03-01 DIAGNOSIS — Z99.89 OSA ON CPAP: Primary | ICD-10-CM

## 2022-03-01 DIAGNOSIS — E66.01 SEVERE OBESITY (BMI 35.0-39.9) WITH COMORBIDITY (HCC): ICD-10-CM

## 2022-03-01 DIAGNOSIS — R06.2 WHEEZING: ICD-10-CM

## 2022-03-01 DIAGNOSIS — G47.33 OSA ON CPAP: Primary | ICD-10-CM

## 2022-03-01 PROCEDURE — 99214 OFFICE O/P EST MOD 30 MIN: CPT | Performed by: INTERNAL MEDICINE

## 2022-03-01 PROCEDURE — G0296 VISIT TO DETERM LDCT ELIG: HCPCS | Performed by: INTERNAL MEDICINE

## 2022-03-01 NOTE — PROGRESS NOTES
Subjective:     Manuel Nye is a 77 y.o. male who complains today of:     Chief Complaint   Patient presents with    Follow-up     AYLIN on CPAP       HPI  Patient presents for AYLIN       Patient presents for sleep apnea follow-up, he is doing good, compliant with CPAP, no issues, he complains of nasal congestion and postnasal drip, however he declined Flonase treatment, weight is stable, no chest pain, denies shortness of breath on exertion, no lower extremity edema, no fever or chills, and no heartburn.   His weight is stable            Allergies:  Bactrim [sulfamethoxazole-trimethoprim]  Past Medical History:   Diagnosis Date    Abscess of back 1/6/2020    Abscess of right leg 1/6/2020    Acute renal failure (HCC)     Anxiety     CAD S/P percutaneous coronary angioplasty 3/11/2014    Cardiomyopathy ([address]     Cerebrovascular accident (CVA) due to occlusion of left middle cerebral artery ([address] 08/2016    brainstem infarction from left MCA occlusion    Cerebrovascular disease 07/27/2016    Colon polyp     Coronary angioplasty status     CVA (cerebral vascular accident) ([address] 11/12/2017    Dependent edema 9/14/2017    Diplopia 5/15/2017    Resolved with management of cataracts    Dupuytren contracture 1/2/2018    Dysphagia 8/18/2011    Dysphonia 8/18/2011    Essential hypertension 8/17/2016    Gallop rhythm     Gout 12/10/2016    Gout of big toe 08/2014    Right Great Toe    H/O fall     Headache     migraines    Heart failure ([address]     History of 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    History of heart failure 1/6/2014    History of myocardial infarction 3/11/2014    History of recurrent pneumonia 2/11/2014    Hx of bacterial pneumonia     Hyperlipidemia 4/30/2021    Hyperlipidemia, mixed 4/30/2021    Hyperlipidemia, mixed 4/30/2021    Hypotension     Left carotid artery stenosis 8/23/2020  Left carotid artery stenosis 8/23/2020    Leg swelling     Macrocytosis without anemia 12/10/2016    Microalbuminuria due to type 2 diabetes mellitus ([facility] 9/14/2018    Morbid obesity ([facility] 1/2/2014    Myocardial infarction ([facility]     Obesity     AYLIN (obstructive sleep apnea) 12/10/2016    Osteoarthritis     Right-sided muscle weakness 10/19/2016    S/P cardiac catheterization     Sciatica     Skin cyst 4/8/2016    Spasm 11/9/2016    Spina bifida ([facility]     Stented coronary artery     Tremor of right hand 5/15/2017    Type 2 diabetes mellitus with diabetic polyneuropathy, without long-term current use of insulin (HCC)     Unsteady gait 5/15/2017    Weakness     Weight gain      Past Surgical History:   Procedure Laterality Date    ANKLE SURGERY Left     BACK SURGERY      lumbar laminectomy    CHOLECYSTECTOMY      COLONOSCOPY  01/15/2010    polyp, diverticulosis (DR ELAINE)    COLONOSCOPY N/A 08/19/2021    COLORECTAL CANCER SCREENING, HIGH RISK with polypectomies  performed by Leonarda Erwin MD at [facility] 08/20/2021    polyps x 3, 3y repeat (DR Radha Dupont)  COLONOSCOPY with polypectomies DIAGNOSTIC performed by Nikita Joya MD at [facility]  2014    3 stents    CYST REMOVAL  05/16/2016    DR Madeline Loco,  L SCROTAL CYST, R thigh, L ear, back    SHOULDER SURGERY Bilateral 12/18/2015    left once right twice- to remove bone spurs    TONSILLECTOMY       Family History   Problem Relation Age of Onset    Breast Cancer Mother     Stroke Father     Colon Cancer Father 76     Social History     Socioeconomic History    Marital status:      Spouse name: Azra Gutierrez Number of children: 3    Years of education: 12+    Highest education level: Not on file   Occupational History    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     Employer: [employer] [place]   Tobacco Use    Smoking status: Current Some Day Smoker Packs/day: 2.50     Years: 30.00     Pack years: 75.00     Types: Cigarettes     Last attempt to quit:      Years since quittin.1    Smokeless tobacco: Never Used    Tobacco comment: varies   Vaping Use    Vaping Use: Never used   Substance and Sexual Activity    Alcohol use: Yes     Alcohol/week: 0.0 standard drinks     Comment: limits less than 10/wk varies    Drug use: No    Sexual activity: Yes     Partners: Female   Other Topics Concern    Not on file   Social History Narrative    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-- Disabled from [place] after 2016 CVA    Lives With: Spouse    Type of Home: House    Home Layout: Two level, Bed/Bath upstairs, Laundry in basement(Railing)    Home Access: Stairs to enter with rails    Entrance Stairs - Number of Steps: 3 in back, 4 in front    Bathroom Shower/Tub: Tub/Shower unit    Bathroom Equipment: Shower chair    Home Equipment: Rolling walker, Cane    ADL Assistance: Independent    Homemaking Assistance: Needs assistance(Spouse  is primary; vertigo and arthritis limit housework)    Ambulation Assistance: Independent(Walker outside, cane inside)    Transfer Assistance: Independent    Active : No    Patient's  Info: has not driven since CVA     Social Determinants of Health     Financial Resource Strain:     Difficulty of Paying Living Expenses: Not on file   Food Insecurity:     Worried About [address] in the Last Year: Not on file    [address] in the Last Year: Not on file   Transportation Needs:     Lack of Transportation (Medical): Not on file    Lack of Transportation (Non-Medical):  Not on file   Physical Activity:     Days of Exercise per Week: Not on file    Minutes of Exercise per Session: Not on file   Stress:     Feeling of Stress : Not on file   Social Connections:     Frequency of Communication with Friends and Family: Not on file    Frequency of Social Gatherings with Friends and Family: Not on file   Lizzeth Larger Attends Gnosticism Services: Not on file    Active Member of Clubs or Organizations: Not on file    Attends Club or Organization Meetings: Not on file    Marital Status: Not on file   Intimate Partner Violence:     Fear of Current or Ex-Partner: Not on file    Emotionally Abused: Not on file    Physically Abused: Not on file    Sexually Abused: Not on file   Housing Stability:     Unable to Pay for Housing in the Last Year: Not on file    Number of [place] in the Last Year: Not on file    Unstable Housing in the Last Year: Not on file         Review of Systems      ROS: 10 organs review of system is done including general, psychological, ENT, hematological, endocrine, respiratory, cardiovascular, gastrointestinal,musculoskeletal, neurological,  allergy and Immunology is done and is otherwise negative. Current Outpatient Medications   Medication Sig Dispense Refill    metFORMIN (GLUCOPHAGE) 500 MG tablet Take 1 tablet by mouth 2 times daily (with meals) 180 tablet 0    atorvastatin (LIPITOR) 40 MG tablet Take 1 tablet by mouth daily 90 tablet 1    clopidogrel (PLAVIX) 75 MG tablet Take 1 tablet by mouth daily 90 tablet 1    VITAMIN D PO       allopurinol (ZYLOPRIM) 300 MG tablet Take 1 tablet by mouth daily 90 tablet 1    torsemide (DEMADEX) 20 MG tablet Take 1 tablet by mouth daily 90 tablet 1    glimepiride (AMARYL) 1 MG tablet Take 1 tablet by mouth every morning (before breakfast) 90 tablet 1    carvedilol (COREG) 25 MG tablet Take 1 tablet by mouth 2 times daily 180 tablet 1    Elastic Bandages & Supports (V-4 HIGH COMPRESSION HOSE) MISC KNEE HIGH - 20-30 mmHg 2 each 1    Alcohol Swabs PADS DX: diabetes mellitus. Test 1 time(s) daily - Ok to substitute per insurance (1 each = 1 box) 100 each 5    blood glucose monitor strips DX: diabetes mellitus. Use 1 time(s) daily - True Metrix requested by patient - Brii Madden to substitute per insurance 100 strip 5    Lancets MISC DX: diabetes mellitus. Use 1 time(s) daily - Ok to substitute per insurance (1 each = 1 box) 100 each 5    blood glucose monitor kit and supplies DX: diabetes mellitus. Test 1 time(s) daily - True Metrix requested by patient - [id number] Claudia Hope to substitute per insurance 1 kit 0    aspirin 81 MG EC tablet Take 81 mg by mouth daily      Probiotic Product (PROBIOTIC DAILY PO) Take 1 tablet by mouth daily      vitamin B-12 (CYANOCOBALAMIN) 100 MCG tablet Take 50 mcg by mouth daily      ammonium lactate (AMLACTIN) 12 % cream APPLY TO DRY CALLUS AREAS 1 TO 2 TIMES DAILY  5    Misc. Devices (COMMODE BEDSIDE) MISC Use daily as needed 1 each 0    MULTIPLE VITAMIN PO Take 1 tablet by mouth daily        No current facility-administered medications for this visit. Objective:     Vitals:    03/01/22 1451   BP: 128/74   Site: Right Upper Arm   Position: Sitting   Cuff Size: Large Adult   Pulse: 106   Resp: 18   Temp: 97.9 °F (36.6 °C)   TempSrc: Infrared   SpO2: 97%   Weight: 281 lb 12.8 oz (127.8 kg)   Height: 6' (1.829 m)         Physical Exam  Constitutional:       Appearance: He is well-developed. HENT:      Head: Normocephalic and atraumatic. Eyes:      General:         Left eye: No discharge. Conjunctiva/sclera: Conjunctivae normal.      Pupils: Pupils are equal, round, and reactive to light. Neck:      Vascular: No JVD. Cardiovascular:      Rate and Rhythm: Normal rate and regular rhythm. Heart sounds: Normal heart sounds. No murmur heard. No friction rub. No gallop. Pulmonary:      Effort: Pulmonary effort is normal. No respiratory distress. Breath sounds: Wheezing (mild and mainly on inspiration) present. No rales. Chest:      Chest wall: No tenderness. Abdominal:      General: Bowel sounds are normal.      Palpations: Abdomen is soft. Musculoskeletal:         General: No tenderness or deformity. Cervical back: Normal range of motion and neck supple. Right lower leg: No edema.       Left lower leg: No documentation of shared decision making? Answer:   Yes     Order Specific Question:   Is this a low dose CT or a routine CT? Answer:   Low Dose CT [1]     Order Specific Question:   Is this the first (baseline) CT or an annual exam?     Answer:   Baseline [1]     Order Specific Question:   Does the patient show any signs or symptoms of lung cancer? Answer:   No     Order Specific Question:   Smoking Status? Answer:   Current Some Day Smoker [2]     Order Specific Question:   Smoking packs per day? Answer:   2.5     Order Specific Question:   Years smoking? Answer:   30    AR VISIT TO DISCUSS LUNG CA SCREEN W LDCT     No orders of the defined types were placed in this encounter. Discussed with patient the importance of exercise and weight control and  overall health and well-being. Reviewed with the patient: current clinical status, medications, activities and diet. Side effects, adverse effects of the medication prescribed today, as well as treatment plan and result expectations have been discussed with the patient who expresses understanding and desires to proceed. Return in about 1 year (around 3/1/2023). I spent 30 min with this patient, greater the 50% of this time was spent in counseling and/or coordinating of care.'    Itz Root MD    Low Dose CT (LDCT) Lung Screening criteria met:     Age 55-77(Medicare) or 50-80 (USPST)   Pack year smoking >30 (Medicare) or >20 (USPSTF)   Still smoking or less than 15 year since quit   No sign or symptoms of lung cancer   > 11 months since last LDCT     Risks and benefits of lung cancer screening with LDCT scans discussed:    Significance of positive screen - False-positive LDCT results often occur. 95% of all positive results do not lead to a diagnosis of cancer. Usually further imaging can resolve most false-positive results; however, some patients may require invasive procedures.     Over diagnosis risk - 10% to 12% of screen-detected lung cancer cases are over diagnosedthat is, the cancer would not have been detected in the patient's lifetime without the screening. Need for follow up screens annually to continue lung cancer screening effectiveness     Risks associated with radiation from annual LDCT- Radiation exposure is about the same as for a mammogram, which is about 1/3 of the annual background radiation exposure from everyday life. Starting screening at age 54 is not likely to increase cancer risk from radiation exposure. Patients with comorbidities resulting in life expectancy of < 10 years, or that would preclude treatment of an abnormality identified on CT, should not be screened due to lack of benefit.     To obtain maximal benefit from this screening, smoking cessation and long-term abstinence from smoking is critical

## 2022-03-13 DIAGNOSIS — E11.42 TYPE 2 DIABETES MELLITUS WITH DIABETIC POLYNEUROPATHY, WITHOUT LONG-TERM CURRENT USE OF INSULIN (HCC): Chronic | ICD-10-CM

## 2022-03-13 DIAGNOSIS — R60.0 BILATERAL LOWER EXTREMITY EDEMA: ICD-10-CM

## 2022-03-14 RX ORDER — GLIMEPIRIDE 1 MG/1
1 TABLET ORAL
Qty: 90 TABLET | Refills: 1 | Status: SHIPPED | OUTPATIENT
Start: 2022-03-14 | End: 2022-08-30

## 2022-03-14 RX ORDER — TORSEMIDE 20 MG/1
20 TABLET ORAL DAILY
Qty: 90 TABLET | Refills: 1 | Status: SHIPPED | OUTPATIENT
Start: 2022-03-14 | End: 2022-08-30

## 2022-03-14 NOTE — TELEPHONE ENCOUNTER
pharmacy requesting medication refill.  Please approve or deny this request.    Rx requested:  Requested Prescriptions     Pending Prescriptions Disp Refills    torsemide (DEMADEX) 20 MG tablet [Pharmacy Med Name: TORSEMIDE TABS 20MG] 90 tablet 3     Sig: TAKE 1 TABLET DAILY    glimepiride (AMARYL) 1 MG tablet [Pharmacy Med Name: GLIMEPIRIDE TABS 1MG] 90 tablet 3     Sig: TAKE 1 TABLET EVERY MORNING BEFORE BREAKFAST         Last Office Visit:   10/5/2021      Next Visit Date:  Future Appointments   Date Time Provider Qasim Rae   4/5/2022  3:40 PM Glyn Gosselin, MD [facility]   6/27/2022 12:45 PM Kiesha Berger MD [facility]   6/29/2022  2:00 PM Lynda Gallo MD [facility]   7/5/2022  2:00 PM Pablo Douglas DO [facility]   3/1/2023  1:00 PM Afsaneh Deluna MD [facility]

## 2022-03-23 ENCOUNTER — TELEPHONE (OUTPATIENT)
Dept: CASE MANAGEMENT | Age: 67
End: 2022-03-23

## 2022-03-23 NOTE — TELEPHONE ENCOUNTER
Physician documentation on smoking history and CT Lung Screening reviewed. All required documentation complete. Patient is a current every day smoker with a 75 pack year history ( 2.5 ppd x 30 years) per physician documentation.

## 2022-03-28 ENCOUNTER — HOSPITAL ENCOUNTER (OUTPATIENT)
Dept: CT IMAGING | Age: 67
Discharge: HOME | End: 2022-03-30
Payer: MEDICARE

## 2022-03-28 DIAGNOSIS — Z87.891 PERSONAL HISTORY OF TOBACCO USE: ICD-10-CM

## 2022-03-28 PROCEDURE — 71271 CT THORAX LUNG CANCER SCR C-: CPT

## 2022-04-28 DIAGNOSIS — M10.9 GOUT OF MULTIPLE SITES, UNSPECIFIED CAUSE, UNSPECIFIED CHRONICITY: Chronic | ICD-10-CM

## 2022-04-29 NOTE — TELEPHONE ENCOUNTER
Pharmacy is requesting medication refill.  Please approve or deny this request.    Rx requested:  Requested Prescriptions     Pending Prescriptions Disp Refills    allopurinol (ZYLOPRIM) 300 MG tablet [Pharmacy Med Name: ALLOPURINOL TABS 300MG] 90 tablet 3     Sig: Take 1 tablet by mouth daily         Last Office Visit:   10/5/2021      Next Visit Date:  Future Appointments   Date Time Provider Qasim Rae   6/27/2022  3:40 PM Jillian Adams MD [place] [id number]   6/29/2022  2:00 PM MD Hilario Collins   7/1/2022 12:45 PM Bernard Poe DO [address] [place] [address]   7/5/2022  2:00 PM Remy Arceo DO [facility]   3/1/2023  1:00 PM Tami Mcgill MD [facility]

## 2022-05-01 RX ORDER — ALLOPURINOL 300 MG/1
300 TABLET ORAL DAILY
Qty: 90 TABLET | Refills: 1 | Status: SHIPPED | OUTPATIENT
Start: 2022-05-01

## 2022-05-15 DIAGNOSIS — I73.9 PAD (PERIPHERAL ARTERY DISEASE) (HCC): ICD-10-CM

## 2022-05-15 DIAGNOSIS — E78.2 MIXED HYPERLIPIDEMIA: ICD-10-CM

## 2022-05-15 DIAGNOSIS — E11.42 TYPE 2 DIABETES MELLITUS WITH DIABETIC POLYNEUROPATHY, WITHOUT LONG-TERM CURRENT USE OF INSULIN (HCC): Chronic | ICD-10-CM

## 2022-05-15 DIAGNOSIS — E83.42 HYPOMAGNESEMIA: ICD-10-CM

## 2022-05-15 DIAGNOSIS — M10.9 GOUT OF MULTIPLE SITES, UNSPECIFIED CAUSE, UNSPECIFIED CHRONICITY: ICD-10-CM

## 2022-05-15 DIAGNOSIS — I10 ESSENTIAL HYPERTENSION: ICD-10-CM

## 2022-05-15 DIAGNOSIS — I25.10 CORONARY ARTERY DISEASE INVOLVING NATIVE CORONARY ARTERY OF NATIVE HEART WITHOUT ANGINA PECTORIS: Primary | ICD-10-CM

## 2022-05-17 NOTE — TELEPHONE ENCOUNTER
Refill has been sent to mail order pharmacy. Please instruct patient to return to the lab 1 to 2 weeks prior to his scheduled office visit for fasting blood work. Future orders have been left in his chart.

## 2022-05-17 NOTE — TELEPHONE ENCOUNTER
Pharmacy is requesting medication refill.  Please approve or deny this request.    Rx requested:  Requested Prescriptions     Pending Prescriptions Disp Refills    metFORMIN (GLUCOPHAGE) 500 MG tablet [Pharmacy Med Name: METFORMIN HCL TABS 500MG] 180 tablet 3     Sig: Take 1 tablet by mouth 2 times daily (with meals)         Last Office Visit:   Visit date not found      Next Visit Date:  Future Appointments   Date Time Provider Qasim Rae   6/27/2022  3:40 PM Jillian Adams MD [place] [id number]   6/29/2022  2:00 PM Harley Tracey MD [facility]   7/1/2022 12:45 PM Bernard Poe, DO [address] [place] [address]   3/1/2023  1:00 PM Tami Mcgill MD [facility]

## 2022-05-28 DIAGNOSIS — I25.10 CAD S/P PERCUTANEOUS CORONARY ANGIOPLASTY: Chronic | ICD-10-CM

## 2022-05-28 DIAGNOSIS — Z98.61 CAD S/P PERCUTANEOUS CORONARY ANGIOPLASTY: Chronic | ICD-10-CM

## 2022-05-28 DIAGNOSIS — I63.9 CEREBROVASCULAR ACCIDENT (CVA), UNSPECIFIED MECHANISM (HCC): ICD-10-CM

## 2022-06-01 NOTE — TELEPHONE ENCOUNTER
Pharamacy requesting medication refill.  Please approve or deny this request.    Rx requested:  Requested Prescriptions     Pending Prescriptions Disp Refills    clopidogrel (PLAVIX) 75 MG tablet [Pharmacy Med Name: CLOPIDOGREL BISULFATE TABS 75MG] 90 tablet 3     Sig: TAKE 1 TABLET DAILY    atorvastatin (LIPITOR) 40 MG tablet [Pharmacy Med Name: ATORVASTATIN TABS 40MG] 90 tablet 3     Sig: TAKE 1 TABLET DAILY         Last Office Visit:   10/5/2021      Next Visit Date:  Future Appointments   Date Time Provider Qasim Rodriguezi   6/27/2022  3:40 PM Juliano Greene MD [place] [id number]   6/29/2022  2:00 PM Julien Samuels MD [facility]   7/1/2022 12:45 PM Bernard Zee, [address]   3/1/2023  1:00 PM Graciela Royal MD [address] [place] [address]

## 2022-06-02 RX ORDER — CLOPIDOGREL BISULFATE 75 MG/1
75 TABLET ORAL DAILY
Qty: 90 TABLET | Refills: 3 | Status: SHIPPED | OUTPATIENT
Start: 2022-06-02

## 2022-06-02 RX ORDER — ATORVASTATIN CALCIUM 40 MG/1
40 TABLET, FILM COATED ORAL DAILY
Qty: 90 TABLET | Refills: 3 | Status: ON HOLD | OUTPATIENT
Start: 2022-06-02 | End: 2022-11-02 | Stop reason: HOSPADM

## 2022-06-06 ENCOUNTER — PATIENT MESSAGE (OUTPATIENT)
Dept: FAMILY MEDICINE CLINIC | Age: 67
End: 2022-06-06

## 2022-06-13 ENCOUNTER — HOSPITAL ENCOUNTER (OUTPATIENT)
Dept: LAB | Age: 67
Discharge: HOME | End: 2022-06-13
Payer: MEDICARE

## 2022-06-13 ENCOUNTER — TELEPHONE (OUTPATIENT)
Dept: FAMILY MEDICINE CLINIC | Age: 67
End: 2022-06-13

## 2022-06-13 DIAGNOSIS — E11.42 TYPE 2 DIABETES MELLITUS WITH DIABETIC POLYNEUROPATHY, WITHOUT LONG-TERM CURRENT USE OF INSULIN (HCC): Chronic | ICD-10-CM

## 2022-06-13 DIAGNOSIS — E83.42 HYPOMAGNESEMIA: ICD-10-CM

## 2022-06-13 DIAGNOSIS — E78.2 MIXED HYPERLIPIDEMIA: ICD-10-CM

## 2022-06-13 DIAGNOSIS — M10.9 GOUT OF MULTIPLE SITES, UNSPECIFIED CAUSE, UNSPECIFIED CHRONICITY: ICD-10-CM

## 2022-06-13 DIAGNOSIS — I10 ESSENTIAL HYPERTENSION: ICD-10-CM

## 2022-06-13 DIAGNOSIS — I73.9 PAD (PERIPHERAL ARTERY DISEASE) (HCC): ICD-10-CM

## 2022-06-13 DIAGNOSIS — I25.10 CORONARY ARTERY DISEASE INVOLVING NATIVE CORONARY ARTERY OF NATIVE HEART WITHOUT ANGINA PECTORIS: ICD-10-CM

## 2022-06-13 LAB
BASOPHILS ABSOLUTE: 0 K/UL (ref 0–0.2)
BASOPHILS RELATIVE PERCENT: 0.5 %
EOSINOPHILS ABSOLUTE: 0.1 K/UL (ref 0–0.7)
EOSINOPHILS RELATIVE PERCENT: 1.4 %
HBA1C MFR BLD: 7.1 % (ref 4.8–5.9)
HCT VFR BLD CALC: 44.6 % (ref 42–52)
HEMOGLOBIN: 15.2 G/DL (ref 14–18)
LYMPHOCYTES ABSOLUTE: 0.9 K/UL (ref 1–4.8)
LYMPHOCYTES RELATIVE PERCENT: 16.4 %
MACROCYTES: ABNORMAL
MCH RBC QN AUTO: 36.9 PG (ref 27–31.3)
MCHC RBC AUTO-ENTMCNC: 34.1 % (ref 33–37)
MCV RBC AUTO: 108.5 FL (ref 80–100)
MONOCYTES ABSOLUTE: 0.4 K/UL (ref 0.2–0.8)
MONOCYTES RELATIVE PERCENT: 7.5 %
NEUTROPHILS ABSOLUTE: 4.2 K/UL (ref 1.4–6.5)
NEUTROPHILS RELATIVE PERCENT: 74.2 %
PDW BLD-RTO: 13.1 % (ref 11.5–14.5)
PLATELET # BLD: 175 K/UL (ref 130–400)
RBC # BLD: 4.11 M/UL (ref 4.7–6.1)
REASON FOR REJECTION: NORMAL
REJECTED TEST: NORMAL
WBC # BLD: 5.7 K/UL (ref 4.8–10.8)

## 2022-06-13 PROCEDURE — 83036 HEMOGLOBIN GLYCOSYLATED A1C: CPT

## 2022-06-13 PROCEDURE — 85025 COMPLETE CBC W/AUTO DIFF WBC: CPT

## 2022-06-13 PROCEDURE — 36415 COLL VENOUS BLD VENIPUNCTURE: CPT

## 2022-06-14 ENCOUNTER — TELEPHONE (OUTPATIENT)
Dept: FAMILY MEDICINE CLINIC | Age: 67
End: 2022-06-14

## 2022-06-14 DIAGNOSIS — I25.10 CORONARY ARTERY DISEASE INVOLVING NATIVE CORONARY ARTERY OF NATIVE HEART WITHOUT ANGINA PECTORIS: ICD-10-CM

## 2022-06-14 DIAGNOSIS — E83.42 HYPOMAGNESEMIA: ICD-10-CM

## 2022-06-14 DIAGNOSIS — E78.2 MIXED HYPERLIPIDEMIA: ICD-10-CM

## 2022-06-14 DIAGNOSIS — E11.42 TYPE 2 DIABETES MELLITUS WITH DIABETIC POLYNEUROPATHY, WITHOUT LONG-TERM CURRENT USE OF INSULIN (HCC): ICD-10-CM

## 2022-06-14 DIAGNOSIS — M10.9 ACUTE GOUT OF MULTIPLE SITES, UNSPECIFIED CAUSE: Primary | ICD-10-CM

## 2022-06-14 DIAGNOSIS — I73.9 PAD (PERIPHERAL ARTERY DISEASE) (HCC): ICD-10-CM

## 2022-06-14 NOTE — TELEPHONE ENCOUNTER
Please help with this. Patient should not be charged for this initial attempt at labs and would need to be instructed to return for this testing. Ok to place same orders and I will co-sign.

## 2022-06-14 NOTE — TELEPHONE ENCOUNTER
Gary Fitzpatrick from [facility] main line calling to inform that patient labs where rejected:  -CMP   -Lipid   -Magnesium   -Zenia acid  Reason: Quantity was not sufficient

## 2022-06-16 NOTE — RESULT ENCOUNTER NOTE
CBC with high .5, A1c 7. 1CMP, lipid panel, magnesium, and uric acid not run due to \"quantity not sufficient\"    Please notify patient that recent lab work shows stable blood counts and blood sugar that is worsening and above goal control with an A1c of 7.1 (previously 6.3, goal is less than 7). We will discuss further at upcoming office visit. Please clarify with lab why the majority of ordered tests were canceled. I am unclear as to how insufficient quantities of blood could be obtained if all lab results were placed at the same time and known to the person doing the blood draw. Patient will need these tests done prior to visit. Please place new orders if needed for the same canceled labs and I will cosign. Advised patient we will need to return to the lab to have this testing done.

## 2022-06-17 NOTE — TELEPHONE ENCOUNTER
1st attempt to call patient, left VM to call office back to inform of PCP requests for labs reordered.

## 2022-06-17 NOTE — TELEPHONE ENCOUNTER
Patient called office back and is aware of PCP request/advise.  He states he will get the labs done on Monday 6/20/22

## 2022-06-20 ENCOUNTER — HOSPITAL ENCOUNTER (OUTPATIENT)
Dept: LAB | Age: 67
Discharge: HOME | End: 2022-06-20
Payer: MEDICARE

## 2022-06-20 DIAGNOSIS — I25.10 CORONARY ARTERY DISEASE INVOLVING NATIVE CORONARY ARTERY OF NATIVE HEART WITHOUT ANGINA PECTORIS: ICD-10-CM

## 2022-06-20 DIAGNOSIS — E78.2 MIXED HYPERLIPIDEMIA: ICD-10-CM

## 2022-06-20 DIAGNOSIS — I73.9 PAD (PERIPHERAL ARTERY DISEASE) (HCC): ICD-10-CM

## 2022-06-20 DIAGNOSIS — M10.9 ACUTE GOUT OF MULTIPLE SITES, UNSPECIFIED CAUSE: ICD-10-CM

## 2022-06-20 DIAGNOSIS — E11.42 TYPE 2 DIABETES MELLITUS WITH DIABETIC POLYNEUROPATHY, WITHOUT LONG-TERM CURRENT USE OF INSULIN (HCC): ICD-10-CM

## 2022-06-20 DIAGNOSIS — E83.42 HYPOMAGNESEMIA: ICD-10-CM

## 2022-06-20 LAB
ALBUMIN SERPL-MCNC: 4.5 G/DL (ref 3.5–4.6)
ALP BLD-CCNC: 69 U/L (ref 35–104)
ALT SERPL-CCNC: 29 U/L (ref 0–41)
ANION GAP SERPL CALCULATED.3IONS-SCNC: 18 MEQ/L (ref 9–15)
AST SERPL-CCNC: 25 U/L (ref 0–40)
BILIRUB SERPL-MCNC: 0.6 MG/DL (ref 0.2–0.7)
BUN BLDV-MCNC: 14 MG/DL (ref 8–23)
CALCIUM SERPL-MCNC: 9.9 MG/DL (ref 8.5–9.9)
CHLORIDE BLD-SCNC: 92 MEQ/L (ref 95–107)
CHOLESTEROL, TOTAL: 114 MG/DL (ref 0–199)
CO2: 25 MEQ/L (ref 20–31)
CREAT SERPL-MCNC: 0.86 MG/DL (ref 0.7–1.2)
GFR AFRICAN AMERICAN: >60
GFR NON-AFRICAN AMERICAN: >60
GLOBULIN: 2.9 G/DL (ref 2.3–3.5)
GLUCOSE BLD-MCNC: 185 MG/DL (ref 70–99)
HDLC SERPL-MCNC: 50 MG/DL (ref 40–59)
LDL CHOLESTEROL CALCULATED: 48 MG/DL (ref 0–129)
MAGNESIUM: 1.7 MG/DL (ref 1.7–2.4)
POTASSIUM SERPL-SCNC: 3.8 MEQ/L (ref 3.4–4.9)
SODIUM BLD-SCNC: 135 MEQ/L (ref 135–144)
TOTAL PROTEIN: 7.4 G/DL (ref 6.3–8)
TRIGL SERPL-MCNC: 81 MG/DL (ref 0–150)
URIC ACID, SERUM: 6.5 MG/DL (ref 3.4–7)

## 2022-06-20 PROCEDURE — 36415 COLL VENOUS BLD VENIPUNCTURE: CPT

## 2022-06-20 PROCEDURE — 83735 ASSAY OF MAGNESIUM: CPT

## 2022-06-20 PROCEDURE — 80053 COMPREHEN METABOLIC PANEL: CPT

## 2022-06-20 PROCEDURE — 80061 LIPID PANEL: CPT

## 2022-06-20 PROCEDURE — 84550 ASSAY OF BLOOD/URIC ACID: CPT

## 2022-06-27 ENCOUNTER — OFFICE VISIT (OUTPATIENT)
Dept: FAMILY MEDICINE CLINIC | Age: 67
End: 2022-06-27
Payer: MEDICARE

## 2022-06-27 VITALS
BODY MASS INDEX: 37.19 KG/M2 | WEIGHT: 280.6 LBS | TEMPERATURE: 96.5 F | RESPIRATION RATE: 18 BRPM | HEIGHT: 73 IN | DIASTOLIC BLOOD PRESSURE: 68 MMHG | OXYGEN SATURATION: 98 % | SYSTOLIC BLOOD PRESSURE: 126 MMHG | HEART RATE: 93 BPM

## 2022-06-27 DIAGNOSIS — E78.2 MIXED HYPERLIPIDEMIA: ICD-10-CM

## 2022-06-27 DIAGNOSIS — I10 ESSENTIAL HYPERTENSION: Chronic | ICD-10-CM

## 2022-06-27 DIAGNOSIS — I25.10 CORONARY ARTERY DISEASE INVOLVING NATIVE CORONARY ARTERY OF NATIVE HEART WITHOUT ANGINA PECTORIS: ICD-10-CM

## 2022-06-27 DIAGNOSIS — E66.01 CLASS 2 SEVERE OBESITY DUE TO EXCESS CALORIES WITH SERIOUS COMORBIDITY AND BODY MASS INDEX (BMI) OF 37.0 TO 37.9 IN ADULT (HCC): ICD-10-CM

## 2022-06-27 DIAGNOSIS — M10.9 GOUT OF MULTIPLE SITES, UNSPECIFIED CAUSE, UNSPECIFIED CHRONICITY: ICD-10-CM

## 2022-06-27 DIAGNOSIS — E11.29 MICROALBUMINURIA DUE TO TYPE 2 DIABETES MELLITUS (HCC): ICD-10-CM

## 2022-06-27 DIAGNOSIS — D75.89 MACROCYTOSIS WITHOUT ANEMIA: Chronic | ICD-10-CM

## 2022-06-27 DIAGNOSIS — Z23 NEED FOR VACCINATION: ICD-10-CM

## 2022-06-27 DIAGNOSIS — Z13.6 SCREENING FOR AAA (ABDOMINAL AORTIC ANEURYSM): ICD-10-CM

## 2022-06-27 DIAGNOSIS — R80.9 MICROALBUMINURIA DUE TO TYPE 2 DIABETES MELLITUS (HCC): ICD-10-CM

## 2022-06-27 DIAGNOSIS — Z72.0 TOBACCO USE: ICD-10-CM

## 2022-06-27 DIAGNOSIS — E11.65 TYPE 2 DIABETES MELLITUS WITH HYPERGLYCEMIA, WITHOUT LONG-TERM CURRENT USE OF INSULIN (HCC): Primary | ICD-10-CM

## 2022-06-27 DIAGNOSIS — I73.9 PAD (PERIPHERAL ARTERY DISEASE) (HCC): ICD-10-CM

## 2022-06-27 PROCEDURE — 99214 OFFICE O/P EST MOD 30 MIN: CPT | Performed by: FAMILY MEDICINE

## 2022-06-27 PROCEDURE — 90677 PCV20 VACCINE IM: CPT | Performed by: FAMILY MEDICINE

## 2022-06-27 PROCEDURE — 3051F HG A1C>EQUAL 7.0%<8.0%: CPT | Performed by: FAMILY MEDICINE

## 2022-06-27 PROCEDURE — 1123F ACP DISCUSS/DSCN MKR DOCD: CPT | Performed by: FAMILY MEDICINE

## 2022-06-27 SDOH — ECONOMIC STABILITY: FOOD INSECURITY: WITHIN THE PAST 12 MONTHS, THE FOOD YOU BOUGHT JUST DIDN'T LAST AND YOU DIDN'T HAVE MONEY TO GET MORE.: NEVER TRUE

## 2022-06-27 SDOH — ECONOMIC STABILITY: FOOD INSECURITY: WITHIN THE PAST 12 MONTHS, YOU WORRIED THAT YOUR FOOD WOULD RUN OUT BEFORE YOU GOT MONEY TO BUY MORE.: NEVER TRUE

## 2022-06-27 ASSESSMENT — PATIENT HEALTH QUESTIONNAIRE - PHQ9
7. TROUBLE CONCENTRATING ON THINGS, SUCH AS READING THE NEWSPAPER OR WATCHING TELEVISION: 0
SUM OF ALL RESPONSES TO PHQ QUESTIONS 1-9: 0
9. THOUGHTS THAT YOU WOULD BE BETTER OFF DEAD, OR OF HURTING YOURSELF: 0
SUM OF ALL RESPONSES TO PHQ QUESTIONS 1-9: 0
SUM OF ALL RESPONSES TO PHQ QUESTIONS 1-9: 0
5. POOR APPETITE OR OVEREATING: 0
2. FEELING DOWN, DEPRESSED OR HOPELESS: 0
3. TROUBLE FALLING OR STAYING ASLEEP: 0
1. LITTLE INTEREST OR PLEASURE IN DOING THINGS: 0
6. FEELING BAD ABOUT YOURSELF - OR THAT YOU ARE A FAILURE OR HAVE LET YOURSELF OR YOUR FAMILY DOWN: 0
8. MOVING OR SPEAKING SO SLOWLY THAT OTHER PEOPLE COULD HAVE NOTICED. OR THE OPPOSITE, BEING SO FIGETY OR RESTLESS THAT YOU HAVE BEEN MOVING AROUND A LOT MORE THAN USUAL: 0
SUM OF ALL RESPONSES TO PHQ QUESTIONS 1-9: 0
10. IF YOU CHECKED OFF ANY PROBLEMS, HOW DIFFICULT HAVE THESE PROBLEMS MADE IT FOR YOU TO DO YOUR WORK, TAKE CARE OF THINGS AT HOME, OR GET ALONG WITH OTHER PEOPLE: 0
4. FEELING TIRED OR HAVING LITTLE ENERGY: 0
SUM OF ALL RESPONSES TO PHQ9 QUESTIONS 1 & 2: 0

## 2022-06-27 ASSESSMENT — SOCIAL DETERMINANTS OF HEALTH (SDOH): HOW HARD IS IT FOR YOU TO PAY FOR THE VERY BASICS LIKE FOOD, HOUSING, MEDICAL CARE, AND HEATING?: NOT HARD AT ALL

## 2022-06-27 ASSESSMENT — ENCOUNTER SYMPTOMS
BLOOD IN STOOL: 0
VOMITING: 0
COUGH: 0
DIARRHEA: 0
ANAL BLEEDING: 0
NAUSEA: 0
CHEST TIGHTNESS: 0
ABDOMINAL PAIN: 0
SHORTNESS OF BREATH: 0
WHEEZING: 0
CONSTIPATION: 0

## 2022-06-27 ASSESSMENT — COLUMBIA-SUICIDE SEVERITY RATING SCALE - C-SSRS
1. WITHIN THE PAST MONTH, HAVE YOU WISHED YOU WERE DEAD OR WISHED YOU COULD GO TO SLEEP AND NOT WAKE UP?: NO
6. HAVE YOU EVER DONE ANYTHING, STARTED TO DO ANYTHING, OR PREPARED TO DO ANYTHING TO END YOUR LIFE?: NO
2. HAVE YOU ACTUALLY HAD ANY THOUGHTS OF KILLING YOURSELF?: NO

## 2022-06-27 NOTE — RESULT ENCOUNTER NOTE
Lipid panel normal, uric acid 6.5, Normal Mg, CMP with   Will review results with patient in office tomorrow at scheduled visit

## 2022-06-27 NOTE — PROGRESS NOTES
Gavino Diaz (: 1955) is a 79 y.o. male, Established patient, who presents today for:    Chief Complaint   Patient presents with    Diabetes     Patient presents today for a chronic follow up.  Hypertension    Hyperlipidemia         ASSESSMENT/PLAN    1. Type 2 diabetes mellitus with hyperglycemia, without long-term current use of insulin (HCC)  -     Comprehensive Metabolic Panel; Future  -     Hemoglobin A1C; Future  2. Microalbuminuria due to type 2 diabetes mellitus ([facility] [address]  -     Comprehensive Metabolic Panel; Future  -     Hemoglobin A1C; Future  3. Essential hypertension  -     CBC with Auto Differential; Future  -     Comprehensive Metabolic Panel; Future  4. Mixed hyperlipidemia  -     Comprehensive Metabolic Panel; Future  5. Coronary artery disease involving native coronary artery of native heart without angina pectoris  -     CBC with Auto Differential; Future  6. PAD (peripheral artery disease) ([facility])  Assessment & Plan:   Asymptomatic, continue current medications and continue current treatment plan  7. Gout of multiple sites, unspecified cause, unspecified chronicity  -     Uric Acid; Future  8. Macrocytosis without anemia  -     CBC with Auto Differential; Future  -     Comprehensive Metabolic Panel; Future  9. Tobacco use  -     US SCREENING FOR AAA; Future  10. Class 2 severe obesity due to excess calories with serious comorbidity and body mass index (BMI) of 37.0 to 37.9 in adult [facility])  Assessment & Plan:  Patient counseled on healthy dietary choices and the benefits of a lower salt and/or lower carbohydrate diet as appropriate. Patient also counseled on benefits of moderate intensity cardiovascular exercise for 150 minutes per week as they are able. Advice was given to make small changes over time, setting smaller achievable goals until recommended lifestyle changes are reached. 6. Screening for AAA (abdominal aortic aneurysm)  -     US SCREENING FOR AAA; Future  12.  Need for vaccination  -     Pneumococcal, PCV20, PREVNAR 21, (age 25 yrs+), IM, PF      Return in about 4 months (around 10/27/2022) for Annual Wellness Visit. SUBJECTIVE/OBJECTIVE:    HPI    Patient presents for chronic diabetes, hypertension, hyperlipidemia, coronary artery disease, PAD, and gout F/U. Patient reports taking medications as prescribed since the most recent visit. They deny any polyuria or polydipsia, new onset vision changes, or new onset paresthesias. Patient denies any chest pain, palpitations, lightheadedness, dizziness, worsening lower extremity edema, or syncope. Patient denies any dyspnea at rest, persistent wheezing, worsening cough, chest tightness, or limitation in normal day-to-day activity due to breathing. There is no reported leg pain at rest and patient denies any leg pain with normal day-to-day activity. Current Outpatient Medications on File Prior to Visit   Medication Sig Dispense Refill    clopidogrel (PLAVIX) 75 MG tablet Take 1 tablet by mouth daily 90 tablet 3    atorvastatin (LIPITOR) 40 MG tablet Take 1 tablet by mouth daily 90 tablet 3    metFORMIN (GLUCOPHAGE) 500 MG tablet Take 1 tablet by mouth 2 times daily (with meals) 180 tablet 1    allopurinol (ZYLOPRIM) 300 MG tablet Take 1 tablet by mouth daily 90 tablet 1    torsemide (DEMADEX) 20 MG tablet Take 1 tablet by mouth daily 90 tablet 1    glimepiride (AMARYL) 1 MG tablet Take 1 tablet by mouth every morning (before breakfast) 90 tablet 1    VITAMIN D PO       carvedilol (COREG) 25 MG tablet Take 1 tablet by mouth 2 times daily 180 tablet 1    Elastic Bandages & Supports (V-4 HIGH COMPRESSION HOSE) MISC KNEE HIGH - 20-30 mmHg 2 each 1    Alcohol Swabs PADS DX: diabetes mellitus. Test 1 time(s) daily - Ok to substitute per insurance (1 each = 1 box) 100 each 5    blood glucose monitor strips DX: diabetes mellitus.  Use 1 time(s) daily - True Metrix requested by patient - Tatiana Dumont to substitute per insurance 100 strip 5    Lancets MISC DX: diabetes mellitus. Use 1 time(s) daily - Ok to substitute per insurance (1 each = 1 box) 100 each 5    blood glucose monitor kit and supplies DX: diabetes mellitus. Test 1 time(s) daily - True Metrix requested by patient - Myriam Edwards to substitute per insurance 1 kit 0    aspirin 81 MG EC tablet Take 81 mg by mouth daily      Probiotic Product (PROBIOTIC DAILY PO) Take 1 tablet by mouth daily      vitamin B-12 (CYANOCOBALAMIN) 100 MCG tablet Take 50 mcg by mouth daily      ammonium lactate (AMLACTIN) 12 % cream APPLY TO DRY CALLUS AREAS 1 TO 2 TIMES DAILY  5    Misc. Devices (COMMODE BEDSIDE) MISC Use daily as needed 1 each 0    MULTIPLE VITAMIN PO Take 1 tablet by mouth daily        No current facility-administered medications on file prior to visit. Allergies   Allergen Reactions    Bactrim [Sulfamethoxazole-Trimethoprim] Nausea And Vomiting and Other (See Comments)     Sugar count elevated, had troubles urinating        Review of Systems   Constitutional: Negative for appetite change, chills, diaphoresis, fatigue, fever and unexpected weight change. Eyes: Negative for visual disturbance. Respiratory: Negative for cough, chest tightness, shortness of breath and wheezing. Cardiovascular: Negative for chest pain, palpitations and leg swelling. No orthopnea, No PND   Gastrointestinal: Negative for abdominal pain, anal bleeding, blood in stool, constipation, diarrhea, nausea and vomiting. No heartburn, No melena   Endocrine: Negative for cold intolerance, heat intolerance, polydipsia, polyphagia and polyuria. Genitourinary: Negative for dysuria and hematuria. Musculoskeletal: Negative for myalgias. Skin: Negative for rash. Neurological: Negative for dizziness, syncope, weakness, light-headedness, numbness and headaches. Psychiatric/Behavioral: Negative for dysphoric mood. The patient is not nervous/anxious.         Vitals:  /68 (Site: Right Upper Arm, Position: Sitting, Cuff Size: Large Adult)   Pulse 93   Temp (!) 96.5 °F (35.8 °C) (Temporal)   Resp 18   Ht 6' 1\" (1.854 m)   Wt 280 lb 9.6 oz (127.3 kg)   SpO2 98%   BMI 37.02 kg/m²     Physical Exam  Vitals reviewed. Constitutional:       General: He is not in acute distress. Appearance: He is obese. He is not ill-appearing. Eyes:      General: No scleral icterus. Neck:      Thyroid: No thyroid mass, thyromegaly or thyroid tenderness. Vascular: No carotid bruit. Cardiovascular:      Rate and Rhythm: Normal rate and regular rhythm. Heart sounds: Normal heart sounds. No murmur heard. Pulmonary:      Effort: Pulmonary effort is normal. No respiratory distress. Breath sounds: Normal breath sounds. No wheezing, rhonchi or rales. Abdominal:      General: Bowel sounds are normal. There is no distension. Palpations: Abdomen is soft. Tenderness: There is no abdominal tenderness. There is no right CVA tenderness, left CVA tenderness, guarding or rebound. Musculoskeletal:      Cervical back: Neck supple. Right lower leg: No edema. Left lower leg: No edema. Lymphadenopathy:      Cervical: No cervical adenopathy. Skin:     Findings: No rash. Neurological:      Mental Status: He is alert and oriented to person, place, and time. Psychiatric:         Mood and Affect: Mood normal.         Behavior: Behavior normal.         Thought Content: Thought content normal.         Ortho Exam (If Applicable)              An electronic signature was used to authenticate this note.      Darylene Floro, MD

## 2022-06-29 ENCOUNTER — OFFICE VISIT (OUTPATIENT)
Dept: NEUROLOGY | Age: 67
End: 2022-06-29
Payer: MEDICARE

## 2022-06-29 VITALS
BODY MASS INDEX: 37.51 KG/M2 | DIASTOLIC BLOOD PRESSURE: 80 MMHG | WEIGHT: 283 LBS | HEIGHT: 73 IN | HEART RATE: 73 BPM | SYSTOLIC BLOOD PRESSURE: 130 MMHG

## 2022-06-29 DIAGNOSIS — R27.0 ATAXIA: ICD-10-CM

## 2022-06-29 DIAGNOSIS — G45.9 TIA (TRANSIENT ISCHEMIC ATTACK): ICD-10-CM

## 2022-06-29 DIAGNOSIS — H81.03 MENIERE DISEASE, BILATERAL: ICD-10-CM

## 2022-06-29 DIAGNOSIS — R42 DIZZINESS: ICD-10-CM

## 2022-06-29 DIAGNOSIS — H93.13 TINNITUS OF BOTH EARS: Chronic | ICD-10-CM

## 2022-06-29 DIAGNOSIS — G45.0 VBI (VERTEBROBASILAR INSUFFICIENCY): Primary | ICD-10-CM

## 2022-06-29 PROCEDURE — 99214 OFFICE O/P EST MOD 30 MIN: CPT | Performed by: PSYCHIATRY & NEUROLOGY

## 2022-06-29 PROCEDURE — 1123F ACP DISCUSS/DSCN MKR DOCD: CPT | Performed by: PSYCHIATRY & NEUROLOGY

## 2022-06-29 RX ORDER — HYDROXYZINE HYDROCHLORIDE 25 MG/1
TABLET, FILM COATED ORAL
Qty: 30 TABLET | Refills: 3 | Status: ON HOLD | OUTPATIENT
Start: 2022-06-29 | End: 2022-11-01

## 2022-06-29 ASSESSMENT — ENCOUNTER SYMPTOMS
SHORTNESS OF BREATH: 0
PHOTOPHOBIA: 0
VOMITING: 0
COLOR CHANGE: 0
TROUBLE SWALLOWING: 0
BACK PAIN: 0
NAUSEA: 0
CHOKING: 0

## 2022-06-29 NOTE — PROGRESS NOTES
Subjective:      Patient ID: Mireille Ng is a 79 y.o. male who presents today for:  Chief Complaint   Patient presents with    1 Year Follow Up     dizziness, patient states his dizziness somedays is good and some days are bad and cant figure out why patient states. HPI 51-year-old right-handed handed gentleman with a history of dizziness. Patient has ataxia from the same. The dizziness is on and off and is not quite clear why this occurs. No true vertigo has been identified. Patient does have a history of carsickness and had vertigo in the past.  He does not respond to meclizine as it makes him sleep. Has Sequent back issues as well. She been tried on multiple medication in the past without any response. We will reenter hydroxyzine. 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   Last seen we did actually give him a prescription for hydroxyzine  And actually did not  the prescription for the medicine as a side effect of this medication is listed as dizziness which is unclear no reports of orthostatic dizziness    Past Medical History:   Diagnosis Date    Abscess of back 1/6/2020    Abscess of right leg 1/6/2020    Acute renal failure ([address]     Anxiety     CAD S/P percutaneous coronary angioplasty 3/11/2014    Cardiomyopathy ([address]     Cerebrovascular accident (CVA) due to occlusion of left middle cerebral artery ([address] 08/2016    brainstem infarction from left MCA occlusion    Cerebrovascular disease 07/27/2016    Colon polyp     Coronary angioplasty status     CVA (cerebral vascular accident) ([address] 11/12/2017    Dependent edema 9/14/2017    Diplopia 5/15/2017    Resolved with management of cataracts    Dupuytren contracture 1/2/2018    Dysphagia 8/18/2011    Dysphonia 8/18/2011    Essential hypertension 8/17/2016    Gallop rhythm     Gout 12/10/2016    Gout of big toe 08/2014    Right Great Toe    H/O fall     Headache     migraines    Heart failure ([address]     History of 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    History of heart failure 1/6/2014    History of myocardial infarction 3/11/2014    History of recurrent pneumonia 2/11/2014    Hx of bacterial pneumonia     Hyperlipidemia 4/30/2021    Hyperlipidemia, mixed 4/30/2021    Hyperlipidemia, mixed 4/30/2021    Hypotension     Left carotid artery stenosis 8/23/2020    Left carotid artery stenosis 8/23/2020    Leg swelling     Macrocytosis without anemia 12/10/2016    Microalbuminuria due to type 2 diabetes mellitus ([address] 9/14/2018    Morbid obesity ([address] 1/2/2014    Myocardial infarction ([address]     Obesity     AYLIN (obstructive sleep apnea) 12/10/2016    Osteoarthritis     Right-sided muscle weakness 10/19/2016    S/P cardiac catheterization     Sciatica     Skin cyst 4/8/2016    Spasm 11/9/2016    Spina bifida ([address]     Stented coronary artery     Tremor of right hand 5/15/2017    Type 2 diabetes mellitus with diabetic polyneuropathy, without long-term current use of insulin ([address]     Unsteady gait 5/15/2017    Weakness     Weight gain      Past Surgical History:   Procedure Laterality Date    ANKLE SURGERY Left     BACK SURGERY      lumbar laminectomy    CHOLECYSTECTOMY      COLONOSCOPY  01/15/2010    polyp, diverticulosis (DR ELAINE)    COLONOSCOPY N/A 08/19/2021    COLORECTAL CANCER SCREENING, HIGH RISK with polypectomies  performed by Uvaldo Solano MD at [facility] 08/20/2021    polyps x 3, 3y repeat (DR Rachel Murphy)  COLONOSCOPY with polypectomies DIAGNOSTIC performed by Ayse Lan MD at [address] [place] [address]  2014    3 stents    CYST REMOVAL  05/16/2016    DR Edi Ryder,  L SCROTAL CYST, R thigh, L ear, back    SHOULDER SURGERY Bilateral 12/18/2015    left once right twice- to remove bone spurs    TONSILLECTOMY Social History     Socioeconomic History    Marital status:      Spouse name: Ekta Mares Number of children: 3    Years of education: 12+    Highest education level: Not on file   Occupational History    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     Employer: [employer]   Tobacco Use    Smoking status: Current Some Day Smoker     Packs/day: 2.50     Years: 30.00     Pack years: 75.00     Types: Cigarettes    Smokeless tobacco: Never Used    Tobacco comment: varies   Vaping Use    Vaping Use: Never used   Substance and Sexual Activity    Alcohol use: Yes     Alcohol/week: 0.0 standard drinks     Comment: limits less than 10/wk varies    Drug use: No    Sexual activity: Yes     Partners: Female   Other Topics Concern    Not on file   Social History Narrative    Was in [place] [employer] 10 yrs-- [facility] [place] [facility]    Lives With: Spouse    Type of Home: House    Home Layout: Two level, Bed/Bath upstairs, Laundry in basement(Railing)    Home Access: Stairs to enter with rails    Entrance Stairs - Number of Steps: 3 in back, 4 in front    Bathroom Shower/Tub: Tub/Shower unit    Bathroom Equipment: Shower chair    Home Equipment: Rolling walker, Cane    ADL Assistance: Independent    Homemaking Assistance: Needs assistance(Spouse  is primary; vertigo and arthritis limit housework)    Ambulation Assistance: Independent(Walker outside, cane inside)    Transfer Assistance: Independent    Active : No    Patient's  Info: has not driven since CVA     Social Determinants of Health     Financial Resource Strain: Low Risk     Difficulty of Paying Living Expenses: Not hard at all   Food Insecurity: No Food Insecurity    Worried About [address] in the Last Year: Never true    [facility] in the Last Year: Never true   Transportation Needs:     Lack of Transportation (Medical): Not on file    Lack of Transportation (Non-Medical):  Not on file   Physical Activity:     Days of Exercise per Week: Not on file    Minutes of Exercise per Session: Not on file   Stress:     Feeling of Stress : Not on file   Social Connections:     Frequency of Communication with Friends and Family: Not on file    Frequency of Social Gatherings with Friends and Family: Not on file    Attends Jain Services: Not on file    Active Member of [address] [place] [address] or Organizations: Not on file    Attends Club or Organization Meetings: Not on file    Marital Status: Not on file   Intimate Partner Violence:     Fear of Current or Ex-Partner: Not on file    Emotionally Abused: Not on file    Physically Abused: Not on file    Sexually Abused: Not on file   Housing Stability:     Unable to Pay for Housing in the Last Year: Not on file    Number of [place] in the Last Year: Not on file    Unstable Housing in the Last Year: Not on file     Family History   Problem Relation Age of Onset    Breast Cancer Mother     Stroke Father     Colon Cancer Father 76     Allergies   Allergen Reactions    Bactrim [Sulfamethoxazole-Trimethoprim] Nausea And Vomiting and Other (See Comments)     Sugar count elevated, had troubles urinating       Current Outpatient Medications   Medication Sig Dispense Refill    hydrOXYzine HCl (ATARAX) 25 MG tablet Twice daily for 2 to 3 days when dizziness 30 tablet 3    clopidogrel (PLAVIX) 75 MG tablet Take 1 tablet by mouth daily 90 tablet 3    atorvastatin (LIPITOR) 40 MG tablet Take 1 tablet by mouth daily 90 tablet 3    metFORMIN (GLUCOPHAGE) 500 MG tablet Take 1 tablet by mouth 2 times daily (with meals) 180 tablet 1    allopurinol (ZYLOPRIM) 300 MG tablet Take 1 tablet by mouth daily 90 tablet 1    torsemide (DEMADEX) 20 MG tablet Take 1 tablet by mouth daily 90 tablet 1    glimepiride (AMARYL) 1 MG tablet Take 1 tablet by mouth every morning (before breakfast) 90 tablet 1    VITAMIN D PO       carvedilol (COREG) 25 MG tablet Take 1 tablet by mouth 2 times daily 180 tablet 1    Elastic Bandages & Supports (V-4 HIGH COMPRESSION HOSE) MISC KNEE HIGH - 20-30 mmHg 2 each 1    Alcohol Swabs PADS DX: diabetes mellitus. Test 1 time(s) daily - Ok to substitute per insurance (1 each = 1 box) 100 each 5    blood glucose monitor strips DX: diabetes mellitus. Use 1 time(s) daily - True Metrix requested by patient - Renae Rajan to substitute per insurance 100 strip 5    Lancets MISC DX: diabetes mellitus. Use 1 time(s) daily - Ok to substitute per insurance (1 each = 1 box) 100 each 5    blood glucose monitor kit and supplies DX: diabetes mellitus. Test 1 time(s) daily - True Metrix requested by patient - Renae Rajan to substitute per insurance 1 kit 0    aspirin 81 MG EC tablet Take 81 mg by mouth daily      Probiotic Product (PROBIOTIC DAILY PO) Take 1 tablet by mouth daily      vitamin B-12 (CYANOCOBALAMIN) 100 MCG tablet Take 50 mcg by mouth daily      ammonium lactate (AMLACTIN) 12 % cream APPLY TO DRY CALLUS AREAS 1 TO 2 TIMES DAILY  5    Misc. Devices (COMMODE BEDSIDE) MISC Use daily as needed 1 each 0    MULTIPLE VITAMIN PO Take 1 tablet by mouth daily        No current facility-administered medications for this visit. Review of Systems   Constitutional: Negative for fever. HENT: Negative for ear pain, tinnitus and trouble swallowing. Eyes: Negative for photophobia and visual disturbance. Respiratory: Negative for choking and shortness of breath. Cardiovascular: Negative for chest pain and palpitations. Gastrointestinal: Negative for nausea and vomiting. Musculoskeletal: Negative for back pain, gait problem, joint swelling, myalgias, neck pain and neck stiffness. Skin: Negative for color change. Allergic/Immunologic: Negative for food allergies. Neurological: Negative for dizziness, tremors, seizures, syncope, facial asymmetry, speech difficulty, weakness, light-headedness, numbness and headaches.    Psychiatric/Behavioral: Negative for behavioral problems, confusion, hallucinations and sleep disturbance. Objective:   /80 (Site: Left Upper Arm, Position: Sitting, Cuff Size: Medium Adult)   Pulse 73   Ht 6' 1\" (1.854 m)   Wt 283 lb (128.4 kg)   BMI 37.34 kg/m²     Physical Exam  Vitals reviewed. Eyes:      Pupils: Pupils are equal, round, and reactive to light. Cardiovascular:      Rate and Rhythm: Normal rate and regular rhythm. Heart sounds: No murmur heard. Pulmonary:      Effort: Pulmonary effort is normal.      Breath sounds: Normal breath sounds. Abdominal:      General: Bowel sounds are normal.   Musculoskeletal:         General: Normal range of motion. Cervical back: Normal range of motion. Skin:     General: Skin is warm. Neurological:      Mental Status: He is alert and oriented to person, place, and time. Cranial Nerves: No cranial nerve deficit. Sensory: No sensory deficit. Motor: No abnormal muscle tone. Coordination: Coordination normal.      Deep Tendon Reflexes: Reflexes are normal and symmetric. Babinski sign absent on the right side. Babinski sign absent on the left side. Psychiatric:         Mood and Affect: Mood normal.     And is ataxic and areflexic    No results found.     Lab Results   Component Value Date    WBC 5.7 06/13/2022    RBC 4.11 06/13/2022    HGB 15.2 06/13/2022    HCT 44.6 06/13/2022    .5 06/13/2022    MCH 36.9 06/13/2022    MCHC 34.1 06/13/2022    RDW 13.1 06/13/2022     06/13/2022    MPV 8.4 09/25/2015     Lab Results   Component Value Date     06/20/2022    K 3.8 06/20/2022    K 3.5 08/21/2021    CL 92 06/20/2022    CO2 25 06/20/2022    BUN 14 06/20/2022    CREATININE 0.86 06/20/2022    GFRAA >60.0 06/20/2022    LABGLOM >60.0 06/20/2022    GLUCOSE 185 06/20/2022    PROT 7.4 06/20/2022    LABALBU 4.5 06/20/2022    LABALBU DETECTED 10/27/2011    CALCIUM 9.9 06/20/2022    BILITOT 0.6 06/20/2022    ALKPHOS 69 06/20/2022    AST 25 06/20/2022 ALT 29 06/20/2022     Lab Results   Component Value Date    PROTIME 13.1 08/20/2021    PROTIME 12.4 11/12/2017    INR 1.0 08/20/2021     Lab Results   Component Value Date    TSH 4.080 11/12/2017    TIICPEJE78 685 11/12/2017    FOLATE 11.8 11/12/2017    FERRITIN 117.0 10/05/2021    IRON 124 10/05/2021    TIBC 348 10/05/2021     Lab Results   Component Value Date    TRIG 81 06/20/2022    HDL 50 06/20/2022    LDLCALC 48 06/20/2022     Lab Results   Component Value Date    LABAMPH Neg 10/26/2020    BARBSCNU Neg 10/26/2020    LABBENZ Neg 10/26/2020    LABMETH Neg 10/26/2020    OPIATESCREENURINE Neg 10/26/2020    PHENCYCLIDINESCREENURINE Neg 10/26/2020    ETOH 210 10/26/2020     No results found for: LITHIUM, DILFRTOT, VALPROATE    Assessment:       Diagnosis Orders   1. VBI (vertebrobasilar insufficiency)     2. TIA (transient ischemic attack)     3. Tinnitus of both ears     4. Dizziness     5. Ataxia     Multiple basal insufficiency or Ménière's disease cannot be isolated. Patient has had this for 6 years. His extensive relations and everything else were negative. He has good days and bad days. He did not respond to [place]. When last seen we had recommended hydroxyzine though he did not  the prescription to the expectation of side effects dizziness which I am not quite sure as this is a medicine for dizziness. Recommended reentry there is when he has a spells. Spells are infrequent and therefore recommended that we reentry hydroxyzine at the days when he is dizzy. Overall he is doing well with the combination of medications and not had any major events and therefore may not be significantly pathologic. Plan:      No orders of the defined types were placed in this encounter. Orders Placed This Encounter   Medications    hydrOXYzine HCl (ATARAX) 25 MG tablet     Sig: Twice daily for 2 to 3 days when dizziness     Dispense:  30 tablet     Refill:  3       No follow-ups on file.       Alex JERONIMO Chapin Padron MD

## 2022-07-01 ENCOUNTER — OFFICE VISIT (OUTPATIENT)
Dept: CARDIOLOGY CLINIC | Age: 67
End: 2022-07-01
Payer: MEDICARE

## 2022-07-01 VITALS — HEART RATE: 95 BPM | DIASTOLIC BLOOD PRESSURE: 80 MMHG | SYSTOLIC BLOOD PRESSURE: 130 MMHG

## 2022-07-01 DIAGNOSIS — Z98.61 CAD S/P PERCUTANEOUS CORONARY ANGIOPLASTY: Primary | ICD-10-CM

## 2022-07-01 DIAGNOSIS — E66.01 CLASS 2 SEVERE OBESITY DUE TO EXCESS CALORIES WITH SERIOUS COMORBIDITY AND BODY MASS INDEX (BMI) OF 37.0 TO 37.9 IN ADULT (HCC): ICD-10-CM

## 2022-07-01 DIAGNOSIS — G47.33 OSA (OBSTRUCTIVE SLEEP APNEA): Chronic | ICD-10-CM

## 2022-07-01 DIAGNOSIS — I10 ESSENTIAL HYPERTENSION: Chronic | ICD-10-CM

## 2022-07-01 DIAGNOSIS — I25.2 HISTORY OF MYOCARDIAL INFARCTION: Chronic | ICD-10-CM

## 2022-07-01 DIAGNOSIS — I73.9 CLAUDICATION (HCC): ICD-10-CM

## 2022-07-01 DIAGNOSIS — I25.10 CAD S/P PERCUTANEOUS CORONARY ANGIOPLASTY: Primary | ICD-10-CM

## 2022-07-01 DIAGNOSIS — I73.9 PAD (PERIPHERAL ARTERY DISEASE) (HCC): ICD-10-CM

## 2022-07-01 DIAGNOSIS — I25.10 CORONARY ARTERY DISEASE INVOLVING NATIVE CORONARY ARTERY OF NATIVE HEART WITHOUT ANGINA PECTORIS: ICD-10-CM

## 2022-07-01 DIAGNOSIS — Z72.0 TOBACCO USE: ICD-10-CM

## 2022-07-01 PROCEDURE — 1123F ACP DISCUSS/DSCN MKR DOCD: CPT | Performed by: INTERNAL MEDICINE

## 2022-07-01 PROCEDURE — 93000 ELECTROCARDIOGRAM COMPLETE: CPT | Performed by: INTERNAL MEDICINE

## 2022-07-01 PROCEDURE — 99214 OFFICE O/P EST MOD 30 MIN: CPT | Performed by: INTERNAL MEDICINE

## 2022-07-01 NOTE — PROGRESS NOTES
Chief Complaint   Patient presents with    Coronary Artery Disease    Hypertension       Patient presents for initial medical evaluation. Patient is followed on a regular basis by Dr. Cordell Hendrix, [facility] with dr. Heidi Guzmán for general cardiology. S/p abnormal MRA of legs with severe SFA disease on right. + hx of DM, HTN and HLP. Hx of PCI with me in 2014. Pt denies chest pain, dyspnea, dyspnea on exertion, change in exercise capacity, fatigue,  nausea, vomiting, diarrhea, constipation, motor weakness, insomnia, weight loss, syncope, dizziness, lightheadedness, palpitations, PND, orthopnea. Has discoloration of his legs and pain in calves. has symptoms of: Both legs with chronic swelling, severe pain and more concentrated to both anterior calf, heaviness, fatigue, going on intermittent since 2007 and has progressively gotten worse over years. RLE worse than LLE. Has lidoderm patches on at night due to pain. + smoker. Had podiatry procedures with dr. Tiesha Dunn.     7-1-22: Patient with history of coronary status post remote PCI in 2014. History of peripheral arterial disease status post right  SFA peripheral vas interventions in 2019. Left SFA with distal 50% stenosis no significant gradient and treated medically  States he is doing okay overall. Denies any major angina or heart failure type symptoms  He has some discomfort in his legs on occasion. Does have some soreness. He continues to smoke. EKG with normal sinus rhythm nonspecific changes.        Patient Active Problem List   Diagnosis    Obesity    History of heart failure    History of recurrent pneumonia    CAD (coronary artery disease)    History of myocardial infarction    DM2 (diabetes mellitus, type 2) ([facility] [address]    Skin cyst    Local infection of skin and subcutaneous tissue    History of CVA (cerebrovascular accident)    Essential hypertension    Right-sided muscle weakness    Spasm    AYLIN (obstructive sleep apnea)    Gout    Macrocytosis without anemia    Tremor of right hand    Unsteady gait    Dependent edema    Cutaneous abscess of back excluding buttocks    Skin infection    Microalbuminuria due to type 2 diabetes mellitus (HCC)    Lymphedema of both lower extremities    PAD (peripheral artery disease) (HCC)    Venous insufficiency of both lower extremities    Claudication (HCC)    Tinnitus of both ears    Sensorineural hearing loss (SNHL) of left ear    Ataxic gait    Left carotid artery stenosis    Dysarthria    Late effects of CVA (cerebrovascular accident)    TIA (transient ischemic attack)    At high risk for falls    Hyperlipidemia    Skin lesion of left external ear    History of colon polyps    Lumbar radiculopathy    Dizziness    Adenomatous polyp of ascending colon    Cecal polyp    Adenomatous polyp of transverse colon    Adenomatous polyp of descending colon    Rectal bleeding    Tobacco use    VBI (vertebrobasilar insufficiency)    Ataxia    Meniere disease, bilateral       Past Surgical History:   Procedure Laterality Date    ANKLE SURGERY Left     BACK SURGERY      lumbar laminectomy    CHOLECYSTECTOMY      COLONOSCOPY  01/15/2010    polyp, diverticulosis (DR ELAINE)    COLONOSCOPY N/A 08/19/2021    COLORECTAL CANCER SCREENING, HIGH RISK with polypectomies  performed by Robert Servin MD at [facility] 08/20/2021    polyps x 3, 3y repeat (DR Richardson Net)  COLONOSCOPY with polypectomies DIAGNOSTIC performed by Tracy Sahu MD at [facility]  2014    3 stents    CYST REMOVAL  05/16/2016    DR Henrik Mccray,  L SCROTAL CYST, R thigh, L ear, back    SHOULDER SURGERY Bilateral 12/18/2015    left once right twice- to remove bone spurs    TONSILLECTOMY         Social History     Socioeconomic History    Marital status:      Spouse name: Ella Gibson Number of children: 3    Years of education: 12+    Highest education level: Not on file   Occupational History    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     Employer: [employer]   Tobacco Use    Smoking status: Current Some Day Smoker     Packs/day: 2.50     Years: 30.00     Pack years: 75.00     Types: Cigarettes    Smokeless tobacco: Never Used    Tobacco comment: varies   Vaping Use    Vaping Use: Never used   Substance and Sexual Activity    Alcohol use: Yes     Alcohol/week: 0.0 standard drinks     Comment: limits less than 10/wk varies    Drug use: No    Sexual activity: Yes     Partners: Female   Other Topics Concern    Not on file   Social History Narrative    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 from [place] after 2016 CVA    Lives With: Spouse    Type of Home: House    Home Layout: Two level, Bed/Bath upstairs, Laundry in basement(Railing)    Home Access: Stairs to enter with rails    Entrance Stairs - Number of Steps: 3 in back, 4 in front    Bathroom Shower/Tub: Tub/Shower unit    Bathroom Equipment: Shower chair    Home Equipment: Rolling walker, Cane    ADL Assistance: Independent    Homemaking Assistance: Needs assistance(Spouse  is primary; vertigo and arthritis limit housework)    Ambulation Assistance: Independent(Walker outside, cane inside)    Transfer Assistance: Independent    Active : No    Patient's  Info: has not driven since CVA     Social Determinants of Health     Financial Resource Strain: Low Risk     Difficulty of Paying Living Expenses: Not hard at all   Food Insecurity: No Food Insecurity    Worried About [address] in the Last Year: Never true    [address] in the Last Year: Never true   Transportation Needs:     Lack of Transportation (Medical): Not on file    Lack of Transportation (Non-Medical):  Not on file   Physical Activity:     Days of Exercise per Week: Not on file    Minutes of Exercise per Session: Not on file   Stress:     Feeling of Stress : Not on file   Social Connections:     Frequency of Communication with Friends and Family: Not on file    Frequency of Social Gatherings with Friends and Family: Not on file    Attends Jehovah's witness Services: Not on file    Active Member of Clubs or Organizations: Not on file    Attends Club or Organization Meetings: Not on file    Marital Status: Not on file   Intimate Partner Violence:     Fear of Current or Ex-Partner: Not on file    Emotionally Abused: Not on file    Physically Abused: Not on file    Sexually Abused: Not on file   Housing Stability:     Unable to Pay for Housing in the Last Year: Not on file    Number of [place] in the Last Year: Not on file    Unstable Housing in the Last Year: Not on file       Family History   Problem Relation Age of Onset    Breast Cancer Mother     Stroke Father     Colon Cancer Father 76       Current Outpatient Medications   Medication Sig Dispense Refill    hydrOXYzine HCl (ATARAX) 25 MG tablet Twice daily for 2 to 3 days when dizziness 30 tablet 3    clopidogrel (PLAVIX) 75 MG tablet Take 1 tablet by mouth daily 90 tablet 3    atorvastatin (LIPITOR) 40 MG tablet Take 1 tablet by mouth daily 90 tablet 3    metFORMIN (GLUCOPHAGE) 500 MG tablet Take 1 tablet by mouth 2 times daily (with meals) 180 tablet 1    allopurinol (ZYLOPRIM) 300 MG tablet Take 1 tablet by mouth daily 90 tablet 1    torsemide (DEMADEX) 20 MG tablet Take 1 tablet by mouth daily 90 tablet 1    glimepiride (AMARYL) 1 MG tablet Take 1 tablet by mouth every morning (before breakfast) 90 tablet 1    VITAMIN D PO       carvedilol (COREG) 25 MG tablet Take 1 tablet by mouth 2 times daily 180 tablet 1    Elastic Bandages & Supports (V-4 HIGH COMPRESSION HOSE) MISC KNEE HIGH - 20-30 mmHg 2 each 1    Alcohol Swabs PADS DX: diabetes mellitus. Test 1 time(s) daily - Ok to substitute per insurance (1 each = 1 box) 100 each 5    blood glucose monitor strips DX: diabetes mellitus.  Use 1 time(s) daily - True Metrix requested by patient - [id number] Claudia Hope to substitute per insurance 100 strip 5    Lancets MISC DX: diabetes mellitus. Use 1 time(s) daily - Ok to substitute per insurance (1 each = 1 box) 100 each 5    blood glucose monitor kit and supplies DX: diabetes mellitus. Test 1 time(s) daily - True Metrix requested by patient - [id number] Claudia Hope to substitute per insurance 1 kit 0    aspirin 81 MG EC tablet Take 81 mg by mouth daily      Probiotic Product (PROBIOTIC DAILY PO) Take 1 tablet by mouth daily      vitamin B-12 (CYANOCOBALAMIN) 100 MCG tablet Take 50 mcg by mouth daily      ammonium lactate (AMLACTIN) 12 % cream APPLY TO DRY CALLUS AREAS 1 TO 2 TIMES DAILY  5    Misc. Devices (COMMODE BEDSIDE) MISC Use daily as needed 1 each 0    MULTIPLE VITAMIN PO Take 1 tablet by mouth daily        No current facility-administered medications for this visit. Bactrim [sulfamethoxazole-trimethoprim]    Review of Systems:  General ROS: negative  Psychological ROS: negative  Hematological and Lymphatic ROS: No history of blood clots or bleeding disorder. Respiratory ROS: no cough, shortness of breath, or wheezing  Cardiovascular ROS: no chest pain or dyspnea on exertion  Gastrointestinal ROS: no abdominal pain, change in bowel habits, or black or bloody stools  Genito-Urinary ROS: no dysuria, trouble voiding, or hematuria  Musculoskeletal ROS: negative  Neurological ROS: no TIA or stroke symptoms  Dermatological ROS: negative    VITALS:  Blood pressure 130/80, pulse 95. There is no height or weight on file to calculate BMI. Physical Examination:  General appearance - alert, well appearing, and in no distress  Mental status - alert, oriented to person, place, and time  Neck - Neck is supple, no JVD or carotid bruits. No thyromegaly or adenopathy.    Chest - clear to auscultation, no wheezes, rales or rhonchi, symmetric air entry  Heart - normal rate, regular rhythm, normal S1, S2, no murmurs, rubs, clicks or gallops  Abdomen - soft, nontender, nondistended, no masses or organomegaly  Neurological - alert, oriented, normal speech, no focal findings or movement disorder noted  Extremities - peripheral pulses normal, no pedal edema, no clubbing or cyanosis  Skin - normal coloration and turgor, no rashes, no suspicious skin lesions noted        Orders Placed This Encounter   Procedures    EKG 12 Lead       ASSESSMENT:     Diagnosis Orders   1. CAD S/P percutaneous coronary angioplasty  EKG 12 Lead   2. Coronary artery disease involving native coronary artery of native heart without angina pectoris     3. Claudication ([facility] [address]     4. Essential hypertension     5. History of myocardial infarction     6. Class 2 severe obesity due to excess calories with serious comorbidity and body mass index (BMI) of 37.0 to 37.9 in adult ([facility] [address]     7. PAD (peripheral artery disease) ([facility] [address]     8. AYLIN (obstructive sleep apnea)     9. Tobacco use       5. PAD    PLAN:     Patient will need to continue to follow up with you for their general medical care     As always, aggressive risk factor modification is strongly recommended. We should adhere to the JNC VIII guidelines for HTN management and the NCEP ATP III guidelines for LDL-C management. Cardiac diet is always recommended with low fat, cholesterol, calories and sodium. Continue medications at current doses. Cont with Plavix for PAD. Patient was advised and encouraged to check blood pressure at home or at a pharmacy, maintain a logbook, and also call us back if blood pressure are above the target ranges or if it is low. Patient clearly understands and agrees to the instructions. We will need to continue to monitor muscle and liver enzymes, BUN, CR, and electrolytes. Check EKG    PAD evaluation in future.      Smoking cessation was strongly recommended    Await AAA screen US

## 2022-07-13 ENCOUNTER — HOSPITAL ENCOUNTER (OUTPATIENT)
Dept: ULTRASOUND IMAGING | Age: 67
Discharge: HOME | End: 2022-07-15
Payer: MEDICARE

## 2022-07-13 DIAGNOSIS — Z72.0 TOBACCO USE: ICD-10-CM

## 2022-07-13 DIAGNOSIS — Z13.6 SCREENING FOR AAA (ABDOMINAL AORTIC ANEURYSM): ICD-10-CM

## 2022-07-13 PROCEDURE — 76706 US ABDL AORTA SCREEN AAA: CPT

## 2022-07-13 NOTE — RESULT ENCOUNTER NOTE
Please notify patient that recent screening ultrasound was reported as negative for any definitive signs of abdominal aortic aneurysm. No further imaging is needed.

## 2022-08-08 DIAGNOSIS — I10 ESSENTIAL HYPERTENSION: Chronic | ICD-10-CM

## 2022-08-09 RX ORDER — CARVEDILOL 25 MG/1
25 TABLET ORAL 2 TIMES DAILY
Qty: 180 TABLET | Refills: 3 | Status: SHIPPED | OUTPATIENT
Start: 2022-08-09

## 2022-08-09 NOTE — TELEPHONE ENCOUNTER
Pharmacy is requesting medication refill. Please approve or deny this request.    Rx requested:  Requested Prescriptions     Pending Prescriptions Disp Refills    carvedilol (COREG) 25 MG tablet [Pharmacy Med Name: CARVEDILOL (IR) TABS 25MG] 180 tablet 3     Sig: Take 1 tablet by mouth in the morning and 1 tablet before bedtime.          Last Office Visit:   6/27/2022      Next Visit Date:  Future Appointments   Date Time Provider Qasim Kyra   11/1/2022  3:45 PM Tara Beltran MD [facility]   3/1/2023  1:00 PM Flor Ng MD [address]   6/28/2023  3:30 PM Altagracia Vora MD [facility]   [facility]13[facility]

## 2022-08-22 NOTE — PATIENT INSTRUCTIONS
Remove the packing in 24 hours after wetting the incision in the shower or tub  Cleanse daily in the shower or tub and apply dry sterile dressing or band aid. Glycopyrrolate Pregnancy And Lactation Text: This medication is Pregnancy Category B and is considered safe during pregnancy. It is unknown if it is excreted breast milk.

## 2022-08-29 DIAGNOSIS — R60.0 BILATERAL LOWER EXTREMITY EDEMA: ICD-10-CM

## 2022-08-29 DIAGNOSIS — E11.42 TYPE 2 DIABETES MELLITUS WITH DIABETIC POLYNEUROPATHY, WITHOUT LONG-TERM CURRENT USE OF INSULIN (HCC): Chronic | ICD-10-CM

## 2022-08-30 RX ORDER — GLIMEPIRIDE 1 MG/1
1 TABLET ORAL
Qty: 90 TABLET | Refills: 1 | Status: SHIPPED | OUTPATIENT
Start: 2022-08-30

## 2022-08-30 RX ORDER — TORSEMIDE 20 MG/1
20 TABLET ORAL DAILY
Qty: 90 TABLET | Refills: 1 | Status: SHIPPED | OUTPATIENT
Start: 2022-08-30

## 2022-08-30 NOTE — TELEPHONE ENCOUNTER
Pharmacy is requesting medication refill.  Please approve or deny this request.    Rx requested:  Requested Prescriptions     Pending Prescriptions Disp Refills    torsemide (DEMADEX) 20 MG tablet [Pharmacy Med Name: TORSEMIDE TABS 20MG] 90 tablet 3     Sig: Take 1 tablet by mouth daily    glimepiride (AMARYL) 1 MG tablet [Pharmacy Med Name: GLIMEPIRIDE TABS 1MG] 90 tablet 3     Sig: Take 1 tablet by mouth every morning (before breakfast)         Last Office Visit:   6/27/2022      Next Visit Date:  Future Appointments   Date Time Provider Qasim Rae   11/1/2022  3:45 PM [facility]   3/1/2023  1:00 PM Akua Florez MD [address]   6/28/2023  3:30 PM MD Hilario Angulo   7/13/2023  3:30 PM Bernard Ruiz, [facility]

## 2022-09-26 ENCOUNTER — OFFICE VISIT (OUTPATIENT)
Dept: FAMILY MEDICINE CLINIC | Age: 67
End: 2022-09-26
Payer: MEDICARE

## 2022-09-26 VITALS
DIASTOLIC BLOOD PRESSURE: 62 MMHG | TEMPERATURE: 96.9 F | HEIGHT: 73 IN | WEIGHT: 275 LBS | HEART RATE: 85 BPM | SYSTOLIC BLOOD PRESSURE: 108 MMHG | BODY MASS INDEX: 36.45 KG/M2 | OXYGEN SATURATION: 98 %

## 2022-09-26 DIAGNOSIS — M54.9 UPPER BACK PAIN ON RIGHT SIDE: Primary | ICD-10-CM

## 2022-09-26 DIAGNOSIS — E11.42 TYPE 2 DIABETES MELLITUS WITH DIABETIC POLYNEUROPATHY, WITHOUT LONG-TERM CURRENT USE OF INSULIN (HCC): ICD-10-CM

## 2022-09-26 DIAGNOSIS — M54.2 NECK PAIN ON RIGHT SIDE: ICD-10-CM

## 2022-09-26 DIAGNOSIS — S29.012A STRAIN OF RHOMBOID MUSCLE, INITIAL ENCOUNTER: ICD-10-CM

## 2022-09-26 DIAGNOSIS — Z23 NEED FOR VACCINATION: ICD-10-CM

## 2022-09-26 LAB — HBA1C MFR BLD: 7 %

## 2022-09-26 PROCEDURE — 3051F HG A1C>EQUAL 7.0%<8.0%: CPT | Performed by: FAMILY MEDICINE

## 2022-09-26 PROCEDURE — 99213 OFFICE O/P EST LOW 20 MIN: CPT | Performed by: FAMILY MEDICINE

## 2022-09-26 PROCEDURE — 1123F ACP DISCUSS/DSCN MKR DOCD: CPT | Performed by: FAMILY MEDICINE

## 2022-09-26 PROCEDURE — G0008 ADMIN INFLUENZA VIRUS VAC: HCPCS | Performed by: FAMILY MEDICINE

## 2022-09-26 PROCEDURE — 90694 VACC AIIV4 NO PRSRV 0.5ML IM: CPT | Performed by: FAMILY MEDICINE

## 2022-09-26 PROCEDURE — 83036 HEMOGLOBIN GLYCOSYLATED A1C: CPT | Performed by: FAMILY MEDICINE

## 2022-09-26 RX ORDER — TIZANIDINE 2 MG/1
2 TABLET ORAL EVERY 8 HOURS PRN
Qty: 15 TABLET | Refills: 0 | Status: SHIPPED | OUTPATIENT
Start: 2022-09-26 | End: 2022-10-01

## 2022-09-26 RX ORDER — ACETAMINOPHEN 500 MG
500 TABLET ORAL EVERY 6 HOURS PRN
COMMUNITY

## 2022-09-26 ASSESSMENT — PATIENT HEALTH QUESTIONNAIRE - PHQ9
SUM OF ALL RESPONSES TO PHQ QUESTIONS 1-9: 0
2. FEELING DOWN, DEPRESSED OR HOPELESS: 0
1. LITTLE INTEREST OR PLEASURE IN DOING THINGS: 0
SUM OF ALL RESPONSES TO PHQ QUESTIONS 1-9: 0
SUM OF ALL RESPONSES TO PHQ QUESTIONS 1-9: 0
SUM OF ALL RESPONSES TO PHQ9 QUESTIONS 1 & 2: 0
SUM OF ALL RESPONSES TO PHQ QUESTIONS 1-9: 0

## 2022-09-26 ASSESSMENT — ENCOUNTER SYMPTOMS
BACK PAIN: 1
NAUSEA: 0
SHORTNESS OF BREATH: 0
VOMITING: 0

## 2022-09-26 NOTE — PROGRESS NOTES
Ike Diaz (: 1955) is a 79 y.o. male, Established patient, who presents today for:    Chief Complaint   Patient presents with    Back Pain     Patient states that he had a fall on  and has since been having upper back pain Patient states that he has been having lots of pain          ASSESSMENT/PLAN    1. Upper back pain on right side  Comments:  Likely muscular strain. Will manage conservatively w/heat/ice, Tylenol, Voltaren gel, home exercises, and formal PT. Would refer to ortho INB in 2-3 wks. Orders:  -     tiZANidine (ZANAFLEX) 2 MG tablet; Take 1 tablet by mouth every 8 hours as needed (musc;le spasms), Disp-15 tablet, R-0Normal  -     [facility] [place]  2. Strain of rhomboid muscle, initial encounter  -     tiZANidine (ZANAFLEX) 2 MG tablet; Take 1 tablet by mouth every 8 hours as needed (musc;le spasms), Disp-15 tablet, R-0Normal  -     [facility] [place]  3. Neck pain on right side  -     tiZANidine (ZANAFLEX) 2 MG tablet; Take 1 tablet by mouth every 8 hours as needed (musc;le spasms), Disp-15 tablet, R-0Normal  -     [facility] [place]  4. Type 2 diabetes mellitus with diabetic polyneuropathy, without long-term current use of insulin (HCC)  -     POCT glycosylated hemoglobin (Hb A1C)  -     Microalbumin / Creatinine Urine Ratio; Future  5. Need for vaccination  -     Influenza, FLUAD, (age 72 y+), IM, Preservative Free, 0.5 mL      Return for KEEP NEXT SCHEDULED VISIT 2022. SUBJECTIVE/OBJECTIVE:    HPI    Patient presents for acute visit regarding back pain. Patient reports mechanical fall tripping over the cat at home 3 weeks ago. There was no reported head injury or LOC. Patient was helped to his feet by his wife and son-in-law. There has been subsequent right upper back pain with radiation to the right shoulder. Pain is rated 5-8/10 in severity and noted to be worse with lifting/carrying.  Pain is improved to a mild degree with use of Tylenol and laying down. There is no reported neck pain, upper extremity paresthesias, or upper extremity weakness. Current Outpatient Medications on File Prior to Visit   Medication Sig Dispense Refill    acetaminophen (TYLENOL) 500 MG tablet Take 500 mg by mouth every 6 hours as needed for Pain      torsemide (DEMADEX) 20 MG tablet Take 1 tablet by mouth daily 90 tablet 1    glimepiride (AMARYL) 1 MG tablet Take 1 tablet by mouth every morning (before breakfast) 90 tablet 1    carvedilol (COREG) 25 MG tablet Take 1 tablet by mouth in the morning and 1 tablet before bedtime. 180 tablet 3    hydrOXYzine HCl (ATARAX) 25 MG tablet Twice daily for 2 to 3 days when dizziness 30 tablet 3    clopidogrel (PLAVIX) 75 MG tablet Take 1 tablet by mouth daily 90 tablet 3    atorvastatin (LIPITOR) 40 MG tablet Take 1 tablet by mouth daily 90 tablet 3    metFORMIN (GLUCOPHAGE) 500 MG tablet Take 1 tablet by mouth 2 times daily (with meals) 180 tablet 1    allopurinol (ZYLOPRIM) 300 MG tablet Take 1 tablet by mouth daily 90 tablet 1    VITAMIN D PO       Elastic Bandages & Supports (V-4 HIGH COMPRESSION HOSE) MISC KNEE HIGH - 20-30 mmHg 2 each 1    Alcohol Swabs PADS DX: diabetes mellitus. Test 1 time(s) daily - Ok to substitute per insurance (1 each = 1 box) 100 each 5    blood glucose monitor strips DX: diabetes mellitus. Use 1 time(s) daily - True Metrix requested by patient - [id number] Claudia Hope to substitute per insurance 100 strip 5    Lancets MISC DX: diabetes mellitus. Use 1 time(s) daily - Ok to substitute per insurance (1 each = 1 box) 100 each 5    blood glucose monitor kit and supplies DX: diabetes mellitus.  Test 1 time(s) daily - True Metrix requested by patient - [id number] Claudia Hope to substitute per insurance 1 kit 0    aspirin 81 MG EC tablet Take 81 mg by mouth daily      Probiotic Product (PROBIOTIC DAILY PO) Take 1 tablet by mouth daily      vitamin B-12 (CYANOCOBALAMIN) 100 MCG tablet Take 50 mcg by mouth daily      ammonium lactate (AMLACTIN) 12 % cream APPLY TO DRY CALLUS AREAS 1 TO 2 TIMES DAILY  5    Misc. Devices (COMMODE BEDSIDE) MISC Use daily as needed 1 each 0    MULTIPLE VITAMIN PO Take 1 tablet by mouth daily        No current facility-administered medications on file prior to visit. Allergies   Allergen Reactions    Bactrim [Sulfamethoxazole-Trimethoprim] Nausea And Vomiting and Other (See Comments)     Sugar count elevated, had troubles urinating        Review of Systems   Constitutional:  Negative for chills, diaphoresis and fever. Respiratory:  Negative for shortness of breath. Cardiovascular:  Negative for chest pain. Gastrointestinal:  Negative for nausea and vomiting. Musculoskeletal:  Positive for back pain (upper back). Negative for neck pain. Neurological:  Negative for weakness, numbness and headaches. Vitals:  /62 (Site: Left Upper Arm, Position: Sitting, Cuff Size: Large Adult)   Pulse 85   Temp 96.9 °F (36.1 °C) (Temporal)   Ht 6' 1\" (1.854 m)   Wt 275 lb (124.7 kg) Comment: Patient reported  SpO2 98%   BMI 36.28 kg/m²     Results for POC orders placed in visit on 09/26/22   POCT glycosylated hemoglobin (Hb A1C)   Result Value Ref Range    Hemoglobin A1C 7.0 (A) %         Physical Exam  Vitals reviewed. Constitutional:       General: He is not in acute distress. Appearance: He is obese. He is not ill-appearing, toxic-appearing or diaphoretic. Neck:     Cardiovascular:      Rate and Rhythm: Normal rate. Pulmonary:      Effort: Pulmonary effort is normal.   Musculoskeletal:      Cervical back: Spasms and tenderness present. No swelling, deformity, rigidity or bony tenderness. Pain with movement and muscular tenderness (right sided) present. No spinous process tenderness. Decreased range of motion. Back:    Neurological:      Mental Status: He is alert. Ortho Exam (If Applicable)              An electronic signature was used to authenticate this note.      PA THORNE Liss Duvall MD

## 2022-09-27 ENCOUNTER — PATIENT MESSAGE (OUTPATIENT)
Dept: FAMILY MEDICINE CLINIC | Age: 67
End: 2022-09-27

## 2022-09-28 NOTE — TELEPHONE ENCOUNTER
From: Neida Husbands Hurlbut  To: Dr. Tayler Silvestre: 9/27/2022 10:47 PM EDT  Subject: Ref: Dipak Calero. 9/26 visit; Voltaren Topical gel. My wife bought the gel and the package states NOT to use on back, hip, shoulder or any other area except extremities. What should we do? Return it and just use deep penetrating pain relief creme? It works well. Heat and ice is also providing relief for short periods of time.     Thanks,   Asher

## 2022-10-03 ENCOUNTER — PATIENT MESSAGE (OUTPATIENT)
Dept: FAMILY MEDICINE CLINIC | Age: 67
End: 2022-10-03

## 2022-10-04 NOTE — TELEPHONE ENCOUNTER
From: Harika Diaz  To: Dr. Libby Thomas: 10/3/2022 8:11 PM EDT  Subject: 9-26 visit    Zanaflex is not for me!!! Side effects of 2nd dose ( at bedtime only 2 times) NO dizziness, drowsiness, weakness; Just the opposite, more like a BIG shot of adrenaline! Took @ 3hrs to dissipate to near normal. Dry mouth worse than Coreg & much more than Flexeril. Additionally had increased muscle spasms in right upper arm , left leg & lower back. Besides spasms in lower back, had pain as well??? I will give bottle to you in Nov. to dispose of. Voltaren (generic) is very helpful & heat & ice as well. I have felt improvement in the last week! Was our flu shots extra strength kind for seniors as usual? That wasn't brought up by you or nurse. Saw article related to revved up flu shots for seniors which reminded me. In the past you had.     Thanks, Harika Whitley..            9            9

## 2022-10-19 ENCOUNTER — HOSPITAL ENCOUNTER (OUTPATIENT)
Dept: LAB | Age: 67
Discharge: HOME | End: 2022-10-19
Payer: MEDICARE

## 2022-10-19 DIAGNOSIS — R80.9 MICROALBUMINURIA DUE TO TYPE 2 DIABETES MELLITUS (HCC): ICD-10-CM

## 2022-10-19 DIAGNOSIS — E11.42 TYPE 2 DIABETES MELLITUS WITH DIABETIC POLYNEUROPATHY, WITHOUT LONG-TERM CURRENT USE OF INSULIN (HCC): ICD-10-CM

## 2022-10-19 DIAGNOSIS — E11.29 MICROALBUMINURIA DUE TO TYPE 2 DIABETES MELLITUS (HCC): ICD-10-CM

## 2022-10-19 DIAGNOSIS — D75.89 MACROCYTOSIS WITHOUT ANEMIA: Chronic | ICD-10-CM

## 2022-10-19 DIAGNOSIS — E11.65 TYPE 2 DIABETES MELLITUS WITH HYPERGLYCEMIA, WITHOUT LONG-TERM CURRENT USE OF INSULIN (HCC): ICD-10-CM

## 2022-10-19 DIAGNOSIS — I10 ESSENTIAL HYPERTENSION: Chronic | ICD-10-CM

## 2022-10-19 DIAGNOSIS — I25.10 CORONARY ARTERY DISEASE INVOLVING NATIVE CORONARY ARTERY OF NATIVE HEART WITHOUT ANGINA PECTORIS: ICD-10-CM

## 2022-10-19 DIAGNOSIS — E78.2 MIXED HYPERLIPIDEMIA: ICD-10-CM

## 2022-10-19 DIAGNOSIS — M10.9 GOUT OF MULTIPLE SITES, UNSPECIFIED CAUSE, UNSPECIFIED CHRONICITY: ICD-10-CM

## 2022-10-19 LAB
ALBUMIN SERPL-MCNC: 4.2 G/DL (ref 3.5–4.6)
ALP BLD-CCNC: 74 U/L (ref 35–104)
ALT SERPL-CCNC: 29 U/L (ref 0–41)
ANION GAP SERPL CALCULATED.3IONS-SCNC: 15 MEQ/L (ref 9–15)
AST SERPL-CCNC: 31 U/L (ref 0–40)
BILIRUB SERPL-MCNC: 0.6 MG/DL (ref 0.2–0.7)
BUN BLDV-MCNC: 10 MG/DL (ref 8–23)
CALCIUM SERPL-MCNC: 9.3 MG/DL (ref 8.5–9.9)
CHLORIDE BLD-SCNC: 95 MEQ/L (ref 95–107)
CO2: 29 MEQ/L (ref 20–31)
CREAT SERPL-MCNC: 0.85 MG/DL (ref 0.7–1.2)
CREATININE URINE: 129.6 MG/DL
GFR SERPL CREATININE-BSD FRML MDRD: >60 ML/MIN/{1.73_M2}
GLOBULIN: 2.8 G/DL (ref 2.3–3.5)
GLUCOSE BLD-MCNC: 172 MG/DL (ref 70–99)
MICROALBUMIN UR-MCNC: 13.3 MG/DL
MICROALBUMIN/CREAT UR-RTO: 102.6 MG/G (ref 0–30)
POTASSIUM SERPL-SCNC: 3.8 MEQ/L (ref 3.4–4.9)
SODIUM BLD-SCNC: 139 MEQ/L (ref 135–144)
TOTAL PROTEIN: 7 G/DL (ref 6.3–8)
URIC ACID, SERUM: 5.8 MG/DL (ref 3.4–7)

## 2022-10-19 PROCEDURE — 82570 ASSAY OF URINE CREATININE: CPT

## 2022-10-19 PROCEDURE — 82043 UR ALBUMIN QUANTITATIVE: CPT

## 2022-10-19 PROCEDURE — 85025 COMPLETE CBC W/AUTO DIFF WBC: CPT

## 2022-10-19 PROCEDURE — 80053 COMPREHEN METABOLIC PANEL: CPT

## 2022-10-19 PROCEDURE — 36415 COLL VENOUS BLD VENIPUNCTURE: CPT

## 2022-10-19 PROCEDURE — 84550 ASSAY OF BLOOD/URIC ACID: CPT

## 2022-10-20 LAB
BASOPHILS ABSOLUTE: 0 K/UL (ref 0–0.2)
BASOPHILS RELATIVE PERCENT: 0.5 %
EOSINOPHILS ABSOLUTE: 0.1 K/UL (ref 0–0.7)
EOSINOPHILS RELATIVE PERCENT: 2.5 %
HCT VFR BLD CALC: 41.1 % (ref 42–52)
HEMOGLOBIN: 14.2 G/DL (ref 14–18)
LYMPHOCYTES ABSOLUTE: 1.5 K/UL (ref 1–4.8)
LYMPHOCYTES RELATIVE PERCENT: 33 %
MACROCYTES: ABNORMAL
MCH RBC QN AUTO: 37.1 PG (ref 27–31.3)
MCHC RBC AUTO-ENTMCNC: 34.4 % (ref 33–37)
MCV RBC AUTO: 107.8 FL (ref 79–92.2)
MONOCYTES ABSOLUTE: 0.4 K/UL (ref 0.2–0.8)
MONOCYTES RELATIVE PERCENT: 9.8 %
NEUTROPHILS ABSOLUTE: 2.4 K/UL (ref 1.4–6.5)
NEUTROPHILS RELATIVE PERCENT: 54.2 %
PDW BLD-RTO: 13.5 % (ref 11.5–14.5)
PLATELET # BLD: 171 K/UL (ref 130–400)
PLATELET SLIDE REVIEW: NORMAL
RBC # BLD: 3.82 M/UL (ref 4.7–6.1)
WBC # BLD: 4.4 K/UL (ref 4.8–10.8)

## 2022-10-25 NOTE — RESULT ENCOUNTER NOTE
High urine microalbumin, CBC with stable high .8, uric acid 5.8, CMP with   MyChart message sent to patient

## 2022-11-01 ENCOUNTER — HOSPITAL ENCOUNTER (OUTPATIENT)
Age: 67
Setting detail: OBSERVATION
Discharge: HOME | End: 2022-11-02
Attending: STUDENT IN AN ORGANIZED HEALTH CARE EDUCATION/TRAINING PROGRAM | Admitting: INTERNAL MEDICINE
Payer: MEDICARE

## 2022-11-01 ENCOUNTER — APPOINTMENT (OUTPATIENT)
Dept: MRI IMAGING | Age: 67
End: 2022-11-01
Payer: MEDICARE

## 2022-11-01 ENCOUNTER — APPOINTMENT (OUTPATIENT)
Dept: CT IMAGING | Age: 67
End: 2022-11-01
Payer: MEDICARE

## 2022-11-01 ENCOUNTER — APPOINTMENT (OUTPATIENT)
Dept: GENERAL RADIOLOGY | Age: 67
End: 2022-11-01
Payer: MEDICARE

## 2022-11-01 DIAGNOSIS — R29.90 STROKE-LIKE SYMPTOMS: Primary | ICD-10-CM

## 2022-11-01 DIAGNOSIS — E66.9 OBESITY (BMI 30-39.9): ICD-10-CM

## 2022-11-01 DIAGNOSIS — F10.929 ACUTE ALCOHOLIC INTOXICATION WITH COMPLICATION (HCC): ICD-10-CM

## 2022-11-01 LAB
ABO/RH: NORMAL
ALBUMIN SERPL-MCNC: 4.4 G/DL (ref 3.5–4.6)
ALP BLD-CCNC: 73 U/L (ref 35–104)
ALT SERPL-CCNC: 26 U/L (ref 0–41)
AMPHETAMINE SCREEN, URINE: NORMAL
ANION GAP SERPL CALCULATED.3IONS-SCNC: 17 MEQ/L (ref 9–15)
ANISOCYTOSIS: ABNORMAL
ANTIBODY SCREEN: NORMAL
APTT: 27.6 SEC (ref 24.4–36.8)
AST SERPL-CCNC: 25 U/L (ref 0–40)
BACTERIA: NEGATIVE /HPF
BARBITURATE SCREEN URINE: NORMAL
BASOPHILS ABSOLUTE: 0 K/UL (ref 0–0.2)
BASOPHILS RELATIVE PERCENT: 0.8 %
BENZODIAZEPINE SCREEN, URINE: NORMAL
BILIRUB SERPL-MCNC: 0.5 MG/DL (ref 0.2–0.7)
BILIRUBIN URINE: NEGATIVE
BLOOD, URINE: ABNORMAL
BUN BLDV-MCNC: 14 MG/DL (ref 8–23)
C-REACTIVE PROTEIN, HIGH SENSITIVITY: 3.5 MG/L (ref 0–5)
CALCIUM SERPL-MCNC: 9.6 MG/DL (ref 8.5–9.9)
CANNABINOID SCREEN URINE: NORMAL
CHLORIDE BLD-SCNC: 90 MEQ/L (ref 95–107)
CHP ED QC CHECK: YES
CLARITY: CLEAR
CO2: 25 MEQ/L (ref 20–31)
COCAINE METABOLITE SCREEN URINE: NORMAL
COLOR: YELLOW
CREAT SERPL-MCNC: 0.82 MG/DL (ref 0.7–1.2)
EOSINOPHILS ABSOLUTE: 0 K/UL (ref 0–0.7)
EOSINOPHILS RELATIVE PERCENT: 1.8 %
EPITHELIAL CELLS, UA: NORMAL /HPF (ref 0–5)
ETHANOL PERCENT: 0.18 G/DL
ETHANOL: 209 MG/DL (ref 0–0.08)
FENTANYL SCREEN, URINE: NORMAL
GFR SERPL CREATININE-BSD FRML MDRD: >60 ML/MIN/{1.73_M2}
GLOBULIN: 3 G/DL (ref 2.3–3.5)
GLUCOSE BLD-MCNC: 117 MG/DL (ref 70–99)
GLUCOSE BLD-MCNC: 119 MG/DL
GLUCOSE BLD-MCNC: 119 MG/DL (ref 70–99)
GLUCOSE BLD-MCNC: 144 MG/DL (ref 70–99)
GLUCOSE BLD-MCNC: 195 MG/DL (ref 70–99)
GLUCOSE BLD-MCNC: 270 MG/DL (ref 70–99)
GLUCOSE URINE: NEGATIVE MG/DL
HCT VFR BLD CALC: 41.6 % (ref 42–52)
HEMOGLOBIN: 14 G/DL (ref 14–18)
HYALINE CASTS: NORMAL /HPF (ref 0–5)
INFLUENZA A BY PCR: NEGATIVE
INFLUENZA B BY PCR: NEGATIVE
INR BLD: 1
KETONES, URINE: NEGATIVE MG/DL
LACTIC ACID: 3.4 MMOL/L (ref 0.5–2.2)
LEUKOCYTE ESTERASE, URINE: NEGATIVE
LV EF: 60 %
LVEF MODALITY: NORMAL
LYMPHOCYTES ABSOLUTE: 2.5 K/UL (ref 1–4.8)
LYMPHOCYTES RELATIVE PERCENT: 42 %
Lab: NORMAL
MAGNESIUM: 1.7 MG/DL (ref 1.7–2.4)
MCH RBC QN AUTO: 36 PG (ref 27–31.3)
MCHC RBC AUTO-ENTMCNC: 33.5 % (ref 33–37)
MCV RBC AUTO: 107.5 FL (ref 79–92.2)
METHADONE SCREEN, URINE: NORMAL
MONOCYTES ABSOLUTE: 0.4 K/UL (ref 0.2–0.8)
MONOCYTES RELATIVE PERCENT: 5.7 %
NEUTROPHILS ABSOLUTE: 3.2 K/UL (ref 1.4–6.5)
NEUTROPHILS RELATIVE PERCENT: 53 %
NITRITE, URINE: NEGATIVE
OPIATE SCREEN URINE: NORMAL
OXYCODONE URINE: NORMAL
PDW BLD-RTO: 13.4 % (ref 11.5–14.5)
PERFORMED ON: ABNORMAL
PH UA: 6 (ref 5–9)
PHENCYCLIDINE SCREEN URINE: NORMAL
PLATELET # BLD: 166 K/UL (ref 130–400)
PLATELET SLIDE REVIEW: NORMAL
POIKILOCYTES: ABNORMAL
POLYCHROMASIA: ABNORMAL
POTASSIUM SERPL-SCNC: 3.5 MEQ/L (ref 3.4–4.9)
PRO-BNP: 69 PG/ML
PROPOXYPHENE SCREEN: NORMAL
PROTEIN UA: NEGATIVE MG/DL
PROTHROMBIN TIME: 12.9 SEC (ref 12.3–14.9)
RBC # BLD: 3.87 M/UL (ref 4.7–6.1)
RBC UA: NORMAL /HPF (ref 0–5)
SARS-COV-2, NAAT: NOT DETECTED
SEDIMENTATION RATE, ERYTHROCYTE: 20 MM (ref 0–20)
SMUDGE CELLS: 0.9
SODIUM BLD-SCNC: 132 MEQ/L (ref 135–144)
SPECIFIC GRAVITY UA: 1.02 (ref 1–1.03)
STOMATOCYTES: ABNORMAL
T4 FREE: 1.54 NG/DL (ref 0.84–1.68)
TOTAL CK: 86 U/L (ref 0–190)
TOTAL PROTEIN: 7.4 G/DL (ref 6.3–8)
TROPONIN: <0.01 NG/ML (ref 0–0.01)
TSH REFLEX: 4.78 UIU/ML (ref 0.44–3.86)
URINE REFLEX TO CULTURE: ABNORMAL
UROBILINOGEN, URINE: 0.2 E.U./DL
WBC # BLD: 6 K/UL (ref 4.8–10.8)
WBC UA: NORMAL /HPF (ref 0–5)

## 2022-11-01 PROCEDURE — 70498 CT ANGIOGRAPHY NECK: CPT

## 2022-11-01 PROCEDURE — 83880 ASSAY OF NATRIURETIC PEPTIDE: CPT

## 2022-11-01 PROCEDURE — 70551 MRI BRAIN STEM W/O DYE: CPT

## 2022-11-01 PROCEDURE — 6360000004 HC RX CONTRAST MEDICATION: Performed by: STUDENT IN AN ORGANIZED HEALTH CARE EDUCATION/TRAINING PROGRAM

## 2022-11-01 PROCEDURE — G0378 HOSPITAL OBSERVATION PER HR: HCPCS

## 2022-11-01 PROCEDURE — 82077 ASSAY SPEC XCP UR&BREATH IA: CPT

## 2022-11-01 PROCEDURE — 80307 DRUG TEST PRSMV CHEM ANLYZR: CPT

## 2022-11-01 PROCEDURE — 92523 SPEECH SOUND LANG COMPREHEN: CPT

## 2022-11-01 PROCEDURE — 6360000004 HC RX CONTRAST MEDICATION: Performed by: INTERNAL MEDICINE

## 2022-11-01 PROCEDURE — 87502 INFLUENZA DNA AMP PROBE: CPT

## 2022-11-01 PROCEDURE — 99203 OFFICE O/P NEW LOW 30 MIN: CPT | Performed by: PSYCHIATRY & NEUROLOGY

## 2022-11-01 PROCEDURE — 97166 OT EVAL MOD COMPLEX 45 MIN: CPT

## 2022-11-01 PROCEDURE — 84484 ASSAY OF TROPONIN QUANT: CPT

## 2022-11-01 PROCEDURE — 93005 ELECTROCARDIOGRAM TRACING: CPT | Performed by: STUDENT IN AN ORGANIZED HEALTH CARE EDUCATION/TRAINING PROGRAM

## 2022-11-01 PROCEDURE — 6360000002 HC RX W HCPCS: Performed by: INTERNAL MEDICINE

## 2022-11-01 PROCEDURE — 85652 RBC SED RATE AUTOMATED: CPT

## 2022-11-01 PROCEDURE — 70496 CT ANGIOGRAPHY HEAD: CPT

## 2022-11-01 PROCEDURE — 85730 THROMBOPLASTIN TIME PARTIAL: CPT

## 2022-11-01 PROCEDURE — 6370000000 HC RX 637 (ALT 250 FOR IP): Performed by: INTERNAL MEDICINE

## 2022-11-01 PROCEDURE — 96361 HYDRATE IV INFUSION ADD-ON: CPT

## 2022-11-01 PROCEDURE — 85610 PROTHROMBIN TIME: CPT

## 2022-11-01 PROCEDURE — 72148 MRI LUMBAR SPINE W/O DYE: CPT

## 2022-11-01 PROCEDURE — 83605 ASSAY OF LACTIC ACID: CPT

## 2022-11-01 PROCEDURE — APPSS45 APP SPLIT SHARED TIME 31-45 MINUTES: Performed by: NURSE PRACTITIONER

## 2022-11-01 PROCEDURE — 86900 BLOOD TYPING SEROLOGIC ABO: CPT

## 2022-11-01 PROCEDURE — 92610 EVALUATE SWALLOWING FUNCTION: CPT

## 2022-11-01 PROCEDURE — 86141 C-REACTIVE PROTEIN HS: CPT

## 2022-11-01 PROCEDURE — 87635 SARS-COV-2 COVID-19 AMP PRB: CPT

## 2022-11-01 PROCEDURE — 85025 COMPLETE CBC W/AUTO DIFF WBC: CPT

## 2022-11-01 PROCEDURE — 86901 BLOOD TYPING SEROLOGIC RH(D): CPT

## 2022-11-01 PROCEDURE — 86850 RBC ANTIBODY SCREEN: CPT

## 2022-11-01 PROCEDURE — 70450 CT HEAD/BRAIN W/O DYE: CPT

## 2022-11-01 PROCEDURE — 84443 ASSAY THYROID STIM HORMONE: CPT

## 2022-11-01 PROCEDURE — 80053 COMPREHEN METABOLIC PANEL: CPT

## 2022-11-01 PROCEDURE — 96372 THER/PROPH/DIAG INJ SC/IM: CPT

## 2022-11-01 PROCEDURE — 84439 ASSAY OF FREE THYROXINE: CPT

## 2022-11-01 PROCEDURE — 82550 ASSAY OF CK (CPK): CPT

## 2022-11-01 PROCEDURE — 96360 HYDRATION IV INFUSION INIT: CPT

## 2022-11-01 PROCEDURE — 99285 EMERGENCY DEPT VISIT HI MDM: CPT

## 2022-11-01 PROCEDURE — C8929 TTE W OR WO FOL WCON,DOPPLER: HCPCS

## 2022-11-01 PROCEDURE — 97162 PT EVAL MOD COMPLEX 30 MIN: CPT

## 2022-11-01 PROCEDURE — 36415 COLL VENOUS BLD VENIPUNCTURE: CPT

## 2022-11-01 PROCEDURE — 71045 X-RAY EXAM CHEST 1 VIEW: CPT

## 2022-11-01 PROCEDURE — 83735 ASSAY OF MAGNESIUM: CPT

## 2022-11-01 PROCEDURE — 2580000003 HC RX 258: Performed by: STUDENT IN AN ORGANIZED HEALTH CARE EDUCATION/TRAINING PROGRAM

## 2022-11-01 PROCEDURE — 81001 URINALYSIS AUTO W/SCOPE: CPT

## 2022-11-01 RX ORDER — ONDANSETRON 2 MG/ML
4 INJECTION INTRAMUSCULAR; INTRAVENOUS EVERY 6 HOURS PRN
Status: DISCONTINUED | OUTPATIENT
Start: 2022-11-01 | End: 2022-11-02 | Stop reason: HOSPADM

## 2022-11-01 RX ORDER — ALLOPURINOL 300 MG/1
300 TABLET ORAL DAILY
Status: DISCONTINUED | OUTPATIENT
Start: 2022-11-01 | End: 2022-11-02 | Stop reason: HOSPADM

## 2022-11-01 RX ORDER — INSULIN LISPRO 100 [IU]/ML
0-8 INJECTION, SOLUTION INTRAVENOUS; SUBCUTANEOUS
Status: DISCONTINUED | OUTPATIENT
Start: 2022-11-01 | End: 2022-11-02 | Stop reason: HOSPADM

## 2022-11-01 RX ORDER — ROSUVASTATIN CALCIUM 40 MG/1
40 TABLET, COATED ORAL NIGHTLY
Status: DISCONTINUED | OUTPATIENT
Start: 2022-11-01 | End: 2022-11-02 | Stop reason: HOSPADM

## 2022-11-01 RX ORDER — ASPIRIN 300 MG/1
300 SUPPOSITORY RECTAL DAILY
Status: DISCONTINUED | OUTPATIENT
Start: 2022-11-01 | End: 2022-11-02 | Stop reason: HOSPADM

## 2022-11-01 RX ORDER — ASPIRIN 81 MG/1
81 TABLET ORAL DAILY
Status: DISCONTINUED | OUTPATIENT
Start: 2022-11-01 | End: 2022-11-02 | Stop reason: HOSPADM

## 2022-11-01 RX ORDER — INSULIN LISPRO 100 [IU]/ML
0-4 INJECTION, SOLUTION INTRAVENOUS; SUBCUTANEOUS NIGHTLY
Status: DISCONTINUED | OUTPATIENT
Start: 2022-11-01 | End: 2022-11-02 | Stop reason: HOSPADM

## 2022-11-01 RX ORDER — CARVEDILOL 25 MG/1
25 TABLET ORAL 2 TIMES DAILY
Status: DISCONTINUED | OUTPATIENT
Start: 2022-11-01 | End: 2022-11-02 | Stop reason: HOSPADM

## 2022-11-01 RX ORDER — DEXTROSE MONOHYDRATE 100 MG/ML
INJECTION, SOLUTION INTRAVENOUS CONTINUOUS PRN
Status: DISCONTINUED | OUTPATIENT
Start: 2022-11-01 | End: 2022-11-02 | Stop reason: HOSPADM

## 2022-11-01 RX ORDER — LABETALOL HYDROCHLORIDE 5 MG/ML
10 INJECTION, SOLUTION INTRAVENOUS EVERY 10 MIN PRN
Status: DISCONTINUED | OUTPATIENT
Start: 2022-11-01 | End: 2022-11-02 | Stop reason: HOSPADM

## 2022-11-01 RX ORDER — POLYETHYLENE GLYCOL 3350 17 G/17G
17 POWDER, FOR SOLUTION ORAL DAILY PRN
Status: DISCONTINUED | OUTPATIENT
Start: 2022-11-01 | End: 2022-11-02 | Stop reason: HOSPADM

## 2022-11-01 RX ORDER — ENOXAPARIN SODIUM 100 MG/ML
30 INJECTION SUBCUTANEOUS 2 TIMES DAILY
Status: DISCONTINUED | OUTPATIENT
Start: 2022-11-01 | End: 2022-11-02 | Stop reason: HOSPADM

## 2022-11-01 RX ORDER — CLOPIDOGREL BISULFATE 75 MG/1
75 TABLET ORAL DAILY
Status: DISCONTINUED | OUTPATIENT
Start: 2022-11-01 | End: 2022-11-02 | Stop reason: HOSPADM

## 2022-11-01 RX ORDER — ONDANSETRON 4 MG/1
4 TABLET, ORALLY DISINTEGRATING ORAL EVERY 8 HOURS PRN
Status: DISCONTINUED | OUTPATIENT
Start: 2022-11-01 | End: 2022-11-02 | Stop reason: HOSPADM

## 2022-11-01 RX ORDER — 0.9 % SODIUM CHLORIDE 0.9 %
1000 INTRAVENOUS SOLUTION INTRAVENOUS ONCE
Status: COMPLETED | OUTPATIENT
Start: 2022-11-01 | End: 2022-11-01

## 2022-11-01 RX ORDER — TORSEMIDE 20 MG/1
20 TABLET ORAL DAILY
Status: DISCONTINUED | OUTPATIENT
Start: 2022-11-01 | End: 2022-11-02 | Stop reason: HOSPADM

## 2022-11-01 RX ADMIN — ENOXAPARIN SODIUM 30 MG: 100 INJECTION SUBCUTANEOUS at 11:54

## 2022-11-01 RX ADMIN — ROSUVASTATIN CALCIUM 40 MG: 40 TABLET, FILM COATED ORAL at 22:22

## 2022-11-01 RX ADMIN — IOPAMIDOL 75 ML: 612 INJECTION, SOLUTION INTRAVENOUS at 05:58

## 2022-11-01 RX ADMIN — ENOXAPARIN SODIUM 30 MG: 100 INJECTION SUBCUTANEOUS at 22:22

## 2022-11-01 RX ADMIN — SODIUM CHLORIDE 1000 ML: 9 INJECTION, SOLUTION INTRAVENOUS at 06:59

## 2022-11-01 RX ADMIN — PERFLUTREN 1.5 ML: 6.52 INJECTION, SUSPENSION INTRAVENOUS at 16:32

## 2022-11-01 RX ADMIN — ALLOPURINOL 300 MG: 300 TABLET ORAL at 22:26

## 2022-11-01 RX ADMIN — CARVEDILOL 25 MG: 25 TABLET ORAL at 22:27

## 2022-11-01 ASSESSMENT — ENCOUNTER SYMPTOMS
EYE PAIN: 0
WHEEZING: 0
CHEST TIGHTNESS: 0
SHORTNESS OF BREATH: 0
ABDOMINAL DISTENTION: 0
COLOR CHANGE: 0
BACK PAIN: 0
EYE REDNESS: 0
ABDOMINAL PAIN: 0
DIARRHEA: 0
PHOTOPHOBIA: 1
TROUBLE SWALLOWING: 0
SORE THROAT: 0
VOMITING: 0
RHINORRHEA: 0
NAUSEA: 0
COUGH: 0

## 2022-11-01 ASSESSMENT — PAIN DESCRIPTION - LOCATION: LOCATION: HEAD

## 2022-11-01 ASSESSMENT — PAIN DESCRIPTION - ORIENTATION: ORIENTATION: ANTERIOR;RIGHT

## 2022-11-01 ASSESSMENT — PAIN DESCRIPTION - DESCRIPTORS: DESCRIPTORS: ACHING

## 2022-11-01 ASSESSMENT — PAIN - FUNCTIONAL ASSESSMENT
PAIN_FUNCTIONAL_ASSESSMENT: NONE - DENIES PAIN
PAIN_FUNCTIONAL_ASSESSMENT: NONE - DENIES PAIN

## 2022-11-01 ASSESSMENT — PAIN DESCRIPTION - FREQUENCY: FREQUENCY: CONTINUOUS

## 2022-11-01 ASSESSMENT — PAIN SCALES - GENERAL: PAINLEVEL_OUTOF10: 3

## 2022-11-01 ASSESSMENT — LIFESTYLE VARIABLES
HOW OFTEN DO YOU HAVE A DRINK CONTAINING ALCOHOL: PATIENT DECLINED
HOW MANY STANDARD DRINKS CONTAINING ALCOHOL DO YOU HAVE ON A TYPICAL DAY: PATIENT DECLINED

## 2022-11-01 NOTE — PROGRESS NOTES
[facility]   Facility/Department: [facility] [place] [facility] Nandini Cavazos  Speech Language Pathology  Clinical Bedside Swallow Evaluation    NAME:Khari Diaz  : 1955 (79 y.o.)   [x]   confirmed    MRN: [id number]  [id number]  ADMISSION DATE: 2022  PATIENT DIAGNOSIS(ES): Obesity (BMI 30-39. 9) [E66.9]  Stroke-like symptoms [R29.90]  Stroke-like episode [O75.69]  Acute alcoholic intoxication with complication [facility]) [W28.325]  Chief Complaint   Patient presents with    Altered Mental Status     Concerned of stroke  3 in past     Patient Active Problem List    Diagnosis Date Noted    Claudication [facility])     Stroke-like episode 2022    VBI (vertebrobasilar insufficiency) 2022    Ataxia 2022    Meniere disease, bilateral 2022    Tobacco use 10/05/2021    Rectal bleeding 2021    Adenomatous polyp of ascending colon     Cecal polyp     Adenomatous polyp of transverse colon     Adenomatous polyp of descending colon     Lumbar radiculopathy 2021    Dizziness 2021    Hyperlipidemia 2021    Skin lesion of left external ear 2021    History of colon polyps 2021    TIA (transient ischemic attack) 2020    At high risk for falls 2020    Late effects of CVA (cerebrovascular accident) 10/28/2020    Dysarthria     Left carotid artery stenosis 2020    Tinnitus of both ears 2020    Sensorineural hearing loss (SNHL) of left ear 2020    Ataxic gait 2020    Venous insufficiency of both lower extremities 2019    PAD (peripheral artery disease) ([facility] [address] 2019    Lymphedema of both lower extremities 2018    Microalbuminuria due to type 2 diabetes mellitus ([address] 2018    Skin infection 2018    Cutaneous abscess of back excluding buttocks 2018    Dependent edema 2017    Tremor of right hand 05/15/2017    Unsteady gait 05/15/2017    AYLIN (obstructive sleep apnea) 12/10/2016    Gout 12/10/2016    Macrocytosis Microalbuminuria due to type 2 diabetes mellitus ([address] 9/14/2018    Morbid obesity ([address] 1/2/2014    Myocardial infarction ([address]     Obesity     AYLIN (obstructive sleep apnea) 12/10/2016    Osteoarthritis     Right-sided muscle weakness 10/19/2016    S/P cardiac catheterization     Sciatica     Skin cyst 4/8/2016    Spasm 11/9/2016    Spina bifida ([address]     Stented coronary artery     Tremor of right hand 5/15/2017    Type 2 diabetes mellitus with diabetic polyneuropathy, without long-term current use of insulin ([address]     Unsteady gait 5/15/2017    Weakness     Weight gain      Past Surgical History:   Procedure Laterality Date    ANKLE SURGERY Left     BACK SURGERY      lumbar laminectomy    CHOLECYSTECTOMY      COLONOSCOPY  01/15/2010    polyp, diverticulosis ( [facility])    COLONOSCOPY N/A 08/19/2021    COLORECTAL CANCER SCREENING, HIGH RISK with polypectomies  performed by Aileen Gonzalez MD at [facility] 08/20/2021    polyps x 3, 3y repeat (DR Darek Olivo)  COLONOSCOPY with polypectomies DIAGNOSTIC performed by Italo Copeland MD at [facility]  2014    3 stents    CYST REMOVAL  05/16/2016    DR Dominic Kraft,  L SCROTAL CYST, R thigh, L ear, back    SHOULDER SURGERY Bilateral 12/18/2015    left once right twice- to remove bone spurs    TONSILLECTOMY       Allergies   Allergen Reactions    Bactrim [Sulfamethoxazole-Trimethoprim] Nausea And Vomiting and Other (See Comments)     Sugar count elevated, had troubles urinating       DATE ONSET: 11/1/2022    Date of Evaluation: 11/1/2022   Evaluating Therapist: Amber Jonas SLP    Dysphagia Diagnosis  Dysphagia Diagnosis: Swallow function appears WFL;No clinical indicators of dysphagia  Dysphagia Impression : No clinical indicators of oral or pharyngeal dysphagia identified at bedside.     Recommended Diet  Recommendations: Self feed  Diet Solids Recommendation: Regular  Liquid Consistency Recommendation: Thin  Recommended Form of Meds: PO  Compensatory Swallowing Strategies : Upright as possible for all oral intake;Small bites/sips      Reason for Referral  Michelle Braden was referred for a bedside swallow evaluation to assess the efficiency of his swallow function, identify signs and symptoms of aspiration, identify risk factors, and make recommendations regarding safe dietary consistencies, effective compensatory strategies, and safe eating environment. General  Chart Reviewed: Yes  Subjective  Subjective: Patient reports \"feeling embarassed\" that he came to the hospital this time. Patient reports feeling symptoms similar to that of previous CVA/TIA, but also notes the symptoms are resolving quickly. Behavior/Cognition: Alert; Cooperative;Pleasant mood  Respiratory Status: Room air  O2 Device: None (Room air)  Communication Observation: Aphasia  Follows Directions: Simple  Dentition: Adequate  Patient Positioning: Upright in bed  Baseline Vocal Quality: Normal  Volitional Cough: Strong  Prior Dysphagia History: History of mild oral dysphagia following 2020 CVA  Consistencies Administered: Thin - cup;Regular    Vision and Hearing  Vision  Vision: Impaired  Vision Exceptions: Wears glasses for reading (wears glasses for light sensitivity)  Hearing  Hearing: Exceptions to [facility]  Hearing Exceptions:  (patient reports increased hearing sensitivity)    Current Diet level  Current Diet : NPO  Current Liquid Diet : NPO    Oral Motor  Labial: No impairment  Dentition: Natural;Intact  Oral Hygiene: Moist;Clean  Lingual: No impairment  Velum: No Impairment  Mandible: No impairment  Gag:  (dnt)    Oral/Pharyngeal Phase  Oral Phase - Comment: WFL at this time. Adequate mastication and oral clearance of bolus in all opportunities independently. Pharyngeal Phase: No pharyngeal dysphagia suspected at this time. Hyolaryngeal movement is present. No overt s/s of aspiration observed per all consistencies tested.     PO Trials  Neuromuscular Estim Used: No  Assessment Method(s): Observation  Patient Position: Upright at edge of bed  Vocal Quality: No Impairment  Consistency Presented: Regular; Thin  How Presented: Self-fed/presented  How much presented:  (full package luigi cracker, 4oz water)  Bolus Acceptance: No impairment  Bolus Formation/Control: No impairment  Propulsion: No impairment  Oral Residue: Less than 10% of bolus  Initiation of Swallow:  (no impairment suspected)  Laryngeal Elevation:  (present)  Aspiration Signs/Symptoms: None  Pharyngeal Phase Characteristics: No impairment, issues, or problems    Dysphagia Diagnosis  Dysphagia Diagnosis: Swallow function appears WFL;No clinical indicators of dysphagia  Dysphagia Impression : No clinical indicators of oral or pharyngeal dysphagia identified at bedside. Dysphagia Outcome Severity Scale: Level 7: Normal in all situations     Recommendations  Requires SLP Intervention: No  Recommendations: Self feed  D/C Recommendations: No follow up therapy recommended post discharge  Diet Solids Recommendation: Regular  Liquid Consistency Recommendation: Thin  Compensatory Swallowing Strategies : Upright as possible for all oral intake;Small bites/sips  Recommended Form of Meds: PO  Duration of Treatment: no f/u  Frequency of Treatment: no f/u    Education  Individuals consulted  Consulted and agree with results and recommendations: Patient;RN  RN Name: [facility] in place: Yes  Type of devices: Bed alarm in place;Call light within reach  Restraints Initially in Place: No    Pain Assessment  Patient c/o pain: Location and pain level: 3/10 right frontal headach  Pt able to proceed with session.   RN notified    Pain Re-assessment  Patient c/o pain: Described as same as above      Therapy Time  SLP Individual Minutes  Time In: 1945  Time Out: 8583  Minutes: 10              Signature: Electronically signed by MATT Barnett on 11/1/2022 at 11:38 AM

## 2022-11-01 NOTE — ED NOTES
Patient transferred from car to wheelchair by ER staff and escorted into ER. Patient transferred from wheelchair to BAT bed.        Angelia Aguiar RN  11/01/22 [id number]

## 2022-11-01 NOTE — CONSULTS
[facility] Neurology Consult Note  Name: Laura Herrmann  Age: 79 y.o. Gender: male  CodeStatus: Full Code  Allergies: Bactrim [Sulfamethoxazole-Trimethoprim]    Chief Complaint:Altered Mental Status (Concerned of stroke/3 in past)    Primary Care Provider: Kristi Kruger MD  InpatientTreatment Team: Treatment Team: Attending Provider: Leopoldo Lynn, DO; Utilization Reviewer: Mary Pendleton RN; Registered Nurse: Antonella Danielle RN; Consulting Physician: Tatum France MD  Admission Date: 11/1/2022      HPI   Consulting provider: Dr Ines Ferraro for right-sided numbness, rule out CVA  Pt seen and examined for neurology consult. Patient is a 71-year-old  male with past medical history of Beatt[place] mellitus type II, CAD with coronary stents, spina bifida, obstructive sleep apnea, obesity, carotid artery stenosis, hyperlipidemia, CVA, gout, cardiomyopathy who presented to [place] emergency room on 11/1/2022 for vertigo, right-sided weakness and numbness, diplopia, lightheadedness, right-sided headache, fatigue that began at 3:30 in the morning. Patient was sitting on the commode when symptoms began. Patient does have history of CVA and TIA in the past with similar symptoms. Reports while walking on the stairs his right leg felt as if it were getting give out on him. He has had ongoing right leg weakness. He also reports tenderness to the right frontal/temporal area of his scalp with associated headache. Also reported some vision changes mainly when looking at lights saying that he saw \"3 of everything\". Patient reports he got scared therefore came to the emergency room for further evaluation. Patient on dual antiplatelet therapy and reports daily compliance. Laboratory testing shows mild hyponatremia 132 and otherwise essentially negative. Initial CT of the head did not for any acute findings. Currently alert and oriented x3, no acute distress, cooperative. Wife at bedside.   Patient tends to perseverate. No upper extremity weakness. Right lower extremity weakness noted. Areflexic in the lower extremities. Denies bowel or bladder dysfunction. No radiculopathy. Since seen personally by me and I was contacted from the emergency room regarding the same. 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     Employer: [employer]   Tobacco Use    Smoking status: Some Days     Packs/day: 2.50     Years: 30.00     Pack years: 75.00     Types: Cigarettes    Smokeless tobacco: Never    Tobacco comments:     varies   Vaping Use    Vaping Use: Never used   Substance and Sexual Activity    Alcohol use:  Yes     Alcohol/week: 0.0 standard drinks     Comment: limits less than 10/wk varies    Drug use: No    Sexual activity: Yes     Partners: Female   Other Topics Concern    Not on file   Social History Narrative    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-- Disabled from [address] [facility] after 2016 CVA    Lives With: Spouse    Type of Home: House    Home Layout: Two level, Bed/Bath upstairs, Laundry in basement(Railing)    Home Access: Stairs to enter with rails    Entrance Stairs - Number of Steps: 3 in back, 4 in front    Bathroom Shower/Tub: Tub/Shower unit    [address] [facility]: Shower chair    Home Equipment: Rolling walker, Cane    ADL Assistance: Independent    Homemaking Assistance: Needs assistance(Spouse  is primary; vertigo and arthritis limit housework)    Ambulation Assistance: Independent(Walker outside, cane inside)    Transfer Assistance: Independent    Active : No    Patient's  Info: has not driven since CVA     Social Determinants of Health     Financial Resource Strain: Low Risk     Difficulty of Paying Living Expenses: Not hard at all   Food Insecurity: No Food Insecurity    Worried About Running Out of Food in the Last Year: Never true    Ran Out of Food in the Last Year: Never true   Transportation Needs: Not on file   Physical Activity: Unknown    Days of Exercise per Week: Patient refused    Minutes of Exercise per Session: Not on file   Stress: Not on file   Social Connections: Not on file   Intimate Partner Violence: Not on file   Housing Stability: Not on file          Review of Systems   Constitutional:  Negative for appetite change and fever. HENT:  Negative for hearing loss and trouble swallowing. Eyes:  Positive for photophobia and visual disturbance. Respiratory:  Negative for cough, chest tightness, shortness of breath and wheezing. Cardiovascular:  Negative for chest pain, palpitations and leg swelling. Gastrointestinal:  Negative for abdominal distention, abdominal pain, nausea and vomiting. Genitourinary:  Negative for difficulty urinating. Musculoskeletal:  Positive for arthralgias and gait problem. Negative for back pain, joint swelling, myalgias, neck pain and neck stiffness. Skin:  Negative for color change and rash. Neurological:  Positive for weakness and headaches. Negative for dizziness, tremors, seizures, syncope, facial asymmetry, speech difficulty, light-headedness and numbness. Psychiatric/Behavioral:  Negative for agitation, confusion and hallucinations. The patient is not nervous/anxious. Physical Exam  Vitals and nursing note reviewed. Constitutional:       General: He is not in acute distress. Appearance: He is not diaphoretic. HENT:      Head: Normocephalic and atraumatic. Eyes:      General: No visual field deficit.      Extraocular Movements: Extraocular movements intact. Pupils: Pupils are equal, round, and reactive to light. Cardiovascular:      Rate and Rhythm: Normal rate and regular rhythm. Pulmonary:      Effort: Pulmonary effort is normal. No respiratory distress. Breath sounds: Normal breath sounds. Abdominal:      General: Bowel sounds are normal.      Palpations: Abdomen is soft. Skin:     General: Skin is warm and dry. Neurological:      Mental Status: He is alert and oriented to person, place, and time. Cranial Nerves: No cranial nerve deficit, dysarthria or facial asymmetry. Motor: Weakness (rle 3/5) present. No tremor, atrophy, abnormal muscle tone, seizure activity or pronator drift. Coordination: Coordination normal. Finger-Nose-Finger Test normal.      Gait: Gait abnormal.      Deep Tendon Reflexes: Reflexes abnormal.      Reflex Scores:       Patellar reflexes are 0 on the right side and 0 on the left side. Achilles reflexes are 0 on the right side and 0 on the left side.       Same as noted above      Medications:  Reviewed    Infusion Medications:    dextrose       Scheduled Medications:    enoxaparin  30 mg SubCUTAneous BID    aspirin  81 mg Oral Daily    Or    aspirin  300 mg Rectal Daily    rosuvastatin  40 mg Oral Nightly    insulin lispro  0-8 Units SubCUTAneous TID WC    insulin lispro  0-4 Units SubCUTAneous Nightly     PRN Meds: ondansetron **OR** ondansetron, polyethylene glycol, labetalol, glucose, dextrose bolus **OR** dextrose bolus, glucagon (rDNA), dextrose    Labs:   Recent Labs     11/01/22 0530   WBC 6.0   HGB 14.0   HCT 41.6*        Recent Labs     11/01/22 0530   *   K 3.5   CL 90*   CO2 25   BUN 14   CREATININE 0.82   CALCIUM 9.6     Recent Labs     11/01/22  0530   AST 25   ALT 26   BILITOT 0.5   ALKPHOS 73     Recent Labs     11/01/22 0530   INR 1.0     Recent Labs     11/01/22 0530   CKTOTAL 80   TROPONINI <0.010       Urinalysis:   Lab Results   Component Value Date/Time    NITRU Negative 11/01/2022 05:30 AM    WBCUA 0-2 08/20/2021 01:45 AM    BACTERIA Negative 08/20/2021 01:45 AM    RBCUA 0-2 08/20/2021 01:45 AM    BLOODU TRACE 11/01/2022 05:30 AM    SPECGRAV 1.017 11/01/2022 05:30 AM    GLUCOSEU Negative 11/01/2022 05:30 AM       Radiology:   Most recent    EEG No valid procedures specified. MRI of Brain No results found for this or any previous visit. Results for orders placed during the hospital encounter of 10/26/20    MRI BRAIN WO CONTRAST    Narrative  EXAMINATION: MRI BRAIN WO CONTRAST    CLINICAL HISTORY:  CVA    COMPARISONS: NONE AVAILABLE    TECHNIQUE:    Multiplanar multisequence images of the brain were obtained without contrast. Diffusion perfusion imaging was obtained. FINDINGS:    There are no extra-axial collections. There is no evidence of hemorrhage. There are no areas of signal abnormality on perfusion diffusion imaging to suggest ischemia. The susceptibility images do not demonstrate evidence of hemosiderin deposition within  the brain parenchyma or the leptomeninges. There is preservation of the gray-white matter differentiation. There are a few scattered foci of T2/FLAIR increased signal in the subcortical and periventricular white matter without associated edema or mass effect. There is marked prominence of the  sulci and ventricles indicating severe global cerebral atrophy and chronic involutional changes especially affecting the bilateral frontal lobes. The midline structures are intact, the corpus callosum is within normal limits. The region of the pineal gland and the sella turcica are unremarkable. There are no space-occupying lesions in the posterior fossa. The basilar cisterns are patent. The craniocervical junction is unremarkable. The visualized portions of the orbits are within normal limits, the globes are intact. The visualized portions of the paranasal sinuses are within normal limits.     The calvarium and soft tissues are unremarkable. Impression  There are no acute intracranial changes, there is no evidence of hemorrhage or acute ischemia. There is prominence of sulci and ventricles especially involving the frontal lobes indicating chronic interstitial changes and severe cerebral atrophy. Few  scattered foci of T2 FLAIR hyperintensity are noted throughout both hemispheres. The findings are most commonly associated with chronic microangiopathy. MRA of the Head and Neck: No results found for this or any previous visit. No results found for this or any previous visit. Results for orders placed during the hospital encounter of 11/12/17    MRA Head WO Contrast    Narrative  EXAMINATION: MRI BRAIN WO CONTRAST, MRA NECK WO CONTRAST, MRA HEAD WO CONTRAST    CLINICAL HISTORY: Altered vision. History of stroke. Right-sided weakness. Headache. COMPARISONS: July 28, 2016 MRI brain. November 12, 2017 CT brain without contrast enhancement. FINDINGS: Complete MRI of the brain was obtained. There is age-related atrophy. There are very few, very small T2 hyperintense age-related white matter lesions, largest being in the left frontal lobe. There is no mass. There is no edema. There is no  hematoma. There is no hydrocephalus. The proximal cervical spinal cord is normal to the C3 level. There is no abnormal restricted diffusion. There is no abnormal paramagnetic signal.    Time-of-flight MR angiography of the head and neck was obtained. Images of the brain were obtained centered on the [place] revealing no arterial occlusion or acquired stenosis. There is a large caliber posterior communicating artery on the  right. The P1 segment on the right is hypoplastic. There is no aneurysm. The A1 segment on the right is aplastic. There is a patent anterior communicating artery. Images of the neck show no stenosis or occlusion. There is no dissection.  Vertebral  arteries are large caliber and have normal flow volume. CONCLUSION: ATROPHY. NO ACUTE PROCESS IN THE BRAIN. INCIDENTAL VARIATIONS OF NORMAL ANATOMY ON MR ANGIOGRAM, WITHOUT ACQUIRED STENOSIS AND WITHOUT ARTERIAL OCCLUSION. CT of the Head: Results for orders placed during the hospital encounter of 11/01/22    CT HEAD WO CONTRAST    Narrative  EXAMINATION:  CT OF THE HEAD WITHOUT CONTRAST  11/1/2022 5:27 am    TECHNIQUE:  CT of the head was performed without the administration of intravenous  contrast. Automated exposure control, iterative reconstruction, and/or weight  based adjustment of the mA/kV was utilized to reduce the radiation dose to as  low as reasonably achievable. COMPARISON:  None. HISTORY:  ORDERING SYSTEM PROVIDED HISTORY: cva w/u  TECHNOLOGIST PROVIDED HISTORY:  Has a \"code stroke\" or \"stroke alert\" been called? ->Yes  Reason for exam:->cva w/u  Decision Support Exception - unselect if not a suspected or confirmed  emergency medical condition->Emergency Medical Condition (MA)  What reading provider will be dictating this exam?->CRC    FINDINGS:  BRAIN/VENTRICLES: There is no acute intracranial hemorrhage, mass effect or  midline shift. No abnormal extra-axial fluid collection. The gray-white  differentiation is maintained without evidence of an acute infarct. There is  no evidence of hydrocephalus. The ventricles, cisterns and sulci are  prominent consistent with atrophy. There is decreased attenuation within the  periventricular white matter consistent with periventricular leukomalacia. ORBITS: The visualized portion of the orbits demonstrate no acute abnormality. SINUSES: The visualized paranasal sinuses and mastoid air cells demonstrate  no acute abnormality. SOFT TISSUES/SKULL:  No acute abnormality of the visualized skull or soft  tissues. Impression  1. There is no acute intracranial abnormality. Specifically, there is no  intracranial hemorrhage.   2. Atrophy and periventricular leukomalacia,  . No results found for this or any previous visit. No results found for this or any previous visit. Carotid duplex: No results found for this or any previous visit. No results found for this or any previous visit. Results for orders placed during the hospital encounter of 20    US CAROTID ARTERY BILATERAL    Narrative  Patient MRN:  [id number]    : 1955    Age: 72 years    Gender: Male    Order Date:  2020 1:53 PM    EXAM: US CAROTID ARTERY BILATERAL    NUMBER OF IMAGES:  72    INDICATION: Z86.73 History of CVA (cerebrovascular accident) ICD10 , unsteady gait    COMPARISON: Carotid artery ultrasound 2017    FINDINGS:  Right side:  Proximal common carotid artery peak systolic velocity is 871 centimeters per second  Mid, common carotid artery peak systolic velocity is 455.5 centimeters per second. Distal common carotid artery peak systolic velocity is 225.7 centimeters per second    External carotid artery peak systolic velocity is 405 centimeters per second. Right carotid artery bulb/bifurcation peak systolic velocity 17.6 cm/s    Proximal internal carotid artery peak systolic velocity is 038.2 centimeters per second  Mid internal carotid artery peak systolic velocity is 144.0 centimeters per second. Distal internal carotid artery peak systolic velocity is 82.8 centimeters per second    Vertebral artery peak systolic velocity is 95.0 centimeters per second. Vertebral artery flow is antegrade    ICA/CCA ratio is 0.8    Left side:  Proximal common carotid artery peak systolic velocity is 477.6 centimeters per second  Mid, common carotid artery peak systolic velocity is 133.4 centimeters per second. Distal common carotid artery peak systolic velocity is 107.8 centimeters per second. External carotid artery peak systolic velocity is 119.6 centimeters per second. Left carotid artery bulb/bifurcation peak systolic velocity 58.2 cm/s.     Proximal internal carotid artery peak systolic velocity is 13.5 centimeters per second  Mid internal carotid artery peak systolic velocity is 01.5 centimeters per second. Distal internal carotid artery peak systolic velocity is 204.3 centimeters per second    Vertebral artery peak systolic velocity is 38.0 centimeters per second. Vertebral artery flow is antegrade    ICA/CCA ratio is 1.1. Calcified and noncalcified plaque bilateral carotid artery bulb/bifurcations and bilateral internal carotid arteries    Impression  Peak systolic velocities, ICA/CCA ratios and antegrade vertebral artery flow as above. See above for details of the examination and below for internal carotid artery interpretation guidelines. 1. Estimated luminal stenosis 50-69% distal left internal carotid artery. 2.Calcified and noncalcified plaque bilateral carotid artery bulb/bifurcations and bilateral internal carotid arteries. GOSINK CRITERIA    Diameter          PSV t            EDV t          PSV          EDV          Stenosis Site  %              cm/sec          cm/sec      ICA/CCA    ICA/CCA    0-49                 <124              <40             <2:1           ---                 ---    50-69              125-225                  >2:1           ---                 ---    70-89               225-325          >100          >4:1           >5:1              ---    90%+                >325              >100          >4:1           >9:1           Damped resistive CCA    >95%               May be            May be      Damped      ---              Damped resistive CCA  decreased      decreased  resistive CCA      Validated velocity measurements with angiographic measurements, velocity criteria are extrapolated from diameter data as defined by the Society of Radiologist in [address] [place] [address] Radiology 2003; [id number]. Echo No results found for this or any previous visit.             Assessment/Plan:    Patient is a 19-year-old  male with past medical history of diabetes mellitus type 2, CAD post MI with coronary stents, PAD, obesity, hyperlipidemia, CVA, TIA, gout, hypertension who presented with sudden onset of right lower extremity weakness, headache, vision changes with concern for TIA versus CVA. Patient does have documented previous history of CVA and recurrent TIAs mainly that present with the same symptoms. Upon review of his previous MRIs brain, CT head, MRAs head and neck, CTAs head and neck since 2014 there are no noted acute ischemic findings or significant stenosis or arterial occlusion on his scans. Patient on dual antiplatelet therapy at presentation and reports daily compliance. Initial CT of the head negative for any acute findings. CTA of the head neck pending. MRI of the brain pending. We will add MRI of the lumbar spine given his lower extremity weakness and areflexia. We will also assess sed rate and CRP given his scalp tenderness with associated headache and vision changes. Assess lipid panel and hemoglobin A1c for risk factor modification. Echocardiogram has been ordered. Further recommendations to follow pending work-up. I have personally performed a face to face diagnostic evaluation on this patient, reviewed all data and investigations, and am the sole provider of all clinical decisions on the neurological status of this patient. Recurrent right-sided symptoms with a normal examination still Multiple relations and I truly not feel that there is a vascular disease here. Patient also has variable symptoms though given the above findings we will reinvestigate. Case was discussed with emergency room and recommended admission as we have no other way to see him right away. We will arrange for the MRI. We will arrange for MRI of the lumbar spine as well. 60% time spent reviewing this patient myself      Alex Cornelius MD, [facility]ctor, [address] [place] [address] Board of Psychiatry & Neurology  Board Certified in Vascular Neurology  Board Certified in Neuromuscular Medicine  Certified in Neurorehabilitation         Collaborating physicians: Dr Cherie Cooper    Electronically signed by NEREYDA Winters CNP on 11/1/2022 at 11:15 AM

## 2022-11-01 NOTE — PLAN OF CARE
See OT evaluation for all goals and OT POC.  Electronically signed by ISIS Farrar/L on 11/1/2022 at 12:04 PM

## 2022-11-01 NOTE — ED PROVIDER NOTES
[facility] ED  eMERGENCY dEPARTMENT eNCOUnter      Pt Name: Misa Diaz  MRN: [id number]  [place] 1955  Date of evaluation: 11/1/2022  Provider: Akanksha Ellis DO      HISTORY OF PRESENT ILLNESS      Chief Complaint   Patient presents with    Altered Mental Status     Concerned of stroke  3 in past       The history is provided by the Patient and Wife. Misa Diaz is a 79 y.o. male with a PMH clinically significant for HTN, HLD, DMII, CVA, Carotid Artery Stenosis, prior CVA with R-sided deficits, chronic dizziness, diplopia, AYLIN, CAD, Anxiety presenting to the ED via EMS c/o multiple complaints including right-sided weakness/numbness, diplopia, dizziness, lightheadedness, right-sided headache and fatigue with initial onset at approximately 3:30 AM this morning. States that he was just sitting on the toilet when symptoms started. States he was awake before that and did not have symptoms then. States that he was feeling better earlier in the day. Has a history of multiple strokes in the past and states he has always had deficits like this with those strokes. Says that the weakness and numbness usually gets better however. Wife states that he intermittently gets numbness and weakness in the right leg. Also regularly gets double vision. Regularly walks with a walker. Denies any recent illnesses of which she is aware. Patient also states that he has been feeling well. No recent F/C/D, cough, SOB, CP, changes in bowel movements or urinary symptoms. Patient states he has been taking all medications as indicated. He is on Plavix. Denies any anticoagulation including Coumadin, Xarelto, Eliquis. Brianna Odom backwards in September, but has not had any more recent falls. Patient is a poor historian. Easily distracted and difficult to redirect. Per Chart Review: Most recent MRI in 10/2020 showing no acute intracranial changes at that time.   Did note prominence of the sulci and ventricles with severe cerebral atrophy. Scattered foci likely associated with chronic microangiopathy. REVIEW OF SYSTEMS       Review of Systems   Constitutional:  Positive for fatigue. Negative for chills, diaphoresis and fever. HENT:  Negative for rhinorrhea and sore throat. Eyes:  Positive for photophobia. Negative for pain, redness and visual disturbance. Respiratory:  Negative for cough and shortness of breath. Cardiovascular:  Negative for chest pain and palpitations. Gastrointestinal:  Negative for abdominal pain, diarrhea, nausea and vomiting. Genitourinary:  Negative for dysuria. Musculoskeletal:  Positive for arthralgias. Negative for back pain, myalgias and neck pain. Skin:  Negative for rash. Neurological:  Positive for dizziness, weakness, light-headedness, numbness and headaches. Psychiatric/Behavioral:  The patient is nervous/anxious.       PAST MEDICAL HISTORY     Past Medical History:   Diagnosis Date    Abscess of back 1/6/2020    Abscess of right leg 1/6/2020    Acute renal failure (HCC)     Anxiety     CAD S/P percutaneous coronary angioplasty 3/11/2014    Cardiomyopathy [facility])     Cerebrovascular accident (CVA) due to occlusion of left middle cerebral artery ([address] 08/2016    brainstem infarction from left MCA occlusion    Cerebrovascular disease 07/27/2016    Colon polyp     Coronary angioplasty status     CVA (cerebral vascular accident) ([address] 11/12/2017    Dependent edema 9/14/2017    Diplopia 5/15/2017    Resolved with management of cataracts    Dupuytren contracture 1/2/2018    Dysphagia 8/18/2011    Dysphonia 8/18/2011    Essential hypertension 8/17/2016    Gallop rhythm     Gout 12/10/2016    Gout of big toe 08/2014    Right Great Toe    H/O fall     Headache     migraines    Heart failure ([address]     History of chest pain 1/2/2014    History of CVA 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    History of heart failure 1/6/2014 History of myocardial infarction 3/11/2014    History of recurrent pneumonia 2/11/2014    Hx of bacterial pneumonia     Hyperlipidemia 4/30/2021    Hyperlipidemia, mixed 4/30/2021    Hyperlipidemia, mixed 4/30/2021    Hypotension     Left carotid artery stenosis 8/23/2020    Left carotid artery stenosis 8/23/2020    Leg swelling     Macrocytosis without anemia 12/10/2016    Microalbuminuria due to type 2 diabetes mellitus ([address] 9/14/2018    Morbid obesity ([address] 1/2/2014    Myocardial infarction ([address]     Obesity     AYLIN (obstructive sleep apnea) 12/10/2016    Osteoarthritis     Right-sided muscle weakness 10/19/2016    S/P cardiac catheterization     Sciatica     Skin cyst 4/8/2016    Spasm 11/9/2016    Spina bifida ([address]     Stented coronary artery     Tremor of right hand 5/15/2017    Type 2 diabetes mellitus with diabetic polyneuropathy, without long-term current use of insulin ([address]     Unsteady gait 5/15/2017    Weakness     Weight gain        SURGICAL HISTORY       Past Surgical History:   Procedure Laterality Date    ANKLE SURGERY Left     BACK SURGERY      lumbar laminectomy    CHOLECYSTECTOMY      COLONOSCOPY  01/15/2010    polyp, diverticulosis ( [facility])    COLONOSCOPY N/A 08/19/2021    COLORECTAL CANCER SCREENING, HIGH RISK with polypectomies  performed by Aileen Gonzalez MD at [facility]    COLONOSCOPY N/A 08/20/2021    polyps x 3, 3y repeat (DR Darek Olivo)  COLONOSCOPY with polypectomies DIAGNOSTIC performed by Italo Copeland MD at [address] [place] [address]    3 stents    CYST REMOVAL  05/16/2016    DR Dominic Kraft,  L SCROTAL CYST, R thigh, L ear, back    SHOULDER SURGERY Bilateral 12/18/2015    left once right twice- to remove bone spurs    TONSILLECTOMY         FAMILY HISTORY       Family History   Problem Relation Age of Onset    Breast Cancer Mother     Stroke Father     Colon Cancer Father 76       SOCIAL HISTORY       Social History     Socioeconomic History    Marital status:      Spouse name: Kartik Quiñonez    Number of children: 3    Years of education: 12+   Occupational History    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     Employer: [employer] [place]   Tobacco Use    Smoking status: Some Days     Packs/day: 2.50     Years: 30.00     Pack years: 75.00     Types: Cigarettes    Smokeless tobacco: Never    Tobacco comments:     varies   Vaping Use    Vaping Use: Never used   Substance and Sexual Activity    Alcohol use: Yes     Alcohol/week: 0.0 standard drinks     Comment: limits less than 10/wk varies    Drug use: No    Sexual activity: Yes     Partners: Female   Social History Narrative    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-- Disabled from [place] after 2016 CVA    Lives With: Spouse    Type of Home: House    Home Layout: Two level, Bed/Bath upstairs, Laundry in basement(Railing)    Home Access: Stairs to enter with rails    Entrance Stairs - Number of Steps: 3 in back, 4 in front    Bathroom Shower/Tub: Tub/Shower unit    Bathroom Equipment: Shower chair    Home Equipment: Rolling walker, Cane    ADL Assistance: Independent    Homemaking Assistance: Needs assistance(Spouse  is primary; vertigo and arthritis limit housework)    Ambulation Assistance: Independent(Walker outside, cane inside)    Transfer Assistance: Independent    Active : No    Patient's  Info: has not driven since CVA     Social Determinants of Health     Financial Resource Strain: Low Risk     Difficulty of Paying Living Expenses: Not hard at all   Food Insecurity: No Food Insecurity    Worried About Running Out of Food in the Last Year: Never true    [address] in the Last Year: Never true   Physical Activity: Unknown    Days of Exercise per Week: Patient refused       CURRENT MEDICATIONS       Previous Medications    ACETAMINOPHEN (TYLENOL) 500 MG TABLET    Take 500 mg by mouth every 6 hours as needed for Pain    ALCOHOL SWABS PADS    DX: diabetes mellitus.  Test 1 time(s) daily - Ok to substitute per insurance (1 each = 1 box)    ALLOPURINOL (ZYLOPRIM) 300 MG TABLET    Take 1 tablet by mouth daily    AMMONIUM LACTATE (AMLACTIN) 12 % CREAM    APPLY TO DRY CALLUS AREAS 1 TO 2 TIMES DAILY    ASPIRIN 81 MG EC TABLET    Take 81 mg by mouth daily    ATORVASTATIN (LIPITOR) 40 MG TABLET    Take 1 tablet by mouth daily    BLOOD GLUCOSE MONITOR KIT AND SUPPLIES    DX: diabetes mellitus. Test 1 time(s) daily - True Metrix requested by patient - [id number] [address] to substitute per insurance    BLOOD GLUCOSE MONITOR STRIPS    DX: diabetes mellitus. Use 1 time(s) daily - True Metrix requested by patient - Ok to substitute per insurance    CARVEDILOL (COREG) 25 MG TABLET    Take 1 tablet by mouth in the morning and 1 tablet before bedtime. CLOPIDOGREL (PLAVIX) 75 MG TABLET    Take 1 tablet by mouth daily    ELASTIC BANDAGES & SUPPORTS (V-4 HIGH COMPRESSION HOSE) MISC    KNEE HIGH - 20-30 mmHg    GLIMEPIRIDE (AMARYL) 1 MG TABLET    Take 1 tablet by mouth every morning (before breakfast)    HYDROXYZINE HCL (ATARAX) 25 MG TABLET    Twice daily for 2 to 3 days when dizziness    LANCETS MISC    DX: diabetes mellitus. Use 1 time(s) daily - Ok to substitute per insurance (1 each = 1 box)    METFORMIN (GLUCOPHAGE) 500 MG TABLET    Take 1 tablet by mouth 2 times daily (with meals)    MISC.  DEVICES (COMMODE BEDSIDE) MISC    Use daily as needed    MULTIPLE VITAMIN PO    Take 1 tablet by mouth daily     PROBIOTIC PRODUCT (PROBIOTIC DAILY PO)    Take 1 tablet by mouth daily    TORSEMIDE (DEMADEX) 20 MG TABLET    Take 1 tablet by mouth daily    VITAMIN B-12 (CYANOCOBALAMIN) 100 MCG TABLET    Take 50 mcg by mouth daily    VITAMIN D PO           ALLERGIES     Bactrim [sulfamethoxazole-trimethoprim]      PHYSICAL EXAM       ED Triage Vitals   BP Temp Temp Source Heart Rate Resp SpO2 Height Weight   11/01/22 0518 11/01/22 0511 11/01/22 0511 11/01/22 0518 11/01/22 0518 11/01/22 0518 -- 11/01/22 0511   136/83 97.9 °F (36.6 °C) Temporal 85 18 97 %  285 lb 8 oz (129.5 kg)       Physical Exam  Vitals and nursing note reviewed. Constitutional:       Appearance: He is obese. He is not ill-appearing, toxic-appearing or diaphoretic. HENT:      Head: Normocephalic and atraumatic. Mouth/Throat:      Mouth: Mucous membranes are moist.      Pharynx: Oropharynx is clear. Eyes:      General: No visual field deficit. Extraocular Movements:      Right eye: Nystagmus present. Left eye: Nystagmus present. Pupils: Pupils are equal, round, and reactive to light. Visual Fields: Right eye visual fields normal and left eye visual fields normal.      Comments: Able to cross midline. Difficulty maintaining lateral gazes however 2/2 dizziness   Cardiovascular:      Rate and Rhythm: Normal rate and regular rhythm. Pulses: Normal pulses. Heart sounds: Normal heart sounds. Pulmonary:      Effort: Pulmonary effort is normal.      Breath sounds: Normal breath sounds. Abdominal:      General: There is no distension. Palpations: Abdomen is soft. Tenderness: There is no abdominal tenderness. There is no guarding or rebound. Musculoskeletal:         General: No deformity. Cervical back: Neck supple. No tenderness. Right lower leg: Edema present. Left lower leg: Edema present. Skin:     General: Skin is warm and dry. Capillary Refill: Capillary refill takes less than 2 seconds. Neurological:      Mental Status: He is alert and oriented to person, place, and time. Cranial Nerves: No dysarthria or facial asymmetry. Sensory: Sensory deficit present. Motor: Weakness present. Coordination: Coordination abnormal. Finger-Nose-Finger Test abnormal and Heel to [place] Test abnormal.      Gait: Gait abnormal.      Comments: Short steps, wide-based gait  Altered sensation to light touch throughout R-sided extremities and face. Sensation to pain intact throughout.    Psychiatric: Mood and Affect: Mood is anxious.        MDM:   Chart Reviewed: PMH and additional information as noted in HPI obtained from chart review    Vitals:    Vitals:    11/01/22 0600 11/01/22 0614 11/01/22 0715 11/01/22 0730   BP: 112/77  115/65 121/82   Pulse:  81 86 89   Resp:  11 14 16   Temp:       TempSrc:       SpO2: 99% 98% 95% 96%   Weight:           PROCEDURES:  Unless otherwise noted below, none  Procedures    LABS:  Labs Reviewed   COMPREHENSIVE METABOLIC PANEL - Abnormal; Notable for the following components:       Result Value    Sodium 132 (*)     Chloride 90 (*)     Anion Gap 17 (*)     Glucose 117 (*)     All other components within normal limits   CBC WITH AUTO DIFFERENTIAL - Abnormal; Notable for the following components:    RBC 3.87 (*)     Hematocrit 41.6 (*)     .5 (*)     MCH 36.0 (*)     All other components within normal limits   TSH WITH REFLEX - Abnormal; Notable for the following components:    TSH 4.780 (*)     All other components within normal limits   LACTIC ACID - Abnormal; Notable for the following components:    Lactic Acid 3.4 (*)     All other components within normal limits    Narrative:     CALL  [facility]  [facility] tel. [phone],  lacid results called to and read back by aguila lopez, 11/01/2022 06:02, by  GISELA   POCT GLUCOSE - Abnormal; Notable for the following components:    POC Glucose 119 (*)     All other components within normal limits   POCT GLUCOSE - Normal   RAPID INFLUENZA A/B ANTIGENS   COVID-19, RAPID   MAGNESIUM   TROPONIN   BRAIN NATRIURETIC PEPTIDE   CK   ETHANOL    Narrative:     Juan Miguelnavin Oreillya  [facility] tel. [phone],  lacid results called to and read back by aguila lopez, 11/01/2022 06:02, by  GISELA   APTT   PROTIME-INR   T4, FREE   URINALYSIS WITH REFLEX TO CULTURE   URINE DRUG SCREEN   TYPE AND SCREEN       XR CHEST PORTABLE    (Results Pending)   CTA NECK W WO CONTRAST    (Results Pending)   CTA HEAD W WO CONTRAST    (Results Pending)   CT HEAD WO CONTRAST (Results Pending)       ED Course as of 11/01/22 0750   Tue Nov 01, 2022   0632 Lactic Acid(!!): 3.4  Likely 2/2 Etoh intox [NA]   0634 EKG 12 Lead  EKG showing sinus rhythm with first-degree AVB, rate of 83 bpm.  Normal axis, otherwise normal intervals. No gross acute ST-T wave abnormalities. [NA]   [id number] Sodium(!): 132 [NA]   0648 Anion Gap(!): 17 [NA]   0648 SARS-CoV-2, NAAT: Not Detected [NA]   7764 Influenza A by PCR: Negative [NA]   0648 Influenza B by PCR: Negative [NA]   0648 Total CK: 86 [NA]   0648 Troponin: <0.010 [NA]   0648 Ethanol Lvl: 209  C/w mild clinical intoxication. [NA]   0648 aPTT: 27.6 [NA]   0648 INR: 1.0 [NA]      ED Course User Index  [NA] Oleg Daley MD       79 y.o. male with a PMH clinically significant for HTN, HLD, DMII, CVA, Carotid Artery Stenosis, prior CVA with R-sided deficits, chronic dizziness, diplopia, AYLIN, CAD, Anxiety presenting to the ED via EMS c/o multiple complaints including right-sided weakness/numbness, diplopia, dizziness, lightheadedness, right-sided headache and fatigue with initial onset at approximately 3:30 AM this morning. Upon initial evaluation, Pt anxious appearing, but otherwise Afebrile, HDS and in NAD. PE as noted above. Labs, , EKG,, and Imaging as noted above. Given findings, clinical presentation felt most concerning for recrudescence of prior stroke versus new CVA. Patient however with multiple reasons for symptoms to be more chronic than acute. Wife noting that the patient does intermittently have the symptoms. Patient also will be mildly acutely intoxicated. Weakness reported to be new however, or at least worse than baseline. NIHSS as noted above. CT head without evidence of acute bleed. No evidence of arrhythmias or cardiogenic etiology. CTA pending at time of signout.   Pt was administered   Medications   0.9 % sodium chloride bolus (1,000 mLs IntraVENous New Bag 11/1/22 0659)   iopamidol (ISOVUE-300) 61 % injection 75 mL (75 mLs IntraVENous Given 11/1/22 0558)       Plan: Follow up imaging, reassess and discuss findings with neurology. Signed out to Dr. Alysa Hamilton at 0700 with plan as noted above. Dr. Alysa Hamilton re-examined the patient 56. Able to dorsi flex and plantar flex right foot. While examining right hand patient was able to lift right heel off of bed and able to do so intermittently. There is significant improvement. Patient states his visual symptoms of triple quadruple vision has improved. He had some blurring of vision also which has improved. Sensation to the right hand is still tingling but is improved. Patient to the face on the right is still tingling but improved. Takes aspirin and Plavix. Patient's nontoxic. Call has been placed to Dr. Gabriel Carrera. Patient has residual deficits from prior stroke as well. NOT a TPA candidate; patient improving. Discussed with Dr. Jean Marie Randhawa who will accept the patient. Patient speaks with ease. Knows month/year/age. Identifies his wife. Identifies 1 over head light, a blue light looks like 3. Vision improved. FINAL IMPRESSION      1. Stroke-like symptoms    2. Acute alcoholic intoxication with complication (HCC)    3. Obesity (BMI 30-39. 9)          DISPOSITION/PLAN   DISPOSITION Decision To Admit 11/01/2022 07:43:22 AM      Current Discharge Medication List           DO Thalia Cosme, DO  11/01/22 [address], DO  11/01/22 6801

## 2022-11-01 NOTE — ED TRIAGE NOTES
Patient to ED for c/o concern for stroke. Patient states he became dizzy and \"felt like the ones before\" at 0314 while at home in bathroom. Patient has deficits to right side from previous strokes per patient report. Patient verbal and answering questions appropriately. Skin p/w/d. Respirations even and unlabored. Hx of heart attack 2012 and stents 2013. Denies recent illness. .    Denies illict drug use. Endorses Scotch use.

## 2022-11-01 NOTE — PROGRESS NOTES
Physical Therapy Med Surg Initial Assessment  Fac[facility]/Department: [facility]  [id number]       NAME: Celeste Garcia  : 1955 (22 y.o.)  MRN: [id number]  CODE STATUS: Full Code    Date of Service: 2022    Patient Diagnosis(es): Obesity (BMI 30-39. 9) [E66.9]  Stroke-like symptoms [R29.90]  Stroke-like episode [B82.72]  Acute alcoholic intoxication with complication [facility]) [U68.946]   Chief Complaint   Patient presents with    Altered Mental Status     Concerned of stroke  3 in past     Patient Active Problem List    Diagnosis Date Noted    Claudication [facility])     Stroke-like episode 2022    VBI (vertebrobasilar insufficiency) 2022    Ataxia 2022    Meniere disease, bilateral 2022    Tobacco use 10/05/2021    Rectal bleeding 2021    Adenomatous polyp of ascending colon     Cecal polyp     Adenomatous polyp of transverse colon     Adenomatous polyp of descending colon     Lumbar radiculopathy 2021    Dizziness 2021    Hyperlipidemia 2021    Skin lesion of left external ear 2021    History of colon polyps 2021    TIA (transient ischemic attack) 2020    At high risk for falls 2020    Late effects of CVA (cerebrovascular accident) 10/28/2020    Dysarthria     Left carotid artery stenosis 2020    Tinnitus of both ears 2020    Sensorineural hearing loss (SNHL) of left ear 2020    Ataxic gait 2020    Venous insufficiency of both lower extremities 2019    PAD (peripheral artery disease) ([address] 2019    Lymphedema of both lower extremities 2018    Microalbuminuria due to type 2 diabetes mellitus ([address] 2018    Skin infection 2018    Cutaneous abscess of back excluding buttocks 2018    Dependent edema 2017    Tremor of right hand 05/15/2017    Unsteady gait 05/15/2017    AYLIN (obstructive sleep apnea) 12/10/2016    Gout 12/10/2016    Macrocytosis without anemia 12/10/2016 Spasm 11/09/2016    Right-sided muscle weakness 10/19/2016    Essential hypertension 08/17/2016    History of CVA (cerebrovascular accident) 08/15/2016    Skin cyst 04/08/2016    Local infection of skin and subcutaneous tissue 04/08/2016    CAD (coronary artery disease) 03/11/2014    History of myocardial infarction 03/11/2014    DM2 (diabetes mellitus, type 2) ([address] 03/11/2014    History of recurrent pneumonia 02/11/2014    History of heart failure 01/06/2014    Obesity 01/02/2014        Past Medical History:   Diagnosis Date    Abscess of back 1/6/2020    Abscess of right leg 1/6/2020    Acute renal failure (HCC)     Anxiety     CAD S/P percutaneous coronary angioplasty 3/11/2014    Cardiomyopathy ([address]     Cerebrovascular accident (CVA) due to occlusion of left middle cerebral artery ([address] 08/2016    brainstem infarction from left MCA occlusion    Cerebrovascular disease 07/27/2016    Colon polyp     Coronary angioplasty status     CVA (cerebral vascular accident) ([address] 11/12/2017    Dependent edema 9/14/2017    Diplopia 5/15/2017    Resolved with management of cataracts    Dupuytren contracture 1/2/2018    Dysphagia 8/18/2011    Dysphonia 8/18/2011    Essential hypertension 8/17/2016    Gallop rhythm     Gout 12/10/2016    Gout of big toe 08/2014    Right Great Toe    H/O fall     Headache     migraines    Heart failure ([address]     History of chest pain 1/2/2014    History of CVA 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    History of heart failure 1/6/2014    History of myocardial infarction 3/11/2014    History of recurrent pneumonia 2/11/2014    Hx of bacterial pneumonia     Hyperlipidemia 4/30/2021    Hyperlipidemia, mixed 4/30/2021    Hyperlipidemia, mixed 4/30/2021    Hypotension     Left carotid artery stenosis 8/23/2020    Left carotid artery stenosis 8/23/2020    Leg swelling     Macrocytosis without anemia 12/10/2016    Microalbuminuria due to type 2 diabetes mellitus ([facility] 9/14/2018    Morbid obesity ([address] 1/2/2014    Myocardial infarction ([facility]     Obesity     AYLIN (obstructive sleep apnea) 12/10/2016    Osteoarthritis     Right-sided muscle weakness 10/19/2016    S/P cardiac catheterization     Sciatica     Skin cyst 4/8/2016    Spasm 11/9/2016    Spina bifida ([facility]     Stented coronary artery     Tremor of right hand 5/15/2017    Type 2 diabetes mellitus with diabetic polyneuropathy, without long-term current use of insulin ([facility]     Unsteady gait 5/15/2017    Weakness     Weight gain      Past Surgical History:   Procedure Laterality Date    ANKLE SURGERY Left     BACK SURGERY      lumbar laminectomy    CHOLECYSTECTOMY      COLONOSCOPY  01/15/2010    polyp, diverticulosis ( [facility])    COLONOSCOPY N/A 08/19/2021    COLORECTAL CANCER SCREENING, HIGH RISK with polypectomies  performed by Joe Green MD at [address] [facility] 08/20/2021    polyps x 3, 3y repeat (DR Killian Mccallum)  COLONOSCOPY with polypectomies DIAGNOSTIC performed by Divya Alegria MD at [facility]  2014    3 stents    CYST REMOVAL  05/16/2016    DR Violeta Cowan,  L SCROTAL CYST, R thigh, L ear, back    SHOULDER SURGERY Bilateral 12/18/2015    left once right twice- to remove bone spurs    TONSILLECTOMY         Patient assessed for rehabilitation services?: Yes  Family / Caregiver Present: Yes (wife)    Restrictions:  Restrictions/Precautions: Fall Risk (high cruz score)     SUBJECTIVE:   Pain   0/10    Prior Level of Function:  Social/Functional History  Lives With: Spouse  Type of Home: House  Home Layout: Two level, Bed/Bath upstairs  Home Access: Stairs to enter with rails  Entrance Stairs - Number of Steps: 5 steps to enter, then 7+7 to second floor with rails  Bathroom Shower/Tub: Tub/Shower unit  Bathroom Equipment: Shower chair, Hand-held shower  Home Equipment: Cane  ADL Assistance:  (Supervision for shower transfers)  [address]:  (Shared housework)  Ambulation Assistance: Independent (Cane in the house, [employer] for longer distances)  Transfer Assistance: Independent  Active : No  Patient's  Info: has not driven since CVA  Mode of Transportation: Car    OBJECTIVE:   Vision  Vision Exceptions: Wears glasses for reading (wears glasses for light sensitivity)  Hearing: Exceptions to [facility]  Hearing Exceptions:  (patient reports increased hearing sensitivity)    Cognition:  Overall Orientation Status: Within Functional Limits  Follows Commands: Within Functional Limits  Overall Cognitive Status: Exceptions  Cognition Comment: Min verbal cues for safety. Pt reports he feels 'fine'but spouse reports concerns re: limitations getting up off floor. Pt states he is 'fine', unaware of deficits when he has falls    Observation/Palpation  Posture: Fair  Observation: pt sitting edge of bed with RN at time of PT arrival; no signs of distress; word finding difficulty at times    ROM:  RLE AROM: WFL  LLE AROM : WFL    Strength:  Strength RLE  Comment: grossly 4-/5  Strength LLE  Comment: grossly 4/5    Neuro:  Balance  Sitting - Static: Good  Sitting - Dynamic: Good  Standing - Static: Fair;-  Standing - Dynamic: Poor;+    Sensation: Impaired (R UE and LE)    Bed mobility  Bed Mobility Comments: NT pt sitting at bedside upon arrival to pt room- anticipate supervision    Transfers  Sit to Stand: Minimal Assistance (prefers to pull up to standing)  Stand to Sit: Stand by assistance  Comment: heavy reliance on UEs during transfers    Ambulation  Surface: Level tile  Device: No Device;Hand-Held Assist  Assistance: Contact guard assistance  Quality of Gait: furniture walk; heavy reliance on UEs for steadiness; posterior LOB with 180 deg turn with use of UE to correct  Gait Deviations: Slow Elaine; Increased HOLLIE; Decreased step height  Distance: 30ft    Stairs/Curb  Stairs?: No    Activity Tolerance  Activity Tolerance Comments: Pt limited by balance deficits    Patient Education  Education Given To: Patient  Education Provided: Role of Therapy;Plan of Care; Fall Prevention Strategies  Education Method: Verbal  Education Outcome: Continued education needed       ASSESSMENT:   Body Structures, Functions, Activity Limitations Requiring Skilled Therapeutic Intervention: Decreased functional mobility ; Decreased strength;Decreased endurance;Decreased balance;Decreased posture  Decision Making: Medium Complexity  History: high  Exam: med  Clinical Presentation: med    Therapy Prognosis: Good    DISCHARGE RECOMMENDATIONS:  Discharge Recommendations: Continue to assess pending progress, Patient would benefit from continued therapy after discharge    Assessment: Pt is 79 y.o. male with R UE/LE weakness/numbness at home and diplopia. Pt has a h/o of CVAs. Pt exhibits decreased R side strength, decreased balance, and numbness R LE which increases pt risk for falls during mobility. Pt exhibits heavy reliance on UEs for steadiness during gait and reports only use of cane inside pt's home. Continued PT is required for safe d/c home with wife with decreased risk for falls. Requires PT Follow-Up: Yes      PLAN OF CARE:  Physcial Therapy Plan  General Plan: 1 time a day 3-6 times a week  Current Treatment Recommendations: Strengthening, ROM, Balance training, Functional mobility training, Transfer training, Endurance training, Gait training, Stair training, Neuromuscular re-education, Home exercise program, Safety education & training, Patient/Caregiver education & training, Equipment evaluation, education, & procurement, Positioning, Therapeutic activities    Safety Devices  Type of Devices:  All fall risk precautions in place, Call light within reach  Restraints  Restraints Initially in Place: No    Goals:  Long Term Goals  Long Term Goal 1: Pt will demonstrate bed mobility indep  Long Term Goal 2: Pt will demonstrate transfers with safest AD mod indep  Long Term Goal 3: Pt will demonstrate amb >/= 150ft with safest AD mod indep  Long Term Goal 4: Pt will demonstrate stair negotiation 1 flight with 1 rail mod indep  Long Term Goal 5: Pt will demonstrate TUG </= 13 sec for decreased risk for falls at home    [facility] (6 CLICK) BASIC MOBILITY  AM-PAC Inpatient Mobility Raw Score : 17     Therapy Time:   Individual   Time In 1106   Time Out 1124   Minutes 18       Eval X 18 min    Monica Alexandra, PT, 11/01/22 at 11:58 AM         Definitions for assistance levels  Independent = pt does not require any physical supervision or assistance from another person for activity completion. Device may be needed.   Stand by assistance = pt requires verbal cues or instructions from another person, close to but not touching, to perform the activity  Minimal assistance= pt performs 75% or more of the activity; assistance is required to complete the activity  Moderate assistance= pt performs 50% of the activity; assistance is required to complete the activity  Maximal assistance = pt performs 25% of the activity; assistance is required to complete the activity  Dependent = pt requires total physical assistance to accomplish the task

## 2022-11-01 NOTE — PROGRESS NOTES
[facility]   Facility/Department: [facility]  Speech Language Pathology    NAME:Khari Diaz  : 1955  ROOM: [id number]      DATE: 2022      This patient is currently in observation/outpatient status. To comply with Medicare and insurance regulations, please update orders to include a valid Speech Therapy treatment diagnosis (i.e. aphasia, dysphagia, dysarthria, cognitive impairment).        Thank you,  Doni Ayala SLP, CCC-SLP, Date: 2022, Time: 9:43 AM

## 2022-11-01 NOTE — ED NOTES
The following labs were labeled with appropriate pt sticker and tubed to lab:     [x] Blue     [x] Lavender   [] on ice  [x] Green/yellow  [] Green/black [] on ice  [x] Grey  [x] on ice  [] Yellow  [x] Red  [] Type/ Screen  [] ABG  [] VBG    [] COVID-19 swab    [] Rapid  [] PCR  [] Flu swab  [] Peds Viral Panel     [] Urine Sample  [] Pelvic Cultures  [] Blood Cultures  [] X 2  [] STREP Cultures         Angelia Aguiar RN  11/01/22 [id number]

## 2022-11-01 NOTE — ED NOTES
Transport here. Pt without further complaints. No acute distress noted. Belongings with patient. No tele pack was ordered.       Eliana Salgado RN  11/01/22 1328

## 2022-11-01 NOTE — PROGRESS NOTES
[facility] OCCUPATIONAL THERAPY EVALUATION - ACUTE     NAME: Fatmata Ray  : 1955 (89 y.o.)  MRN: [id number]  CODE STATUS: Full Code  Room: J865/Z439-81    Date of Service: 2022    Patient Diagnosis(es): Obesity (BMI 30-39. 9) [E66.9]  Stroke-like symptoms [R29.90]  Stroke-like episode [K63.30]  Acute alcoholic intoxication with complication [facility]) [O76.203]   Patient Active Problem List    Diagnosis Date Noted    Claudication [facility])     Stroke-like episode 2022    VBI (vertebrobasilar insufficiency) 2022    Ataxia 2022    Meniere disease, bilateral 2022    Tobacco use 10/05/2021    Rectal bleeding 2021    Adenomatous polyp of ascending colon     Cecal polyp     Adenomatous polyp of transverse colon     Adenomatous polyp of descending colon     Lumbar radiculopathy 2021    Dizziness 2021    Hyperlipidemia 2021    Skin lesion of left external ear 2021    History of colon polyps 2021    TIA (transient ischemic attack) 2020    At high risk for falls 2020    Late effects of CVA (cerebrovascular accident) 10/28/2020    Dysarthria     Left carotid artery stenosis 2020    Tinnitus of both ears 2020    Sensorineural hearing loss (SNHL) of left ear 2020    Ataxic gait 2020    Venous insufficiency of both lower extremities 2019    PAD (peripheral artery disease) ([address] 2019    Lymphedema of both lower extremities 2018    Microalbuminuria due to type 2 diabetes mellitus ([address] 2018    Skin infection 2018    Cutaneous abscess of back excluding buttocks 2018    Dependent edema 2017    Tremor of right hand 05/15/2017    Unsteady gait 05/15/2017    AYLIN (obstructive sleep apnea) 12/10/2016    Gout 12/10/2016    Macrocytosis without anemia 12/10/2016    Spasm 2016    Right-sided muscle weakness 10/19/2016    Essential hypertension 2016    History of CVA (cerebrovascular accident) 08/15/2016    Skin cyst 04/08/2016    Local infection of skin and subcutaneous tissue 04/08/2016    CAD (coronary artery disease) 03/11/2014    History of myocardial infarction 03/11/2014    DM2 (diabetes mellitus, type 2) ([address] 03/11/2014    History of recurrent pneumonia 02/11/2014    History of heart failure 01/06/2014    Obesity 01/02/2014        Past Medical History:   Diagnosis Date    Abscess of back 1/6/2020    Abscess of right leg 1/6/2020    Acute renal failure (HCC)     Anxiety     CAD S/P percutaneous coronary angioplasty 3/11/2014    Cardiomyopathy [facility])     Cerebrovascular accident (CVA) due to occlusion of left middle cerebral artery ([address] 08/2016    brainstem infarction from left MCA occlusion    Cerebrovascular disease 07/27/2016    Colon polyp     Coronary angioplasty status     CVA (cerebral vascular accident) ([address] 11/12/2017    Dependent edema 9/14/2017    Diplopia 5/15/2017    Resolved with management of cataracts    Dupuytren contracture 1/2/2018    Dysphagia 8/18/2011    Dysphonia 8/18/2011    Essential hypertension 8/17/2016    Gallop rhythm     Gout 12/10/2016    Gout of big toe 08/2014    Right Great Toe    H/O fall     Headache     migraines    Heart failure ([address]     History of chest pain 1/2/2014    History of CVA 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    History of heart failure 1/6/2014    History of myocardial infarction 3/11/2014    History of recurrent pneumonia 2/11/2014    Hx of bacterial pneumonia     Hyperlipidemia 4/30/2021    Hyperlipidemia, mixed 4/30/2021    Hyperlipidemia, mixed 4/30/2021    Hypotension     Left carotid artery stenosis 8/23/2020    Left carotid artery stenosis 8/23/2020    Leg swelling     Macrocytosis without anemia 12/10/2016    Microalbuminuria due to type 2 diabetes mellitus ([address] 9/14/2018    Morbid obesity ([address] 1/2/2014    Myocardial infarction ([address]     Obesity     AYLIN (obstructive sleep apnea) 12/10/2016    Osteoarthritis     Right-sided muscle weakness 10/19/2016    S/P cardiac catheterization     Sciatica     Skin cyst 4/8/2016    Spasm 11/9/2016    Spina bifida ([facility]     Stented coronary artery     Tremor of right hand 5/15/2017    Type 2 diabetes mellitus with diabetic polyneuropathy, without long-term current use of insulin ([address]     Unsteady gait 5/15/2017    Weakness     Weight gain      Past Surgical History:   Procedure Laterality Date    ANKLE SURGERY Left     BACK SURGERY      lumbar laminectomy    CHOLECYSTECTOMY      COLONOSCOPY  01/15/2010    polyp, diverticulosis ( [facility])    COLONOSCOPY N/A 08/19/2021    COLORECTAL CANCER SCREENING, HIGH RISK with polypectomies  performed by Carlo Rajput MD at [address] [facility] 08/20/2021    polyps x 3, 3y repeat (DR Sonal Bermudez)  COLONOSCOPY with polypectomies DIAGNOSTIC performed by Janey Browning MD at [facility]  2014    3 stents    CYST REMOVAL  05/16/2016    DR Amada Everett,  L SCROTAL CYST, R thigh, L ear, back    SHOULDER SURGERY Bilateral 12/18/2015    left once right twice- to remove bone spurs    TONSILLECTOMY          Restrictions  Restrictions/Precautions: Fall Risk (high cruz score)       Safety Devices: Safety Devices  Type of Devices:  All fall risk precautions in place;Call light within reach     Patient's date of birth confirmed: Yes    General:  Chart Reviewed: Yes  Patient assessed for rehabilitation services?: Yes    Subjective  Subjective: \"I guess I can try\"       Pain at start of treatment: No    Pain at end of treatment: No    Location:   Nursing notified: Not Applicable  RN:   Intervention: None    Prior Level of Function:  Social/Functional History  Lives With: Spouse  Type of Home: House  Home Layout: Two level, Bed/Bath upstairs  Home Access: Stairs to enter with rails  Entrance Stairs - Number of Steps: 5 steps to enter, then 7+7 to second floor with rails  Bathroom Shower/Tub: Tub/Shower unit  Bathroom Equipment: Shower chair, Hand-held shower  Home Equipment: Cane  Has the patient had two or more falls in the past year or any fall with injury in the past year?: Yes  ADL Assistance:  (Supervision for shower transfers)  [address]:  (Shared housework)  Ambulation Assistance: Independent (Shruthi Laughter in the house, [employer] for longer distances)  Transfer Assistance: Independent  Active : No  Patient's  Info: has not driven since CVA  Mode of Transportation: Car    OBJECTIVE:     Orientation Status:  Orientation  Overall Orientation Status: Within Functional Limits    Observation:  Observation/Palpation  Posture: Fair  Observation: pt sitting edge of bed with RN at time of OT arrival; no signs of distress; word finding difficulty at times    Cognition Status:  Cognition  Overall Cognitive Status: Exceptions  Arousal/Alertness: Appropriate responses to stimuli  Following Commands: Follows one step commands consistently; Follows multistep commands with increased time  Attention Span: Difficulty dividing attention  Memory: Decreased recall of precautions  Safety Judgement: Decreased awareness of need for safety  Problem Solving: Assistance required to generate solutions  Insights: Decreased awareness of deficits  Initiation: Requires cues for some  Sequencing: Requires cues for some  Cognition Comment: Min verbal cues for safety. Pt reports he feels 'fine'but spouse reports concerns re: limitations getting up off floor.  Pt states he is 'fine', unaware of deficits when he has falls    Perception Status:  Perception  Overall Perceptual Status: WFL    Vision and Hearing Status:  Vision  Vision Exceptions: Wears glasses for reading (Light sensitivity)  Hearing  Hearing: Within functional limits  Hearing Exceptions:  (Hearing 'more sensitive')   Vision - Basic Assessment  Prior Vision: Wears glasses all the time  Visual History:  (Previous CVA caused light sensitivity)  Patient Visual Report: No visual complaint reported. Vision Adaptations: Colored lens for glasses    GROSS ASSESSMENT AROM/PROM:  AROM: Within functional limits       ROM:   LUE AROM (degrees)  LUE AROM : WFL  Left Hand AROM (degrees)  Left Hand AROM: WFL  RUE AROM (degrees)  RUE AROM : WFL  Right Hand AROM (degrees)  Right Hand AROM: WFL    UE STRENGTH:  Strength: Generally decreased, functional    UE COORDINATION:  Coordination: Generally decreased, functional    UE TONE:  Tone: Normal    UE SENSATION:  Sensation: Impaired (R side numbness/tingling)    Hand Dominance:  Hand Dominance  Hand Dominance: Left (\"I'm ambidexterous')    ADL Status:  ADL  Feeding: Independent  Grooming: Supervision  UE Bathing: Stand by assistance  LE Bathing: Contact guard assistance  UE Dressing: Stand by assistance  LE Dressing: Contact guard assistance  Toileting: Contact guard assistance  Additional Comments: Simulated ADLs as above. Decreased balance and endurance. Toilet Transfers  Toilet Transfer: Unable to assess  Toilet Transfers Comments: Anticipate CGA    Functional Mobility:    Transfers  Sit to stand: Contact guard assistance  Stand to sit: Contact guard assistance  Transfer Comments: Increased effort    Patient ambulated to/from bathroom with Handheld assist at [facility] level. Difficulty navigating turns.      Bed Mobility  Bed mobility  Bed Mobility Comments: NT pt sitting at bedside upon arrival to pt room- anticipate supervision    Seated and Standing Balance:  Balance  Sitting: Intact  Standing: Impaired  Standing - Static: Good  Standing - Dynamic: Fair    Functional Endurance:  Activity Tolerance  Activity Tolerance: Patient Tolerated treatment well    D/C Recommendations:  OT D/C RECOMMENDATIONS  REQUIRES OT FOLLOW-UP: Yes    Equipment Recommendations:  OT Equipment Recommendations  Other: Continue to assess    OT Education:   Patient Education  Education Given To: Patient  Education Provided: Role of Therapy  Education Method: Verbal  Barriers to Learning: None  Education Outcome: Verbalized understanding    OT Follow Up:   OT D/C RECOMMENDATIONS  REQUIRES OT FOLLOW-UP: Yes       Assessment/Discharge Disposition:  Assessment: Pt is a 79year old man from home who presents to [address] [facility] with stroke like symptoms. Pt demonstrates the above deficits which impact his ability to perform ADLs and IADLs. Pt. limited d/t fatigue and weakness. Pt would benefit from continued OT to maximize independence and safety with ADL tasks.   Performance deficits / Impairments: Decreased ADL status, Decreased functional mobility , Decreased strength, Decreased balance, Decreased endurance, Decreased high-level IADLs  Prognosis: Good  Discharge Recommendations: Continue to assess pending progress  Decision Making: Medium Complexity  History: Pt's medical history is moderately complex  Exam: Pt. has 6 performance deficits  Assistance / Modification: Pt. requires min A    AMPAC (Six Click) Self care Score   How much help for putting on and taking off regular lower body clothing?: A Little  How much help for Bathing?: A Little  How much help for Toileting?: A Little  How much help for putting on and taking off regular upper body clothing?: A Little  How much help for taking care of personal grooming?: A Little  How much help for eating meals?: None  AM-[facility] Inpatient Daily Activity Raw Score: 19  AM-PAC Inpatient ADL T-Scale Score : 40.22  ADL Inpatient CMS 0-100% Score: 42.8    Therapy key for assistance levels -   Independent/Mod I = Pt. is able to perform task with no assistance but may require a device   Stand by assistance = Pt. does not perform task at an independent level but does not need physical assistance, requires verbal cues  Minimal, Moderate, Maximal Assistance = Pt. requires physical assistance (25%, 50%, 75% assist from helper) for task but is able to actively participate in task   Dependent = Pt. requires total assistance with task and is not able to actively participate with task completion     Plan:  Occupational Therapy Plan  Times Per Week: 1-4x  Therapy Duration:  (Length of acute stay)  Current Treatment Recommendations: Balance training, Functional mobility training, Endurance training, Safety education & training, Neuromuscular re-education, Patient/Caregiver education & training, Equipment evaluation, education, & procurement, Self-Care / ADL, Home management training    Goals:   Patient will:    - Improve functional endurance to tolerate/complete 30 mins of ADL's  - Be Mod I in UB ADLs   - Be Mod I in LB ADLs  - Be Mod I in ADL transfers without LOB  - Be Mod I in toileting tasks  - Improve B UE strength and endurance to 4/5 in order to participate in self-care activities as projected. - Access appropriate D/C site with as few architectural barriers as possible. - Sequence self-care tasks with no verbal cues for safety    Patient Goal: Patient goals : \"I want to get home\"      Discussed and agreed upon: Yes Comments:       Therapy Time:   Individual   Time In 1106   Time Out 1124   Minutes 18     Eval: 18 minutes     Electronically signed by:     ISIS Mclaughlin/L,   11/1/2022, 12:02 PM

## 2022-11-01 NOTE — PROGRESS NOTES
[facility]   Facility/Department: [address] Eulis Libman  Speech Language Pathology  Initial Speech/Language/Cognitive Assessment    NAME:Khari Diaz  : 1955 (79 y.o.)   [x]   confirmed    MRN: [id number]  [id number]  ADMISSION DATE: 2022  PATIENT DIAGNOSIS(ES): Obesity (BMI 30-39. 9) [E66.9]  Stroke-like symptoms [R29.90]  Stroke-like episode [U51.81]  Acute alcoholic intoxication with complication [facility]) [F28.186]  Chief Complaint   Patient presents with    Altered Mental Status     Concerned of stroke  3 in past     Patient Active Problem List    Diagnosis Date Noted    Claudication [facility])     Stroke-like episode 2022    VBI (vertebrobasilar insufficiency) 2022    Ataxia 2022    Meniere disease, bilateral 2022    Tobacco use 10/05/2021    Rectal bleeding 2021    Adenomatous polyp of ascending colon     Cecal polyp     Adenomatous polyp of transverse colon     Adenomatous polyp of descending colon     Lumbar radiculopathy 2021    Dizziness 2021    Hyperlipidemia 2021    Skin lesion of left external ear 2021    History of colon polyps 2021    TIA (transient ischemic attack) 2020    At high risk for falls 2020    Late effects of CVA (cerebrovascular accident) 10/28/2020    Dysarthria     Left carotid artery stenosis 2020    Tinnitus of both ears 2020    Sensorineural hearing loss (SNHL) of left ear 2020    Ataxic gait 2020    Venous insufficiency of both lower extremities 2019    PAD (peripheral artery disease) [facility] 2019    Lymphedema of both lower extremities 2018    Microalbuminuria due to type 2 diabetes mellitus ([address] 2018    Skin infection 2018    Cutaneous abscess of back excluding buttocks 2018    Dependent edema 2017    Tremor of right hand 05/15/2017    Unsteady gait 05/15/2017    AYLIN (obstructive sleep apnea) 12/10/2016    Gout 12/10/2016 Macrocytosis without anemia 12/10/2016    Spasm 11/09/2016    Right-sided muscle weakness 10/19/2016    Essential hypertension 08/17/2016    History of CVA (cerebrovascular accident) 08/15/2016    Skin cyst 04/08/2016    Local infection of skin and subcutaneous tissue 04/08/2016    CAD (coronary artery disease) 03/11/2014    History of myocardial infarction 03/11/2014    DM2 (diabetes mellitus, type 2) ([address] 03/11/2014    History of recurrent pneumonia 02/11/2014    History of heart failure 01/06/2014    Obesity 01/02/2014     Past Medical History:   Diagnosis Date    Abscess of back 1/6/2020    Abscess of right leg 1/6/2020    Acute renal failure (HCC)     Anxiety     CAD S/P percutaneous coronary angioplasty 3/11/2014    Cardiomyopathy ([address]     Cerebrovascular accident (CVA) due to occlusion of left middle cerebral artery ([address] 08/2016    brainstem infarction from left MCA occlusion    Cerebrovascular disease 07/27/2016    Colon polyp     Coronary angioplasty status     CVA (cerebral vascular accident) ([address] 11/12/2017    Dependent edema 9/14/2017    Diplopia 5/15/2017    Resolved with management of cataracts    Dupuytren contracture 1/2/2018    Dysphagia 8/18/2011    Dysphonia 8/18/2011    Essential hypertension 8/17/2016    Gallop rhythm     Gout 12/10/2016    Gout of big toe 08/2014    Right Great Toe    H/O fall     Headache     migraines    Heart failure ([address]     History of chest pain 1/2/2014    History of CVA 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    History of heart failure 1/6/2014    History of myocardial infarction 3/11/2014    History of recurrent pneumonia 2/11/2014    Hx of bacterial pneumonia     Hyperlipidemia 4/30/2021    Hyperlipidemia, mixed 4/30/2021    Hyperlipidemia, mixed 4/30/2021    Hypotension     Left carotid artery stenosis 8/23/2020    Left carotid artery stenosis 8/23/2020    Leg swelling     Macrocytosis without anemia 12/10/2016    Microalbuminuria due to type 2 diabetes mellitus ([facility] 9/14/2018    Morbid obesity ([address] 1/2/2014    Myocardial infarction ([address]     Obesity     AYLIN (obstructive sleep apnea) 12/10/2016    Osteoarthritis     Right-sided muscle weakness 10/19/2016    S/P cardiac catheterization     Sciatica     Skin cyst 4/8/2016    Spasm 11/9/2016    Spina bifida ([address]     Stented coronary artery     Tremor of right hand 5/15/2017    Type 2 diabetes mellitus with diabetic polyneuropathy, without long-term current use of insulin ([address]     Unsteady gait 5/15/2017    Weakness     Weight gain      Past Surgical History:   Procedure Laterality Date    ANKLE SURGERY Left     BACK SURGERY      lumbar laminectomy    CHOLECYSTECTOMY      COLONOSCOPY  01/15/2010    polyp, diverticulosis ( [facility])    COLONOSCOPY N/A 08/19/2021    COLORECTAL CANCER SCREENING, HIGH RISK with polypectomies  performed by Dionne Bernheim, MD at [address] [facility] 08/20/2021    polyps x 3, 3y repeat (DR Radha Flores)  COLONOSCOPY with polypectomies DIAGNOSTIC performed by Bernard Humphrey MD at [facility]  2014    3 stents    CYST REMOVAL  05/16/2016    DR Danay Wilkins,  L SCROTAL CYST, R thigh, L ear, back    SHOULDER SURGERY Bilateral 12/18/2015    left once right twice- to remove bone spurs    TONSILLECTOMY         DATE ONSET: 11/1/2022    Date of Evaluation: 11/1/2022   Evaluating Therapist: MATT Ramirez        Diagnosis: Patient p/w mild expressive aphasia characterized by impaired word finding abilities, though these deficits are well documented from previous CVA and are unchanged per patient and family reporting; Patient also p/w mild cognitive impairment characterized by decreased short-term memory and working memory deficits, which patient also reports are baseline and declined skilled ST intervention.     Requires SLP Intervention: No     D/C Recommendations: No follow up therapy recommended post discharge    General  Chart reviewed: Yes  Subjective/Behavior: Patient reports \"feeling embarassed\" that he came to the hospital this time. Patient reports feeling symptoms similar to that of previous CVA/TIA, but also notes the symptoms are resolving quickly. Patient reports persistent word finding difficulty from prior CVA, denies new motor speech, language, or cognitive deficits. Patient has history of aphasia and dysarthria following CVA. Patient tangential during evaluation. Oxygen: RA  Previous level of function and limitations: expressive aphasia from prior CVA    Vision and Hearing  Vision  Vision: Impaired  Vision Exceptions: Wears glasses for reading (wears glasses for light sensitivity)  Hearing  Hearing: Exceptions to [facility]  Hearing Exceptions:  (patient reports increased hearing sensitivity)     Oral/Motor  Oral Motor   Labial: No impairment  Dentition: Natural;Intact  Oral Hygiene: Moist;Clean  Lingual: No impairment  Velum: No Impairment  Mandible: No impairment  Gag:  (dnt)    Motor Speech  Motor Speech  Apraxic Characteristics: None  Dysarthric Characteristics: None  Intelligibility: No impairment  Overall Impairment Severity: None    Comprehension  Auditory Comprehension  Comprehension: Within Functional Limits    Expression  Expression  Primary Mode of Expression: Verbal  Verbal Expression  Verbal Expression: Exceptions to functional limits  Initiation: WFL  Repetition: WFL  Automatic Speech: WFL  Confrontation: Mild  Convergent: Mild  Divergent: Mild  Responsive: Mild  Conversation: Mild  Effective Techniques: Provide extra time; Word retrived strategies    Cognition  Orientation  Overall Orientation Status: Within Functional Limits  Attention  Attention: Within Functional Limits  Memory  Memory: Exceptions to [facility]  Short-term Memory: Mild  Working Memory: Mild  Problem Solving  Problem Solving: Within Functional Limits  Abstract Reasoning  Abstract Reasoning: Within Functional Limits  Safety/Judgment  Safety/Judgment: Exceptions to [facility]  Insight: Mild      Recommendations  Requires SLP Intervention: No  D/C Recommendations: No follow up therapy recommended post discharge  Patient Education: Results of SLE  Patient Education Response: Demonstrated understanding  Duration of Treatment: no f/u  Frequency of Treatment: no f/u    Education  Individuals consulted  Consulted and agree with results and recommendations: RN;Family member  Family member consulted: spouse  RN Name: [place] [facility] in place: Yes  Type of devices: Bed alarm in place;Call light within reach  Restraints Initially in Place: No    Pain Assessment  Patient c/o pain: Location and pain level: 3/10 right frontal headache  Pt able to proceed with session.   RN notified    Pain Re-assessment  Patient c/o pain: Described as same as above         Therapy Time  SLP Individual Minutes  Time In: 5810  Time Out: 1100  Minutes: 15                 Signature: Electronically signed by MATT Julio on 11/1/2022 at 11:47 AM

## 2022-11-01 NOTE — H&P
History and Physical    Admit Date: 11/1/2022  PCP: Russel Sims MD    CHIEF COMPLAINT:    Chief Complaint   Patient presents with    Altered Mental Status     Concerned of stroke  3 in past        HISTORY OF PRESENT ILLNESS:    The patient is a 79 y.o. male with a past medical history of CAD status post PCI, previous CVA on DAPT, alcohol abuse, HTN, HLD, type 2 diabetes, obesity who presented to hospital with altered mentation. Patient reports that he was staying up until 2 AM or so when he noticed that he has right-sided numbness involving his right upper and lower extremities face and forehead. He reports some difficulty with ambulation also. Patient reports that he had multiple alcoholic beverages last night. His ethanol level is 209. Talk screen is negative. Cardiac enzymes are negative. He denies chest pain, chest heaviness. No dyspnea. No cough. No urinary symptoms. No abdominal pain or nausea or vomiting.     Past Medical History:        Diagnosis Date    Abscess of back 1/6/2020    Abscess of right leg 1/6/2020    Acute renal failure (HCC)     Anxiety     CAD S/P percutaneous coronary angioplasty 3/11/2014    Cardiomyopathy [facility])     Cerebrovascular accident (CVA) due to occlusion of left middle cerebral artery ([facility] [address] 08/2016    brainstem infarction from left MCA occlusion    Cerebrovascular disease 07/27/2016    Colon polyp     Coronary angioplasty status     CVA (cerebral vascular accident) ([address] 11/12/2017    Dependent edema 9/14/2017    Diplopia 5/15/2017    Resolved with management of cataracts    Dupuytren contracture 1/2/2018    Dysphagia 8/18/2011    Dysphonia 8/18/2011    Essential hypertension 8/17/2016    Gallop rhythm     Gout 12/10/2016    Gout of big toe 08/2014    Right Great Toe    H/O fall     Headache     migraines    Heart failure ([address]     History of chest pain 1/2/2014    History of CVA 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    History of heart failure 1/6/2014    History of myocardial infarction 3/11/2014    History of recurrent pneumonia 2/11/2014    Hx of bacterial pneumonia     Hyperlipidemia 4/30/2021    Hyperlipidemia, mixed 4/30/2021    Hyperlipidemia, mixed 4/30/2021    Hypotension     Left carotid artery stenosis 8/23/2020    Left carotid artery stenosis 8/23/2020    Leg swelling     Macrocytosis without anemia 12/10/2016    Microalbuminuria due to type 2 diabetes mellitus ([address] 9/14/2018    Morbid obesity ([address] 1/2/2014    Myocardial infarction ([address]     Obesity     AYLIN (obstructive sleep apnea) 12/10/2016    Osteoarthritis     Right-sided muscle weakness 10/19/2016    S/P cardiac catheterization     Sciatica     Skin cyst 4/8/2016    Spasm 11/9/2016    Spina bifida ([address]     Stented coronary artery     Tremor of right hand 5/15/2017    Type 2 diabetes mellitus with diabetic polyneuropathy, without long-term current use of insulin ([address]     Unsteady gait 5/15/2017    Weakness     Weight gain        Past Surgical History:        Procedure Laterality Date    ANKLE SURGERY Left     BACK SURGERY      lumbar laminectomy    CHOLECYSTECTOMY      COLONOSCOPY  01/15/2010    polyp, diverticulosis ( [facility])    COLONOSCOPY N/A 08/19/2021    COLORECTAL CANCER SCREENING, HIGH RISK with polypectomies  performed by Danica Ellison MD at [facility]    COLONOSCOPY N/A 08/20/2021    polyps x 3, 3y repeat (DR Faisal Padilla)  COLONOSCOPY with polypectomies DIAGNOSTIC performed by Severiano Sommers MD at [address] [place] [address]    3 stents    CYST REMOVAL  05/16/2016    DR Fred Brown,  L SCROTAL CYST, R thigh, L ear, back    SHOULDER SURGERY Bilateral 12/18/2015    left once right twice- to remove bone spurs    TONSILLECTOMY         Social History:   Social History     Socioeconomic History    Marital status:      Spouse name: Michelle Jennings    Number of children: 3    Years of education: 12+ Highest education level: Not on file   Occupational History    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     Employer: [employer]   Tobacco Use    Smoking status: Some Days     Packs/day: 2.50     Years: 30.00     Pack years: 75.00     Types: Cigarettes    Smokeless tobacco: Never    Tobacco comments:     varies   Vaping Use    Vaping Use: Never used   Substance and Sexual Activity    Alcohol use:  Yes     Alcohol/week: 0.0 standard drinks     Comment: limits less than 10/wk varies    Drug use: No    Sexual activity: Yes     Partners: Female   Other Topics Concern    Not on file   Social History Narrative    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-- Disabled from [place] after 2016 CVA    Lives With: Spouse    Type of Home: House    Home Layout: Two level, Bed/Bath upstairs, Laundry in basement(Railing)    Home Access: Stairs to enter with rails    Entrance Stairs - Number of Steps: 3 in back, 4 in front    Bathroom Shower/Tub: Tub/Shower unit    Bathroom Equipment: Shower chair    Home Equipment: Rolling walker, Cane    ADL Assistance: Independent    Homemaking Assistance: Needs assistance(Spouse  is primary; vertigo and arthritis limit housework)    Ambulation Assistance: Independent(Walker outside, cane inside)    Transfer Assistance: Independent    Active : No    Patient's  Info: has not driven since CVA     Social Determinants of Health     Financial Resource Strain: Low Risk     Difficulty of Paying Living Expenses: Not hard at all   Food Insecurity: No Food Insecurity    Worried About Running Out of Food in the Last Year: Never true    Ran Out of Food in the Last Year: Never true   Transportation Needs: Not on file   Physical Activity: Unknown    Days of Exercise per Week: Patient refused    Minutes of Exercise per Session: Not on file   Stress: Not on file   Social Connections: Not on file   Intimate Partner Violence: Not on file   Housing Stability: Not on file       Family History:   Family History   Problem Relation Age of Onset    Breast Cancer Mother     Stroke Father     Colon Cancer Father 76       Medications Prior to Admission:    Prior to Admission medications    Medication Sig Start Date End Date Taking? Authorizing Provider   acetaminophen (TYLENOL) 500 MG tablet Take 500 mg by mouth every 6 hours as needed for Pain    Historical Provider, MD   torsemide (DEMADEX) 20 MG tablet Take 1 tablet by mouth daily 8/30/22   Tico Sam MD   glimepiride (AMARYL) 1 MG tablet Take 1 tablet by mouth every morning (before breakfast) 8/30/22   Tico Sam MD   carvedilol (COREG) 25 MG tablet Take 1 tablet by mouth in the morning and 1 tablet before bedtime. 8/9/22   Tico Sam MD   hydrOXYzine HCl (ATARAX) 25 MG tablet Twice daily for 2 to 3 days when dizziness 6/29/22   Glenna Cardenas MD   clopidogrel (PLAVIX) 75 MG tablet Take 1 tablet by mouth daily 6/2/22   Tico Sam MD   atorvastatin (LIPITOR) 40 MG tablet Take 1 tablet by mouth daily 6/2/22   Tico Sam MD   metFORMIN (GLUCOPHAGE) 500 MG tablet Take 1 tablet by mouth 2 times daily (with meals) 5/17/22   Tico Sam MD   allopurinol (ZYLOPRIM) 300 MG tablet Take 1 tablet by mouth daily 5/1/22   Tico Sam MD   VITAMIN D PO  10/1/20   Historical Provider, MD   Elastic Bandages & Supports (V-4 HIGH COMPRESSION HOSE) MISC KNEE HIGH - 20-30 mmHg 5/18/20   Tico Sam MD   Alcohol Swabs PADS DX: diabetes mellitus. Test 1 time(s) daily - Ok to substitute per insurance (1 each = 1 box) 5/14/20   Tico Sam MD   blood glucose monitor strips DX: diabetes mellitus. Use 1 time(s) daily - True Metrix requested by patient - Phill Diggsgle to substitute per insurance 5/14/20   Tico Sam MD   Lancets MISC DX: diabetes mellitus. Use 1 time(s) daily - Ok to substitute per insurance (1 each = 1 box) 5/14/20   Tico Sam MD   blood glucose monitor kit and supplies DX: diabetes mellitus.  Test 1 time(s) daily - True Metrix requested by patient - [id number] [address] to substitute per insurance 20   Abigail Brennan MD   aspirin 81 MG EC tablet Take 81 mg by mouth daily    Historical Provider, MD   Probiotic Product (PROBIOTIC DAILY PO) Take 1 tablet by mouth daily    Historical Provider, MD   vitamin B-12 (CYANOCOBALAMIN) 100 MCG tablet Take 50 mcg by mouth daily    Historical Provider, MD   ammonium lactate (AMLACTIN) 12 % cream APPLY TO DRY CALLUS AREAS 1 TO 2 TIMES DAILY 17   Historical Provider, MD   Misc. Devices (COMMODE BEDSIDE) MISC Use daily as needed 16   Paul Tran MD   MULTIPLE VITAMIN PO Take 1 tablet by mouth daily     Historical Provider, MD       Allergies:  Bactrim [sulfamethoxazole-trimethoprim]    REVIEW OF SYSTEMS:  All systems reviewed and negative except for what is in HPI. PHYSICAL EXAM:  Vitals:  /60   Pulse 84   Temp (!) 96.6 °F (35.9 °C) (Oral)   Resp 19   Wt 285 lb 8 oz (129.5 kg)   SpO2 94%   BMI 37.67 kg/m²   BMI Classification: Obese (BMI 30.0-39. 9)  Pulse Ox: SpO2  Av.6 %  Min: 94 %  Max: 99 %  Supplemental O2:      CONSTITUTIONAL:  awake, alert, cooperative, no apparent distress, and appears stated age  HEENT: Normocephalic, PERRLA  NECK: no JVD, no LAD  HEART: RRR, no murmurs, gallops, or rubs  LUNGS: clear to auscultation bilaterally, no wheezes, crackles, or rhonchi. ABDOMEN: soft/NT/ND, positive BS  MUSCULOSKELETAL: negative for edema, +2 pulses  SKIN: intact without rash or jaundice  NEURO:  CN intact and no focal deficits.   However, subjectively has decreased sensation on the right side    DATA:  Recent Results (from the past 24 hour(s))   POCT Glucose    Collection Time: 22  5:13 AM   Result Value Ref Range    POC Glucose 119 (H) 70 - 99 mg/dl    Performed on ACCU-CHEK    EKG 12 Lead    Collection Time: 22  5:15 AM   Result Value Ref Range    Ventricular Rate 83 BPM    Atrial Rate 83 BPM    P-R Interval 210 ms    QRS Duration 70 ms    Q-T Interval 392 ms    QTc Calculation (Bazett) 460 ms    P Axis 57 degrees    R Axis 8 degrees    T Axis 64 degrees   POCT Glucose    Collection Time: 11/01/22  5:17 AM   Result Value Ref Range    Glucose 119 mg/dL    QC OK?  yes    Comprehensive Metabolic Panel    Collection Time: 11/01/22  5:30 AM   Result Value Ref Range    Sodium 132 (L) 135 - 144 mEq/L    Potassium 3.5 3.4 - 4.9 mEq/L    Chloride 90 (L) 95 - 107 mEq/L    CO2 25 20 - 31 mEq/L    Anion Gap 17 (H) 9 - 15 mEq/L    Glucose 117 (H) 70 - 99 mg/dL    BUN 14 8 - 23 mg/dL    Creatinine 0.82 0.70 - 1.20 mg/dL    Est, Glom Filt Rate >60.0 >60    Calcium 9.6 8.5 - 9.9 mg/dL    Total Protein 7.4 6.3 - 8.0 g/dL    Albumin 4.4 3.5 - 4.6 g/dL    Total Bilirubin 0.5 0.2 - 0.7 mg/dL    Alkaline Phosphatase 73 35 - 104 U/L    ALT 26 0 - 41 U/L    AST 25 0 - 40 U/L    Globulin 3.0 2.3 - 3.5 g/dL   Magnesium    Collection Time: 11/01/22  5:30 AM   Result Value Ref Range    Magnesium 1.7 1.7 - 2.4 mg/dL   CBC with Auto Differential    Collection Time: 11/01/22  5:30 AM   Result Value Ref Range    WBC 6.0 4.8 - 10.8 K/uL    RBC 3.87 (L) 4.70 - 6.10 M/uL    Hemoglobin 14.0 14.0 - 18.0 g/dL    Hematocrit 41.6 (L) 42.0 - 52.0 %    .5 (H) 79.0 - 92.2 fL    MCH 36.0 (H) 27.0 - 31.3 pg    MCHC 33.5 33.0 - 37.0 %    RDW 13.4 11.5 - 14.5 %    Platelets 013 411 - 219 K/uL    PLATELET SLIDE REVIEW Normal     Neutrophils % 53.0 %    Lymphocytes % 42.0 %    Monocytes % 5.7 %    Eosinophils % 1.8 %    Basophils % 0.8 %    Neutrophils Absolute 3.2 1.4 - 6.5 K/uL    Lymphocytes Absolute 2.5 1.0 - 4.8 K/uL    Monocytes Absolute 0.4 0.2 - 0.8 K/uL    Eosinophils Absolute 0.0 0.0 - 0.7 K/uL    Basophils Absolute 0.0 0.0 - 0.2 K/uL    Smudge Cells 0.9     Anisocytosis 1+     Polychromasia 1+     Poikilocytes 1+     Stomatocytes 1+    Troponin    Collection Time: 11/01/22  5:30 AM   Result Value Ref Range    Troponin <0.010 0.000 - 0.010 ng/mL   Brain Natriuretic Peptide    Collection Time: 11/01/22  5:30 AM   Result Value Ref Range    Pro-BNP 69 pg/mL   CK    Collection Time: 11/01/22  5:30 AM   Result Value Ref Range    Total CK 86 0 - 190 U/L   TSH with Reflex    Collection Time: 11/01/22  5:30 AM   Result Value Ref Range    TSH 4.780 (H) 0.440 - 3.860 uIU/mL   Ethanol    Collection Time: 11/01/22  5:30 AM   Result Value Ref Range    Ethanol Lvl 209 mg/dL    Ethanol percent 0.183 G/dL   APTT    Collection Time: 11/01/22  5:30 AM   Result Value Ref Range    aPTT 27.6 24.4 - 36.8 sec   Protime-INR    Collection Time: 11/01/22  5:30 AM   Result Value Ref Range    Protime 12.9 12.3 - 14.9 sec    INR 1.0    Lactic Acid    Collection Time: 11/01/22  5:30 AM   Result Value Ref Range    Lactic Acid 3.4 (HH) 0.5 - 2.2 mmol/L   T4, Free    Collection Time: 11/01/22  5:30 AM   Result Value Ref Range    T4 Free 1.54 0.84 - 1.68 ng/dL   Rapid influenza A/B antigens    Collection Time: 11/01/22  6:08 AM    Specimen: Nasopharyngeal   Result Value Ref Range    Influenza A by PCR Negative     Influenza B by PCR Negative    COVID-19, Rapid    Collection Time: 11/01/22  6:08 AM    Specimen: Nasopharyngeal Swab   Result Value Ref Range    SARS-CoV-2, NAAT Not Detected Not Detected           ASSESSMENT AND PLAN:    Right-sided numbness-rule out stroke  History of stroke  Alcohol abuse  CAD-no chest pain, stable  Obesity  HTN  HLD    Plan  We will observe on neuro telemetry floor  Stroke protocol initiated  Aspirin and statin, resume home Plavix  Discussed with neurologist, Dr. Gonzalez Perez who Lemuel Wesley with the patient from before  CTA of the head and neck are pending  CT head is negative for acute pathology  Will obtain MRI of the brain and MRA of the head  Obtain echocardiogram  PT/OT/SLP evaluation  Diabetic diet  Discussed plan of care with patient and his partner at bedside         DISCHARGE PLANNING  Pending stroke work-up    Code status: Full Code    Electronically signed by Balaji Dupont DO on 11/1/22 at 9:43 AM EDT

## 2022-11-02 VITALS
SYSTOLIC BLOOD PRESSURE: 155 MMHG | HEIGHT: 73 IN | WEIGHT: 285.5 LBS | TEMPERATURE: 98.6 F | HEART RATE: 84 BPM | OXYGEN SATURATION: 98 % | DIASTOLIC BLOOD PRESSURE: 88 MMHG | RESPIRATION RATE: 18 BRPM | BODY MASS INDEX: 37.84 KG/M2

## 2022-11-02 LAB
CHOLESTEROL, TOTAL: 109 MG/DL (ref 0–199)
EKG ATRIAL RATE: 83 BPM
EKG P AXIS: 57 DEGREES
EKG P-R INTERVAL: 210 MS
EKG Q-T INTERVAL: 392 MS
EKG QRS DURATION: 70 MS
EKG QTC CALCULATION (BAZETT): 460 MS
EKG R AXIS: 8 DEGREES
EKG T AXIS: 64 DEGREES
EKG VENTRICULAR RATE: 83 BPM
HCT VFR BLD CALC: 38.5 % (ref 42–52)
HDLC SERPL-MCNC: 38 MG/DL (ref 40–59)
HEMOGLOBIN: 13.2 G/DL (ref 14–18)
LDL CHOLESTEROL CALCULATED: 37 MG/DL (ref 0–129)
MCH RBC QN AUTO: 36.9 PG (ref 27–31.3)
MCHC RBC AUTO-ENTMCNC: 34.2 % (ref 33–37)
MCV RBC AUTO: 108 FL (ref 79–92.2)
PDW BLD-RTO: 13.7 % (ref 11.5–14.5)
PLATELET # BLD: 149 K/UL (ref 130–400)
RBC # BLD: 3.57 M/UL (ref 4.7–6.1)
TRIGL SERPL-MCNC: 170 MG/DL (ref 0–150)
WBC # BLD: 4 K/UL (ref 4.8–10.8)

## 2022-11-02 PROCEDURE — APPSS15 APP SPLIT SHARED TIME 0-15 MINUTES: Performed by: NURSE PRACTITIONER

## 2022-11-02 PROCEDURE — 96372 THER/PROPH/DIAG INJ SC/IM: CPT

## 2022-11-02 PROCEDURE — 36415 COLL VENOUS BLD VENIPUNCTURE: CPT

## 2022-11-02 PROCEDURE — 85027 COMPLETE CBC AUTOMATED: CPT

## 2022-11-02 PROCEDURE — 6360000002 HC RX W HCPCS: Performed by: INTERNAL MEDICINE

## 2022-11-02 PROCEDURE — 99233 SBSQ HOSP IP/OBS HIGH 50: CPT | Performed by: PSYCHIATRY & NEUROLOGY

## 2022-11-02 PROCEDURE — 83036 HEMOGLOBIN GLYCOSYLATED A1C: CPT

## 2022-11-02 PROCEDURE — 80061 LIPID PANEL: CPT

## 2022-11-02 PROCEDURE — 93010 ELECTROCARDIOGRAM REPORT: CPT | Performed by: INTERNAL MEDICINE

## 2022-11-02 PROCEDURE — G0378 HOSPITAL OBSERVATION PER HR: HCPCS

## 2022-11-02 RX ORDER — ROSUVASTATIN CALCIUM 40 MG/1
40 TABLET, COATED ORAL NIGHTLY
Qty: 30 TABLET | Refills: 3 | Status: SHIPPED | OUTPATIENT
Start: 2022-11-02

## 2022-11-02 RX ADMIN — ALLOPURINOL 300 MG: 300 TABLET ORAL at 11:03

## 2022-11-02 RX ADMIN — CARVEDILOL 25 MG: 25 TABLET ORAL at 11:03

## 2022-11-02 RX ADMIN — CLOPIDOGREL BISULFATE 75 MG: 75 TABLET ORAL at 11:03

## 2022-11-02 RX ADMIN — TORSEMIDE 20 MG: 20 TABLET ORAL at 11:04

## 2022-11-02 RX ADMIN — ENOXAPARIN SODIUM 30 MG: 100 INJECTION SUBCUTANEOUS at 11:02

## 2022-11-02 RX ADMIN — ASPIRIN 81 MG: 81 TABLET, COATED ORAL at 11:04

## 2022-11-02 ASSESSMENT — ENCOUNTER SYMPTOMS
TROUBLE SWALLOWING: 0
COUGH: 0
SHORTNESS OF BREATH: 0
BACK PAIN: 0
VOMITING: 0
CHEST TIGHTNESS: 0
NAUSEA: 0
ABDOMINAL DISTENTION: 0
WHEEZING: 0
COLOR CHANGE: 0
ABDOMINAL PAIN: 0

## 2022-11-02 NOTE — DISCHARGE INSTRUCTIONS
Follow up with primary care physician in the next 7 days or sooner if needed. If you do not have a Primary care physician, please schedule an appointment with one. Please ask prior to discharge about a list of local providers. Please return to ER or call 911 if you develop any significant signs or symptoms. I may not have addressed all of your medical illnesses or the abnormal blood work or imaging therefore please ask your PCP to obtain [facility] record to follow up on all of the abnormal labs, imaging and findings that I have and have not addressed during your hospitalization. Discharging you from the hospital does not mean that your medical care ends here and now. You may still need additional work up, investigation, monitoring, and treatment to be handled from this point on by outside providers including your PCP, Specialists and other healthcare providers. For medication questions, contact your retail pharmacy and your PCP. Your medical team at [facility] ([facility]) appreciates the opportunity to work with you to get well!     Tristian Cowart,   11:35 AM

## 2022-11-02 NOTE — DISCHARGE SUMMARY
Hospital Medicine Discharge Summary    Fredi Arreguin  :  1955  MRN:  [id number]    Admit date:  2022  Discharge date:  2022    Admitting Physician:  [facility]   Primary Care Physician:  Rodrigo Silvestre MD      Discharge Diagnoses:      Right-sided numbness-stroke ruled out. Pt was initially intoxicated on presentation to ED  History of stroke  Alcohol abuse  CAD-no chest pain, stable  Obesity  HTN  HLD     Chief Complaint   Patient presents with    Altered Mental Status     Concerned of stroke  3 in past     Hospital Course:     Patient is a 66-year-old male who was observed in the hospital for right-sided numbness. Stroke was ruled out. MRI of the brain was negative. Patient underwent neurologic evaluation by Dr. Walterine Babinski. He also had CTA of the head and neck and CT of the head that was without any new acute pathology. Patient had physical therapy evaluation. He was discharged home in a stable condition. Plan of care was discussed with patient and his wife during this admission. Exam on discharge:   BP (!) 155/88   Pulse 84   Temp 98.6 °F (37 °C)   Resp 18   Ht 6' 1\" (1.854 m)   Wt 285 lb 8 oz (129.5 kg)   SpO2 98%   BMI 37.67 kg/m²   General appearance: No apparent distress, appears stated age and cooperative. HEENT: Pupils equal, round, and reactive to light. Conjunctivae/corneas clear. Neck: Supple, with full range of motion. No jugular venous distention. Trachea midline. Respiratory:  Normal respiratory effort. Clear to auscultation, bilaterally without Rales/Wheezes/Rhonchi. Cardiovascular: Regular rate and rhythm with normal S1/S2 without murmurs, rubs or gallops. Abdomen: Soft, non-tender, non-distended with normal bowel sounds. Musculoskeletal: No clubbing, cyanosis or edema bilaterally. Full range of motion without deformity. Skin: Skin color, texture, turgor normal.  No rashes or lesions. Neuro: Non Focal. Symetrical motor and tone.  Nl Comprehension, Alert,awake and oriented. NL CN. Symetrical tone and reflexes. Psychiatric: Alert and oriented, thought content appropriate, normal insight  Capillary Refill: Brisk,< 3 seconds   Peripheral Pulses: +2 palpable, equal bilaterally     Patient was seen by the following consultants   Consults:  IP CONSULT TO NEUROLOGY    Significant Diagnostic Studies:    Refer to chart     Please refer to chart if no studies are shown here    CTA HEAD W WO CONTRAST    Result Date: 11/2/2022  EXAMINATION: CTA OF THE HEAD WITHOUT AND WITH CONTRAST; CTA OF THE NECK 11/1/2022 5:58 am: TECHNIQUE: CTA of the head/brain was performed without and with the administration of intravenous contrast. Multiplanar reformatted images are provided for review. MIP images are provided for review. Automated exposure control, iterative reconstruction, and/or weight based adjustment of the mA/kV was utilized to reduce the radiation dose to as low as reasonably achievable.; CTA of the neck was performed with the administration of intravenous contrast. Multiplanar reformatted images are provided for review. MIP images are provided for review. Stenosis of the internal carotid arteries measured using NASCET criteria. Automated exposure control, iterative reconstruction, and/or weight based adjustment of the mA/kV was utilized to reduce the radiation dose to as low as reasonably achievable.  COMPARISON: CTA of the head and neck, 10/26/2020 HISTORY: ORDERING SYSTEM PROVIDED HISTORY: R/O LVO TECHNOLOGIST PROVIDED HISTORY: Reason for exam:->R/O LVO Decision Support Exception - unselect if not a suspected or confirmed emergency medical condition->Emergency Medical Condition (MA) What reading provider will be dictating this exam?->CRC; ORDERING SYSTEM PROVIDED HISTORY: R/o LVO TECHNOLOGIST PROVIDED HISTORY: Reason for exam:->R/o LVO Decision Support Exception - unselect if not a suspected or confirmed emergency medical condition->Emergency Medical Condition (MA) What reading provider will be dictating this exam?->CRC FINDINGS: CTA NECK: AORTIC ARCH/ARCH VESSELS: No dissection or arterial injury. No significant stenosis of the brachiocephalic or subclavian arteries. CAROTID ARTERIES: No dissection, arterial injury, or hemodynamically significant stenosis by NASCET criteria. VERTEBRAL ARTERIES: No dissection, arterial injury, or significant stenosis. SOFT TISSUES: The lung apices are clear. No cervical or superior mediastinal lymphadenopathy. The larynx and pharynx are unremarkable. No acute abnormality of the salivary and thyroid glands. BONES: No acute osseous abnormality. CTA HEAD: ANTERIOR CIRCULATION: No significant stenosis of the intracranial internal carotid, anterior cerebral, or middle cerebral arteries. No aneurysm. POSTERIOR CIRCULATION: No significant stenosis of the vertebral, basilar, or posterior cerebral arteries. No aneurysm. OTHER: No dural venous sinus thrombosis on this non-dedicated study. BRAIN: No mass effect or midline shift. No extra-axial fluid collection. The gray-white differentiation is maintained. No major arterial stenosis or occlusion is seen in the head or the neck. RECOMMENDATIONS: Unavailable     CT HEAD WO CONTRAST    Result Date: 11/1/2022  EXAMINATION: CT OF THE HEAD WITHOUT CONTRAST  11/1/2022 5:27 am TECHNIQUE: CT of the head was performed without the administration of intravenous contrast. Automated exposure control, iterative reconstruction, and/or weight based adjustment of the mA/kV was utilized to reduce the radiation dose to as low as reasonably achievable. COMPARISON: None. HISTORY: ORDERING SYSTEM PROVIDED HISTORY: cva w/u TECHNOLOGIST PROVIDED HISTORY: Has a \"code stroke\" or \"stroke alert\" been called? ->Yes Reason for exam:->cva w/u Decision Support Exception - unselect if not a suspected or confirmed emergency medical condition->Emergency Medical Condition (MA) What reading provider will be dictating this exam?->CRC FINDINGS: BRAIN/VENTRICLES: There is no acute intracranial hemorrhage, mass effect or midline shift. No abnormal extra-axial fluid collection. The gray-white differentiation is maintained without evidence of an acute infarct. There is no evidence of hydrocephalus. The ventricles, cisterns and sulci are prominent consistent with atrophy. There is decreased attenuation within the periventricular white matter consistent with periventricular leukomalacia. ORBITS: The visualized portion of the orbits demonstrate no acute abnormality. SINUSES: The visualized paranasal sinuses and mastoid air cells demonstrate no acute abnormality. SOFT TISSUES/SKULL:  No acute abnormality of the visualized skull or soft tissues. 1.  There is no acute intracranial abnormality. Specifically, there is no intracranial hemorrhage. 2. Atrophy and periventricular leukomalacia, . CTA NECK W WO CONTRAST    Result Date: 11/2/2022  EXAMINATION: CTA OF THE HEAD WITHOUT AND WITH CONTRAST; CTA OF THE NECK 11/1/2022 5:58 am: TECHNIQUE: CTA of the head/brain was performed without and with the administration of intravenous contrast. Multiplanar reformatted images are provided for review. MIP images are provided for review. Automated exposure control, iterative reconstruction, and/or weight based adjustment of the mA/kV was utilized to reduce the radiation dose to as low as reasonably achievable.; CTA of the neck was performed with the administration of intravenous contrast. Multiplanar reformatted images are provided for review. MIP images are provided for review. Stenosis of the internal carotid arteries measured using NASCET criteria. Automated exposure control, iterative reconstruction, and/or weight based adjustment of the mA/kV was utilized to reduce the radiation dose to as low as reasonably achievable.  COMPARISON: CTA of the head and neck, 10/26/2020 HISTORY: ORDERING SYSTEM PROVIDED HISTORY: R/O LVO TECHNOLOGIST PROVIDED HISTORY: Reason for exam:->R/O LVO Decision Support Exception - unselect if not a suspected or confirmed emergency medical condition->Emergency Medical Condition (MA) What reading provider will be dictating this exam?->CRC; ORDERING SYSTEM PROVIDED HISTORY: R/o LVO TECHNOLOGIST PROVIDED HISTORY: Reason for exam:->R/o LVO Decision Support Exception - unselect if not a suspected or confirmed emergency medical condition->Emergency Medical Condition (MA) What reading provider will be dictating this exam?->CRC FINDINGS: CTA NECK: AORTIC ARCH/ARCH VESSELS: No dissection or arterial injury. No significant stenosis of the brachiocephalic or subclavian arteries. CAROTID ARTERIES: No dissection, arterial injury, or hemodynamically significant stenosis by NASCET criteria. VERTEBRAL ARTERIES: No dissection, arterial injury, or significant stenosis. SOFT TISSUES: The lung apices are clear. No cervical or superior mediastinal lymphadenopathy. The larynx and pharynx are unremarkable. No acute abnormality of the salivary and thyroid glands. BONES: No acute osseous abnormality. CTA HEAD: ANTERIOR CIRCULATION: No significant stenosis of the intracranial internal carotid, anterior cerebral, or middle cerebral arteries. No aneurysm. POSTERIOR CIRCULATION: No significant stenosis of the vertebral, basilar, or posterior cerebral arteries. No aneurysm. OTHER: No dural venous sinus thrombosis on this non-dedicated study. BRAIN: No mass effect or midline shift. No extra-axial fluid collection. The gray-white differentiation is maintained. No major arterial stenosis or occlusion is seen in the head or the neck.  RECOMMENDATIONS: Unavailable     MRI LUMBAR SPINE WO CONTRAST    Result Date: 11/1/2022  EXAMINATION: MRI OF THE LUMBAR SPINE WITHOUT CONTRAST, 11/1/2022 1:29 pm TECHNIQUE: Multiplanar multisequence MRI of the lumbar spine was performed without the administration of intravenous contrast. COMPARISON: 08/09/2021 HISTORY: ORDERING SYSTEM PROVIDED HISTORY: righ tleg weakness, areflexia BLE TECHNOLOGIST PROVIDED HISTORY: Reason for exam:->righ tleg weakness, areflexia BLE What reading provider will be dictating this exam?->CRC FINDINGS: BONES/ALIGNMENT: There is transitional anatomy at the lumbosacral junction. S1 appears lumbarized. Close attention to the correct anatomic level is recommended prior to any potential intervention. There is grade 1 anterolisthesis of L5 relative to S1 measuring 3 mm. Alignment is otherwise normal.  There is no acute fracture. There is disc desiccation and mild disc space narrowing at L5-S1, similar to the previous exam.  There is no spondylolysis. SPINAL CORD: The conus medullaris is normal in size and signal intensities and terminates normally. SOFT TISSUES: No paraspinal mass identified. L1-L2: There is no disc bulge or protrusion present. There is no significant spinal canal stenosis or neural foraminal narrowing present. L2-L3: There is no disc bulge or protrusion present. There is no significant spinal canal stenosis or neural foraminal narrowing present. L3-L4 There is a disc bulge and facet arthropathy. There is mild spinal canal stenosis. No significant neural foraminal narrowing is present. L4-L5 There is no disc bulge or protrusion present. There is no significant spinal canal stenosis or neural foraminal narrowing present. There is bilateral facet arthropathy. L5-S1 Sequelae of right hemilaminectomy is noted. There is grade 1 anterolisthesis, disc bulge and facet arthropathy. There is no significant spinal canal stenosis. There is mild effacement the right lateral recess and moderate right neural foraminal narrowing. The appearance is similar to the previous exam.  No significant left neural foraminal narrowing is present. S1-S2: There is no disc bulge or protrusion present. There is no significant spinal canal stenosis or neural foraminal narrowing present.      1. Transitional anatomy at the lumbosacral junction. S1 appears lumbarized. Close attention to the correct anatomic level is recommended prior to any potential intervention. 2. Sequelae of right hemilaminectomy at L5-S1. There is grade 1 anterolisthesis, disc bulge and facet arthropathy resulting in effacement the right lateral recess and moderate right neural foraminal narrowing, similar to the previous exam. 3. Mild spinal canal stenosis at L3-4, similar to the previous exam.     XR CHEST PORTABLE    Result Date: 11/1/2022  EXAMINATION: ONE XRAY VIEW OF THE CHEST 11/1/2022 5:20 am COMPARISON: 26 October 2020 HISTORY: ORDERING SYSTEM PROVIDED HISTORY: cva w/u TECHNOLOGIST PROVIDED HISTORY: Reason for exam:->cva w/u What reading provider will be dictating this exam?->CRC FINDINGS: The lungs are without acute focal process. There is no effusion or pneumothorax. The cardiomediastinal silhouette is without acute process. The osseous structures are without acute process. No acute process. MRI brain without contrast    Result Date: 11/1/2022  EXAMINATION: MRI OF THE BRAIN WITHOUT CONTRAST  11/1/2022 1:27 pm TECHNIQUE: Multiplanar multisequence MRI of the brain was performed without the administration of intravenous contrast. COMPARISON: CT head without IV contrast. HISTORY: ORDERING SYSTEM PROVIDED HISTORY: right sided numbness TECHNOLOGIST PROVIDED HISTORY: Reason for exam:->right sided numbness What reading provider will be dictating this exam?->CRC FINDINGS: No abnormal diffusion restriction is identified. Volume loss within the cerebellar hemispheres is noted bilaterally. Diffuse corpus callosum atrophy is identified. Prominence of the Gisela sylvian fissures is noted compatible with frontotemporal volume loss. No midline shift or mass effect the lateral ventricles is noted. Nonspecific signal hyperintensity on FLAIR imaging in the periventricular white matter is identified. Vascular flow voids are unremarkable.  The structures of the inner ear are normal in signal intensity on the provided sequences. Focal region of signal hyperintensity on FLAIR imaging is noted in the subcortical white matter of the right occipital lobe on image 17 of MR series 5. Signal loss on gradient recall echo imaging is noted within the basal ganglia bilaterally, as well as, the substantia nigra which can be seen in the setting of small vessel ischemic change or neuro degenerative process. 1. No evidence for acute ischemic change. 2. Parenchymal volume loss most predominant within the frontotemporal regions as well as the cerebellar hemispheres. Atrophy of the corpus callosum is identified as well. 3. Other degenerative changes as above. RECOMMENDATIONS: Unavailable       Discharge Medications:         Medication List        START taking these medications      rosuvastatin 40 MG tablet  Commonly known as: CRESTOR  Take 1 tablet by mouth nightly            CONTINUE taking these medications      acetaminophen 500 MG tablet  Commonly known as: TYLENOL     Alcohol Swabs Pads  DX: diabetes mellitus. Test 1 time(s) daily - Ok to substitute per insurance (1 each = 1 box)     allopurinol 300 MG tablet  Commonly known as: ZYLOPRIM  Take 1 tablet by mouth daily     ammonium lactate 12 % cream  Commonly known as: AMLACTIN     aspirin 81 MG EC tablet     blood glucose monitor kit and supplies  DX: diabetes mellitus. Test 1 time(s) daily - True Metrix requested by patient - [id number] [address] to substitute per insurance     blood glucose test strips  DX: diabetes mellitus. Use 1 time(s) daily - True Metrix requested by patient - Ok to substitute per insurance     carvedilol 25 MG tablet  Commonly known as: COREG  Take 1 tablet by mouth in the morning and 1 tablet before bedtime.      clopidogrel 75 MG tablet  Commonly known as: PLAVIX  Take 1 tablet by mouth daily     Commode Bedside Misc  Use daily as needed     glimepiride 1 MG tablet  Commonly known as: AMARYL  Take 1 tablet by mouth every morning (before breakfast)     Lancets Misc  DX: diabetes mellitus. Use 1 time(s) daily - Ok to substitute per insurance (1 each = 1 box)     metFORMIN 500 MG tablet  Commonly known as: GLUCOPHAGE  Take 1 tablet by mouth 2 times daily (with meals)     MULTIPLE VITAMIN PO     PROBIOTIC DAILY PO     torsemide 20 MG tablet  Commonly known as: DEMADEX  Take 1 tablet by mouth daily     V-4 High Compression Hose Misc  KNEE HIGH - 20-30 mmHg     vitamin B-12 100 MCG tablet  Commonly known as: CYANOCOBALAMIN     VITAMIN D PO            STOP taking these medications      atorvastatin 40 MG tablet  Commonly known as: LIPITOR     hydrOXYzine HCl 25 MG tablet  Commonly known as: ATARAX               Where to Get Your Medications        These medications were sent to [address] [place] [address]      Phone: [phone]   rosuvastatin 40 MG tablet         Disposition:   If discharged to Home, Any [place] [id number] needs that were indicated and/or required as been addressed and set up by Social Work. Condition at discharge: good     Activity: activity as tolerated    Total time taken for discharging this patient: 40 minutes. Greater than 70% of time was spent focused exclusively on this patient. Time was taken to review chart, discuss plans with consultants, reconciling medications, discussing plan answering questions with patient.      Harish Carver DO  11/2/2022, 11:36 AM  ----------------------------------------------------------------------------------------------------------------------    Elisabeth Diaz

## 2022-11-02 NOTE — PROGRESS NOTES
[facility] Neurology Daily Progress Note  Name: Saran Blum  Age: 79 y.o. Gender: male  CodeStatus: Full Code  Allergies: Bactrim [Sulfamethoxazole-Trimethoprim]    Chief Complaint:Altered Mental Status (Concerned of stroke/3 in past)    Primary Care Provider: Gloria Bain MD  InpatientTreatment Team: Treatment Team: Attending Provider: Neo Cancino DO; Consulting Physician: Heide Haque MD; : Lilia Delaney RN; : Josephine Vasquez RN; Utilization Reviewer: Roma Lancaster RN; Registered Nurse: Mervin Jha RN  Admission Date: 11/1/2022      HPI   Pt seen and examined for neuro follow up for right lower extremity weakness and headache. Currently alert and oriented x3, no acute distress, cooperative. He reports right lower extremity weakness is improved. No headache today. No visual changes. No focal neurodeficits. No seizure activity. Afebrile. Blood pressure slightly hypertensive at times. Patient reports that he has improved and does not hemideficit he denies any headache    Vitals:    11/02/22 0720   BP: (!) 155/88   Pulse: 84   Resp:    Temp: 98.6 °F (37 °C)   SpO2: 98%        Review of Systems   Constitutional:  Negative for appetite change, fatigue and fever. HENT:  Negative for hearing loss and trouble swallowing. Eyes:  Negative for visual disturbance. Respiratory:  Negative for cough, chest tightness, shortness of breath and wheezing. Cardiovascular:  Negative for chest pain, palpitations and leg swelling. Gastrointestinal:  Negative for abdominal distention, abdominal pain, nausea and vomiting. Genitourinary:  Negative for difficulty urinating. Musculoskeletal:  Negative for arthralgias, back pain, neck pain and neck stiffness. Skin:  Negative for color change and rash. Neurological:  Negative for dizziness, tremors, seizures, syncope, facial asymmetry, speech difficulty, weakness, light-headedness, numbness and headaches. Psychiatric/Behavioral:  Negative for agitation, confusion and hallucinations. The patient is not nervous/anxious. Physical Exam  Vitals and nursing note reviewed. Constitutional:       General: He is not in acute distress. Appearance: He is not diaphoretic. HENT:      Head: Normocephalic. Eyes:      Extraocular Movements: Extraocular movements intact. Pupils: Pupils are equal, round, and reactive to light. Cardiovascular:      Rate and Rhythm: Normal rate and regular rhythm. Pulmonary:      Effort: Pulmonary effort is normal. No respiratory distress. Breath sounds: Normal breath sounds. Abdominal:      General: Bowel sounds are normal. There is no distension. Palpations: Abdomen is soft. Tenderness: There is no abdominal tenderness. Skin:     General: Skin is warm and dry. Neurological:      General: No focal deficit present. Mental Status: He is alert and oriented to person, place, and time. Mental status is at baseline.      Nonfocal normal neurological examination is notable        Medications:  Reviewed    Infusion Medications:    dextrose       Scheduled Medications:    enoxaparin  30 mg SubCUTAneous BID    aspirin  81 mg Oral Daily    Or    aspirin  300 mg Rectal Daily    rosuvastatin  40 mg Oral Nightly    insulin lispro  0-8 Units SubCUTAneous TID WC    insulin lispro  0-4 Units SubCUTAneous Nightly    allopurinol  300 mg Oral Daily    carvedilol  25 mg Oral BID    clopidogrel  75 mg Oral Daily    torsemide  20 mg Oral Daily     PRN Meds: ondansetron **OR** ondansetron, polyethylene glycol, labetalol, glucose, dextrose bolus **OR** dextrose bolus, glucagon (rDNA), dextrose    Labs:   Recent Labs     11/01/22 0530 11/02/22  0515   WBC 6.0 4.0*   HGB 14.0 13.2*   HCT 41.6* 38.5*    149     Recent Labs     11/01/22  0530   *   K 3.5   CL 90*   CO2 25   BUN 14   CREATININE 0.82   CALCIUM 9.6     Recent Labs     11/01/22  0530   AST 25   ALT 26 BILITOT 0.5   ALKPHOS 73     Recent Labs     11/01/22  0530   INR 1.0     Recent Labs     11/01/22  0530   CKTOTAL 86   TROPONINI <0.010       Urinalysis:   Lab Results   Component Value Date/Time    NITRU Negative 11/01/2022 05:30 AM    WBCUA 0-2 11/01/2022 05:30 AM    BACTERIA Negative 11/01/2022 05:30 AM    RBCUA 0-2 11/01/2022 05:30 AM    BLOODU TRACE 11/01/2022 05:30 AM    SPECGRAV 1.017 11/01/2022 05:30 AM    GLUCOSEU Negative 11/01/2022 05:30 AM       Radiology:   Most recent    EEG No valid procedures specified. MRI of Brain No results found for this or any previous visit. Results for orders placed during the hospital encounter of 11/01/22    MRI brain without contrast    Narrative  EXAMINATION:  MRI OF THE BRAIN WITHOUT CONTRAST  11/1/2022 1:27 pm    TECHNIQUE:  Multiplanar multisequence MRI of the brain was performed without the  administration of intravenous contrast.    COMPARISON:  CT head without IV contrast.    HISTORY:  ORDERING SYSTEM PROVIDED HISTORY: right sided numbness  TECHNOLOGIST PROVIDED HISTORY:  Reason for exam:->right sided numbness  What reading provider will be dictating this exam?->CRC    FINDINGS:  No abnormal diffusion restriction is identified. Volume loss within the cerebellar hemispheres is noted bilaterally. Diffuse  corpus callosum atrophy is identified. Prominence of the Gisela sylvian fissures is noted compatible with  frontotemporal volume loss. No midline shift or mass effect the lateral  ventricles is noted. Nonspecific signal hyperintensity on FLAIR imaging in  the periventricular white matter is identified. Vascular flow voids are unremarkable. The structures of the inner ear are normal in signal intensity on the  provided sequences. Focal region of signal hyperintensity on FLAIR imaging  is noted in the subcortical white matter of the right occipital lobe on image  17 of MR series 5.   Signal loss on gradient recall echo imaging is noted  within the basal ganglia bilaterally, as well as, the substantia nigra which  can be seen in the setting of small vessel ischemic change or neuro  degenerative process. Impression  1. No evidence for acute ischemic change. 2. Parenchymal volume loss most predominant within the frontotemporal regions  as well as the cerebellar hemispheres. Atrophy of the corpus callosum is  identified as well. 3. Other degenerative changes as above. RECOMMENDATIONS:  Unavailable                            MRA of the Head and Neck: No results found for this or any previous visit. No results found for this or any previous visit. Results for orders placed during the hospital encounter of 11/12/17    MRA Head WO Contrast    Narrative  EXAMINATION: MRI BRAIN WO CONTRAST, MRA NECK WO CONTRAST, MRA HEAD WO CONTRAST    CLINICAL HISTORY: Altered vision. History of stroke. Right-sided weakness. Headache. COMPARISONS: July 28, 2016 MRI brain. November 12, 2017 CT brain without contrast enhancement. FINDINGS: Complete MRI of the brain was obtained. There is age-related atrophy. There are very few, very small T2 hyperintense age-related white matter lesions, largest being in the left frontal lobe. There is no mass. There is no edema. There is no  hematoma. There is no hydrocephalus. The proximal cervical spinal cord is normal to the C3 level. There is no abnormal restricted diffusion. There is no abnormal paramagnetic signal.    Time-of-flight MR angiography of the head and neck was obtained. Images of the brain were obtained centered on the [place] revealing no arterial occlusion or acquired stenosis. There is a large caliber posterior communicating artery on the  right. The P1 segment on the right is hypoplastic. There is no aneurysm. The A1 segment on the right is aplastic. There is a patent anterior communicating artery. Images of the neck show no stenosis or occlusion. There is no dissection.  Vertebral  arteries are large caliber and have normal flow volume. CONCLUSION: ATROPHY. NO ACUTE PROCESS IN THE BRAIN. INCIDENTAL VARIATIONS OF NORMAL ANATOMY ON MR ANGIOGRAM, WITHOUT ACQUIRED STENOSIS AND WITHOUT ARTERIAL OCCLUSION. CT of the Head: Results for orders placed during the hospital encounter of 11/01/22    CT HEAD WO CONTRAST    Narrative  EXAMINATION:  CT OF THE HEAD WITHOUT CONTRAST  11/1/2022 5:27 am    TECHNIQUE:  CT of the head was performed without the administration of intravenous  contrast. Automated exposure control, iterative reconstruction, and/or weight  based adjustment of the mA/kV was utilized to reduce the radiation dose to as  low as reasonably achievable. COMPARISON:  None. HISTORY:  ORDERING SYSTEM PROVIDED HISTORY: cva w/u  TECHNOLOGIST PROVIDED HISTORY:  Has a \"code stroke\" or \"stroke alert\" been called? ->Yes  Reason for exam:->cva w/u  Decision Support Exception - unselect if not a suspected or confirmed  emergency medical condition->Emergency Medical Condition (MA)  What reading provider will be dictating this exam?->CRC    FINDINGS:  BRAIN/VENTRICLES: There is no acute intracranial hemorrhage, mass effect or  midline shift. No abnormal extra-axial fluid collection. The gray-white  differentiation is maintained without evidence of an acute infarct. There is  no evidence of hydrocephalus. The ventricles, cisterns and sulci are  prominent consistent with atrophy. There is decreased attenuation within the  periventricular white matter consistent with periventricular leukomalacia. ORBITS: The visualized portion of the orbits demonstrate no acute abnormality. SINUSES: The visualized paranasal sinuses and mastoid air cells demonstrate  no acute abnormality. SOFT TISSUES/SKULL:  No acute abnormality of the visualized skull or soft  tissues. Impression  1. There is no acute intracranial abnormality. Specifically, there is no  intracranial hemorrhage.   2. Atrophy and periventricular leukomalacia,  . No results found for this or any previous visit. No results found for this or any previous visit. Carotid duplex: No results found for this or any previous visit. No results found for this or any previous visit. Results for orders placed during the hospital encounter of 20    US CAROTID ARTERY BILATERAL    Narrative  Patient MRN:  [id number]    : 1955    Age: 72 years    Gender: Male    Order Date:  2020 1:53 PM    EXAM: US CAROTID ARTERY BILATERAL    NUMBER OF IMAGES:  72    INDICATION: Z86.73 History of CVA (cerebrovascular accident) ICD10 , unsteady gait    COMPARISON: Carotid artery ultrasound 2017    FINDINGS:  Right side:  Proximal common carotid artery peak systolic velocity is 504 centimeters per second  Mid, common carotid artery peak systolic velocity is 500.0 centimeters per second. Distal common carotid artery peak systolic velocity is 522.8 centimeters per second    External carotid artery peak systolic velocity is 385 centimeters per second. Right carotid artery bulb/bifurcation peak systolic velocity 56.9 cm/s    Proximal internal carotid artery peak systolic velocity is 962.2 centimeters per second  Mid internal carotid artery peak systolic velocity is 357.3 centimeters per second. Distal internal carotid artery peak systolic velocity is 05.3 centimeters per second    Vertebral artery peak systolic velocity is 16.4 centimeters per second. Vertebral artery flow is antegrade    ICA/CCA ratio is 0.8    Left side:  Proximal common carotid artery peak systolic velocity is 231.4 centimeters per second  Mid, common carotid artery peak systolic velocity is 066.9 centimeters per second. Distal common carotid artery peak systolic velocity is 160.2 centimeters per second. External carotid artery peak systolic velocity is 376.1 centimeters per second. Left carotid artery bulb/bifurcation peak systolic velocity 35.2 cm/s.     Proximal internal carotid artery peak systolic velocity is 02.8 centimeters per second  Mid internal carotid artery peak systolic velocity is 59.4 centimeters per second. Distal internal carotid artery peak systolic velocity is 062.8 centimeters per second    Vertebral artery peak systolic velocity is 72.4 centimeters per second. Vertebral artery flow is antegrade    ICA/CCA ratio is 1.1. Calcified and noncalcified plaque bilateral carotid artery bulb/bifurcations and bilateral internal carotid arteries    Impression  Peak systolic velocities, ICA/CCA ratios and antegrade vertebral artery flow as above. See above for details of the examination and below for internal carotid artery interpretation guidelines. 1. Estimated luminal stenosis 50-69% distal left internal carotid artery. 2.Calcified and noncalcified plaque bilateral carotid artery bulb/bifurcations and bilateral internal carotid arteries. GOSINK CRITERIA    Diameter          PSV t            EDV t          PSV          EDV          Stenosis Site  %              cm/sec          cm/sec      ICA/CCA    ICA/CCA    0-49                 <124              <40             <2:1           ---                 ---    50-69              125-225                  >2:1           ---                 ---    70-89               225-325          >100          >4:1           >5:1              ---    90%+                >325              >100          >4:1           >9:1           Damped resistive CCA    >95%               May be            May be      Damped      ---              Damped resistive CCA  decreased      decreased  resistive CCA      Validated velocity measurements with angiographic measurements, velocity criteria are extrapolated from diameter data as defined by the Society of Radiologist in [address] [place] [address] Drive Radiology 2003; 922;905-967. Echo No results found for this or any previous visit.             Assessment/Plan:  11/1/22:  Patient is a 42-year-old  male with past medical history of diabetes mellitus type 2, CAD post MI with coronary stents, PAD, obesity, hyperlipidemia, CVA, TIA, gout, hypertension who presented with sudden onset of right lower extremity weakness, headache, vision changes with concern for TIA versus CVA. Patient does have documented previous history of CVA and recurrent TIAs mainly that present with the same symptoms. Upon review of his previous MRIs brain, CT head, MRAs head and neck, CTAs head and neck since 2014 there are no noted acute ischemic findings or significant stenosis or arterial occlusion on his scans. Patient on dual antiplatelet therapy at presentation and reports daily compliance. Initial CT of the head negative for any acute findings. CTA of the head neck pending. MRI of the brain pending. We will add MRI of the lumbar spine given his lower extremity weakness and areflexia. We will also assess sed rate and CRP given his scalp tenderness with associated headache and vision changes. Assess lipid panel and hemoglobin A1c for risk factor modification. Echocardiogram has been ordered. Further recommendations to follow pending work-up. I have personally performed a face to face diagnostic evaluation on this patient, reviewed all data and investigations, and am the sole provider of all clinical decisions on the neurological status of this patient. Recurrent right-sided symptoms with a normal examination still Multiple relations and I truly not feel that there is a vascular disease here. Patient also has variable symptoms though given the above findings we will reinvestigate. Case was discussed with emergency room and recommended admission as we have no other way to see him right away. We will arrange for the MRI. We will arrange for MRI of the lumbar spine as well. 60% time spent reviewing this patient myself    11/2/2022:  MRI of the brain negative for acute findings.   Patient with noted atrophy of the frontotemporal regions and cerebellar hemispheres. Will need to be followed outpatient for monitoring for cognitive impairment. MRI of the lumbar spine negative for significant canal stenosis. CTA of the head and neck completed yesterday with reading pending. Sed rate and crp normal  LDL 37  Okay to CA from neurology standpoint if CTA  head and neck negative for significant stenosis. Patient will continue on dual antiplatelet therapy and follow-up in the office in 4 to 6 weeks. I have personally performed a face to face diagnostic evaluation on this patient, reviewed all data and investigations, and am the sole provider of all clinical decisions on the neurological status of this patient. Nonfocal neuro examination notable. Patient symptoms quite do not fit into any anatomical Charli all his investigations have been normal.  MRI of the brain was normal except for some atrophy noted within the right frontal lobe. No seizures are noted. Patient is on dual antiplatelet therapy which we will continue given that his CT angiograms did not show thing significant. Findings discussed with the patient. 6% time spent on evaluating patient and investigations and discussion      Alex Carter MD, [address] American Board of Psychiatry & Neurology  Board Certified in Vascular Neurology  Board Certified in Neuromuscular Medicine  Certified in Neurorehabilitation     Collaborating physicians: Dr Chavo Carter    Electronically signed by Elon Brunner, APRN - CNP on 11/2/2022 at 11:08 AM

## 2022-11-02 NOTE — CARE COORDINATION
[facility] Case Management Initial Discharge Assessment    Met with Patient to discuss discharge plan. PCP: Tommy Hansen MD                                Date of Last Visit: was supposed to see yesterday however the office called him and cancelled his appoinment. [facility] Patient: No        [facility] Notified: no    If no PCP, list provided? N/A    Discharge Planning    Living Arrangements: independently at home    Who do you live with? wife    Who helps you with your care:  self or spouse    If lives at home:     Do you have any barriers navigating in your home? no    Patient can perform ADL? No    Current Services (outpatient and in home) :  None    Dialysis: No    Is transportation available to get to your appointments? Yes    DME Equipment:  yes - has walker and cane states uses both    Respiratory equipment: None    Respiratory provider:  no     Pharmacy:  yes - [facility]     Consult with Medication Assistance Program?  No      Patient agreeable to [address]? No and Declined    Patient agreeable to SNF/Rehab? No and Declined    Other discharge needs identified? N/A    Does Patient Have a High-Risk for Readmission Diagnosis (CHF, PN, MI, COPD)? No    Initial Discharge Plan? (Note: please see concurrent daily documentation for any updates after initial note). Plans to dc home today with no needs. Cm to follow for any new d/c needs or changes to the d/c plan.      Readmission Risk              Risk of Unplanned Readmission:  0         Electronically signed by Leela Rivera RN on 11/2/2022 at 11:18 AM

## 2022-11-02 NOTE — PROGRESS NOTES
CLINICAL PHARMACY NOTE: MEDS TO BEDS    Total # of Prescriptions Filled: 0   The following medications were delivered to the patient:  Went to pt room and he refused med. .. Pt stated a hospitalist shouldn't be changing his meds. ..  He will follow up with his pcp to decide what to take and not take; but at this time he refuses med     Additional Documentation:

## 2022-11-02 NOTE — PROGRESS NOTES
Pt's home meds were verified with pharmacy. Meds are in pt specific box in med room.  Electronically signed by Maxime Nguyen RN on 11/1/2022 at 11:23 PM

## 2022-11-03 ENCOUNTER — CARE COORDINATION (OUTPATIENT)
Dept: CARE COORDINATION | Age: 67
End: 2022-11-03

## 2022-11-03 LAB — HBA1C MFR BLD: 7.7 % (ref 4.8–5.9)

## 2022-11-03 NOTE — PROGRESS NOTES
Physical Therapy  Facility/Department: [facility] MED SURG [id number]  Physical Therapy Discharge      NAME: Vee Izquierdo    : 1955 (79 y.o.)  MRN: [id number]    Account: [de-identified]  Gender: male      Patient has been discharged from acute care hospital. DC patient from current PT program.      Electronically signed by Giancarlo Sethi PT on 11/3/22 at 4:47 PM EDT

## 2022-11-03 NOTE — CARE COORDINATION
Care Transitions Outreach Attempt    Call within 2 business days of discharge: Yes   Attempted to reach patient for transitions of care follow up. Unable to reach patient. CTN left HIPAA compliant message requesting return call. Will attempt to reach again. START taking:  rosuvastatin (CRESTOR)  STOP taking:  atorvastatin 40 MG tablet (LIPITOR)  hydrOXYzine HCl 25 MG tablet (ATARAX)      Patient: Blayne Sewell Patient : 1955 MRN: [id number]    Last Discharge  Street       Date Complaint Diagnosis Description Type Department Provider    22 Altered Mental Status Stroke-like symptoms . .. ED to Hosp-Admission (Discharged) (ADMITTED) DO Husam; KATHERINE. .. Was this an external facility discharge? No Discharge Facility: N/A    Noted following upcoming appointments from discharge chart review:   [facility] follow up appointment(s):   Future Appointments   Date Time Provider Qasim Rae   2022 11:30 AM MD [address] [address]   2022  4:30 PM MD [facility] [address]   2023  3:30 PM MD Devora Baum Neurology -   3/1/2023  1:00 PM Bita Moe MD [facility]   2023  3:30 PM MD Devora Baum Neurology -   2023  3:30 PM Bernard Raman DO [facility]     [id number] [address] follow up appointment(s):     [address] Transition Team  [phone]

## 2022-11-04 ENCOUNTER — CARE COORDINATION (OUTPATIENT)
Dept: CARE COORDINATION | Age: 67
End: 2022-11-04

## 2022-11-04 NOTE — CARE COORDINATION
Care Transitions Outreach Attempt    Call within 2 business days of discharge: Yes   Second attempt made to reach patient for transitions of care follow up. Unable to reach patient. CTN left HIPAA compliant message requesting return call. CTM will sign off. START taking:  rosuvastatin (CRESTOR)  STOP taking:  atorvastatin 40 MG tablet (LIPITOR)  hydrOXYzine HCl 25 MG tablet (ATARAX)    Patient: Linnette Valverde Patient : 1955 MRN: [id number]    Last Discharge  Street       Date Complaint Diagnosis Description Type Department Provider    22 Altered Mental Status Stroke-like symptoms . .. ED to Hosp-Admission (Discharged) (ADMITTED) DO Husam; KATHERINE. .. Was this an external facility discharge? No Discharge Facility: N/A    Noted following upcoming appointments from discharge chart review:   [facility] follow up appointment(s):   Future Appointments   Date Time Provider Qasim Rae   2022 11:30 AM MD Emery [address]   2022  4:30 PM MD Emery Altamirano    2023  3:30 PM MD Apollo Muniz Neurology -   3/1/2023  1:00 PM Sushma Banks MD [facility]   2023  3:30 PM MD Apollo Muniz Neurology -   2023  3:30 PM Bernard Salcido DO [facility]     [id number] [address] follow up appointment(s):     [address] Transition Team  [phone]

## 2022-11-09 ENCOUNTER — OFFICE VISIT (OUTPATIENT)
Dept: FAMILY MEDICINE CLINIC | Age: 67
End: 2022-11-09
Payer: MEDICARE

## 2022-11-09 VITALS
HEART RATE: 93 BPM | WEIGHT: 269.8 LBS | SYSTOLIC BLOOD PRESSURE: 108 MMHG | DIASTOLIC BLOOD PRESSURE: 70 MMHG | TEMPERATURE: 97.6 F | OXYGEN SATURATION: 97 % | HEIGHT: 73 IN | BODY MASS INDEX: 35.76 KG/M2

## 2022-11-09 DIAGNOSIS — E66.01 CLASS 2 SEVERE OBESITY DUE TO EXCESS CALORIES WITH SERIOUS COMORBIDITY AND BODY MASS INDEX (BMI) OF 35.0 TO 35.9 IN ADULT (HCC): ICD-10-CM

## 2022-11-09 DIAGNOSIS — Z72.0 TOBACCO USE: ICD-10-CM

## 2022-11-09 DIAGNOSIS — R20.2 PARESTHESIAS: Primary | ICD-10-CM

## 2022-11-09 DIAGNOSIS — E78.2 MIXED HYPERLIPIDEMIA: ICD-10-CM

## 2022-11-09 DIAGNOSIS — F10.10 EXCESSIVE DRINKING OF ALCOHOL: ICD-10-CM

## 2022-11-09 DIAGNOSIS — I10 ESSENTIAL HYPERTENSION: Chronic | ICD-10-CM

## 2022-11-09 DIAGNOSIS — E11.65 TYPE 2 DIABETES MELLITUS WITH HYPERGLYCEMIA, WITHOUT LONG-TERM CURRENT USE OF INSULIN (HCC): ICD-10-CM

## 2022-11-09 PROCEDURE — 99214 OFFICE O/P EST MOD 30 MIN: CPT | Performed by: FAMILY MEDICINE

## 2022-11-09 PROCEDURE — 3078F DIAST BP <80 MM HG: CPT | Performed by: FAMILY MEDICINE

## 2022-11-09 PROCEDURE — 3051F HG A1C>EQUAL 7.0%<8.0%: CPT | Performed by: FAMILY MEDICINE

## 2022-11-09 PROCEDURE — 1123F ACP DISCUSS/DSCN MKR DOCD: CPT | Performed by: FAMILY MEDICINE

## 2022-11-09 PROCEDURE — 3074F SYST BP LT 130 MM HG: CPT | Performed by: FAMILY MEDICINE

## 2022-11-09 RX ORDER — BLOOD PRESSURE TEST KIT
KIT MISCELLANEOUS
Qty: 100 EACH | Refills: 5 | Status: SHIPPED | OUTPATIENT
Start: 2022-11-09

## 2022-11-09 RX ORDER — GLUCOSAMINE HCL/CHONDROITIN SU 500-400 MG
CAPSULE ORAL
Qty: 100 STRIP | Refills: 5 | Status: SHIPPED | OUTPATIENT
Start: 2022-11-09

## 2022-11-09 RX ORDER — LANCETS 30 GAUGE
EACH MISCELLANEOUS
Qty: 100 EACH | Refills: 5 | Status: SHIPPED | OUTPATIENT
Start: 2022-11-09

## 2022-11-09 ASSESSMENT — PATIENT HEALTH QUESTIONNAIRE - PHQ9
SUM OF ALL RESPONSES TO PHQ9 QUESTIONS 1 & 2: 0
1. LITTLE INTEREST OR PLEASURE IN DOING THINGS: 0
SUM OF ALL RESPONSES TO PHQ QUESTIONS 1-9: 0
2. FEELING DOWN, DEPRESSED OR HOPELESS: 0
SUM OF ALL RESPONSES TO PHQ QUESTIONS 1-9: 0

## 2022-11-09 ASSESSMENT — ENCOUNTER SYMPTOMS
ABDOMINAL PAIN: 0
NAUSEA: 0
ANAL BLEEDING: 0
CHEST TIGHTNESS: 0
COUGH: 0
BLOOD IN STOOL: 0
WHEEZING: 0
SHORTNESS OF BREATH: 0
CONSTIPATION: 0
DIARRHEA: 0
VOMITING: 0

## 2022-11-09 NOTE — ASSESSMENT & PLAN NOTE
I reviewed results of most recent lipid panel with patient and wife today in the office. I discussed with patient that with noted elevated triglycerides and low HDL it would be reasonable to change his atorvastatin 40 mg daily to rosuvastatin 40 mg daily. After long discussion and explaining rationale patient agrees to start rosuvastatin 40 mg once daily once he has completed his current supply of atorvastatin 40 mg daily tablets.

## 2022-11-09 NOTE — PROGRESS NOTES
Genoveva Diaz (: 1955) is a 79 y.o. male, Established patient, who presents today for:    Chief Complaint   Patient presents with    Follow-Up from Hospital     Patient is present for hospital f/u Patient states that he was d/c . ASSESSMENT/PLAN    1. Paresthesias  Comments:  Unclear etiology, consider TIA. Resolved since hospital discharge. I stressed with patient importance of aggressively managing risk factors. See below  2. Type 2 diabetes mellitus with hyperglycemia, without long-term current use of insulin ([address]  Assessment & Plan:  Uncontrolled diabetes based on most recent A1c obtained in the hospital.  Patient disputes this result and refuses any new medication for diabetes management. I reviewed with patient benefits of starting a GLP-1 agonist like dulaglutide (Trulicity) once weekly. He was counseled on the benefits of improved blood glucose and potential weight loss. I reviewed with him that his diet is likely contributing to elevated glucose values over time, his alcohol consumption is likely contributing to elevated glucose values, and his diabetes may be progressing over time as well. Patient voices understanding, wishes to continue with his current medication and states he will consider addition of medication if A1c remains above goal control on subsequent testing. It was reviewed with patient that blood sugar should be checked once a day, alternating between before breakfast, before lunch, and before dinner. These values should be recorded in a logbook that is brought to each visit. Prescription given for diabetic testing supplies today in the office. Orders:  -     Alcohol Swabs PADS; Disp-100 each, R-5, NormalDX: diabetes mellitus. Test 1 time(s) daily - Ok to substitute per insurance (1 each = 1 box)  -     blood glucose monitor strips; DX: diabetes mellitus.  Use 1 time(s) daily - True Metrix requested by patient - Ok to substitute per insurance, Disp-100 strip, R-5, Normal  -     Lancets MISC; Disp-100 each, R-5, NormalDX: diabetes mellitus. Use 1 time(s) daily - Ok to substitute per insurance (1 each = 1 box)  3. Essential hypertension  Assessment & Plan:   Well-controlled, continue current medications, continue current treatment plan, medication adherence emphasized and lifestyle modifications recommended  4. Mixed hyperlipidemia  Assessment & Plan:  I reviewed results of most recent lipid panel with patient and wife today in the office. I discussed with patient that with noted elevated triglycerides and low HDL it would be reasonable to change his atorvastatin 40 mg daily to rosuvastatin 40 mg daily. After long discussion and explaining rationale patient agrees to start rosuvastatin 40 mg once daily once he has completed his current supply of atorvastatin 40 mg daily tablets. 5. Tobacco use  Assessment & Plan:  Patient pre-contemplative and is aware that smoking cessation would be the best thing for overall health. We reviewed cessation aids including medication and nicotine replacement therapy. Will continue to encourage complete smoking cessation at subsequent visits. 6. Excessive drinking of alcohol  Assessment & Plan:  I reviewed with patient at length the importance of limiting alcohol intake. I discussed with him that frequent consumption of alcohol would increase his risk for falls with his known history of stroke and chronic unsteady/ataxic gait. I reviewed with him that alcohol consumption would also increase his blood glucose values and would potentially interact with his medication. I discussed with him the consumption of more than 2 beers daily was excessive and would greatly increase his risk of liver dysfunction over time. Patient voices understanding and agrees to consider cutting back on alcohol consumption at home. We will continue to monitor over time.   7. Class 2 severe obesity due to excess calories with serious comorbidity and body mass index (BMI) of 35.0 to 35.9 in adult [facility])  Assessment & Plan:  Patient counseled on healthy dietary choices and the benefits of a lower salt and/or lower carbohydrate diet as appropriate. Patient also counseled on benefits of moderate intensity cardiovascular exercise for 150 minutes per week as they are able. Advice was given to make small changes over time, setting smaller achievable goals until recommended lifestyle changes are reached. Return for [address] VISIT [address] [facility] [address] 2022. SUBJECTIVE/OBJECTIVE:    HPI    Patient presents for hospital follow-up visit. Patient was admitted at [facility] [place] on 11/01/2022 with complaints of right-sided numbness, dizziness, and lightheadedness with  concern regarding stroke. Neurology was consulted, MRI of the brain was negative, CTA of the head and neck and CT of the head showed no new acute pathology, stroke was ruled out. Patient was noted on testing to be intoxicated. Was found stable for discharge home on 11/02/2022 to follow-up with primary care and neurology as an outpatient. Patient reports feeling much improved and back to normal baseline since hospital discharge. He denies not picking up new cholesterol medication prescribed by the hospitalist on discharge from the hospital [facility] didn't explain anything to me and just sent the prescription in. I wasn't going to take it. \"  There are no reported right-sided paresthesias or new onset muscle weakness. Patient denies any headaches, vision changes, chest pain, dyspnea, palpitations, lightheadedness, dizziness, worsening lower extremity edema, or syncope. He denies adhering to any specific lower carbohydrate or lower salt diet. He denies checking blood glucose values at home and states he is not sure if he has lancets, alcohol swabs, or test strips at home. He admits to occasional episodes of increased alcohol consumption, 3-4 beers at a time over the past several weeks.   He states he will sometimes go 1 to 2 weeks or more without consuming any alcohol. He denies consuming more than 4 beers in the setting. He also admits to continued cigarette smoking, usually 0.5 to 0.75 packs/day. \"I don't want another lecture about that, I already know. \"     Current Outpatient Medications on File Prior to Visit   Medication Sig Dispense Refill    acetaminophen (TYLENOL) 500 MG tablet Take 500 mg by mouth every 6 hours as needed for Pain      torsemide (DEMADEX) 20 MG tablet Take 1 tablet by mouth daily 90 tablet 1    glimepiride (AMARYL) 1 MG tablet Take 1 tablet by mouth every morning (before breakfast) 90 tablet 1    carvedilol (COREG) 25 MG tablet Take 1 tablet by mouth in the morning and 1 tablet before bedtime. 180 tablet 3    clopidogrel (PLAVIX) 75 MG tablet Take 1 tablet by mouth daily 90 tablet 3    metFORMIN (GLUCOPHAGE) 500 MG tablet Take 1 tablet by mouth 2 times daily (with meals) 180 tablet 1    allopurinol (ZYLOPRIM) 300 MG tablet Take 1 tablet by mouth daily 90 tablet 1    VITAMIN D PO       Elastic Bandages & Supports (V-4 HIGH COMPRESSION HOSE) MISC KNEE HIGH - 20-30 mmHg 2 each 1    blood glucose monitor kit and supplies DX: diabetes mellitus. Test 1 time(s) daily - True Metrix requested by patient - Nola Petey to substitute per insurance 1 kit 0    aspirin 81 MG EC tablet Take 81 mg by mouth daily      Probiotic Product (PROBIOTIC DAILY PO) Take 1 tablet by mouth daily      vitamin B-12 (CYANOCOBALAMIN) 100 MCG tablet Take 50 mcg by mouth daily      ammonium lactate (AMLACTIN) 12 % cream APPLY TO DRY CALLUS AREAS 1 TO 2 TIMES DAILY  5    Misc. Devices (COMMODE BEDSIDE) MISC Use daily as needed 1 each 0    MULTIPLE VITAMIN PO Take 1 tablet by mouth daily       rosuvastatin (CRESTOR) 40 MG tablet Take 1 tablet by mouth nightly (Patient not taking: Reported on 11/9/2022) 30 tablet 3     No current facility-administered medications on file prior to visit.        Allergies   Allergen Reactions    Bactrim [Sulfamethoxazole-Trimethoprim] Nausea And Vomiting and Other (See Comments)     Sugar count elevated, had troubles urinating        Review of Systems   Constitutional:  Negative for appetite change, chills, diaphoresis, fatigue, fever and unexpected weight change. Eyes:  Negative for visual disturbance. Respiratory:  Negative for cough, chest tightness, shortness of breath and wheezing. Cardiovascular:  Negative for chest pain, palpitations and leg swelling. No orthopnea, No PND   Gastrointestinal:  Negative for abdominal pain, anal bleeding, blood in stool, constipation, diarrhea, nausea and vomiting. No heartburn, No melena   Endocrine: Negative for cold intolerance, heat intolerance, polydipsia, polyphagia and polyuria. Genitourinary:  Negative for dysuria and hematuria. Musculoskeletal:  Positive for gait problem (chronic, stable). Negative for myalgias. Skin:  Negative for rash. Neurological:  Negative for dizziness, syncope, weakness, light-headedness, numbness and headaches. Psychiatric/Behavioral:  Negative for dysphoric mood. The patient is not nervous/anxious. Vitals:  /70 (Site: Right Upper Arm, Position: Sitting, Cuff Size: Large Adult)   Pulse 93   Temp 97.6 °F (36.4 °C) (Temporal)   Ht 6' 1\" (1.854 m)   Wt 269 lb 12.8 oz (122.4 kg)   SpO2 97%   BMI 35.60 kg/m²     Physical Exam  Vitals reviewed. Constitutional:       General: He is not in acute distress. Appearance: He is obese. He is not ill-appearing. Eyes:      General: No scleral icterus. Neck:      Thyroid: No thyroid mass, thyromegaly or thyroid tenderness. Vascular: No carotid bruit. Cardiovascular:      Rate and Rhythm: Normal rate and regular rhythm. Heart sounds: Normal heart sounds. No murmur heard. Pulmonary:      Effort: Pulmonary effort is normal. No respiratory distress. Breath sounds: Normal breath sounds. No wheezing, rhonchi or rales.    Abdominal: General: Bowel sounds are normal. There is no distension. Palpations: Abdomen is soft. Tenderness: There is no abdominal tenderness. There is no right CVA tenderness, left CVA tenderness, guarding or rebound. Musculoskeletal:      Cervical back: Neck supple. Right lower leg: Edema (trace to 1+ to midcalf) present. Left lower leg: Edema (trace to 1+ to midcalf) present. Lymphadenopathy:      Cervical: No cervical adenopathy. Skin:     Findings: No rash. Neurological:      Mental Status: He is alert and oriented to person, place, and time. Psychiatric:         Mood and Affect: Mood normal.         Behavior: Behavior normal.         Thought Content: Thought content normal.       Ortho Exam (If Applicable)              An electronic signature was used to authenticate this note.      Ita Mccormack MD

## 2022-11-09 NOTE — ASSESSMENT & PLAN NOTE
I reviewed with patient at length the importance of limiting alcohol intake. I discussed with him that frequent consumption of alcohol would increase his risk for falls with his known history of stroke and chronic unsteady/ataxic gait. I reviewed with him that alcohol consumption would also increase his blood glucose values and would potentially interact with his medication. I discussed with him the consumption of more than 2 beers daily was excessive and would greatly increase his risk of liver dysfunction over time. Patient voices understanding and agrees to consider cutting back on alcohol consumption at home. We will continue to monitor over time.

## 2022-11-09 NOTE — ASSESSMENT & PLAN NOTE
Uncontrolled diabetes based on most recent A1c obtained in the hospital.  Patient disputes this result and refuses any new medication for diabetes management. I reviewed with patient benefits of starting a GLP-1 agonist like dulaglutide (Trulicity) once weekly. He was counseled on the benefits of improved blood glucose and potential weight loss. I reviewed with him that his diet is likely contributing to elevated glucose values over time, his alcohol consumption is likely contributing to elevated glucose values, and his diabetes may be progressing over time as well. Patient voices understanding, wishes to continue with his current medication and states he will consider addition of medication if A1c remains above goal control on subsequent testing. It was reviewed with patient that blood sugar should be checked once a day, alternating between before breakfast, before lunch, and before dinner. These values should be recorded in a logbook that is brought to each visit. Prescription given for diabetic testing supplies today in the office.

## 2022-11-30 DIAGNOSIS — M10.9 GOUT OF MULTIPLE SITES, UNSPECIFIED CAUSE, UNSPECIFIED CHRONICITY: Chronic | ICD-10-CM

## 2022-12-01 RX ORDER — ALLOPURINOL 300 MG/1
300 TABLET ORAL DAILY
Qty: 90 TABLET | Refills: 1 | Status: SHIPPED | OUTPATIENT
Start: 2022-12-01

## 2022-12-01 NOTE — TELEPHONE ENCOUNTER
Pharmacy is requesting medication refill.  Please approve or deny this request.    Rx requested:  Requested Prescriptions     Pending Prescriptions Disp Refills    allopurinol (ZYLOPRIM) 300 MG tablet [Pharmacy Med Name: ALLOPURINOL TABS 300MG] 90 tablet 3     Sig: Take 1 tablet by mouth daily         Last Office Visit:   11/9/2022      Next Visit Date:  Future Appointments   Date Time Provider Qasim Rae   12/20/2022  4:30 PM Latonia Whiting MD [place] [id number]   1/4/2023  3:30 PM MD Devora Baum Neurology -   3/1/2023  1:00 PM Bita Moe MD [facility]   6/28/2023  3:30 PM MD Devora Baum Neurology -   7/13/2023  3:30 PM Bernard Raman, [facility]

## 2022-12-07 LAB — DIABETIC RETINOPATHY: NEGATIVE

## 2022-12-19 SDOH — HEALTH STABILITY: PHYSICAL HEALTH
ON AVERAGE, HOW MANY DAYS PER WEEK DO YOU ENGAGE IN MODERATE TO STRENUOUS EXERCISE (LIKE A BRISK WALK)?: PATIENT DECLINED

## 2022-12-19 ASSESSMENT — LIFESTYLE VARIABLES
HOW OFTEN DO YOU HAVE SIX OR MORE DRINKS ON ONE OCCASION: 98
HOW MANY STANDARD DRINKS CONTAINING ALCOHOL DO YOU HAVE ON A TYPICAL DAY: 98
HOW OFTEN DO YOU HAVE A DRINK CONTAINING ALCOHOL: 98
HOW MANY STANDARD DRINKS CONTAINING ALCOHOL DO YOU HAVE ON A TYPICAL DAY: PATIENT DECLINED
HOW OFTEN DO YOU HAVE A DRINK CONTAINING ALCOHOL: PATIENT DECLINED

## 2022-12-19 ASSESSMENT — PATIENT HEALTH QUESTIONNAIRE - PHQ9
2. FEELING DOWN, DEPRESSED OR HOPELESS: 0
SUM OF ALL RESPONSES TO PHQ9 QUESTIONS 1 & 2: 0
SUM OF ALL RESPONSES TO PHQ QUESTIONS 1-9: 0
SUM OF ALL RESPONSES TO PHQ QUESTIONS 1-9: 0
1. LITTLE INTEREST OR PLEASURE IN DOING THINGS: 0
SUM OF ALL RESPONSES TO PHQ QUESTIONS 1-9: 0
SUM OF ALL RESPONSES TO PHQ QUESTIONS 1-9: 0

## 2022-12-20 ENCOUNTER — OFFICE VISIT (OUTPATIENT)
Dept: FAMILY MEDICINE CLINIC | Age: 67
End: 2022-12-20
Payer: MEDICARE

## 2022-12-20 VITALS
TEMPERATURE: 97.4 F | DIASTOLIC BLOOD PRESSURE: 76 MMHG | HEIGHT: 73 IN | HEART RATE: 99 BPM | SYSTOLIC BLOOD PRESSURE: 130 MMHG | BODY MASS INDEX: 36.29 KG/M2 | WEIGHT: 273.8 LBS | OXYGEN SATURATION: 96 %

## 2022-12-20 DIAGNOSIS — I25.10 CORONARY ARTERY DISEASE INVOLVING NATIVE CORONARY ARTERY OF NATIVE HEART WITHOUT ANGINA PECTORIS: ICD-10-CM

## 2022-12-20 DIAGNOSIS — R26.81 UNSTEADY GAIT: Chronic | ICD-10-CM

## 2022-12-20 DIAGNOSIS — Z12.5 SCREENING FOR PROSTATE CANCER: ICD-10-CM

## 2022-12-20 DIAGNOSIS — F10.10 EXCESSIVE DRINKING OF ALCOHOL: ICD-10-CM

## 2022-12-20 DIAGNOSIS — E11.29 MICROALBUMINURIA DUE TO TYPE 2 DIABETES MELLITUS (HCC): Chronic | ICD-10-CM

## 2022-12-20 DIAGNOSIS — Z00.00 MEDICARE ANNUAL WELLNESS VISIT, SUBSEQUENT: Primary | ICD-10-CM

## 2022-12-20 DIAGNOSIS — I10 ESSENTIAL HYPERTENSION: Chronic | ICD-10-CM

## 2022-12-20 DIAGNOSIS — M10.9 GOUT OF MULTIPLE SITES, UNSPECIFIED CAUSE, UNSPECIFIED CHRONICITY: Chronic | ICD-10-CM

## 2022-12-20 DIAGNOSIS — R80.9 MICROALBUMINURIA DUE TO TYPE 2 DIABETES MELLITUS (HCC): Chronic | ICD-10-CM

## 2022-12-20 DIAGNOSIS — G47.33 OSA (OBSTRUCTIVE SLEEP APNEA): Chronic | ICD-10-CM

## 2022-12-20 DIAGNOSIS — E78.2 MIXED HYPERLIPIDEMIA: ICD-10-CM

## 2022-12-20 DIAGNOSIS — I73.9 PAD (PERIPHERAL ARTERY DISEASE) (HCC): ICD-10-CM

## 2022-12-20 DIAGNOSIS — E11.65 TYPE 2 DIABETES MELLITUS WITH HYPERGLYCEMIA, WITHOUT LONG-TERM CURRENT USE OF INSULIN (HCC): ICD-10-CM

## 2022-12-20 PROCEDURE — 3051F HG A1C>EQUAL 7.0%<8.0%: CPT | Performed by: FAMILY MEDICINE

## 2022-12-20 PROCEDURE — 3074F SYST BP LT 130 MM HG: CPT | Performed by: FAMILY MEDICINE

## 2022-12-20 PROCEDURE — 3078F DIAST BP <80 MM HG: CPT | Performed by: FAMILY MEDICINE

## 2022-12-20 PROCEDURE — G0439 PPPS, SUBSEQ VISIT: HCPCS | Performed by: FAMILY MEDICINE

## 2022-12-20 PROCEDURE — 1123F ACP DISCUSS/DSCN MKR DOCD: CPT | Performed by: FAMILY MEDICINE

## 2022-12-20 RX ORDER — ATORVASTATIN CALCIUM 40 MG/1
TABLET, FILM COATED ORAL
COMMUNITY
Start: 2022-12-04

## 2022-12-20 NOTE — PROGRESS NOTES
Medicare Annual Wellness Visit    Laura Herrmann is here for Medicare AWV (Patient is present for AWV/)    Assessment & Plan   1. Medicare annual wellness visit, subsequent  2. Screening for prostate cancer  -     PSA Screening; Future  3. Unsteady gait  Assessment & Plan:   Patient advised to continue with home exercises, use walker with all ambulation, and keep chronic follow-up visits with neurology. 4. Type 2 diabetes mellitus with hyperglycemia, without long-term current use of insulin (HCC)  -     Hemoglobin A1C; Future  -     Comprehensive Metabolic Panel; Future  -     Lipid Panel; Future  5. Microalbuminuria due to type 2 diabetes mellitus (HCC)  -     Hemoglobin A1C; Future  -     Comprehensive Metabolic Panel; Future  6. Essential hypertension  -     Comprehensive Metabolic Panel; Future  -     CBC with Auto Differential; Future  7. AYLIN (obstructive sleep apnea)  Assessment & Plan:  Patient using CPAP machine on a nightly basis and is getting restful sleep with use of the machine. 8. Mixed hyperlipidemia  -     Comprehensive Metabolic Panel; Future  -     Lipid Panel; Future  9. Coronary artery disease involving native coronary artery of native heart without angina pectoris  -     Lipid Panel; Future  -     CBC with Auto Differential; Future  10. PAD (peripheral artery disease) ([facility])  -     Lipid Panel; Future  11. Excessive drinking of alcohol  Assessment & Plan:  Reviewed with patient again the importance of limiting alcohol intake. I discussed with him that frequent consumption of alcohol would increase his risk for falls with his known history of stroke and chronic unsteady/ataxic gait. I reviewed with him that alcohol consumption would also increase his blood glucose values and would potentially interact with his medication. I discussed with him the consumption of more than 2 beers daily was excessive and would greatly increase his risk of liver dysfunction over time.   Patient voices understanding and agrees to consider cutting back on alcohol consumption at home. We will continue to monitor over time. Orders:  -     Comprehensive Metabolic Panel; Future  -     Uric Acid; Future  -     CBC with Auto Differential; Future  12. Gout of multiple sites, unspecified cause, unspecified chronicity  -     Comprehensive Metabolic Panel; Future  -     Uric Acid; Future        Recommendations for Preventive Services Due: see orders and patient instructions/AVS.  Recommended screening schedule for the next 5-10 years is provided to the patient in written form: see Patient Instructions/AVS.     Return in about 3 months (around 3/20/2023) for diabetes, lipids, and HTN F/U. Subjective       Patient's complete Health Risk Assessment and screening values have been reviewed and are found in Flowsheets. The following problems were reviewed today and where indicated follow up appointments were made and/or referrals ordered. Positive Risk Factor Screenings with Interventions:    Fall Risk:  Do you feel unsteady or are you worried about falling? : (!) yes  2 or more falls in past year?: (!) yes  Fall with injury in past year?: (!) yes     Interventions:    Patient declines any further evaluation or treatment. Patient is status post physical therapy and has home exercises for management of gait. No falls reported since most recent visit.                Weight and Activity:  Physical Activity: Unknown    Days of Exercise per Week: Patient refused    Minutes of Exercise per Session: Not on file     On average, how many days per week do you engage in moderate to strenuous exercise (like a brisk walk)?: Patient refused  Have you lost any weight without trying in the past 3 months?: No  Body mass index: (!) 36.12  Obesity Interventions:  See AVS for additional education material  See A/P for plan and any pertinent orders              ADL's:   Patient reports needing help with:  Select all that apply: [employer], Transportation  Interventions:  Patient declined any further interventions or treatment     Tobacco Use:  Tobacco Use: High Risk    Smoking Tobacco Use: Some Days    Smokeless Tobacco Use: Never    Passive Exposure: Not on file     E-cigarette/Vaping       Questions Responses    E-cigarette/Vaping Use Never User    Start Date     Passive Exposure     Quit Date     Counseling Given     Comments         Interventions:  Patient declined any further intervention or treatment                        Objective   Vitals:    12/20/22 1632   BP: 130/76   Site: Right Upper Arm   Position: Sitting   Cuff Size: Medium Adult   Pulse: 99   Temp: 97.4 °F (36.3 °C)   TempSrc: Temporal   SpO2: 96%   Weight: 273 lb 12.8 oz (124.2 kg)   Height: 6' 1\" (1.854 m)      Body mass index is 36.12 kg/m². Allergies   Allergen Reactions    Bactrim [Sulfamethoxazole-Trimethoprim] Nausea And Vomiting and Other (See Comments)     Sugar count elevated, had troubles urinating     Prior to Visit Medications    Medication Sig Taking? Authorizing Provider   atorvastatin (LIPITOR) 40 MG tablet  Yes Historical Provider, MD   allopurinol (ZYLOPRIM) 300 MG tablet Take 1 tablet by mouth daily Yes Akhil Noel MD   Alcohol Swabs PADS DX: diabetes mellitus. Test 1 time(s) daily - Ok to substitute per insurance (1 each = 1 box) Yes Akhil Noel MD   blood glucose monitor strips DX: diabetes mellitus. Use 1 time(s) daily - True Metrix requested by patient - [id number] [address] to substitute per insurance Yes Akhil Noel MD   Lancets MISC DX: diabetes mellitus.  Use 1 time(s) daily - Ok to substitute per insurance (1 each = 1 box) Yes Akhil Noel MD   acetaminophen (TYLENOL) 500 MG tablet Take 500 mg by mouth every 6 hours as needed for Pain Yes Historical Provider, MD   torsemide (DEMADEX) 20 MG tablet Take 1 tablet by mouth daily Yes Akhil Noel MD   glimepiride (AMARYL) 1 MG tablet Take 1 tablet by mouth every morning (before breakfast) Yes Bernardo Garcia MD   carvedilol (COREG) 25 MG tablet Take 1 tablet by mouth in the morning and 1 tablet before bedtime. Yes Bernardo Garcia MD   clopidogrel (PLAVIX) 75 MG tablet Take 1 tablet by mouth daily Yes Bernardo Garcia MD   metFORMIN (GLUCOPHAGE) 500 MG tablet Take 1 tablet by mouth 2 times daily (with meals) Yes Bernardo Garcia MD   VITAMIN D PO  Yes Historical Provider, MD   Elastic Bandages & Supports (V-4 HIGH COMPRESSION HOSE) MISC KNEE HIGH - 20-30 mmHg Yes Bernardo Garcia MD   blood glucose monitor kit and supplies DX: diabetes mellitus. Test 1 time(s) daily - True Metrix requested by patient - Kirk Monterroso to substitute per insurance Yes Bernardo Garcia MD   aspirin 81 MG EC tablet Take 81 mg by mouth daily Yes Historical Provider, MD   Probiotic Product (PROBIOTIC DAILY PO) Take 1 tablet by mouth daily Yes Historical Provider, MD   vitamin B-12 (CYANOCOBALAMIN) 100 MCG tablet Take 50 mcg by mouth daily Yes Historical Provider, MD   ammonium lactate (AMLACTIN) 12 % cream APPLY TO DRY CALLUS AREAS 1 TO 2 TIMES DAILY Yes Historical Provider, MD   Misc.  Devices (COMMODE BEDSIDE) MISC Use daily as needed Yes Ira Worthy MD   MULTIPLE VITAMIN PO Take 1 tablet by mouth daily  Yes Historical Provider, MD   rosuvastatin (CRESTOR) 40 MG tablet Take 1 tablet by mouth nightly  Patient not taking: No sig reported  DO Clari Phipps (Including outside providers/suppliers regularly involved in providing care):   Patient Care Team:  Bernardo Garcia MD as PCP - General (Family Medicine)  Bernardo Garcia MD as PCP - [facility] Provider  Olivia Madera MD as Physician (Cardiology)  Melony Au MD as Surgeon (General Surgery)  Faye Rolle MD as Consulting Physician (Neurology)  Regis Mckeon MD as Consulting Physician (Pulmonology)  Julius Ansari as Consulting Physician (Optometry)  Nick Araujo MD as Consulting Physician (Thoracic Surgery)  Porsha Hwang MD as Surgeon (Ophthalmology)  Dolly Farrell MD as Surgeon (Orthopedic Surgery)  Avelino Landau, DPM (Inactive) as Consulting Physician (Podiatry)  Elinor Gillette MD as Consulting Physician (Hematology and Oncology)  Josh Rae MD as Consulting Physician (Dermatology)  Sue Sawant MD as Surgeon (Orthopedic Surgery)  Junior Morataya DO as Consulting Physician (Cardiology)  Mahogany Whitley MD as Consulting Physician (Interventional Radiology)     Reviewed and updated this visit:  Tobacco  Allergies  Meds  Problems  Med Hx  Surg Hx  Soc Hx  Fam Hx

## 2022-12-20 NOTE — ASSESSMENT & PLAN NOTE
Reviewed with patient again the importance of limiting alcohol intake. I discussed with him that frequent consumption of alcohol would increase his risk for falls with his known history of stroke and chronic unsteady/ataxic gait. I reviewed with him that alcohol consumption would also increase his blood glucose values and would potentially interact with his medication. I discussed with him the consumption of more than 2 beers daily was excessive and would greatly increase his risk of liver dysfunction over time. Patient voices understanding and agrees to consider cutting back on alcohol consumption at home. We will continue to monitor over time.

## 2022-12-20 NOTE — ASSESSMENT & PLAN NOTE
Patient advised to continue with home exercises, use walker with all ambulation, and keep chronic follow-up visits with neurology.

## 2022-12-20 NOTE — PATIENT INSTRUCTIONS
Preventing Falls: Care Instructions  Overview     Getting around your home safely can be a challenge if you have injuries or health problems that make it easy for you to fall. Loose rugs and furniture in walkways are among the dangers for many older people who have problems walking or who have poor eyesight. People who have conditions such as arthritis, osteoporosis, or dementia also have to be careful not to fall. You can make your home safer with a few simple measures. Follow-up care is a key part of your treatment and safety. Be sure to make and go to all appointments, and call your doctor if you are having problems. It's also a good idea to know your test results and keep a list of the medicines you take. How can you care for yourself at home? Taking care of yourself  Exercise regularly to improve your strength, muscle tone, and balance. Walk if you can. Swimming may be a good choice if you cannot walk easily. Have your vision and hearing checked each year or any time you notice a change. If you have trouble seeing and hearing, you might not be able to avoid objects and could lose your balance. Know the side effects of the medicines you take. Ask your doctor or pharmacist whether the medicines you take can affect your balance. Sleeping pills or sedatives can affect your balance. Limit the amount of alcohol you drink. Alcohol can impair your balance and other senses. Ask your doctor whether calluses or corns on your feet need to be removed. If you wear loose-fitting shoes because of calluses or corns, you can lose your balance and fall. Talk to your doctor if you have numbness in your feet. You may get dizzy if you do not drink enough water. To prevent dehydration, drink plenty of fluids. Choose water and other clear liquids. If you have kidney, heart, or liver disease and have to limit fluids, talk with your doctor before you increase the amount of fluids you drink.   Preventing falls at home  Remove raised doorway thresholds, throw rugs, and clutter. Repair loose carpet or raised areas in the floor. Move furniture and electrical cords to keep them out of walking paths. Use nonskid floor wax, and wipe up spills right away, especially on ceramic tile floors. If you use a walker or cane, put rubber tips on it. If you use crutches, clean the bottoms of them regularly with an abrasive pad, such as steel wool. Keep your house well lit, especially stairways, porches, and outside walkways. Use night-lights in areas such as hallways and bathrooms. Add extra light switches or use remote switches (such as switches that go on or off when you clap your hands) to make it easier to turn lights on if you have to get up during the night. Install sturdy handrails on stairways. Move items in your cabinets so that the things you use a lot are on the lower shelves (about waist level). Keep a cordless phone and a flashlight with new batteries by your bed. If possible, put a phone in each of the main rooms of your house, or carry a cell phone in case you fall and cannot reach a phone. Or, you can wear a device around your neck or wrist. You push a button that sends a signal for help. Wear low-heeled shoes that fit well and give your feet good support. Use footwear with nonskid soles. Check the heels and soles of your shoes for wear. Repair or replace worn heels or soles. Do not wear socks without shoes on smooth floors, such as wood. Walk on the grass when the sidewalks are slippery. If you live in an area that gets snow and ice in the winter, sprinkle salt on slippery steps and sidewalks. Or ask a family member or friend to do this for you. Preventing falls in the bath  Install grab bars and nonskid mats inside and outside your shower or tub and near the toilet and sinks. Use shower chairs and bath benches. Use a hand-held shower head that will allow you to sit while showering.   Get into a tub or shower by putting the weaker leg in first. Get out of a tub or shower with your strong side first.  Repair loose toilet seats and consider installing a raised toilet seat to make getting on and off the toilet easier. Keep your bathroom door unlocked while you are in the shower. Where can you learn more? Go to http://www.rojas.com/ and enter G117 to learn more about \"Preventing Falls: Care Instructions. \"  Current as of: May 4, 2022               Content Version: 13.5  © 5301-9661 Healthwise, Incorporated. Care instructions adapted under license by [facility] ([facility]). If you have questions about a medical condition or this instruction, always ask your healthcare professional. [address] any warranty or liability for your use of this information. Hearing Loss: Care Instructions  Overview     Hearing loss is a sudden or slow decrease in how well you hear. It can range from mild to severe. Permanent hearing loss can occur with aging. It also can happen when you are exposed long-term to loud noise. Examples include listening to loud music, riding motorcycles, or being around other loud machines. Hearing loss can affect your work and home life. It can make you feel lonely or depressed. You may feel that you have lost your independence. But hearing aids and other devices can help you hear better and feel connected to others. Follow-up care is a key part of your treatment and safety. Be sure to make and go to all appointments, and call your doctor if you are having problems. It's also a good idea to know your test results and keep a list of the medicines you take. How can you care for yourself at home? Avoid loud noises whenever possible. This helps keep your hearing from getting worse. Always wear hearing protection around loud noises. Wear a hearing aid as directed. See a professional who can help you pick a hearing aid that fits you. Have hearing tests as your doctor suggests. They can show whether your hearing has changed. Your hearing aid may need to be adjusted. Use other devices as needed. These may include:  Telephone amplifiers and hearing aids that can connect to a television, stereo, radio, or microphone. Devices that use lights or vibrations. These alert you to the doorbell, a ringing telephone, or a baby monitor. Television closed-captioning. This shows the words at the bottom of the screen. Most new TVs can do this. TTY (text telephone). This lets you type messages back and forth on the telephone instead of talking or listening. These devices are also called TDD. When messages are typed on the keyboard, they are sent over the phone line to a receiving TTY. The message is shown on a monitor. Use text messaging, social media, and email if it is hard for you to communicate by telephone. Try to learn a listening technique called speechreading. It is not lipreading. You pay attention to people's gestures, expressions, posture, and tone of voice. These clues can help you understand what a person is saying. Face the person you are talking to, and have them face you. Make sure the lighting is good. You need to see the other person's face clearly. Think about counseling if you need help to adjust to your hearing loss. When should you call for help? Watch closely for changes in your health, and be sure to contact your doctor if:    You think your hearing is getting worse.     You have new symptoms, such as dizziness or nausea. Where can you learn more? Go to http://www.[employer].com/ and enter R798 to learn more about \"Hearing Loss: Care Instructions. \"  Current as of: May 4, 2022               Content Version: 13.5  © 5324-9777 Healthwise, Incorporated. Care instructions adapted under license by [facility] ([facility]).  If you have questions about a medical condition or this instruction, always ask your healthcare professional. Francisco Javier Lucas any all the things you used to be able to do. Talking with your doctor about ADLs isn't a test that you either pass or fail. It's just a way to get more information about your health and safety. Follow-up care is a key part of your treatment and safety. Be sure to make and go to all appointments, and call your doctor if you are having problems. It's also a good idea to know your test results and keep a list of the medicines you take. Current as of: October 6, 2021               Content Version: 13.5  © 2006-2022 Healthwise, [employer]. Care instructions adapted under license by [facility] ([facility]). If you have questions about a medical condition or this instruction, always ask your healthcare professional. [address] any warranty or liability for your use of this information. Advance Directives: Care Instructions  Overview  An advance directive is a legal way to state your wishes at the end of your life. It tells your family and your doctor what to do if you can't say what you want. There are two main types of advance directives. You can change them any time your wishes change. Living will. This form tells your family and your doctor your wishes about life support and other treatment. The form is also called a declaration. Medical power of . This form lets you name a person to make treatment decisions for you when you can't speak for yourself. This person is called a health care agent (health care proxy, health care surrogate). The form is also called a durable power of  for health care. If you do not have an advance directive, decisions about your medical care may be made by a family member, or by a doctor or a  who doesn't know you. It may help to think of an advance directive as a gift to the people who care for you. If you have one, they won't have to make tough decisions by themselves.   For more information, including forms for your state, see the [employer] website (www.caringinfo.org/planning/advance-directives/). Follow-up care is a key part of your treatment and safety. Be sure to make and go to all appointments, and call your doctor if you are having problems. It's also a good idea to know your test results and keep a list of the medicines you take. What should you include in an advance directive? Many states have a unique advance directive form. (It may ask you to address specific issues.) Or you might use a universal form that's approved by many states. If your form doesn't tell you what to address, it may be hard to know what to include in your advance directive. Use the questions below to help you get started. Who do you want to make decisions about your medical care if you are not able to? What life-support measures do you want if you have a serious illness that gets worse over time or can't be cured? What are you most afraid of that might happen? (Maybe you're afraid of having pain, losing your independence, or being kept alive by machines.)  Where would you prefer to die? (Your home? A hospital? A nursing home?)  Do you want to donate your organs when you die? Do you want certain Oriental orthodox practices performed before you die? When should you call for help? Be sure to contact your doctor if you have any questions. Where can you learn more? Go to http://www.rojas.com/ and enter R264 to learn more about \"Advance Directives: Care Instructions. \"  Current as of: June 16, 2022               Content Version: 13.5  © 9327-7597 Healthwise, Incorporated. Care instructions adapted under license by [facility] ([facility]). If you have questions about a medical condition or this instruction, always ask your healthcare professional. [place] [address] any warranty or liability for your use of this information. Personalized Preventive Plan for Dennise Calero - 12/20/2022  Medicare offers a range of preventive health benefits. Some of the tests and screenings are paid in full while other may be subject to a deductible, co-insurance, and/or copay. Some of these benefits include a comprehensive review of your medical history including lifestyle, illnesses that may run in your family, and various assessments and screenings as appropriate. After reviewing your medical record and screening and assessments performed today your provider may have ordered immunizations, labs, imaging, and/or referrals for you. A list of these orders (if applicable) as well as your Preventive Care list are included within your After Visit Summary for your review. Other Preventive Recommendations:    A preventive eye exam performed by an eye specialist is recommended every 1-2 years to screen for glaucoma; cataracts, macular degeneration, and other eye disorders. A preventive dental visit is recommended every 6 months. Try to get at least 150 minutes of exercise per week or 10,000 steps per day on a pedometer . Order or download the FREE \"Exercise & Physical Activity: Your Everyday Guide\" from [employer]. Call 7-357.506.5093 or search [employer] online. You need 8635-7595 mg of calcium and 7638-9913 IU of vitamin D per day. It is possible to meet your calcium requirement with diet alone, but a vitamin D supplement is usually necessary to meet this goal.  When exposed to the sun, use a sunscreen that protects against both UVA and UVB radiation with an SPF of 30 or greater. Reapply every 2 to 3 hours or after sweating, drying off with a towel, or swimming. Always wear a seat belt when traveling in a car. Always wear a helmet when riding a bicycle or motorcycle. Heart-Healthy Diet   Sodium, Fat, and Cholesterol Controlled Diet       What Is a Heart Healthy Diet? A heart-healthy diet is one that limits sodium , certain types of fat , and cholesterol .  This type of diet is recommended for:   People with any form of cardiovascular disease (eg, coronary heart disease , peripheral vascular disease , previous heart attack , previous stroke )   People with risk factors for cardiovascular disease, such as high blood pressure , high cholesterol , or diabetes   Anyone who wants to lower their risk of developing cardiovascular disease   Sodium    Sodium is a mineral found in many foods. In general, most people consume much more sodium than they need. Diets high in sodium can increase blood pressure and lead to edema (water retention). On a heart-healthy diet, you should consume no more than 2,300 mg (milligrams) of sodium per dayabout the amount in one teaspoon of table salt. The foods highest in sodium include table salt (about 50% sodium), processed foods, convenience foods, and preserved foods. Cholesterol    Cholesterol is a fat-like, waxy substance in your blood. Our bodies make some cholesterol. It is also found in animal products, with the highest amounts in fatty meat, egg yolks, whole milk, cheese, shellfish, and organ meats. On a heart-healthy diet, you should limit your cholesterol intake to less than 200 mg per day. It is normal and important to have some cholesterol in your bloodstream. But too much cholesterol can cause plaque to build up within your arteries, which can eventually lead to a heart attack or stroke. The two types of cholesterol that are most commonly referred to are:   Low-density lipoprotein (LDL) cholesterol  Also known as bad cholesterol, this is the cholesterol that tends to build up along your arteries. Bad cholesterol levels are increased by eating fats that are saturated or hydrogenated. Optimal level of this cholesterol is less than 100. Over 130 starts to get risky for heart disease.    High-density lipoprotein (HDL) cholesterol  Also known as good cholesterol, this type of cholesterol actually carries cholesterol away from your arteries and may, therefore, help lower your risk of having a heart attack. You want this level to be high (ideally greater than 60). It is a risk to have a level less than 40. You can raise this good cholesterol by eating olive oil, canola oil, avocados, or nuts. Exercise raises this level, too. Fat    Fat is calorie dense and packs a lot of calories into a small amount of food. Even though fats should be limited due to their high calorie content, not all fats are bad. In fact, some fats are quite healthful. Fat can be broken down into four main types. The good-for-you fats are:   Monounsaturated fat  found in oils such as olive and canola, avocados, and nuts and natural nut butters; can decrease cholesterol levels, while keeping levels of HDL cholesterol high   Polyunsaturated fat  found in oils such as safflower, sunflower, soybean, corn, and sesame; can decrease total cholesterol and LDL cholesterol   Omega-3 fatty acids  particularly those found in fatty fish (such as salmon, trout, tuna, mackerel, herring, and sardines); can decrease risk of arrhythmias, decrease triglyceride levels, and slightly lower blood pressure   The fats that you want to limit are:   Saturated fat  found in animal products, many fast foods, and a few vegetables; increases total blood cholesterol, including LDL levels   Animal fats that are saturated include: butter, lard, whole-milk dairy products, meat fat, and poultry skin   Vegetable fats that are saturated include: hydrogenated shortening, palm oil, coconut oil, cocoa butter   Hydrogenated or trans fat  found in margarine and vegetable shortening, most shelf stable snack foods, and fried foods; increases LDL and decreases HDL     It is generally recommended that you limit your total fat for the day to less than 30% of your total calories. If you follow an 1800-calorie heart healthy diet, for example, this would mean 60 grams of fat or less per day.    Saturated fat and trans fat in your diet raises your blood cholesterol the most, much more than dietary cholesterol does. For this reason, on a heart-healthy diet, less than 7% of your calories should come from saturated fat and ideally 0% from trans fat. On an 1800-calorie diet, this translates into less than 14 grams of saturated fat per day, leaving 46 grams of fat to come from mono- and polyunsaturated fats.    Food Choices on a Heart Healthy Diet   Food Category   Foods Recommended   Foods to Avoid   Grains   Breads and rolls without salted tops Most dry and cooked cereals Unsalted crackers and breadsticks Low-sodium or homemade breadcrumbs or stuffing All rice and pastas   Breads, rolls, and crackers with salted tops High-fat baked goods (eg, muffins, donuts, pastries) Quick breads, self-rising flour, and biscuit mixes Regular bread crumbs Instant hot cereals Commercially prepared rice, pasta, or stuffing mixes   Vegetables   Most fresh, frozen, and low-sodium canned vegetables Low-sodium and salt-free vegetable juices Canned vegetables if unsalted or rinsed   Regular canned vegetables and juices, including sauerkraut and pickled vegetables Frozen vegetables with sauces Commercially prepared potato and vegetable mixes   Fruits   Most fresh, frozen, and canned fruits All fruit juices   Fruits processed with salt or sodium   Milk   Nonfat or low-fat (1%) milk Nonfat or low-fat yogurt Cottage cheese, low-fat ricotta, cheeses labeled as low-fat and low-sodium   Whole milk Reduced-fat (2%) milk Malted and chocolate milk Full fat yogurt Most cheeses (unless low-fat and low salt) Buttermilk (no more than 1 cup per week)   Meats and Beans   Lean cuts of fresh or frozen beef, veal, lamb, or pork (look for the word loin) Fresh or frozen poultry without the skin Fresh or frozen fish and some shellfish Egg whites and egg substitutes (Limit whole eggs to three per week) Tofu Nuts or seeds (unsalted, dry-roasted), low-sodium peanut butter Dried peas, beans, and lentils   Any smoked, cured, salted, or canned meat, fish, or poultry (including rocha, chipped beef, cold cuts, hot dogs, sausages, sardines, and anchovies) Poultry skins Breaded and/or fried fish or meats Canned peas, beans, and lentils Salted nuts   Fats and Oils   Olive oil and canola oil Low-sodium, low-fat salad dressings and mayonnaise   Butter, margarine, coconut and palm oils, rocha fat   Snacks, Sweets, and Condiments   Low-sodium or unsalted versions of broths, soups, soy sauce, and condiments Pepper, herbs, and spices; vinegar, lemon, or lime juice Low-fat frozen desserts (yogurt, sherbet, fruit bars) Sugar, cocoa powder, honey, syrup, jam, and preserves Low-fat, trans-fat free cookies, cakes, and pies Deng and animal crackers, fig bars, cynthia snaps   High-fat desserts Broth, soups, gravies, and sauces, made from instant mixes or other high-sodium ingredients Salted snack foods Canned olives Meat tenderizers, seasoning salt, and most flavored vinegars   Beverages   Low-sodium carbonated beverages Tea and coffee in moderation Soy milk   Commercially softened water   Suggestions   Make whole grains, fruits, and vegetables the base of your diet. Choose heart-healthy fats such as canola, olive, and flaxseed oil, and foods high in heart-healthy fats, such as nuts, seeds, soybeans, tofu, and fish. Eat fish at least twice per week; the fish highest in omega-3 fatty acids and lowest in mercury include salmon, herring, mackerel, sardines, and canned chunk light tuna. If you eat fish less than twice per week or have high triglycerides, talk to your doctor about taking fish oil supplements. Read food labels. For products low in fat and cholesterol, look for fat free, low-fat, cholesterol free, saturated fat free, and trans fat freeAlso scan the Nutrition Facts Label, which lists saturated fat, trans fat, and cholesterol amounts.    For products low in sodium, look for sodium free, very low sodium, low sodium, no added salt, and unsalted   Skip the salt when cooking or at the table; if food needs more flavor, get creative and try out different herbs and spices. Garlic and onion also add substantial flavor to foods. Trim any visible fat off meat and poultry before cooking, and drain the fat off after saldana. Use cooking methods that require little or no added fat, such as grilling, boiling, baking, poaching, broiling, roasting, steaming, stir-frying, and sauting. Avoid fast food and convenience food. They tend to be high in saturated and trans fat and have a lot of added salt. Talk to a registered dietitian for individualized diet advice. Last Reviewed: March 2011 Yamila Dumas MS, MPH, RD   Updated: 3/29/2011     Patient information: Weight loss treatments    INTRODUCTION -- Obesity is a major international problem, and Americans are among the heaviest people in the world. The percentage of obese people in the [place] has risen steadily. Many people find that although they initially lose weight by dieting, they quickly regain the weight after the diet ends. Because it so hard to keep weight off over time, it is important to have as much information and support as possible before starting a diet. You are most likely to be successful in losing weight and keeping it off when you believe that your body weight can be controlled. STARTING A WEIGHT LOSS PROGRAM -- Some people like to talk to their doctor or nurse to get help choosing the best plan, monitoring progress, and getting advice and support along the way. To know what treatment (or combination of treatments) will work best, determine your body mass index (BMI) and waist circumference (measurement). The BMI is calculated from your height and weight.   A person with a BMI between 25 and 29.9 is considered overweight   A person with a BMI of 30 or greater is considered to be obese  A waist circumference greater than 35 inches (88 cm) in women and 40 inches (102 cm) in men increases the risk of obesity-related complications, such as heart disease and diabetes. People who are obese and who have a larger waist size may need more aggressive weight loss treatment than others. Talk to your doctor or nurse for advice. Types of treatment -- Based on your measurements and your medical history, your doctor or nurse can determine what combination of weight loss treatments would work best for you. Treatments may include changes in lifestyle, exercise, dieting, and, in some cases, weight loss medicines or weight loss surgery. Weight loss surgery, also called bariatric surgery, is reserved for people with severe obesity who have not responded to other weight loss treatments. SETTING A WEIGHT LOSS GOAL -- It is important to set a realistic weight loss goal. Your first goal should be to avoid gaining more weight and staying at your current weight (or within 5 percent). Many people have a \"dream\" weight that is difficult or impossible to achieve. People at high risk of developing diabetes who are able to lose 5 percent of their body weight and maintain this weight will reduce their risk of developing diabetes by about 50 percent and reduce their blood pressure. This is a success. Losing more than 15 percent of your body weight and staying at this weight is an extremely good result, even if you never reach your \"dream\" or \"ideal\" weight. LIFESTYLE CHANGES -- Programs that help you to change your lifestyle are usually run by psychologists or other professionals. The goals of lifestyle changes are to help you change your eating habits, become more active, and be more aware of how much you eat and exercise, helping you to make healthier choices. This type of treatment can be broken down into three steps: The triggers that make you want to eat   Eating   What happens after you eat  Triggers to eat -- Determining what triggers you to eat involves figuring out what foods you eat and where and when you eat.  To figure out what triggers you to eat, keep a record for a few days of everything you eat, the places where you eat, how often you eat, and the emotions you were feeling when you ate. For some people, the trigger is related to a certain time of day or night. For others, the trigger is related to a certain place, like sitting at a desk working. Eating -- You can change your eating habits by breaking the chain of events between the trigger for eating and eating itself. There are many ways to do this. For instance, you can:  Limit where you eat to a few places (eg, dining room)   Restrict the number of utensils (eg, only a fork) used for eating   Drink a sip of water between each bite   Chew your food a certain number of times   Get up and stop eating every few minutes  What happens after you eat -- Rewarding yourself for good eating behaviors can help you to develop better habits. This is not a reward for weight loss; instead, it is a reward for changing unhealthy behaviors. Do not use food as a reward. Some people find money, clothing, or personal care (eg, a hair cut, manicure, or massage) to be effective rewards. Treat yourself immediately after making better eating choices to reinforce the value of the good behavior. You need to have clear behavior goals, and you must have a time frame for reaching your goals. Reward small changes along the way to your final goal.  Other factors that contribute to successful weight loss -- Changing your behavior involves more than just changing unhealthy eating habits; it also involves finding people around you to support your weight loss, reducing stress, and learning to be strong when tempted by food. Establish a \"elaine\" system -- Having a friend or family member available to provide support and reinforce good behavior is very helpful. The support person needs to understand your goals.    Learn to be strong -- Learning to be strong when tempted by food is an important part of losing weight. As an example, you will need to learn how to say \"no\" and continue to say no when urged to eat at parties and social gatherings. Develop strategies for events before you go, such as eating before you go or taking low-calorie snacks and drinks with you. Develop a support system -- Having a support system is helpful when losing weight. This is why many commercial groups are successful. Family support is also essential; if your family does not support your efforts to lose weight, this can slow your progress or even keep you from losing weight. Positive thinking -- People often have conversations with themselves in their head; these conversations can be positive or negative. If you eat a piece of cake that was not planned, you may respond by thinking, \"Oh, you stupid idiot, you've blown your diet! \" and as a result, you may eat more cake. A positive thought for the same event could be, \"Well, I ate cake when it was not on my plan. Now I should do something to get back on track. \" A positive approach is much more likely to be successful than a negative one. Reduce stress -- Although stress is a part of everyday life, it can trigger uncontrolled eating in some people. It is important to find a way to get through these difficult times without eating or by eating low-calorie food, like raw vegetables. It may be helpful to imagine a relaxing place that allows you to temporarily escape from stress. With deep breaths and closed eyes, you can imagine this relaxing place for a few minutes. Self-help programs -- Self-help programs like [place] [employer]®, Overeaters Anonymous®, and Take Off Valerie (TOPS)© work for some people. As with all weight loss programs, you are most likely to be successful with these plans if you make long-term changes in how you eat. CHOOSING A DIET -- A calorie is a unit of energy found in food. Your body needs calories to function.  The goal of any diet is to burn up more calories than you eat. How quickly you lose weight depends upon several factors, such as your age, gender, and starting weight. Older people have a slower metabolism than young people, so they lose weight more slowly. Men lose more weight than women of similar height and weight when dieting because they use more energy. People who are extremely overweight lose weight more quickly than those who are only mildly overweight. Try not to drink alcohol or drinks with added sugar, and most sweets (candy, cakes, cookies), since they rarely contain important nutrients. Portion-controlled diets -- One simple way to diet is to buy packaged foods, like frozen low-calorie meals or meal-replacement canned drinks. A typical meal plan for one day may include:  A meal-replacement drink or breakfast bar for breakfast   A meal-replacement drink or a frozen low-calorie (250 to 350 calories) meal for lunch   A frozen low-calorie meal or other prepackaged, calorie-controlled meal, along with extra vegetables for dinner  This would give you 1000 to 1500 calories per day. Low-fat diet -- To reduce the amount of fat in your diet, you can:  Eat low-fat foods. Low-fat foods are those that contain less than 30 percent of calories from fat. Fat is listed on the food facts label (figure 1). Count fat grams. For a 1500 calorie diet, this would mean about 45 g or fewer of fat per day. Low-carbohydrate diet -- Low- and very-low-carbohydrate diets (eg, Atkins diet, [employer]) have become popular ways to lose weight quickly. With a very-low-carbohydrate diet, you eat between 0 and 60 grams of carbohydrates per day (a standard diet contains 200 to 300 grams of carbohydrates)   With a low-carbohydrate diet, you eat between 60 and 130 grams of carbohydrates per day  Carbohydrates are found in fruits, vegetables, and grains (including breads, rice, pasta, and cereal), alcoholic beverages, and in dairy products.  Meat and fish do not contain that sound too good to be true usually are. These plans are a waste of time and money and are not recommended. A doctor, nurse, or nutritionist can help you find a safe and effective way to lose weight and keep it off. WEIGHT LOSS MEDICINES -- Taking a weight loss medicine may be helpful when used in combination with diet, exercise, and lifestyle changes. However, it is important to understand the risks and benefits of these medicines. It is also important to be realistic about your goal weight using a weight loss medicine; you may not reach your \"dream\" weight, but you may be able to reduce your risk of diabetes or heart disease. Weight loss medicines may be recommended for people who have not been able to lose weight with diet and exercise who have a:  BMI of 30 or more    BMI between 27 and 29.9 and have other medical problems, such as diabetes, high cholesterol, or high blood pressure  Two weight loss medicines are approved in the [place] for long-term use. These are sibutramine and orlistat. Other weight loss medicines (phentermine, diethylpropion) are available but are only approved for short-term use (up to 12 weeks). Sibutramine -- Sibutramine (Meridia®, Reductil®) is a medicine that reduces your appetite. In people who take the medicine for one year, the average weight loss is 10 percent of the initial body weight (5 percent more than those who took a placebo treatment). Side effects of sibutramine include insomnia, dry mouth, and constipation. Increases in blood pressure can occur. Therefore, blood pressure is usually monitored during treatment. There is no evidence that sibutramine causes heart or lung problems (like dexfenfluramine and fenfluramine (Phen/Fen)).  However, experts agree that sibutramine should not used by people with coronary heart disease, heart failure, uncontrolled hypertension, stroke, irregular heart rhythms, or peripheral vascular disease (poor circulation in the legs).  Orlistat -- Orlistat (Xenical® 120 mg capsules) is a medicine that reduces the amount of fat your body absorbs from the foods you eat. A lower-dose version is now available without a prescription (Nilay® 60 mg capsules) in many countries, including the [facility]. The medicine is recommended three times per day, taken with a meal; you can skip a dose if you skip a meal or if the meal contains no fat. After one year of treatment with orlistat, the average weight loss is approximately 8 to 10 percent of initial body weight (4 percent more than in those who took a placebo). Cholesterol levels often improve, and blood pressure sometimes falls. In people with diabetes, orlistat may help control blood sugar levels. Side effects occur in 15 to 10 percent of people and may include stomach cramps, gas, diarrhea, leakage of stool, or oily stools. These problems are more likely when you take orlistat with a high-fat meal (if more than 30 percent of calories in the meal are from fat). Side effects usually improve as you learn to avoid high-fat foods. Severe liver injury has been reported rarely in patients taking orlistat, but it is not known if orlistat caused the liver problems. Diet supplements -- Diet supplements are widely used by people who are trying to lose weight, although the safety and efficacy of these supplements are often unproven. A few of the more common diet supplements are discussed below; none of these are recommended because they have not been studied carefully, and there is no proof they are safe or effective. Chitosan and wheat dextrin are ineffective for weight loss, and their use is not recommended. Ephedra, a compound related to ephedrine, is no longer available in the [facility] due to safety concerns. Many nonprescription diet pills previously contained ephedra.  Although some studies have shown that ephedra helps with weight loss, there can be serious side effects (psychiatric symptoms, palpitations, and stomach upset), including death. There are not enough data about the safety and efficacy of chromium, ginseng, glucomannan, green tea, hydroxycitric acid, L carnitine, psyllium, pyruvate supplements, Castro wort, and conjugated linoleic acid. Two supplements from [facility], [address] [place] (also known as the Urmila Davalos 15 pill) and Herbathin dietary supplement, have been shown to contain prescription drugs. Hoodia gordonii is a dietary supplement derived from a plant in [place]. It is not recommended because there is no proof that it is safe or effective. Bitter orange (Citrus aurantium) can increase your heart rate and blood pressure and is not recommended. SHOULD I HAVE SURGERY TO LOSE WEIGHT? -- Weight loss surgery is recommended ONLY for people with one of the following:  Severe obesity (body mass index above 40) (calculator 1 and calculator 2) who have not responded to diet, exercise, or weight loss medicines   Body mass index between 35 and 40, along with a serious medical problem (including diabetes, severe joint pain, or sleep apnea) that would improve with weight loss  You should be sure that you understand the potential risks and benefits of weight loss surgery. You must be motivated and willing to make lifelong changes in how you eat to reach and maintain a healthier weight after surgery. You must also be realistic about weight loss after surgery (see 'Effectiveness of weight loss surgery' below). PREPARING FOR WEIGHT LOSS SURGERY -- Most people who have weight loss surgery will meet with several specialists before surgery is scheduled. This often includes a dietitian, mental health counselor, a doctor who specializes in care of obese people, and a surgeon who performs weight loss surgery (bariatric surgeon). You may need to work with these providers for several weeks or months before surgery.   The nutritionist will explain what and how much you will be able to eat after surgery. You may also need to lose a small amount of weight before surgery. The mental health specialist will help you to cope with stress and other factors that can make it harder to lose weight or trigger you to eat   The medical doctor will determine whether you need other tests, counseling, or treatment before surgery. He or she might also help you begin a medical weight loss program so that you can lose some weight before surgery. The bariatric surgeon will meet with you to discuss the surgeries available to treat obesity. He or she will also make sure you are a good candidate for surgery. TYPES OF WEIGHT LOSS SURGERY -- There are several types of weight loss surgeries, the most common being lap banding, gastric bypass, and gastric sleeve. Lap banding -- Laparoscopic adjustable gastric banding (LAGB), or lap banding, is a surgery that uses an adjustable band around the opening to the stomach (figure 1). This reduces the amount of food that you can eat at one time. Lap banding is done through small incisions, with a laparoscope. The band can be adjusted after surgery, allowing you to eat more or less food. Adjustments to the size and tightness of the band are made by using a needle to add or remove fluid from a port (a small container under the skin that is connected to the band). Adding fluid to the band makes it tighter which restricts the amount of food you can eat and may help you to lose more weight. Lap banding is a popular choice because it is relatively simple to perform, can be adjusted or removed, and has a low risk of serious complications immediately after surgery. However, weight loss with the lap band depends on your ability to follow the program closely. You will need to prepare nutritious meals that [facility] with\" the band, not against it. For example, the lap band will not work well if you eat or drink a large amount of liquid calories (like ice cream).  The band will not help you to feel full when you eat/drink liquid calories. Weight loss ranges from 45 to 75 percent after two years. As an example, a person who is 120 pounds overweight could expect to lose approximately 54 to 90 pounds in the two years after lap banding. Gastric bypass -- Rashel-en-Y gastric bypass, also called gastric bypass, helps you to lose weight by reducing the amount of food you can eat and reducing the number of calories and nutrients you absorb from the food you eat. To perform gastric bypass, a surgeon creates a small stomach pouch by dividing the stomach and attaching it to the small intestine. This helps you to lose weight in two ways: The smaller stomach can hold less food than before surgery. This causes you to feel full after eating a very small amount of food or liquid. Over time, the pouch might stretch, allowing you to eat more food. The body absorbs fewer calories, since food bypasses most of the stomach as well as the upper small intestine. This new arrangement seems to decrease your appetite and change how you break down foods by changing the release of various hormones. Gastric bypass can be performed as open surgery (through an incision on the abdomen) or laparoscopically, which uses smaller incisions and smaller instruments. Both the laparoscopic and open techniques have risks and benefits. You and your surgeon should work together to decide which surgery, if any, is right for you. Gastric bypass has a high success rate, and people lose an average of 62 to 68 percent of their excess body weight in the first year. Weight loss typically levels off after one to two years, with an overall excess weight loss between 50 and 75 percent. For a person who is 120 pounds overweight, an average of 60 to 90 pounds of weight loss would be expected. Gastric sleeve -- Gastric sleeve, also known as sleeve gastrectomy, is a surgery that reduces the size of the stomach and makes it into a narrow tube (figure 3). The new stomach is much smaller and produces less of the hormone (ghrelin) that causes hunger, helping you feel satisfied with less food. Sleeve gastrectomy is safer than gastric bypass because the intestines are not rearranged, and there is less chance of malnutrition. It also appears to control hunger better than lap banding. It might be safer than the lap banding because no foreign materials are used. The gastric sleeve has a good success rate, and people lose an average of 33 percent of their excess body weight in the first year. For a person who is 120 pounds overweight, this would mean losing about 40 pounds in the first year. WEIGHT LOSS SURGERY COMPLICATIONS -- A variety of complications can occur with weight loss surgery. The risks of surgery depend upon which surgery you have and any medical problems you had before surgery. Some of the more common early surgical complications (one to six weeks after surgery) include:  Bleeding   Infection   Blockage or tear in the bowels   Need for further surgery  Important medical complications after surgery can include blood clots in the legs or lungs, heart attack, pneumonia, and urinary tract infection. Complications are less likely when surgery is performed in centers that are experienced in weight loss surgery. In general, centers with experience in weight loss surgery have:  Board-certified doctors and surgeons   A team of support staff (dietitians, counselors, nurses)   Long-term follow-up after surgery   Hospital staff experienced with the care of weight loss patients. This includes nurses who are trained in the care of patients immediately after surgery and anesthesiologists who are experienced in caring for the morbidly obese. EFFECTIVENESS OF WEIGHT LOSS SURGERY -- The goal of weight loss surgery is to reduce the risk of illness or death associated with obesity.  Weight loss surgery can also help you to feel and look better, reduce the amount of money you spend on medicines, and cut down on sick days. As an example, weight loss surgery can improve health problems related to obesity (diabetes, high blood pressure, high cholesterol, sleep apnea) to the point that you need less or no medicine. Finally, weight loss surgery might reduce your risk of developing heart disease, cancer, and certain infections. AFTER WEIGHT LOSS SURGERY -- You will need to stay in the hospital until your team feels that it is safe for you to leave (on average, one to three days). Do not drive if you are taking prescription pain medicine. Begin exercising as soon as possible once you have healed; most weight loss centers will design an exercise program for you. Once you are home, it is important to eat and drink exactly what your doctor and dietitian recommend. You will see your doctor, nurse, and dietitian on a regular basis after surgery to monitor your health, diet, and weight loss. You will be able to slowly increase how much you eat over time, although it will always be important to:  Eat small, frequent meals and not skip meals   Chew your food slowly and completely   Avoid eating while \"distracted\" (such as eating while watching TV)   Stop eating when you feel full   Drink liquids at least 30 minutes before or after eating   Avoid foods high in fat or sugar   Take vitamin supplements, as recommended  It can take several months to learn to listen to your body so that you know when you are hungry and when you are full. You may dislike foods you previously loved, and you may begin to prefer new foods. This can be a frustrating process for some people, so talk to your dietitian if you are having trouble. It usually takes between one and two years to lose weight after surgery. After reaching their goal weight, some people have plastic surgery (called \"body contouring\") to remove excess skin from the body, particularly in the abdominal area.   Before you decide to have weight loss surgery, you must commit to staying healthy for life. This includes following up with your healthcare team, exercising most days of the week, and eating a sensible diet every day. It can be difficult to develop new eating and exercise habits after weight loss surgery, and you will have to work hard to stick to your goals. Recovering from surgery and losing weight can be stressful and emotional, and it is important to have the support of family and friends. Working with a , therapist, or support group can help you through the ups and downs. WHERE TO GET MORE INFORMATION -- Your healthcare provider is the best source of information for questions and concerns related to your medical problem. This article will be updated as needed every four months on our Web site ([email or web address])    Smoking Cessation  This document explains the best ways for you to quit smoking and new treatments to help. It lists new medicines that can double or triple your chances of quitting and quitting for good. It also considers ways to avoid relapses and concerns you may have about quitting, including weight gain. NICOTINE: A POWERFUL ADDICTION  If you have tried to quit smoking, you know how hard it can be. It is hard because nicotine is a very addictive drug. For some people, it can be as addictive as heroin or cocaine. Usually, people make 2 or 3 tries, or more, before finally being able to quit. Each time you try to quit, you can learn about what helps and what hurts. Quitting takes hard work and a lot of effort, but you can quit smoking. QUITTING SMOKING IS ONE OF THE MOST IMPORTANT THINGS YOU WILL EVER DO. You will live longer, feel better, and live better. The impact on your body of quitting smoking is felt almost immediately:  Within 20 minutes, blood pressure decreases. Pulse returns to its normal level. After 8 hours, carbon monoxide levels in the blood return to normal. Oxygen level increases.   After 24 hours, chance of heart attack starts to decrease. Breath, hair, and body stop smelling like smoke. After 48 hours, damaged nerve endings begin to recover. Sense of taste and smell improve. After 72 hours, the body is virtually free of nicotine. Bronchial tubes relax and breathing becomes easier. After 2 to 12 weeks, lungs can hold more air. Exercise becomes easier and circulation improves. Quitting will reduce your risk of having a heart attack, stroke, cancer, or lung disease:  After 1 year, the risk of coronary heart disease is cut in half. After 5 years, the risk of stroke falls to the same as a nonsmoker. After 10 years, the risk of lung cancer is cut in half and the risk of other cancers decreases significantly. After 15 years, the risk of coronary heart disease drops, usually to the level of a nonsmoker. If you are pregnant, quitting smoking will improve your chances of having a healthy baby. The people you live with, especially your children, will be healthier. You will have extra money to spend on things other than cigarettes. FIVE KEYS TO QUITTING  Studies have shown that these 5 steps will help you quit smoking and quit for good. You have the best chances of quitting if you use them together:  Get ready. Get support and encouragement. Learn new skills and behaviors. Get medicine to reduce your nicotine addiction and use it correctly. Be prepared for relapse or difficult situations. Be determined to continue trying to quit, even if you do not succeed at first.  1. GET READY  Set a quit date. Change your environment. Get rid of ALL cigarettes, ashtrays, matches, and lighters in your home, car, and place of work. Do not let people smoke in your home. Review your past attempts to quit. Think about what worked and what did not. Once you quit, do not smoke. NOT EVEN A PUFF!   2. GET SUPPORT AND ENCOURAGEMENT  Studies have shown that you have a better chance of being successful if you have help. You can get support in many ways. Tell your family, friends, and coworkers that you are going to quit and need their support. Ask them not to smoke around you. Talk to your caregivers (doctor, dentist, nurse, pharmacist, psychologist, and/or smoking counselor). Get individual, group, or telephone counseling and support. The more counseling you have, the better your chances are of quitting. Programs are available at [facility]. Call your local health department for information about programs in your area. Spiritual beliefs and practices may help some smokers quit. Quit meters are small computer programs online or downloadable that keep track of quit statistics, such as amount of \"quit-time,\" cigarettes not smoked, and money saved. Many smokers find one or more of the many self-help books available useful in helping them quit and stay off tobacco.  3. LEARN NEW SKILLS AND BEHAVIORS  Try to distract yourself from urges to smoke. Talk to someone, go for a walk, or occupy your time with a task. When you first try to quit, change your routine. Take a different route to work. Drink tea instead of coffee. Eat breakfast in a different place. Do something to reduce your stress. Take a hot bath, exercise, or read a book. Plan something enjoyable to do every day. Reward yourself for not smoking. Explore interactive web-based programs that specialize in helping you quit. 4. GET MEDICINE AND USE IT CORRECTLY  Medicines can help you stop smoking and decrease the urge to smoke. Combining medicine with the above behavioral methods and support can quadruple your chances of successfully quitting smoking. The [place] Food and Drug Administration (FDA) has approved 7 medicines to help you quit smoking. These medicines fall into 3 categories.   Nicotine replacement therapy (delivers nicotine to your body without the negative effects and risks of smoking):  Nicotine gum: Available over-the-counter. Nicotine lozenges: Available over-the-counter. Nicotine inhaler: Available by prescription. Nicotine nasal spray: Available by prescription. Nicotine skin patches (transdermal): Available by prescription and over-the-counter. Antidepressant medicine (helps people abstain from smoking, but how this works is unknown): Bupropion sustained-release (SR) tablets: Available by prescription. Nicotinic receptor partial agonist (simulates the effect of nicotine in your brain):  Varenicline tartrate tablets: Available by prescription. Ask your caregiver for advice about which medicines to use and how to use them. Carefully read the information on the package. Everyone who is trying to quit may benefit from using a medicine. If you are pregnant or trying to become pregnant, nursing an infant, you are under age 25, or you smoke fewer than 10 cigarettes per day, talk to your caregiver before taking any nicotine replacement medicines. You should stop using a nicotine replacement product and call your caregiver if you experience nausea, dizziness, weakness, vomiting, fast or irregular heartbeat, mouth problems with the lozenge or gum, or redness or swelling of the skin around the patch that does not go away. Do not use any other product containing nicotine while using a nicotine replacement product. Talk to your caregiver before using these products if you have diabetes, heart disease, asthma, stomach ulcers, you had a recent heart attack, you have high blood pressure that is not controlled with medicine, a history of irregular heartbeat, or you have been prescribed medicine to help you quit smoking. 5. BE PREPARED FOR RELAPSE OR DIFFICULT SITUATIONS  Most relapses occur within the first 3 months after quitting. Do not be discouraged if you start smoking again. Remember, most people try several times before they finally quit. You may have symptoms of withdrawal because your body is used to nicotine. You may crave cigarettes, be irritable, feel very hungry, cough often, get headaches, or have difficulty concentrating. The withdrawal symptoms are only temporary. They are strongest when you first quit, but they will go away within 10 to 14 days. Here are some difficult situations to watch for:  Alcohol. Avoid drinking alcohol. Drinking lowers your chances of successfully quitting. Caffeine. Try to reduce the amount of caffeine you consume. It also lowers your chances of successfully quitting. Other smokers. Being around smoking can make you want to smoke. Avoid smokers. Weight gain. Many smokers will gain weight when they quit, usually less than 10 pounds. Eat a healthy diet and stay active. Do not let weight gain distract you from your main goal, quitting smoking. Some medicines that help you quit smoking may also help delay weight gain. You can always lose the weight gained after you quit. Bad mood or depression. There are a lot of ways to improve your mood other than smoking. If you are having problems with any of these situations, talk to your caregiver. SPECIAL SITUATIONS AND CONDITIONS  Studies suggest that everyone can quit smoking. Your situation or condition can give you a special reason to quit. Pregnant women/new mothers: By quitting, you protect your baby's health and your own. Hospitalized patients: By quitting, you reduce health problems and help healing. Heart attack patients: By quitting, you reduce your risk of a second heart attack. Lung, head, and neck cancer patients: By quitting, you reduce your chance of a second cancer. Parents of children and adolescents: By quitting, you protect your children from illnesses caused by secondhand smoke. QUESTIONS TO THINK ABOUT  Think about the following questions before you try to stop smoking. You may want to talk about your answers with your caregiver. Why do you want to quit?   If you tried to quit in the past, what helped and what did not?  What will be the most difficult situations for you after you quit? How will you plan to handle them? Who can help you through the tough times? Your family? Friends? Caregiver? What pleasures do you get from smoking? What ways can you still get pleasure if you quit? Here are some questions to ask your caregiver: How can you help me to be successful at quitting? What medicine do you think would be best for me and how should I take it? What should I do if I need more help? What is smoking withdrawal like? How can I get information on withdrawal?  Quitting takes hard work and a lot of effort, but you can quit smoking. FOR MORE INFORMATION   Smokefree. gov ([employer]) provides free, accurate, evidence-based information and professional assistance to help support the immediate and long-term needs of people trying to quit smoking. Document Released: 12/12/2002 Document Revised: 12/06/2012 Document Reviewed: 10/04/2010  [facility] Patient Information ©2012 Izzy Gonzalez.

## 2023-01-04 ENCOUNTER — OFFICE VISIT (OUTPATIENT)
Dept: NEUROLOGY | Age: 68
End: 2023-01-04
Payer: MEDICARE

## 2023-01-04 VITALS
BODY MASS INDEX: 35.97 KG/M2 | HEART RATE: 87 BPM | DIASTOLIC BLOOD PRESSURE: 64 MMHG | WEIGHT: 272.6 LBS | SYSTOLIC BLOOD PRESSURE: 132 MMHG

## 2023-01-04 DIAGNOSIS — R42 DIZZINESS: Primary | ICD-10-CM

## 2023-01-04 DIAGNOSIS — G45.9 TIA (TRANSIENT ISCHEMIC ATTACK): ICD-10-CM

## 2023-01-04 DIAGNOSIS — H81.03 MENIERE DISEASE, BILATERAL: ICD-10-CM

## 2023-01-04 DIAGNOSIS — F45.8 ANXIETY HYPERVENTILATION: ICD-10-CM

## 2023-01-04 DIAGNOSIS — R27.0 ATAXIA: ICD-10-CM

## 2023-01-04 DIAGNOSIS — F41.9 ANXIETY HYPERVENTILATION: ICD-10-CM

## 2023-01-04 DIAGNOSIS — G45.0 VBI (VERTEBROBASILAR INSUFFICIENCY): ICD-10-CM

## 2023-01-04 DIAGNOSIS — G31.9 DEGENERATIVE DISEASE OF NERVOUS SYSTEM, UNSPECIFIED (HCC): ICD-10-CM

## 2023-01-04 PROCEDURE — 1123F ACP DISCUSS/DSCN MKR DOCD: CPT | Performed by: PSYCHIATRY & NEUROLOGY

## 2023-01-04 PROCEDURE — 3074F SYST BP LT 130 MM HG: CPT | Performed by: PSYCHIATRY & NEUROLOGY

## 2023-01-04 PROCEDURE — 99214 OFFICE O/P EST MOD 30 MIN: CPT | Performed by: PSYCHIATRY & NEUROLOGY

## 2023-01-04 PROCEDURE — 3078F DIAST BP <80 MM HG: CPT | Performed by: PSYCHIATRY & NEUROLOGY

## 2023-01-04 RX ORDER — HYDROXYZINE HYDROCHLORIDE 25 MG/1
25 TABLET, FILM COATED ORAL 2 TIMES DAILY
Qty: 60 TABLET | Refills: 3 | Status: SHIPPED | OUTPATIENT
Start: 2023-01-04 | End: 2023-02-03

## 2023-01-04 ASSESSMENT — ENCOUNTER SYMPTOMS
VOMITING: 0
NAUSEA: 0
TROUBLE SWALLOWING: 0
BACK PAIN: 0
COLOR CHANGE: 0
PHOTOPHOBIA: 0
CHOKING: 0
SHORTNESS OF BREATH: 0

## 2023-01-04 NOTE — PROGRESS NOTES
Subjective:      Patient ID: Jorge Alberto Thomason is a 79 y.o. male who presents today for:  Chief Complaint   Patient presents with    Follow-up     Pt states that he is still having trouble with his balance. He is walking with a walker. Last fall was 09/06/2022. He does have bone bruising from this fall. Other     Insurance did deny hydroxyzine. If you would like to try to get this again we can redo the prior auth. HPI vertebrobasilar insufficiency with dizziness. Patient is still dizzy and ataxic at times. Some of this may related to underlying Allan Copping and we had recommended hydroxyzine which was not covered by the insurance company has not responded to [facility]. 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 Patient has a failed back syndrome as well.    he is not any falls injuries or trauma he still has tinnitus in both ears since last seen patient was in the hospital and had seen him there in November of recent presented sudden onset of right lower extremity weakness headache visual dysfunction. There was concern about CVA. We had further obtain all his MRIs CTAs and MRIs from 2014 and no ischemic event was noted. Bar spine MRI was obtained and patient does not have canal stenosis CT angiograms are normal sed rate CRP are normal.  Patient continues on dual antiplatelet therapy now without any bleeding or bruising.     Past Medical History:   Diagnosis Date    Abscess of back 1/6/2020    Abscess of right leg 1/6/2020    Acute renal failure (HCC)     Anxiety     CAD S/P percutaneous coronary angioplasty 3/11/2014    Cardiomyopathy [facility])     Cerebrovascular accident (CVA) due to occlusion of left middle cerebral artery ([address] 08/2016    brainstem infarction from left MCA occlusion    Cerebrovascular disease 07/27/2016    Colon polyp     Coronary angioplasty status     CVA (cerebral vascular accident) ([address] 11/12/2017    Dependent edema 9/14/2017    Diplopia 5/15/2017    Resolved with management of cataracts    Dupuytren contracture 1/2/2018    Dysphagia 8/18/2011    Dysphonia 8/18/2011    Essential hypertension 8/17/2016    Gallop rhythm     Gout 12/10/2016    Gout of big toe 08/2014    Right Great Toe    H/O fall     Headache     migraines    Heart failure ([address]     History of chest pain 1/2/2014    History of CVA 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    History of heart failure 1/6/2014    History of myocardial infarction 3/11/2014    History of recurrent pneumonia 2/11/2014    Hx of bacterial pneumonia     Hyperlipidemia 4/30/2021    Hyperlipidemia, mixed 4/30/2021    Hyperlipidemia, mixed 4/30/2021    Hypotension     Left carotid artery stenosis 8/23/2020    Left carotid artery stenosis 8/23/2020    Leg swelling     Macrocytosis without anemia 12/10/2016    Microalbuminuria due to type 2 diabetes mellitus ([address] 9/14/2018    Morbid obesity ([address] 1/2/2014    Myocardial infarction ([address]     Obesity     AYLIN (obstructive sleep apnea) 12/10/2016    Osteoarthritis     Right-sided muscle weakness 10/19/2016    S/P cardiac catheterization     Sciatica     Skin cyst 4/8/2016    Spasm 11/9/2016    Spina bifida ([address]     Stented coronary artery     Tremor of right hand 5/15/2017    Type 2 diabetes mellitus with diabetic polyneuropathy, without long-term current use of insulin ([address]     Unsteady gait 5/15/2017    Weakness     Weight gain      Past Surgical History:   Procedure Laterality Date    ANKLE SURGERY Left     BACK SURGERY      lumbar laminectomy    CHOLECYSTECTOMY      COLONOSCOPY  01/15/2010    polyp, diverticulosis ( [facility])    COLONOSCOPY N/A 08/19/2021    COLORECTAL CANCER SCREENING, HIGH RISK with polypectomies  performed by Iraj Camarillo MD at [facility]    COLONOSCOPY N/A 08/20/2021    polyps x 3, 3y repeat (DR Chantelle Le)  COLONOSCOPY with polypectomies [place]AGNOST[place] [facility] [place] [address] [facility] WITH STENT PLACEMENT  2014    3 stents    CYST REMOVAL  05/16/2016    DR Tristan Montemayor,  L SCROTAL CYST, R thigh, L ear, back    SHOULDER SURGERY Bilateral 12/18/2015    left once right twice- to remove bone spurs    TONSILLECTOMY       Social History     Socioeconomic History    Marital status:      Spouse name: Yaritza Florez    Number of children: 3    Years of education: 12+    Highest education level: Not on file   Occupational History    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     Employer: [employer]   Tobacco Use    Smoking status: Some Days     Packs/day: 2.50     Years: 30.00     Pack years: 75.00     Types: Cigarettes    Smokeless tobacco: Never    Tobacco comments:     varies   Vaping Use    Vaping Use: Never used   Substance and Sexual Activity    Alcohol use:  Yes     Alcohol/week: 0.0 standard drinks     Comment: limits less than 10/wk varies    Drug use: No    Sexual activity: Yes     Partners: Female   Other Topics Concern    Not on file   Social History Narrative    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 from [employer] after 2016 CVA    Lives With: Spouse    Type of Home: House    Home Layout: Two level, Bed/Bath upstairs, Laundry in basement(Railing)    Home Access: Stairs to enter with rails    Entrance Stairs - Number of Steps: 3 in back, 4 in front    Bathroom Shower/Tub: Tub/Shower unit    Bathroom Equipment: Shower chair    Home Equipment: Rolling walker, Cane    ADL Assistance: Independent    Homemaking Assistance: Needs assistance(Spouse  is primary; vertigo and arthritis limit housework)    Ambulation Assistance: Independent(Walker outside, cane inside)    Transfer Assistance: Independent    Active : No    Patient's  Info: has not driven since CVA     Social Determinants of Health     Financial Resource Strain: Low Risk     Difficulty of Paying Living Expenses: Not hard at all   Food Insecurity: No Food Insecurity    Worried About Running Out of Food in the Last Year: Never true    Ran Out of Food in the Last Year: Never true   Transportation Needs: Not on file   Physical Activity: Unknown    Days of Exercise per Week: Patient refused    Minutes of Exercise per Session: Not on file   Stress: Not on file   Social Connections: Not on file   Intimate Partner Violence: Not on file   Housing Stability: Not on file     Family History   Problem Relation Age of Onset    Breast Cancer Mother     Stroke Father     Colon Cancer Father 76     Allergies   Allergen Reactions    Bactrim [Sulfamethoxazole-Trimethoprim] Nausea And Vomiting and Other (See Comments)     Sugar count elevated, had troubles urinating       Current Outpatient Medications   Medication Sig Dispense Refill    atorvastatin (LIPITOR) 40 MG tablet       allopurinol (ZYLOPRIM) 300 MG tablet Take 1 tablet by mouth daily 90 tablet 1    Alcohol Swabs PADS DX: diabetes mellitus. Test 1 time(s) daily - Ok to substitute per insurance (1 each = 1 box) 100 each 5    blood glucose monitor strips DX: diabetes mellitus. Use 1 time(s) daily - True Metrix requested by patient - Alexandra Del Valle to substitute per insurance 100 strip 5    Lancets MISC DX: diabetes mellitus. Use 1 time(s) daily - Ok to substitute per insurance (1 each = 1 box) 100 each 5    acetaminophen (TYLENOL) 500 MG tablet Take 500 mg by mouth every 6 hours as needed for Pain      torsemide (DEMADEX) 20 MG tablet Take 1 tablet by mouth daily 90 tablet 1    glimepiride (AMARYL) 1 MG tablet Take 1 tablet by mouth every morning (before breakfast) 90 tablet 1    carvedilol (COREG) 25 MG tablet Take 1 tablet by mouth in the morning and 1 tablet before bedtime.  180 tablet 3    clopidogrel (PLAVIX) 75 MG tablet Take 1 tablet by mouth daily 90 tablet 3    metFORMIN (GLUCOPHAGE) 500 MG tablet Take 1 tablet by mouth 2 times daily (with meals) 180 tablet 1    VITAMIN D PO       Elastic Bandages & Supports (V-4 HIGH COMPRESSION HOSE) MISC KNEE HIGH - 20-30 mmHg 2 each 1    blood glucose monitor kit and supplies DX: diabetes mellitus. Test 1 time(s) daily - True Metrix requested by patient - Estephania Harrington to substitute per insurance 1 kit 0    aspirin 81 MG EC tablet Take 81 mg by mouth daily      Probiotic Product (PROBIOTIC DAILY PO) Take 1 tablet by mouth daily      vitamin B-12 (CYANOCOBALAMIN) 100 MCG tablet Take 50 mcg by mouth daily      ammonium lactate (AMLACTIN) 12 % cream APPLY TO DRY CALLUS AREAS 1 TO 2 TIMES DAILY  5    Misc. Devices (COMMODE BEDSIDE) MISC Use daily as needed 1 each 0    MULTIPLE VITAMIN PO Take 1 tablet by mouth daily       rosuvastatin (CRESTOR) 40 MG tablet Take 1 tablet by mouth nightly (Patient not taking: No sig reported) 30 tablet 3     No current facility-administered medications for this visit. Review of Systems   Constitutional:  Negative for fever. HENT:  Negative for ear pain, tinnitus and trouble swallowing. Eyes:  Negative for photophobia and visual disturbance. Respiratory:  Negative for choking and shortness of breath. Cardiovascular:  Negative for chest pain and palpitations. Gastrointestinal:  Negative for nausea and vomiting. Musculoskeletal:  Positive for gait problem. Negative for back pain, joint swelling, myalgias, neck pain and neck stiffness. Skin:  Negative for color change. Allergic/Immunologic: Negative for food allergies. Neurological:  Positive for dizziness, weakness and light-headedness. Negative for tremors, seizures, syncope, facial asymmetry, speech difficulty, numbness and headaches. Psychiatric/Behavioral:  Negative for behavioral problems, confusion, hallucinations and sleep disturbance. Objective:   /64 (Site: Left Upper Arm, Position: Sitting, Cuff Size: Medium Adult)   Pulse 87   Wt 272 lb 9.6 oz (123.7 kg)   BMI 35.97 kg/m²     Physical Exam  Vitals reviewed. Eyes:      Pupils: Pupils are equal, round, and reactive to light.    Cardiovascular:      Rate and Rhythm: Normal rate and regular rhythm. Heart sounds: No murmur heard. Pulmonary:      Effort: Pulmonary effort is normal.      Breath sounds: Normal breath sounds. Abdominal:      General: Bowel sounds are normal.   Musculoskeletal:         General: Normal range of motion. Cervical back: Normal range of motion. Skin:     General: Skin is warm. Neurological:      Mental Status: He is alert and oriented to person, place, and time. Cranial Nerves: No cranial nerve deficit. Sensory: No sensory deficit. Motor: No abnormal muscle tone. Coordination: Coordination normal.      Deep Tendon Reflexes: Reflexes are normal and symmetric. Babinski sign absent on the right side. Babinski sign absent on the left side. Psychiatric:         Mood and Affect: Mood normal.   Continues to be very photophobic and lightheaded. He is areflexic in the lower EXTR he walks with a walker. CTA HEAD W WO CONTRAST    Result Date: 11/2/2022  EXAMINATION: CTA OF THE HEAD WITHOUT AND WITH CONTRAST; CTA OF THE NECK 11/1/2022 5:58 am: TECHNIQUE: CTA of the head/brain was performed without and with the administration of intravenous contrast. Multiplanar reformatted images are provided for review. MIP images are provided for review. Automated exposure control, iterative reconstruction, and/or weight based adjustment of the mA/kV was utilized to reduce the radiation dose to as low as reasonably achievable.; CTA of the neck was performed with the administration of intravenous contrast. Multiplanar reformatted images are provided for review. MIP images are provided for review. Stenosis of the internal carotid arteries measured using NASCET criteria. Automated exposure control, iterative reconstruction, and/or weight based adjustment of the mA/kV was utilized to reduce the radiation dose to as low as reasonably achievable.  COMPARISON: CTA of the head and neck, 10/26/2020 HISTORY: ORDERING SYSTEM PROVIDED HISTORY: R/O LVO TECHNOLOGIST PROVIDED HISTORY: Reason for exam:->R/O LVO Decision Support Exception - unselect if not a suspected or confirmed emergency medical condition->Emergency Medical Condition (MA) What reading provider will be dictating this exam?->CRC; ORDERING SYSTEM PROVIDED HISTORY: R/o LVO TECHNOLOGIST PROVIDED HISTORY: Reason for exam:->R/o LVO Decision Support Exception - unselect if not a suspected or confirmed emergency medical condition->Emergency Medical Condition (MA) What reading provider will be dictating this exam?->CRC FINDINGS: CTA NECK: AORTIC ARCH/ARCH VESSELS: No dissection or arterial injury. No significant stenosis of the brachiocephalic or subclavian arteries. CAROTID ARTERIES: No dissection, arterial injury, or hemodynamically significant stenosis by NASCET criteria. VERTEBRAL ARTERIES: No dissection, arterial injury, or significant stenosis. SOFT TISSUES: The lung apices are clear. No cervical or superior mediastinal lymphadenopathy. The larynx and pharynx are unremarkable. No acute abnormality of the salivary and thyroid glands. BONES: No acute osseous abnormality. CTA HEAD: ANTERIOR CIRCULATION: No significant stenosis of the intracranial internal carotid, anterior cerebral, or middle cerebral arteries. No aneurysm. POSTERIOR CIRCULATION: No significant stenosis of the vertebral, basilar, or posterior cerebral arteries. No aneurysm. OTHER: No dural venous sinus thrombosis on this non-dedicated study. BRAIN: No mass effect or midline shift. No extra-axial fluid collection. The gray-white differentiation is maintained. No major arterial stenosis or occlusion is seen in the head or the neck.  RECOMMENDATIONS: Unavailable     CT HEAD WO CONTRAST    Result Date: 11/1/2022  EXAMINATION: CT OF THE HEAD WITHOUT CONTRAST  11/1/2022 5:27 am TECHNIQUE: CT of the head was performed without the administration of intravenous contrast. Automated exposure control, iterative reconstruction, and/or weight based adjustment of the mA/kV was utilized to reduce the radiation dose to as low as reasonably achievable. COMPARISON: None. HISTORY: ORDERING SYSTEM PROVIDED HISTORY: cva w/u TECHNOLOGIST PROVIDED HISTORY: Has a \"code stroke\" or \"stroke alert\" been called? ->Yes Reason for exam:->cva w/u Decision Support Exception - unselect if not a suspected or confirmed emergency medical condition->Emergency Medical Condition (MA) What reading provider will be dictating this exam?->CRC FINDINGS: BRAIN/VENTRICLES: There is no acute intracranial hemorrhage, mass effect or midline shift. No abnormal extra-axial fluid collection. The gray-white differentiation is maintained without evidence of an acute infarct. There is no evidence of hydrocephalus. The ventricles, cisterns and sulci are prominent consistent with atrophy. There is decreased attenuation within the periventricular white matter consistent with periventricular leukomalacia. ORBITS: The visualized portion of the orbits demonstrate no acute abnormality. SINUSES: The visualized paranasal sinuses and mastoid air cells demonstrate no acute abnormality. SOFT TISSUES/SKULL:  No acute abnormality of the visualized skull or soft tissues. 1.  There is no acute intracranial abnormality. Specifically, there is no intracranial hemorrhage. 2. Atrophy and periventricular leukomalacia, . CTA NECK W WO CONTRAST    Result Date: 11/2/2022  EXAMINATION: CTA OF THE HEAD WITHOUT AND WITH CONTRAST; CTA OF THE NECK 11/1/2022 5:58 am: TECHNIQUE: CTA of the head/brain was performed without and with the administration of intravenous contrast. Multiplanar reformatted images are provided for review. MIP images are provided for review.  Automated exposure control, iterative reconstruction, and/or weight based adjustment of the mA/kV was utilized to reduce the radiation dose to as low as reasonably achievable.; CTA of the neck was performed with the administration of intravenous contrast. Multiplanar reformatted images are provided for review. MIP images are provided for review. Stenosis of the internal carotid arteries measured using NASCET criteria. Automated exposure control, iterative reconstruction, and/or weight based adjustment of the mA/kV was utilized to reduce the radiation dose to as low as reasonably achievable. COMPARISON: CTA of the head and neck, 10/26/2020 HISTORY: ORDERING SYSTEM PROVIDED HISTORY: R/O LVO TECHNOLOGIST PROVIDED HISTORY: Reason for exam:->R/O LVO Decision Support Exception - unselect if not a suspected or confirmed emergency medical condition->Emergency Medical Condition (MA) What reading provider will be dictating this exam?->CRC; ORDERING SYSTEM PROVIDED HISTORY: R/o LVO TECHNOLOGIST PROVIDED HISTORY: Reason for exam:->R/o LVO Decision Support Exception - unselect if not a suspected or confirmed emergency medical condition->Emergency Medical Condition (MA) What reading provider will be dictating this exam?->CRC FINDINGS: CTA NECK: AORTIC ARCH/ARCH VESSELS: No dissection or arterial injury. No significant stenosis of the brachiocephalic or subclavian arteries. CAROTID ARTERIES: No dissection, arterial injury, or hemodynamically significant stenosis by NASCET criteria. VERTEBRAL ARTERIES: No dissection, arterial injury, or significant stenosis. SOFT TISSUES: The lung apices are clear. No cervical or superior mediastinal lymphadenopathy. The larynx and pharynx are unremarkable. No acute abnormality of the salivary and thyroid glands. BONES: No acute osseous abnormality. CTA HEAD: ANTERIOR CIRCULATION: No significant stenosis of the intracranial internal carotid, anterior cerebral, or middle cerebral arteries. No aneurysm. POSTERIOR CIRCULATION: No significant stenosis of the vertebral, basilar, or posterior cerebral arteries. No aneurysm. OTHER: No dural venous sinus thrombosis on this non-dedicated study. BRAIN: No mass effect or midline shift.  No extra-axial fluid collection. The gray-white differentiation is maintained. No major arterial stenosis or occlusion is seen in the head or the neck. RECOMMENDATIONS: Unavailable     MRI LUMBAR SPINE WO CONTRAST    Result Date: 11/2/2022  EXAMINATION: MRI OF THE LUMBAR SPINE WITHOUT CONTRAST, 11/1/2022 1:29 pm TECHNIQUE: Multiplanar multisequence MRI of the lumbar spine was performed without the administration of intravenous contrast. COMPARISON: 08/09/2021 HISTORY: ORDERING SYSTEM PROVIDED HISTORY: righ tleg weakness, areflexia BLE TECHNOLOGIST PROVIDED HISTORY: Reason for exam:->righ tleg weakness, areflexia BLE What reading provider will be dictating this exam?->CRC FINDINGS: BONES/ALIGNMENT: There is transitional anatomy at the lumbosacral junction. S1 appears lumbarized. Close attention to the correct anatomic level is recommended prior to any potential intervention. There is grade 1 anterolisthesis of L5 relative to S1 measuring 3 mm. Alignment is otherwise normal.  There is no acute fracture. There is disc desiccation and mild disc space narrowing at L5-S1, similar to the previous exam.  There is no spondylolysis. SPINAL CORD: The conus medullaris is normal in size and signal intensities and terminates normally. SOFT TISSUES: No paraspinal mass identified. L1-L2: There is no disc bulge or protrusion present. There is no significant spinal canal stenosis or neural foraminal narrowing present. L2-L3: There is no disc bulge or protrusion present. There is no significant spinal canal stenosis or neural foraminal narrowing present. L3-L4 There is a disc bulge and facet arthropathy. There is mild spinal canal stenosis. No significant neural foraminal narrowing is present. L4-L5 There is no disc bulge or protrusion present. There is no significant spinal canal stenosis or neural foraminal narrowing present. There is bilateral facet arthropathy. L5-S1 Sequelae of right hemilaminectomy is noted.   There is grade 1 anterolisthesis, disc bulge and facet arthropathy. There is no significant spinal canal stenosis. There is mild effacement the right lateral recess and moderate right neural foraminal narrowing. The appearance is similar to the previous exam.  No significant left neural foraminal narrowing is present. S1-S2: There is no disc bulge or protrusion present. There is no significant spinal canal stenosis or neural foraminal narrowing present. 1. Transitional anatomy at the lumbosacral junction. S1 appears lumbarized. Close attention to the correct anatomic level is recommended prior to any potential intervention. 2. Sequelae of right hemilaminectomy at L5-S1. There is grade 1 anterolisthesis, disc bulge and facet arthropathy resulting in effacement the right lateral recess and moderate right neural foraminal narrowing, similar to the previous exam. 3. Mild spinal canal stenosis at L3-4, similar to the previous exam.     XR CHEST PORTABLE    Result Date: 11/1/2022  EXAMINATION: ONE XRAY VIEW OF THE CHEST 11/1/2022 5:20 am COMPARISON: 26 October 2020 HISTORY: ORDERING SYSTEM PROVIDED HISTORY: cva w/u TECHNOLOGIST PROVIDED HISTORY: Reason for exam:->cva w/u What reading provider will be dictating this exam?->CRC FINDINGS: The lungs are without acute focal process. There is no effusion or pneumothorax. The cardiomediastinal silhouette is without acute process. The osseous structures are without acute process. No acute process.      MRI brain without contrast    Result Date: 11/1/2022  EXAMINATION: MRI OF THE BRAIN WITHOUT CONTRAST  11/1/2022 1:27 pm TECHNIQUE: Multiplanar multisequence MRI of the brain was performed without the administration of intravenous contrast. COMPARISON: CT head without IV contrast. HISTORY: ORDERING SYSTEM PROVIDED HISTORY: right sided numbness TECHNOLOGIST PROVIDED HISTORY: Reason for exam:->right sided numbness What reading provider will be dictating this exam?->CRC FINDINGS: No abnormal diffusion restriction is identified. Volume loss within the cerebellar hemispheres is noted bilaterally. Diffuse corpus callosum atrophy is identified. Prominence of the Gisela sylvian fissures is noted compatible with frontotemporal volume loss. No midline shift or mass effect the lateral ventricles is noted. Nonspecific signal hyperintensity on FLAIR imaging in the periventricular white matter is identified. Vascular flow voids are unremarkable. The structures of the inner ear are normal in signal intensity on the provided sequences. Focal region of signal hyperintensity on FLAIR imaging is noted in the subcortical white matter of the right occipital lobe on image 17 of MR series 5. Signal loss on gradient recall echo imaging is noted within the basal ganglia bilaterally, as well as, the substantia nigra which can be seen in the setting of small vessel ischemic change or neuro degenerative process. 1. No evidence for acute ischemic change. 2. Parenchymal volume loss most predominant within the frontotemporal regions as well as the cerebellar hemispheres. Atrophy of the corpus callosum is identified as well. 3. Other degenerative changes as above.  RECOMMENDATIONS: Unavailable       Lab Results   Component Value Date/Time    WBC 4.0 11/02/2022 05:15 AM    RBC 3.57 11/02/2022 05:15 AM    HGB 13.2 11/02/2022 05:15 AM    HCT 38.5 11/02/2022 05:15 AM    .0 11/02/2022 05:15 AM    MCH 36.9 11/02/2022 05:15 AM    MCHC 34.2 11/02/2022 05:15 AM    RDW 13.7 11/02/2022 05:15 AM     11/02/2022 05:15 AM    MPV 8.4 09/25/2015 11:49 AM     Lab Results   Component Value Date/Time     11/01/2022 05:30 AM    K 3.5 11/01/2022 05:30 AM    K 3.5 08/21/2021 08:45 AM    CL 90 11/01/2022 05:30 AM    CO2 25 11/01/2022 05:30 AM    BUN 14 11/01/2022 05:30 AM    CREATININE 0.82 11/01/2022 05:30 AM    GFRAA >60.0 06/20/2022 02:34 PM    LABGLOM >60.0 11/01/2022 05:30 AM    GLUCOSE 117 11/01/2022 05:30 AM PROT 7.4 11/01/2022 05:30 AM    LABALBU 4.4 11/01/2022 05:30 AM    LABALBU DETECTED 10/27/2011 03:08 PM    CALCIUM 9.6 11/01/2022 05:30 AM    BILITOT 0.5 11/01/2022 05:30 AM    ALKPHOS 73 11/01/2022 05:30 AM    AST 25 11/01/2022 05:30 AM    ALT 26 11/01/2022 05:30 AM     Lab Results   Component Value Date/Time    PROTIME 12.9 11/01/2022 05:30 AM    PROTIME 12.4 11/12/2017 04:04 AM    INR 1.0 11/01/2022 05:30 AM     Lab Results   Component Value Date/Time    TSH 4.080 11/12/2017 04:00 AM    [id number] 685 11/12/2017 04:00 AM    FOLATE 11.8 11/12/2017 04:00 AM    FERRITIN 117.0 10/05/2021 02:44 PM    IRON 124 10/05/2021 02:44 PM    TIBC 348 10/05/2021 02:44 PM     Lab Results   Component Value Date/Time    TRIG 170 11/02/2022 05:15 AM    HDL 38 11/02/2022 05:15 AM    LDLCALC 37 11/02/2022 05:15 AM     Lab Results   Component Value Date/Time    LABAMPH Neg 11/01/2022 05:30 AM    BARBSCNU Neg 11/01/2022 05:30 AM    LABBENZ Neg 11/01/2022 05:30 AM    LABMETH Neg 11/01/2022 05:30 AM    OPIATESCREENURINE Neg 11/01/2022 05:30 AM    PHENCYCLIDINESCREENURINE Neg 11/01/2022 05:30 AM    ETOH 209 11/01/2022 05:30 AM     No results found for: LITHIUM, DILFRTOT, VALPROATE    Assessment:       Diagnosis Orders   1. Dizziness        2. Anxiety hyperventilation        3. Degenerative disease of nervous system, unspecified ([address]        4. TIA (transient ischemic attack)        5. VBI (vertebrobasilar insufficiency)        6. Meniere disease, bilateral        7. Ataxia        Dizziness which is chronic with the underlying tinnitus of both ears with Ménière's disease with a VBI. Patient was admitted to hospital with lower extremity weakness on the right. Extensively she did not even think significant. We had started him on dual antiplatelet therapy all his investigations leg with large vessel evaluations are all normal.  Patient is not any bleeding or bruising.   Patient likely has anxiety hypoventilation syndrome and may benefit from the use of hydroxyzine. He did not do well on Antivert. We will see if he can get through so the insurance company. He has multiple other risk factors but is not orthostatic. Patient's symptoms have been present for quite some time and we have not been able to quite break his dizzy spells. We will keep an eye on this and go to follow lumbar spine MRI is reviewed and does not show canal stenosis. All his inpatient hospital records were reviewed with him      Alex Ramirez MD, [place], American Board of Psychiatry & Neurology  Board Certified in Vascular Neurology  Board Certified in Neuromuscular Medicine  Certified in [address] [place] [address] Ave:      No orders of the defined types were placed in this encounter. No orders of the defined types were placed in this encounter. No follow-ups on file.       Eddi Quezada MD

## 2023-03-01 ENCOUNTER — OFFICE VISIT (OUTPATIENT)
Dept: PULMONOLOGY | Age: 68
End: 2023-03-01
Payer: MEDICARE

## 2023-03-01 VITALS
HEART RATE: 90 BPM | BODY MASS INDEX: 37.52 KG/M2 | TEMPERATURE: 97 F | WEIGHT: 277 LBS | DIASTOLIC BLOOD PRESSURE: 68 MMHG | OXYGEN SATURATION: 96 % | SYSTOLIC BLOOD PRESSURE: 128 MMHG | HEIGHT: 72 IN | RESPIRATION RATE: 16 BRPM

## 2023-03-01 DIAGNOSIS — R91.8 LUNG NODULES: ICD-10-CM

## 2023-03-01 DIAGNOSIS — E66.01 SEVERE OBESITY (BMI 35.0-39.9) WITH COMORBIDITY (HCC): ICD-10-CM

## 2023-03-01 DIAGNOSIS — Z87.891 PERSONAL HISTORY OF TOBACCO USE: ICD-10-CM

## 2023-03-01 DIAGNOSIS — R06.2 WHEEZING: Primary | ICD-10-CM

## 2023-03-01 DIAGNOSIS — G47.33 OSA ON CPAP: ICD-10-CM

## 2023-03-01 DIAGNOSIS — F17.210 CIGARETTE NICOTINE DEPENDENCE, UNCOMPLICATED: ICD-10-CM

## 2023-03-01 DIAGNOSIS — Z99.89 OSA ON CPAP: ICD-10-CM

## 2023-03-01 PROCEDURE — G0296 VISIT TO DETERM LDCT ELIG: HCPCS | Performed by: INTERNAL MEDICINE

## 2023-03-01 PROCEDURE — 1123F ACP DISCUSS/DSCN MKR DOCD: CPT | Performed by: INTERNAL MEDICINE

## 2023-03-01 PROCEDURE — 3074F SYST BP LT 130 MM HG: CPT | Performed by: INTERNAL MEDICINE

## 2023-03-01 PROCEDURE — 3078F DIAST BP <80 MM HG: CPT | Performed by: INTERNAL MEDICINE

## 2023-03-01 PROCEDURE — 99214 OFFICE O/P EST MOD 30 MIN: CPT | Performed by: INTERNAL MEDICINE

## 2023-03-01 NOTE — PATIENT INSTRUCTIONS

## 2023-03-01 NOTE — PROGRESS NOTES
Subjective:     Robb Diez is a 79 y.o. male who complains today of:     Chief Complaint   Patient presents with    Follow-up     1 YR F/U for AYLIN on CPAP and Cigarette nicotine dependence       HPI  Patient presents for AYLIN     Patient presents for AYLIN follow-up, he uses CPAP every night, we were unable to bring his compliance report due to Wi-Fi related issues. Otherwise he is doing good, no daytime sleepiness or tiredness, he is compliant with treatment, no chest pain, no fever no chills his weight is stable, unfortunately continues to smoke half a pack per day. No lower extremity edema.         Allergies:  Bactrim [sulfamethoxazole-trimethoprim]  Past Medical History:   Diagnosis Date    Abscess of back 1/6/2020    Abscess of right leg 1/6/2020    Acute renal failure (HCC)     Anxiety     CAD S/P percutaneous coronary angioplasty 3/11/2014    Cardiomyopathy [facility])     Cerebrovascular accident (CVA) due to occlusion of left middle cerebral artery ([facility] 08/2016    brainstem infarction from left MCA occlusion    Cerebrovascular disease 07/27/2016    Colon polyp     Coronary angioplasty status     CVA (cerebral vascular accident) ([address] 11/12/2017    Dependent edema 9/14/2017    Diplopia 5/15/2017    Resolved with management of cataracts    Dupuytren contracture 1/2/2018    Dysphagia 8/18/2011    Dysphonia 8/18/2011    Essential hypertension 8/17/2016    Gallop rhythm     Gout 12/10/2016    Gout of big toe 08/2014    Right Great Toe    H/O fall     Headache     migraines    Heart failure ([address]     History of chest pain 1/2/2014    History of CVA 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    History of heart failure 1/6/2014    History of myocardial infarction 3/11/2014    History of recurrent pneumonia 2/11/2014    Hx of bacterial pneumonia     Hyperlipidemia 4/30/2021    Hyperlipidemia, mixed 4/30/2021    Hyperlipidemia, mixed 4/30/2021    Hypotension     Left carotid artery stenosis 8/23/2020    Left carotid artery stenosis 8/23/2020    Leg swelling     Macrocytosis without anemia 12/10/2016    Microalbuminuria due to type 2 diabetes mellitus (HCC) 9/14/2018    Morbid obesity ([facility]) 1/2/2014    Myocardial infarction ([facility])     Obesity     AYLIN (obstructive sleep apnea) 12/10/2016    Osteoarthritis     Right-sided muscle weakness 10/19/2016    S/P cardiac catheterization     Sciatica     Skin cyst 4/8/2016    Spasm 11/9/2016    Spina bifida ([facility])     Stented coronary artery     Tremor of right hand 5/15/2017    Type 2 diabetes mellitus with diabetic polyneuropathy, without long-term current use of insulin ([facility])     Unsteady gait 5/15/2017    Weakness     Weight gain      Past Surgical History:   Procedure Laterality Date    ANKLE SURGERY Left     BACK SURGERY      lumbar laminectomy    CHOLECYSTECTOMY      COLONOSCOPY  01/15/2010    polyp, diverticulosis (DR ELAINE)    COLONOSCOPY N/A 08/19/2021    COLORECTAL CANCER SCREENING, HIGH RISK with polypectomies  performed by Bina Ferguson MD at [facility]    COLONOSCOPY N/A 08/20/2021    polyps x 3, 3y repeat (DR VIVEROS)  COLONOSCOPY with polypectomies DIAGNOSTIC performed by Alexey Viveros MD at [facility]    CORONARY ANGIOPLASTY WITH STENT PLACEMENT  2014    3 stents    CYST REMOVAL  05/16/2016    DR SCHMID,  L SCROTAL CYST, R thigh, L ear, back    SHOULDER SURGERY Bilateral 12/18/2015    left once right twice- to remove bone spurs    TONSILLECTOMY       Family History   Problem Relation Age of Onset    Breast Cancer Mother     Stroke Father     Colon Cancer Father 75     Social History     Socioeconomic History    Marital status:      Spouse name: Kristi    Number of children: 3    Years of education: 12+    Highest education level: Not on file   Occupational History    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     Employer: [employer]   Tobacco Use    Smoking status: Some Days     Packs/day: 2.50      Years: 30.00     Pack years: 75.00     Types: Cigarettes    Smokeless tobacco: Never    Tobacco comments:     varies   Vaping Use    Vaping Use: Never used   Substance and Sexual Activity    Alcohol use:  Yes     Alcohol/week: 0.0 standard drinks     Comment: limits less than 10/wk varies    Drug use: No    Sexual activity: Yes     Partners: Female   Other Topics Concern    Not on file   Social History Narrative    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 from [place] after 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    Lives With: Spouse    Type of Home: House    Home Layout: Two level, Bed/Bath upstairs, Laundry in basement(Railing)    Home Access: Stairs to enter with rails    Entrance Stairs - Number of Steps: 3 in back, 4 in front    Bathroom Shower/Tub: Tub/Shower unit    Bathroom Equipment: Shower chair    Home Equipment: Rolling walker, Cane    ADL Assistance: Independent    Homemaking Assistance: Needs assistance(Spouse  is primary; vertigo and arthritis limit housework)    Ambulation Assistance: Independent(Walker outside, cane inside)    Transfer Assistance: Independent    Active : No    Patient's  Info: has not driven since CVA     Social Determinants of Health     Financial Resource Strain: Low Risk     Difficulty of Paying Living Expenses: Not hard at all   Food Insecurity: No Food Insecurity    Worried About Running Out of Food in the Last Year: Never true    [address] in the Last Year: Never true   Transportation Needs: Not on file   Physical Activity: Unknown    Days of Exercise per Week: Patient refused    Minutes of Exercise per Session: Not on file   Stress: Not on file   Social Connections: Not on file   Intimate Partner Violence: Not on file   Housing Stability: Not on file         Review of Systems      ROS: 10 organs review of system is done including general, psychological, ENT, hematological, endocrine, respiratory, cardiovascular, gastrointestinal,musculoskeletal, neurological,  allergy and Immunology is done and is otherwise negative. Current Outpatient Medications   Medication Sig Dispense Refill    atorvastatin (LIPITOR) 40 MG tablet       allopurinol (ZYLOPRIM) 300 MG tablet Take 1 tablet by mouth daily 90 tablet 1    Alcohol Swabs PADS DX: diabetes mellitus. Test 1 time(s) daily - Ok to substitute per insurance (1 each = 1 box) 100 each 5    blood glucose monitor strips DX: diabetes mellitus. Use 1 time(s) daily - True Metrix requested by patient - [id number] [address] Hope to substitute per insurance 100 strip 5    Lancets MISC DX: diabetes mellitus. Use 1 time(s) daily - Ok to substitute per insurance (1 each = 1 box) 100 each 5    acetaminophen (TYLENOL) 500 MG tablet Take 500 mg by mouth every 6 hours as needed for Pain      torsemide (DEMADEX) 20 MG tablet Take 1 tablet by mouth daily 90 tablet 1    glimepiride (AMARYL) 1 MG tablet Take 1 tablet by mouth every morning (before breakfast) 90 tablet 1    carvedilol (COREG) 25 MG tablet Take 1 tablet by mouth in the morning and 1 tablet before bedtime. 180 tablet 3    clopidogrel (PLAVIX) 75 MG tablet Take 1 tablet by mouth daily 90 tablet 3    metFORMIN (GLUCOPHAGE) 500 MG tablet Take 1 tablet by mouth 2 times daily (with meals) 180 tablet 1    VITAMIN D PO       Elastic Bandages & Supports (V-4 HIGH COMPRESSION HOSE) MISC KNEE HIGH - 20-30 mmHg 2 each 1    blood glucose monitor kit and supplies DX: diabetes mellitus. Test 1 time(s) daily - True Metrix requested by patient - [id number] Claudia Hope to substitute per insurance 1 kit 0    aspirin 81 MG EC tablet Take 81 mg by mouth daily      Probiotic Product (PROBIOTIC DAILY PO) Take 1 tablet by mouth daily      vitamin B-12 (CYANOCOBALAMIN) 100 MCG tablet Take 50 mcg by mouth daily      ammonium lactate (AMLACTIN) 12 % cream APPLY TO DRY CALLUS AREAS 1 TO 2 TIMES DAILY  5    Misc.  Devices (COMMODE BEDSIDE) MISC Use daily as needed 1 each 0    MULTIPLE VITAMIN PO Take 1 tablet by mouth daily       rosuvastatin (CRESTOR) 40 MG tablet Take 1 tablet by mouth nightly (Patient not taking: No sig reported) 30 tablet 3     No current facility-administered medications for this visit.       Objective:     Vitals:    03/01/23 1254 03/01/23 1322   BP: 128/68    Site: Right Upper Arm    Position: Sitting    Cuff Size: Large Adult    Pulse: (!) 102 90   Resp: 16    Temp: 97 °F (36.1 °C)    TempSrc: Infrared    SpO2: 96%    Weight: 277 lb (125.6 kg)    Height: 6' (1.829 m)          Physical Exam  Constitutional:       Appearance: He is well-developed.   HENT:      Head: Normocephalic and atraumatic.   Eyes:      General:         Left eye: No discharge.      Conjunctiva/sclera: Conjunctivae normal.      Pupils: Pupils are equal, round, and reactive to light.   Neck:      Vascular: No JVD.   Cardiovascular:      Rate and Rhythm: Normal rate and regular rhythm.      Heart sounds: Normal heart sounds. No murmur heard.    No friction rub. No gallop.   Pulmonary:      Effort: Pulmonary effort is normal. No respiratory distress.      Breath sounds: Wheezing (mild insp wheezing) present. No rales.   Chest:      Chest wall: No tenderness.   Abdominal:      General: Bowel sounds are normal.      Palpations: Abdomen is soft.   Musculoskeletal:         General: No tenderness or deformity.      Cervical back: Normal range of motion and neck supple.      Right lower leg: No edema.      Left lower leg: No edema.   Skin:     General: Skin is warm and dry.   Neurological:      Mental Status: He is alert and oriented to person, place, and time.   Psychiatric:         Judgment: Judgment normal.       Imaging studies reviewed and interpreted by me CT chest March 2022, shows small lung nodules less than 4 mm in size.  Lab results reviewed in chart  PFT none  ECHO: October 2020, EF 60%, with diastolic dysfunction  Sleep study 2016 shows mild AYLIN, AHI 12  Assessment and Plan       Diagnosis Orders   1. Wheezing  Full PFT Study With Bronchodilator      2. Personal history of  tobacco use  VT VISIT TO DISCUSS LUNG CA SCREEN W LDCT    CT Lung Screen (Annual/Baseline)      3. AYLIN on CPAP        4. Cigarette nicotine dependence, uncomplicated        5. Severe obesity (BMI 35.0-39.9) with comorbidity (HCC)        6. Lung nodules          Wheezing, with smoking history, will obtain PFT evaluate for possible obstructive airway disease or hyperreactive airway disease.  Patient does not wish to follow-up after PFT, he will call me after the test is done to review results.  Otherwise I will see him back in 1 year   will continue to monitor lung nodules with yearly CT lung cancer screening, he is due in March of this year  Lastly, patient is compliant  Smoking cessation strongly recommended  Weight loss recommended      Orders Placed This Encounter   Procedures    CT Lung Screen (Annual/Baseline)     Age: Patient is 67 y.o.    Smoking History: Social History    Tobacco Use      Smoking status: Some Days        Packs/day: 2.50        Years: 30.00        Pack years: 75        Types: Cigarettes      Smokeless tobacco: Never      Tobacco comments: varies    Vaping Use      Vaping Use: Never used    Alcohol use: Yes      Alcohol/week: 0.0 standard drinks      Comment: limits less than 10/wk varies    Drug use: No   Pack years: 75    Date of last lung cancer screening: 3/29/2022     Standing Status:   Future     Standing Expiration Date:   9/1/2024     Order Specific Question:   Is there documentation of shared decision making?     Answer:   Yes     Order Specific Question:   Is this a low dose CT or a routine CT?     Answer:   Low Dose CT [1]     Order Specific Question:   Is this the first (baseline) CT or an annual exam?     Answer:   Annual [2]     Order Specific Question:   Does the patient show any signs or symptoms of lung cancer?     Answer:   No     Order Specific Question:   Smoking Status?     Answer:   Some Days [2]     Order Specific Question:   Smoking packs per day?     Answer:   2.5      Order Specific Question:   Years smoking? Answer:   30    Full PFT Study With Bronchodilator     Standing Status:   Future     Standing Expiration Date:   9/1/2024    MT VISIT TO DISCUSS LUNG CA SCREEN W LDCT     No orders of the defined types were placed in this encounter. Discussed with patient the importance of exercise and weight control and  overall health and well-being. Reviewed with the patient: current clinical status, medications, activities and diet. Side effects, adverse effects of the medication prescribed today, as well as treatment plan and result expectations have been discussed with the patient who expresses understanding and desires to proceed. Return in about 1 year (around 3/1/2024).       Dylan Bernard MD

## 2023-03-01 NOTE — PROGRESS NOTES
Discussed with the patient the current USPSTF guidelines released March 9, 2021 for screening for lung cancer. For adults aged 48 to [de-identified] years who have a 20 pack-year smoking history and currently smoke or have quit within the past 15 years the grade B recommendation is to:  Screen for lung cancer with low-dose computed tomography (LDCT) every year. Stop screening once a person has not smoked for 15 years or has a health problem that limits life expectancy or the ability to have lung surgery. The patient  reports that he has been smoking cigarettes. He has a 75.00 pack-year smoking history. He has never used smokeless tobacco.. Discussed with patient the risks and benefits of screening, including over-diagnosis, false positive rate, and total radiation exposure. The patient currently exhibits no signs or symptoms suggestive of lung cancer. Discussed with patient the importance of compliance with yearly annual lung cancer screenings and willingness to undergo diagnosis and treatment if screening scan is positive. In addition, the patient was counseled regarding the importance of remaining smoke free and/or total smoking cessation.     Also reviewed the following if the patient has Medicare that as of February 10, 2022, Medicare only covers LDCT screening in patients aged 51-72 with at least a 20 pack-year smoking history who currently smoke or have quit in the last 15 years

## 2023-03-07 DIAGNOSIS — R60.0 BILATERAL LOWER EXTREMITY EDEMA: ICD-10-CM

## 2023-03-07 DIAGNOSIS — E11.42 TYPE 2 DIABETES MELLITUS WITH DIABETIC POLYNEUROPATHY, WITHOUT LONG-TERM CURRENT USE OF INSULIN (HCC): Chronic | ICD-10-CM

## 2023-03-07 NOTE — TELEPHONE ENCOUNTER
Pt requesting 90 days refills . ..  he is out and would like them to be sent from [facility]     glimepiride (AMARYL) 1MG tablet   torsemide (DEMADEX) 20 MG tablet    LOV 12/20/22  FOV 3/21/23

## 2023-03-08 RX ORDER — TORSEMIDE 20 MG/1
20 TABLET ORAL DAILY
Qty: 90 TABLET | Refills: 1 | Status: SHIPPED | OUTPATIENT
Start: 2023-03-08

## 2023-03-08 RX ORDER — GLIMEPIRIDE 1 MG/1
1 TABLET ORAL
Qty: 90 TABLET | Refills: 1 | Status: SHIPPED | OUTPATIENT
Start: 2023-03-08

## 2023-03-08 NOTE — TELEPHONE ENCOUNTER
Pharmacy is requesting medication refill.  Please approve or deny this request.    Rx requested:  Requested Prescriptions     Pending Prescriptions Disp Refills    torsemide (DEMADEX) 20 MG tablet 90 tablet 1     Sig: Take 1 tablet by mouth daily    glimepiride (AMARYL) 1 MG tablet 90 tablet 1     Sig: Take 1 tablet by mouth every morning (before breakfast)         Last Office Visit:   12/20/2022      Next Visit Date:  Future Appointments   Date Time Provider Qasim Rae   3/21/2023  4:30 PM Hellen Moss MD [facility] [address]   3/31/2023  2:30 PM [place] CT ROOM 1 [facility] RAD   3/31/2023  3:00 PM [place] PFT ROOM [facility] PFT [address] [place] [address]   6/28/2023  3:30 PM Alex Renee MD [facility] Neurology -   7/13/2023  3:30 PM Bernard Hernandezst [facility]   3/4/2024  3:15 PM Angel Luis Arriaga MD [facility]

## 2023-03-16 RX ORDER — ATORVASTATIN CALCIUM 40 MG/1
40 TABLET, FILM COATED ORAL DAILY
Qty: 30 TABLET | Refills: 0 | Status: SHIPPED | OUTPATIENT
Start: 2023-03-16

## 2023-04-18 ENCOUNTER — OFFICE VISIT (OUTPATIENT)
Dept: FAMILY MEDICINE CLINIC | Age: 68
End: 2023-04-18
Payer: MEDICARE

## 2023-04-18 VITALS
HEART RATE: 92 BPM | TEMPERATURE: 97.4 F | DIASTOLIC BLOOD PRESSURE: 72 MMHG | SYSTOLIC BLOOD PRESSURE: 124 MMHG | WEIGHT: 274.6 LBS | BODY MASS INDEX: 37.24 KG/M2 | OXYGEN SATURATION: 96 %

## 2023-04-18 DIAGNOSIS — R06.2 WHEEZING: ICD-10-CM

## 2023-04-18 DIAGNOSIS — I25.10 CORONARY ARTERY DISEASE INVOLVING NATIVE CORONARY ARTERY OF NATIVE HEART WITHOUT ANGINA PECTORIS: ICD-10-CM

## 2023-04-18 DIAGNOSIS — Z72.0 TOBACCO USE: ICD-10-CM

## 2023-04-18 DIAGNOSIS — I10 ESSENTIAL HYPERTENSION: Chronic | ICD-10-CM

## 2023-04-18 DIAGNOSIS — E78.2 MIXED HYPERLIPIDEMIA: ICD-10-CM

## 2023-04-18 DIAGNOSIS — F10.10 EXCESSIVE DRINKING OF ALCOHOL: ICD-10-CM

## 2023-04-18 DIAGNOSIS — I73.9 PAD (PERIPHERAL ARTERY DISEASE) (HCC): ICD-10-CM

## 2023-04-18 DIAGNOSIS — E11.29 MICROALBUMINURIA DUE TO TYPE 2 DIABETES MELLITUS (HCC): Chronic | ICD-10-CM

## 2023-04-18 DIAGNOSIS — E11.42 TYPE 2 DIABETES MELLITUS WITH DIABETIC POLYNEUROPATHY, WITHOUT LONG-TERM CURRENT USE OF INSULIN (HCC): Primary | Chronic | ICD-10-CM

## 2023-04-18 DIAGNOSIS — R80.9 MICROALBUMINURIA DUE TO TYPE 2 DIABETES MELLITUS (HCC): Chronic | ICD-10-CM

## 2023-04-18 DIAGNOSIS — D75.89 MACROCYTOSIS WITHOUT ANEMIA: Chronic | ICD-10-CM

## 2023-04-18 DIAGNOSIS — E66.01 CLASS 2 SEVERE OBESITY DUE TO EXCESS CALORIES WITH SERIOUS COMORBIDITY AND BODY MASS INDEX (BMI) OF 37.0 TO 37.9 IN ADULT (HCC): ICD-10-CM

## 2023-04-18 DIAGNOSIS — M10.9 GOUT OF MULTIPLE SITES, UNSPECIFIED CAUSE, UNSPECIFIED CHRONICITY: Chronic | ICD-10-CM

## 2023-04-18 PROCEDURE — 3051F HG A1C>EQUAL 7.0%<8.0%: CPT | Performed by: FAMILY MEDICINE

## 2023-04-18 PROCEDURE — 99214 OFFICE O/P EST MOD 30 MIN: CPT | Performed by: FAMILY MEDICINE

## 2023-04-18 PROCEDURE — 3074F SYST BP LT 130 MM HG: CPT | Performed by: FAMILY MEDICINE

## 2023-04-18 PROCEDURE — 1123F ACP DISCUSS/DSCN MKR DOCD: CPT | Performed by: FAMILY MEDICINE

## 2023-04-18 PROCEDURE — 3078F DIAST BP <80 MM HG: CPT | Performed by: FAMILY MEDICINE

## 2023-04-18 RX ORDER — ROSUVASTATIN CALCIUM 40 MG/1
40 TABLET, COATED ORAL NIGHTLY
Qty: 90 TABLET | Refills: 1 | Status: SHIPPED | OUTPATIENT
Start: 2023-04-18

## 2023-04-18 RX ORDER — CARVEDILOL 25 MG/1
25 TABLET ORAL 2 TIMES DAILY
Qty: 180 TABLET | Refills: 1 | Status: SHIPPED | OUTPATIENT
Start: 2023-04-18

## 2023-04-18 SDOH — ECONOMIC STABILITY: FOOD INSECURITY: WITHIN THE PAST 12 MONTHS, THE FOOD YOU BOUGHT JUST DIDN'T LAST AND YOU DIDN'T HAVE MONEY TO GET MORE.: NEVER TRUE

## 2023-04-18 SDOH — ECONOMIC STABILITY: FOOD INSECURITY: WITHIN THE PAST 12 MONTHS, YOU WORRIED THAT YOUR FOOD WOULD RUN OUT BEFORE YOU GOT MONEY TO BUY MORE.: NEVER TRUE

## 2023-04-18 SDOH — ECONOMIC STABILITY: INCOME INSECURITY: HOW HARD IS IT FOR YOU TO PAY FOR THE VERY BASICS LIKE FOOD, HOUSING, MEDICAL CARE, AND HEATING?: NOT HARD AT ALL

## 2023-04-18 SDOH — ECONOMIC STABILITY: HOUSING INSECURITY
IN THE LAST 12 MONTHS, WAS THERE A TIME WHEN YOU DID NOT HAVE A STEADY PLACE TO SLEEP OR SLEPT IN A SHELTER (INCLUDING NOW)?: NO

## 2023-04-18 ASSESSMENT — PATIENT HEALTH QUESTIONNAIRE - PHQ9
1. LITTLE INTEREST OR PLEASURE IN DOING THINGS: 0
SUM OF ALL RESPONSES TO PHQ QUESTIONS 1-9: 0
2. FEELING DOWN, DEPRESSED OR HOPELESS: 0
SUM OF ALL RESPONSES TO PHQ QUESTIONS 1-9: 0
SUM OF ALL RESPONSES TO PHQ9 QUESTIONS 1 & 2: 0

## 2023-04-18 ASSESSMENT — ENCOUNTER SYMPTOMS
DIARRHEA: 0
BACK PAIN: 1
WHEEZING: 0
BLOOD IN STOOL: 0
CONSTIPATION: 0
NAUSEA: 0
SHORTNESS OF BREATH: 0
ABDOMINAL PAIN: 0
CHEST TIGHTNESS: 0
COUGH: 0
VOMITING: 0
ANAL BLEEDING: 0

## 2023-04-18 NOTE — ASSESSMENT & PLAN NOTE
Currently asymptomatic. Patient instructed to continue with current dose of Plavix and start rosuvastatin for more aggressive cholesterol management.

## 2023-04-18 NOTE — ASSESSMENT & PLAN NOTE
Most recent A1c improved and just above goal control. Patient instructed to continue with current dose of metformin and glimepiride.   I stressed with him the importance of doubling down in efforts to eat a lower carbohydrate diet

## 2023-04-18 NOTE — ASSESSMENT & PLAN NOTE
Currently asymptomatic. Most recent lipid panel with elevated triglycerides. I stressed with patient the importance of switching to rosuvastatin for more aggressive lipid management as previously discussed. Patient agrees to make medication change, prescription for rosuvastatin has been sent to mail order pharmacy.

## 2023-04-18 NOTE — ASSESSMENT & PLAN NOTE
Uric acid level stable. No reported gout attacks since most recent visit. Patient instructed to continue with current dose of allopurinol.

## 2023-04-18 NOTE — ASSESSMENT & PLAN NOTE
Blood pressure within normal limits today in the office.   Patient instructed to continue with current dose of carvedilol

## 2023-04-18 NOTE — ASSESSMENT & PLAN NOTE
Reviewed with patient again the importance of limiting alcohol intake. Discussed with him again that frequent consumption of alcohol would increase risk for falls with his known history of stroke and chronic unsteady/ataxic gait. I reviewed with him again that alcohol consumption will also increase blood glucose values and would potentially interact with his prescription medication. Patient voices understanding, but states he will continue with his current consumption level of alcohol. We will continue to monitor over time.

## 2023-04-18 NOTE — PROGRESS NOTES
medication change, prescription for rosuvastatin has been sent to mail order pharmacy. Orders:  -     Lipid Panel; Future  -     CBC with Auto Differential; Future  -     rosuvastatin (CRESTOR) 40 MG tablet; Take 1 tablet by mouth nightly, Disp-90 tablet, R-1Normal  6. PAD (peripheral artery disease) (HCC)  Assessment & Plan:   Currently asymptomatic. Patient instructed to continue with current dose of Plavix and start rosuvastatin for more aggressive cholesterol management. Orders:  -     Lipid Panel; Future  -     rosuvastatin (CRESTOR) 40 MG tablet; Take 1 tablet by mouth nightly, Disp-90 tablet, R-1Normal  7. Excessive drinking of alcohol  Assessment & Plan:  Reviewed with patient again the importance of limiting alcohol intake. Discussed with him again that frequent consumption of alcohol would increase risk for falls with his known history of stroke and chronic unsteady/ataxic gait. I reviewed with him again that alcohol consumption will also increase blood glucose values and would potentially interact with his prescription medication. Patient voices understanding, but states he will continue with his current consumption level of alcohol. We will continue to monitor over time. Orders:  -     Comprehensive Metabolic Panel; Future  -     Uric Acid; Future  -     CBC with Auto Differential; Future  -     Vitamin B12 & Folate; Future  8. Macrocytosis without anemia  -     CBC with Auto Differential; Future  -     Vitamin B12 & Folate; Future  9. Gout of multiple sites, unspecified cause, unspecified chronicity  Assessment & Plan:  Uric acid level stable. No reported gout attacks since most recent visit. Patient instructed to continue with current dose of allopurinol. Orders:  -     Comprehensive Metabolic Panel; Future  -     Uric Acid; Future  10. Tobacco use  Assessment & Plan:  Patient pre-contemplative and is aware that smoking cessation would be the best thing for overall health.  We reviewed

## 2023-04-28 ENCOUNTER — TELEPHONE (OUTPATIENT)
Dept: FAMILY MEDICINE CLINIC | Age: 68
End: 2023-04-28

## 2023-04-30 ENCOUNTER — APPOINTMENT (OUTPATIENT)
Dept: GENERAL RADIOLOGY | Age: 68
End: 2023-04-30
Payer: MEDICARE

## 2023-04-30 ENCOUNTER — APPOINTMENT (OUTPATIENT)
Dept: CT IMAGING | Age: 68
End: 2023-04-30
Payer: MEDICARE

## 2023-04-30 ENCOUNTER — HOSPITAL ENCOUNTER (EMERGENCY)
Age: 68
Discharge: HOME | End: 2023-04-30
Attending: EMERGENCY MEDICINE
Payer: MEDICARE

## 2023-04-30 VITALS
BODY MASS INDEX: 35.65 KG/M2 | RESPIRATION RATE: 22 BRPM | SYSTOLIC BLOOD PRESSURE: 139 MMHG | DIASTOLIC BLOOD PRESSURE: 71 MMHG | WEIGHT: 269 LBS | HEIGHT: 73 IN | HEART RATE: 89 BPM | TEMPERATURE: 98.1 F | OXYGEN SATURATION: 97 %
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
## 2023-04-30 DIAGNOSIS — E87.21 ACUTE LACTIC ACIDOSIS: ICD-10-CM

## 2023-04-30 LAB
ALBUMIN SERPL-MCNC: 4.3 G/DL (ref 3.5–4.6)
ALP SERPL-CCNC: 71 U/L (ref 35–104)
ALT SERPL-CCNC: 25 U/L (ref 0–41)
ANION GAP SERPL CALCULATED.3IONS-SCNC: 19 MEQ/L (ref 9–15)
APTT PPP: 25.6 SEC (ref 24.4–36.8)
AST SERPL-CCNC: 25 U/L (ref 0–40)
BASOPHILS # BLD: 0.1 K/UL (ref 0–0.2)
BASOPHILS NFR BLD: 1 %
BILIRUB SERPL-MCNC: 0.4 MG/DL (ref 0.2–0.7)
BUN SERPL-MCNC: 14 MG/DL (ref 8–23)
CALCIUM SERPL-MCNC: 9.7 MG/DL (ref 8.5–9.9)
CHLORIDE SERPL-SCNC: 95 MEQ/L (ref 95–107)
CK SERPL-CCNC: 236 U/L (ref 0–190)
CO2 SERPL-SCNC: 23 MEQ/L (ref 20–31)
CREAT SERPL-MCNC: 0.99 MG/DL (ref 0.7–1.2)
EOSINOPHIL # BLD: 0.1 K/UL (ref 0–0.7)
EOSINOPHIL NFR BLD: 1 %
ERYTHROCYTE [DISTWIDTH] IN BLOOD BY AUTOMATED COUNT: 13.9 % (ref 11.5–14.5)
ETHANOL PERCENT: 0.16 G/DL
ETHANOLAMINE SERPL-MCNC: 181 MG/DL (ref 0–0.08)
GLOBULIN SER CALC-MCNC: 2.9 G/DL (ref 2.3–3.5)
GLUCOSE SERPL-MCNC: 158 MG/DL (ref 70–99)
HCT VFR BLD AUTO: 43.5 % (ref 42–52)
HGB BLD-MCNC: 15.2 G/DL (ref 14–18)
INR PPP: 1
LACTATE BLDV-SCNC: 4.3 MMOL/L (ref 0.5–2.2)
LACTATE BLDV-SCNC: 5 MMOL/L (ref 0.5–2.2)
LYMPHOCYTES # BLD: 1.7 K/UL (ref 1–4.8)
LYMPHOCYTES NFR BLD: 24 %
MCH RBC QN AUTO: 37.8 PG (ref 27–31.3)
MCHC RBC AUTO-ENTMCNC: 35 % (ref 33–37)
MCV RBC AUTO: 108.1 FL (ref 79–92.2)
MONOCYTES # BLD: 0.4 K/UL (ref 0.2–0.8)
MONOCYTES NFR BLD: 5.1 %
NEUTROPHILS # BLD: 4.8 K/UL (ref 1.4–6.5)
NEUTS SEG NFR BLD: 68.9 %
PLATELET # BLD AUTO: 160 K/UL (ref 130–400)
POTASSIUM SERPL-SCNC: 3.8 MEQ/L (ref 3.4–4.9)
PROT SERPL-MCNC: 7.2 G/DL (ref 6.3–8)
PROTHROMBIN TIME: 13.4 SEC (ref 12.3–14.9)
RBC # BLD AUTO: 4.03 M/UL (ref 4.7–6.1)
SODIUM SERPL-SCNC: 137 MEQ/L (ref 135–144)
TROPONIN T SERPL-MCNC: <0.01 NG/ML (ref 0–0.01)
WBC # BLD AUTO: 6.9 K/UL (ref 4.8–10.8)

## 2023-04-30 PROCEDURE — 96374 THER/PROPH/DIAG INJ IV PUSH: CPT | Performed by: EMERGENCY MEDICINE

## 2023-04-30 PROCEDURE — 85610 PROTHROMBIN TIME: CPT

## 2023-04-30 PROCEDURE — 99285 EMERGENCY DEPT VISIT HI MDM: CPT | Performed by: EMERGENCY MEDICINE

## 2023-04-30 PROCEDURE — 80053 COMPREHEN METABOLIC PANEL: CPT

## 2023-04-30 PROCEDURE — 82550 ASSAY OF CK (CPK): CPT

## 2023-04-30 PROCEDURE — 6360000002 HC RX W HCPCS: Performed by: EMERGENCY MEDICINE

## 2023-04-30 PROCEDURE — 70450 CT HEAD/BRAIN W/O DYE: CPT

## 2023-04-30 PROCEDURE — 83605 ASSAY OF LACTIC ACID: CPT

## 2023-04-30 PROCEDURE — 2580000003 HC RX 258: Performed by: EMERGENCY MEDICINE

## 2023-04-30 PROCEDURE — 93005 ELECTROCARDIOGRAM TRACING: CPT | Performed by: EMERGENCY MEDICINE

## 2023-04-30 PROCEDURE — 82077 ASSAY SPEC XCP UR&BREATH IA: CPT

## 2023-04-30 PROCEDURE — 85730 THROMBOPLASTIN TIME PARTIAL: CPT

## 2023-04-30 PROCEDURE — 84484 ASSAY OF TROPONIN QUANT: CPT

## 2023-04-30 PROCEDURE — 72131 CT LUMBAR SPINE W/O DYE: CPT

## 2023-04-30 PROCEDURE — 36415 COLL VENOUS BLD VENIPUNCTURE: CPT

## 2023-04-30 PROCEDURE — 72125 CT NECK SPINE W/O DYE: CPT

## 2023-04-30 PROCEDURE — 85025 COMPLETE CBC W/AUTO DIFF WBC: CPT

## 2023-04-30 PROCEDURE — 73560 X-RAY EXAM OF KNEE 1 OR 2: CPT

## 2023-04-30 PROCEDURE — 71045 X-RAY EXAM CHEST 1 VIEW: CPT

## 2023-04-30 RX ORDER — 0.9 % SODIUM CHLORIDE 0.9 %
500 INTRAVENOUS SOLUTION INTRAVENOUS ONCE
Status: COMPLETED | OUTPATIENT
Start: 2023-04-30 | End: 2023-04-30

## 2023-04-30 RX ORDER — ORPHENADRINE CITRATE 30 MG/ML
30 INJECTION INTRAMUSCULAR; INTRAVENOUS ONCE
Status: COMPLETED | OUTPATIENT
Start: 2023-04-30 | End: 2023-04-30

## 2023-04-30 RX ADMIN — ORPHENADRINE CITRATE 30 MG: 60 INJECTION INTRAMUSCULAR; INTRAVENOUS at 08:09

## 2023-04-30 RX ADMIN — SODIUM CHLORIDE 500 ML: 9 INJECTION, SOLUTION INTRAVENOUS at 09:15

## 2023-04-30 ASSESSMENT — ENCOUNTER SYMPTOMS
VOMITING: 0
SHORTNESS OF BREATH: 0
ABDOMINAL PAIN: 0
NAUSEA: 0
EYES NEGATIVE: 1
WHEEZING: 0
ALLERGIC/IMMUNOLOGIC NEGATIVE: 1
RHINORRHEA: 0
TROUBLE SWALLOWING: 0
BACK PAIN: 1

## 2023-04-30 ASSESSMENT — PAIN SCALES - GENERAL: PAINLEVEL_OUTOF10: 7

## 2023-04-30 ASSESSMENT — PAIN DESCRIPTION - LOCATION: LOCATION: BACK

## 2023-04-30 ASSESSMENT — PAIN DESCRIPTION - DESCRIPTORS: DESCRIPTORS: ACHING

## 2023-05-01 LAB
EKG ATRIAL RATE: 80 BPM
EKG P AXIS: 55 DEGREES
EKG P-R INTERVAL: 218 MS
EKG Q-T INTERVAL: 394 MS
EKG QRS DURATION: 76 MS
EKG QTC CALCULATION (BAZETT): 454 MS
EKG R AXIS: 7 DEGREES
EKG T AXIS: 62 DEGREES
EKG VENTRICULAR RATE: 80 BPM

## 2023-05-01 PROCEDURE — 93010 ELECTROCARDIOGRAM REPORT: CPT | Performed by: INTERNAL MEDICINE

## 2023-05-08 ENCOUNTER — HOSPITAL ENCOUNTER (OUTPATIENT)
Dept: CT IMAGING | Age: 68
Discharge: HOME | End: 2023-05-10
Payer: MEDICARE

## 2023-05-08 ENCOUNTER — HOSPITAL ENCOUNTER (OUTPATIENT)
Dept: PULMONOLOGY | Age: 68
Discharge: HOME | End: 2023-05-08
Payer: MEDICARE

## 2023-05-08 DIAGNOSIS — R06.2 WHEEZING: ICD-10-CM

## 2023-05-08 DIAGNOSIS — Z87.891 PERSONAL HISTORY OF TOBACCO USE: ICD-10-CM

## 2023-05-08 PROCEDURE — 94060 EVALUATION OF WHEEZING: CPT

## 2023-05-08 PROCEDURE — 71271 CT THORAX LUNG CANCER SCR C-: CPT

## 2023-05-08 PROCEDURE — 94729 DIFFUSING CAPACITY: CPT

## 2023-05-08 PROCEDURE — 6360000002 HC RX W HCPCS: Performed by: INTERNAL MEDICINE

## 2023-05-08 PROCEDURE — 94726 PLETHYSMOGRAPHY LUNG VOLUMES: CPT

## 2023-05-08 RX ORDER — ALBUTEROL SULFATE 2.5 MG/3ML
2.5 SOLUTION RESPIRATORY (INHALATION) ONCE
Status: COMPLETED | OUTPATIENT
Start: 2023-05-08 | End: 2023-05-08

## 2023-05-08 RX ADMIN — ALBUTEROL SULFATE 2.5 MG: 2.5 SOLUTION RESPIRATORY (INHALATION) at 15:34

## 2023-05-10 NOTE — PROCEDURES
christiano [facility] [address], [id number] [facility]                    PULMONARY FUNCTION  Dafne Aura [place]   76 y.o.   male     Test interpretation     Spirometry meets ATS criteria for moderately severe obstructive airway disease with no significant response to bronchodilator  Lung volumes shows air trapping  Diffusion capacity is normal       Clinical correlation is recommended     Tarik Romo MD [facility], 5/10/2023 11:21 AM

## 2023-05-13 DIAGNOSIS — I25.10 CAD S/P PERCUTANEOUS CORONARY ANGIOPLASTY: Chronic | ICD-10-CM

## 2023-05-13 DIAGNOSIS — Z98.61 CAD S/P PERCUTANEOUS CORONARY ANGIOPLASTY: Chronic | ICD-10-CM

## 2023-05-13 DIAGNOSIS — I63.9 CEREBROVASCULAR ACCIDENT (CVA), UNSPECIFIED MECHANISM (HCC): ICD-10-CM

## 2023-05-13 DIAGNOSIS — M10.9 GOUT OF MULTIPLE SITES, UNSPECIFIED CAUSE, UNSPECIFIED CHRONICITY: Chronic | ICD-10-CM

## 2023-05-15 RX ORDER — CLOPIDOGREL BISULFATE 75 MG/1
75 TABLET ORAL DAILY
Qty: 90 TABLET | Refills: 1 | Status: SHIPPED | OUTPATIENT
Start: 2023-05-15

## 2023-05-15 RX ORDER — ALLOPURINOL 300 MG/1
300 TABLET ORAL DAILY
Qty: 90 TABLET | Refills: 1 | Status: SHIPPED | OUTPATIENT
Start: 2023-05-15

## 2023-06-28 ENCOUNTER — OFFICE VISIT (OUTPATIENT)
Dept: NEUROLOGY | Age: 68
End: 2023-06-28
Payer: MEDICARE

## 2023-06-28 VITALS
DIASTOLIC BLOOD PRESSURE: 74 MMHG | SYSTOLIC BLOOD PRESSURE: 120 MMHG | HEART RATE: 90 BPM | WEIGHT: 277.1 LBS | BODY MASS INDEX: 36.56 KG/M2

## 2023-06-28 DIAGNOSIS — M54.16 LUMBAR RADICULOPATHY: ICD-10-CM

## 2023-06-28 DIAGNOSIS — R26.0 ATAXIC GAIT: ICD-10-CM

## 2023-06-28 DIAGNOSIS — H93.13 TINNITUS OF BOTH EARS: ICD-10-CM

## 2023-06-28 DIAGNOSIS — G45.0 VBI (VERTEBROBASILAR INSUFFICIENCY): Primary | ICD-10-CM

## 2023-06-28 DIAGNOSIS — R27.0 ATAXIA: ICD-10-CM

## 2023-06-28 PROCEDURE — 3078F DIAST BP <80 MM HG: CPT | Performed by: PSYCHIATRY & NEUROLOGY

## 2023-06-28 PROCEDURE — 1123F ACP DISCUSS/DSCN MKR DOCD: CPT | Performed by: PSYCHIATRY & NEUROLOGY

## 2023-06-28 PROCEDURE — 3074F SYST BP LT 130 MM HG: CPT | Performed by: PSYCHIATRY & NEUROLOGY

## 2023-06-28 PROCEDURE — 99214 OFFICE O/P EST MOD 30 MIN: CPT | Performed by: PSYCHIATRY & NEUROLOGY

## 2023-06-28 ASSESSMENT — ENCOUNTER SYMPTOMS
CHOKING: 0
NAUSEA: 0
BACK PAIN: 0
SHORTNESS OF BREATH: 0
COLOR CHANGE: 0
VOMITING: 0
PHOTOPHOBIA: 0
TROUBLE SWALLOWING: 0

## 2023-06-30 ENCOUNTER — OFFICE VISIT (OUTPATIENT)
Dept: FAMILY MEDICINE CLINIC | Age: 68
End: 2023-06-30
Payer: MEDICARE

## 2023-06-30 VITALS
HEART RATE: 95 BPM | WEIGHT: 277 LBS | TEMPERATURE: 100.4 F | SYSTOLIC BLOOD PRESSURE: 124 MMHG | BODY MASS INDEX: 36.55 KG/M2 | DIASTOLIC BLOOD PRESSURE: 70 MMHG | OXYGEN SATURATION: 97 %

## 2023-06-30 DIAGNOSIS — L97.322 SKIN ULCER OF LEFT ANKLE WITH FAT LAYER EXPOSED (HCC): Primary | ICD-10-CM

## 2023-06-30 PROCEDURE — 3074F SYST BP LT 130 MM HG: CPT

## 2023-06-30 PROCEDURE — 3078F DIAST BP <80 MM HG: CPT

## 2023-06-30 PROCEDURE — 1123F ACP DISCUSS/DSCN MKR DOCD: CPT

## 2023-06-30 PROCEDURE — 99213 OFFICE O/P EST LOW 20 MIN: CPT

## 2023-06-30 RX ORDER — CEPHALEXIN 500 MG/1
500 CAPSULE ORAL 4 TIMES DAILY
Qty: 40 CAPSULE | Refills: 0 | Status: SHIPPED | OUTPATIENT
Start: 2023-06-30 | End: 2023-07-10

## 2023-06-30 ASSESSMENT — ENCOUNTER SYMPTOMS
RESPIRATORY NEGATIVE: 1
EYES NEGATIVE: 1
COLOR CHANGE: 0

## 2023-07-07 ENCOUNTER — INITIAL CONSULT (OUTPATIENT)
Age: 68
End: 2023-07-07
Payer: MEDICARE

## 2023-07-07 VITALS — HEIGHT: 73 IN | TEMPERATURE: 97.1 F | BODY MASS INDEX: 36.58 KG/M2 | WEIGHT: 276 LBS

## 2023-07-07 DIAGNOSIS — S90.221A SUBUNGUAL HEMATOMA OF TOE, RIGHT, INITIAL ENCOUNTER: ICD-10-CM

## 2023-07-07 DIAGNOSIS — I87.2 CHRONIC VENOUS INSUFFICIENCY: ICD-10-CM

## 2023-07-07 DIAGNOSIS — I87.2 STASIS DERMATITIS OF BOTH LEGS: ICD-10-CM

## 2023-07-07 DIAGNOSIS — E11.621 TYPE 2 DIABETES MELLITUS WITH FOOT ULCER (CODE) (HCC): ICD-10-CM

## 2023-07-07 DIAGNOSIS — L84 CALLUS: ICD-10-CM

## 2023-07-07 DIAGNOSIS — L97.322 ULCER OF LEFT ANKLE, WITH FAT LAYER EXPOSED (HCC): ICD-10-CM

## 2023-07-07 DIAGNOSIS — L89.523 STAGE III PRESSURE ULCER OF LEFT ANKLE (HCC): ICD-10-CM

## 2023-07-07 DIAGNOSIS — B35.1 DERMATOPHYTOSIS OF NAIL: ICD-10-CM

## 2023-07-07 DIAGNOSIS — M25.572 ACUTE LEFT ANKLE PAIN: Primary | ICD-10-CM

## 2023-07-07 PROCEDURE — 99203 OFFICE O/P NEW LOW 30 MIN: CPT | Performed by: PODIATRIST

## 2023-07-07 PROCEDURE — 11042 DBRDMT SUBQ TIS 1ST 20SQCM/<: CPT | Performed by: PODIATRIST

## 2023-07-07 PROCEDURE — 3051F HG A1C>EQUAL 7.0%<8.0%: CPT | Performed by: PODIATRIST

## 2023-07-07 PROCEDURE — 1123F ACP DISCUSS/DSCN MKR DOCD: CPT | Performed by: PODIATRIST

## 2023-07-07 ASSESSMENT — ENCOUNTER SYMPTOMS
BACK PAIN: 0
SHORTNESS OF BREATH: 0
NAUSEA: 0
VOMITING: 0

## 2023-07-10 ENCOUNTER — TELEPHONE (OUTPATIENT)
Dept: PAIN MANAGEMENT | Age: 68
End: 2023-07-10

## 2023-07-10 NOTE — TELEPHONE ENCOUNTER
[facility] called this morning and they scheduled him on 7/20/23. Is that going to be okay, because you wanted to see him Thursday 7/13/23?

## 2023-07-12 ENCOUNTER — OFFICE VISIT (OUTPATIENT)
Age: 68
End: 2023-07-12
Payer: MEDICARE

## 2023-07-12 VITALS — TEMPERATURE: 97.1 F | WEIGHT: 263 LBS | BODY MASS INDEX: 34.85 KG/M2 | HEIGHT: 73 IN

## 2023-07-12 DIAGNOSIS — L97.322 ULCER OF LEFT ANKLE, WITH FAT LAYER EXPOSED (HCC): Primary | ICD-10-CM

## 2023-07-12 DIAGNOSIS — E11.621 TYPE 2 DIABETES MELLITUS WITH FOOT ULCER (CODE) (HCC): ICD-10-CM

## 2023-07-12 PROCEDURE — 11042 DBRDMT SUBQ TIS 1ST 20SQCM/<: CPT | Performed by: PODIATRIST

## 2023-07-12 PROCEDURE — 99999 PR OFFICE/OUTPT VISIT,PROCEDURE ONLY: CPT | Performed by: PODIATRIST

## 2023-07-12 ASSESSMENT — ENCOUNTER SYMPTOMS
BACK PAIN: 0
VOMITING: 0
NAUSEA: 0
SHORTNESS OF BREATH: 0

## 2023-07-13 ENCOUNTER — OFFICE VISIT (OUTPATIENT)
Dept: CARDIOLOGY CLINIC | Age: 68
End: 2023-07-13
Payer: MEDICARE

## 2023-07-13 VITALS
DIASTOLIC BLOOD PRESSURE: 78 MMHG | HEIGHT: 73 IN | HEART RATE: 86 BPM | WEIGHT: 246 LBS | OXYGEN SATURATION: 97 % | SYSTOLIC BLOOD PRESSURE: 142 MMHG | BODY MASS INDEX: 32.6 KG/M2

## 2023-07-13 DIAGNOSIS — I25.2 HISTORY OF MYOCARDIAL INFARCTION: ICD-10-CM

## 2023-07-13 DIAGNOSIS — Z98.61 CAD S/P PERCUTANEOUS CORONARY ANGIOPLASTY: Primary | ICD-10-CM

## 2023-07-13 DIAGNOSIS — I10 ESSENTIAL HYPERTENSION: ICD-10-CM

## 2023-07-13 DIAGNOSIS — E66.01 CLASS 2 SEVERE OBESITY DUE TO EXCESS CALORIES WITH SERIOUS COMORBIDITY AND BODY MASS INDEX (BMI) OF 37.0 TO 37.9 IN ADULT (HCC): ICD-10-CM

## 2023-07-13 DIAGNOSIS — Z72.0 TOBACCO USE: ICD-10-CM

## 2023-07-13 DIAGNOSIS — I25.10 CAD S/P PERCUTANEOUS CORONARY ANGIOPLASTY: Primary | ICD-10-CM

## 2023-07-13 DIAGNOSIS — G47.33 OSA (OBSTRUCTIVE SLEEP APNEA): ICD-10-CM

## 2023-07-13 DIAGNOSIS — I73.9 PAD (PERIPHERAL ARTERY DISEASE) (HCC): ICD-10-CM

## 2023-07-13 DIAGNOSIS — I25.10 CORONARY ARTERY DISEASE INVOLVING NATIVE CORONARY ARTERY OF NATIVE HEART WITHOUT ANGINA PECTORIS: ICD-10-CM

## 2023-07-13 DIAGNOSIS — I73.9 CLAUDICATION (HCC): ICD-10-CM

## 2023-07-13 PROCEDURE — 99214 OFFICE O/P EST MOD 30 MIN: CPT | Performed by: INTERNAL MEDICINE

## 2023-07-13 PROCEDURE — 1123F ACP DISCUSS/DSCN MKR DOCD: CPT | Performed by: INTERNAL MEDICINE

## 2023-07-13 PROCEDURE — 3078F DIAST BP <80 MM HG: CPT | Performed by: INTERNAL MEDICINE

## 2023-07-13 PROCEDURE — 3077F SYST BP >= 140 MM HG: CPT | Performed by: INTERNAL MEDICINE

## 2023-07-13 NOTE — PROGRESS NOTES
of Paying Living Expenses: Not hard at all   Food Insecurity: No Food Insecurity    Worried About [place] in the Last Year: Never true    Ran Out of Food in the Last Year: Never true   Transportation Needs: Unknown    Lack of Transportation (Non-Medical): No   Physical Activity: Unknown    Days of Exercise per Week: Patient refused   Housing Stability: Unknown    Unstable Housing in the Last Year: No       Family History   Problem Relation Age of Onset    Breast Cancer Mother     Stroke Father     Colon Cancer Father 76       Current Outpatient Medications   Medication Sig Dispense Refill    allopurinol (ZYLOPRIM) 300 MG tablet Take 1 tablet by mouth daily 90 tablet 1    clopidogrel (PLAVIX) 75 MG tablet Take 1 tablet by mouth daily 90 tablet 1    metFORMIN (GLUCOPHAGE) 500 MG tablet Take 1 tablet by mouth 2 times daily (with meals) 180 tablet 1    carvedilol (COREG) 25 MG tablet Take 1 tablet by mouth 2 times daily 180 tablet 1    rosuvastatin (CRESTOR) 40 MG tablet Take 1 tablet by mouth nightly 90 tablet 1    torsemide (DEMADEX) 20 MG tablet Take 1 tablet by mouth daily 90 tablet 1    glimepiride (AMARYL) 1 MG tablet Take 1 tablet by mouth every morning (before breakfast) 90 tablet 1    Alcohol Swabs PADS DX: diabetes mellitus. Test 1 time(s) daily - Ok to substitute per insurance (1 each = 1 box) 100 each 5    blood glucose monitor strips DX: diabetes mellitus. Use 1 time(s) daily - True Metrix requested by patient - Serg Antony to substitute per insurance 100 strip 5    Lancets MISC DX: diabetes mellitus. Use 1 time(s) daily - Ok to substitute per insurance (1 each = 1 box) 100 each 5    acetaminophen (TYLENOL) 500 MG tablet Take 1 tablet by mouth every 6 hours as needed for Pain      VITAMIN D PO       Elastic Bandages & Supports (V-4 HIGH COMPRESSION HOSE) MISC KNEE HIGH - 20-30 mmHg 2 each 1    blood glucose monitor kit and supplies DX: diabetes mellitus.  Test 1 time(s) daily - True Metrix requested by

## 2023-07-20 ENCOUNTER — HOSPITAL ENCOUNTER (OUTPATIENT)
Dept: WOUND CARE | Age: 68
Discharge: HOME | End: 2023-07-20
Payer: MEDICARE

## 2023-07-20 DIAGNOSIS — L97.322 ULCER OF LEFT ANKLE, WITH FAT LAYER EXPOSED (HCC): ICD-10-CM

## 2023-07-20 PROCEDURE — 99213 OFFICE O/P EST LOW 20 MIN: CPT

## 2023-07-20 PROCEDURE — 11042 DBRDMT SUBQ TIS 1ST 20SQCM/<: CPT | Performed by: PODIATRIST

## 2023-07-20 PROCEDURE — 11042 DBRDMT SUBQ TIS 1ST 20SQCM/<: CPT

## 2023-07-20 RX ORDER — SODIUM CHLOR/HYPOCHLOROUS ACID 0.033 %
SOLUTION, IRRIGATION IRRIGATION ONCE
OUTPATIENT
Start: 2023-07-20 | End: 2023-07-20

## 2023-07-20 RX ORDER — CLOBETASOL PROPIONATE 0.5 MG/G
OINTMENT TOPICAL ONCE
OUTPATIENT
Start: 2023-07-20 | End: 2023-07-20

## 2023-07-20 RX ORDER — BETAMETHASONE DIPROPIONATE 0.05 %
OINTMENT (GRAM) TOPICAL ONCE
OUTPATIENT
Start: 2023-07-20 | End: 2023-07-20

## 2023-07-20 RX ORDER — IBUPROFEN 200 MG
TABLET ORAL ONCE
OUTPATIENT
Start: 2023-07-20 | End: 2023-07-20

## 2023-07-20 RX ORDER — LIDOCAINE 50 MG/G
OINTMENT TOPICAL ONCE
OUTPATIENT
Start: 2023-07-20 | End: 2023-07-20

## 2023-07-20 RX ORDER — LIDOCAINE HYDROCHLORIDE 20 MG/ML
JELLY TOPICAL ONCE
OUTPATIENT
Start: 2023-07-20 | End: 2023-07-20

## 2023-07-20 RX ORDER — GENTAMICIN SULFATE 1 MG/G
OINTMENT TOPICAL ONCE
OUTPATIENT
Start: 2023-07-20 | End: 2023-07-20

## 2023-07-20 RX ORDER — LIDOCAINE HYDROCHLORIDE 40 MG/ML
SOLUTION TOPICAL ONCE
OUTPATIENT
Start: 2023-07-20 | End: 2023-07-20

## 2023-07-20 RX ORDER — GINSENG 100 MG
CAPSULE ORAL ONCE
OUTPATIENT
Start: 2023-07-20 | End: 2023-07-20

## 2023-07-20 RX ORDER — LIDOCAINE 40 MG/G
CREAM TOPICAL ONCE
OUTPATIENT
Start: 2023-07-20 | End: 2023-07-20

## 2023-07-20 RX ORDER — BACITRACIN ZINC AND POLYMYXIN B SULFATE 500; 1000 [USP'U]/G; [USP'U]/G
OINTMENT TOPICAL ONCE
OUTPATIENT
Start: 2023-07-20 | End: 2023-07-20

## 2023-07-20 NOTE — PROGRESS NOTES
[address]                                                   Progress Note and Procedure Note      [facility]  MEDICAL RECORD NUMBER:  [id number]  AGE: 76 y.o. GENDER: male  : 1955  EPISODE DATE:  2023    Subjective:     Chief Complaint   Patient presents with    Wound Check     Left lower leg         HISTORY of PRESENT ILLNESS HPI     Anay Marino is a 76 y.o. male who presents today for wound/ulcer evaluation. History of Wound Context: Patient presents for continued treatment of a diabetic ulcer of the left lateral ankle. Patient reports compliance with applying topical antibiotic to the wound. He states he has been attempting to offload the wound with a pillow while he is in bed. Patient has not observed signs of infection. Patient reports having intermittent/transient episodes of pain. Patient denies nausea, vomiting, fever, chills, chest pain, or shortness of breath.      Wound/Ulcer Pain Timing/Severity: intermittent  Quality of pain: burning  Severity:  3  10   Modifying Factors: None  Associated Signs/Symptoms: none    Ulcer Identification:  Ulcer Type: diabetic  Contributing Factors: diabetes and chronic pressure    Wound:  Diabetic ulcer left ankle        PAST MEDICAL HISTORY        Diagnosis Date    Abscess of back 2020    Abscess of right leg 2020    Acute renal failure (HCC)     Anxiety     CAD S/P percutaneous coronary angioplasty 3/11/2014    Cardiomyopathy [facility])     Cerebrovascular accident (CVA) due to occlusion of left middle cerebral artery ([address]) 2016    brainstem infarction from left MCA occlusion    Cerebrovascular disease 2016    Colon polyp     Coronary angioplasty status     CVA (cerebral vascular accident) ([address]) 2017    Dependent edema 2017    Diplopia 5/15/2017    Resolved with management of cataracts    Dupuytren contracture 2018    Dysphagia 2011    Dysphonia 2011    Essential hypertension 2016

## 2023-07-20 NOTE — DISCHARGE INSTRUCTIONS
[address] [facility]   Physician Orders and Discharge Instructions  [facility], [address]  Telephone: [phone]      [phone]      NAME:  Ashley Carrasco          YOB: 1955  MEDICAL RECORD NUMBER:  [id number]    Your  is:  [address] Care/Facility: None    Wound Location: Left Lateral Ankle    Dressing orders:. 1. Cleanse wound(s) with normal saline. 2. Apply dry DASHA  Or equivalent to wound bed. 3. Moisten DASHA with a few drops of normal saline. 4. Cover with 2x2 or 4x4's and dry dressing  5. Change  Every other day Tuesday, Thursday and Saturday     Compression: Wear your own compression stockings on the right and left legs    Offloading Device:    Other Instructions:     Keep all dressings clean, dry and intact. Keep pressure off the wound(s) at all times. Follow up visit   2 Weeks August 3, 2023 @ 3:45    Please give 24 hour notice if unable to keep appointment. [phone]    If you experience any of the following, please call the Wound Care Service at  [phone] or go to the nearest emergency room. *Increase in pain *Temperature over 101 *Increase in drainage from your wound or a foul odor  *Uncontrolled swelling *Need for compression bandage changes due to slippage, breakthrough drainage       PLEASE NOTE: IF YOU ARE UNABLE TO OBTAIN WOUND SUPPLIES, CONTINUE TO USE THE SUPPLIES YOU HAVE AVAILABLE UNTIL YOU ARE ABLE TO REACH US.  IT IS MOST IMPORTANT TO KEEP THE WOUND COVERED AT ALL TIMES

## 2023-07-20 NOTE — FLOWSHEET NOTE
[address] [place] [address] West:     [employer] [id number] [facility] p: [phone] f: [phone]     Ordering Center:     [address] [place] [address]  [phone]  WOUND CARE [phone]  FAX NUMBER [phone]    Patient Information:      Ernst Meza  [facility]  [address] [place] [address],Fourth Floor [address]   [phone]   : 1955  AGE: 76 y.o. GENDER: male   TODAYS DATE:  2023    Insurance:      PRIMARY INSURANCE:  Plan: [employer] MEDICARE ADVANTAGE  Coverage: [facility] MEDICARE  Effective Date: 2023  [id number] - (Medicare Managed)    SECONDARY INSURANCE:  Plan:   Coverage:   Effective Date:   [unfilled]    [unfilled]   [unfilled]     Patient Wound Information:      Problem List Items Addressed This Visit          Other    Ulcer of left ankle, with fat layer exposed ([address])       WOUNDS REQUIRING DRESSING SUPPLIES:     Wound 23 Ankle Left;Lateral #1 Left lateral ankle (Active)   Wound Image   23 1405   Wound Etiology Other 23 140   Wound Cleansed Cleansed with saline 23 140   Wound Length (cm) 1.5 cm 23 140   Wound Width (cm) 0.9 cm 23 140   Wound Depth (cm) 0.1 cm 23 1405   Wound Surface Area (cm^2) 1.35 cm^2 23 1405   Wound Volume (cm^3) 0.135 cm^3 23 1405   Post-Procedure Length (cm) 2 cm 23 1443   Post-Procedure Width (cm) 1.9 cm 23 1443   Post-Procedure Depth (cm) 0.3 cm 23 1443   Post-Procedure Surface Area (cm^2) 3.8 cm^2 23 1443   Post-Procedure Volume (cm^3) 1.14 cm^3 23 1443   Wound Assessment Dry;Devitalized tissue 23 1405   Drainage Amount Moderate 23 1448   Drainage Description Serosanguinous 23 1448   Odor None 23 1405   Gisela-wound Assessment Dry/flaky; Hyperkeratosis (callous) 23 1405   Margins Defined edges 23 1405   Wound Thickness Description not for Pressure Injury Partial thickness

## 2023-07-27 ENCOUNTER — TELEPHONE (OUTPATIENT)
Dept: PODIATRY | Age: 68
End: 2023-07-27

## 2023-07-27 NOTE — TELEPHONE ENCOUNTER
Patient called regarding that Prevalon style offloading boot. He says that his wife looked on [address] [place] [address] and there was a lot of options and they were not sure which one to get, they are asking for guidance on this.

## 2023-08-03 ENCOUNTER — HOSPITAL ENCOUNTER (OUTPATIENT)
Dept: WOUND CARE | Age: 68
Discharge: HOME | End: 2023-08-03
Payer: MEDICARE

## 2023-08-03 VITALS
RESPIRATION RATE: 16 BRPM | TEMPERATURE: 98.4 F | DIASTOLIC BLOOD PRESSURE: 71 MMHG | SYSTOLIC BLOOD PRESSURE: 121 MMHG | HEART RATE: 95 BPM

## 2023-08-03 DIAGNOSIS — L97.322 ULCER OF LEFT ANKLE, WITH FAT LAYER EXPOSED (HCC): Primary | ICD-10-CM

## 2023-08-03 DIAGNOSIS — E11.622 TYPE 2 DIABETES MELLITUS WITH OTHER SKIN ULCER (CODE) (HCC): ICD-10-CM

## 2023-08-03 PROCEDURE — 11042 DBRDMT SUBQ TIS 1ST 20SQCM/<: CPT | Performed by: PODIATRIST

## 2023-08-03 PROCEDURE — 6370000000 HC RX 637 (ALT 250 FOR IP): Performed by: PODIATRIST

## 2023-08-03 PROCEDURE — 11042 DBRDMT SUBQ TIS 1ST 20SQCM/<: CPT

## 2023-08-03 RX ORDER — LIDOCAINE HYDROCHLORIDE 20 MG/ML
JELLY TOPICAL ONCE
OUTPATIENT
Start: 2023-08-03 | End: 2023-08-03

## 2023-08-03 RX ORDER — IBUPROFEN 200 MG
TABLET ORAL ONCE
OUTPATIENT
Start: 2023-08-03 | End: 2023-08-03

## 2023-08-03 RX ORDER — LIDOCAINE HYDROCHLORIDE 40 MG/ML
SOLUTION TOPICAL ONCE
OUTPATIENT
Start: 2023-08-03 | End: 2023-08-03

## 2023-08-03 RX ORDER — SODIUM CHLOR/HYPOCHLOROUS ACID 0.033 %
SOLUTION, IRRIGATION IRRIGATION ONCE
OUTPATIENT
Start: 2023-08-03 | End: 2023-08-03

## 2023-08-03 RX ORDER — GENTAMICIN SULFATE 1 MG/G
OINTMENT TOPICAL ONCE
OUTPATIENT
Start: 2023-08-03 | End: 2023-08-03

## 2023-08-03 RX ORDER — LIDOCAINE 50 MG/G
OINTMENT TOPICAL ONCE
OUTPATIENT
Start: 2023-08-03 | End: 2023-08-03

## 2023-08-03 RX ORDER — CLOBETASOL PROPIONATE 0.5 MG/G
OINTMENT TOPICAL ONCE
OUTPATIENT
Start: 2023-08-03 | End: 2023-08-03

## 2023-08-03 RX ORDER — LIDOCAINE 40 MG/G
CREAM TOPICAL ONCE
OUTPATIENT
Start: 2023-08-03 | End: 2023-08-03

## 2023-08-03 RX ORDER — BETAMETHASONE DIPROPIONATE 0.05 %
OINTMENT (GRAM) TOPICAL ONCE
OUTPATIENT
Start: 2023-08-03 | End: 2023-08-03

## 2023-08-03 RX ORDER — LIDOCAINE 40 MG/G
CREAM TOPICAL ONCE
Status: COMPLETED | OUTPATIENT
Start: 2023-08-03 | End: 2023-08-03

## 2023-08-03 RX ORDER — GINSENG 100 MG
CAPSULE ORAL ONCE
OUTPATIENT
Start: 2023-08-03 | End: 2023-08-03

## 2023-08-03 RX ORDER — BACITRACIN ZINC AND POLYMYXIN B SULFATE 500; 1000 [USP'U]/G; [USP'U]/G
OINTMENT TOPICAL ONCE
OUTPATIENT
Start: 2023-08-03 | End: 2023-08-03

## 2023-08-03 RX ADMIN — LIDOCAINE 4%: 4 CREAM TOPICAL at 16:06

## 2023-08-03 NOTE — DISCHARGE INSTRUCTIONS
[address] [facility]   Physician Orders and Discharge Instructions  [facility], [address]  Telephone: [phone]      [phone]        NAME:  Jalil Pearson                                                                                                YOB: 1955  MEDICAL RECORD NUMBER:  [id number]     Your  is:  Tobin Coates Care/Facility: None     Wound Location: Left Lateral Ankle     Dressing orders:. 1. Cleanse wound(s) with normal saline. 2. Apply dry DASHA  Or equivalent to wound bed. 3. Moisten DASHA with a few drops of normal saline. 4. Cover with 2x2 or 4x4's and dry dressing  5. Change  Every other day Tuesday, Thursday and Saturday      Compression:None     Offloading Device: Felt pad applied to left ankle by Dr. Alley Mcadams. Dr. Olga Stanford office ph  [phone] - call if need to been seen sooner than 8/24/23. Wear a prevalon boot to the left foot when not ambulating. Other Instructions:      Keep all dressings clean, dry and intact. Keep pressure off the wound(s) at all times. Follow up visit   3 Weeks August 24, 2023 @ 2:15      Please give 24 hour notice if unable to keep appointment. [phone]     If you experience any of the following, please call the Wound Care Service at  [phone] or go to the nearest emergency room. *Increase in pain         *Temperature over 101           *Increase in drainage from your wound or a foul odor  *Uncontrolled swelling            *Need for compression bandage changes due to slippage, breakthrough drainage       PLEASE NOTE: IF YOU ARE UNABLE TO OBTAIN WOUND SUPPLIES, CONTINUE TO USE THE SUPPLIES YOU HAVE AVAILABLE UNTIL YOU ARE ABLE TO REACH US.  IT IS MOST IMPORTANT TO KEEP THE WOUND COVERED AT ALL TIMES

## 2023-08-03 NOTE — FLOWSHEET NOTE
[address] [place] [address] West:     [employer] [id number] [facility] p: [phone] f: [phone]     Ordering Center:     [address] [place] [address]  Yusuf Albert [address]  [phone]  WOUND CARE [phone]  FAX NUMBER [phone]    Patient Information:      Charo Flores  [address],Fourth [address] [facility]   [phone]   : 1955  AGE: 76 y.o.      GENDER: male   TODAYS DATE:  8/3/2023    Insurance:      PRIMARY INSURANCE:  Plan: [employer] MEDICARE ADVANTAGE  Coverage: [facility] MEDICARE  Effective Date: 2023  [id number] - (Medicare Managed)    SECONDARY INSURANCE:  Plan:   Coverage:   Effective Date:   [unfilled]    [unfilled]   [unfilled]     Patient Wound Information:      Problem List Items Addressed This Visit          Endocrine    Type 2 diabetes mellitus with other skin ulcer (CODE) ([address])       Other    Ulcer of left ankle, with fat layer exposed ([address]) - Primary    Relevant Orders    Initiate Outpatient Wound Care Protocol       WOUNDS REQUIRING DRESSING SUPPLIES:     Wound 23 Ankle Left;Lateral #1 Left lateral ankle (Active)   Wound Image    23 1558   Wound Etiology Other 23 1558   Wound Cleansed Cleansed with saline 23 1558   Dressing/Treatment Collagen with Ag;Dry dressing 23 1504   Wound Length (cm) 1.5 cm 23 1558   Wound Width (cm) 1.3 cm 23 1558   Wound Depth (cm) 0.1 cm 23 1558   Wound Surface Area (cm^2) 1.95 cm^2 23 1558   Change in Wound Size % (l*w) -44.44 23 1558   Wound Volume (cm^3) 0.195 cm^3 23 1558   Wound Healing % -44 23 1558   Post-Procedure Length (cm) 1.5 cm 23 1624   Post-Procedure Width (cm) 1.3 cm 23 1624   Post-Procedure Depth (cm) 0.35 cm 23 1624   Post-Procedure Surface Area (cm^2) 1.95 cm^2 23 1624   Post-Procedure Volume (cm^3) 0.6825 cm^3 23 1624   Wound Assessment [place]/red;Slough 23 8201

## 2023-08-03 NOTE — PROGRESS NOTES
[address] [facility]                                                   Progress Note and Procedure Note      [facility]  MEDICAL RECORD NUMBER:  [id number]  AGE: 76 y.o. GENDER: male  : 1955  EPISODE DATE:  8/3/2023    Subjective:     Chief Complaint   Patient presents with    Wound Check         HISTORY of PRESENT ILLNESS GEMA Reese is a 76 y.o. male who presents today for wound/ulcer evaluation. History of Wound Context: Patient presents for continued local wound care for a chronic diabetic ulceration of the left lateral ankle. Patient denies observing signs of infection. They do report that the gauze dressing was sticking to the wound bed right currently have been using a nonstick dressing. This is causing less discomfort with dressing changes. Patient denies nausea, vomiting, fever, chills, chest pain, or shortness of breath.      Wound/Ulcer Pain Timing/Severity: Intermittent  Quality of pain: burning  Severity:  6 / 10   Modifying Factors: None  Associated Signs/Symptoms: drainage and pain    Ulcer Identification:  Ulcer Type: diabetic  Contributing Factors: diabetes, chronic pressure, and arterial insufficiency    Wound:  Diabetic ulcer left lateral ankle        PAST MEDICAL HISTORY        Diagnosis Date    Abscess of back 2020    Abscess of right leg 2020    Acute renal failure (HCC)     Anxiety     CAD S/P percutaneous coronary angioplasty 3/11/2014    Cardiomyopathy [facility])     Cerebrovascular accident (CVA) due to occlusion of left middle cerebral artery ([address]) 2016    brainstem infarction from left MCA occlusion    Cerebrovascular disease 2016    Colon polyp     Coronary angioplasty status     CVA (cerebral vascular accident) ([address]) 2017    Dependent edema 2017    Diplopia 5/15/2017    Resolved with management of cataracts    Dupuytren contracture 2018    Dysphagia 2011    Dysphonia 2011    Essential hypertension 2016

## 2023-08-03 NOTE — PLAN OF CARE
Problem: Chronic Conditions and Co-morbidities  Goal: Patient's chronic conditions and co-morbidity symptoms are monitored and maintained or improved  Outcome: Progressing     Problem: Chronic Conditions and Co-morbidities  Goal: Patient's chronic conditions and co-morbidity symptoms are monitored and maintained or improved  Outcome: Progressing     Problem: Cognitive:  Goal: Knowledge of wound care  Description: Knowledge of wound care  Outcome: Progressing

## 2023-08-14 ENCOUNTER — HOSPITAL ENCOUNTER (OUTPATIENT)
Dept: LAB | Age: 68
Discharge: HOME | End: 2023-08-14
Payer: MEDICARE

## 2023-08-14 DIAGNOSIS — M10.9 GOUT OF MULTIPLE SITES, UNSPECIFIED CAUSE, UNSPECIFIED CHRONICITY: Chronic | ICD-10-CM

## 2023-08-14 DIAGNOSIS — R80.9 MICROALBUMINURIA DUE TO TYPE 2 DIABETES MELLITUS (HCC): Chronic | ICD-10-CM

## 2023-08-14 DIAGNOSIS — E11.29 MICROALBUMINURIA DUE TO TYPE 2 DIABETES MELLITUS (HCC): Chronic | ICD-10-CM

## 2023-08-14 DIAGNOSIS — I25.10 CORONARY ARTERY DISEASE INVOLVING NATIVE CORONARY ARTERY OF NATIVE HEART WITHOUT ANGINA PECTORIS: ICD-10-CM

## 2023-08-14 DIAGNOSIS — I10 ESSENTIAL HYPERTENSION: Chronic | ICD-10-CM

## 2023-08-14 DIAGNOSIS — E11.42 TYPE 2 DIABETES MELLITUS WITH DIABETIC POLYNEUROPATHY, WITHOUT LONG-TERM CURRENT USE OF INSULIN (HCC): Chronic | ICD-10-CM

## 2023-08-14 DIAGNOSIS — E78.2 MIXED HYPERLIPIDEMIA: ICD-10-CM

## 2023-08-14 DIAGNOSIS — D75.89 MACROCYTOSIS WITHOUT ANEMIA: Chronic | ICD-10-CM

## 2023-08-14 DIAGNOSIS — F10.10 EXCESSIVE DRINKING OF ALCOHOL: ICD-10-CM

## 2023-08-14 DIAGNOSIS — E66.01 CLASS 2 SEVERE OBESITY DUE TO EXCESS CALORIES WITH SERIOUS COMORBIDITY AND BODY MASS INDEX (BMI) OF 37.0 TO 37.9 IN ADULT (HCC): ICD-10-CM

## 2023-08-14 DIAGNOSIS — I73.9 PAD (PERIPHERAL ARTERY DISEASE) (HCC): ICD-10-CM

## 2023-08-14 LAB
ALBUMIN SERPL-MCNC: 4.4 G/DL (ref 3.5–4.6)
ALP SERPL-CCNC: 68 U/L (ref 35–104)
ALT SERPL-CCNC: 34 U/L (ref 0–41)
ANION GAP SERPL CALCULATED.3IONS-SCNC: 15 MEQ/L (ref 9–15)
ANISOCYTOSIS BLD QL SMEAR: ABNORMAL
AST SERPL-CCNC: 37 U/L (ref 0–40)
BASOPHILS # BLD: 0 K/UL (ref 0–0.2)
BASOPHILS NFR BLD: 0.6 %
BILIRUB SERPL-MCNC: 0.6 MG/DL (ref 0.2–0.7)
BUN SERPL-MCNC: 15 MG/DL (ref 8–23)
CALCIUM SERPL-MCNC: 9.6 MG/DL (ref 8.5–9.9)
CHLORIDE SERPL-SCNC: 98 MEQ/L (ref 95–107)
CHOLEST SERPL-MCNC: 102 MG/DL (ref 0–199)
CO2 SERPL-SCNC: 27 MEQ/L (ref 20–31)
CREAT SERPL-MCNC: 1.17 MG/DL (ref 0.7–1.2)
EOSINOPHIL # BLD: 0.1 K/UL (ref 0–0.7)
EOSINOPHIL NFR BLD: 1.5 %
ERYTHROCYTE [DISTWIDTH] IN BLOOD BY AUTOMATED COUNT: 14.6 % (ref 11.5–14.5)
GLOBULIN SER CALC-MCNC: 3.3 G/DL (ref 2.3–3.5)
GLUCOSE SERPL-MCNC: 174 MG/DL (ref 70–99)
HBA1C MFR BLD: 7.6 % (ref 4.8–5.9)
HCT VFR BLD AUTO: 43.3 % (ref 42–52)
HDLC SERPL-MCNC: 40 MG/DL (ref 40–59)
HGB BLD-MCNC: 14.7 G/DL (ref 14–18)
LDLC SERPL CALC-MCNC: 38 MG/DL (ref 0–129)
LYMPHOCYTES # BLD: 1.5 K/UL (ref 1–4.8)
LYMPHOCYTES NFR BLD: 29.1 %
MACROCYTES BLD QL SMEAR: ABNORMAL
MCH RBC QN AUTO: 36 PG (ref 27–31.3)
MCHC RBC AUTO-ENTMCNC: 34 % (ref 33–37)
MCV RBC AUTO: 105.8 FL (ref 79–92.2)
MONOCYTES # BLD: 0.5 K/UL (ref 0.2–0.8)
MONOCYTES NFR BLD: 9 %
NEUTROPHILS # BLD: 3 K/UL (ref 1.4–6.5)
NEUTS SEG NFR BLD: 59.8 %
PLATELET # BLD AUTO: 190 K/UL (ref 130–400)
PLATELET BLD QL SMEAR: ADEQUATE
POTASSIUM SERPL-SCNC: 4.2 MEQ/L (ref 3.4–4.9)
PROT SERPL-MCNC: 7.7 G/DL (ref 6.3–8)
RBC # BLD AUTO: 4.09 M/UL (ref 4.7–6.1)
SLIDE REVIEW: ABNORMAL
SODIUM SERPL-SCNC: 140 MEQ/L (ref 135–144)
STOMATOCYTES BLD QL SMEAR: ABNORMAL
TRIGL SERPL-MCNC: 120 MG/DL (ref 0–150)
URATE SERPL-MCNC: 3.8 MG/DL (ref 3.4–7)
WBC # BLD AUTO: 5.1 K/UL (ref 4.8–10.8)

## 2023-08-14 PROCEDURE — 82607 VITAMIN B-12: CPT

## 2023-08-14 PROCEDURE — 80061 LIPID PANEL: CPT

## 2023-08-14 PROCEDURE — 84550 ASSAY OF BLOOD/URIC ACID: CPT

## 2023-08-14 PROCEDURE — 80053 COMPREHEN METABOLIC PANEL: CPT

## 2023-08-14 PROCEDURE — 85025 COMPLETE CBC W/AUTO DIFF WBC: CPT

## 2023-08-14 PROCEDURE — 82746 ASSAY OF FOLIC ACID SERUM: CPT

## 2023-08-14 PROCEDURE — 83036 HEMOGLOBIN GLYCOSYLATED A1C: CPT

## 2023-08-14 PROCEDURE — 36415 COLL VENOUS BLD VENIPUNCTURE: CPT

## 2023-08-15 LAB
FOLATE: >20 NG/ML
VITAMIN B-12: 1644 PG/ML (ref 232–1245)

## 2023-08-17 NOTE — ED NOTES
Bed assigned. Sec called for 15 min.  No tele ordered     Rosaura Clement RN  11/01/22 5635 Patient discharging home with family. AVS gone over with patient in room and prescriptions given to patient. ACE bandage removed per order and camisole placed. Drain teaching completed with demonstrated understanding. Supplies and drain handout given to patient. PIV removed. All questions answered.

## 2023-08-21 ENCOUNTER — OFFICE VISIT (OUTPATIENT)
Dept: FAMILY MEDICINE CLINIC | Age: 68
End: 2023-08-21
Payer: MEDICARE

## 2023-08-21 VITALS
DIASTOLIC BLOOD PRESSURE: 76 MMHG | WEIGHT: 268 LBS | OXYGEN SATURATION: 98 % | SYSTOLIC BLOOD PRESSURE: 132 MMHG | HEART RATE: 74 BPM | TEMPERATURE: 98.8 F | BODY MASS INDEX: 35.36 KG/M2

## 2023-08-21 DIAGNOSIS — I73.9 PAD (PERIPHERAL ARTERY DISEASE) (HCC): ICD-10-CM

## 2023-08-21 DIAGNOSIS — E78.2 MIXED HYPERLIPIDEMIA: ICD-10-CM

## 2023-08-21 DIAGNOSIS — E11.29 MICROALBUMINURIA DUE TO TYPE 2 DIABETES MELLITUS (HCC): Chronic | ICD-10-CM

## 2023-08-21 DIAGNOSIS — M10.9 GOUT OF MULTIPLE SITES, UNSPECIFIED CAUSE, UNSPECIFIED CHRONICITY: Chronic | ICD-10-CM

## 2023-08-21 DIAGNOSIS — E11.65 TYPE 2 DIABETES MELLITUS WITH HYPERGLYCEMIA, WITHOUT LONG-TERM CURRENT USE OF INSULIN (HCC): Primary | ICD-10-CM

## 2023-08-21 DIAGNOSIS — I25.10 CORONARY ARTERY DISEASE INVOLVING NATIVE CORONARY ARTERY OF NATIVE HEART WITHOUT ANGINA PECTORIS: ICD-10-CM

## 2023-08-21 DIAGNOSIS — R80.9 MICROALBUMINURIA DUE TO TYPE 2 DIABETES MELLITUS (HCC): Chronic | ICD-10-CM

## 2023-08-21 DIAGNOSIS — E66.01 CLASS 2 SEVERE OBESITY DUE TO EXCESS CALORIES WITH SERIOUS COMORBIDITY AND BODY MASS INDEX (BMI) OF 35.0 TO 35.9 IN ADULT (HCC): ICD-10-CM

## 2023-08-21 DIAGNOSIS — E11.622 TYPE 2 DIABETES MELLITUS WITH OTHER SKIN ULCER (CODE) (HCC): ICD-10-CM

## 2023-08-21 DIAGNOSIS — I10 ESSENTIAL HYPERTENSION: Chronic | ICD-10-CM

## 2023-08-21 PROCEDURE — 3078F DIAST BP <80 MM HG: CPT | Performed by: FAMILY MEDICINE

## 2023-08-21 PROCEDURE — 3075F SYST BP GE 130 - 139MM HG: CPT | Performed by: FAMILY MEDICINE

## 2023-08-21 PROCEDURE — 1123F ACP DISCUSS/DSCN MKR DOCD: CPT | Performed by: FAMILY MEDICINE

## 2023-08-21 PROCEDURE — 3051F HG A1C>EQUAL 7.0%<8.0%: CPT | Performed by: FAMILY MEDICINE

## 2023-08-21 PROCEDURE — 99214 OFFICE O/P EST MOD 30 MIN: CPT | Performed by: FAMILY MEDICINE

## 2023-08-21 ASSESSMENT — ENCOUNTER SYMPTOMS
CONSTIPATION: 0
ABDOMINAL PAIN: 0
RECTAL PAIN: 0
BLOOD IN STOOL: 0
ABDOMINAL DISTENTION: 0
CHEST TIGHTNESS: 0
NAUSEA: 0
VOMITING: 0
WHEEZING: 0
DIARRHEA: 0
SHORTNESS OF BREATH: 0
ANAL BLEEDING: 0

## 2023-08-21 NOTE — ASSESSMENT & PLAN NOTE
No reported claudication pain. Patient instructed to continue with current dose of Plavix and rosuvastatin.

## 2023-08-21 NOTE — ASSESSMENT & PLAN NOTE
Most recent lipid panel at goal control. Patient instructed to continue with current dose of rosuvastatin.

## 2023-08-21 NOTE — ASSESSMENT & PLAN NOTE
Patient established with wound care/podiatry. Instructions given to follow recommendations of the specialist for management.

## 2023-08-21 NOTE — ASSESSMENT & PLAN NOTE
No reported gout attacks since most recent visit.   Most recent uric acid level at goal.  Patient instructed to continue with current dose of allopurinol

## 2023-08-21 NOTE — ASSESSMENT & PLAN NOTE
Currently asymptomatic. Most recent lipid panel at goal control. Patient instructed to continue with current dose of rosuvastatin.

## 2023-08-21 NOTE — PROGRESS NOTES
Ulysses Diaz (: 1955) is a 76 y.o. male, Established patient, who presents today for:    Chief Complaint   Patient presents with    Follow-up     Discuss labs         ASSESSMENT/PLAN    1. Type 2 diabetes mellitus with hyperglycemia, without long-term current use of insulin (HCC)  Assessment & Plan:  Blood glucose above goal control based on most recent A1c and home glucose values. I stressed with patient the importance of being more aggressive with diabetes management, particularly in light of recently noted left lower extremity ulceration. Patient agrees to start Trulicity for further blood glucose management. He will discontinue glimepiride moving forward. We will continue titrating Trulicity dosing over time at subsequent visits. Orders:  -     Hemoglobin A1C; Future  -     Comprehensive Metabolic Panel; Future  -     Dulaglutide 0.75 MG/0.5ML SOPN; Inject 0.75 mg into the skin once a week, Disp-2 mL, R-2Normal  2. Type 2 diabetes mellitus with other skin ulcer (CODE) ([address])  Assessment & Plan:  Patient established with wound care/podiatry. Instructions given to follow recommendations of the specialist for management. 3. Microalbuminuria due to type 2 diabetes mellitus (HCC)  -     Hemoglobin A1C; Future  -     Comprehensive Metabolic Panel; Future  4. Essential hypertension  Assessment & Plan:  Blood pressure within normal limits today in the office. Patient instructed to continue with current dose of carvedilol  Orders:  -     Comprehensive Metabolic Panel; Future  5. Mixed hyperlipidemia  Assessment & Plan:  Most recent lipid panel at goal control. Patient instructed to continue with current dose of rosuvastatin. Orders:  -     Comprehensive Metabolic Panel; Future  6. Coronary artery disease involving native coronary artery of native heart without angina pectoris  Assessment & Plan:  Currently asymptomatic. Most recent lipid panel at goal control.   Patient instructed to continue with

## 2023-08-21 NOTE — ASSESSMENT & PLAN NOTE
Blood glucose above goal control based on most recent A1c and home glucose values. I stressed with patient the importance of being more aggressive with diabetes management, particularly in light of recently noted left lower extremity ulceration. Patient agrees to start Trulicity for further blood glucose management. He will discontinue glimepiride moving forward. We will continue titrating Trulicity dosing over time at subsequent visits.

## 2023-08-24 ENCOUNTER — TELEPHONE (OUTPATIENT)
Dept: PHARMACY | Facility: CLINIC | Age: 68
End: 2023-08-24

## 2023-08-24 NOTE — TELEPHONE ENCOUNTER
[facility] CLINICAL PHARMACY: ADHERENCE REVIEW  Identified care gap per [employer] fills at [facility]: Diabetes and Statin adherence    ASSESSMENT  DIABETES ADHERENCE    Insurance Records claims through 08/14/2023 (Prior Year [address] = not reported; [address] = 100%; Potential Fail Date: 12/24/23): Glimepiride 1 mg tablet last filled on 6/5/23 for 90 day supply. Next refill due: 9/3/23. - Note discontinued 7/84/09 and Trulicity started. Metformin 500 mg tablets last filled on 7/28/23 for 90 day supply. Next refill due: 05/01/93  Trulicity last filled on 8/22/23 for 28 day supply. Next refill due: 9/19/23    Lab Results   Component Value Date    LABA1C 7.6 (H) 08/14/2023    LABA1C 7.1 (H) 04/12/2023    LABA1C 7.7 (H) 11/02/2022     NOTE: A1c <9%    [facility] Records claims through 08/14/2023 (Prior Year PDC = not reported; [facility] [address] [place] [address] = 81%; Potential Fail Date: 9/9/23): Rosuvastatin 40 mg tablets last filled on 4/18/23 for 90 day supply. Next refill due: 7/17/23    Per chart review, 1 refill remaining    Lab Results   Component Value Date    CHOL 102 08/14/2023    TRIG 120 08/14/2023    HDL 40 08/14/2023    LDLCALC 38 08/14/2023     ALT   Date Value Ref Range Status   08/14/2023 34 0 - 41 U/L Final     AST   Date Value Ref Range Status   08/14/2023 37 0 - 40 U/L Final     The ASCVD Risk score ([place] DK, et al., 2019) failed to calculate for the following reasons: The valid total cholesterol range is 130 to 320 mg/dL     PLAN  The following are interventions that have been identified:   Patient overdue refilling rosuvastatin and active on home medication list. Per chart review, appears should have refill on file at [facility] pharmacy    Attempting to reach patient to review. Left message asking for return call. Jammcardhart message sent to patient.      Last Visit: 8/21/23    Future Appointments   Date Time Provider [address]   8/31/2023  3:15 PM Gabby Baker, [address] [place] [address]

## 2023-08-28 ENCOUNTER — PATIENT MESSAGE (OUTPATIENT)
Dept: FAMILY MEDICINE CLINIC | Age: 68
End: 2023-08-28

## 2023-08-28 NOTE — TELEPHONE ENCOUNTER
From: Kristal Diaz  To: Dr. Moustapha Baker: 8/28/2023 5:23 PM EDT  Subject: new rx dulaglutide ? DDM gave me Trulicity; is this generic & or is there a generic? Cost $40.00 a month; up from $5.00 for 3 months for glimepiride! Surprised by cost & that's with insurance. Also am I only patient you have on this, or are there others? It has to be refrigerated you didn't mention nor DDM. Mireya Pina Some of side effects gave me pause; pancreas & thyroid cancers. What do you think?  Thanks in advance, Kristal [place].

## 2023-08-31 ENCOUNTER — HOSPITAL ENCOUNTER (OUTPATIENT)
Dept: WOUND CARE | Age: 68
Discharge: HOME | End: 2023-08-31
Payer: MEDICARE

## 2023-08-31 VITALS
RESPIRATION RATE: 16 BRPM | TEMPERATURE: 97.5 F | DIASTOLIC BLOOD PRESSURE: 65 MMHG | SYSTOLIC BLOOD PRESSURE: 115 MMHG | HEART RATE: 100 BPM

## 2023-08-31 DIAGNOSIS — L97.322 ULCER OF LEFT ANKLE, WITH FAT LAYER EXPOSED (HCC): Primary | ICD-10-CM

## 2023-08-31 PROCEDURE — 6370000000 HC RX 637 (ALT 250 FOR IP): Performed by: PODIATRIST

## 2023-08-31 PROCEDURE — 11042 DBRDMT SUBQ TIS 1ST 20SQCM/<: CPT | Performed by: PODIATRIST

## 2023-08-31 PROCEDURE — 11042 DBRDMT SUBQ TIS 1ST 20SQCM/<: CPT

## 2023-08-31 RX ORDER — LIDOCAINE 40 MG/G
CREAM TOPICAL ONCE
Status: COMPLETED | OUTPATIENT
Start: 2023-08-31 | End: 2023-08-31

## 2023-08-31 RX ORDER — GENTAMICIN SULFATE 1 MG/G
OINTMENT TOPICAL ONCE
OUTPATIENT
Start: 2023-08-31 | End: 2023-08-31

## 2023-08-31 RX ORDER — BACITRACIN ZINC AND POLYMYXIN B SULFATE 500; 1000 [USP'U]/G; [USP'U]/G
OINTMENT TOPICAL ONCE
OUTPATIENT
Start: 2023-08-31 | End: 2023-08-31

## 2023-08-31 RX ORDER — GINSENG 100 MG
CAPSULE ORAL ONCE
OUTPATIENT
Start: 2023-08-31 | End: 2023-08-31

## 2023-08-31 RX ORDER — LIDOCAINE 40 MG/G
CREAM TOPICAL ONCE
OUTPATIENT
Start: 2023-08-31 | End: 2023-08-31

## 2023-08-31 RX ORDER — IBUPROFEN 200 MG
TABLET ORAL ONCE
OUTPATIENT
Start: 2023-08-31 | End: 2023-08-31

## 2023-08-31 RX ORDER — LIDOCAINE HYDROCHLORIDE 20 MG/ML
JELLY TOPICAL ONCE
OUTPATIENT
Start: 2023-08-31 | End: 2023-08-31

## 2023-08-31 RX ORDER — SODIUM CHLOR/HYPOCHLOROUS ACID 0.033 %
SOLUTION, IRRIGATION IRRIGATION ONCE
OUTPATIENT
Start: 2023-08-31 | End: 2023-08-31

## 2023-08-31 RX ORDER — BETAMETHASONE DIPROPIONATE 0.05 %
OINTMENT (GRAM) TOPICAL ONCE
OUTPATIENT
Start: 2023-08-31 | End: 2023-08-31

## 2023-08-31 RX ORDER — LIDOCAINE HYDROCHLORIDE 40 MG/ML
SOLUTION TOPICAL ONCE
OUTPATIENT
Start: 2023-08-31 | End: 2023-08-31

## 2023-08-31 RX ORDER — CLOBETASOL PROPIONATE 0.5 MG/G
OINTMENT TOPICAL ONCE
OUTPATIENT
Start: 2023-08-31 | End: 2023-08-31

## 2023-08-31 RX ORDER — LIDOCAINE 50 MG/G
OINTMENT TOPICAL ONCE
OUTPATIENT
Start: 2023-08-31 | End: 2023-08-31

## 2023-08-31 RX ADMIN — LIDOCAINE 4%: 4 CREAM TOPICAL at 16:04

## 2023-08-31 NOTE — DISCHARGE INSTRUCTIONS
[address] [facility]   Physician Orders and Discharge Instructions  [facility], [address]  Telephone: [phone]      [phone]        NAME:  Nicole Schwab                                                                                                YOB: 1955  MEDICAL RECORD NUMBER:  [id number]     Your  is:  Tobin Coates Care/Facility: None     Wound Location: Left Lateral Ankle     Dressing orders:. 1. Cleanse wound(s) with normal saline. 2. Apply dry DASHA  Or equivalent to wound bed. 3. Moisten DASHA with a few drops of normal saline. 4. Cover with 2x2 or 4x4's and dry dressing  5. Change  Every other day Tuesday, Thursday and Saturday      Compression: May wear your own compression stockings     Offloading Device:     Other Instructions:an Order was placed today  by Dr. Augustus Hayes for diabetic shoes. May go to [facility] [employer] [facility] - [phone])  to purchase. (She should call you to make an appointment). Keep all dressings clean, dry and intact. Keep pressure off the wound(s) at all times. Follow up visit   1 Week September 7, 2023 @  3:15     Please give 24 hour notice if unable to keep appointment. [phone]     If you experience any of the following, please call the Wound Care Service at  [phone] or go to the nearest emergency room. *Increase in pain         *Temperature over 101           *Increase in drainage from your wound or a foul odor  *Uncontrolled swelling            *Need for compression bandage changes due to slippage, breakthrough drainage       PLEASE NOTE: IF YOU ARE UNABLE TO OBTAIN WOUND SUPPLIES, CONTINUE TO USE THE SUPPLIES YOU HAVE AVAILABLE UNTIL YOU ARE ABLE TO REACH US.  IT IS MOST IMPORTANT TO KEEP THE WOUND COVERED AT ALL TIMES

## 2023-08-31 NOTE — PROGRESS NOTES
[address]                                                   Progress Note and Procedure Note      [facility]  MEDICAL RECORD NUMBER:  [id number]  AGE: 76 y.o. GENDER: male  : 1955  EPISODE DATE:  2023    Subjective:     Chief Complaint   Patient presents with    Wound Check         HISTORY of PRESENT ILLNESS HPI     Anita Bosch is a 76 y.o. male who presents today for wound/ulcer evaluation. History of Wound Context: Patient presents for continued treatment of a chronic diabetic ulcer of the left ankle. Patient reports compliance with dressing changes and offloading the ankle while sleeping. Patient reports having pain when walking. Patient denies nausea, vomiting, fever, chills, chest pain, or shortness of breath.      Wound/Ulcer Pain Timing/Severity: intermittent  Quality of pain: burning  Severity:  3 / 10   Modifying Factors: Pain worsens with walking  Associated Signs/Symptoms: drainage    Ulcer Identification:  Ulcer Type: diabetic  Contributing Factors: diabetes and chronic pressure    Wound:  Diabetic ulcer left ankle        PAST MEDICAL HISTORY        Diagnosis Date    Abscess of back 2020    Abscess of right leg 2020    Acute renal failure (HCC)     Anxiety     CAD S/P percutaneous coronary angioplasty 3/11/2014    Cardiomyopathy [facility])     Cerebrovascular accident (CVA) due to occlusion of left middle cerebral artery ([address]) 2016    brainstem infarction from left MCA occlusion    Cerebrovascular disease 2016    Colon polyp     Coronary angioplasty status     CVA (cerebral vascular accident) ([address]) 2017    Dependent edema 2017    Diplopia 5/15/2017    Resolved with management of cataracts    Dupuytren contracture 2018    Dysphagia 2011    Dysphonia 2011    Essential hypertension 2016    Gallop rhythm     Gout 12/10/2016    Gout of big toe 2014    Right Great Toe    H/O fall     Headache     migraines    Heart failure

## 2023-09-04 ENCOUNTER — PATIENT MESSAGE (OUTPATIENT)
Dept: FAMILY MEDICINE CLINIC | Age: 68
End: 2023-09-04

## 2023-09-04 DIAGNOSIS — R60.0 BILATERAL LOWER EXTREMITY EDEMA: ICD-10-CM

## 2023-09-05 ENCOUNTER — TELEPHONE (OUTPATIENT)
Dept: PAIN MANAGEMENT | Age: 68
End: 2023-09-05

## 2023-09-05 RX ORDER — TORSEMIDE 20 MG/1
20 TABLET ORAL DAILY
Qty: 90 TABLET | Refills: 1 | Status: SHIPPED | OUTPATIENT
Start: 2023-09-05

## 2023-09-05 NOTE — TELEPHONE ENCOUNTER
Pt called asking if he can wear compression socks, with the leg wound? Would prefer an answer via [employer] but can also be reached at [phone].

## 2023-09-05 NOTE — TELEPHONE ENCOUNTER
Pharmacy is requesting medication refill.  Please approve or deny this request.    Rx requested:  Requested Prescriptions     Pending Prescriptions Disp Refills    torsemide (DEMADEX) 20 MG tablet [Pharmacy Med Name: Torsemide 20 MG Oral Tablet] 90 tablet 3     Sig: TAKE 1 TABLET BY MOUTH DAILY         Last Office Visit:   8/21/2023      Next Visit Date:  Future Appointments   Date Time Provider [address]   9/7/2023  3:15 PM [facility], [address] [place] [address]   11/27/2023  5:00 PM Donovan Erazo MD [address] [place] [address]   3/4/2024  3:15 PM Randall Lentz MD [address] [place] [address]   6/26/2024  3:45 PM Cecile Simmons MD [facility]   7/11/2024  2:45 PM Bernard Bhakta, [facility]

## 2023-09-05 NOTE — TELEPHONE ENCOUNTER
From: Dona Diaz  To: Dr. Brown Dye: 9/4/2023 5:20 PM EDT  Subject: trulicity      Thought you would like to know that I took first weekly dose of trulicity on Sunday 0-7-78. So far okay; will advise if problems.  Dona Mathews.

## 2023-09-06 NOTE — TELEPHONE ENCOUNTER
I left pt a vm with dr. German Prasad message below and that the info was also in [address] [place] [address]. It is okay to apply your compression stocking over the dressing. If you have any further questions, let me know.

## 2023-09-07 ENCOUNTER — HOSPITAL ENCOUNTER (OUTPATIENT)
Dept: WOUND CARE | Age: 68
Discharge: HOME | End: 2023-09-07
Payer: MEDICARE

## 2023-09-07 VITALS
SYSTOLIC BLOOD PRESSURE: 155 MMHG | TEMPERATURE: 98.9 F | HEART RATE: 97 BPM | DIASTOLIC BLOOD PRESSURE: 80 MMHG | RESPIRATION RATE: 16 BRPM

## 2023-09-07 DIAGNOSIS — R60.0 LOWER EXTREMITY EDEMA: ICD-10-CM

## 2023-09-07 DIAGNOSIS — L97.322 ULCER OF LEFT ANKLE, WITH FAT LAYER EXPOSED (HCC): Primary | ICD-10-CM

## 2023-09-07 PROCEDURE — 6370000000 HC RX 637 (ALT 250 FOR IP): Performed by: PODIATRIST

## 2023-09-07 PROCEDURE — 11042 DBRDMT SUBQ TIS 1ST 20SQCM/<: CPT

## 2023-09-07 RX ORDER — LIDOCAINE HYDROCHLORIDE 40 MG/ML
SOLUTION TOPICAL ONCE
OUTPATIENT
Start: 2023-09-07 | End: 2023-09-07

## 2023-09-07 RX ORDER — LIDOCAINE 40 MG/G
CREAM TOPICAL ONCE
Status: COMPLETED | OUTPATIENT
Start: 2023-09-07 | End: 2023-09-07

## 2023-09-07 RX ORDER — SODIUM CHLOR/HYPOCHLOROUS ACID 0.033 %
SOLUTION, IRRIGATION IRRIGATION ONCE
OUTPATIENT
Start: 2023-09-07 | End: 2023-09-07

## 2023-09-07 RX ORDER — GENTAMICIN SULFATE 1 MG/G
OINTMENT TOPICAL ONCE
OUTPATIENT
Start: 2023-09-07 | End: 2023-09-07

## 2023-09-07 RX ORDER — GINSENG 100 MG
CAPSULE ORAL ONCE
OUTPATIENT
Start: 2023-09-07 | End: 2023-09-07

## 2023-09-07 RX ORDER — BETAMETHASONE DIPROPIONATE 0.05 %
OINTMENT (GRAM) TOPICAL ONCE
OUTPATIENT
Start: 2023-09-07 | End: 2023-09-07

## 2023-09-07 RX ORDER — LIDOCAINE HYDROCHLORIDE 20 MG/ML
JELLY TOPICAL ONCE
OUTPATIENT
Start: 2023-09-07 | End: 2023-09-07

## 2023-09-07 RX ORDER — CLOBETASOL PROPIONATE 0.5 MG/G
OINTMENT TOPICAL ONCE
OUTPATIENT
Start: 2023-09-07 | End: 2023-09-07

## 2023-09-07 RX ORDER — BACITRACIN ZINC AND POLYMYXIN B SULFATE 500; 1000 [USP'U]/G; [USP'U]/G
OINTMENT TOPICAL ONCE
OUTPATIENT
Start: 2023-09-07 | End: 2023-09-07

## 2023-09-07 RX ORDER — IBUPROFEN 200 MG
TABLET ORAL ONCE
OUTPATIENT
Start: 2023-09-07 | End: 2023-09-07

## 2023-09-07 RX ORDER — LIDOCAINE 40 MG/G
CREAM TOPICAL ONCE
OUTPATIENT
Start: 2023-09-07 | End: 2023-09-07

## 2023-09-07 RX ORDER — LIDOCAINE 50 MG/G
OINTMENT TOPICAL ONCE
OUTPATIENT
Start: 2023-09-07 | End: 2023-09-07

## 2023-09-07 RX ADMIN — LIDOCAINE 4%: 4 CREAM TOPICAL at 15:42

## 2023-09-07 NOTE — PLAN OF CARE
Problem: Chronic Conditions and Co-morbidities  Goal: Patient's chronic conditions and co-morbidity symptoms are monitored and maintained or improved  9/7/2023 1522 by Wallace Dumont RN  Outcome: Progressing  9/7/2023 1522 by Wallace Dumont RN  Outcome: Progressing     Problem: Wound:  Goal: Will show signs of wound healing; wound closure and no evidence of infection  Description: Will show signs of wound healing; wound closure and no evidence of infection  Outcome: Progressing

## 2023-09-07 NOTE — DISCHARGE INSTRUCTIONS
[address] [facility]   Physician Orders and Discharge Instructions  [facility], [address]  Telephone: [phone]      [phone]        NAME:  Patrica Jacobs                                                                                                YOB: 1955  MEDICAL RECORD NUMBER:  [id number]     Your  is:  Tobin [place] Care/Facility: None     Wound Location: Left Lateral Ankle     Dressing orders:. 1. Cleanse wound(s) with normal saline. 2. Apply dry DASHA  Or equivalent to wound bed. 3. Moisten DASHA with a few drops of normal saline. 4. Cover with 2x2 or 4x4's and dry dressing  5. Change  Every other day Tuesday, Thursday and Saturday      Compression You may wear your own compression socks     Offloading Device:     Other Instructions: Prescription given for velcro compression wraps. An Order was placed 8/31/2023  by Dr. Monica Paula for diabetic shoes. May go to [facility] [place] - [phone])  to purchase. (She should call you to make an appointment). Try to place a pillow under your calf to prevent pressure on the area. Keep all dressings clean, dry and intact. Keep pressure off the wound(s) at all times. Follow up visit   2 Week September 21, 2023 @ 3:15pm     Please give 24 hour notice if unable to keep appointment. [phone]     If you experience any of the following, please call the Wound Care Service at  [phone] or go to the nearest emergency room. *Increase in pain         *Temperature over 101           *Increase in drainage from your wound or a foul odor  *Uncontrolled swelling            *Need for compression bandage changes due to slippage, breakthrough drainage       PLEASE NOTE: IF YOU ARE UNABLE TO OBTAIN WOUND SUPPLIES, CONTINUE TO USE THE SUPPLIES YOU HAVE AVAILABLE UNTIL YOU ARE ABLE TO REACH US.  IT IS MOST IMPORTANT TO

## 2023-09-07 NOTE — PROGRESS NOTES
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    History of heart failure 1/6/2014    History of myocardial infarction 3/11/2014    History of recurrent pneumonia 2/11/2014    Hx of bacterial pneumonia     Hyperlipidemia 4/30/2021    Hyperlipidemia, mixed 4/30/2021    Hyperlipidemia, mixed 4/30/2021    Hypotension     Left carotid artery stenosis 8/23/2020    Left carotid artery stenosis 8/23/2020    Leg swelling     Macrocytosis without anemia 12/10/2016    Microalbuminuria due to type 2 diabetes mellitus ([address]) 9/14/2018    Morbid obesity ([address]) 1/2/2014    Myocardial infarction ([address])     Obesity     AYLIN (obstructive sleep apnea) 12/10/2016    Osteoarthritis     Right-sided muscle weakness 10/19/2016    S/P cardiac catheterization     Sciatica     Skin cyst 4/8/2016    Spasm 11/9/2016    Spina bifida ([address])     Stented coronary artery     Tremor of right hand 5/15/2017    Type 2 diabetes mellitus with diabetic polyneuropathy, without long-term current use of insulin ([address])     Unsteady gait 5/15/2017    Weakness     Weight gain        PAST SURGICAL HISTORY    Past Surgical History:   Procedure Laterality Date    ANKLE SURGERY Left     BACK SURGERY      lumbar laminectomy    CHOLECYSTECTOMY      COLONOSCOPY  01/15/2010    polyp, diverticulosis ( [facility])    COLONOSCOPY N/A 08/19/2021    COLORECTAL CANCER SCREENING, HIGH RISK with polypectomies  performed by Brittanie Rand MD at [facility]    COLONOSCOPY N/A 08/20/2021    polyps x 3, 3y repeat (

## 2023-09-13 ENCOUNTER — TELEPHONE (OUTPATIENT)
Age: 68
End: 2023-09-13

## 2023-09-21 ENCOUNTER — HOSPITAL ENCOUNTER (OUTPATIENT)
Dept: WOUND CARE | Age: 68
Discharge: HOME | End: 2023-09-21
Payer: MEDICARE

## 2023-09-21 VITALS
RESPIRATION RATE: 18 BRPM | DIASTOLIC BLOOD PRESSURE: 65 MMHG | TEMPERATURE: 98.1 F | SYSTOLIC BLOOD PRESSURE: 131 MMHG | HEART RATE: 96 BPM

## 2023-09-21 DIAGNOSIS — L97.322 ULCER OF LEFT ANKLE, WITH FAT LAYER EXPOSED (HCC): Primary | ICD-10-CM

## 2023-09-21 PROCEDURE — 11042 DBRDMT SUBQ TIS 1ST 20SQCM/<: CPT

## 2023-09-21 PROCEDURE — 11042 DBRDMT SUBQ TIS 1ST 20SQCM/<: CPT | Performed by: PODIATRIST

## 2023-09-21 PROCEDURE — 6370000000 HC RX 637 (ALT 250 FOR IP): Performed by: PODIATRIST

## 2023-09-21 RX ORDER — LIDOCAINE HYDROCHLORIDE 20 MG/ML
JELLY TOPICAL ONCE
OUTPATIENT
Start: 2023-09-21 | End: 2023-09-21

## 2023-09-21 RX ORDER — GINSENG 100 MG
CAPSULE ORAL ONCE
OUTPATIENT
Start: 2023-09-21 | End: 2023-09-21

## 2023-09-21 RX ORDER — CLOBETASOL PROPIONATE 0.5 MG/G
OINTMENT TOPICAL ONCE
OUTPATIENT
Start: 2023-09-21 | End: 2023-09-21

## 2023-09-21 RX ORDER — LIDOCAINE HYDROCHLORIDE 40 MG/ML
SOLUTION TOPICAL ONCE
OUTPATIENT
Start: 2023-09-21 | End: 2023-09-21

## 2023-09-21 RX ORDER — LIDOCAINE 50 MG/G
OINTMENT TOPICAL ONCE
OUTPATIENT
Start: 2023-09-21 | End: 2023-09-21

## 2023-09-21 RX ORDER — LIDOCAINE 40 MG/G
CREAM TOPICAL ONCE
Status: COMPLETED | OUTPATIENT
Start: 2023-09-21 | End: 2023-09-21

## 2023-09-21 RX ORDER — BETAMETHASONE DIPROPIONATE 0.05 %
OINTMENT (GRAM) TOPICAL ONCE
OUTPATIENT
Start: 2023-09-21 | End: 2023-09-21

## 2023-09-21 RX ORDER — BACITRACIN ZINC AND POLYMYXIN B SULFATE 500; 1000 [USP'U]/G; [USP'U]/G
OINTMENT TOPICAL ONCE
OUTPATIENT
Start: 2023-09-21 | End: 2023-09-21

## 2023-09-21 RX ORDER — GENTAMICIN SULFATE 1 MG/G
OINTMENT TOPICAL ONCE
OUTPATIENT
Start: 2023-09-21 | End: 2023-09-21

## 2023-09-21 RX ORDER — TRIAMCINOLONE ACETONIDE 1 MG/G
OINTMENT TOPICAL ONCE
OUTPATIENT
Start: 2023-09-21 | End: 2023-09-21

## 2023-09-21 RX ORDER — LIDOCAINE 40 MG/G
CREAM TOPICAL ONCE
OUTPATIENT
Start: 2023-09-21 | End: 2023-09-21

## 2023-09-21 RX ORDER — IBUPROFEN 200 MG
TABLET ORAL ONCE
OUTPATIENT
Start: 2023-09-21 | End: 2023-09-21

## 2023-09-21 RX ORDER — SODIUM CHLOR/HYPOCHLOROUS ACID 0.033 %
SOLUTION, IRRIGATION IRRIGATION ONCE
OUTPATIENT
Start: 2023-09-21 | End: 2023-09-21

## 2023-09-21 RX ADMIN — LIDOCAINE 4%: 4 CREAM TOPICAL at 15:42

## 2023-09-21 ASSESSMENT — PAIN DESCRIPTION - ORIENTATION: ORIENTATION: LEFT

## 2023-09-21 ASSESSMENT — PAIN DESCRIPTION - PAIN TYPE: TYPE: CHRONIC PAIN

## 2023-09-21 ASSESSMENT — PAIN SCALES - GENERAL: PAINLEVEL_OUTOF10: 3

## 2023-09-21 ASSESSMENT — PAIN DESCRIPTION - DESCRIPTORS: DESCRIPTORS: BURNING;ACHING

## 2023-09-21 ASSESSMENT — PAIN - FUNCTIONAL ASSESSMENT: PAIN_FUNCTIONAL_ASSESSMENT: PREVENTS OR INTERFERES SOME ACTIVE ACTIVITIES AND ADLS

## 2023-09-21 ASSESSMENT — PAIN DESCRIPTION - LOCATION: LOCATION: FOOT

## 2023-09-21 NOTE — PROGRESS NOTES
[address]                                                   Progress Note and Procedure Note      Wilfredo Flores [facility]  MEDICAL RECORD NUMBER:  [id number]  AGE: 76 y.o. GENDER: male  : 1955  EPISODE DATE:  2023    Subjective:     Chief Complaint   Patient presents with    Wound Check         HISTORY of PRESENT ILLNESS HPI     Zuhair Kamara is a 76 y.o. male who presents today for wound/ulcer evaluation. History of Wound Context: Patient presents for continued treatment of a chronic ulcer to the left lateral ankle. Patient reports compliance with dressing changes. Patient denies observing signs of infection. Patient reports compliance with use of offloading boot while sleeping. Patient denies nausea, vomiting, fever, chills, chest pain, or shortness of breath.      Wound/Ulcer Pain Timing/Severity: intermittent  Quality of pain: sharp  Severity:  6 / 10   Modifying Factors: Pain worsens with walking  Associated Signs/Symptoms: edema, drainage, and pain    Ulcer Identification:  Ulcer Type: diabetic  Contributing Factors: edema, venous stasis, diabetes, and chronic pressure    Wound:  Diabetic ulceration left ankle        PAST MEDICAL HISTORY        Diagnosis Date    Abscess of back 2020    Abscess of right leg 2020    Acute renal failure (HCC)     Anxiety     CAD S/P percutaneous coronary angioplasty 3/11/2014    Cardiomyopathy [facility])     Cerebrovascular accident (CVA) due to occlusion of left middle cerebral artery ([address]) 2016    brainstem infarction from left MCA occlusion    Cerebrovascular disease 2016    Colon polyp     Coronary angioplasty status     CVA (cerebral vascular accident) ([address]) 2017    Dependent edema 2017    Diplopia 5/15/2017    Resolved with management of cataracts    Dupuytren contracture 2018    Dysphagia 2011    Dysphonia 2011    Essential hypertension 2016    Gallop rhythm     Gout 12/10/2016    Gout of big toe 2014

## 2023-09-21 NOTE — PLAN OF CARE
[address] [place] [address] [place]:     [employer] [id number] [facility] p: [phone] f: [phone]     Ordering Center:     [address] [place] [address]  Maria Dolores [place] [address]  [phone]  WOUND CARE [phone]  FAX NUMBER [phone]    Patient Information:      Murali Aguilera  [facility]  [address] [place] [address],Fourth Floor [facility]   [phone]   : 1955  AGE: 76 y.o.      GENDER: male   TODAYS DATE:  2023    Insurance:      PRIMARY INSURANCE:  Plan: [email or web address] MEDICARE ADVANTAGE  Coverage: [facility] MEDICARE  Effective Date: 2023  [id number] - (Medicare Managed)    SECONDARY INSURANCE:  Plan:   Coverage:   Effective Date:   @[email or web address]@    [unfilled]   [unfilled]     Patient Wound Information:      Problem List Items Addressed This Visit          Other    Ulcer of left ankle, with fat layer exposed ([address]) - Primary    Relevant Orders    Initiate Outpatient Wound Care Protocol       WOUNDS REQUIRING DRESSING SUPPLIES:     Wound 23 Ankle Left;Lateral #1 Left lateral ankle (Active)   Wound Image   23 1532   Wound Etiology Other 23 1532   Wound Cleansed Cleansed with saline 23 1532   Dressing/Treatment Collagen;Dry dressing 23 1552   Offloading for Diabetic Foot Ulcers Offloading ordered 23 1558   Wound Length (cm) 2.7 cm 23 1532   Wound Width (cm) 1.2 cm 23 1532   Wound Depth (cm) 0.1 cm 23 1532   Wound Surface Area (cm^2) 3.24 cm^2 23 1532   Change in Wound Size % (l*w) -140 23 1532   Wound Volume (cm^3) 0.324 cm^3 23 1532   Wound Healing % -140 23 1532   Post-Procedure Length (cm) 2.7 cm 23 1552   Post-Procedure Width (cm) 1.2 cm 23 1552   Post-Procedure Depth (cm) 0.11 cm 23 1552   Post-Procedure Surface Area (cm^2) 3.24 cm^2 23 155   Post-Procedure Volume (cm^3) 0.3564 cm^3 23 155   Wound Assessment Devitalized tissue;Pink/red 23

## 2023-09-21 NOTE — DISCHARGE INSTRUCTIONS
[address] [facility]   Physician Orders and Discharge Instructions  [facility], [address]  Telephone: [phone]      [phone]        NAME:  Selina Esquivel                                                                                                YOB: 1955  MEDICAL RECORD NUMBER:  [id number]     Your  is:  Tobin [place] Care/Facility: None     Wound Location: Left Lateral Ankle     Dressing orders: 1. Cleanse wound(s) with normal saline. 2. Apply dry DASHA  Or equivalent to wound bed. 3. Moisten DASHA with a few drops of normal saline. 4. Cover with 2x2 or 4x4's and dry dressing  5. Change Every other day Tuesday, Thursday and Saturday      Compression: You may wear your own compression socks     Offloading Device:     Other Instructions: Prescription given for velcro compression wraps. An Order was placed 8/31/2023  by Dr. Joanna Dumont for diabetic shoes. May go to [facility] [place] - [phone]) Try to place a pillow under your calf to prevent pressure on the area. Keep all dressings clean, dry and intact. Keep pressure off the wound(s) at all times. Follow up visit   2 Week October 5, 2023 @ 3:00 pm     Please give 24 hour notice if unable to keep appointment. [phone]     If you experience any of the following, please call the Wound Care Service at  [phone] or go to the nearest emergency room. *Increase in pain         *Temperature over 101           *Increase in drainage from your wound or a foul odor  *Uncontrolled swelling            *Need for compression bandage changes due to slippage, breakthrough drainage       PLEASE NOTE: IF YOU ARE UNABLE TO OBTAIN WOUND SUPPLIES, CONTINUE TO USE THE SUPPLIES YOU HAVE AVAILABLE UNTIL YOU ARE ABLE TO REACH US.  IT IS MOST IMPORTANT TO KEEP THE WOUND COVERED AT ALL TIMES

## 2023-09-25 ENCOUNTER — PATIENT MESSAGE (OUTPATIENT)
Dept: FAMILY MEDICINE CLINIC | Age: 68
End: 2023-09-25

## 2023-09-25 DIAGNOSIS — E11.65 TYPE 2 DIABETES MELLITUS WITH HYPERGLYCEMIA, WITHOUT LONG-TERM CURRENT USE OF INSULIN (HCC): Primary | ICD-10-CM

## 2023-09-26 NOTE — TELEPHONE ENCOUNTER
From: Cy Diaz  To: Dr. Keiry Swartz: 9/25/2023 8:44 PM EDT  Subject: Allyson Mata my friend & Doctor. Reporting in on new med. . Took 4th dose yesterday, Sunday 9-24- 23. I have not had any side effects as listed in brochures. Will have to refill as there are 4 doses per RX in box. Query; my morning fasting readings have gone up since ceasing glimp. I am still fighting open sore on left side of left ankle. [facility] encouraging that it's healing & soon will cover with product to help my skin to grow back. 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 Readings vary from lowest 164 to highest 273 on 9-16. I'm in 190s to low 200s 204 cinthia. On 9-17 191 after one time high of 273 on 9-16 (very stressful day). My ? should we change RX to next level of dosage. Please advise; Respectfully, Cy Mi.  Joe

## 2023-10-03 DIAGNOSIS — E11.42 TYPE 2 DIABETES MELLITUS WITH DIABETIC POLYNEUROPATHY, WITHOUT LONG-TERM CURRENT USE OF INSULIN (HCC): Chronic | ICD-10-CM

## 2023-10-03 NOTE — TELEPHONE ENCOUNTER
Pharmacy is requesting medication refill.  Please approve or deny this request.    Rx requested:  Requested Prescriptions     Pending Prescriptions Disp Refills    metFORMIN (GLUCOPHAGE) 500 MG tablet [Pharmacy Med Name: metFORMIN HCl 500 MG Oral Tablet] 180 tablet 3     Sig: TAKE 1 TABLET BY MOUTH TWICE  DAILY WITH MEALS         Last Office Visit:   8/21/2023      Next Visit Date:  Future Appointments   Date Time Provider [address] [place] [address]   10/5/2023  3:00 PM Paulina Ballard, [address] [place] [address]   11/27/2023  5:00 PM Titi Starks MD [address] [place] [address]   3/4/2024  3:15 PM Milo Pete MD [address] [place] [address]   6/26/2024  3:45 PM Alfreda Umaña MD [facility] -   7/11/2024  2:45 PM Beranrd Blanco, [facility]

## 2023-10-05 ENCOUNTER — HOSPITAL ENCOUNTER (OUTPATIENT)
Dept: WOUND CARE | Age: 68
Discharge: HOME | End: 2023-10-05
Attending: PODIATRIST
Payer: MEDICARE

## 2023-10-05 VITALS
RESPIRATION RATE: 20 BRPM | SYSTOLIC BLOOD PRESSURE: 143 MMHG | HEART RATE: 103 BPM | DIASTOLIC BLOOD PRESSURE: 73 MMHG | TEMPERATURE: 97.9 F

## 2023-10-05 DIAGNOSIS — L97.322 ULCER OF LEFT ANKLE, WITH FAT LAYER EXPOSED (HCC): Primary | ICD-10-CM

## 2023-10-05 PROCEDURE — 11042 DBRDMT SUBQ TIS 1ST 20SQCM/<: CPT

## 2023-10-05 PROCEDURE — 6370000000 HC RX 637 (ALT 250 FOR IP): Performed by: PODIATRIST

## 2023-10-05 PROCEDURE — 11042 DBRDMT SUBQ TIS 1ST 20SQCM/<: CPT | Performed by: PODIATRIST

## 2023-10-05 RX ORDER — BETAMETHASONE DIPROPIONATE 0.05 %
OINTMENT (GRAM) TOPICAL ONCE
OUTPATIENT
Start: 2023-10-05 | End: 2023-10-05

## 2023-10-05 RX ORDER — GINSENG 100 MG
CAPSULE ORAL ONCE
OUTPATIENT
Start: 2023-10-05 | End: 2023-10-05

## 2023-10-05 RX ORDER — BACITRACIN ZINC AND POLYMYXIN B SULFATE 500; 1000 [USP'U]/G; [USP'U]/G
OINTMENT TOPICAL ONCE
OUTPATIENT
Start: 2023-10-05 | End: 2023-10-05

## 2023-10-05 RX ORDER — LIDOCAINE 50 MG/G
OINTMENT TOPICAL ONCE
OUTPATIENT
Start: 2023-10-05 | End: 2023-10-05

## 2023-10-05 RX ORDER — TRIAMCINOLONE ACETONIDE 1 MG/G
OINTMENT TOPICAL ONCE
OUTPATIENT
Start: 2023-10-05 | End: 2023-10-05

## 2023-10-05 RX ORDER — LIDOCAINE 40 MG/G
CREAM TOPICAL ONCE
Status: COMPLETED | OUTPATIENT
Start: 2023-10-05 | End: 2023-10-05

## 2023-10-05 RX ORDER — LIDOCAINE HYDROCHLORIDE 40 MG/ML
SOLUTION TOPICAL ONCE
OUTPATIENT
Start: 2023-10-05 | End: 2023-10-05

## 2023-10-05 RX ORDER — SODIUM CHLOR/HYPOCHLOROUS ACID 0.033 %
SOLUTION, IRRIGATION IRRIGATION ONCE
OUTPATIENT
Start: 2023-10-05 | End: 2023-10-05

## 2023-10-05 RX ORDER — LIDOCAINE 40 MG/G
CREAM TOPICAL ONCE
OUTPATIENT
Start: 2023-10-05 | End: 2023-10-05

## 2023-10-05 RX ORDER — CLOBETASOL PROPIONATE 0.5 MG/G
OINTMENT TOPICAL ONCE
OUTPATIENT
Start: 2023-10-05 | End: 2023-10-05

## 2023-10-05 RX ORDER — LIDOCAINE HYDROCHLORIDE 20 MG/ML
JELLY TOPICAL ONCE
OUTPATIENT
Start: 2023-10-05 | End: 2023-10-05

## 2023-10-05 RX ORDER — IBUPROFEN 200 MG
TABLET ORAL ONCE
OUTPATIENT
Start: 2023-10-05 | End: 2023-10-05

## 2023-10-05 RX ORDER — GENTAMICIN SULFATE 1 MG/G
OINTMENT TOPICAL ONCE
OUTPATIENT
Start: 2023-10-05 | End: 2023-10-05

## 2023-10-05 RX ADMIN — LIDOCAINE 4%: 4 CREAM TOPICAL at 15:24

## 2023-10-05 ASSESSMENT — PAIN DESCRIPTION - DESCRIPTORS: DESCRIPTORS: BURNING;SHOOTING

## 2023-10-05 ASSESSMENT — PAIN DESCRIPTION - ORIENTATION: ORIENTATION: LEFT

## 2023-10-05 ASSESSMENT — PAIN SCALES - GENERAL: PAINLEVEL_OUTOF10: 7

## 2023-10-05 ASSESSMENT — PAIN DESCRIPTION - LOCATION: LOCATION: ANKLE

## 2023-10-05 NOTE — DISCHARGE INSTRUCTIONS
[address] [facility]   Physician Orders and Discharge Instructions  [facility] Joel Gomez, [address]  Telephone: [phone]      [phone]        NAME:  Ashley Carrasco                                                                                                YOB: 1955  MEDICAL RECORD NUMBER:  [id number]     Your  is:  Tobin Coates Care/Facility: None     Wound Location: Left Lateral Ankle     Dressing orders: 1. Cleanse wound(s) with normal saline. 2. Apply dry DASHA  Or equivalent to wound bed. 3. Moisten DASHA with a few drops of normal saline. 4. Cover with 2x2 or 4x4's and dry dressing  5. Change Every other day Tuesday, Thursday and Saturday      Compression: You may wear your own compression socks     Offloading Device:     Other Instructions: Prescription given for velcro compression wraps. Try to place a pillow under your calf to prevent pressure on the area. Keep all dressings clean, dry and intact. Keep pressure off the wound(s) at all times. Follow up visit   2 Week October 19, 2023 @ 3:15     Please give 24 hour notice if unable to keep appointment. [phone]     If you experience any of the following, please call the Wound Care Service at  [phone] or go to the nearest emergency room. *Increase in pain         *Temperature over 101           *Increase in drainage from your wound or a foul odor  *Uncontrolled swelling            *Need for compression bandage changes due to slippage, breakthrough drainage       PLEASE NOTE: IF YOU ARE UNABLE TO OBTAIN WOUND SUPPLIES, CONTINUE TO USE THE SUPPLIES YOU HAVE AVAILABLE UNTIL YOU ARE ABLE TO REACH US.  IT IS MOST IMPORTANT TO KEEP THE WOUND COVERED AT ALL TIMES

## 2023-10-05 NOTE — PROGRESS NOTES
[address]                                                   Progress Note and Procedure Note      Iftikhar Montanez [facility]  MEDICAL RECORD NUMBER:  [id number]  AGE: 76 y.o. GENDER: male  : 1955  EPISODE DATE:  10/5/2023    Subjective:     Chief Complaint   Patient presents with    Wound Check         HISTORY of PRESENT ILLNESS HPI     Jonathon Chaudhari is a 76 y.o. male who presents today for wound/ulcer evaluation. History of Wound Context: Patient presents for continued local wound care for chronic diabetic ulceration of the left ankle. Patient reports compliance with dressing changes, they report having difficulty removing the Betsy from the wound base. Patient has not observed signs of infection. Patient denies nausea, vomiting, fever, chills, chest pain, or shortness of breath.      Wound/Ulcer Pain Timing/Severity: intermittent  Quality of pain: burning  Severity:  5 / 10   Modifying Factors: Pain worsens with movement  Associated Signs/Symptoms: drainage and pain    Ulcer Identification:  Ulcer Type: diabetic  Contributing Factors: edema, venous stasis, and diabetes    Wound:  Full-thickness diabetic ulceration left lateral ankle        PAST MEDICAL HISTORY        Diagnosis Date    Abscess of back 2020    Abscess of right leg 2020    Acute renal failure (HCC)     Anxiety     CAD S/P percutaneous coronary angioplasty 3/11/2014    Cardiomyopathy [facility])     Cerebrovascular accident (CVA) due to occlusion of left middle cerebral artery ([address]) 2016    brainstem infarction from left MCA occlusion    Cerebrovascular disease 2016    Colon polyp     Coronary angioplasty status     CVA (cerebral vascular accident) ([address]) 2017    Dependent edema 2017    Diplopia 5/15/2017    Resolved with management of cataracts    Dupuytren contracture 2018    Dysphagia 2011    Dysphonia 2011    Essential hypertension 2016    Gallop rhythm     Gout 12/10/2016    Gout of big

## 2023-10-19 ENCOUNTER — HOSPITAL ENCOUNTER (OUTPATIENT)
Dept: WOUND CARE | Age: 68
Discharge: HOME | End: 2023-10-19
Attending: PODIATRIST
Payer: MEDICARE

## 2023-10-19 VITALS
SYSTOLIC BLOOD PRESSURE: 140 MMHG | TEMPERATURE: 97.5 F | HEART RATE: 97 BPM | DIASTOLIC BLOOD PRESSURE: 75 MMHG | RESPIRATION RATE: 20 BRPM

## 2023-10-19 DIAGNOSIS — L97.322 ULCER OF LEFT ANKLE, WITH FAT LAYER EXPOSED (HCC): Primary | ICD-10-CM

## 2023-10-19 PROCEDURE — 6370000000 HC RX 637 (ALT 250 FOR IP): Performed by: PODIATRIST

## 2023-10-19 PROCEDURE — 11042 DBRDMT SUBQ TIS 1ST 20SQCM/<: CPT

## 2023-10-19 PROCEDURE — 11042 DBRDMT SUBQ TIS 1ST 20SQCM/<: CPT | Performed by: PODIATRIST

## 2023-10-19 RX ORDER — IBUPROFEN 200 MG
TABLET ORAL ONCE
OUTPATIENT
Start: 2023-10-19 | End: 2023-10-19

## 2023-10-19 RX ORDER — CLOBETASOL PROPIONATE 0.5 MG/G
OINTMENT TOPICAL ONCE
OUTPATIENT
Start: 2023-10-19 | End: 2023-10-19

## 2023-10-19 RX ORDER — SODIUM CHLOR/HYPOCHLOROUS ACID 0.033 %
SOLUTION, IRRIGATION IRRIGATION ONCE
OUTPATIENT
Start: 2023-10-19 | End: 2023-10-19

## 2023-10-19 RX ORDER — LIDOCAINE 40 MG/G
CREAM TOPICAL ONCE
OUTPATIENT
Start: 2023-10-19 | End: 2023-10-19

## 2023-10-19 RX ORDER — GENTAMICIN SULFATE 1 MG/G
OINTMENT TOPICAL ONCE
OUTPATIENT
Start: 2023-10-19 | End: 2023-10-19

## 2023-10-19 RX ORDER — LIDOCAINE 50 MG/G
OINTMENT TOPICAL ONCE
OUTPATIENT
Start: 2023-10-19 | End: 2023-10-19

## 2023-10-19 RX ORDER — GINSENG 100 MG
CAPSULE ORAL ONCE
OUTPATIENT
Start: 2023-10-19 | End: 2023-10-19

## 2023-10-19 RX ORDER — LIDOCAINE HYDROCHLORIDE 40 MG/ML
SOLUTION TOPICAL ONCE
OUTPATIENT
Start: 2023-10-19 | End: 2023-10-19

## 2023-10-19 RX ORDER — TRIAMCINOLONE ACETONIDE 1 MG/G
OINTMENT TOPICAL ONCE
OUTPATIENT
Start: 2023-10-19 | End: 2023-10-19

## 2023-10-19 RX ORDER — BACITRACIN ZINC AND POLYMYXIN B SULFATE 500; 1000 [USP'U]/G; [USP'U]/G
OINTMENT TOPICAL ONCE
OUTPATIENT
Start: 2023-10-19 | End: 2023-10-19

## 2023-10-19 RX ORDER — LIDOCAINE HYDROCHLORIDE 20 MG/ML
JELLY TOPICAL ONCE
OUTPATIENT
Start: 2023-10-19 | End: 2023-10-19

## 2023-10-19 RX ORDER — MAGNESIUM HYDROXIDE 1200 MG/15ML
LIQUID ORAL
Qty: 1000 ML | Refills: 3 | Status: SHIPPED | OUTPATIENT
Start: 2023-10-19 | End: 2023-10-20 | Stop reason: SDUPTHER

## 2023-10-19 RX ORDER — LIDOCAINE 40 MG/G
CREAM TOPICAL ONCE
Status: COMPLETED | OUTPATIENT
Start: 2023-10-19 | End: 2023-10-19

## 2023-10-19 RX ORDER — BETAMETHASONE DIPROPIONATE 0.05 %
OINTMENT (GRAM) TOPICAL ONCE
OUTPATIENT
Start: 2023-10-19 | End: 2023-10-19

## 2023-10-19 RX ADMIN — LIDOCAINE 4%: 4 CREAM TOPICAL at 15:55

## 2023-10-19 ASSESSMENT — PAIN SCALES - GENERAL: PAINLEVEL_OUTOF10: 4

## 2023-10-19 ASSESSMENT — PAIN DESCRIPTION - LOCATION: LOCATION: ANKLE

## 2023-10-19 NOTE — DISCHARGE INSTRUCTIONS
[address] [facility]   Physician Orders and Discharge Instructions  [facility], [address]  Telephone: [phone]      [phone]        NAME:  Selina Esquivel                                                                                                YOB: 1955  MEDICAL RECORD NUMBER:  [id number]     Your  is:  Tobin Coates Care/Facility: None     Wound Location: Left Lateral Ankle     Dressing orders: 1. Cleanse wound(s) with normal saline. 2. Apply dry DASHA  Or equivalent to wound bed. 3. Moisten DASHA with a few drops of normal saline. 4. Cover with dry dressing   5. Change Every other day Tuesday, Thursday and Saturday      Compression: You may wear your own compression socks (velcro compression on the Left)     Offloading Device:     Other Instructions:  Try to place a pillow under your calf to prevent pressure on the area. Keep all dressings clean, dry and intact. Keep pressure off the wound(s) at all times. Follow up visit   2 Weeks November 2, 2023 @ 3:15     Please give 24 hour notice if unable to keep appointment. [phone]     If you experience any of the following, please call the Wound Care Service at  [phone] or go to the nearest emergency room. *Increase in pain         *Temperature over 101           *Increase in drainage from your wound or a foul odor  *Uncontrolled swelling            *Need for compression bandage changes due to slippage, breakthrough drainage       PLEASE NOTE: IF YOU ARE UNABLE TO OBTAIN WOUND SUPPLIES, CONTINUE TO USE THE SUPPLIES YOU HAVE AVAILABLE UNTIL YOU ARE ABLE TO REACH US.  IT IS MOST IMPORTANT TO KEEP THE WOUND COVERED AT ALL TIMES

## 2023-10-19 NOTE — PROGRESS NOTES
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    History of heart failure 1/6/2014    History of myocardial infarction 3/11/2014    History of recurrent pneumonia 2/11/2014    Hx of bacterial pneumonia     Hyperlipidemia 4/30/2021    Hyperlipidemia, mixed 4/30/2021    Hyperlipidemia, mixed 4/30/2021    Hypotension     Left carotid artery stenosis 8/23/2020    Left carotid artery stenosis 8/23/2020    Leg swelling     Macrocytosis without anemia 12/10/2016    Microalbuminuria due to type 2 diabetes mellitus ([address]) 9/14/2018    Morbid obesity ([address]) 1/2/2014    Myocardial infarction ([address])     Obesity     AYLIN (obstructive sleep apnea) 12/10/2016    Osteoarthritis     Right-sided muscle weakness 10/19/2016    S/P cardiac catheterization     Sciatica     Skin cyst 4/8/2016    Spasm 11/9/2016    Spina bifida ([address])     Stented coronary artery     Tremor of right hand 5/15/2017    Type 2 diabetes mellitus with diabetic polyneuropathy, without long-term current use of insulin ([address])     Unsteady gait 5/15/2017    Weakness     Weight gain        PAST SURGICAL HISTORY    Past Surgical History:   Procedure Laterality Date    ANKLE SURGERY Left     BACK SURGERY      lumbar laminectomy    CHOLECYSTECTOMY      COLONOSCOPY  01/15/2010    polyp, diverticulosis ( [facility])    COLONOSCOPY N/A 08/19/2021    COLORECTAL CANCER SCREENING, HIGH RISK with polypectomies  performed by Nuvia Mccullough MD at [facility]    COLONOSCOPY N/A 08/20/2021    polyps x 3, 3y repeat (DR Viry Daley)  COLONOSCOPY with polypectomies DIAGNOSTIC performed by Nohelia Bustos MD at [address] [place] [address]

## 2023-10-20 ENCOUNTER — TELEPHONE (OUTPATIENT)
Dept: PODIATRY | Age: 68
End: 2023-10-20

## 2023-10-20 DIAGNOSIS — L97.322 ULCER OF LEFT ANKLE, WITH FAT LAYER EXPOSED (HCC): Primary | ICD-10-CM

## 2023-10-20 RX ORDER — MAGNESIUM HYDROXIDE 1200 MG/15ML
LIQUID ORAL
Qty: 1000 ML | Refills: 3 | Status: SHIPPED | OUTPATIENT
Start: 2023-10-20 | End: 2023-10-27

## 2023-10-20 NOTE — TELEPHONE ENCOUNTER
Patient called regarding the sterile saline that was prescribed to him. He says that [facility] only has this in IV form. He says that they suggested sending it to another pharmacy. Please advise.

## 2023-11-01 ENCOUNTER — PATIENT MESSAGE (OUTPATIENT)
Dept: FAMILY MEDICINE CLINIC | Age: 68
End: 2023-11-01

## 2023-11-01 NOTE — TELEPHONE ENCOUNTER
From: Dinesh Diaz  To: Dr. Church Buggy: 11/1/2023 3:22 AM EDT  Subject: Trulicity 3mg    Hi, my friend & doctor; no adverse side-effects from shot Sun 10-29-23. Picked up shoes & Belkys & me got flu shot at [facility]. Both readings under 200 @ morning fast. Will advise. Respectfully, Dinesh Roa..

## 2023-11-02 ENCOUNTER — HOSPITAL ENCOUNTER (OUTPATIENT)
Dept: WOUND CARE | Age: 68
Discharge: HOME | End: 2023-11-02
Attending: PODIATRIST
Payer: MEDICARE

## 2023-11-02 VITALS
DIASTOLIC BLOOD PRESSURE: 88 MMHG | RESPIRATION RATE: 18 BRPM | TEMPERATURE: 97.7 F | HEART RATE: 101 BPM | SYSTOLIC BLOOD PRESSURE: 159 MMHG

## 2023-11-02 DIAGNOSIS — L97.322 ULCER OF LEFT ANKLE, WITH FAT LAYER EXPOSED (HCC): Primary | ICD-10-CM

## 2023-11-02 PROCEDURE — 11042 DBRDMT SUBQ TIS 1ST 20SQCM/<: CPT

## 2023-11-02 PROCEDURE — 11042 DBRDMT SUBQ TIS 1ST 20SQCM/<: CPT | Performed by: PODIATRIST

## 2023-11-02 PROCEDURE — 6370000000 HC RX 637 (ALT 250 FOR IP): Performed by: PODIATRIST

## 2023-11-02 RX ORDER — BETAMETHASONE DIPROPIONATE 0.05 %
OINTMENT (GRAM) TOPICAL ONCE
OUTPATIENT
Start: 2023-11-02 | End: 2023-11-02

## 2023-11-02 RX ORDER — SODIUM CHLOR/HYPOCHLOROUS ACID 0.033 %
SOLUTION, IRRIGATION IRRIGATION ONCE
OUTPATIENT
Start: 2023-11-02 | End: 2023-11-02

## 2023-11-02 RX ORDER — LIDOCAINE HYDROCHLORIDE 40 MG/ML
SOLUTION TOPICAL ONCE
OUTPATIENT
Start: 2023-11-02 | End: 2023-11-02

## 2023-11-02 RX ORDER — LIDOCAINE 50 MG/G
OINTMENT TOPICAL ONCE
OUTPATIENT
Start: 2023-11-02 | End: 2023-11-02

## 2023-11-02 RX ORDER — LIDOCAINE HYDROCHLORIDE 20 MG/ML
JELLY TOPICAL ONCE
OUTPATIENT
Start: 2023-11-02 | End: 2023-11-02

## 2023-11-02 RX ORDER — GINSENG 100 MG
CAPSULE ORAL ONCE
OUTPATIENT
Start: 2023-11-02 | End: 2023-11-02

## 2023-11-02 RX ORDER — IBUPROFEN 200 MG
TABLET ORAL ONCE
OUTPATIENT
Start: 2023-11-02 | End: 2023-11-02

## 2023-11-02 RX ORDER — LIDOCAINE HYDROCHLORIDE 20 MG/ML
JELLY TOPICAL ONCE
Status: COMPLETED | OUTPATIENT
Start: 2023-11-02 | End: 2023-11-02

## 2023-11-02 RX ORDER — GENTAMICIN SULFATE 1 MG/G
OINTMENT TOPICAL ONCE
OUTPATIENT
Start: 2023-11-02 | End: 2023-11-02

## 2023-11-02 RX ORDER — BACITRACIN ZINC AND POLYMYXIN B SULFATE 500; 1000 [USP'U]/G; [USP'U]/G
OINTMENT TOPICAL ONCE
OUTPATIENT
Start: 2023-11-02 | End: 2023-11-02

## 2023-11-02 RX ORDER — TRIAMCINOLONE ACETONIDE 1 MG/G
OINTMENT TOPICAL ONCE
OUTPATIENT
Start: 2023-11-02 | End: 2023-11-02

## 2023-11-02 RX ORDER — LIDOCAINE 40 MG/G
CREAM TOPICAL ONCE
OUTPATIENT
Start: 2023-11-02 | End: 2023-11-02

## 2023-11-02 RX ORDER — CLOBETASOL PROPIONATE 0.5 MG/G
OINTMENT TOPICAL ONCE
OUTPATIENT
Start: 2023-11-02 | End: 2023-11-02

## 2023-11-02 RX ADMIN — LIDOCAINE HYDROCHLORIDE: 20 JELLY TOPICAL at 15:40

## 2023-11-02 ASSESSMENT — PAIN DESCRIPTION - LOCATION: LOCATION: ANKLE

## 2023-11-02 ASSESSMENT — PAIN DESCRIPTION - DESCRIPTORS: DESCRIPTORS: BURNING;SHOOTING

## 2023-11-02 ASSESSMENT — PAIN SCALES - GENERAL: PAINLEVEL_OUTOF10: 3

## 2023-11-02 ASSESSMENT — PAIN DESCRIPTION - ORIENTATION: ORIENTATION: LEFT

## 2023-11-02 NOTE — DISCHARGE INSTRUCTIONS
[address] and Hyper[facility]ic Medicine   Physician Orders and Discharge Instructions  [facility], [address]  Telephone: [phone]      [phone]        NAME:  Katheren Bence                                                                                                YOB: 1955  MEDICAL RECORD NUMBER:  [id number]     Your  is:  [facility] Care/Facility: None     Wound Location: Left Lateral Ankle     Dressing orders: 1. Cleanse wound(s) with normal saline. 2. Apply dry DASHA  Or equivalent to wound bed. 3. Moisten DASHA with a few drops of normal saline. 4. Cover with dry dressing   5. Change Every other day Tuesday, Thursday and Saturday      Compression: You may wear your own compression socks (velcro compression on the Left)     Offloading Device:     Other Instructions:  Try to place a pillow under your calf to prevent pressure on the area. Keep all dressings clean, dry and intact. Keep pressure off the wound(s) at all times. Follow up visit   2 Weeks November 16, 2023 @ 3:45     Please give 24 hour notice if unable to keep appointment. [phone]     If you experience any of the following, please call the Wound Care Service at  [phone] or go to the nearest emergency room. *Increase in pain         *Temperature over 101           *Increase in drainage from your wound or a foul odor  *Uncontrolled swelling            *Need for compression bandage changes due to slippage, breakthrough drainage       PLEASE NOTE: IF YOU ARE UNABLE TO OBTAIN WOUND SUPPLIES, CONTINUE TO USE THE SUPPLIES YOU HAVE AVAILABLE UNTIL YOU ARE ABLE TO REACH US.  IT IS MOST IMPORTANT TO KEEP THE WOUND COVERED AT ALL TIMES

## 2023-11-02 NOTE — PLAN OF CARE
Problem: Chronic Conditions and Co-morbidities  Goal: Patient's chronic conditions and co-morbidity symptoms are monitored and maintained or improved  11/2/2023 1526 by Sarah Ansari RN  Outcome: Progressing  11/2/2023 1526 by Sarah Ansari RN  Outcome: Progressing     Problem: Wound:  Goal: Will show signs of wound healing; wound closure and no evidence of infection  Description: Will show signs of wound healing; wound closure and no evidence of infection  Outcome: Progressing

## 2023-11-02 NOTE — PROGRESS NOTES
0.3 cm 11/02/23 1610   Post-Procedure Surface Area (cm^2) 6.48 cm^2 11/02/23 1610   Post-Procedure Volume (cm^3) 1.944 cm^3 11/02/23 1610   Wound Assessment Pink/red;Slough 11/02/23 1532   Drainage Amount Small (< 25%) 11/02/23 1532   Drainage Description Serosanguinous 11/02/23 1532   Odor None 11/02/23 1532   Gisela-wound Assessment Dry/flaky; Hyperkeratosis (callous) 11/02/23 1532   Margins Undefined edges 11/02/23 1532   Wound Thickness Description not for Pressure Injury Full thickness 11/02/23 1532   Number of days: 105            Percent of Wound/Ulcer Debrided: 100%    Total Surface Area Debrided:  6.48 sq cm     Diabetic/Pressure/Non Pressure Ulcers:  Ulcer: Diabetic ulcer, fat layer exposed      Bleeding:  Minimal    Hemostasis Achieved:  not needed    Procedural Pain:  0  / 10     Post Procedural Pain:  0 / 10     Response to treatment:  Well tolerated by patient. Plan:     The left lateral ankle was sharply debrided. Patient instructed continue his current wound care regimen. Patient encouraged to continue use of the Prevalon boot to offload the area when at rest.  Patient should closely monitor for signs of infection and call immediately if they are observed. Treatment Note please see attached Discharge Instructions    Written patient dismissal instructions given to patient and signed by patient or POA. Discharge [address] [facility],[address] and Hyperbaric Medicine   Physician Orders and Discharge [facility], [address] [place] [address]  Telephone: [phone]      [phone]        NAME:  Kierra Reeves                                                                                                YOB: 1955  MEDICAL RECORD NUMBER:  [id number]     Your  is:  [facility] Care/Facility: None     Wound Location: Left Lateral Ankle     Dressing orders: 1. Cleanse wound(s) with

## 2023-11-16 ENCOUNTER — HOSPITAL ENCOUNTER (OUTPATIENT)
Dept: WOUND CARE | Age: 68
Discharge: HOME | End: 2023-11-16
Attending: PODIATRIST
Payer: MEDICARE

## 2023-11-16 VITALS
DIASTOLIC BLOOD PRESSURE: 78 MMHG | HEART RATE: 100 BPM | SYSTOLIC BLOOD PRESSURE: 142 MMHG | TEMPERATURE: 97.8 F | RESPIRATION RATE: 20 BRPM

## 2023-11-16 DIAGNOSIS — L97.322 ULCER OF LEFT ANKLE, WITH FAT LAYER EXPOSED (HCC): Primary | ICD-10-CM

## 2023-11-16 PROCEDURE — 11042 DBRDMT SUBQ TIS 1ST 20SQCM/<: CPT

## 2023-11-16 PROCEDURE — 6370000000 HC RX 637 (ALT 250 FOR IP): Performed by: PODIATRIST

## 2023-11-16 PROCEDURE — 11042 DBRDMT SUBQ TIS 1ST 20SQCM/<: CPT | Performed by: PODIATRIST

## 2023-11-16 RX ORDER — IBUPROFEN 200 MG
TABLET ORAL ONCE
OUTPATIENT
Start: 2023-11-16 | End: 2023-11-16

## 2023-11-16 RX ORDER — LIDOCAINE 50 MG/G
OINTMENT TOPICAL ONCE
OUTPATIENT
Start: 2023-11-16 | End: 2023-11-16

## 2023-11-16 RX ORDER — LIDOCAINE HYDROCHLORIDE 20 MG/ML
JELLY TOPICAL ONCE
Status: COMPLETED | OUTPATIENT
Start: 2023-11-16 | End: 2023-11-16

## 2023-11-16 RX ORDER — LIDOCAINE 40 MG/G
CREAM TOPICAL ONCE
OUTPATIENT
Start: 2023-11-16 | End: 2023-11-16

## 2023-11-16 RX ORDER — SODIUM CHLOR/HYPOCHLOROUS ACID 0.033 %
SOLUTION, IRRIGATION IRRIGATION ONCE
OUTPATIENT
Start: 2023-11-16 | End: 2023-11-16

## 2023-11-16 RX ORDER — GINSENG 100 MG
CAPSULE ORAL ONCE
OUTPATIENT
Start: 2023-11-16 | End: 2023-11-16

## 2023-11-16 RX ORDER — LIDOCAINE HYDROCHLORIDE 20 MG/ML
JELLY TOPICAL ONCE
OUTPATIENT
Start: 2023-11-16 | End: 2023-11-16

## 2023-11-16 RX ORDER — CLOBETASOL PROPIONATE 0.5 MG/G
OINTMENT TOPICAL ONCE
OUTPATIENT
Start: 2023-11-16 | End: 2023-11-16

## 2023-11-16 RX ORDER — TRIAMCINOLONE ACETONIDE 1 MG/G
OINTMENT TOPICAL ONCE
OUTPATIENT
Start: 2023-11-16 | End: 2023-11-16

## 2023-11-16 RX ORDER — GENTAMICIN SULFATE 1 MG/G
OINTMENT TOPICAL ONCE
OUTPATIENT
Start: 2023-11-16 | End: 2023-11-16

## 2023-11-16 RX ORDER — BETAMETHASONE DIPROPIONATE 0.05 %
OINTMENT (GRAM) TOPICAL ONCE
OUTPATIENT
Start: 2023-11-16 | End: 2023-11-16

## 2023-11-16 RX ORDER — LIDOCAINE HYDROCHLORIDE 40 MG/ML
SOLUTION TOPICAL ONCE
OUTPATIENT
Start: 2023-11-16 | End: 2023-11-16

## 2023-11-16 RX ORDER — BACITRACIN ZINC AND POLYMYXIN B SULFATE 500; 1000 [USP'U]/G; [USP'U]/G
OINTMENT TOPICAL ONCE
OUTPATIENT
Start: 2023-11-16 | End: 2023-11-16

## 2023-11-16 RX ADMIN — LIDOCAINE HYDROCHLORIDE: 20 JELLY TOPICAL at 16:05

## 2023-11-16 ASSESSMENT — PAIN DESCRIPTION - DESCRIPTORS: DESCRIPTORS: DISCOMFORT

## 2023-11-16 ASSESSMENT — PAIN DESCRIPTION - LOCATION: LOCATION: LEG

## 2023-11-16 ASSESSMENT — PAIN SCALES - GENERAL: PAINLEVEL_OUTOF10: 3

## 2023-11-16 ASSESSMENT — PAIN DESCRIPTION - ORIENTATION: ORIENTATION: LEFT

## 2023-11-16 NOTE — DISCHARGE INSTRUCTIONS
[address] [facility]   Physician Orders and Discharge Instructions  [facility]  [place], [address]  Telephone: [phone]      [phone]        NAME:  Dano Teran                                                                                                YOB: 1955  MEDICAL RECORD NUMBER:  [id number]     Your  is:  [facility] Care/Facility: None     Wound Location: Left Lateral Ankle     Dressing orders: 1. Cleanse wound(s) with normal saline. 2. Apply dry DASHA  Or equivalent to wound bed. 3. Moisten DASHA with a few drops of normal saline. 4. Cover with dry dressing   5. Change Every other day Tuesday, Thursday and Saturday      Compression: You may wear your own compression socks (velcro compression on the Left)     Offloading Device:     Other Instructions:  Try to place a pillow under your calf to prevent pressure on the area Or use a Prevalon boot. Keep all dressings clean, dry and intact. Keep pressure off the wound(s) at all times. Follow up visit   2 Weeks November 30, 2023 @ 3:45     Please give 24 hour notice if unable to keep appointment. [phone]     If you experience any of the following, please call the Wound Care Service at  [phone] or go to the nearest emergency room. *Increase in pain         *Temperature over 101           *Increase in drainage from your wound or a foul odor  *Uncontrolled swelling            *Need for compression bandage changes due to slippage, breakthrough drainage       PLEASE NOTE: IF YOU ARE UNABLE TO OBTAIN WOUND SUPPLIES, CONTINUE TO USE THE SUPPLIES YOU HAVE AVAILABLE UNTIL YOU ARE ABLE TO REACH US.  IT IS MOST IMPORTANT TO KEEP THE WOUND COVERED AT ALL TIMES

## 2023-11-16 NOTE — PROGRESS NOTES
Post-Procedure Depth (cm) 0.4 cm 11/16/23 1626   Post-Procedure Surface Area (cm^2) 2.64 cm^2 11/16/23 1626   Post-Procedure Volume (cm^3) 1. 056 cm^3 11/16/23 1626   Wound Assessment Eschar dry;Pink/red;Slough 11/16/23 1557   Drainage Amount Small (< 25%) 11/16/23 1557   Drainage Description Serosanguinous; Yellow 11/16/23 1557   Odor None 11/16/23 1557   Gisela-wound Assessment Dry/flaky; Hyperkeratosis (callous) 11/16/23 1557   Margins Attached edges; Defined edges 11/16/23 1557   Wound Thickness Description not for Pressure Injury Full thickness 11/16/23 1557   Number of days: 119            Percent of Wound/Ulcer Debrided: 100%    Total Surface Area Debrided:  2.64 sq cm     Diabetic/Pressure/Non Pressure Ulcers:  Ulcer: Diabetic ulcer, fat layer exposed      Bleeding:  Minimal    Hemostasis Achieved:  not needed    Procedural Pain:  1  / 10     Post Procedural Pain:  0 / 10     Response to treatment:  Well tolerated by patient. Plan:     The lateral left ankle wound was sharply debrided. Patient instructed to continue the current wound care regimen and offloading of the wound. Patient should closely monitor for signs of infection and call immediately if these are observed. Treatment Note please see attached Discharge Instructions    Written patient dismissal instructions given to patient and signed by patient or POA. Discharge [address] [facility],[address] and Hyperbaric Medicine   Physician Orders and Discharge [facility], [address] [place] [address]  Telephone: [phone]      [phone]        NAME:  Jonathon Chaudhari                                                                                                YOB: 1955  MEDICAL RECORD NUMBER:  [id number]     Your  is:  [facility] Care/Facility: None     Wound Location: Left Lateral Ankle     Dressing orders: 1. Cleanse wound(s) with

## 2023-11-20 ENCOUNTER — TELEPHONE (OUTPATIENT)
Dept: PODIATRY | Age: 68
End: 2023-11-20

## 2023-11-20 NOTE — TELEPHONE ENCOUNTER
Patient states that he forgot to ask for more wound care supplies. He only has enough for one more dressing change. He says all he needs are the gauze and bandages.

## 2023-11-27 ENCOUNTER — HOSPITAL ENCOUNTER (OUTPATIENT)
Dept: LAB | Age: 68
Discharge: HOME | End: 2023-11-27
Payer: MEDICARE

## 2023-11-27 DIAGNOSIS — R80.9 MICROALBUMINURIA DUE TO TYPE 2 DIABETES MELLITUS (HCC): Chronic | ICD-10-CM

## 2023-11-27 DIAGNOSIS — E11.65 TYPE 2 DIABETES MELLITUS WITH HYPERGLYCEMIA, WITHOUT LONG-TERM CURRENT USE OF INSULIN (HCC): ICD-10-CM

## 2023-11-27 DIAGNOSIS — E66.01 CLASS 2 SEVERE OBESITY DUE TO EXCESS CALORIES WITH SERIOUS COMORBIDITY AND BODY MASS INDEX (BMI) OF 35.0 TO 35.9 IN ADULT (HCC): ICD-10-CM

## 2023-11-27 DIAGNOSIS — E78.2 MIXED HYPERLIPIDEMIA: ICD-10-CM

## 2023-11-27 DIAGNOSIS — I10 ESSENTIAL HYPERTENSION: Chronic | ICD-10-CM

## 2023-11-27 DIAGNOSIS — E11.29 MICROALBUMINURIA DUE TO TYPE 2 DIABETES MELLITUS (HCC): Chronic | ICD-10-CM

## 2023-11-27 LAB
ALBUMIN SERPL-MCNC: 4.2 G/DL (ref 3.5–4.6)
ALP SERPL-CCNC: 77 U/L (ref 35–104)
ALT SERPL-CCNC: 43 U/L (ref 0–41)
ANION GAP SERPL CALCULATED.3IONS-SCNC: 13 MEQ/L (ref 9–15)
AST SERPL-CCNC: 45 U/L (ref 0–40)
BILIRUB SERPL-MCNC: 0.4 MG/DL (ref 0.2–0.7)
BUN SERPL-MCNC: 17 MG/DL (ref 8–23)
CALCIUM SERPL-MCNC: 9.5 MG/DL (ref 8.5–9.9)
CHLORIDE SERPL-SCNC: 95 MEQ/L (ref 95–107)
CO2 SERPL-SCNC: 30 MEQ/L (ref 20–31)
CREAT SERPL-MCNC: 1.33 MG/DL (ref 0.7–1.2)
GLOBULIN SER CALC-MCNC: 3.3 G/DL (ref 2.3–3.5)
GLUCOSE SERPL-MCNC: 301 MG/DL (ref 70–99)
HBA1C MFR BLD: 8.8 % (ref 4.8–5.9)
POTASSIUM SERPL-SCNC: 3.4 MEQ/L (ref 3.4–4.9)
PROT SERPL-MCNC: 7.5 G/DL (ref 6.3–8)
SODIUM SERPL-SCNC: 138 MEQ/L (ref 135–144)

## 2023-11-27 PROCEDURE — 80053 COMPREHEN METABOLIC PANEL: CPT

## 2023-11-27 PROCEDURE — 36415 COLL VENOUS BLD VENIPUNCTURE: CPT

## 2023-11-27 PROCEDURE — 83036 HEMOGLOBIN GLYCOSYLATED A1C: CPT

## 2023-11-30 DIAGNOSIS — E11.29 MICROALBUMINURIA DUE TO TYPE 2 DIABETES MELLITUS (HCC): Chronic | ICD-10-CM

## 2023-11-30 DIAGNOSIS — R79.89 ABNORMAL LFTS (LIVER FUNCTION TESTS): ICD-10-CM

## 2023-11-30 DIAGNOSIS — E11.65 TYPE 2 DIABETES MELLITUS WITH HYPERGLYCEMIA, WITHOUT LONG-TERM CURRENT USE OF INSULIN (HCC): Primary | ICD-10-CM

## 2023-11-30 DIAGNOSIS — R80.9 MICROALBUMINURIA DUE TO TYPE 2 DIABETES MELLITUS (HCC): Chronic | ICD-10-CM

## 2023-11-30 DIAGNOSIS — N28.9 RENAL INSUFFICIENCY: ICD-10-CM

## 2023-12-01 DIAGNOSIS — I25.10 CAD S/P PERCUTANEOUS CORONARY ANGIOPLASTY: Chronic | ICD-10-CM

## 2023-12-01 DIAGNOSIS — Z98.61 CAD S/P PERCUTANEOUS CORONARY ANGIOPLASTY: Chronic | ICD-10-CM

## 2023-12-01 DIAGNOSIS — I63.9 CEREBROVASCULAR ACCIDENT (CVA), UNSPECIFIED MECHANISM (HCC): ICD-10-CM

## 2023-12-01 DIAGNOSIS — I73.9 PAD (PERIPHERAL ARTERY DISEASE) (HCC): ICD-10-CM

## 2023-12-01 DIAGNOSIS — I25.10 CORONARY ARTERY DISEASE INVOLVING NATIVE CORONARY ARTERY OF NATIVE HEART WITHOUT ANGINA PECTORIS: ICD-10-CM

## 2023-12-01 DIAGNOSIS — E78.2 MIXED HYPERLIPIDEMIA: ICD-10-CM

## 2023-12-01 RX ORDER — CLOPIDOGREL BISULFATE 75 MG/1
75 TABLET ORAL DAILY
Qty: 90 TABLET | Refills: 1 | Status: SHIPPED | OUTPATIENT
Start: 2023-12-01

## 2023-12-01 RX ORDER — ROSUVASTATIN CALCIUM 40 MG/1
40 TABLET, COATED ORAL NIGHTLY
Qty: 90 TABLET | Refills: 1 | Status: SHIPPED | OUTPATIENT
Start: 2023-12-01

## 2023-12-01 NOTE — TELEPHONE ENCOUNTER
Comments:     Last Office Visit (last PCP visit):   8/21/2023    Next Visit Date:  Future Appointments   Date Time Provider [address]   12/4/2023  5:00 PM [facility] Cynthia [facility] MD [address]   12[facility]2023  [facility]   3/4/2024  3:15 PM Hannah Abraham MD [facility]   6/26/2024  3:45 PM MD [facility]rec[facility]    7/11/2024  2:45 PM Holiday, Annemarie Valero, [facility]       **If hasn't been seen in over a year OR hasn't followed up according to last diabetes/ADHD visit, make appointment for patient before sending refill to provider.     Rx requested:  Requested Prescriptions     Pending Prescriptions Disp Refills    clopidogrel (PLAVIX) 75 MG tablet [Pharmacy Med Name: Clopidogrel Bisulfate 75 MG Oral Tablet] 90 tablet 3     Sig: TAKE 1 TABLET BY MOUTH DAILY    rosuvastatin (CRESTOR) 40 MG tablet [Pharmacy Med Name: Rosuvastatin Calcium 40 MG Oral Tablet] 90 tablet 3     Sig: TAKE 1 TABLET BY MOUTH NIGHTLY

## 2023-12-03 ENCOUNTER — PATIENT MESSAGE (OUTPATIENT)
Dept: FAMILY MEDICINE CLINIC | Age: 68
End: 2023-12-03

## 2023-12-04 ENCOUNTER — OFFICE VISIT (OUTPATIENT)
Dept: FAMILY MEDICINE CLINIC | Age: 68
End: 2023-12-04
Payer: MEDICARE

## 2023-12-04 VITALS
SYSTOLIC BLOOD PRESSURE: 124 MMHG | OXYGEN SATURATION: 97 % | BODY MASS INDEX: 34.57 KG/M2 | WEIGHT: 262 LBS | DIASTOLIC BLOOD PRESSURE: 68 MMHG | HEART RATE: 94 BPM

## 2023-12-04 DIAGNOSIS — I65.22 LEFT CAROTID ARTERY STENOSIS: ICD-10-CM

## 2023-12-04 DIAGNOSIS — M10.9 GOUT OF MULTIPLE SITES, UNSPECIFIED CAUSE, UNSPECIFIED CHRONICITY: Chronic | ICD-10-CM

## 2023-12-04 DIAGNOSIS — Z86.73 HISTORY OF CVA (CEREBROVASCULAR ACCIDENT): Chronic | ICD-10-CM

## 2023-12-04 DIAGNOSIS — E78.2 MIXED HYPERLIPIDEMIA: ICD-10-CM

## 2023-12-04 DIAGNOSIS — L97.322 ULCER OF LEFT ANKLE, WITH FAT LAYER EXPOSED (HCC): ICD-10-CM

## 2023-12-04 DIAGNOSIS — E11.622 TYPE 2 DIABETES MELLITUS WITH OTHER SKIN ULCER (CODE) (HCC): ICD-10-CM

## 2023-12-04 DIAGNOSIS — E11.29 MICROALBUMINURIA DUE TO TYPE 2 DIABETES MELLITUS (HCC): Chronic | ICD-10-CM

## 2023-12-04 DIAGNOSIS — E11.65 TYPE 2 DIABETES MELLITUS WITH HYPERGLYCEMIA, WITHOUT LONG-TERM CURRENT USE OF INSULIN (HCC): Primary | ICD-10-CM

## 2023-12-04 DIAGNOSIS — D75.89 MACROCYTOSIS WITHOUT ANEMIA: Chronic | ICD-10-CM

## 2023-12-04 DIAGNOSIS — I73.9 PAD (PERIPHERAL ARTERY DISEASE) (HCC): ICD-10-CM

## 2023-12-04 DIAGNOSIS — R80.9 MICROALBUMINURIA DUE TO TYPE 2 DIABETES MELLITUS (HCC): Chronic | ICD-10-CM

## 2023-12-04 DIAGNOSIS — I10 ESSENTIAL HYPERTENSION: Chronic | ICD-10-CM

## 2023-12-04 DIAGNOSIS — I25.10 CORONARY ARTERY DISEASE INVOLVING NATIVE CORONARY ARTERY OF NATIVE HEART WITHOUT ANGINA PECTORIS: ICD-10-CM

## 2023-12-04 PROBLEM — K62.5 RECTAL BLEEDING: Status: RESOLVED | Noted: 2021-08-20 | Resolved: 2023-12-04

## 2023-12-04 PROBLEM — Z86.010 HISTORY OF COLON POLYPS: Chronic | Status: ACTIVE | Noted: 2021-04-30

## 2023-12-04 PROBLEM — L02.212 CUTANEOUS ABSCESS OF BACK EXCLUDING BUTTOCKS: Status: RESOLVED | Noted: 2018-01-05 | Resolved: 2023-12-04

## 2023-12-04 PROBLEM — G45.9 TIA (TRANSIENT ISCHEMIC ATTACK): Status: RESOLVED | Noted: 2020-11-06 | Resolved: 2023-12-04

## 2023-12-04 PROBLEM — Z91.81 AT HIGH RISK FOR FALLS: Chronic | Status: ACTIVE | Noted: 2020-11-06

## 2023-12-04 PROBLEM — I69.90 LATE EFFECTS OF CVA (CEREBROVASCULAR ACCIDENT): Chronic | Status: ACTIVE | Noted: 2020-10-28

## 2023-12-04 PROBLEM — Z86.0100 HISTORY OF COLON POLYPS: Chronic | Status: ACTIVE | Noted: 2021-04-30

## 2023-12-04 PROCEDURE — 3078F DIAST BP <80 MM HG: CPT | Performed by: FAMILY MEDICINE

## 2023-12-04 PROCEDURE — 99214 OFFICE O/P EST MOD 30 MIN: CPT | Performed by: FAMILY MEDICINE

## 2023-12-04 PROCEDURE — 1123F ACP DISCUSS/DSCN MKR DOCD: CPT | Performed by: FAMILY MEDICINE

## 2023-12-04 PROCEDURE — 3052F HG A1C>EQUAL 8.0%<EQUAL 9.0%: CPT | Performed by: FAMILY MEDICINE

## 2023-12-04 PROCEDURE — 3074F SYST BP LT 130 MM HG: CPT | Performed by: FAMILY MEDICINE

## 2023-12-04 RX ORDER — GLUCOSAMINE HCL/CHONDROITIN SU 500-400 MG
CAPSULE ORAL
Qty: 100 STRIP | Refills: 5 | Status: SHIPPED | OUTPATIENT
Start: 2023-12-04

## 2023-12-04 RX ORDER — LANCETS 30 GAUGE
EACH MISCELLANEOUS
Qty: 100 EACH | Refills: 5 | Status: SHIPPED | OUTPATIENT
Start: 2023-12-04

## 2023-12-04 ASSESSMENT — ENCOUNTER SYMPTOMS
ABDOMINAL PAIN: 0
WHEEZING: 0
CHEST TIGHTNESS: 0
DIARRHEA: 0
SHORTNESS OF BREATH: 0
VOMITING: 0
ANAL BLEEDING: 0
CONSTIPATION: 0
BLOOD IN STOOL: 0
NAUSEA: 0
COUGH: 0

## 2023-12-04 NOTE — PROGRESS NOTES
David Diaz (: 1955) is a 76 y.o. male, Established patient, who presents today for:    Chief Complaint   Patient presents with    Follow-up    Diabetes    Hypertension    Hyperlipidemia         ASSESSMENT/PLAN:    1. Type 2 diabetes mellitus with hyperglycemia, without long-term current use of insulin ([facility])  Assessment & Plan:  Blood glucose above goal control and worsening based on most recent A1c and home glucose values. I again stressed with patient the importance of being more aggressive with diabetes management. Despite reviewing benefits of SGLT-2i medication patient declines starting Jardiance due to concerns over potential side effects. I reviewed with patient that the next step for management would be injectable insulin based on his A1c level and known underlying medical history. I reviewed with him the significant risks involved with continued hyperglycemia including poor healing of his left lower extremity ulcer, potential worsening microalbuminuria and kidney dysfunction, and increased risk for repeat heart attack and/or stroke. Patient declines any further medication change today in the office. \"I need to do my research on insulin. \"  I have referred him to endocrinology to establish care and obtain a second opinion regarding medication management. Patient was advised to contact the office should he change his mind about starting injectable insulin. If this were the case I would start him on Lantus 10 units at bedtime. Orders:  -     Hemoglobin A1C; Future  -     Comprehensive Metabolic Panel; Future  -     Lipid Panel; Future  -     Ambulatory referral to Endocrinology  -     Lancets MISC; Disp-100 each, R-5, NormalDX: diabetes mellitus. Use 1-3 time(s) daily - Ok to substitute per insurance (1 each = 1 box)  -     blood glucose monitor strips; Test 1-3 times a day & as needed for symptoms of irregular blood glucose.  Dispense sufficient amount for indicated testing frequency plus

## 2023-12-04 NOTE — TELEPHONE ENCOUNTER
From: Yong Diaz  To: Dr. Abrams Sink: 12/3/2023 9:09 PM EST  Subject: Medication change    Hello, I have serious reservations ref. RX Jardiance aka Empagliflozin for the following reasons. (this text is so you would know before my visit tomorrow ?s) On 11-27-23 when I had lab done my blood sugar was 301. Prior @ home 1st thing upon waking up was 192 following a painful restless nite. Prior reading was 172 after a better nite (still not great). At clinic we had to register in [facility] ([place] [address] [facility]) therefore I had to go up & down hallway ramp which aggravated wound (pain level 6+) resulting in 301 reading. I had been debrided prior Thur. . Anyway, last time we did a major change we lowered metformin & added glimperide we had to find correct dosages. With Trulicity maybe raise Metformin back from 500 to 750? The [employer] I researched seems to have several conflicts with my other meds. Coreg high B.P.; Torsemide diuretic. I used [employer]. [employer] [address]. All 3 not to use if 72 yoa or older. Common side effects include:   hyperventilation, anorexia, abdominal pain, nausea, vomiting, lethargy, mental status changes, hypotension, acute kidney injury, yeast infections of genitals, UTI's. These are COMMON SIDE EFFECTS!!!!!!!!! Silvestre De Jesus, see you tomorrow. Respectfully Khari Diaz.

## 2023-12-05 NOTE — ASSESSMENT & PLAN NOTE
Blood glucose above goal control and worsening based on most recent A1c and home glucose values. I again stressed with patient the importance of being more aggressive with diabetes management. Despite reviewing benefits of SGLT-2i medication patient declines starting Jardiance due to concerns over potential side effects. I reviewed with patient that the next step for management would be injectable insulin based on his A1c level and known underlying medical history. I reviewed with him the significant risks involved with continued hyperglycemia including poor healing of his left lower extremity ulcer, potential worsening microalbuminuria and kidney dysfunction, and increased risk for repeat heart attack and/or stroke. Patient declines any further medication change today in the office. \"I need to do my research on insulin. \"  I have referred him to endocrinology to establish care and obtain a second opinion regarding medication management. Patient was advised to contact the office should he change his mind about starting injectable insulin. If this were the case I would start him on Lantus 10 units at bedtime.

## 2023-12-07 ENCOUNTER — HOSPITAL ENCOUNTER (OUTPATIENT)
Dept: WOUND CARE | Age: 68
Discharge: HOME | End: 2023-12-07
Attending: PODIATRIST
Payer: MEDICARE

## 2023-12-07 VITALS
HEART RATE: 103 BPM | DIASTOLIC BLOOD PRESSURE: 74 MMHG | TEMPERATURE: 98.2 F | SYSTOLIC BLOOD PRESSURE: 137 MMHG | RESPIRATION RATE: 20 BRPM

## 2023-12-07 DIAGNOSIS — L97.322 ULCER OF LEFT ANKLE, WITH FAT LAYER EXPOSED (HCC): Primary | ICD-10-CM

## 2023-12-07 PROCEDURE — 11042 DBRDMT SUBQ TIS 1ST 20SQCM/<: CPT | Performed by: PODIATRIST

## 2023-12-07 PROCEDURE — 11042 DBRDMT SUBQ TIS 1ST 20SQCM/<: CPT

## 2023-12-07 PROCEDURE — 6370000000 HC RX 637 (ALT 250 FOR IP): Performed by: PODIATRIST

## 2023-12-07 RX ORDER — CLOBETASOL PROPIONATE 0.5 MG/G
OINTMENT TOPICAL ONCE
OUTPATIENT
Start: 2023-12-07 | End: 2023-12-07

## 2023-12-07 RX ORDER — LIDOCAINE 40 MG/G
CREAM TOPICAL ONCE
OUTPATIENT
Start: 2023-12-07 | End: 2023-12-07

## 2023-12-07 RX ORDER — GENTAMICIN SULFATE 1 MG/G
OINTMENT TOPICAL ONCE
OUTPATIENT
Start: 2023-12-07 | End: 2023-12-07

## 2023-12-07 RX ORDER — LIDOCAINE 50 MG/G
OINTMENT TOPICAL ONCE
OUTPATIENT
Start: 2023-12-07 | End: 2023-12-07

## 2023-12-07 RX ORDER — GINSENG 100 MG
CAPSULE ORAL ONCE
OUTPATIENT
Start: 2023-12-07 | End: 2023-12-07

## 2023-12-07 RX ORDER — BACITRACIN ZINC AND POLYMYXIN B SULFATE 500; 1000 [USP'U]/G; [USP'U]/G
OINTMENT TOPICAL ONCE
OUTPATIENT
Start: 2023-12-07 | End: 2023-12-07

## 2023-12-07 RX ORDER — LIDOCAINE HYDROCHLORIDE 20 MG/ML
JELLY TOPICAL ONCE
Status: COMPLETED | OUTPATIENT
Start: 2023-12-07 | End: 2023-12-07

## 2023-12-07 RX ORDER — BETAMETHASONE DIPROPIONATE 0.05 %
OINTMENT (GRAM) TOPICAL ONCE
OUTPATIENT
Start: 2023-12-07 | End: 2023-12-07

## 2023-12-07 RX ORDER — LIDOCAINE HYDROCHLORIDE 20 MG/ML
JELLY TOPICAL ONCE
OUTPATIENT
Start: 2023-12-07 | End: 2023-12-07

## 2023-12-07 RX ORDER — LIDOCAINE HYDROCHLORIDE 40 MG/ML
SOLUTION TOPICAL ONCE
OUTPATIENT
Start: 2023-12-07 | End: 2023-12-07

## 2023-12-07 RX ORDER — IBUPROFEN 200 MG
TABLET ORAL ONCE
OUTPATIENT
Start: 2023-12-07 | End: 2023-12-07

## 2023-12-07 RX ORDER — TRIAMCINOLONE ACETONIDE 1 MG/G
OINTMENT TOPICAL ONCE
OUTPATIENT
Start: 2023-12-07 | End: 2023-12-07

## 2023-12-07 RX ORDER — SODIUM CHLOR/HYPOCHLOROUS ACID 0.033 %
SOLUTION, IRRIGATION IRRIGATION ONCE
OUTPATIENT
Start: 2023-12-07 | End: 2023-12-07

## 2023-12-07 RX ADMIN — LIDOCAINE HYDROCHLORIDE: 20 JELLY TOPICAL at 16:29

## 2023-12-07 ASSESSMENT — PAIN DESCRIPTION - DESCRIPTORS: DESCRIPTORS: BURNING

## 2023-12-07 ASSESSMENT — PAIN DESCRIPTION - ORIENTATION: ORIENTATION: LEFT

## 2023-12-07 ASSESSMENT — PAIN SCALES - GENERAL: PAINLEVEL_OUTOF10: 3

## 2023-12-07 ASSESSMENT — PAIN DESCRIPTION - LOCATION: LOCATION: LEG

## 2023-12-07 NOTE — PROGRESS NOTES
[address]                                                   Progress Note and Procedure Note      [facility]  MEDICAL RECORD NUMBER:  [id number]  AGE: 76 y.o. GENDER: male  : 1955  EPISODE DATE:  2023    Subjective:     Chief Complaint   Patient presents with    Wound Check         HISTORY of PRESENT ILLNESS HPI     Charo Flores is a 76 y.o. male who presents today for wound/ulcer evaluation. 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 Patient reports compliance with dressing changes and keeping wound offloaded. Patient has no other signs of infection. Patient denies nausea, vomiting, fever, chills, chest pain, or shortness of breath.      Wound/Ulcer Pain Timing/Severity: intermittent  Quality of pain: burning  Severity: Moderate  Modifying Factors: Pain worsens with direct pressure to the wound such as from the edge of his compression garment  Associated Signs/Symptoms: edema and drainage    Ulcer Identification:  Ulcer Type: diabetic  Contributing Factors: edema, venous stasis, diabetes, and chronic pressure    Wound:  Diabetic ulceration left ankle        PAST MEDICAL HISTORY        Diagnosis Date    Abscess of back 2020    Abscess of right leg 2020    Acute renal failure (HCC)     Adenomatous polyp of ascending colon     Adenomatous polyp of descending colon     Adenomatous polyp of transverse colon     Anxiety     CAD S/P percutaneous coronary angioplasty 2014    Cardiomyopathy ([address])     Cecal polyp     Cerebrovascular accident (CVA) due to occlusion of left middle cerebral artery ([address]) 2016    brainstem infarction from left MCA occlusion    Cerebrovascular disease 2016    Claudication [facility])     Colon polyp     Coronary angioplasty status     Cutaneous abscess of back excluding buttocks 2018    CVA (cerebral vascular accident) ([address]) 2017

## 2023-12-07 NOTE — DISCHARGE INSTRUCTIONS
[address] and Hyper[facility] Medicine   Physician Orders and Discharge Instructions  [facility], [address]  Telephone: [phone]      [phone]        NAME:  Maria Isabel Reed                                                                                                YOB: 1955  MEDICAL RECORD NUMBER:  [id number]     Your  is:  [facility] Care/Facility: None     Wound Location: Left Lateral Ankle     Dressing orders: 1. Cleanse wound(s) with normal saline. 2. Apply dry DASHA  Or equivalent to wound bed. 3. Moisten DASHA with a few drops of normal saline. 4. Cover with dry dressing   5. Change Every other day Tuesday, Thursday and Saturday      Compression: You may wear your own compression socks (velcro compression on the Left)     Offloading Device:     Other Instructions:  Try to place a pillow under your calf to prevent pressure on the area Or use a Prevalon boot. Keep all dressings clean, dry and intact. Keep pressure off the wound(s) at all times. Follow up visit   1 Week  December 14 , 2023 @  3:15     Please give 24 hour notice if unable to keep appointment. [phone]     If you experience any of the following, please call the Wound Care Service at  [phone] or go to the nearest emergency room. *Increase in pain         *Temperature over 101           *Increase in drainage from your wound or a foul odor  *Uncontrolled swelling            *Need for compression bandage changes due to slippage, breakthrough drainage       PLEASE NOTE: IF YOU ARE UNABLE TO OBTAIN WOUND SUPPLIES, CONTINUE TO USE THE SUPPLIES YOU HAVE AVAILABLE UNTIL YOU ARE ABLE TO REACH US.  IT IS MOST IMPORTANT TO KEEP THE WOUND COVERED AT ALL TIMES

## 2023-12-11 ENCOUNTER — OFFICE VISIT (OUTPATIENT)
Dept: ENDOCRINOLOGY | Age: 68
End: 2023-12-11
Payer: MEDICARE

## 2023-12-11 VITALS
WEIGHT: 258 LBS | OXYGEN SATURATION: 94 % | HEART RATE: 98 BPM | HEIGHT: 73 IN | DIASTOLIC BLOOD PRESSURE: 67 MMHG | SYSTOLIC BLOOD PRESSURE: 107 MMHG | BODY MASS INDEX: 34.19 KG/M2

## 2023-12-11 DIAGNOSIS — E11.22 TYPE 2 DIABETES MELLITUS WITH STAGE 3A CHRONIC KIDNEY DISEASE, WITHOUT LONG-TERM CURRENT USE OF INSULIN (HCC): Primary | ICD-10-CM

## 2023-12-11 DIAGNOSIS — E11.65 TYPE 2 DIABETES MELLITUS WITH HYPERGLYCEMIA, UNSPECIFIED WHETHER LONG TERM INSULIN USE (HCC): ICD-10-CM

## 2023-12-11 DIAGNOSIS — N18.31 TYPE 2 DIABETES MELLITUS WITH STAGE 3A CHRONIC KIDNEY DISEASE, WITHOUT LONG-TERM CURRENT USE OF INSULIN (HCC): Primary | ICD-10-CM

## 2023-12-11 LAB
CHP ED QC CHECK: ABNORMAL
GLUCOSE BLD-MCNC: 360 MG/DL

## 2023-12-11 PROCEDURE — 3074F SYST BP LT 130 MM HG: CPT | Performed by: PHYSICIAN ASSISTANT

## 2023-12-11 PROCEDURE — 82962 GLUCOSE BLOOD TEST: CPT | Performed by: PHYSICIAN ASSISTANT

## 2023-12-11 PROCEDURE — 3078F DIAST BP <80 MM HG: CPT | Performed by: PHYSICIAN ASSISTANT

## 2023-12-11 PROCEDURE — 1123F ACP DISCUSS/DSCN MKR DOCD: CPT | Performed by: PHYSICIAN ASSISTANT

## 2023-12-11 PROCEDURE — 99214 OFFICE O/P EST MOD 30 MIN: CPT | Performed by: PHYSICIAN ASSISTANT

## 2023-12-11 PROCEDURE — 3052F HG A1C>EQUAL 8.0%<EQUAL 9.0%: CPT | Performed by: PHYSICIAN ASSISTANT

## 2023-12-11 RX ORDER — DULAGLUTIDE 4.5 MG/.5ML
4.5 INJECTION, SOLUTION SUBCUTANEOUS WEEKLY
Qty: 12 ADJUSTABLE DOSE PRE-FILLED PEN SYRINGE | Refills: 3 | Status: SHIPPED | OUTPATIENT
Start: 2023-12-11

## 2023-12-11 ASSESSMENT — ENCOUNTER SYMPTOMS
SORE THROAT: 0
SHORTNESS OF BREATH: 0
RHINORRHEA: 0
VOMITING: 0
NAUSEA: 0
COUGH: 0
WHEEZING: 0
SINUS PRESSURE: 0
DIARRHEA: 0
ABDOMINAL PAIN: 0

## 2023-12-11 NOTE — PROGRESS NOTES
of Onset    Breast Cancer Mother     Stroke Father     Colon Cancer Father 76     Allergies   Allergen Reactions    Bactrim [Sulfamethoxazole-Trimethoprim] Nausea And Vomiting and Other (See Comments)     Sugar count elevated, had troubles urinating       Current Outpatient Medications:     Lancets MISC, DX: diabetes mellitus. Use 1-3 time(s) daily - Ok to substitute per insurance (1 each = 1 box), Disp: 100 each, Rfl: 5    blood glucose monitor strips, Test 1-3 times a day & as needed for symptoms of irregular blood glucose. Dispense sufficient amount for indicated testing frequency plus additional to accommodate PRN testing needs. , Disp: 100 strip, Rfl: 5    clopidogrel (PLAVIX) 75 MG tablet, Take 1 tablet by mouth daily, Disp: 90 tablet, Rfl: 1    rosuvastatin (CRESTOR) 40 MG tablet, Take 1 tablet by mouth nightly, Disp: 90 tablet, Rfl: 1    metFORMIN (GLUCOPHAGE) 500 MG tablet, Take 1 tablet by mouth 2 times daily (with meals), Disp: 180 tablet, Rfl: 1    torsemide (DEMADEX) 20 MG tablet, Take 1 tablet by mouth daily, Disp: 90 tablet, Rfl: 1    allopurinol (ZYLOPRIM) 300 MG tablet, Take 1 tablet by mouth daily, Disp: 90 tablet, Rfl: 1    carvedilol (COREG) 25 MG tablet, Take 1 tablet by mouth 2 times daily, Disp: 180 tablet, Rfl: 1    Alcohol Swabs PADS, DX: diabetes mellitus. Test 1 time(s) daily - Ok to substitute per insurance (1 each = 1 box), Disp: 100 each, Rfl: 5    acetaminophen (TYLENOL) 500 MG tablet, Take 1 tablet by mouth every 6 hours as needed for Pain, Disp: , Rfl:     VITAMIN D PO, , Disp: , Rfl:     Elastic Bandages & Supports (V-4 HIGH COMPRESSION HOSE) MISC, KNEE HIGH - 20-30 mmHg, Disp: 2 each, Rfl: 1    blood glucose monitor kit and supplies, DX: diabetes mellitus.  Test 1 time(s) daily - True Metrix requested by patient - Edinson Levine to substitute per insurance, Disp: 1 kit, Rfl: 0    aspirin 81 MG EC tablet, Take 1 tablet by mouth daily, Disp: , Rfl:     Probiotic Product (PROBIOTIC DAILY PO), Take

## 2023-12-11 NOTE — PATIENT INSTRUCTIONS
Endocrinology-    Check your blood sugars 4 times a day, before meals and at night  Document these numbers in a blood glucose log and bring them with you to your follow-up appointment. If you are prescribed insulin, Do not take your mealtime insulin if your blood sugars less than 120   Call our office if you have blood sugars less than 80 or greater then 300 on two or more occasions  Call our office if you have any questions regarding your blood sugars or insulin dosing regiment  Signs of low blood sugar may include tremors, feeling shaky, sweating, dizziness, confusion and weakness. Check your blood sugar immediatly if you have any of these symptoms.      The plan as discussed at your appointment-   Increase Trulicity 4.5 mg injected weekly  Metformin 500 mg before breakfast and dinner  Glucose logs given   Repeat labs 4-5 days before your follow up appointment   Follow up in 3 months

## 2023-12-14 ENCOUNTER — HOSPITAL ENCOUNTER (OUTPATIENT)
Dept: WOUND CARE | Age: 68
Discharge: HOME | End: 2023-12-14
Attending: PODIATRIST
Payer: MEDICARE

## 2023-12-14 VITALS
HEART RATE: 99 BPM | DIASTOLIC BLOOD PRESSURE: 65 MMHG | TEMPERATURE: 97.3 F | RESPIRATION RATE: 20 BRPM | SYSTOLIC BLOOD PRESSURE: 142 MMHG

## 2023-12-14 DIAGNOSIS — L97.322 ULCER OF LEFT ANKLE, WITH FAT LAYER EXPOSED (HCC): Primary | ICD-10-CM

## 2023-12-14 PROCEDURE — 11042 DBRDMT SUBQ TIS 1ST 20SQCM/<: CPT

## 2023-12-14 PROCEDURE — 11042 DBRDMT SUBQ TIS 1ST 20SQCM/<: CPT | Performed by: PODIATRIST

## 2023-12-14 PROCEDURE — 6370000000 HC RX 637 (ALT 250 FOR IP): Performed by: PODIATRIST

## 2023-12-14 RX ORDER — LIDOCAINE 50 MG/G
OINTMENT TOPICAL ONCE
OUTPATIENT
Start: 2023-12-14 | End: 2023-12-14

## 2023-12-14 RX ORDER — BETAMETHASONE DIPROPIONATE 0.05 %
OINTMENT (GRAM) TOPICAL ONCE
OUTPATIENT
Start: 2023-12-14 | End: 2023-12-14

## 2023-12-14 RX ORDER — TRIAMCINOLONE ACETONIDE 1 MG/G
OINTMENT TOPICAL ONCE
OUTPATIENT
Start: 2023-12-14 | End: 2023-12-14

## 2023-12-14 RX ORDER — GENTAMICIN SULFATE 1 MG/G
OINTMENT TOPICAL ONCE
OUTPATIENT
Start: 2023-12-14 | End: 2023-12-14

## 2023-12-14 RX ORDER — LIDOCAINE 40 MG/G
CREAM TOPICAL ONCE
Status: COMPLETED | OUTPATIENT
Start: 2023-12-14 | End: 2023-12-14

## 2023-12-14 RX ORDER — IBUPROFEN 200 MG
TABLET ORAL ONCE
OUTPATIENT
Start: 2023-12-14 | End: 2023-12-14

## 2023-12-14 RX ORDER — CLOBETASOL PROPIONATE 0.5 MG/G
OINTMENT TOPICAL ONCE
OUTPATIENT
Start: 2023-12-14 | End: 2023-12-14

## 2023-12-14 RX ORDER — LIDOCAINE HYDROCHLORIDE 20 MG/ML
JELLY TOPICAL ONCE
OUTPATIENT
Start: 2023-12-14 | End: 2023-12-14

## 2023-12-14 RX ORDER — LIDOCAINE 40 MG/G
CREAM TOPICAL ONCE
OUTPATIENT
Start: 2023-12-14 | End: 2023-12-14

## 2023-12-14 RX ORDER — SODIUM CHLOR/HYPOCHLOROUS ACID 0.033 %
SOLUTION, IRRIGATION IRRIGATION ONCE
OUTPATIENT
Start: 2023-12-14 | End: 2023-12-14

## 2023-12-14 RX ORDER — GINSENG 100 MG
CAPSULE ORAL ONCE
OUTPATIENT
Start: 2023-12-14 | End: 2023-12-14

## 2023-12-14 RX ORDER — BACITRACIN ZINC AND POLYMYXIN B SULFATE 500; 1000 [USP'U]/G; [USP'U]/G
OINTMENT TOPICAL ONCE
OUTPATIENT
Start: 2023-12-14 | End: 2023-12-14

## 2023-12-14 RX ORDER — LIDOCAINE HYDROCHLORIDE 40 MG/ML
SOLUTION TOPICAL ONCE
OUTPATIENT
Start: 2023-12-14 | End: 2023-12-14

## 2023-12-14 RX ADMIN — LIDOCAINE 4%: 4 CREAM TOPICAL at 15:33

## 2023-12-14 ASSESSMENT — PAIN SCALES - GENERAL: PAINLEVEL_OUTOF10: 6

## 2023-12-14 ASSESSMENT — PAIN DESCRIPTION - LOCATION: LOCATION: LEG

## 2023-12-14 ASSESSMENT — PAIN DESCRIPTION - DESCRIPTORS: DESCRIPTORS: BURNING

## 2023-12-14 ASSESSMENT — PAIN DESCRIPTION - ORIENTATION: ORIENTATION: LEFT

## 2023-12-14 NOTE — ADDENDUM NOTE
Encounter addended by: Lincoln Red RN on: 12/14/2023 4:28 PM   Actions taken: Flowsheet accepted, Patient Education documented on, Charge Capture section accepted, Therapy plan modified

## 2023-12-14 NOTE — PROGRESS NOTES
[address]                                                   Progress Note and Procedure Note      Suzanna Fitch [facility]  MEDICAL RECORD NUMBER:  [id number]  AGE: 76 y.o. GENDER: male  : 1955  EPISODE DATE:  2023    Subjective:     Chief Complaint   Patient presents with    Wound Check         HISTORY of PRESENT ILLNESS HPI     Marshall Porter is a 76 y.o. male who presents today for wound/ulcer evaluation. History of Wound Context: Patient presents for continued treatment of a chronic diabetic ulceration of the left ankle. Patient reports compliance with dressing changes and use of offloading boot while at rest.  Patient has not observed signs of infection. Patient denies nausea, vomiting, fever, chills, chest pain, or shortness of breath. Wound/Ulcer Pain Timing/Severity: intermittent  Quality of pain: sharp, burning  Severity:  6 / 10   Modifying Factors: Pain worsens with direct pressure to the wound and with certain positions.   Associated Signs/Symptoms: drainage and pain    Ulcer Identification:  Ulcer Type: diabetic  Contributing Factors: edema, venous stasis, and chronic pressure    Wound:  Full-thickness ulceration left lateral ankle        PAST MEDICAL HISTORY        Diagnosis Date    Abscess of back 2020    Abscess of right leg 2020    Acute renal failure (HCC)     Adenomatous polyp of ascending colon     Adenomatous polyp of descending colon     Adenomatous polyp of transverse colon     Anxiety     CAD S/P percutaneous coronary angioplasty 2014    Cardiomyopathy ([address])     Cecal polyp     Cerebrovascular accident (CVA) due to occlusion of left middle cerebral artery ([address]) 2016    brainstem infarction from left MCA occlusion    Cerebrovascular disease 2016    Claudication [facility])     Colon polyp     Coronary angioplasty status     Cutaneous abscess of back excluding buttocks 2018    CVA (cerebral vascular accident) ([address]) 2017    Dependent

## 2023-12-14 NOTE — DISCHARGE INSTRUCTIONS
[address] [facility]   Physician Orders and Discharge Instructions  [facility], [address] [place] [address] Avenue  Telephone: [phone]      [phone]        NAME:  Ryan Antony                                                                                                YOB: 1955  MEDICAL RECORD NUMBER:  [id number]     Your  is:  Tobin Coates Care/Facility: None     Wound Location: Left Lateral Ankle     Dressing orders:. 1. Cleanse wound(s) with normal saline. 2. Apply dry HYDROFERA BLUE READY FOAM or equivalent to wound bed. NOTE: If your [place] is not soft and pliable, moisten with saline until it is sponge like and then ring out excess saline and apply to wound bed. 3. Cover with 4x4's and wrap with gauze (lazarus or kerlix)  4. Change every other day Tuesday, Thursday and Saturday. Compression: You may wear your own compression socks (velcro compression on the Left)     Offloading Device:     Other Instructions:  Try to place a pillow under your calf to prevent pressure on the area Or use a Prevalon boot. Keep all dressings clean, dry and intact. Keep pressure off the wound(s) at all times. Follow up visit   1 Week  December 21, 2023 @  3:00     Please give 24 hour notice if unable to keep appointment. [phone]     If you experience any of the following, please call the Wound Care Service at  [phone] or go to the nearest emergency room. *Increase in pain         *Temperature over 101           *Increase in drainage from your wound or a foul odor  *Uncontrolled swelling            *Need for compression bandage changes due to slippage, breakthrough drainage       PLEASE NOTE: IF YOU ARE UNABLE TO OBTAIN WOUND SUPPLIES, CONTINUE TO USE THE SUPPLIES YOU HAVE AVAILABLE UNTIL YOU ARE ABLE TO REACH US.  IT IS MOST IMPORTANT TO KEEP THE WOUND COVERED AT ALL TIMES

## 2023-12-28 ENCOUNTER — HOSPITAL ENCOUNTER (OUTPATIENT)
Dept: WOUND CARE | Age: 68
Discharge: HOME | End: 2023-12-28
Attending: PODIATRIST
Payer: MEDICARE

## 2023-12-28 VITALS
SYSTOLIC BLOOD PRESSURE: 129 MMHG | DIASTOLIC BLOOD PRESSURE: 72 MMHG | TEMPERATURE: 98.1 F | HEART RATE: 99 BPM | RESPIRATION RATE: 20 BRPM

## 2023-12-28 DIAGNOSIS — L97.921 CHRONIC ULCER OF LEFT LEG, LIMITED TO BREAKDOWN OF SKIN (HCC): ICD-10-CM

## 2023-12-28 DIAGNOSIS — L97.322 ULCER OF LEFT ANKLE, WITH FAT LAYER EXPOSED (HCC): Primary | ICD-10-CM

## 2023-12-28 PROCEDURE — 97597 DBRDMT OPN WND 1ST 20 CM/<: CPT | Performed by: PODIATRIST

## 2023-12-28 PROCEDURE — 97597 DBRDMT OPN WND 1ST 20 CM/<: CPT

## 2023-12-28 PROCEDURE — 6370000000 HC RX 637 (ALT 250 FOR IP): Performed by: PODIATRIST

## 2023-12-28 PROCEDURE — 97598 DBRDMT OPN WND ADDL 20CM/<: CPT | Performed by: PODIATRIST

## 2023-12-28 PROCEDURE — 11042 DBRDMT SUBQ TIS 1ST 20SQCM/<: CPT | Performed by: PODIATRIST

## 2023-12-28 PROCEDURE — 11042 DBRDMT SUBQ TIS 1ST 20SQCM/<: CPT

## 2023-12-28 RX ORDER — LIDOCAINE 40 MG/G
CREAM TOPICAL ONCE
Status: COMPLETED | OUTPATIENT
Start: 2023-12-28 | End: 2023-12-28

## 2023-12-28 RX ORDER — BACITRACIN ZINC AND POLYMYXIN B SULFATE 500; 1000 [USP'U]/G; [USP'U]/G
OINTMENT TOPICAL ONCE
OUTPATIENT
Start: 2023-12-28 | End: 2023-12-28

## 2023-12-28 RX ORDER — LIDOCAINE 50 MG/G
OINTMENT TOPICAL ONCE
OUTPATIENT
Start: 2023-12-28 | End: 2023-12-28

## 2023-12-28 RX ORDER — TRIAMCINOLONE ACETONIDE 1 MG/G
OINTMENT TOPICAL ONCE
OUTPATIENT
Start: 2023-12-28 | End: 2023-12-28

## 2023-12-28 RX ORDER — GENTAMICIN SULFATE 1 MG/G
OINTMENT TOPICAL ONCE
OUTPATIENT
Start: 2023-12-28 | End: 2023-12-28

## 2023-12-28 RX ORDER — IBUPROFEN 200 MG
TABLET ORAL ONCE
OUTPATIENT
Start: 2023-12-28 | End: 2023-12-28

## 2023-12-28 RX ORDER — LIDOCAINE 40 MG/G
CREAM TOPICAL ONCE
OUTPATIENT
Start: 2023-12-28 | End: 2023-12-28

## 2023-12-28 RX ORDER — LIDOCAINE HYDROCHLORIDE 20 MG/ML
JELLY TOPICAL ONCE
OUTPATIENT
Start: 2023-12-28 | End: 2023-12-28

## 2023-12-28 RX ORDER — SODIUM CHLOR/HYPOCHLOROUS ACID 0.033 %
SOLUTION, IRRIGATION IRRIGATION ONCE
OUTPATIENT
Start: 2023-12-28 | End: 2023-12-28

## 2023-12-28 RX ORDER — BETAMETHASONE DIPROPIONATE 0.05 %
OINTMENT (GRAM) TOPICAL ONCE
OUTPATIENT
Start: 2023-12-28 | End: 2023-12-28

## 2023-12-28 RX ORDER — LIDOCAINE HYDROCHLORIDE 40 MG/ML
SOLUTION TOPICAL ONCE
OUTPATIENT
Start: 2023-12-28 | End: 2023-12-28

## 2023-12-28 RX ORDER — GINSENG 100 MG
CAPSULE ORAL ONCE
OUTPATIENT
Start: 2023-12-28 | End: 2023-12-28

## 2023-12-28 RX ORDER — CLOBETASOL PROPIONATE 0.5 MG/G
OINTMENT TOPICAL ONCE
OUTPATIENT
Start: 2023-12-28 | End: 2023-12-28

## 2023-12-28 RX ADMIN — LIDOCAINE 4%: 4 CREAM TOPICAL at 15:51

## 2023-12-28 NOTE — PROGRESS NOTES
BLUE is not soft and pliable, moisten with saline until it is sponge like and then ring out excess saline and apply to wound bed. 3. Cover with 4x4's and wrap with gauze (lazarus or kerlix)  4. Change every other day Tuesday, Thursday and Saturday. Compression: You may wear your own compression socks (velcro compression on the Left) or . Apply 10-20mmHg Spandagrip to LEFT lower leg, apply first thing in the morning, remove at bedtime, elevate legs as much as possible during the day. (43)     Offloading Device:     Other Instructions:  Try to place a pillow under your calf to prevent pressure on the area Or use a Prevalon boot. Keep all dressings clean, dry and intact. Keep pressure off the wound(s) at all times. Follow up visit   2 Weeks  January 11, 2024 @  3:45     Please give 24 hour notice if unable to keep appointment. [phone]     If you experience any of the following, please call the Wound Care Service at  [phone] or go to the nearest emergency room. *Increase in pain         *Temperature over 101           *Increase in drainage from your wound or a foul odor  *Uncontrolled swelling            *Need for compression bandage changes due to slippage, breakthrough drainage       PLEASE NOTE: IF YOU ARE UNABLE TO OBTAIN WOUND SUPPLIES, CONTINUE TO USE THE SUPPLIES YOU HAVE AVAILABLE UNTIL YOU ARE ABLE TO REACH US.  IT IS MOST IMPORTANT TO KEEP THE WOUND COVERED AT ALL TIMES              Electronically signed by Malka Plata DPM on 12/28/2023 at 4:20 PM

## 2023-12-28 NOTE — DISCHARGE INSTRUCTIONS
[address] [facility]   Physician Orders and Discharge Instructions  [facility], [address]  Telephone: [phone]      [phone]        NAME:  Ashley Carrasco                                                                                                YOB: 1955  MEDICAL RECORD NUMBER:  [id number]     Your  is:  Tobin Coates Care/Facility: None     Wound Location: Left Lateral Ankle     Dressing orders:. 1. Cleanse wound(s) with normal saline. 2. Apply dry HYDROFERA BLUE READY FOAM or equivalent to wound bed. NOTE: If your [place] is not soft and pliable, moisten with saline until it is sponge like and then ring out excess saline and apply to wound bed. 3. Cover with 4x4's and wrap with gauze (lazarus or kerlix)  4. Change every other day Tuesday, Thursday and Saturday. Compression: You may wear your own compression socks (velcro compression on the Left) or . Apply 10-20mmHg Spandagrip to LEFT lower leg, apply first thing in the morning, remove at bedtime, elevate legs as much as possible during the day. (43)     Offloading Device:     Other Instructions:  Try to place a pillow under your calf to prevent pressure on the area Or use a Prevalon boot. Keep all dressings clean, dry and intact. Keep pressure off the wound(s) at all times. Follow up visit   2 Weeks  January 11, 2024 @  3:45     Please give 24 hour notice if unable to keep appointment. [phone]     If you experience any of the following, please call the Wound Care Service at  [phone] or go to the nearest emergency room.         *Increase in pain         *Temperature over 101           *Increase in drainage from your wound or a foul odor  *Uncontrolled swelling            *Need for compression bandage changes due to slippage, breakthrough drainage       PLEASE NOTE: IF YOU ARE UNABLE TO OBTAIN WOUND

## 2023-12-28 NOTE — FLOWSHEET NOTE
[address] [place] [address] West:     [employer] [id number] [facility] p: [phone] f: [phone]     Ordering Center:     [address] [place] [address] [address] [place] [address]  Wilfredo Velez [address]  [phone]  WOUND CARE [phone]  FAX NUMBER [phone]    Patient Information:      Hien Rosario  [facility]  [address] [facility] [facility]   [phone]   : 1955  AGE: 76 y.o.      GENDER: male   TODAYS DATE:  2023    Insurance:      PRIMARY INSURANCE:  Plan: [employer] MEDICARE ADVANTAGE  Coverage: [facility] MEDICARE  Effective Date: 2023  [id number] - (Medicare Managed)    SECONDARY INSURANCE:  Plan:   Coverage:   Effective Date:   [unfilled]    [unfilled]   [unfilled]     Patient Wound Information:      Problem List Items Addressed This Visit          Other    Ulcer of left ankle, with fat layer exposed ([address]) - Primary    Relevant Orders    Initiate Outpatient Wound Care Protocol       WOUNDS REQUIRING DRESSING SUPPLIES:     Wound 23 Ankle Left;Lateral #1 Left lateral ankle (Active)   Wound Image    23 1543   Wound Etiology Diabetic 23 1543   Wound Cleansed Cleansed with saline 23 1543   Dressing/Treatment Hydrofera blue;Dry dressing 23 1543   Offloading for Diabetic Foot Ulcers Offloading ordered;Diabetic shoes/inserts 23 1543   Wound Length (cm) 1 cm 23 1543   Wound Width (cm) 0.7 cm 23 1543   Wound Depth (cm) 0.2 cm 23 1543   Wound Surface Area (cm^2) 0.7 cm^2 23 1543   Change in Wound Size % (l*w) 48.15 23 1543   Wound Volume (cm^3) 0.14 cm^3 23 1543   Wound Healing % -4 23 1543   Post-Procedure Length (cm) 1 cm 23 1609   Post-Procedure Width (cm) 0.7 cm 23 1609   Post-Procedure Depth (cm) 0.25 cm 23 1609   Post-Procedure Surface Area (cm^2) 0.7 cm^2 23 1609   Post-Procedure Volume (cm^3) 0.175 cm^3 23 1609   Wound Assessment Pink/red;Slough 23

## 2024-01-11 ENCOUNTER — HOSPITAL ENCOUNTER (OUTPATIENT)
Dept: WOUND CARE | Age: 69
Discharge: HOME | End: 2024-01-11
Attending: PODIATRIST
Payer: MEDICARE

## 2024-01-11 VITALS
SYSTOLIC BLOOD PRESSURE: 130 MMHG | DIASTOLIC BLOOD PRESSURE: 69 MMHG | RESPIRATION RATE: 18 BRPM | HEART RATE: 95 BPM | TEMPERATURE: 97.8 F

## 2024-01-11 DIAGNOSIS — L97.322 ULCER OF LEFT ANKLE, WITH FAT LAYER EXPOSED (HCC): Primary | ICD-10-CM

## 2024-01-11 PROCEDURE — 6370000000 HC RX 637 (ALT 250 FOR IP): Performed by: PODIATRIST

## 2024-01-11 PROCEDURE — 11042 DBRDMT SUBQ TIS 1ST 20SQCM/<: CPT

## 2024-01-11 PROCEDURE — 11042 DBRDMT SUBQ TIS 1ST 20SQCM/<: CPT | Performed by: PODIATRIST

## 2024-01-11 RX ORDER — LIDOCAINE 50 MG/G
OINTMENT TOPICAL ONCE
OUTPATIENT
Start: 2024-01-11 | End: 2024-01-11

## 2024-01-11 RX ORDER — LIDOCAINE 40 MG/G
CREAM TOPICAL ONCE
OUTPATIENT
Start: 2024-01-11 | End: 2024-01-11

## 2024-01-11 RX ORDER — SODIUM CHLOR/HYPOCHLOROUS ACID 0.033 %
SOLUTION, IRRIGATION IRRIGATION ONCE
OUTPATIENT
Start: 2024-01-11 | End: 2024-01-11

## 2024-01-11 RX ORDER — IBUPROFEN 200 MG
TABLET ORAL ONCE
OUTPATIENT
Start: 2024-01-11 | End: 2024-01-11

## 2024-01-11 RX ORDER — LIDOCAINE HYDROCHLORIDE 20 MG/ML
JELLY TOPICAL ONCE
OUTPATIENT
Start: 2024-01-11 | End: 2024-01-11

## 2024-01-11 RX ORDER — GENTAMICIN SULFATE 1 MG/G
OINTMENT TOPICAL ONCE
OUTPATIENT
Start: 2024-01-11 | End: 2024-01-11

## 2024-01-11 RX ORDER — LIDOCAINE HYDROCHLORIDE 40 MG/ML
SOLUTION TOPICAL ONCE
OUTPATIENT
Start: 2024-01-11 | End: 2024-01-11

## 2024-01-11 RX ORDER — BACITRACIN ZINC AND POLYMYXIN B SULFATE 500; 1000 [USP'U]/G; [USP'U]/G
OINTMENT TOPICAL ONCE
OUTPATIENT
Start: 2024-01-11 | End: 2024-01-11

## 2024-01-11 RX ORDER — TRIAMCINOLONE ACETONIDE 1 MG/G
OINTMENT TOPICAL ONCE
OUTPATIENT
Start: 2024-01-11 | End: 2024-01-11

## 2024-01-11 RX ORDER — CLOBETASOL PROPIONATE 0.5 MG/G
OINTMENT TOPICAL ONCE
OUTPATIENT
Start: 2024-01-11 | End: 2024-01-11

## 2024-01-11 RX ORDER — LIDOCAINE 40 MG/G
CREAM TOPICAL ONCE
Status: COMPLETED | OUTPATIENT
Start: 2024-01-11 | End: 2024-01-11

## 2024-01-11 RX ORDER — BETAMETHASONE DIPROPIONATE 0.05 %
OINTMENT (GRAM) TOPICAL ONCE
OUTPATIENT
Start: 2024-01-11 | End: 2024-01-11

## 2024-01-11 RX ORDER — GINSENG 100 MG
CAPSULE ORAL ONCE
OUTPATIENT
Start: 2024-01-11 | End: 2024-01-11

## 2024-01-11 RX ADMIN — LIDOCAINE 4%: 4 CREAM TOPICAL at 16:15

## 2024-01-11 ASSESSMENT — PAIN DESCRIPTION - DESCRIPTORS: DESCRIPTORS: BURNING

## 2024-01-11 ASSESSMENT — PAIN DESCRIPTION - ORIENTATION: ORIENTATION: LEFT

## 2024-01-11 ASSESSMENT — PAIN SCALES - GENERAL: PAINLEVEL_OUTOF10: 3

## 2024-01-11 ASSESSMENT — PAIN DESCRIPTION - LOCATION: LOCATION: LEG

## 2024-01-11 NOTE — DISCHARGE INSTRUCTIONS
[facility]   Physician Orders and Discharge Instructions  [facility]  [address] [place]  [address]  Telephone: [phone]      [phone]        NAME:  Khari Diaz                                                                                                YOB: 1955  MEDICAL RECORD NUMBER:  [id number]     Your  is:  Rosanna     Home Care/Facility: None     Wound Location: Left Lateral Ankle     Dressing orders:.1.Cleanse wound(s) with normal saline.  2. Apply dry HYDROFERA BLUE READY FOAM or equivalent to wound bed.  NOTE: If your HYDROFRERA BLUE is not soft and pliable, moisten with saline until it is sponge like and then ring out excess saline and apply to wound bed.  3. Cover with 4x4's and wrap with gauze (lazarus or kerlix)  4. Change every other day Tuesday, Thursday and Saturday.      Compression: You may wear your own compression socks (velcro compression on the Left) or .Apply 10-20mmHg Spandagrip to LEFT lower leg, apply first thing in the morning, remove at bedtime, elevate legs as much as possible during the day. (43)     Offloading Device:     Other Instructions:  Try to place a pillow under your calf to prevent pressure on the area Or use a Prevalon boot.      Keep all dressings clean, dry and intact.  Keep pressure off the wound(s) at all times.      Follow up visit   2 Weeks  January 25, 2024 @  3:45     Please give 24 hour notice if unable to keep appointment. [phone]     If you experience any of the following, please call the Wound Care Service at  [phone] or go to the nearest emergency room.        *Increase in pain         *Temperature over 101           *Increase in drainage from your wound or a foul odor  *Uncontrolled swelling            *Need for compression bandage changes due to slippage, breakthrough drainage       PLEASE NOTE: IF YOU ARE UNABLE TO OBTAIN WOUND

## 2024-01-11 NOTE — PROGRESS NOTES
[facility]                                                   Progress Note and Procedure Note      Khari Diaz  MEDICAL RECORD NUMBER:  [id number]  AGE: 68 y.o.   GENDER: male  : 1955  EPISODE DATE:  2024    Subjective:     No chief complaint on file.        HISTORY of PRESENT ILLNESS HPI     Khari Diaz is a 68 y.o. male who presents today for wound/ulcer evaluation.   History of Wound Context: Patient presents for follow-up for a chronic ulcer to the left lateral ankle.  Patient states that the wound at the medial left ankle has closed.  Patient reports compliance with use of Hydrofera Blue and offloading of the left ankle with a heel protector while at rest.  Patient has not observed signs of infection.    Patient denies nausea, vomiting, fever, chills, chest pain, or shortness of breath.     Wound/Ulcer Pain Timing/Severity: intermittent  Quality of pain: burning  Severity:  3 / 10   Modifying Factors: Pain worsens with walking and Pain worsens with direct pressure to the lateral ankle  Associated Signs/Symptoms: drainage and pain    Ulcer Identification:  Ulcer Type: diabetic  Contributing Factors: edema, venous stasis, diabetes, and chronic pressure    Wound:  Full-thickness ulceration left lateral ankle        PAST MEDICAL HISTORY        Diagnosis Date    Abscess of back 2020    Abscess of right leg 2020    Acute renal failure (HCC)     Adenomatous polyp of ascending colon     Adenomatous polyp of descending colon     Adenomatous polyp of transverse colon     Anxiety     CAD S/P percutaneous coronary angioplasty 2014    Cardiomyopathy (HCC)     Cecal polyp     Cerebrovascular accident (CVA) due to occlusion of left middle cerebral artery (HCC) 2016    brainstem infarction from left MCA occlusion    Cerebrovascular disease 2016    Claudication (HCC)     Colon polyp     Coronary angioplasty status     Cutaneous abscess of back excluding buttocks

## 2024-01-11 NOTE — ADDENDUM NOTE
Encounter addended by: Rosanna Jordan RN on: 1/11/2024 5:26 PM   Actions taken: Charge Capture section accepted

## 2024-01-12 DIAGNOSIS — M10.9 GOUT OF MULTIPLE SITES, UNSPECIFIED CAUSE, UNSPECIFIED CHRONICITY: Chronic | ICD-10-CM

## 2024-01-12 RX ORDER — ALLOPURINOL 300 MG/1
300 TABLET ORAL DAILY
Qty: 90 TABLET | Refills: 1 | Status: SHIPPED | OUTPATIENT
Start: 2024-01-12

## 2024-01-12 NOTE — TELEPHONE ENCOUNTER
Comments:     Last Office Visit (last PCP visit):   12/4/2023    Next Visit Date:  Future Appointments   Date Time Provider Department Center   1/25/2024  3:45 PM Teodoro Houston DPM [facility] [place]   3/4/2024  3:15 PM Lali Kilgore MD [place] [facility]   3/5/2024  4:30 PM [facility]ov[facility] Palm [facility]   3/11/2024  3:30 PM Micah Bello PA [place] [facility] [place]   6/26/2024  3:45 PM Alex Collins MD [place] [facility] -   7/11/2024  2:45 PM Bernard Emmanuel DO [place] [facility]       **If hasn't been seen in over a year OR hasn't followed up according to last diabetes/ADHD visit, make appointment for patient before sending refill to provider.    Rx requested:  Requested Prescriptions     Pending Prescriptions Disp Refills    allopurinol (ZYLOPRIM) 300 MG tablet [Pharmacy Med Name: Allopurinol 300 MG Oral Tablet] 90 tablet 3     Sig: TAKE 1 TABLET BY MOUTH DAILY

## 2024-01-19 DIAGNOSIS — R60.0 BILATERAL LOWER EXTREMITY EDEMA: ICD-10-CM

## 2024-01-19 RX ORDER — TORSEMIDE 20 MG/1
20 TABLET ORAL DAILY
Qty: 90 TABLET | Refills: 1 | Status: SHIPPED | OUTPATIENT
Start: 2024-01-19

## 2024-01-19 NOTE — TELEPHONE ENCOUNTER
Comments:     Last Office Visit (last PCP visit):   12/4/2023    Next Visit Date:  Future Appointments   Date Time Provider Department Center   1/25/2024  3:45 PM Teodoro Houston DPM [facility] [place]   3/4/2024  3:15 PM Lali Kilgore MD [place] [facility]   3/5/2024  4:30 PM Giovanny Palm MD [facility]   3/11/2024  3:30 PM Micah Bello PA [facility] [facility]   6/26/2024  3:45 PM Alex Collins MD [place] [facility] -   7/11/2024  2:45 PM Bernard Emmanuel DO [place] [facility]       **If hasn't been seen in over a year OR hasn't followed up according to last diabetes/ADHD visit, make appointment for patient before sending refill to provider.    Rx requested:  Requested Prescriptions     Pending Prescriptions Disp Refills    torsemide (DEMADEX) 20 MG tablet [Pharmacy Med Name: Torsemide 20 MG Oral Tablet] 90 tablet 3     Sig: TAKE 1 TABLET BY MOUTH DAILY

## 2024-01-25 ENCOUNTER — HOSPITAL ENCOUNTER (OUTPATIENT)
Dept: WOUND CARE | Age: 69
Discharge: HOME | End: 2024-01-25
Attending: PODIATRIST
Payer: MEDICARE

## 2024-01-25 VITALS
SYSTOLIC BLOOD PRESSURE: 122 MMHG | HEART RATE: 99 BPM | DIASTOLIC BLOOD PRESSURE: 78 MMHG | TEMPERATURE: 97.2 F | RESPIRATION RATE: 17 BRPM

## 2024-01-25 DIAGNOSIS — L97.322 ULCER OF LEFT ANKLE, WITH FAT LAYER EXPOSED (HCC): Primary | ICD-10-CM

## 2024-01-25 PROCEDURE — 11042 DBRDMT SUBQ TIS 1ST 20SQCM/<: CPT

## 2024-01-25 RX ORDER — IBUPROFEN 200 MG
TABLET ORAL ONCE
OUTPATIENT
Start: 2024-01-25 | End: 2024-01-25

## 2024-01-25 RX ORDER — LIDOCAINE 50 MG/G
OINTMENT TOPICAL ONCE
OUTPATIENT
Start: 2024-01-25 | End: 2024-01-25

## 2024-01-25 RX ORDER — LIDOCAINE HYDROCHLORIDE 20 MG/ML
JELLY TOPICAL ONCE
OUTPATIENT
Start: 2024-01-25 | End: 2024-01-25

## 2024-01-25 RX ORDER — GENTAMICIN SULFATE 1 MG/G
OINTMENT TOPICAL ONCE
OUTPATIENT
Start: 2024-01-25 | End: 2024-01-25

## 2024-01-25 RX ORDER — SODIUM CHLOR/HYPOCHLOROUS ACID 0.033 %
SOLUTION, IRRIGATION IRRIGATION ONCE
OUTPATIENT
Start: 2024-01-25 | End: 2024-01-25

## 2024-01-25 RX ORDER — BETAMETHASONE DIPROPIONATE 0.05 %
OINTMENT (GRAM) TOPICAL ONCE
OUTPATIENT
Start: 2024-01-25 | End: 2024-01-25

## 2024-01-25 RX ORDER — LIDOCAINE HYDROCHLORIDE 40 MG/ML
SOLUTION TOPICAL ONCE
OUTPATIENT
Start: 2024-01-25 | End: 2024-01-25

## 2024-01-25 RX ORDER — LIDOCAINE 40 MG/G
CREAM TOPICAL ONCE
OUTPATIENT
Start: 2024-01-25 | End: 2024-01-25

## 2024-01-25 RX ORDER — CLOBETASOL PROPIONATE 0.5 MG/G
OINTMENT TOPICAL ONCE
OUTPATIENT
Start: 2024-01-25 | End: 2024-01-25

## 2024-01-25 RX ORDER — GINSENG 100 MG
CAPSULE ORAL ONCE
OUTPATIENT
Start: 2024-01-25 | End: 2024-01-25

## 2024-01-25 RX ORDER — BACITRACIN ZINC AND POLYMYXIN B SULFATE 500; 1000 [USP'U]/G; [USP'U]/G
OINTMENT TOPICAL ONCE
OUTPATIENT
Start: 2024-01-25 | End: 2024-01-25

## 2024-01-25 RX ORDER — TRIAMCINOLONE ACETONIDE 1 MG/G
OINTMENT TOPICAL ONCE
OUTPATIENT
Start: 2024-01-25 | End: 2024-01-25

## 2024-01-25 ASSESSMENT — PAIN SCALES - GENERAL: PAINLEVEL_OUTOF10: 1

## 2024-01-25 ASSESSMENT — PAIN DESCRIPTION - ORIENTATION: ORIENTATION: LEFT

## 2024-01-25 ASSESSMENT — PAIN DESCRIPTION - DESCRIPTORS: DESCRIPTORS: BURNING

## 2024-01-25 ASSESSMENT — PAIN DESCRIPTION - LOCATION: LOCATION: LEG

## 2024-01-25 NOTE — DISCHARGE INSTRUCTIONS
[facility]   Physician Orders and Discharge Instructions  [address] [place]  [address]  Telephone: [phone]      [phone]        NAME:  Khari Diaz                                                                                                YOB: 1955  MEDICAL RECORD NUMBER:  [id number]     Your  is:  Rosanna     Home Care/Facility: None     Wound Location: Left Lateral Ankle     Dressing orders:1.Cleanse wound(s) with normal saline.  Apply the Medihoney Alginate to the wound bed  Cover with a dry dressing  Change every other day     Compression: You may wear your own compression socks (velcro compression on the Left) or .Apply 10-20mmHg Spandagrip to LEFT lower leg, apply first thing in the morning, remove at bedtime, elevate legs as much as possible during the day. (43)     Offloading Device:     Other Instructions:  Try to place a pillow under your calf to prevent pressure on the area Or use a Prevalon boot.      Keep all dressings clean, dry and intact.  Keep pressure off the wound(s) at all times.      Follow up visit   2 Weeks  February 8, 2024 @  3:45     Please give 24 hour notice if unable to keep appointment. [phone]     If you experience any of the following, please call the Wound Care Service at  [phone] or go to the nearest emergency room.        *Increase in pain         *Temperature over 101           *Increase in drainage from your wound or a foul odor  *Uncontrolled swelling            *Need for compression bandage changes due to slippage, breakthrough drainage       PLEASE NOTE: IF YOU ARE UNABLE TO OBTAIN WOUND SUPPLIES, CONTINUE TO USE THE SUPPLIES YOU HAVE AVAILABLE UNTIL YOU ARE ABLE TO REACH US. IT IS MOST IMPORTANT TO KEEP THE WOUND COVERED AT ALL TIMES

## 2024-01-25 NOTE — PROGRESS NOTES
(cm^2) 2.25 cm^2 01/25/24 1604   Change in Wound Size % (l*w) -66.67 01/25/24 1604   Wound Volume (cm^3) 0.45 cm^3 01/25/24 1604   Wound Healing % -233 01/25/24 1604   Post-Procedure Length (cm) 1.5 cm 01/25/24 1629   Post-Procedure Width (cm) 1.5 cm 01/25/24 1629   Post-Procedure Depth (cm) 0.25 cm 01/25/24 1629   Post-Procedure Surface Area (cm^2) 2.25 cm^2 01/25/24 1629   Post-Procedure Volume (cm^3) 0.5625 cm^3 01/25/24 1629   Wound Assessment Pink/red;Slough 01/25/24 1604   Drainage Amount Moderate (25-50%) 01/25/24 1604   Drainage Description Serosanguinous 01/25/24 1604   Odor None 01/25/24 1604   Gisela-wound Assessment Dry/flaky;Hyperkeratosis (callous) 01/25/24 1604   Margins Attached edges 01/25/24 1604   Wound Thickness Description not for Pressure Injury Full thickness 01/25/24 1604   Number of days: 189            Percent of Wound/Ulcer Debrided: 100%    Total Surface Area Debrided:  2.25 sq cm     Diabetic/Pressure/Non Pressure Ulcers:  Ulcer: Diabetic ulcer, fat layer exposed      Bleeding:  Minimal    Hemostasis Achieved:  not needed    Procedural Pain:  0  / 10     Post Procedural Pain:  0 / 10     Response to treatment:  Well tolerated by patient.       Plan:     The left lateral ankle wound was sharply debrided.  Due to lack of progress we have decided to change his wound care regimen at this time.  Patient instructed to discontinue use of Hydrofera Blue.  I have recommended a honey alginate dressing to the wound base.  Patient instructed to continue offloading the wound while at rest,  continue compression.      Treatment Note please see attached Discharge Instructions    Written patient dismissal instructions given to patient and signed by patient or POA.         Discharge Instructions         [facility]   Physician Orders and Discharge Instructions  [address]  [address]  Telephone: [phone]

## 2024-02-08 ENCOUNTER — HOSPITAL ENCOUNTER (OUTPATIENT)
Dept: WOUND CARE | Age: 69
Discharge: HOME | End: 2024-02-08
Attending: PODIATRIST
Payer: MEDICARE

## 2024-02-08 VITALS
TEMPERATURE: 98.2 F | HEART RATE: 102 BPM | DIASTOLIC BLOOD PRESSURE: 81 MMHG | RESPIRATION RATE: 18 BRPM | SYSTOLIC BLOOD PRESSURE: 136 MMHG

## 2024-02-08 DIAGNOSIS — E11.622 TYPE 2 DIABETES MELLITUS WITH OTHER SKIN ULCER (CODE) (HCC): ICD-10-CM

## 2024-02-08 DIAGNOSIS — L97.322 ULCER OF LEFT ANKLE, WITH FAT LAYER EXPOSED (HCC): Primary | ICD-10-CM

## 2024-02-08 PROCEDURE — 11042 DBRDMT SUBQ TIS 1ST 20SQCM/<: CPT

## 2024-02-08 PROCEDURE — 11042 DBRDMT SUBQ TIS 1ST 20SQCM/<: CPT | Performed by: PODIATRIST

## 2024-02-08 PROCEDURE — 6370000000 HC RX 637 (ALT 250 FOR IP): Performed by: PODIATRIST

## 2024-02-08 RX ORDER — GINSENG 100 MG
CAPSULE ORAL ONCE
OUTPATIENT
Start: 2024-02-08 | End: 2024-02-08

## 2024-02-08 RX ORDER — LIDOCAINE 50 MG/G
OINTMENT TOPICAL ONCE
OUTPATIENT
Start: 2024-02-08 | End: 2024-02-08

## 2024-02-08 RX ORDER — TRIAMCINOLONE ACETONIDE 1 MG/G
OINTMENT TOPICAL ONCE
OUTPATIENT
Start: 2024-02-08 | End: 2024-02-08

## 2024-02-08 RX ORDER — LIDOCAINE HYDROCHLORIDE 40 MG/ML
SOLUTION TOPICAL ONCE
OUTPATIENT
Start: 2024-02-08 | End: 2024-02-08

## 2024-02-08 RX ORDER — IBUPROFEN 200 MG
TABLET ORAL ONCE
OUTPATIENT
Start: 2024-02-08 | End: 2024-02-08

## 2024-02-08 RX ORDER — GENTAMICIN SULFATE 1 MG/G
OINTMENT TOPICAL ONCE
OUTPATIENT
Start: 2024-02-08 | End: 2024-02-08

## 2024-02-08 RX ORDER — LIDOCAINE HYDROCHLORIDE 20 MG/ML
JELLY TOPICAL ONCE
OUTPATIENT
Start: 2024-02-08 | End: 2024-02-08

## 2024-02-08 RX ORDER — CLOBETASOL PROPIONATE 0.5 MG/G
OINTMENT TOPICAL ONCE
OUTPATIENT
Start: 2024-02-08 | End: 2024-02-08

## 2024-02-08 RX ORDER — BETAMETHASONE DIPROPIONATE 0.05 %
OINTMENT (GRAM) TOPICAL ONCE
OUTPATIENT
Start: 2024-02-08 | End: 2024-02-08

## 2024-02-08 RX ORDER — BACITRACIN ZINC AND POLYMYXIN B SULFATE 500; 1000 [USP'U]/G; [USP'U]/G
OINTMENT TOPICAL ONCE
OUTPATIENT
Start: 2024-02-08 | End: 2024-02-08

## 2024-02-08 RX ORDER — LIDOCAINE 40 MG/G
CREAM TOPICAL ONCE
OUTPATIENT
Start: 2024-02-08 | End: 2024-02-08

## 2024-02-08 RX ORDER — LIDOCAINE 40 MG/G
CREAM TOPICAL ONCE
Status: COMPLETED | OUTPATIENT
Start: 2024-02-08 | End: 2024-02-08

## 2024-02-08 RX ORDER — SODIUM CHLOR/HYPOCHLOROUS ACID 0.033 %
SOLUTION, IRRIGATION IRRIGATION ONCE
OUTPATIENT
Start: 2024-02-08 | End: 2024-02-08

## 2024-02-08 RX ADMIN — LIDOCAINE 4%: 4 CREAM TOPICAL at 16:09

## 2024-02-08 ASSESSMENT — PAIN DESCRIPTION - ORIENTATION: ORIENTATION: LEFT

## 2024-02-08 ASSESSMENT — PAIN DESCRIPTION - LOCATION: LOCATION: LEG

## 2024-02-08 ASSESSMENT — PAIN SCALES - GENERAL: PAINLEVEL_OUTOF10: 4

## 2024-02-08 ASSESSMENT — PAIN DESCRIPTION - DESCRIPTORS: DESCRIPTORS: BURNING

## 2024-02-08 NOTE — PROGRESS NOTES
vascular accident) ([facility]) 11/12/2017    Dependent edema 09/14/2017    Diplopia 05/15/2017    Resolved with management of cataracts    Dupuytren contracture 01/02/2018    Dysphagia 08/18/2011    Dysphonia 08/18/2011    Essential hypertension 08/17/2016    Gallop rhythm     Gout 12/10/2016    Gout of big toe 08/2014    Right Great Toe    H/O fall     Headache     migraines    Heart failure ([facility])     History of chest pain 01/02/2014    History of CVA (cerebrovascular accident) 08/15/2016    Brainstem infarction - occlusion of left MCA 08/2016    History of heart failure 01/06/2014    History of myocardial infarction 03/11/2014    History of recurrent pneumonia 02/11/2014    Hx of bacterial pneumonia     Hyperlipidemia 04/30/2021    Hyperlipidemia, mixed 04/30/2021    Hyperlipidemia, mixed 04/30/2021    Hypotension     Left carotid artery stenosis 08/23/2020    Left carotid artery stenosis 08/23/2020    Leg swelling     Macrocytosis without anemia 12/10/2016    Microalbuminuria due to type 2 diabetes mellitus ([facility]) 09/14/2018    Morbid obesity ([facility]) 01/02/2014    Myocardial infarction ([facility])     Obesity     AYLIN (obstructive sleep apnea) 12/10/2016    AYLIN (obstructive sleep apnea) 12/10/2016    Osteoarthritis     PAD (peripheral artery disease) ([facility]) 01/14/2019    Rectal bleeding 08/20/2021    Right-sided muscle weakness 10/19/2016    S/P cardiac catheterization     Sciatica     Skin cyst 04/08/2016    Spasm 11/09/2016    Spina bifida ([facility])     Stented coronary artery     TIA (transient ischemic attack) 11/06/2020    Tremor of right hand 05/15/2017    Type 2 diabetes mellitus with diabetic polyneuropathy, without long-term current use of insulin ([facility])     Unsteady gait 05/15/2017    Weakness     Weight gain        PAST SURGICAL HISTORY    Past Surgical History:   Procedure Laterality Date    ANKLE SURGERY Left     BACK SURGERY      lumbar laminectomy    CHOLECYSTECTOMY      COLONOSCOPY  01/15/2010    polyp, diverticulosis 
Admission

## 2024-02-08 NOTE — DISCHARGE INSTRUCTIONS
[facility] Wound Center and Hyperbaric Medicine   Physician Orders and Discharge Instructions  [address] [place]  [address]  Telephone: [phone]      [phone]        NAME:  Khari Diaz                                                                                                YOB: 1955  MEDICAL RECORD NUMBER:  [id number]     Your  is:  Rosanna     Home Care/Facility: None     Wound Location: Left Lateral Ankle     Dressing orders:1.Cleanse wound(s) with normal saline.  Apply the Medihoney Alginate to the wound bed  Cover with a dry dressing  Change every other day     Compression: You may wear your own compression socks (velcro compression on the Left) or .Apply 10-20mmHg Spandagrip to LEFT lower leg, apply first thing in the morning, remove at bedtime, elevate legs as much as possible during the day. (43)     Offloading Device:     Other Instructions:  Try to place a pillow under your calf to prevent pressure on the area Or use a Prevalon boot.      Keep all dressings clean, dry and intact.  Keep pressure off the wound(s) at all times.      Follow up visit   2 Weeks  February 22, 2024 @  3:45     Please give 24 hour notice if unable to keep appointment. [phone]     If you experience any of the following, please call the Wound Care Service at  [phone] or go to the nearest emergency room.        *Increase in pain         *Temperature over 101           *Increase in drainage from your wound or a foul odor  *Uncontrolled swelling            *Need for compression bandage changes due to slippage, breakthrough drainage       PLEASE NOTE: IF YOU ARE UNABLE TO OBTAIN WOUND SUPPLIES, CONTINUE TO USE THE SUPPLIES YOU HAVE AVAILABLE UNTIL YOU ARE ABLE TO REACH US. IT IS MOST IMPORTANT TO KEEP THE WOUND COVERED AT ALL TIMES

## 2024-02-08 NOTE — FLOWSHEET NOTE
Wound Care Supplies      Supply Company:     [employer] [id number] [place] [address] p: [phone] f: [phone]     Ordering Center:     [facility]  [address] [facility]  [place] [address]  [phone]  WOUND CARE [phone]  FAX NUMBER [phone]    Patient Information:      Khari Diaz  [address]  [place] [address]   [phone]   : 1955  AGE: 68 y.o.     GENDER: male   TODAYS DATE:  2024    Insurance:      PRIMARY INSURANCE:  Plan: [facility] MEDICARE ADVANTAGE  Coverage: [facility] MEDICARE  Effective Date: 2023  [id number] - (Medicare Managed)    SECONDARY INSURANCE:  Plan:   Coverage:   Effective Date:   [email or web address]    [unfilled]   [unfilled]     Patient Wound Information:      Problem List Items Addressed This Visit          Other    Ulcer of left ankle, with fat layer exposed (HCC) - Primary    Relevant Orders    Initiate Outpatient Wound Care Protocol       WOUNDS REQUIRING DRESSING SUPPLIES:     Wound 23 Ankle Left;Lateral #1 Left lateral ankle (Active)   Wound Image   24 1558   Wound Etiology Diabetic 24 1558   Wound Cleansed Cleansed with saline 24 1558   Dressing/Treatment Honey gel/honey paste;Dry dressing 24 1633   Offloading for Diabetic Foot Ulcers Diabetic shoes/inserts 24 1633   Wound Length (cm) 1.5 cm 24 1558   Wound Width (cm) 0.7 cm 24 1558   Wound Depth (cm) 0.2 cm 24 1558   Wound Surface Area (cm^2) 1.05 cm^2 24 1558   Change in Wound Size % (l*w) 22.22 24 1558   Wound Volume (cm^3) 0.21 cm^3 24 1558   Wound Healing % -56 24 1558   Post-Procedure Length (cm) 1.6 cm 24 1630   Post-Procedure Width (cm) 1.2 cm 24 1630   Post-Procedure Depth (cm) 0.35 cm 24 1630   Post-Procedure Surface Area (cm^2) 1.92 cm^2 24 1630   Post-Procedure Volume (cm^3) 0.672 cm^3 24 1630   Wound Assessment Pink/red;Slough 24 8449

## 2024-02-13 ENCOUNTER — TELEPHONE (OUTPATIENT)
Dept: PODIATRY | Age: 69
End: 2024-02-13

## 2024-02-18 DIAGNOSIS — E11.42 TYPE 2 DIABETES MELLITUS WITH DIABETIC POLYNEUROPATHY, WITHOUT LONG-TERM CURRENT USE OF INSULIN (HCC): Chronic | ICD-10-CM

## 2024-02-19 NOTE — TELEPHONE ENCOUNTER
Comments:     Last Office Visit (last PCP visit):   12/4/2023    Next Visit Date:  Future Appointments   Date Time Provider Department Center   2/22/2024  3:45 PM Teodoro Houston DPM [facility] [place]   3/4/2024  3:15 PM Lali Kilgore MD [place] [facility]   3/5/2024  4:30 PM [facility] Palm [facility]   3/11/2024  3:30 PM Micah Bello PA [place] [facility] [place]   6/26/2024  3:45 PM Alex Collins MD [place] [facility] -   7/11/2024  2:45 PM Bernard Emmanuel DO [place] [facility]       **If hasn't been seen in over a year OR hasn't followed up according to last diabetes/ADHD visit, make appointment for patient before sending refill to provider.    Rx requested:  Requested Prescriptions     Pending Prescriptions Disp Refills    metFORMIN (GLUCOPHAGE) 500 MG tablet [Pharmacy Med Name: metFORMIN HCl 500 MG Oral Tablet] 180 tablet 3     Sig: TAKE 1 TABLET BY MOUTH TWICE  DAILY WITH MEALS

## 2024-02-20 DIAGNOSIS — E11.42 TYPE 2 DIABETES MELLITUS WITH DIABETIC POLYNEUROPATHY, WITHOUT LONG-TERM CURRENT USE OF INSULIN (HCC): Chronic | ICD-10-CM

## 2024-02-22 ENCOUNTER — HOSPITAL ENCOUNTER (OUTPATIENT)
Dept: WOUND CARE | Age: 69
Discharge: HOME | End: 2024-02-22
Attending: PODIATRIST
Payer: MEDICARE

## 2024-02-22 VITALS
SYSTOLIC BLOOD PRESSURE: 137 MMHG | HEART RATE: 98 BPM | RESPIRATION RATE: 18 BRPM | TEMPERATURE: 97 F | DIASTOLIC BLOOD PRESSURE: 70 MMHG

## 2024-02-22 DIAGNOSIS — L97.322 ULCER OF LEFT ANKLE, WITH FAT LAYER EXPOSED (HCC): Primary | ICD-10-CM

## 2024-02-22 PROCEDURE — 11721 DEBRIDE NAIL 6 OR MORE: CPT

## 2024-02-22 PROCEDURE — 11042 DBRDMT SUBQ TIS 1ST 20SQCM/<: CPT

## 2024-02-22 PROCEDURE — 11042 DBRDMT SUBQ TIS 1ST 20SQCM/<: CPT | Performed by: PODIATRIST

## 2024-02-22 PROCEDURE — 11721 DEBRIDE NAIL 6 OR MORE: CPT | Performed by: PODIATRIST

## 2024-02-22 PROCEDURE — 6370000000 HC RX 637 (ALT 250 FOR IP): Performed by: PODIATRIST

## 2024-02-22 RX ORDER — GINSENG 100 MG
CAPSULE ORAL ONCE
OUTPATIENT
Start: 2024-02-22 | End: 2024-02-22

## 2024-02-22 RX ORDER — IBUPROFEN 200 MG
TABLET ORAL ONCE
OUTPATIENT
Start: 2024-02-22 | End: 2024-02-22

## 2024-02-22 RX ORDER — LIDOCAINE HYDROCHLORIDE 20 MG/ML
JELLY TOPICAL ONCE
OUTPATIENT
Start: 2024-02-22 | End: 2024-02-22

## 2024-02-22 RX ORDER — GENTAMICIN SULFATE 1 MG/G
OINTMENT TOPICAL ONCE
OUTPATIENT
Start: 2024-02-22 | End: 2024-02-22

## 2024-02-22 RX ORDER — LIDOCAINE 40 MG/G
CREAM TOPICAL ONCE
OUTPATIENT
Start: 2024-02-22 | End: 2024-02-22

## 2024-02-22 RX ORDER — CLOBETASOL PROPIONATE 0.5 MG/G
OINTMENT TOPICAL ONCE
OUTPATIENT
Start: 2024-02-22 | End: 2024-02-22

## 2024-02-22 RX ORDER — LIDOCAINE 50 MG/G
OINTMENT TOPICAL ONCE
OUTPATIENT
Start: 2024-02-22 | End: 2024-02-22

## 2024-02-22 RX ORDER — BACITRACIN ZINC AND POLYMYXIN B SULFATE 500; 1000 [USP'U]/G; [USP'U]/G
OINTMENT TOPICAL ONCE
OUTPATIENT
Start: 2024-02-22 | End: 2024-02-22

## 2024-02-22 RX ORDER — LIDOCAINE HYDROCHLORIDE 40 MG/ML
SOLUTION TOPICAL ONCE
OUTPATIENT
Start: 2024-02-22 | End: 2024-02-22

## 2024-02-22 RX ORDER — LIDOCAINE 40 MG/G
CREAM TOPICAL ONCE
Status: COMPLETED | OUTPATIENT
Start: 2024-02-22 | End: 2024-02-22

## 2024-02-22 RX ORDER — SODIUM CHLOR/HYPOCHLOROUS ACID 0.033 %
SOLUTION, IRRIGATION IRRIGATION ONCE
OUTPATIENT
Start: 2024-02-22 | End: 2024-02-22

## 2024-02-22 RX ORDER — TRIAMCINOLONE ACETONIDE 1 MG/G
OINTMENT TOPICAL ONCE
OUTPATIENT
Start: 2024-02-22 | End: 2024-02-22

## 2024-02-22 RX ORDER — BETAMETHASONE DIPROPIONATE 0.05 %
OINTMENT (GRAM) TOPICAL ONCE
OUTPATIENT
Start: 2024-02-22 | End: 2024-02-22

## 2024-02-22 RX ADMIN — LIDOCAINE 4%: 4 CREAM TOPICAL at 16:05

## 2024-02-22 ASSESSMENT — PAIN DESCRIPTION - ORIENTATION: ORIENTATION: LEFT

## 2024-02-22 ASSESSMENT — PAIN DESCRIPTION - LOCATION: LOCATION: LEG

## 2024-02-22 ASSESSMENT — PAIN DESCRIPTION - DESCRIPTORS: DESCRIPTORS: BURNING

## 2024-02-22 ASSESSMENT — PAIN SCALES - GENERAL: PAINLEVEL_OUTOF10: 7

## 2024-02-22 NOTE — ADDENDUM NOTE
Contrast: Isovue. Contrast concentration: 370. Amount: 40 mL. Encounter addended by: Rosanna Jordan RN on: 2/22/2024 4:55 PM   Actions taken: Charge Capture section accepted

## 2024-02-22 NOTE — DISCHARGE INSTRUCTIONS
[facility]   Physician Orders and Discharge Instructions  [facility]  [address] [place]  [address]  Telephone: [phone]      [phone]        NAME:  Khari Diaz                                                                                                YOB: 1955  MEDICAL RECORD NUMBER:  [id number]     Your  is:  Rosanna     Home Care/Facility: None     Wound Location: Left Lateral Ankle     Dressing orders:1.Cleanse wound(s) with normal saline.   1. Gently tuck the Cutimed Sorbact Ribbin in the wound bed  2. Cover with a dry dressing then place the piece of Plastisote Foam around the wound bed then cover with dry dressing  3. Change every other day     Compression: You may wear your own compression socks (velcro compression on the Left) or .Apply 10-20mmHg Spandagrip to LEFT lower leg, apply first thing in the morning, remove at bedtime, elevate legs as much as possible during the day. (43)     Offloading Device:      Other Instructions:  Try to place a pillow under your calf to prevent pressure on the area Or use a Prevalon Boot. Increase your Protein Intake.      Keep all dressings clean, dry and intact.  Keep pressure off the wound(s) at all times.      Follow up visit   2 Weeks March 7, 2024 @  3:45     Please give 24 hour notice if unable to keep appointment. [phone]     If you experience any of the following, please call the Wound Care Service at  [phone] or go to the nearest emergency room.        *Increase in pain         *Temperature over 101           *Increase in drainage from your wound or a foul odor  *Uncontrolled swelling            *Need for compression bandage changes due to slippage, breakthrough drainage       PLEASE NOTE: IF YOU ARE UNABLE TO OBTAIN WOUND SUPPLIES, CONTINUE TO USE THE SUPPLIES YOU HAVE AVAILABLE UNTIL YOU ARE ABLE TO REACH US. IT IS MOST IMPORTANT

## 2024-02-22 NOTE — PROGRESS NOTES
[facility]                                                   Progress Note and Procedure Note      Khari Diaz  MEDICAL RECORD NUMBER:  [id number]  AGE: 68 y.o.   GENDER: male  : 1955  EPISODE DATE:  2024    Subjective:     Chief Complaint   Patient presents with    Wound Check         HISTORY of PRESENT ILLNESS HPI     Khari Diaz is a 68 y.o. male who presents today for wound/ulcer evaluation.   History of Wound Context: Patient presents for continued wound care for chronic diabetic ulceration of the left ankle.  Patient reports compliance with dressing changes and has been using a Prevalon boot for offloading while asleep.  Patient has not observed signs of infection.  Patient reports increased pain.  Patient also complains of painful toenails.  Patient ports that he has difficulty trimming the toenails himself due to the deformity of the nail plates.  Patient is a diabetic.  Patient was last seen by endocrinology, Micah Bello PA-C, on 2023.    Patient denies nausea, vomiting, fever, chills, chest pain, or shortness of breath.     Wound/Ulcer Pain Timing/Severity: intermittent  Quality of pain: burning  Severity:  7 / 10   Modifying Factors: Pain worsens with movement and direct pressure to the lateral ankle  Associated Signs/Symptoms: edema, drainage, and pain    Ulcer Identification:  Ulcer Type: diabetic  Contributing Factors: edema, venous stasis, and chronic pressure    Wound:  Full-thickness diabetic ulceration left lateral ankle        PAST MEDICAL HISTORY        Diagnosis Date    Abscess of back 2020    Abscess of right leg 2020    Acute renal failure (HCC)     Adenomatous polyp of ascending colon     Adenomatous polyp of descending colon     Adenomatous polyp of transverse colon     Anxiety     CAD S/P percutaneous coronary angioplasty 2014    Cardiomyopathy (HCC)     Cecal polyp     Cerebrovascular accident (CVA) due to occlusion of left

## 2024-02-27 ENCOUNTER — HOSPITAL ENCOUNTER (OUTPATIENT)
Dept: LAB | Age: 69
Discharge: HOME | End: 2024-02-27
Payer: MEDICARE

## 2024-02-27 DIAGNOSIS — I65.22 LEFT CAROTID ARTERY STENOSIS: ICD-10-CM

## 2024-02-27 DIAGNOSIS — R80.9 MICROALBUMINURIA DUE TO TYPE 2 DIABETES MELLITUS (HCC): Chronic | ICD-10-CM

## 2024-02-27 DIAGNOSIS — E11.65 TYPE 2 DIABETES MELLITUS WITH HYPERGLYCEMIA, WITHOUT LONG-TERM CURRENT USE OF INSULIN (HCC): ICD-10-CM

## 2024-02-27 DIAGNOSIS — E78.2 MIXED HYPERLIPIDEMIA: ICD-10-CM

## 2024-02-27 DIAGNOSIS — I10 ESSENTIAL HYPERTENSION: Chronic | ICD-10-CM

## 2024-02-27 DIAGNOSIS — E11.29 MICROALBUMINURIA DUE TO TYPE 2 DIABETES MELLITUS (HCC): Chronic | ICD-10-CM

## 2024-02-27 DIAGNOSIS — E11.622 TYPE 2 DIABETES MELLITUS WITH OTHER SKIN ULCER (CODE) (HCC): ICD-10-CM

## 2024-02-27 DIAGNOSIS — I73.9 PAD (PERIPHERAL ARTERY DISEASE) (HCC): ICD-10-CM

## 2024-02-27 DIAGNOSIS — I25.10 CORONARY ARTERY DISEASE INVOLVING NATIVE CORONARY ARTERY OF NATIVE HEART WITHOUT ANGINA PECTORIS: ICD-10-CM

## 2024-02-27 LAB
ALBUMIN SERPL-MCNC: 4.1 G/DL (ref 3.5–4.6)
ALP SERPL-CCNC: 66 U/L (ref 35–104)
ALT SERPL-CCNC: 29 U/L (ref 0–41)
ANION GAP SERPL CALCULATED.3IONS-SCNC: 12 MEQ/L (ref 9–15)
AST SERPL-CCNC: 37 U/L (ref 0–40)
BILIRUB SERPL-MCNC: 0.4 MG/DL (ref 0.2–0.7)
BUN SERPL-MCNC: 19 MG/DL (ref 8–23)
CALCIUM SERPL-MCNC: 9.5 MG/DL (ref 8.5–9.9)
CHLORIDE SERPL-SCNC: 95 MEQ/L (ref 95–107)
CHOLEST SERPL-MCNC: 85 MG/DL (ref 0–199)
CO2 SERPL-SCNC: 29 MEQ/L (ref 20–31)
CREAT SERPL-MCNC: 1.36 MG/DL (ref 0.7–1.2)
GLOBULIN SER CALC-MCNC: 3.3 G/DL (ref 2.3–3.5)
GLUCOSE SERPL-MCNC: 200 MG/DL (ref 70–99)
HBA1C MFR BLD: 8.4 % (ref 4.8–5.9)
HDLC SERPL-MCNC: 29 MG/DL (ref 40–59)
LDLC SERPL CALC-MCNC: 16 MG/DL (ref 0–129)
POTASSIUM SERPL-SCNC: 3.7 MEQ/L (ref 3.4–4.9)
PROT SERPL-MCNC: 7.4 G/DL (ref 6.3–8)
SODIUM SERPL-SCNC: 136 MEQ/L (ref 135–144)
TRIGL SERPL-MCNC: 201 MG/DL (ref 0–150)

## 2024-02-27 PROCEDURE — 80061 LIPID PANEL: CPT

## 2024-02-27 PROCEDURE — 36415 COLL VENOUS BLD VENIPUNCTURE: CPT

## 2024-02-27 PROCEDURE — 80053 COMPREHEN METABOLIC PANEL: CPT

## 2024-02-27 PROCEDURE — 83036 HEMOGLOBIN GLYCOSYLATED A1C: CPT

## 2024-03-02 NOTE — RESULT ENCOUNTER NOTE
Please notify patient that recent labwork shows the followin.  His kidney function is slightly worse, but still consistent with stage 3 chronic kidney disease. This tends to progress/worsen with prolonged uncontrolled blood sugar and poor oral hydration. Patient should make best efforts to remain well hydrated, drinking 48-64 oz daily. He should double down on efforts to eat a lower carbohydrate diet.  2. His blood sugar is improved compared to previous values, but still above goal control for diabetes with an A1c of 8.4 (previously 8.8, goal is <7.5). He should keep his visit with endocrinology in the next 2 weeks to discuss any further blood sugar management.   3. His lipid panel shows mildly elevated triglycerides with a bad cholesterol level at goal control. Patient should double down on his efforts to eat a lower carbohydrate and lower saturated fat diet. He should continue with his current dose of crestor.     Please advise patient that any other lab results not specifically mentioned in message above are either normal, stable compared to previous results, not clinically significant, or would not change the current treatment plan.

## 2024-03-05 ENCOUNTER — OFFICE VISIT (OUTPATIENT)
Dept: FAMILY MEDICINE CLINIC | Age: 69
End: 2024-03-05
Payer: MEDICARE

## 2024-03-05 VITALS
WEIGHT: 256.4 LBS | HEIGHT: 72 IN | DIASTOLIC BLOOD PRESSURE: 62 MMHG | OXYGEN SATURATION: 96 % | SYSTOLIC BLOOD PRESSURE: 128 MMHG | TEMPERATURE: 97.8 F | BODY MASS INDEX: 34.73 KG/M2 | HEART RATE: 91 BPM

## 2024-03-05 DIAGNOSIS — E11.29 MICROALBUMINURIA DUE TO TYPE 2 DIABETES MELLITUS (HCC): Chronic | ICD-10-CM

## 2024-03-05 DIAGNOSIS — F10.10 EXCESSIVE DRINKING OF ALCOHOL: ICD-10-CM

## 2024-03-05 DIAGNOSIS — E11.622 TYPE 2 DIABETES MELLITUS WITH OTHER SKIN ULCER (CODE) (HCC): ICD-10-CM

## 2024-03-05 DIAGNOSIS — I10 ESSENTIAL HYPERTENSION: Chronic | ICD-10-CM

## 2024-03-05 DIAGNOSIS — Z12.5 SCREENING FOR PROSTATE CANCER: ICD-10-CM

## 2024-03-05 DIAGNOSIS — M10.9 GOUT OF MULTIPLE SITES, UNSPECIFIED CAUSE, UNSPECIFIED CHRONICITY: Chronic | ICD-10-CM

## 2024-03-05 DIAGNOSIS — L97.322 ULCER OF LEFT ANKLE, WITH FAT LAYER EXPOSED (HCC): ICD-10-CM

## 2024-03-05 DIAGNOSIS — N18.31 TYPE 2 DIABETES MELLITUS WITH STAGE 3A CHRONIC KIDNEY DISEASE, WITHOUT LONG-TERM CURRENT USE OF INSULIN (HCC): Primary | ICD-10-CM

## 2024-03-05 DIAGNOSIS — G31.9 DEGENERATIVE DISEASE OF NERVOUS SYSTEM, UNSPECIFIED (HCC): ICD-10-CM

## 2024-03-05 DIAGNOSIS — E78.2 MIXED HYPERLIPIDEMIA: ICD-10-CM

## 2024-03-05 DIAGNOSIS — E11.22 TYPE 2 DIABETES MELLITUS WITH STAGE 3A CHRONIC KIDNEY DISEASE, WITHOUT LONG-TERM CURRENT USE OF INSULIN (HCC): Primary | ICD-10-CM

## 2024-03-05 DIAGNOSIS — E66.09 CLASS 1 OBESITY DUE TO EXCESS CALORIES WITH SERIOUS COMORBIDITY AND BODY MASS INDEX (BMI) OF 34.0 TO 34.9 IN ADULT: ICD-10-CM

## 2024-03-05 DIAGNOSIS — Z72.0 TOBACCO USE: ICD-10-CM

## 2024-03-05 DIAGNOSIS — I25.10 CORONARY ARTERY DISEASE INVOLVING NATIVE CORONARY ARTERY OF NATIVE HEART WITHOUT ANGINA PECTORIS: ICD-10-CM

## 2024-03-05 DIAGNOSIS — G47.33 OSA (OBSTRUCTIVE SLEEP APNEA): Chronic | ICD-10-CM

## 2024-03-05 DIAGNOSIS — R80.9 MICROALBUMINURIA DUE TO TYPE 2 DIABETES MELLITUS (HCC): Chronic | ICD-10-CM

## 2024-03-05 DIAGNOSIS — I73.9 PAD (PERIPHERAL ARTERY DISEASE) (HCC): ICD-10-CM

## 2024-03-05 DIAGNOSIS — Z23 NEED FOR VACCINATION: ICD-10-CM

## 2024-03-05 PROCEDURE — 1123F ACP DISCUSS/DSCN MKR DOCD: CPT | Performed by: FAMILY MEDICINE

## 2024-03-05 PROCEDURE — 3052F HG A1C>EQUAL 8.0%<EQUAL 9.0%: CPT | Performed by: FAMILY MEDICINE

## 2024-03-05 PROCEDURE — 3074F SYST BP LT 130 MM HG: CPT | Performed by: FAMILY MEDICINE

## 2024-03-05 PROCEDURE — 3078F DIAST BP <80 MM HG: CPT | Performed by: FAMILY MEDICINE

## 2024-03-05 PROCEDURE — 99214 OFFICE O/P EST MOD 30 MIN: CPT | Performed by: FAMILY MEDICINE

## 2024-03-05 RX ORDER — LANCETS 28 GAUGE
EACH MISCELLANEOUS
Qty: 100 EACH | Refills: 5 | Status: SHIPPED | OUTPATIENT
Start: 2024-03-05

## 2024-03-05 ASSESSMENT — ENCOUNTER SYMPTOMS
ABDOMINAL PAIN: 0
ANAL BLEEDING: 0
WHEEZING: 0
VOMITING: 0
COUGH: 0
BLOOD IN STOOL: 0
NAUSEA: 0
DIARRHEA: 0
CHEST TIGHTNESS: 0
CONSTIPATION: 0
SHORTNESS OF BREATH: 0

## 2024-03-05 ASSESSMENT — PATIENT HEALTH QUESTIONNAIRE - PHQ9
SUM OF ALL RESPONSES TO PHQ9 QUESTIONS 1 & 2: 0
SUM OF ALL RESPONSES TO PHQ QUESTIONS 1-9: 0
9. THOUGHTS THAT YOU WOULD BE BETTER OFF DEAD, OR OF HURTING YOURSELF: 0
5. POOR APPETITE OR OVEREATING: 0
6. FEELING BAD ABOUT YOURSELF - OR THAT YOU ARE A FAILURE OR HAVE LET YOURSELF OR YOUR FAMILY DOWN: 0
4. FEELING TIRED OR HAVING LITTLE ENERGY: 0
7. TROUBLE CONCENTRATING ON THINGS, SUCH AS READING THE NEWSPAPER OR WATCHING TELEVISION: 0
SUM OF ALL RESPONSES TO PHQ QUESTIONS 1-9: 0
1. LITTLE INTEREST OR PLEASURE IN DOING THINGS: 0
10. IF YOU CHECKED OFF ANY PROBLEMS, HOW DIFFICULT HAVE THESE PROBLEMS MADE IT FOR YOU TO DO YOUR WORK, TAKE CARE OF THINGS AT HOME, OR GET ALONG WITH OTHER PEOPLE: 0
3. TROUBLE FALLING OR STAYING ASLEEP: 0
SUM OF ALL RESPONSES TO PHQ QUESTIONS 1-9: 0
SUM OF ALL RESPONSES TO PHQ QUESTIONS 1-9: 0
8. MOVING OR SPEAKING SO SLOWLY THAT OTHER PEOPLE COULD HAVE NOTICED. OR THE OPPOSITE, BEING SO FIGETY OR RESTLESS THAT YOU HAVE BEEN MOVING AROUND A LOT MORE THAN USUAL: 0

## 2024-03-05 NOTE — PROGRESS NOTES
Khari Diaz (: 1955) is a 68 y.o. male, Established patient, who presents today for:    Chief Complaint   Patient presents with    Follow-up     Left ear pain very piercing          ASSESSMENT/PLAN:    1. Type 2 diabetes mellitus with stage 3a chronic kidney disease, without long-term current use of insulin ([facility])  Assessment & Plan:  Most recent A1c improved but still above goal control.  Patient is established with endocrinology for medication management.  I have advised him to discuss with endocrinology office potential of starting Jardiance for further management in the setting of his known chronic kidney disease.  Patient was instructed to continue with current doses of metformin and Trulicity.  I stressed with him the importance of doubling down on efforts to eat a lower carbohydrate and lower saturated fat diet.  Orders:  -     Comprehensive Metabolic Panel; Future  -     Lipid Panel; Future  -     Hemoglobin A1C; Future  -     Microalbumin / Creatinine Urine Ratio; Future  -     Lancets (SAFETY LANCET 28G/PRESSURE ACT) MISC; Disp-100 each, R-5, NormalUSE TO CHECK BLOOD GLUCOSE UP TO 3 TIMES DAILY  2. Microalbuminuria due to type 2 diabetes mellitus ([facility])  Assessment & Plan:  Values have been worsening over time.  We will continue to follow testing.  I stressed with patient the importance of tight blood glucose, lipid, and blood pressure control long term.  Orders:  -     Comprehensive Metabolic Panel; Future  -     Lipid Panel; Future  -     Hemoglobin A1C; Future  -     Microalbumin / Creatinine Urine Ratio; Future  -     Lancets (SAFETY LANCET 28G/PRESSURE ACT) MISC; Disp-100 each, R-5, NormalUSE TO CHECK BLOOD GLUCOSE UP TO 3 TIMES DAILY  3. Type 2 diabetes mellitus with other skin ulcer (CODE) ([facility])  Assessment & Plan:  Ongoing management per wound care/podiatry.  Wound is slowly healing over time.  Instructions given to keep regular visits with wound care and follow recommendations of the

## 2024-03-06 NOTE — ASSESSMENT & PLAN NOTE
Most recent A1c improved but still above goal control.  Patient is established with endocrinology for medication management.  I have advised him to discuss with endocrinology office potential of starting Jardiance for further management in the setting of his known chronic kidney disease.  Patient was instructed to continue with current doses of metformin and Trulicity.  I stressed with him the importance of doubling down on efforts to eat a lower carbohydrate and lower saturated fat diet.

## 2024-03-06 NOTE — ASSESSMENT & PLAN NOTE
Currently asymptomatic.  Most recent LDL at goal control.  Patient instructed to continue with current dose of rosuvastatin.

## 2024-03-06 NOTE — ASSESSMENT & PLAN NOTE
Ongoing management per wound care/podiatry.  Wound is slowly healing over time.  Instructions given to keep regular visits with wound care and follow recommendations of the specialist for management.

## 2024-03-06 NOTE — ASSESSMENT & PLAN NOTE
Values have been worsening over time.  We will continue to follow testing.  I stressed with patient the importance of tight blood glucose, lipid, and blood pressure control long term.

## 2024-03-06 NOTE — ASSESSMENT & PLAN NOTE
Most recent lipid panel with elevated triglycerides and LDL at goal control.  I stressed with him the importance of doubling down on efforts to eat a lower carbohydrate and lower saturated fat diet.  Patient was instructed to continue with current dose of rosuvastatin.

## 2024-03-06 NOTE — ASSESSMENT & PLAN NOTE
Patient established with neurology for long-term follow-up and management.  Patient reports some improvement with dizziness and gait since prior visits.  There are no reported falls at home.  He was instructed to keep close follow-up with neurology office for continued evaluation and management.

## 2024-03-06 NOTE — ASSESSMENT & PLAN NOTE
Patient using CPAP machine on a nightly basis and is getting restful sleep with use of the machine.  Instructions given to continue with CPAP compliance with machine at current settings.

## 2024-03-06 NOTE — ASSESSMENT & PLAN NOTE
Patient established with wound care/podiatry for long-term evaluation and management.  Ulcer has been slowly healing over time.  Instructions given to keep close follow-up visits with the specialist for treatment.

## 2024-03-07 ENCOUNTER — HOSPITAL ENCOUNTER (OUTPATIENT)
Dept: WOUND CARE | Age: 69
Discharge: HOME | End: 2024-03-07
Attending: PODIATRIST
Payer: MEDICARE

## 2024-03-07 VITALS
RESPIRATION RATE: 18 BRPM | TEMPERATURE: 98.8 F | SYSTOLIC BLOOD PRESSURE: 121 MMHG | DIASTOLIC BLOOD PRESSURE: 74 MMHG | HEART RATE: 99 BPM

## 2024-03-07 DIAGNOSIS — L97.322 ULCER OF LEFT ANKLE, WITH FAT LAYER EXPOSED (HCC): Primary | ICD-10-CM

## 2024-03-07 PROCEDURE — 6370000000 HC RX 637 (ALT 250 FOR IP): Performed by: PODIATRIST

## 2024-03-07 PROCEDURE — 11042 DBRDMT SUBQ TIS 1ST 20SQCM/<: CPT

## 2024-03-07 PROCEDURE — 11042 DBRDMT SUBQ TIS 1ST 20SQCM/<: CPT | Performed by: PODIATRIST

## 2024-03-07 RX ORDER — LIDOCAINE 50 MG/G
OINTMENT TOPICAL ONCE
OUTPATIENT
Start: 2024-03-07 | End: 2024-03-07

## 2024-03-07 RX ORDER — CLOBETASOL PROPIONATE 0.5 MG/G
OINTMENT TOPICAL ONCE
OUTPATIENT
Start: 2024-03-07 | End: 2024-03-07

## 2024-03-07 RX ORDER — SODIUM CHLOR/HYPOCHLOROUS ACID 0.033 %
SOLUTION, IRRIGATION IRRIGATION ONCE
OUTPATIENT
Start: 2024-03-07 | End: 2024-03-07

## 2024-03-07 RX ORDER — LIDOCAINE 40 MG/G
CREAM TOPICAL ONCE
OUTPATIENT
Start: 2024-03-07 | End: 2024-03-07

## 2024-03-07 RX ORDER — GENTAMICIN SULFATE 1 MG/G
OINTMENT TOPICAL ONCE
OUTPATIENT
Start: 2024-03-07 | End: 2024-03-07

## 2024-03-07 RX ORDER — LIDOCAINE HYDROCHLORIDE 20 MG/ML
JELLY TOPICAL ONCE
OUTPATIENT
Start: 2024-03-07 | End: 2024-03-07

## 2024-03-07 RX ORDER — LIDOCAINE 40 MG/G
CREAM TOPICAL ONCE
Status: COMPLETED | OUTPATIENT
Start: 2024-03-07 | End: 2024-03-07

## 2024-03-07 RX ORDER — GINSENG 100 MG
CAPSULE ORAL ONCE
OUTPATIENT
Start: 2024-03-07 | End: 2024-03-07

## 2024-03-07 RX ORDER — BACITRACIN ZINC AND POLYMYXIN B SULFATE 500; 1000 [USP'U]/G; [USP'U]/G
OINTMENT TOPICAL ONCE
OUTPATIENT
Start: 2024-03-07 | End: 2024-03-07

## 2024-03-07 RX ORDER — TRIAMCINOLONE ACETONIDE 1 MG/G
OINTMENT TOPICAL ONCE
OUTPATIENT
Start: 2024-03-07 | End: 2024-03-07

## 2024-03-07 RX ORDER — BETAMETHASONE DIPROPIONATE 0.05 %
OINTMENT (GRAM) TOPICAL ONCE
OUTPATIENT
Start: 2024-03-07 | End: 2024-03-07

## 2024-03-07 RX ORDER — IBUPROFEN 200 MG
TABLET ORAL ONCE
OUTPATIENT
Start: 2024-03-07 | End: 2024-03-07

## 2024-03-07 RX ORDER — LIDOCAINE HYDROCHLORIDE 40 MG/ML
SOLUTION TOPICAL ONCE
OUTPATIENT
Start: 2024-03-07 | End: 2024-03-07

## 2024-03-07 RX ADMIN — LIDOCAINE 4%: 4 CREAM TOPICAL at 16:07

## 2024-03-07 ASSESSMENT — PAIN DESCRIPTION - LOCATION: LOCATION: LEG

## 2024-03-07 ASSESSMENT — PAIN DESCRIPTION - DESCRIPTORS: DESCRIPTORS: BURNING

## 2024-03-07 ASSESSMENT — PAIN DESCRIPTION - ORIENTATION: ORIENTATION: LEFT

## 2024-03-07 ASSESSMENT — PAIN SCALES - GENERAL: PAINLEVEL_OUTOF10: 6

## 2024-03-07 NOTE — DISCHARGE INSTRUCTIONS
[facility]   Physician Orders and Discharge Instructions  [facility]  [address] [place]  [address]  Telephone: [phone]      [phone]        NAME:  Khari Diaz                                                                                                YOB: 1955  MEDICAL RECORD NUMBER:  [id number]     Your  is:  Rosanna     Home Care/Facility: None     Wound Location: Left Lateral Ankle     Dressing orders:1.Cleanse wound(s) with normal saline.   1. Gently tuck the Cutimed Sorbact Siltec into  the wound bed cover with folded gauze, apply Calamine unna wrap then place a piece of Plastisote Foam (foam side down) around the wound bed, then cover with coban..Leave unna boot in place until next scheduled change. Monitor circulation in feet and if needed, unwrap unna boot and apply dry dressing until next appointment.       Compression: You may wear your own compression sock on your Right leg     Offloading Device:      Other Instructions:  Try to place a pillow under your calf to prevent pressure on the area Or use a Prevalon Boot. Increase your Protein Intake.      Keep all dressings clean, dry and intact.  Keep pressure off the wound(s) at all times.      Follow up visit   1 Week March 14, 2024 @  4:00     Please give 24 hour notice if unable to keep appointment. [phone]     If you experience any of the following, please call the Wound Care Service at  [phone] or go to the nearest emergency room.        *Increase in pain         *Temperature over 101           *Increase in drainage from your wound or a foul odor  *Uncontrolled swelling            *Need for compression bandage changes due to slippage, breakthrough drainage       PLEASE NOTE: IF YOU ARE UNABLE TO OBTAIN WOUND SUPPLIES, CONTINUE TO USE THE SUPPLIES YOU HAVE AVAILABLE UNTIL YOU ARE ABLE TO REACH US. IT IS MOST IMPORTANT TO

## 2024-03-07 NOTE — PROGRESS NOTES
[facility]                                                   Progress Note and Procedure Note      Khari Diaz  MEDICAL RECORD NUMBER:  [id number]  AGE: 68 y.o.   GENDER: male  : 1955  EPISODE DATE:  3/7/2024    Subjective:     Chief Complaint   Patient presents with    Wound Check         HISTORY of PRESENT ILLNESS HPI     Khari Diaz is a 68 y.o. male who presents today for wound/ulcer evaluation.   History of Wound Context: Patient presents for continued wound care for chronic ulceration of the left ankle.  Patient reports compliance with use of the offloading padding and boot while at rest.  Patient reports compliance with dressing changes.  Patient denies observing signs of infection.    Patient denies nausea, vomiting, fever, chills, chest pain, or shortness of breath.     Wound/Ulcer Pain Timing/Severity: intermittent  Quality of pain: Itching  Severity: Mild  Modifying Factors: Pain worsens with movement  Associated Signs/Symptoms: edema and drainage    Ulcer Identification:  Ulcer Type: diabetic  Contributing Factors: edema and venous stasis    Wound:  Full-thickness ulceration left lateral ankle        PAST MEDICAL HISTORY        Diagnosis Date    Abscess of back 2020    Abscess of right leg 2020    Acute renal failure (HCC)     Adenomatous polyp of ascending colon     Adenomatous polyp of descending colon     Adenomatous polyp of transverse colon     Anxiety     CAD S/P percutaneous coronary angioplasty 2014    Cardiomyopathy ([facility])     Cecal polyp     Cerebrovascular accident (CVA) due to occlusion of left middle cerebral artery ([facility]) 2016    brainstem infarction from left MCA occlusion    Cerebrovascular disease 2016    Claudication ([facility])     Colon polyp     Coronary angioplasty status     Cutaneous abscess of back excluding buttocks 2018    CVA (cerebral vascular accident) ([facility]) 2017    Dependent edema 2017    Diplopia 05/15/2017

## 2024-03-11 ENCOUNTER — OFFICE VISIT (OUTPATIENT)
Dept: ENDOCRINOLOGY | Age: 69
End: 2024-03-11
Payer: MEDICARE

## 2024-03-11 VITALS
WEIGHT: 256.4 LBS | HEIGHT: 73 IN | DIASTOLIC BLOOD PRESSURE: 75 MMHG | HEART RATE: 87 BPM | BODY MASS INDEX: 33.98 KG/M2 | SYSTOLIC BLOOD PRESSURE: 128 MMHG | OXYGEN SATURATION: 98 %

## 2024-03-11 DIAGNOSIS — N18.31 TYPE 2 DIABETES MELLITUS WITH STAGE 3A CHRONIC KIDNEY DISEASE, WITHOUT LONG-TERM CURRENT USE OF INSULIN (HCC): Primary | ICD-10-CM

## 2024-03-11 DIAGNOSIS — E11.22 TYPE 2 DIABETES MELLITUS WITH STAGE 3A CHRONIC KIDNEY DISEASE, WITHOUT LONG-TERM CURRENT USE OF INSULIN (HCC): Primary | ICD-10-CM

## 2024-03-11 LAB
CHP ED QC CHECK: NORMAL
GLUCOSE BLD-MCNC: 244 MG/DL

## 2024-03-11 PROCEDURE — 3052F HG A1C>EQUAL 8.0%<EQUAL 9.0%: CPT | Performed by: PHYSICIAN ASSISTANT

## 2024-03-11 PROCEDURE — 3074F SYST BP LT 130 MM HG: CPT | Performed by: PHYSICIAN ASSISTANT

## 2024-03-11 PROCEDURE — 1123F ACP DISCUSS/DSCN MKR DOCD: CPT | Performed by: PHYSICIAN ASSISTANT

## 2024-03-11 PROCEDURE — 3078F DIAST BP <80 MM HG: CPT | Performed by: PHYSICIAN ASSISTANT

## 2024-03-11 PROCEDURE — 82962 GLUCOSE BLOOD TEST: CPT | Performed by: PHYSICIAN ASSISTANT

## 2024-03-11 PROCEDURE — 99214 OFFICE O/P EST MOD 30 MIN: CPT | Performed by: PHYSICIAN ASSISTANT

## 2024-03-11 RX ORDER — DULAGLUTIDE 4.5 MG/.5ML
4.5 INJECTION, SOLUTION SUBCUTANEOUS WEEKLY
Qty: 12 ADJUSTABLE DOSE PRE-FILLED PEN SYRINGE | Refills: 3 | Status: SHIPPED | OUTPATIENT
Start: 2024-03-11

## 2024-03-11 ASSESSMENT — ENCOUNTER SYMPTOMS
ABDOMINAL PAIN: 0
SORE THROAT: 0
COUGH: 0
VOMITING: 0
DIARRHEA: 0
SINUS PRESSURE: 0
RHINORRHEA: 0
NAUSEA: 0
WHEEZING: 0
SHORTNESS OF BREATH: 0

## 2024-03-11 NOTE — PROGRESS NOTES
Cervical back: Normal range of motion and neck supple.   Skin:     General: Skin is warm and dry.   Neurological:      General: No focal deficit present.      Mental Status: He is alert and oriented to person, place, and time.   Psychiatric:         Mood and Affect: Mood normal.           Reviewed with the patient: current clinical status, medications, activities and diet.      Side effects, adverse effects of the medication prescribed today, as well as treatment plan/ rationale and result expectations have been discussed with the patient who expresses understanding and desires to proceed.     Close follow up to evaluate treatment results and for coordination of care.  I have reviewed the patient's medical history in detail and updated the computerized patient record.

## 2024-03-11 NOTE — PATIENT INSTRUCTIONS
Endocrinology-    Check your blood sugars 4 times a day, before meals and at night  Document these numbers in a blood glucose log and bring them with you to your follow-up appointment.  If you are prescribed insulin, Do not take your mealtime insulin if your blood sugars less than 120   Call our office if you have blood sugars less than 80 or greater then 300 on two or more occasions  Call our office if you have any questions regarding your blood sugars or insulin dosing regiment  Signs of low blood sugar may include tremors, feeling shaky, sweating, dizziness, confusion and weakness. Check your blood sugar immediatly if you have any of these symptoms.     The plan as discussed at your appointment-   Continue Trulicity 4.5 mg injected weekly  Start Jardiance 10 mg daily   Metformin 500 mg before breakfast and dinner  Glucose logs given   Repeat labs 4-5 days before your follow up appointment   Follow up in 3 months

## 2024-03-14 ENCOUNTER — HOSPITAL ENCOUNTER (OUTPATIENT)
Dept: WOUND CARE | Age: 69
Discharge: HOME | End: 2024-03-14
Attending: PODIATRIST
Payer: MEDICARE

## 2024-03-14 VITALS
RESPIRATION RATE: 17 BRPM | TEMPERATURE: 97.2 F | DIASTOLIC BLOOD PRESSURE: 75 MMHG | HEART RATE: 96 BPM | SYSTOLIC BLOOD PRESSURE: 134 MMHG

## 2024-03-14 DIAGNOSIS — L97.322 ULCER OF LEFT ANKLE, WITH FAT LAYER EXPOSED (HCC): Primary | ICD-10-CM

## 2024-03-14 PROCEDURE — 11042 DBRDMT SUBQ TIS 1ST 20SQCM/<: CPT

## 2024-03-14 PROCEDURE — 11042 DBRDMT SUBQ TIS 1ST 20SQCM/<: CPT | Performed by: PODIATRIST

## 2024-03-14 RX ORDER — LIDOCAINE HYDROCHLORIDE 20 MG/ML
JELLY TOPICAL ONCE
OUTPATIENT
Start: 2024-03-14 | End: 2024-03-14

## 2024-03-14 RX ORDER — LIDOCAINE 40 MG/G
CREAM TOPICAL ONCE
OUTPATIENT
Start: 2024-03-14 | End: 2024-03-14

## 2024-03-14 RX ORDER — GINSENG 100 MG
CAPSULE ORAL ONCE
OUTPATIENT
Start: 2024-03-14 | End: 2024-03-14

## 2024-03-14 RX ORDER — BETAMETHASONE DIPROPIONATE 0.05 %
OINTMENT (GRAM) TOPICAL ONCE
OUTPATIENT
Start: 2024-03-14 | End: 2024-03-14

## 2024-03-14 RX ORDER — SODIUM CHLOR/HYPOCHLOROUS ACID 0.033 %
SOLUTION, IRRIGATION IRRIGATION ONCE
OUTPATIENT
Start: 2024-03-14 | End: 2024-03-14

## 2024-03-14 RX ORDER — TRIAMCINOLONE ACETONIDE 1 MG/G
OINTMENT TOPICAL ONCE
OUTPATIENT
Start: 2024-03-14 | End: 2024-03-14

## 2024-03-14 RX ORDER — LIDOCAINE HYDROCHLORIDE 40 MG/ML
SOLUTION TOPICAL ONCE
OUTPATIENT
Start: 2024-03-14 | End: 2024-03-14

## 2024-03-14 RX ORDER — CLOBETASOL PROPIONATE 0.5 MG/G
OINTMENT TOPICAL ONCE
OUTPATIENT
Start: 2024-03-14 | End: 2024-03-14

## 2024-03-14 RX ORDER — GENTAMICIN SULFATE 1 MG/G
OINTMENT TOPICAL ONCE
OUTPATIENT
Start: 2024-03-14 | End: 2024-03-14

## 2024-03-14 RX ORDER — BACITRACIN ZINC AND POLYMYXIN B SULFATE 500; 1000 [USP'U]/G; [USP'U]/G
OINTMENT TOPICAL ONCE
OUTPATIENT
Start: 2024-03-14 | End: 2024-03-14

## 2024-03-14 RX ORDER — IBUPROFEN 200 MG
TABLET ORAL ONCE
OUTPATIENT
Start: 2024-03-14 | End: 2024-03-14

## 2024-03-14 RX ORDER — LIDOCAINE 50 MG/G
OINTMENT TOPICAL ONCE
OUTPATIENT
Start: 2024-03-14 | End: 2024-03-14

## 2024-03-14 ASSESSMENT — PAIN DESCRIPTION - ORIENTATION: ORIENTATION: LEFT

## 2024-03-14 ASSESSMENT — PAIN DESCRIPTION - LOCATION: LOCATION: LEG

## 2024-03-14 ASSESSMENT — PAIN SCALES - GENERAL: PAINLEVEL_OUTOF10: 3

## 2024-03-14 ASSESSMENT — PAIN DESCRIPTION - DESCRIPTORS: DESCRIPTORS: BURNING

## 2024-03-14 NOTE — DISCHARGE INSTRUCTIONS
[facility] and Hyper[facility]ic Medicine   Physician Orders and Discharge Instructions  [facility]  [address] [place]  [address]  Telephone: [phone]      [phone]        NAME:  Khari Diaz                                                                                                YOB: 1955  MEDICAL RECORD NUMBER:  [id number]     Your  is:  Rosanna     Home Care/Facility: None     Wound Location: Left Lateral Ankle     Dressing orders:1.Cleanse wound(s) with normal saline.   1. Gently tuck the Cutimed Sorbact Siltec into  the wound bed cover with folded gauze, apply Calamine unna wrap then place a piece of Plastisote Foam (foam side down) around the wound bed, then cover with coban..Leave unna boot in place until next scheduled change. Monitor circulation in feet and if needed, unwrap unna boot and apply dry dressing until next appointment.        Compression: You may wear your own compression sock on your Right leg     Offloading Device:      Other Instructions:  Try to place a pillow under your calf to prevent pressure on the area Or use a Prevalon Boot. Increase your Protein Intake.      Keep all dressings clean, dry and intact.  Keep pressure off the wound(s) at all times.      Follow up visit   1 Week March 21, 2024 @  3:45     Please give 24 hour notice if unable to keep appointment. [phone]     If you experience any of the following, please call the Wound Care Service at  [phone] or go to the nearest emergency room.        *Increase in pain         *Temperature over 101           *Increase in drainage from your wound or a foul odor  *Uncontrolled swelling            *Need for compression bandage changes due to slippage, breakthrough drainage       PLEASE NOTE: IF YOU ARE UNABLE TO OBTAIN WOUND SUPPLIES, CONTINUE TO USE THE SUPPLIES YOU HAVE AVAILABLE UNTIL YOU ARE ABLE TO REACH US. IT IS MOST IMPORTANT TO

## 2024-03-14 NOTE — FLOWSHEET NOTE
Unna Boot Application   Below Knee    NAME:  Khari Diaz  YOB: 1955  MEDICAL RECORD NUMBER:  [id number]  DATE:  3/14/2024    Unna boot: Applied moisturizing agent to dry skin as needed.   Appied primary and secondary dressing as ordered.  Applied Unna roll from toes to knee overlapping each time.   Applied ace wrap or coban from toes to below the knee.   Secured with tape and/or metal clips covered with tape.   Instructed patient/caregiver to keep dressing dry and intact. DO NOT REMOVE DRESSING.   Instructed pt/family/caregiver to report excessive draining, loose bandage, wet dressing, severe pain or tingling in toes.  Applied Unna Boot dressing below the knee to left lower leg.    Unna Boot(s) were applied per  Guidelines.     Electronically signed by Bnia Vilchis RN on 3/14/2024 at 4:44 PM

## 2024-03-14 NOTE — PROGRESS NOTES
WOUND COVERED AT ALL TIMES                    Electronically signed by Teodoro Houston DPM on 3/14/2024 at 4:40 PM

## 2024-03-19 ENCOUNTER — OFFICE VISIT (OUTPATIENT)
Dept: PULMONOLOGY | Age: 69
End: 2024-03-19
Payer: MEDICARE

## 2024-03-19 VITALS
TEMPERATURE: 97.4 F | SYSTOLIC BLOOD PRESSURE: 134 MMHG | BODY MASS INDEX: 34.67 KG/M2 | DIASTOLIC BLOOD PRESSURE: 73 MMHG | HEIGHT: 72 IN | RESPIRATION RATE: 16 BRPM | OXYGEN SATURATION: 99 % | WEIGHT: 256 LBS | HEART RATE: 90 BPM

## 2024-03-19 DIAGNOSIS — Z87.891 PERSONAL HISTORY OF TOBACCO USE: ICD-10-CM

## 2024-03-19 DIAGNOSIS — R91.8 LUNG NODULES: ICD-10-CM

## 2024-03-19 DIAGNOSIS — J44.9 CHRONIC OBSTRUCTIVE PULMONARY DISEASE, UNSPECIFIED COPD TYPE (HCC): ICD-10-CM

## 2024-03-19 DIAGNOSIS — E66.01 SEVERE OBESITY (BMI 35.0-39.9) WITH COMORBIDITY (HCC): ICD-10-CM

## 2024-03-19 DIAGNOSIS — G47.33 OSA ON CPAP: Primary | ICD-10-CM

## 2024-03-19 PROCEDURE — 99214 OFFICE O/P EST MOD 30 MIN: CPT | Performed by: INTERNAL MEDICINE

## 2024-03-19 PROCEDURE — 3078F DIAST BP <80 MM HG: CPT | Performed by: INTERNAL MEDICINE

## 2024-03-19 PROCEDURE — 3075F SYST BP GE 130 - 139MM HG: CPT | Performed by: INTERNAL MEDICINE

## 2024-03-19 PROCEDURE — G0296 VISIT TO DETERM LDCT ELIG: HCPCS | Performed by: INTERNAL MEDICINE

## 2024-03-19 PROCEDURE — 1123F ACP DISCUSS/DSCN MKR DOCD: CPT | Performed by: INTERNAL MEDICINE

## 2024-03-19 NOTE — PROGRESS NOTES
Standing Status:   Future     Standing Expiration Date:   9/19/2025    TN VISIT TO DISCUSS LUNG CA SCREEN W LDCT     No orders of the defined types were placed in this encounter.           Discussed with patient the importance of exercise and weight control and  overall health and well-being.     Reviewed with the patient: current clinical status, medications, activities and diet.      Side effects, adverse effects of the medication prescribed today, as well as treatment plan and result expectations have been discussed with the patient who expresses understanding and desires to proceed.       Return in about 2 months (around 5/19/2024).      Lali Kilgore MD      Discussed with the patient the current USPSTF guidelines released March 9, 2021 for screening for lung cancer.    For adults aged 50 to 80 years who have a 20 pack-year smoking history and currently smoke or have quit within the past 15 years the grade B recommendation is to:  Screen for lung cancer with low-dose computed tomography (LDCT) every year.  Stop screening once a person has not smoked for 15 years or has a health problem that limits life expectancy or the ability to have lung surgery.    The patient  reports that he has been smoking cigarettes. He has a 75.0 pack-year smoking history. He has never used smokeless tobacco.. Discussed with patient the risks and benefits of screening, including over-diagnosis, false positive rate, and total radiation exposure.  The patient currently exhibits no signs or symptoms suggestive of lung cancer.  Discussed with patient the importance of compliance with yearly annual lung cancer screenings and willingness to undergo diagnosis and treatment if screening scan is positive.  In addition, the patient was counseled regarding the importance of remaining smoke free and/or total smoking cessation.    Also reviewed the following if the patient has Medicare that as of February 10, 2022, Medicare only covers LDCT  Glycopyrrolate Counseling:  I discussed with the patient the risks of glycopyrrolate including but not limited to skin rash, drowsiness, dry mouth, difficulty urinating, and blurred vision.

## 2024-03-21 ENCOUNTER — HOSPITAL ENCOUNTER (OUTPATIENT)
Dept: WOUND CARE | Age: 69
Discharge: HOME | End: 2024-03-21
Attending: PODIATRIST
Payer: MEDICARE

## 2024-03-21 VITALS
RESPIRATION RATE: 16 BRPM | SYSTOLIC BLOOD PRESSURE: 128 MMHG | HEART RATE: 88 BPM | TEMPERATURE: 97.3 F | DIASTOLIC BLOOD PRESSURE: 69 MMHG

## 2024-03-21 DIAGNOSIS — L97.322 ULCER OF LEFT ANKLE, WITH FAT LAYER EXPOSED (HCC): Primary | ICD-10-CM

## 2024-03-21 PROCEDURE — 6370000000 HC RX 637 (ALT 250 FOR IP): Performed by: PODIATRIST

## 2024-03-21 PROCEDURE — 11042 DBRDMT SUBQ TIS 1ST 20SQCM/<: CPT

## 2024-03-21 PROCEDURE — 11042 DBRDMT SUBQ TIS 1ST 20SQCM/<: CPT | Performed by: PODIATRIST

## 2024-03-21 RX ORDER — GINSENG 100 MG
CAPSULE ORAL ONCE
OUTPATIENT
Start: 2024-03-21 | End: 2024-03-21

## 2024-03-21 RX ORDER — LIDOCAINE HYDROCHLORIDE 20 MG/ML
JELLY TOPICAL ONCE
OUTPATIENT
Start: 2024-03-21 | End: 2024-03-21

## 2024-03-21 RX ORDER — IBUPROFEN 200 MG
TABLET ORAL ONCE
OUTPATIENT
Start: 2024-03-21 | End: 2024-03-21

## 2024-03-21 RX ORDER — LIDOCAINE 40 MG/G
CREAM TOPICAL ONCE
OUTPATIENT
Start: 2024-03-21 | End: 2024-03-21

## 2024-03-21 RX ORDER — BETAMETHASONE DIPROPIONATE 0.05 %
OINTMENT (GRAM) TOPICAL ONCE
OUTPATIENT
Start: 2024-03-21 | End: 2024-03-21

## 2024-03-21 RX ORDER — TRIAMCINOLONE ACETONIDE 1 MG/G
OINTMENT TOPICAL ONCE
OUTPATIENT
Start: 2024-03-21 | End: 2024-03-21

## 2024-03-21 RX ORDER — LIDOCAINE 50 MG/G
OINTMENT TOPICAL ONCE
OUTPATIENT
Start: 2024-03-21 | End: 2024-03-21

## 2024-03-21 RX ORDER — GENTAMICIN SULFATE 1 MG/G
OINTMENT TOPICAL ONCE
OUTPATIENT
Start: 2024-03-21 | End: 2024-03-21

## 2024-03-21 RX ORDER — BACITRACIN ZINC AND POLYMYXIN B SULFATE 500; 1000 [USP'U]/G; [USP'U]/G
OINTMENT TOPICAL ONCE
OUTPATIENT
Start: 2024-03-21 | End: 2024-03-21

## 2024-03-21 RX ORDER — SODIUM CHLOR/HYPOCHLOROUS ACID 0.033 %
SOLUTION, IRRIGATION IRRIGATION ONCE
OUTPATIENT
Start: 2024-03-21 | End: 2024-03-21

## 2024-03-21 RX ORDER — LIDOCAINE 40 MG/G
CREAM TOPICAL ONCE
Status: COMPLETED | OUTPATIENT
Start: 2024-03-21 | End: 2024-03-21

## 2024-03-21 RX ORDER — CLOBETASOL PROPIONATE 0.5 MG/G
OINTMENT TOPICAL ONCE
OUTPATIENT
Start: 2024-03-21 | End: 2024-03-21

## 2024-03-21 RX ORDER — LIDOCAINE HYDROCHLORIDE 40 MG/ML
SOLUTION TOPICAL ONCE
OUTPATIENT
Start: 2024-03-21 | End: 2024-03-21

## 2024-03-21 RX ADMIN — LIDOCAINE 4%: 4 CREAM TOPICAL at 16:24

## 2024-03-21 NOTE — DISCHARGE INSTRUCTIONS
[facility] and Hyperbaric Medicine   Physician Orders and Discharge Instructions  [facility]  [address] [place]  [address]  Telephone: [phone]      [phone]        NAME:  Khari Diaz                                                                                                YOB: 1955  MEDICAL RECORD NUMBER:  [id number]     Your  is:  Rosanna     Home Care/Facility: None     Wound Location: Left Lateral Ankle     Dressing orders:1.Cleanse wound(s) with normal saline.   1. Gently tuck the Cutimed Sorbact Siltec into  the wound bed cover with folded gauze, apply Calamine unna wrap then place a piece of Plastisote Foam (foam side down) around the wound bed, then cover with coban..Leave unna boot in place until next scheduled change. Monitor circulation in feet and if needed, unwrap unna boot and apply dry dressing until next appointment.        Compression: You may wear your own compression sock on your Right leg     Offloading Device:      Other Instructions:  Try to place a pillow under your calf to prevent pressure on the area Or use a Prevalon Boot. Increase your Protein Intake.      Keep all dressings clean, dry and intact.  Keep pressure off the wound(s) at all times.      Follow up visit   1 Week March 28, 2024 @  3:15     Please give 24 hour notice if unable to keep appointment. [phone]     If you experience any of the following, please call the Wound Care Service at  [phone] or go to the nearest emergency room.        *Increase in pain         *Temperature over 101           *Increase in drainage from your wound or a foul odor  *Uncontrolled swelling            *Need for compression bandage changes due to slippage, breakthrough drainage       PLEASE NOTE: IF YOU ARE UNABLE TO OBTAIN WOUND SUPPLIES, CONTINUE TO USE THE SUPPLIES YOU HAVE AVAILABLE UNTIL YOU ARE ABLE TO REACH US. IT IS MOST IMPORTANT TO

## 2024-03-21 NOTE — PROGRESS NOTES
[facility]                                                   Progress Note and Procedure Note      Khari Diaz  MEDICAL RECORD NUMBER:  [id number]  AGE: 68 y.o.   GENDER: male  : 1955  EPISODE DATE:  3/21/2024    Subjective:     Chief Complaint   Patient presents with    Wound Check         HISTORY of PRESENT ILLNESS HPI     Khari Diaz is a 68 y.o. male who presents today for wound/ulcer evaluation.   History of Wound Context: Patient presents for continued treatment of a chronic diabetic ulceration of the left lateral ankle.  Patient reports compliance with leaving the Unna boot in place.  Patient has not observed signs or symptoms of local or systemic infection.    Patient denies nausea, vomiting, fever, chills, chest pain, or shortness of breath.     Wound/Ulcer Pain Timing/Severity: intermittent  Quality of pain: burning, itching  Severity: Varies from minimal to moderate  Modifying Factors: Pain worsens with walking and movement  Associated Signs/Symptoms: edema and drainage    Ulcer Identification:  Ulcer Type: diabetic  Contributing Factors: edema and venous stasis    Wound:  Full-thickness ulceration left lateral ankle        PAST MEDICAL HISTORY        Diagnosis Date    Abscess of back 2020    Abscess of right leg 2020    Acute renal failure (HCC)     Adenomatous polyp of ascending colon     Adenomatous polyp of descending colon     Adenomatous polyp of transverse colon     Anxiety     CAD S/P percutaneous coronary angioplasty 2014    Cardiomyopathy (HCC)     Cecal polyp     Cerebrovascular accident (CVA) due to occlusion of left middle cerebral artery (HCC) 2016    brainstem infarction from left MCA occlusion    Cerebrovascular disease 2016    Claudication (HCC)     Colon polyp     Coronary angioplasty status     Cutaneous abscess of back excluding buttocks 2018    CVA (cerebral vascular accident) ([facility]) 2017    Dependent edema 2017

## 2024-03-28 ENCOUNTER — HOSPITAL ENCOUNTER (OUTPATIENT)
Dept: WOUND CARE | Age: 69
Discharge: HOME | End: 2024-03-28
Attending: PODIATRIST
Payer: MEDICARE

## 2024-03-28 VITALS
HEART RATE: 95 BPM | RESPIRATION RATE: 17 BRPM | SYSTOLIC BLOOD PRESSURE: 126 MMHG | TEMPERATURE: 98.3 F | DIASTOLIC BLOOD PRESSURE: 69 MMHG

## 2024-03-28 DIAGNOSIS — L97.322 ULCER OF LEFT ANKLE, WITH FAT LAYER EXPOSED (HCC): Primary | ICD-10-CM

## 2024-03-28 PROCEDURE — 6370000000 HC RX 637 (ALT 250 FOR IP): Performed by: PODIATRIST

## 2024-03-28 PROCEDURE — 11042 DBRDMT SUBQ TIS 1ST 20SQCM/<: CPT

## 2024-03-28 PROCEDURE — 11042 DBRDMT SUBQ TIS 1ST 20SQCM/<: CPT | Performed by: PODIATRIST

## 2024-03-28 RX ORDER — SODIUM CHLOR/HYPOCHLOROUS ACID 0.033 %
SOLUTION, IRRIGATION IRRIGATION ONCE
OUTPATIENT
Start: 2024-03-28 | End: 2024-03-28

## 2024-03-28 RX ORDER — CLOBETASOL PROPIONATE 0.5 MG/G
OINTMENT TOPICAL ONCE
OUTPATIENT
Start: 2024-03-28 | End: 2024-03-28

## 2024-03-28 RX ORDER — LIDOCAINE HYDROCHLORIDE 40 MG/ML
SOLUTION TOPICAL ONCE
OUTPATIENT
Start: 2024-03-28 | End: 2024-03-28

## 2024-03-28 RX ORDER — BETAMETHASONE DIPROPIONATE 0.05 %
OINTMENT (GRAM) TOPICAL ONCE
OUTPATIENT
Start: 2024-03-28 | End: 2024-03-28

## 2024-03-28 RX ORDER — TRIAMCINOLONE ACETONIDE 1 MG/G
OINTMENT TOPICAL ONCE
OUTPATIENT
Start: 2024-03-28 | End: 2024-03-28

## 2024-03-28 RX ORDER — LIDOCAINE 40 MG/G
CREAM TOPICAL ONCE
Status: COMPLETED | OUTPATIENT
Start: 2024-03-28 | End: 2024-03-28

## 2024-03-28 RX ORDER — IBUPROFEN 200 MG
TABLET ORAL ONCE
OUTPATIENT
Start: 2024-03-28 | End: 2024-03-28

## 2024-03-28 RX ORDER — GENTAMICIN SULFATE 1 MG/G
OINTMENT TOPICAL ONCE
OUTPATIENT
Start: 2024-03-28 | End: 2024-03-28

## 2024-03-28 RX ORDER — BACITRACIN ZINC AND POLYMYXIN B SULFATE 500; 1000 [USP'U]/G; [USP'U]/G
OINTMENT TOPICAL ONCE
OUTPATIENT
Start: 2024-03-28 | End: 2024-03-28

## 2024-03-28 RX ORDER — LIDOCAINE 40 MG/G
CREAM TOPICAL ONCE
OUTPATIENT
Start: 2024-03-28 | End: 2024-03-28

## 2024-03-28 RX ORDER — LIDOCAINE 50 MG/G
OINTMENT TOPICAL ONCE
OUTPATIENT
Start: 2024-03-28 | End: 2024-03-28

## 2024-03-28 RX ORDER — GINSENG 100 MG
CAPSULE ORAL ONCE
OUTPATIENT
Start: 2024-03-28 | End: 2024-03-28

## 2024-03-28 RX ORDER — LIDOCAINE HYDROCHLORIDE 20 MG/ML
JELLY TOPICAL ONCE
OUTPATIENT
Start: 2024-03-28 | End: 2024-03-28

## 2024-03-28 RX ADMIN — LIDOCAINE 4%: 4 CREAM TOPICAL at 15:34

## 2024-03-28 NOTE — PROGRESS NOTES
[facility]                                                   Progress Note and Procedure Note      Khari Diaz  MEDICAL RECORD NUMBER:  [id number]  AGE: 69 y.o.   GENDER: male  : 1955  EPISODE DATE:  3/28/2024    Subjective:     Chief Complaint   Patient presents with    Wound Check         HISTORY of PRESENT ILLNESS HPI     Khari Diaz is a 69 y.o. male who presents today for wound/ulcer evaluation.   History of Wound Context: Patient presents for continued care for chronic ulceration of the left lateral ankle.  Patient reports compliance with leaving his Unna boot intact.  Patient has not observed signs of infection to the exposed areas.    Patient denies nausea, vomiting, fever, chills, chest pain, or shortness of breath.     Wound/Ulcer Pain Timing/Severity: none  Quality of pain: N/A  Severity:  0 / 10   Modifying Factors: None  Associated Signs/Symptoms: edema and drainage    Ulcer Identification:  Ulcer Type: diabetic  Contributing Factors: edema and venous stasis    Wound:  Full-thickness diabetic ulceration left lateral ankle        PAST MEDICAL HISTORY        Diagnosis Date    Abscess of back 2020    Abscess of right leg 2020    Acute renal failure (HCC)     Adenomatous polyp of ascending colon     Adenomatous polyp of descending colon     Adenomatous polyp of transverse colon     Anxiety     CAD S/P percutaneous coronary angioplasty 2014    Cardiomyopathy (HCC)     Cecal polyp     Cerebrovascular accident (CVA) due to occlusion of left middle cerebral artery (HCC) 2016    brainstem infarction from left MCA occlusion    Cerebrovascular disease 2016    Claudication (HCC)     Colon polyp     Coronary angioplasty status     Cutaneous abscess of back excluding buttocks 2018    CVA (cerebral vascular accident) ([facility]) 2017    Dependent edema 2017    Diplopia 05/15/2017    Resolved with management of cataracts    Dupuytren contracture 2018

## 2024-03-28 NOTE — DISCHARGE INSTRUCTIONS
[facility]   Physician Orders and Discharge Instructions  [facility]  [address] [place]  [address]  Telephone: [phone]      [phone]        NAME:  Khari Diaz                                                                                                YOB: 1955  MEDICAL RECORD NUMBER:  [id number]     Your  is:  Rosanna     Home Care/Facility: None     Wound Location: Left Lateral Ankle     Dressing orders:1.Cleanse wound(s) with normal saline.   1. Cover the wound with Hydrofera Blue then  apply Calamine unna wrap then place a piece of Plastisote Foam (foam side down) around the wound bed, then cover with coban .Leave unna boot in place until next scheduled change. Monitor circulation in feet and if needed, unwrap unna boot and apply dry dressing until next appointment.        Compression: You may wear your own compression sock on your Right leg     Offloading Device:      Other Instructions:  Try to place a pillow under your calf to prevent pressure on the area Or use a Prevalon Boot. Increase your Protein Intake.      Keep all dressings clean, dry and intact.  Keep pressure off the wound(s) at all times.      Follow up visit   1 Week April 4, 2024 @  3:45     Please give 24 hour notice if unable to keep appointment. [phone]     If you experience any of the following, please call the Wound Care Service at  [phone] or go to the nearest emergency room.        *Increase in pain         *Temperature over 101           *Increase in drainage from your wound or a foul odor  *Uncontrolled swelling            *Need for compression bandage changes due to slippage, breakthrough drainage       PLEASE NOTE: IF YOU ARE UNABLE TO OBTAIN WOUND SUPPLIES, CONTINUE TO USE THE SUPPLIES YOU HAVE AVAILABLE UNTIL YOU ARE ABLE TO REACH US. IT IS MOST IMPORTANT TO KEEP THE WOUND COVERED AT ALL TIMES

## 2024-04-04 ENCOUNTER — HOSPITAL ENCOUNTER (OUTPATIENT)
Dept: WOUND CARE | Age: 69
Discharge: HOME | End: 2024-04-04
Attending: PODIATRIST
Payer: MEDICARE

## 2024-04-04 VITALS
RESPIRATION RATE: 17 BRPM | HEART RATE: 93 BPM | TEMPERATURE: 97.4 F | SYSTOLIC BLOOD PRESSURE: 131 MMHG | DIASTOLIC BLOOD PRESSURE: 75 MMHG

## 2024-04-04 DIAGNOSIS — L97.322 ULCER OF LEFT ANKLE, WITH FAT LAYER EXPOSED (HCC): Primary | ICD-10-CM

## 2024-04-04 PROCEDURE — 11042 DBRDMT SUBQ TIS 1ST 20SQCM/<: CPT

## 2024-04-04 PROCEDURE — 6370000000 HC RX 637 (ALT 250 FOR IP): Performed by: PODIATRIST

## 2024-04-04 PROCEDURE — 11042 DBRDMT SUBQ TIS 1ST 20SQCM/<: CPT | Performed by: PODIATRIST

## 2024-04-04 RX ORDER — SODIUM CHLOR/HYPOCHLOROUS ACID 0.033 %
SOLUTION, IRRIGATION IRRIGATION ONCE
OUTPATIENT
Start: 2024-04-04 | End: 2024-04-04

## 2024-04-04 RX ORDER — LIDOCAINE 50 MG/G
OINTMENT TOPICAL ONCE
OUTPATIENT
Start: 2024-04-04 | End: 2024-04-04

## 2024-04-04 RX ORDER — GENTAMICIN SULFATE 1 MG/G
OINTMENT TOPICAL ONCE
OUTPATIENT
Start: 2024-04-04 | End: 2024-04-04

## 2024-04-04 RX ORDER — IBUPROFEN 200 MG
TABLET ORAL ONCE
OUTPATIENT
Start: 2024-04-04 | End: 2024-04-04

## 2024-04-04 RX ORDER — LIDOCAINE HYDROCHLORIDE 20 MG/ML
JELLY TOPICAL ONCE
OUTPATIENT
Start: 2024-04-04 | End: 2024-04-04

## 2024-04-04 RX ORDER — LIDOCAINE 40 MG/G
CREAM TOPICAL ONCE
Status: COMPLETED | OUTPATIENT
Start: 2024-04-04 | End: 2024-04-04

## 2024-04-04 RX ORDER — LIDOCAINE HYDROCHLORIDE 40 MG/ML
SOLUTION TOPICAL ONCE
OUTPATIENT
Start: 2024-04-04 | End: 2024-04-04

## 2024-04-04 RX ORDER — TRIAMCINOLONE ACETONIDE 1 MG/G
OINTMENT TOPICAL ONCE
OUTPATIENT
Start: 2024-04-04 | End: 2024-04-04

## 2024-04-04 RX ORDER — CLOBETASOL PROPIONATE 0.5 MG/G
OINTMENT TOPICAL ONCE
OUTPATIENT
Start: 2024-04-04 | End: 2024-04-04

## 2024-04-04 RX ORDER — BACITRACIN ZINC AND POLYMYXIN B SULFATE 500; 1000 [USP'U]/G; [USP'U]/G
OINTMENT TOPICAL ONCE
OUTPATIENT
Start: 2024-04-04 | End: 2024-04-04

## 2024-04-04 RX ORDER — GINSENG 100 MG
CAPSULE ORAL ONCE
OUTPATIENT
Start: 2024-04-04 | End: 2024-04-04

## 2024-04-04 RX ORDER — LIDOCAINE 40 MG/G
CREAM TOPICAL ONCE
OUTPATIENT
Start: 2024-04-04 | End: 2024-04-04

## 2024-04-04 RX ORDER — BETAMETHASONE DIPROPIONATE 0.05 %
OINTMENT (GRAM) TOPICAL ONCE
OUTPATIENT
Start: 2024-04-04 | End: 2024-04-04

## 2024-04-04 RX ADMIN — LIDOCAINE 4%: 4 CREAM TOPICAL at 16:53

## 2024-04-04 NOTE — PROGRESS NOTES
[facility]                                                   Progress Note and Procedure Note      Khari Diaz  MEDICAL RECORD NUMBER:  [id number]  AGE: 69 y.o.   GENDER: male  : 1955  EPISODE DATE:  2024    Subjective:     Chief Complaint   Patient presents with    Wound Check         HISTORY of PRESENT ILLNESS HPI     Khari Diaz is a 69 y.o. male who presents today for wound/ulcer evaluation.   History of Wound Context: Patient presents continued treatment of a lateral left ankle wound.  Patient reports compliance with keeping the Unna boot intact.  Patient reports continued decreased pain with the wound as compared to prior treatment regimen.    Patient denies nausea, vomiting, fever, chills, chest pain, or shortness of breath.     Wound/Ulcer Pain Timing/Severity: none  Quality of pain: N/A  Severity:  0 / 10   Modifying Factors: None  Associated Signs/Symptoms: edema and drainage    Ulcer Identification:  Ulcer Type: diabetic  Contributing Factors: edema and venous stasis    Wound:  Full-thickness ulceration left lateral ankle        PAST MEDICAL HISTORY        Diagnosis Date    Abscess of back 2020    Abscess of right leg 2020    Acute renal failure (HCC)     Adenomatous polyp of ascending colon     Adenomatous polyp of descending colon     Adenomatous polyp of transverse colon     Anxiety     CAD S/P percutaneous coronary angioplasty 2014    Cardiomyopathy (HCC)     Cecal polyp     Cerebrovascular accident (CVA) due to occlusion of left middle cerebral artery (HCC) 2016    brainstem infarction from left MCA occlusion    Cerebrovascular disease 2016    Claudication (HCC)     Colon polyp     Coronary angioplasty status     Cutaneous abscess of back excluding buttocks 2018    CVA (cerebral vascular accident) ([facility]) 2017    Dependent edema 2017    Diplopia 05/15/2017    Resolved with management of cataracts    Dupuytren contracture

## 2024-04-04 NOTE — DISCHARGE INSTRUCTIONS
[facility]   Physician Orders and Discharge Instructions  [facility]  [address] [place]  [address]  Telephone: [phone]      [phone]        NAME:  Khari Diaz                                                                                                YOB: 1955  MEDICAL RECORD NUMBER:  [id number]     Your  is:  Rosanna     Home Care/Facility: None     Wound Location: Left Lateral Ankle     Dressing orders:1.Cleanse wound(s) with normal saline.   1. Cover the wound with Hydrofera Blue CLASSIS then  apply Calamine unna wrap then place a piece of Plastisote Foam (foam side down) around the wound bed, then cover with coban .Leave unna boot in place until next scheduled change. Monitor circulation in feet and if needed, unwrap unna boot and apply dry dressing until next appointment.        Compression: You may wear your own compression sock on your Right leg     Offloading Device:      Other Instructions:  Try to place a pillow under your calf to prevent pressure on the area Or use a Prevalon Boot. Increase your Protein Intake.      Keep all dressings clean, dry and intact.  Keep pressure off the wound(s) at all times.      Follow up visit   1 Week April 11, 2024 @  4:00     Please give 24 hour notice if unable to keep appointment. [phone]     If you experience any of the following, please call the Wound Care Service at  [phone] or go to the nearest emergency room.        *Increase in pain         *Temperature over 101           *Increase in drainage from your wound or a foul odor  *Uncontrolled swelling            *Need for compression bandage changes due to slippage, breakthrough drainage       PLEASE NOTE: IF YOU ARE UNABLE TO OBTAIN WOUND SUPPLIES, CONTINUE TO USE THE SUPPLIES YOU HAVE AVAILABLE UNTIL YOU ARE ABLE TO REACH US. IT IS MOST IMPORTANT TO KEEP THE WOUND COVERED AT ALL TIMES

## 2024-04-05 DIAGNOSIS — I10 ESSENTIAL HYPERTENSION: Chronic | ICD-10-CM

## 2024-04-05 RX ORDER — CARVEDILOL 25 MG/1
25 TABLET ORAL 2 TIMES DAILY
Qty: 180 TABLET | Refills: 1 | Status: SHIPPED | OUTPATIENT
Start: 2024-04-05

## 2024-04-11 ENCOUNTER — HOSPITAL ENCOUNTER (OUTPATIENT)
Dept: WOUND CARE | Age: 69
Discharge: HOME | End: 2024-04-11
Attending: PODIATRIST
Payer: MEDICARE

## 2024-04-11 VITALS
HEART RATE: 98 BPM | SYSTOLIC BLOOD PRESSURE: 139 MMHG | TEMPERATURE: 97.7 F | DIASTOLIC BLOOD PRESSURE: 75 MMHG | RESPIRATION RATE: 17 BRPM

## 2024-04-11 DIAGNOSIS — L97.322 ULCER OF LEFT ANKLE, WITH FAT LAYER EXPOSED (HCC): Primary | ICD-10-CM

## 2024-04-11 PROCEDURE — 11042 DBRDMT SUBQ TIS 1ST 20SQCM/<: CPT

## 2024-04-11 PROCEDURE — 11042 DBRDMT SUBQ TIS 1ST 20SQCM/<: CPT | Performed by: PODIATRIST

## 2024-04-11 RX ORDER — LIDOCAINE 40 MG/G
CREAM TOPICAL ONCE
OUTPATIENT
Start: 2024-04-11 | End: 2024-04-11

## 2024-04-11 RX ORDER — IBUPROFEN 200 MG
TABLET ORAL ONCE
OUTPATIENT
Start: 2024-04-11 | End: 2024-04-11

## 2024-04-11 RX ORDER — LIDOCAINE HYDROCHLORIDE 40 MG/ML
SOLUTION TOPICAL ONCE
OUTPATIENT
Start: 2024-04-11 | End: 2024-04-11

## 2024-04-11 RX ORDER — CLOBETASOL PROPIONATE 0.5 MG/G
OINTMENT TOPICAL ONCE
OUTPATIENT
Start: 2024-04-11 | End: 2024-04-11

## 2024-04-11 RX ORDER — GENTAMICIN SULFATE 1 MG/G
OINTMENT TOPICAL ONCE
OUTPATIENT
Start: 2024-04-11 | End: 2024-04-11

## 2024-04-11 RX ORDER — BACITRACIN ZINC AND POLYMYXIN B SULFATE 500; 1000 [USP'U]/G; [USP'U]/G
OINTMENT TOPICAL ONCE
OUTPATIENT
Start: 2024-04-11 | End: 2024-04-11

## 2024-04-11 RX ORDER — LIDOCAINE HYDROCHLORIDE 20 MG/ML
JELLY TOPICAL ONCE
OUTPATIENT
Start: 2024-04-11 | End: 2024-04-11

## 2024-04-11 RX ORDER — LIDOCAINE 50 MG/G
OINTMENT TOPICAL ONCE
OUTPATIENT
Start: 2024-04-11 | End: 2024-04-11

## 2024-04-11 RX ORDER — SODIUM CHLOR/HYPOCHLOROUS ACID 0.033 %
SOLUTION, IRRIGATION IRRIGATION ONCE
OUTPATIENT
Start: 2024-04-11 | End: 2024-04-11

## 2024-04-11 RX ORDER — TRIAMCINOLONE ACETONIDE 1 MG/G
OINTMENT TOPICAL ONCE
OUTPATIENT
Start: 2024-04-11 | End: 2024-04-11

## 2024-04-11 RX ORDER — BETAMETHASONE DIPROPIONATE 0.05 %
OINTMENT (GRAM) TOPICAL ONCE
OUTPATIENT
Start: 2024-04-11 | End: 2024-04-11

## 2024-04-11 RX ORDER — GINSENG 100 MG
CAPSULE ORAL ONCE
OUTPATIENT
Start: 2024-04-11 | End: 2024-04-11

## 2024-04-11 NOTE — DISCHARGE INSTRUCTIONS
[facility]   Physician Orders and Discharge Instructions  [facility]  [address] [place]  [address]  Telephone: [phone]      [phone]        NAME:  Khari Diaz                                                                                                YOB: 1955  MEDICAL RECORD NUMBER:  [id number]     Your  is:  Rosanna     Home Care/Facility: None     Wound Location: Left Lateral Ankle     Dressing orders:1.Cleanse wound(s) with normal saline.   1. Cover the wound with Hydrofera Blue CLASSIS then  apply Calamine unna wrap then place a piece of Plastisote Foam (foam side down) around the wound bed, then cover with coban .Leave unna boot in place until next scheduled change. Monitor circulation in feet and if needed, unwrap unna boot and apply dry dressing until next appointment.        Compression: You may wear your own compression sock on your Right leg     Offloading Device:      Other Instructions:  Try to place a pillow under your calf to prevent pressure on the area Or use a Prevalon Boot. Increase your Protein Intake.      Keep all dressings clean, dry and intact.  Keep pressure off the wound(s) at all times.      Follow up visit   1 Week April 18, 2024 @  3:00     Please give 24 hour notice if unable to keep appointment. [phone]     If you experience any of the following, please call the Wound Care Service at  [phone] or go to the nearest emergency room.        *Increase in pain         *Temperature over 101           *Increase in drainage from your wound or a foul odor  *Uncontrolled swelling            *Need for compression bandage changes due to slippage, breakthrough drainage       PLEASE NOTE: IF YOU ARE UNABLE TO OBTAIN WOUND SUPPLIES, CONTINUE TO USE THE SUPPLIES YOU HAVE AVAILABLE UNTIL YOU ARE ABLE TO REACH US. IT IS MOST IMPORTANT TO KEEP THE WOUND COVERED AT ALL TIMES

## 2024-04-11 NOTE — PROGRESS NOTES
[facility]                                                   Progress Note and Procedure Note      Khari Diaz  MEDICAL RECORD NUMBER:  [id number]  AGE: 69 y.o.   GENDER: male  : 1955  EPISODE DATE:  2024    Subjective:     Chief Complaint   Patient presents with    Wound Check         HISTORY of PRESENT ILLNESS HPI     Khari Diaz is a 69 y.o. male who presents today for wound/ulcer evaluation.     History of Wound Context: Patient presents for continued treatment of a chronic diabetic ulceration of the left ankle.  Patient was compliance with leaving the wound in place.  Patient denies recurrence of pain while at rest.  Patient has not observed signs of infection.    Patient denies nausea, vomiting, fever, chills, chest pain, or shortness of breath.     Wound/Ulcer Pain Timing/Severity: none  Quality of pain: N/A  Severity:  0 / 10   Modifying Factors: None  Associated Signs/Symptoms: edema and drainage    Ulcer Identification:  Ulcer Type: diabetic  Contributing Factors: edema and venous stasis    Wound:  Full-thickness diabetic ulceration left lateral ankle.        PAST MEDICAL HISTORY        Diagnosis Date    Abscess of back 2020    Abscess of right leg 2020    Acute renal failure (HCC)     Adenomatous polyp of ascending colon     Adenomatous polyp of descending colon     Adenomatous polyp of transverse colon     Anxiety     CAD S/P percutaneous coronary angioplasty 2014    Cardiomyopathy (HCC)     Cecal polyp     Cerebrovascular accident (CVA) due to occlusion of left middle cerebral artery ([facility]) 2016    brainstem infarction from left MCA occlusion    Cerebrovascular disease 2016    Claudication ([facility])     Colon polyp     Coronary angioplasty status     Cutaneous abscess of back excluding buttocks 2018    CVA (cerebral vascular accident) ([facility]) 2017    Dependent edema 2017    Diplopia 05/15/2017    Resolved with management of cataracts

## 2024-04-18 ENCOUNTER — HOSPITAL ENCOUNTER (OUTPATIENT)
Dept: WOUND CARE | Age: 69
Discharge: HOME | End: 2024-04-18
Attending: PODIATRIST
Payer: MEDICARE

## 2024-04-18 VITALS
TEMPERATURE: 97.8 F | HEART RATE: 94 BPM | DIASTOLIC BLOOD PRESSURE: 68 MMHG | SYSTOLIC BLOOD PRESSURE: 116 MMHG | RESPIRATION RATE: 18 BRPM

## 2024-04-18 DIAGNOSIS — L97.322 ULCER OF LEFT ANKLE, WITH FAT LAYER EXPOSED (HCC): Primary | ICD-10-CM

## 2024-04-18 PROCEDURE — 11042 DBRDMT SUBQ TIS 1ST 20SQCM/<: CPT

## 2024-04-18 PROCEDURE — 6370000000 HC RX 637 (ALT 250 FOR IP): Performed by: PODIATRIST

## 2024-04-18 PROCEDURE — 11042 DBRDMT SUBQ TIS 1ST 20SQCM/<: CPT | Performed by: PODIATRIST

## 2024-04-18 RX ORDER — LIDOCAINE HYDROCHLORIDE 40 MG/ML
SOLUTION TOPICAL ONCE
OUTPATIENT
Start: 2024-04-18 | End: 2024-04-18

## 2024-04-18 RX ORDER — LIDOCAINE HYDROCHLORIDE 20 MG/ML
JELLY TOPICAL ONCE
OUTPATIENT
Start: 2024-04-18 | End: 2024-04-18

## 2024-04-18 RX ORDER — TRIAMCINOLONE ACETONIDE 1 MG/G
OINTMENT TOPICAL ONCE
OUTPATIENT
Start: 2024-04-18 | End: 2024-04-18

## 2024-04-18 RX ORDER — GENTAMICIN SULFATE 1 MG/G
OINTMENT TOPICAL ONCE
OUTPATIENT
Start: 2024-04-18 | End: 2024-04-18

## 2024-04-18 RX ORDER — IBUPROFEN 200 MG
TABLET ORAL ONCE
OUTPATIENT
Start: 2024-04-18 | End: 2024-04-18

## 2024-04-18 RX ORDER — BACITRACIN ZINC AND POLYMYXIN B SULFATE 500; 1000 [USP'U]/G; [USP'U]/G
OINTMENT TOPICAL ONCE
OUTPATIENT
Start: 2024-04-18 | End: 2024-04-18

## 2024-04-18 RX ORDER — LIDOCAINE 50 MG/G
OINTMENT TOPICAL ONCE
OUTPATIENT
Start: 2024-04-18 | End: 2024-04-18

## 2024-04-18 RX ORDER — CLOBETASOL PROPIONATE 0.5 MG/G
OINTMENT TOPICAL ONCE
OUTPATIENT
Start: 2024-04-18 | End: 2024-04-18

## 2024-04-18 RX ORDER — GINSENG 100 MG
CAPSULE ORAL ONCE
OUTPATIENT
Start: 2024-04-18 | End: 2024-04-18

## 2024-04-18 RX ORDER — BETAMETHASONE DIPROPIONATE 0.05 %
OINTMENT (GRAM) TOPICAL ONCE
OUTPATIENT
Start: 2024-04-18 | End: 2024-04-18

## 2024-04-18 RX ORDER — LIDOCAINE 40 MG/G
CREAM TOPICAL ONCE
OUTPATIENT
Start: 2024-04-18 | End: 2024-04-18

## 2024-04-18 RX ORDER — LIDOCAINE 40 MG/G
CREAM TOPICAL ONCE
Status: COMPLETED | OUTPATIENT
Start: 2024-04-18 | End: 2024-04-18

## 2024-04-18 RX ORDER — SODIUM CHLOR/HYPOCHLOROUS ACID 0.033 %
SOLUTION, IRRIGATION IRRIGATION ONCE
OUTPATIENT
Start: 2024-04-18 | End: 2024-04-18

## 2024-04-18 RX ADMIN — LIDOCAINE 4%: 4 CREAM TOPICAL at 15:32

## 2024-04-18 NOTE — DISCHARGE INSTRUCTIONS
[facility]   Physician Orders and Discharge Instructions  [facility]  [address] [place]  [address]  Telephone: [phone]      [phone]        NAME:  Khari Diaz                                                                                                YOB: 1955  MEDICAL RECORD NUMBER:  [id number]     Your  is:  Rosanna     Home Care/Facility: None     Wound Location: Left Lateral Ankle     Dressing orders:1.Cleanse wound(s) with normal saline.   1. Cover the wound with Hydrofera Blue CLASSIS then  apply Calamine unna wrap then place a piece of Plastisote Foam (foam side down) around the wound bed, then cover with coban .Leave unna boot in place until next scheduled change. Monitor circulation in feet and if needed, unwrap unna boot and apply dry dressing until next appointment.        Compression: You may wear your own compression sock on your Right leg     Offloading Device:      Other Instructions:  Try to place a pillow under your calf to prevent pressure on the area Or use a Prevalon Boot. Increase your Protein Intake.      Keep all dressings clean, dry and intact.  Keep pressure off the wound(s) at all times.      Follow up visit   1 Week April 25, 2024 @  3:45     Please give 24 hour notice if unable to keep appointment. [phone]     If you experience any of the following, please call the Wound Care Service at  [phone] or go to the nearest emergency room.        *Increase in pain         *Temperature over 101           *Increase in drainage from your wound or a foul odor  *Uncontrolled swelling            *Need for compression bandage changes due to slippage, breakthrough drainage       PLEASE NOTE: IF YOU ARE UNABLE TO OBTAIN WOUND SUPPLIES, CONTINUE TO USE THE SUPPLIES YOU HAVE AVAILABLE UNTIL YOU ARE ABLE TO REACH US. IT IS MOST IMPORTANT TO KEEP THE WOUND COVERED AT ALL

## 2024-04-18 NOTE — WOUND CARE
Unna Boot Application   Below Knee    NAME:  Khari Diaz  YOB: 1955  MEDICAL RECORD NUMBER:  [id number]  DATE:  4/18/2024    Unna boot: Applied moisturizing agent to dry skin as needed.   Appied primary and secondary dressing as ordered.  Applied Unna roll from toes to knee overlapping each time.   Applied ace wrap or coban from toes to below the knee.   Instructed patient/caregiver to keep dressing dry and intact. DO NOT REMOVE DRESSING.   Instructed pt/family/caregiver to report excessive draining, loose bandage, wet dressing, severe pain or tingling in toes.  Applied Unna Boot dressing below the knee to left lower leg.    Unna Boot(s) were applied per  Guidelines.     Electronically signed by Marianela Ames RN on 4/18/2024 at 4:18 PM     Leave unna boot in place until next scheduled change. Monitor circulation in feet and if needed, unwrap unna boot and apply dry dressing until next appointment.

## 2024-04-18 NOTE — PROGRESS NOTES
03/05/24 116.3 kg (256 lb 6.4 oz)       PHYSICAL EXAM    Constitutional:   Well nourished and well developed.  Appears neat and clean.  Patient is alert, oriented x3, and in no apparent distress.     Respiratory:  Respiratory effort is easy and symmetric bilaterally.  Rate is normal at rest and on room air.    Vascular:  Pedal Pulses is palpable and audible with doppler.  Capillary refill is <3 sec to digits bilateral.  Extremities positive for 1+ pitting edema.    Neurological:   Sensation is diminished to lower extremities.    Dermatological:  Wound description noted in wound assessment.      Psychiatric:  Judgement and insight intact. Short and long term memory intact.  No evidence of depression, anxiety, or agitation.  Patient is calm, cooperative, and communicative.  Appropriate interactions and affect.        Assessment:      Active Hospital Problems    Diagnosis Date Noted    Ulcer of left ankle, with fat layer exposed ([facility]) [L97.322] 07/20/2023        Procedure Note  Indications:  Based on my examination of this patient's wound(s)/ulcer(s) today, debridement is required to promote healing and evaluate the wound base.    Performed by: Teodoro Houston DPM    Consent obtained:  Yes    Time out taken:  Yes    Pain Control:pain control list: Other 4% topical lidocaine      Debridement:Excisional Debridement    Using curette the wound(s)/ulcer(s) was/were sharply debrided down through and including the removal of subcutaneous tissue.        Devitalized Tissue Debrided:  fibrin    Pre Debridement Measurements:  Are located in the Wound/Ulcer Documentation Flow Sheet    Wound/Ulcer #: 1    Post Debridement Measurements:  Wound/Ulcer Descriptions are Pre Debridement except measurements:    Wound 07/20/23 Ankle Left;Lateral #1 Left lateral ankle (Active)   Wound Image    04/18/24 1523   Wound Etiology Diabetic 04/18/24 1523   Wound Cleansed Cleansed with saline 04/18/24 1523   Dressing/Treatment Hydrofera blue;Dry

## 2024-04-25 ENCOUNTER — HOSPITAL ENCOUNTER (OUTPATIENT)
Dept: WOUND CARE | Age: 69
Discharge: HOME | End: 2024-04-25
Attending: PODIATRIST
Payer: MEDICARE

## 2024-04-25 VITALS
TEMPERATURE: 97.7 F | DIASTOLIC BLOOD PRESSURE: 71 MMHG | HEART RATE: 91 BPM | SYSTOLIC BLOOD PRESSURE: 119 MMHG | RESPIRATION RATE: 20 BRPM

## 2024-04-25 DIAGNOSIS — L97.322 ULCER OF LEFT ANKLE, WITH FAT LAYER EXPOSED (HCC): Primary | ICD-10-CM

## 2024-04-25 PROCEDURE — 11042 DBRDMT SUBQ TIS 1ST 20SQCM/<: CPT

## 2024-04-25 PROCEDURE — 6370000000 HC RX 637 (ALT 250 FOR IP): Performed by: PODIATRIST

## 2024-04-25 PROCEDURE — 11042 DBRDMT SUBQ TIS 1ST 20SQCM/<: CPT | Performed by: PODIATRIST

## 2024-04-25 RX ORDER — GENTAMICIN SULFATE 1 MG/G
OINTMENT TOPICAL ONCE
OUTPATIENT
Start: 2024-04-25 | End: 2024-04-25

## 2024-04-25 RX ORDER — SODIUM CHLOR/HYPOCHLOROUS ACID 0.033 %
SOLUTION, IRRIGATION IRRIGATION ONCE
OUTPATIENT
Start: 2024-04-25 | End: 2024-04-25

## 2024-04-25 RX ORDER — LIDOCAINE 40 MG/G
CREAM TOPICAL ONCE
OUTPATIENT
Start: 2024-04-25 | End: 2024-04-25

## 2024-04-25 RX ORDER — LIDOCAINE 50 MG/G
OINTMENT TOPICAL ONCE
OUTPATIENT
Start: 2024-04-25 | End: 2024-04-25

## 2024-04-25 RX ORDER — CLOBETASOL PROPIONATE 0.5 MG/G
OINTMENT TOPICAL ONCE
OUTPATIENT
Start: 2024-04-25 | End: 2024-04-25

## 2024-04-25 RX ORDER — BETAMETHASONE DIPROPIONATE 0.05 %
OINTMENT (GRAM) TOPICAL ONCE
OUTPATIENT
Start: 2024-04-25 | End: 2024-04-25

## 2024-04-25 RX ORDER — GINSENG 100 MG
CAPSULE ORAL ONCE
OUTPATIENT
Start: 2024-04-25 | End: 2024-04-25

## 2024-04-25 RX ORDER — LIDOCAINE HYDROCHLORIDE 20 MG/ML
JELLY TOPICAL ONCE
OUTPATIENT
Start: 2024-04-25 | End: 2024-04-25

## 2024-04-25 RX ORDER — LIDOCAINE HYDROCHLORIDE 20 MG/ML
JELLY TOPICAL ONCE
Status: COMPLETED | OUTPATIENT
Start: 2024-04-25 | End: 2024-04-25

## 2024-04-25 RX ORDER — TRIAMCINOLONE ACETONIDE 1 MG/G
OINTMENT TOPICAL ONCE
OUTPATIENT
Start: 2024-04-25 | End: 2024-04-25

## 2024-04-25 RX ORDER — IBUPROFEN 200 MG
TABLET ORAL ONCE
OUTPATIENT
Start: 2024-04-25 | End: 2024-04-25

## 2024-04-25 RX ORDER — LIDOCAINE HYDROCHLORIDE 40 MG/ML
SOLUTION TOPICAL ONCE
OUTPATIENT
Start: 2024-04-25 | End: 2024-04-25

## 2024-04-25 RX ORDER — BACITRACIN ZINC AND POLYMYXIN B SULFATE 500; 1000 [USP'U]/G; [USP'U]/G
OINTMENT TOPICAL ONCE
OUTPATIENT
Start: 2024-04-25 | End: 2024-04-25

## 2024-04-25 RX ADMIN — LIDOCAINE HYDROCHLORIDE: 20 JELLY TOPICAL at 16:35

## 2024-04-25 ASSESSMENT — PAIN SCALES - GENERAL: PAINLEVEL_OUTOF10: 4

## 2024-04-25 ASSESSMENT — PAIN DESCRIPTION - ORIENTATION: ORIENTATION: LEFT

## 2024-04-25 ASSESSMENT — PAIN DESCRIPTION - DESCRIPTORS: DESCRIPTORS: PINS AND NEEDLES

## 2024-04-25 ASSESSMENT — PAIN DESCRIPTION - LOCATION: LOCATION: ANKLE

## 2024-04-25 NOTE — DISCHARGE INSTRUCTIONS
[facility]   Physician Orders and Discharge Instructions  [facility]  [address] [place]  [address]  Telephone: [phone]      [phone]        NAME:  Khari Diaz                                                                                                YOB: 1955  MEDICAL RECORD NUMBER:  [id number]     Your  is:  Rosanna     Home Care/Facility: None     Wound Location: Left Lateral Ankle     Dressing orders:1.Cleanse wound(s) with normal saline.   1. Cover the wound with Hydrofera Blue CLASSIC  then  apply Calamine unna wrap then place a piece of Plastisote Foam (foam side down) around the wound bed, then cover with coban .Leave unna boot in place until next scheduled change. Monitor circulation in feet and if needed, unwrap unna boot and apply dry dressing until next appointment.        Compression: You may wear your own compression sock on your Right leg     Offloading Device:      Other Instructions:  Try to place a pillow under your calf to prevent pressure on the area Or use a Prevalon Boot. Increase your Protein Intake.      Keep all dressings clean, dry and intact.  Keep pressure off the wound(s) at all times.      Follow up visit   1 Week May 2, 2024 @  3:30     Please give 24 hour notice if unable to keep appointment. [phone]     If you experience any of the following, please call the Wound Care Service at  [phone] or go to the nearest emergency room.        *Increase in pain         *Temperature over 101           *Increase in drainage from your wound or a foul odor  *Uncontrolled swelling            *Need for compression bandage changes due to slippage, breakthrough drainage       PLEASE NOTE: IF YOU ARE UNABLE TO OBTAIN WOUND SUPPLIES, CONTINUE TO USE THE SUPPLIES YOU HAVE AVAILABLE UNTIL YOU ARE ABLE TO REACH US. IT IS MOST IMPORTANT TO KEEP THE WOUND COVERED AT ALL

## 2024-04-25 NOTE — FLOWSHEET NOTE
Unna Boot Application   Below Knee    NAME:  Khari Diaz  YOB: 1955  MEDICAL RECORD NUMBER:  [id number]  DATE:  4/25/2024    Unna boot: Applied moisturizing agent to dry skin as needed.   Appied primary and secondary dressing as ordered.  Applied Unna roll from toes to knee overlapping each time.   Applied ace wrap or coban from toes to below the knee.   Secured with tape and/or metal clips covered with tape.   Instructed patient/caregiver to keep dressing dry and intact. DO NOT REMOVE DRESSING.   Instructed pt/family/caregiver to report excessive draining, loose bandage, wet dressing, severe pain or tingling in toes.  Applied Unna Boot dressing below the knee to left lower leg.    Unna Boot(s) were applied per  Guidelines.     Electronically signed by Rosanna Jordan RN on 4/25/2024 at 5:08 PM

## 2024-04-25 NOTE — PROGRESS NOTES
[facility]                                                   Progress Note and Procedure Note      Khari Diaz  MEDICAL RECORD NUMBER:  [id number]  AGE: 69 y.o.   GENDER: male  : 1955  EPISODE DATE:  2024    Subjective:     Chief Complaint   Patient presents with    Wound Check         HISTORY of PRESENT ILLNESS HPI     Khari Diaz is a 69 y.o. male who presents today for wound/ulcer evaluation.   History of Wound Context: Patient presents for continued treatment of a chronic ulceration of the left lateral ankle.  Patient reports compliance to leave the Unna boot in place, he reports discomfort to the plantar foot and the lateral/dorsal aspect of the left foot beginning 2 days ago.  Patient reports having no pain to the wound itself.    Patient denies nausea, vomiting, fever, chills, chest pain, or shortness of breath.     Wound/Ulcer Pain Timing/Severity: none  Quality of pain: N/A  Severity:  0 / 10   Modifying Factors: None  Associated Signs/Symptoms: edema and drainage    Ulcer Identification:  Ulcer Type: diabetic  Contributing Factors: edema and venous stasis    Wound:  Full-thickness diabetic ulceration left lateral ankle        PAST MEDICAL HISTORY        Diagnosis Date    Abscess of back 2020    Abscess of right leg 2020    Acute renal failure (HCC)     Adenomatous polyp of ascending colon     Adenomatous polyp of descending colon     Adenomatous polyp of transverse colon     Anxiety     CAD S/P percutaneous coronary angioplasty 2014    Cardiomyopathy (HCC)     Cecal polyp     Cerebrovascular accident (CVA) due to occlusion of left middle cerebral artery ([facility]) 2016    brainstem infarction from left MCA occlusion    Cerebrovascular disease 2016    Claudication ([facility])     Colon polyp     Coronary angioplasty status     Cutaneous abscess of back excluding buttocks 2018    CVA (cerebral vascular accident) ([facility]) 2017    Dependent edema

## 2024-05-02 ENCOUNTER — HOSPITAL ENCOUNTER (OUTPATIENT)
Dept: WOUND CARE | Age: 69
Discharge: HOME | End: 2024-05-02
Attending: PODIATRIST
Payer: MEDICARE

## 2024-05-02 VITALS
HEART RATE: 97 BPM | TEMPERATURE: 97 F | DIASTOLIC BLOOD PRESSURE: 76 MMHG | SYSTOLIC BLOOD PRESSURE: 137 MMHG | RESPIRATION RATE: 18 BRPM

## 2024-05-02 DIAGNOSIS — L97.322 ULCER OF LEFT ANKLE, WITH FAT LAYER EXPOSED (HCC): Primary | ICD-10-CM

## 2024-05-02 PROCEDURE — 11042 DBRDMT SUBQ TIS 1ST 20SQCM/<: CPT | Performed by: PODIATRIST

## 2024-05-02 PROCEDURE — 11042 DBRDMT SUBQ TIS 1ST 20SQCM/<: CPT

## 2024-05-02 RX ORDER — TRIAMCINOLONE ACETONIDE 1 MG/G
OINTMENT TOPICAL ONCE
OUTPATIENT
Start: 2024-05-02 | End: 2024-05-02

## 2024-05-02 RX ORDER — GINSENG 100 MG
CAPSULE ORAL ONCE
OUTPATIENT
Start: 2024-05-02 | End: 2024-05-02

## 2024-05-02 RX ORDER — BETAMETHASONE DIPROPIONATE 0.05 %
OINTMENT (GRAM) TOPICAL ONCE
OUTPATIENT
Start: 2024-05-02 | End: 2024-05-02

## 2024-05-02 RX ORDER — LIDOCAINE 40 MG/G
CREAM TOPICAL ONCE
OUTPATIENT
Start: 2024-05-02 | End: 2024-05-02

## 2024-05-02 RX ORDER — LIDOCAINE HYDROCHLORIDE 20 MG/ML
JELLY TOPICAL ONCE
OUTPATIENT
Start: 2024-05-02 | End: 2024-05-02

## 2024-05-02 RX ORDER — SODIUM CHLOR/HYPOCHLOROUS ACID 0.033 %
SOLUTION, IRRIGATION IRRIGATION ONCE
OUTPATIENT
Start: 2024-05-02 | End: 2024-05-02

## 2024-05-02 RX ORDER — LIDOCAINE 50 MG/G
OINTMENT TOPICAL ONCE
OUTPATIENT
Start: 2024-05-02 | End: 2024-05-02

## 2024-05-02 RX ORDER — GENTAMICIN SULFATE 1 MG/G
OINTMENT TOPICAL ONCE
OUTPATIENT
Start: 2024-05-02 | End: 2024-05-02

## 2024-05-02 RX ORDER — IBUPROFEN 200 MG
TABLET ORAL ONCE
OUTPATIENT
Start: 2024-05-02 | End: 2024-05-02

## 2024-05-02 RX ORDER — CLOBETASOL PROPIONATE 0.5 MG/G
OINTMENT TOPICAL ONCE
OUTPATIENT
Start: 2024-05-02 | End: 2024-05-02

## 2024-05-02 RX ORDER — BACITRACIN ZINC AND POLYMYXIN B SULFATE 500; 1000 [USP'U]/G; [USP'U]/G
OINTMENT TOPICAL ONCE
OUTPATIENT
Start: 2024-05-02 | End: 2024-05-02

## 2024-05-02 RX ORDER — LIDOCAINE HYDROCHLORIDE 40 MG/ML
SOLUTION TOPICAL ONCE
OUTPATIENT
Start: 2024-05-02 | End: 2024-05-02

## 2024-05-02 ASSESSMENT — PAIN DESCRIPTION - LOCATION: LOCATION: ANKLE

## 2024-05-02 ASSESSMENT — PAIN DESCRIPTION - ORIENTATION: ORIENTATION: LEFT

## 2024-05-02 ASSESSMENT — PAIN SCALES - GENERAL: PAINLEVEL_OUTOF10: 3

## 2024-05-02 ASSESSMENT — PAIN DESCRIPTION - DESCRIPTORS: DESCRIPTORS: BURNING

## 2024-05-02 NOTE — PROGRESS NOTES
[facility]                                                   Progress Note and Procedure Note      Khari Diaz  MEDICAL RECORD NUMBER:  [id number]  AGE: 69 y.o.   GENDER: male  : 1955  EPISODE DATE:  2024    Subjective:     No chief complaint on file.        HISTORY of PRESENT ILLNESS HPI     Khari Diaz is a 69 y.o. male who presents today for wound/ulcer evaluation.   History of Wound Context: Patient presents for continued treatment for a chronic diabetic ulceration of the left lateral ankle.  Patient reports compliance with leaving the Unna boot dressing intact.  Patient denies complications.    Patient denies nausea, vomiting, fever, chills, chest pain, or shortness of breath.     Wound/Ulcer Pain Timing/Severity: intermittent  Quality of pain: burning  Severity:  3  10   Modifying Factors: None  Associated Signs/Symptoms: edema and drainage    Ulcer Identification:  Ulcer Type: diabetic  Contributing Factors: edema and venous stasis    Wound:  Full-thickness ulceration left lateral ankle        PAST MEDICAL HISTORY        Diagnosis Date    Abscess of back 2020    Abscess of right leg 2020    Acute renal failure (HCC)     Adenomatous polyp of ascending colon     Adenomatous polyp of descending colon     Adenomatous polyp of transverse colon     Anxiety     CAD S/P percutaneous coronary angioplasty 2014    Cardiomyopathy (HCC)     Cecal polyp     Cerebrovascular accident (CVA) due to occlusion of left middle cerebral artery (HCC) 2016    brainstem infarction from left MCA occlusion    Cerebrovascular disease 2016    Claudication (HCC)     Colon polyp     Coronary angioplasty status     Cutaneous abscess of back excluding buttocks 2018    CVA (cerebral vascular accident) ([facility]) 2017    Dependent edema 2017    Diplopia 05/15/2017    Resolved with management of cataracts    Dupuytren contracture 2018    Dysphagia 2011    Dysphonia

## 2024-05-02 NOTE — FLOWSHEET NOTE
Unna Boot Application   Below Knee    NAME:  Khari Diaz  YOB: 1955  MEDICAL RECORD NUMBER:  [id number]  DATE:  5/2/2024    Unna boot: Applied moisturizing agent to dry skin as needed.   Appied primary and secondary dressing as ordered.  Applied Unna roll from toes to knee overlapping each time.   Applied ace wrap or coban from toes to below the knee.   Secured with tape and/or metal clips covered with tape.   Instructed patient/caregiver to keep dressing dry and intact. DO NOT REMOVE DRESSING.   Instructed pt/family/caregiver to report excessive draining, loose bandage, wet dressing, severe pain or tingling in toes.  Applied Unna Boot dressing below the knee to left lower leg.    Unna Boot(s) were applied per  Guidelines.     Electronically signed by Rosanna Jordan RN on 5/2/2024 at 4:27 PM

## 2024-05-02 NOTE — DISCHARGE INSTRUCTIONS
[facility]   Physician Orders and Discharge Instructions  [facility]  [address] [place]  [address]  Telephone: [phone]      [phone]        NAME:  Khari Diaz                                                                                                YOB: 1955  MEDICAL RECORD NUMBER:  [id number]     Your  is:  Rosanna     Home Care/Facility: None     Wound Location: Left Lateral Ankle     Dressing orders:1.Cleanse wound(s) with normal saline.   1. Cover the wound with Hydrofera Blue CLASSIC  then  apply Calamine unna wrap then place a piece of Plastisote Foam (foam (pink)side down) around the wound bed, then cover with coban .Leave unna boot in place until next scheduled change. Monitor circulation in feet and if needed, unwrap unna boot and apply dry dressing until next appointment.        Compression: You may wear your own compression sock on your Right leg     Offloading Device:      Other Instructions:  Try to place a pillow under your calf to prevent pressure on the area Or use a Prevalon Boot. Increase your Protein Intake.      Keep all dressings clean, dry and intact.  Keep pressure off the wound(s) at all times.      Follow up visit   1 Week May 9, 2024 @  3:45     Please give 24 hour notice if unable to keep appointment. [phone]     If you experience any of the following, please call the Wound Care Service at  [phone] or go to the nearest emergency room.        *Increase in pain         *Temperature over 101           *Increase in drainage from your wound or a foul odor  *Uncontrolled swelling            *Need for compression bandage changes due to slippage, breakthrough drainage       PLEASE NOTE: IF YOU ARE UNABLE TO OBTAIN WOUND SUPPLIES, CONTINUE TO USE THE SUPPLIES YOU HAVE AVAILABLE UNTIL YOU ARE ABLE TO REACH US. IT IS MOST IMPORTANT TO KEEP THE WOUND COVERED AT ALL

## 2024-05-09 ENCOUNTER — HOSPITAL ENCOUNTER (OUTPATIENT)
Dept: WOUND CARE | Age: 69
Discharge: HOME | End: 2024-05-09
Attending: PODIATRIST
Payer: MEDICARE

## 2024-05-09 VITALS
SYSTOLIC BLOOD PRESSURE: 135 MMHG | HEART RATE: 89 BPM | TEMPERATURE: 97.2 F | DIASTOLIC BLOOD PRESSURE: 72 MMHG | RESPIRATION RATE: 17 BRPM

## 2024-05-09 DIAGNOSIS — L97.322 ULCER OF LEFT ANKLE, WITH FAT LAYER EXPOSED (HCC): Primary | ICD-10-CM

## 2024-05-09 PROCEDURE — 11042 DBRDMT SUBQ TIS 1ST 20SQCM/<: CPT

## 2024-05-09 PROCEDURE — 11042 DBRDMT SUBQ TIS 1ST 20SQCM/<: CPT | Performed by: PODIATRIST

## 2024-05-09 RX ORDER — SODIUM CHLOR/HYPOCHLOROUS ACID 0.033 %
SOLUTION, IRRIGATION IRRIGATION ONCE
OUTPATIENT
Start: 2024-05-09 | End: 2024-05-09

## 2024-05-09 RX ORDER — LIDOCAINE HYDROCHLORIDE 20 MG/ML
JELLY TOPICAL ONCE
OUTPATIENT
Start: 2024-05-09 | End: 2024-05-09

## 2024-05-09 RX ORDER — TRIAMCINOLONE ACETONIDE 1 MG/G
OINTMENT TOPICAL ONCE
OUTPATIENT
Start: 2024-05-09 | End: 2024-05-09

## 2024-05-09 RX ORDER — GENTAMICIN SULFATE 1 MG/G
OINTMENT TOPICAL ONCE
OUTPATIENT
Start: 2024-05-09 | End: 2024-05-09

## 2024-05-09 RX ORDER — BACITRACIN ZINC AND POLYMYXIN B SULFATE 500; 1000 [USP'U]/G; [USP'U]/G
OINTMENT TOPICAL ONCE
OUTPATIENT
Start: 2024-05-09 | End: 2024-05-09

## 2024-05-09 RX ORDER — LIDOCAINE 40 MG/G
CREAM TOPICAL ONCE
OUTPATIENT
Start: 2024-05-09 | End: 2024-05-09

## 2024-05-09 RX ORDER — IBUPROFEN 200 MG
TABLET ORAL ONCE
OUTPATIENT
Start: 2024-05-09 | End: 2024-05-09

## 2024-05-09 RX ORDER — BETAMETHASONE DIPROPIONATE 0.05 %
OINTMENT (GRAM) TOPICAL ONCE
OUTPATIENT
Start: 2024-05-09 | End: 2024-05-09

## 2024-05-09 RX ORDER — GINSENG 100 MG
CAPSULE ORAL ONCE
OUTPATIENT
Start: 2024-05-09 | End: 2024-05-09

## 2024-05-09 RX ORDER — LIDOCAINE HYDROCHLORIDE 40 MG/ML
SOLUTION TOPICAL ONCE
OUTPATIENT
Start: 2024-05-09 | End: 2024-05-09

## 2024-05-09 RX ORDER — CLOBETASOL PROPIONATE 0.5 MG/G
OINTMENT TOPICAL ONCE
OUTPATIENT
Start: 2024-05-09 | End: 2024-05-09

## 2024-05-09 RX ORDER — LIDOCAINE 50 MG/G
OINTMENT TOPICAL ONCE
OUTPATIENT
Start: 2024-05-09 | End: 2024-05-09

## 2024-05-09 ASSESSMENT — PAIN DESCRIPTION - ORIENTATION: ORIENTATION: LEFT

## 2024-05-09 ASSESSMENT — PAIN DESCRIPTION - DESCRIPTORS: DESCRIPTORS: BURNING

## 2024-05-09 ASSESSMENT — PAIN SCALES - GENERAL: PAINLEVEL_OUTOF10: 2

## 2024-05-09 ASSESSMENT — PAIN DESCRIPTION - LOCATION: LOCATION: ANKLE

## 2024-05-09 NOTE — DISCHARGE INSTRUCTIONS
[facility]   Physician Orders and Discharge Instructions  [facility]  [address] [place]  [address]  Telephone: [phone]      [phone]        NAME:  Khari Diaz                                                                                                YOB: 1955  MEDICAL RECORD NUMBER:  [id number]     Your  is:  Rosanna     Home Care/Facility: None     Wound Location: Left Lateral Ankle     Dressing orders:1.Cleanse wound(s) with normal saline.   1. Cover the wound with Hydrofera Blue CLASSIC  then  apply Calamine unna wrap then place a piece of Plastisote Foam (foam (pink)side down) around the wound bed, then cover with coban .Leave unna boot in place until next scheduled change. Monitor circulation in feet and if needed, unwrap unna boot and apply dry dressing until next appointment.        Compression: You may wear your own compression sock on your Right leg     Offloading Device:      Other Instructions:  Try to place a pillow under your calf to prevent pressure on the area Or use a Prevalon Boot. Increase your Protein Intake.      Keep all dressings clean, dry and intact.  Keep pressure off the wound(s) at all times.      Follow up visit   1 Week May 16, 2024 @  3:30     Please give 24 hour notice if unable to keep appointment. [phone]     If you experience any of the following, please call the Wound Care Service at  [phone] or go to the nearest emergency room.        *Increase in pain         *Temperature over 101           *Increase in drainage from your wound or a foul odor  *Uncontrolled swelling            *Need for compression bandage changes due to slippage, breakthrough drainage       PLEASE NOTE: IF YOU ARE UNABLE TO OBTAIN WOUND SUPPLIES, CONTINUE TO USE THE SUPPLIES YOU HAVE AVAILABLE UNTIL YOU ARE ABLE TO REACH US. IT IS MOST IMPORTANT TO KEEP THE WOUND COVERED AT ALL

## 2024-05-09 NOTE — FLOWSHEET NOTE
Unna Boot Application   Below Knee    NAME:  Khari Diaz  YOB: 1955  MEDICAL RECORD NUMBER:  [id number]  DATE:  5/9/2024    Unna boot: Applied moisturizing agent to dry skin as needed.   Appied primary and secondary dressing as ordered.  Applied Unna roll from toes to knee overlapping each time.   Applied ace wrap or coban from toes to below the knee.   Secured with tape and/or metal clips covered with tape.   Instructed patient/caregiver to keep dressing dry and intact. DO NOT REMOVE DRESSING.   Instructed pt/family/caregiver to report excessive draining, loose bandage, wet dressing, severe pain or tingling in toes.  Applied Unna Boot dressing below the knee to left lower leg.    Unna Boot(s) were applied per  Guidelines.     Electronically signed by Rosanna Jordan RN on 5/9/2024 at 5:03 PM

## 2024-05-09 NOTE — PROGRESS NOTES
[facility]                                                   Progress Note and Procedure Note      Khari Diaz  MEDICAL RECORD NUMBER:  [id number]  AGE: 69 y.o.   GENDER: male  : 1955  EPISODE DATE:  2024    Subjective:     Chief Complaint   Patient presents with    Wound Check         HISTORY of PRESENT ILLNESS HPI     Khari Diaz is a 69 y.o. male who presents today for wound/ulcer evaluation.   History of Wound Context: Patient presents for continued care of a chronic diabetic ulceration of the left lateral ankle.  Patient reports compliance with treating his Unna boot in place and has been compliant with using offloading boot while at rest.  Patient has no new pedal complaints.    Patient denies nausea, vomiting, fever, chills, chest pain, or shortness of breath.     Wound/Ulcer Pain Timing/Severity: intermittent  Quality of pain: burning  Severity:  2 / 10   Modifying Factors: Pain worsens with motion of the ankle joint and direct pressure  Associated Signs/Symptoms: edema and drainage    Ulcer Identification:  Ulcer Type: diabetic  Contributing Factors: edema, venous stasis, and chronic pressure    Wound:  Full-thickness diabetic ulceration left lateral ankle.        PAST MEDICAL HISTORY        Diagnosis Date    Abscess of back 2020    Abscess of right leg 2020    Acute renal failure (HCC)     Adenomatous polyp of ascending colon     Adenomatous polyp of descending colon     Adenomatous polyp of transverse colon     Anxiety     CAD S/P percutaneous coronary angioplasty 2014    Cardiomyopathy (HCC)     Cecal polyp     Cerebrovascular accident (CVA) due to occlusion of left middle cerebral artery ([facility]) 2016    brainstem infarction from left MCA occlusion    Cerebrovascular disease 2016    Claudication (HCC)     Colon polyp     Coronary angioplasty status     Cutaneous abscess of back excluding buttocks 2018    CVA (cerebral vascular accident) ([facility])

## 2024-05-10 ENCOUNTER — HOSPITAL ENCOUNTER (OUTPATIENT)
Dept: CT IMAGING | Age: 69
End: 2024-05-10
Attending: INTERNAL MEDICINE
Payer: MEDICARE

## 2024-05-10 ENCOUNTER — HOSPITAL ENCOUNTER (OUTPATIENT)
Dept: PULMONOLOGY | Age: 69
Discharge: HOME | End: 2024-05-10
Payer: MEDICARE

## 2024-05-10 DIAGNOSIS — J44.9 CHRONIC OBSTRUCTIVE PULMONARY DISEASE, UNSPECIFIED COPD TYPE (HCC): ICD-10-CM

## 2024-05-10 DIAGNOSIS — Z87.891 PERSONAL HISTORY OF TOBACCO USE: ICD-10-CM

## 2024-05-10 PROCEDURE — 94729 DIFFUSING CAPACITY: CPT

## 2024-05-10 PROCEDURE — 94060 EVALUATION OF WHEEZING: CPT

## 2024-05-10 PROCEDURE — 94726 PLETHYSMOGRAPHY LUNG VOLUMES: CPT

## 2024-05-10 PROCEDURE — 71271 CT THORAX LUNG CANCER SCR C-: CPT

## 2024-05-10 PROCEDURE — 6360000002 HC RX W HCPCS

## 2024-05-10 RX ORDER — ALBUTEROL SULFATE 2.5 MG/3ML
SOLUTION RESPIRATORY (INHALATION)
Status: COMPLETED
Start: 2024-05-10 | End: 2024-05-10

## 2024-05-10 RX ADMIN — ALBUTEROL SULFATE 2.5 MG: 2.5 SOLUTION RESPIRATORY (INHALATION) at 15:23

## 2024-05-16 ENCOUNTER — HOSPITAL ENCOUNTER (OUTPATIENT)
Dept: WOUND CARE | Age: 69
Discharge: HOME | End: 2024-05-16
Attending: PODIATRIST
Payer: MEDICARE

## 2024-05-16 VITALS
SYSTOLIC BLOOD PRESSURE: 113 MMHG | DIASTOLIC BLOOD PRESSURE: 69 MMHG | HEART RATE: 93 BPM | RESPIRATION RATE: 17 BRPM | TEMPERATURE: 97.3 F

## 2024-05-16 DIAGNOSIS — L97.322 ULCER OF LEFT ANKLE, WITH FAT LAYER EXPOSED (HCC): Primary | ICD-10-CM

## 2024-05-16 PROCEDURE — 6370000000 HC RX 637 (ALT 250 FOR IP): Performed by: PODIATRIST

## 2024-05-16 PROCEDURE — 11042 DBRDMT SUBQ TIS 1ST 20SQCM/<: CPT | Performed by: PODIATRIST

## 2024-05-16 PROCEDURE — 11042 DBRDMT SUBQ TIS 1ST 20SQCM/<: CPT

## 2024-05-16 RX ORDER — GENTAMICIN SULFATE 1 MG/G
OINTMENT TOPICAL ONCE
OUTPATIENT
Start: 2024-05-16 | End: 2024-05-16

## 2024-05-16 RX ORDER — LIDOCAINE HYDROCHLORIDE 40 MG/ML
SOLUTION TOPICAL ONCE
OUTPATIENT
Start: 2024-05-16 | End: 2024-05-16

## 2024-05-16 RX ORDER — LIDOCAINE 50 MG/G
OINTMENT TOPICAL ONCE
OUTPATIENT
Start: 2024-05-16 | End: 2024-05-16

## 2024-05-16 RX ORDER — GINSENG 100 MG
CAPSULE ORAL ONCE
OUTPATIENT
Start: 2024-05-16 | End: 2024-05-16

## 2024-05-16 RX ORDER — LIDOCAINE HYDROCHLORIDE 20 MG/ML
JELLY TOPICAL ONCE
OUTPATIENT
Start: 2024-05-16 | End: 2024-05-16

## 2024-05-16 RX ORDER — BETAMETHASONE DIPROPIONATE 0.05 %
OINTMENT (GRAM) TOPICAL ONCE
OUTPATIENT
Start: 2024-05-16 | End: 2024-05-16

## 2024-05-16 RX ORDER — CLOBETASOL PROPIONATE 0.5 MG/G
OINTMENT TOPICAL ONCE
OUTPATIENT
Start: 2024-05-16 | End: 2024-05-16

## 2024-05-16 RX ORDER — BACITRACIN ZINC AND POLYMYXIN B SULFATE 500; 1000 [USP'U]/G; [USP'U]/G
OINTMENT TOPICAL ONCE
OUTPATIENT
Start: 2024-05-16 | End: 2024-05-16

## 2024-05-16 RX ORDER — IBUPROFEN 200 MG
TABLET ORAL ONCE
OUTPATIENT
Start: 2024-05-16 | End: 2024-05-16

## 2024-05-16 RX ORDER — SODIUM CHLOR/HYPOCHLOROUS ACID 0.033 %
SOLUTION, IRRIGATION IRRIGATION ONCE
OUTPATIENT
Start: 2024-05-16 | End: 2024-05-16

## 2024-05-16 RX ORDER — LIDOCAINE 40 MG/G
CREAM TOPICAL ONCE
OUTPATIENT
Start: 2024-05-16 | End: 2024-05-16

## 2024-05-16 RX ORDER — LIDOCAINE 40 MG/G
CREAM TOPICAL ONCE
Status: COMPLETED | OUTPATIENT
Start: 2024-05-16 | End: 2024-05-16

## 2024-05-16 RX ORDER — TRIAMCINOLONE ACETONIDE 1 MG/G
OINTMENT TOPICAL ONCE
OUTPATIENT
Start: 2024-05-16 | End: 2024-05-16

## 2024-05-16 RX ADMIN — LIDOCAINE 4%: 4 CREAM TOPICAL at 16:22

## 2024-05-16 ASSESSMENT — PAIN DESCRIPTION - LOCATION: LOCATION: ANKLE

## 2024-05-16 ASSESSMENT — PAIN DESCRIPTION - ORIENTATION: ORIENTATION: LEFT

## 2024-05-16 ASSESSMENT — PAIN DESCRIPTION - DESCRIPTORS: DESCRIPTORS: BURNING

## 2024-05-16 ASSESSMENT — PAIN SCALES - GENERAL: PAINLEVEL_OUTOF10: 1

## 2024-05-16 NOTE — PROGRESS NOTES
EXAM    Constitutional:   Well nourished and well developed.  Appears neat and clean.  Patient is alert, oriented x3, and in no apparent distress.     Respiratory:  Respiratory effort is easy and symmetric bilaterally.  Rate is normal at rest and on room air.    Vascular:  Pedal Pulses is not palpable and audible with doppler.  Capillary refill is <3 sec to digits bilateral.  Extremities positive for 1+ pitting edema.    Neurological:   Sensation is diminished to lower extremities.    Dermatological:  Wound description noted in wound assessment.  New wound noted anterior to the pre-existing wound.    Psychiatric:  Judgement and insight intact. Short and long term memory intact.  No evidence of depression, anxiety, or agitation.  Patient is calm, cooperative, and communicative.  Appropriate interactions and affect.        Assessment:      Active Hospital Problems    Diagnosis Date Noted    Ulcer of left ankle, with fat layer exposed ([facility]) [L97.322] 07/20/2023        Procedure Note  Indications:  Based on my examination of this patient's wound(s)/ulcer(s) today, debridement is required to promote healing and evaluate the wound base.    Performed by: Teodoro Houston DPM    Consent obtained:  Yes    Time out taken:  Yes    Pain Control:pain control list: Other 4% topical lidocaine      Debridement:Excisional Debridement    Using curette the wound(s)/ulcer(s) was/were sharply debrided down through and including the removal of subcutaneous tissue.        Devitalized Tissue Debrided:  fibrin    Pre Debridement Measurements:  Are located in the Wound/Ulcer Documentation Flow Sheet    Wound/Ulcer #: 1 and 2    Post Debridement Measurements:  Wound/Ulcer Descriptions are Pre Debridement except measurements:    Wound 07/20/23 Ankle Left;Lateral #1 Left lateral ankle (Active)   Wound Image    05/16/24 1603   Wound Etiology Diabetic 05/16/24 1603   Wound Cleansed Soap and water 05/16/24 1603   Dressing/Treatment Hydrofera blue

## 2024-05-16 NOTE — DISCHARGE INSTRUCTIONS
[facility]   Physician Orders and Discharge Instructions  [address] [place]  [address]  Telephone: [phone]      [phone]        NAME:  Khari Diaz                                                                                                YOB: 1955  MEDICAL RECORD NUMBER:  [id number]     Your  is:  Rosanna     Home Care/Facility: None     Wound Location: Left Lateral Ankle     Dressing orders:1.Cleanse wound(s) with normal saline.   1. Cover the wound with Hydrofera Blue CLASSIC  then  apply Calamine unna wrap then cover with coban   Leave unna boot in place until next scheduled change. Monitor circulation in feet and if needed, unwrap unna boot and apply dry dressing until next appointment.        Compression: You may wear your own compression sock on your Right leg     Offloading Device:      Other Instructions:  Try to place a pillow under your calf to prevent pressure on the area Or use a Prevalon Boot. Increase your Protein Intake.      Keep all dressings clean, dry and intact.  Keep pressure off the wound(s) at all times.      Follow up visit   1 Week May 23, 2024 @  3:45pm     Please give 24 hour notice if unable to keep appointment. [phone]     If you experience any of the following, please call the Wound Care Service at  [phone] or go to the nearest emergency room.        *Increase in pain         *Temperature over 101           *Increase in drainage from your wound or a foul odor  *Uncontrolled swelling            *Need for compression bandage changes due to slippage, breakthrough drainage       PLEASE NOTE: IF YOU ARE UNABLE TO OBTAIN WOUND SUPPLIES, CONTINUE TO USE THE SUPPLIES YOU HAVE AVAILABLE UNTIL YOU ARE ABLE TO REACH US. IT IS MOST IMPORTANT TO KEEP THE WOUND COVERED AT ALL TIMES

## 2024-05-23 ENCOUNTER — HOSPITAL ENCOUNTER (OUTPATIENT)
Dept: WOUND CARE | Age: 69
Discharge: HOME | End: 2024-05-23
Attending: PODIATRIST
Payer: MEDICARE

## 2024-05-23 VITALS
SYSTOLIC BLOOD PRESSURE: 120 MMHG | TEMPERATURE: 97.7 F | HEART RATE: 90 BPM | RESPIRATION RATE: 18 BRPM | DIASTOLIC BLOOD PRESSURE: 72 MMHG

## 2024-05-23 DIAGNOSIS — L97.322 ULCER OF LEFT ANKLE, WITH FAT LAYER EXPOSED (HCC): Primary | ICD-10-CM

## 2024-05-23 PROCEDURE — 11042 DBRDMT SUBQ TIS 1ST 20SQCM/<: CPT | Performed by: PODIATRIST

## 2024-05-23 PROCEDURE — 11042 DBRDMT SUBQ TIS 1ST 20SQCM/<: CPT

## 2024-05-23 RX ORDER — LIDOCAINE HYDROCHLORIDE 20 MG/ML
JELLY TOPICAL ONCE
OUTPATIENT
Start: 2024-05-23 | End: 2024-05-23

## 2024-05-23 RX ORDER — BETAMETHASONE DIPROPIONATE 0.05 %
OINTMENT (GRAM) TOPICAL ONCE
OUTPATIENT
Start: 2024-05-23 | End: 2024-05-23

## 2024-05-23 RX ORDER — TRIAMCINOLONE ACETONIDE 1 MG/G
OINTMENT TOPICAL ONCE
OUTPATIENT
Start: 2024-05-23 | End: 2024-05-23

## 2024-05-23 RX ORDER — LIDOCAINE HYDROCHLORIDE 40 MG/ML
SOLUTION TOPICAL ONCE
OUTPATIENT
Start: 2024-05-23 | End: 2024-05-23

## 2024-05-23 RX ORDER — LIDOCAINE 50 MG/G
OINTMENT TOPICAL ONCE
OUTPATIENT
Start: 2024-05-23 | End: 2024-05-23

## 2024-05-23 RX ORDER — SODIUM CHLOR/HYPOCHLOROUS ACID 0.033 %
SOLUTION, IRRIGATION IRRIGATION ONCE
OUTPATIENT
Start: 2024-05-23 | End: 2024-05-23

## 2024-05-23 RX ORDER — GENTAMICIN SULFATE 1 MG/G
OINTMENT TOPICAL ONCE
OUTPATIENT
Start: 2024-05-23 | End: 2024-05-23

## 2024-05-23 RX ORDER — LIDOCAINE 40 MG/G
CREAM TOPICAL ONCE
OUTPATIENT
Start: 2024-05-23 | End: 2024-05-23

## 2024-05-23 RX ORDER — GINSENG 100 MG
CAPSULE ORAL ONCE
OUTPATIENT
Start: 2024-05-23 | End: 2024-05-23

## 2024-05-23 RX ORDER — CLOBETASOL PROPIONATE 0.5 MG/G
OINTMENT TOPICAL ONCE
OUTPATIENT
Start: 2024-05-23 | End: 2024-05-23

## 2024-05-23 RX ORDER — BACITRACIN ZINC AND POLYMYXIN B SULFATE 500; 1000 [USP'U]/G; [USP'U]/G
OINTMENT TOPICAL ONCE
OUTPATIENT
Start: 2024-05-23 | End: 2024-05-23

## 2024-05-23 RX ORDER — IBUPROFEN 200 MG
TABLET ORAL ONCE
OUTPATIENT
Start: 2024-05-23 | End: 2024-05-23

## 2024-05-23 ASSESSMENT — PAIN DESCRIPTION - LOCATION: LOCATION: ANKLE

## 2024-05-23 ASSESSMENT — PAIN DESCRIPTION - ORIENTATION: ORIENTATION: LEFT

## 2024-05-23 ASSESSMENT — PAIN DESCRIPTION - DESCRIPTORS: DESCRIPTORS: BURNING

## 2024-05-23 NOTE — PROGRESS NOTES
[facility]                                                   Progress Note and Procedure Note      Khari Diaz  MEDICAL RECORD NUMBER:  [id number]  AGE: 69 y.o.   GENDER: male  : 1955  EPISODE DATE:  2024    Subjective:     Chief Complaint   Patient presents with    Wound Check         HISTORY of PRESENT ILLNESS HPI     Khari Diaz is a 69 y.o. male who presents today for wound/ulcer evaluation.   History of Wound Context: Patient presents today for continued treatment of a chronic diabetic ulcer and new ulcer to the lateral left ankle.  Patient reports compliance with leaving his Unna boot dressing intact.  Patient has not observed signs of infection to the toes or proximal to the dressing.  Patient reports he has been using a travel pillow to further offload the lateral ankle in conjunction with his pre-existing Prevalon boot.    Patient denies nausea, vomiting, fever, chills, chest pain, or shortness of breath.     Wound/Ulcer Pain Timing/Severity: intermittent  Quality of pain: burning  Severity: Mild to moderate  Modifying Factors: Pain worsens with dorsiflexion of the ankle  Associated Signs/Symptoms: edema and drainage    Ulcer Identification:  Ulcer Type: diabetic  Contributing Factors: edema and venous stasis    Wound:  Full-thickness diabetic ulcerations left lateral ankle        PAST MEDICAL HISTORY        Diagnosis Date    Abscess of back 2020    Abscess of right leg 2020    Acute renal failure (HCC)     Adenomatous polyp of ascending colon     Adenomatous polyp of descending colon     Adenomatous polyp of transverse colon     Anxiety     CAD S/P percutaneous coronary angioplasty 2014    Cardiomyopathy (HCC)     Cecal polyp     Cerebrovascular accident (CVA) due to occlusion of left middle cerebral artery (HCC) 2016    brainstem infarction from left MCA occlusion    Cerebrovascular disease 2016    Claudication (HCC)     Colon polyp     Coronary

## 2024-05-23 NOTE — PLAN OF CARE
Problem: Chronic Conditions and Co-morbidities  Goal: Patient's chronic conditions and co-morbidity symptoms are monitored and maintained or improved  Outcome: Progressing     Problem: Wound:  Goal: Will show signs of wound healing; wound closure and no evidence of infection  Description: Will show signs of wound healing; wound closure and no evidence of infection  Outcome: Progressing     Problem: Pain  Goal: Verbalizes/displays adequate comfort level or baseline comfort level  Outcome: Progressing

## 2024-05-23 NOTE — DISCHARGE INSTRUCTIONS
[facility]   Physician Orders and Discharge Instructions  [address] [place]  [address]  Telephone: [phone]      [phone]        NAME:  Khari Diaz                                                                                                YOB: 1955  MEDICAL RECORD NUMBER:  [id number]     Your  is:  Rosanna     Home Care/Facility: None     Wound Location: Left Lateral Ankle     Dressing orders:1.Cleanse wound(s) with normal saline.   1. Cover the wound with Hydrofera Blue CLASSIC  then  apply Calamine unna wrap then cover with coban   Leave unna boot in place until next scheduled change. Monitor circulation in feet and if needed, unwrap unna boot and apply dry dressing until next appointment.        Compression: You may wear your own compression sock on your Right leg     Offloading Device:      Other Instructions:  Continue to place a pillow under your calf to prevent pressure on the area Or use a Prevalon Boot. Increase your Protein Intake.      Keep all dressings clean, dry and intact.  Keep pressure off the wound(s) at all times.      Follow up visit   1 Week May 30, 2024 @  3:45pm     Please give 24 hour notice if unable to keep appointment. [phone]     If you experience any of the following, please call the Wound Care Service at  [phone] or go to the nearest emergency room.        *Increase in pain         *Temperature over 101           *Increase in drainage from your wound or a foul odor  *Uncontrolled swelling            *Need for compression bandage changes due to slippage, breakthrough drainage       PLEASE NOTE: IF YOU ARE UNABLE TO OBTAIN WOUND SUPPLIES, CONTINUE TO USE THE SUPPLIES YOU HAVE AVAILABLE UNTIL YOU ARE ABLE TO REACH US. IT IS MOST IMPORTANT TO KEEP THE WOUND COVERED AT ALL TIMES

## 2024-05-23 NOTE — PLAN OF CARE
Unna Boot Application   Below Knee    NAME:  Khari Diaz  YOB: 1955  MEDICAL RECORD NUMBER:  [id number]  DATE:  5/23/2024    Unna boot: Applied moisturizing agent to dry skin as needed.   Appied primary and secondary dressing as ordered.  Applied Unna roll from toes to knee overlapping each time.   Applied ace wrap or coban from toes to below the knee.   Secured with tape and/or metal clips covered with tape.   Instructed patient/caregiver to keep dressing dry and intact. DO NOT REMOVE DRESSING.   Instructed pt/family/caregiver to report excessive draining, loose bandage, wet dressing, severe pain or tingling in toes.  Applied Unna Boot dressing below the knee to left lower leg.    Unna Boot(s) were applied per  Guidelines.     Electronically signed by Yamila Meyer RN on 5/23/2024 at 4:48 PM

## 2024-05-30 ENCOUNTER — HOSPITAL ENCOUNTER (OUTPATIENT)
Dept: WOUND CARE | Age: 69
Discharge: HOME | End: 2024-05-30
Attending: PODIATRIST
Payer: MEDICARE

## 2024-05-30 VITALS
TEMPERATURE: 97 F | RESPIRATION RATE: 18 BRPM | SYSTOLIC BLOOD PRESSURE: 110 MMHG | HEART RATE: 91 BPM | DIASTOLIC BLOOD PRESSURE: 65 MMHG

## 2024-05-30 DIAGNOSIS — L97.322 ULCER OF LEFT ANKLE, WITH FAT LAYER EXPOSED (HCC): Primary | ICD-10-CM

## 2024-05-30 PROCEDURE — 11042 DBRDMT SUBQ TIS 1ST 20SQCM/<: CPT

## 2024-05-30 PROCEDURE — 11042 DBRDMT SUBQ TIS 1ST 20SQCM/<: CPT | Performed by: PODIATRIST

## 2024-05-30 RX ORDER — GENTAMICIN SULFATE 1 MG/G
OINTMENT TOPICAL ONCE
OUTPATIENT
Start: 2024-05-30 | End: 2024-05-30

## 2024-05-30 RX ORDER — SODIUM CHLOR/HYPOCHLOROUS ACID 0.033 %
SOLUTION, IRRIGATION IRRIGATION ONCE
OUTPATIENT
Start: 2024-05-30 | End: 2024-05-30

## 2024-05-30 RX ORDER — IBUPROFEN 200 MG
TABLET ORAL ONCE
OUTPATIENT
Start: 2024-05-30 | End: 2024-05-30

## 2024-05-30 RX ORDER — LIDOCAINE 40 MG/G
CREAM TOPICAL ONCE
OUTPATIENT
Start: 2024-05-30 | End: 2024-05-30

## 2024-05-30 RX ORDER — CLOBETASOL PROPIONATE 0.5 MG/G
OINTMENT TOPICAL ONCE
OUTPATIENT
Start: 2024-05-30 | End: 2024-05-30

## 2024-05-30 RX ORDER — BACITRACIN ZINC AND POLYMYXIN B SULFATE 500; 1000 [USP'U]/G; [USP'U]/G
OINTMENT TOPICAL ONCE
OUTPATIENT
Start: 2024-05-30 | End: 2024-05-30

## 2024-05-30 RX ORDER — GINSENG 100 MG
CAPSULE ORAL ONCE
OUTPATIENT
Start: 2024-05-30 | End: 2024-05-30

## 2024-05-30 RX ORDER — BETAMETHASONE DIPROPIONATE 0.05 %
OINTMENT (GRAM) TOPICAL ONCE
OUTPATIENT
Start: 2024-05-30 | End: 2024-05-30

## 2024-05-30 RX ORDER — LIDOCAINE HYDROCHLORIDE 20 MG/ML
JELLY TOPICAL ONCE
OUTPATIENT
Start: 2024-05-30 | End: 2024-05-30

## 2024-05-30 RX ORDER — TRIAMCINOLONE ACETONIDE 1 MG/G
OINTMENT TOPICAL ONCE
OUTPATIENT
Start: 2024-05-30 | End: 2024-05-30

## 2024-05-30 RX ORDER — LIDOCAINE 50 MG/G
OINTMENT TOPICAL ONCE
OUTPATIENT
Start: 2024-05-30 | End: 2024-05-30

## 2024-05-30 RX ORDER — LIDOCAINE HYDROCHLORIDE 40 MG/ML
SOLUTION TOPICAL ONCE
OUTPATIENT
Start: 2024-05-30 | End: 2024-05-30

## 2024-05-30 ASSESSMENT — PAIN DESCRIPTION - DESCRIPTORS: DESCRIPTORS: BURNING

## 2024-05-30 ASSESSMENT — PAIN DESCRIPTION - ORIENTATION: ORIENTATION: LEFT

## 2024-05-30 ASSESSMENT — PAIN SCALES - GENERAL: PAINLEVEL_OUTOF10: 3

## 2024-05-30 ASSESSMENT — PAIN DESCRIPTION - LOCATION: LOCATION: ANKLE

## 2024-05-30 NOTE — PROGRESS NOTES
Number of days: 14            Percent of Wound/Ulcer Debrided: 100%    Total Surface Area Debrided:  0.56 sq cm     Diabetic/Pressure/Non Pressure Ulcers:  Ulcer: Diabetic ulcer, fat layer exposed      Bleeding:  Minimal    Hemostasis Achieved:  not needed    Procedural Pain:  0  / 10     Post Procedural Pain:  0 / 10     Response to treatment:  Well tolerated by patient.       Plan:     The 2 wounds of the lateral left ankle were sharply debrided.  Patient instructed to continue offloading the wounds whenever he is at rest due to the position of his legs while sleeping.  I recommended repeat application of Unna boot.  Patient instructed to call if problems or complications arise.      Treatment Note please see attached Discharge Instructions    Written patient dismissal instructions given to patient and signed by patient or POA.         Discharge Instructions              [facility]   Physician Orders and Discharge Instructions  [id number]  Telephone: [phone]      [phone]        NAME:  Khari Diaz                                                                                                YOB: 1955  MEDICAL RECORD NUMBER:  [id number]     Your  is:  Rosanna     Home Care/Facility: None     Wound Location: Left Lateral Ankle     Dressing orders:1.Cleanse wound(s) with normal saline.   1. Cover the wound with Hydrofera Blue CLASSIC  then  apply Calamine unna wrap then cover with coban   Leave unna boot in place until next scheduled change. Monitor circulation in feet and if needed, unwrap unna boot and apply dry dressing until next appointment.        Compression: You may wear your own compression sock on your Right leg     Offloading Device:      Other Instructions:  Continue to place a pillow under your calf to prevent pressure on the area Or use a Prevalon Boot.

## 2024-05-30 NOTE — DISCHARGE INSTRUCTIONS
[facility]   Physician Orders and Discharge Instructions  [address] [place]  [address]  Telephone: [phone]      [phone]        NAME:  Khari Diaz                                                                                                YOB: 1955  MEDICAL RECORD NUMBER:  [id number]     Your  is:  Rosanna     Home Care/Facility: None     Wound Location: Left Lateral Ankle     Dressing orders:1.Cleanse wound(s) with normal saline.   1. Cover the wound with Hydrofera Blue CLASSIC  then  apply Calamine unna wrap then cover with coban   Leave unna boot in place until next scheduled change. Monitor circulation in feet and if needed, unwrap unna boot and apply dry dressing until next appointment.        Compression: You may wear your own compression sock on your Right leg     Offloading Device:      Other Instructions:  Continue to place a pillow under your calf to prevent pressure on the area Or use a Prevalon Boot. Increase your Protein Intake.      Keep all dressings clean, dry and intact.  Keep pressure off the wound(s) at all times.      Follow up visit   1 Week June 6, 2024 @  3:30     Please give 24 hour notice if unable to keep appointment. [phone]     If you experience any of the following, please call the Wound Care Service at  [phone] or go to the nearest emergency room.        *Increase in pain         *Temperature over 101           *Increase in drainage from your wound or a foul odor  *Uncontrolled swelling            *Need for compression bandage changes due to slippage, breakthrough drainage       PLEASE NOTE: IF YOU ARE UNABLE TO OBTAIN WOUND SUPPLIES, CONTINUE TO USE THE SUPPLIES YOU HAVE AVAILABLE UNTIL YOU ARE ABLE TO REACH US. IT IS MOST IMPORTANT TO KEEP THE WOUND COVERED AT ALL TIMES

## 2024-05-30 NOTE — FLOWSHEET NOTE
Unna Boot Application   Below Knee    NAME:  Khari Diaz  YOB: 1955  MEDICAL RECORD NUMBER:  [id number]  DATE:  5/30/2024    Unna boot: Applied moisturizing agent to dry skin as needed.   Appied primary and secondary dressing as ordered.  Applied Unna roll from toes to knee overlapping each time.   Applied ace wrap or coban from toes to below the knee.   Secured with tape and/or metal clips covered with tape.   Instructed patient/caregiver to keep dressing dry and intact. DO NOT REMOVE DRESSING.   Instructed pt/family/caregiver to report excessive draining, loose bandage, wet dressing, severe pain or tingling in toes.  Applied Unna Boot dressing below the knee to left lower leg.    Unna Boot(s) were applied per  Guidelines.     Electronically signed by Rosanna Jordan RN on 5/30/2024 at 5:00 PM

## 2024-06-03 ENCOUNTER — HOSPITAL ENCOUNTER (OUTPATIENT)
Dept: LAB | Age: 69
Discharge: HOME | End: 2024-06-03
Payer: MEDICARE

## 2024-06-03 DIAGNOSIS — N18.31 TYPE 2 DIABETES MELLITUS WITH STAGE 3A CHRONIC KIDNEY DISEASE, WITHOUT LONG-TERM CURRENT USE OF INSULIN (HCC): ICD-10-CM

## 2024-06-03 DIAGNOSIS — E11.22 TYPE 2 DIABETES MELLITUS WITH STAGE 3A CHRONIC KIDNEY DISEASE, WITHOUT LONG-TERM CURRENT USE OF INSULIN (HCC): ICD-10-CM

## 2024-06-03 LAB
ALBUMIN SERPL-MCNC: 4.1 G/DL (ref 3.5–4.6)
ALP SERPL-CCNC: 67 U/L (ref 35–104)
ALT SERPL-CCNC: 48 U/L (ref 0–41)
ANION GAP SERPL CALCULATED.3IONS-SCNC: 12 MEQ/L (ref 9–15)
BILIRUB SERPL-MCNC: 0.5 MG/DL (ref 0.2–0.7)
BUN SERPL-MCNC: 14 MG/DL (ref 8–23)
CALCIUM SERPL-MCNC: 9.5 MG/DL (ref 8.5–9.9)
CHLORIDE SERPL-SCNC: 98 MEQ/L (ref 95–107)
CO2 SERPL-SCNC: 31 MEQ/L (ref 20–31)
CREAT SERPL-MCNC: 1.45 MG/DL (ref 0.7–1.2)
CREAT UR-MCNC: 155.9 MG/DL
GLUCOSE SERPL-MCNC: 179 MG/DL (ref 70–99)
MICROALBUMIN UR-MCNC: 54.5 MG/DL
MICROALBUMIN/CREAT UR-RTO: 349.6 MG/G (ref 0–30)
POTASSIUM SERPL-SCNC: 3.3 MEQ/L (ref 3.4–4.9)
PROT SERPL-MCNC: 7.2 G/DL (ref 6.3–8)

## 2024-06-03 PROCEDURE — 82570 ASSAY OF URINE CREATININE: CPT

## 2024-06-03 PROCEDURE — 83036 HEMOGLOBIN GLYCOSYLATED A1C: CPT

## 2024-06-03 PROCEDURE — 36415 COLL VENOUS BLD VENIPUNCTURE: CPT

## 2024-06-03 PROCEDURE — 80053 COMPREHEN METABOLIC PANEL: CPT

## 2024-06-03 PROCEDURE — 82043 UR ALBUMIN QUANTITATIVE: CPT

## 2024-06-04 LAB
ESTIMATED AVERAGE GLUCOSE: 174 MG/DL
HBA1C MFR BLD: 7.7 % (ref 4–6)

## 2024-06-06 ENCOUNTER — HOSPITAL ENCOUNTER (OUTPATIENT)
Dept: WOUND CARE | Age: 69
Discharge: HOME | End: 2024-06-06
Attending: PODIATRIST
Payer: MEDICARE

## 2024-06-06 VITALS
DIASTOLIC BLOOD PRESSURE: 67 MMHG | RESPIRATION RATE: 19 BRPM | HEART RATE: 94 BPM | TEMPERATURE: 97.2 F | SYSTOLIC BLOOD PRESSURE: 133 MMHG

## 2024-06-06 DIAGNOSIS — L97.322 ULCER OF LEFT ANKLE, WITH FAT LAYER EXPOSED (HCC): Primary | ICD-10-CM

## 2024-06-06 PROCEDURE — 11042 DBRDMT SUBQ TIS 1ST 20SQCM/<: CPT | Performed by: PODIATRIST

## 2024-06-06 PROCEDURE — 11042 DBRDMT SUBQ TIS 1ST 20SQCM/<: CPT

## 2024-06-06 RX ORDER — BACITRACIN ZINC AND POLYMYXIN B SULFATE 500; 1000 [USP'U]/G; [USP'U]/G
OINTMENT TOPICAL ONCE
OUTPATIENT
Start: 2024-06-06 | End: 2024-06-06

## 2024-06-06 RX ORDER — LIDOCAINE HYDROCHLORIDE 20 MG/ML
JELLY TOPICAL ONCE
OUTPATIENT
Start: 2024-06-06 | End: 2024-06-06

## 2024-06-06 RX ORDER — GINSENG 100 MG
CAPSULE ORAL ONCE
OUTPATIENT
Start: 2024-06-06 | End: 2024-06-06

## 2024-06-06 RX ORDER — LIDOCAINE 50 MG/G
OINTMENT TOPICAL ONCE
OUTPATIENT
Start: 2024-06-06 | End: 2024-06-06

## 2024-06-06 RX ORDER — BETAMETHASONE DIPROPIONATE 0.05 %
OINTMENT (GRAM) TOPICAL ONCE
OUTPATIENT
Start: 2024-06-06 | End: 2024-06-06

## 2024-06-06 RX ORDER — IBUPROFEN 200 MG
TABLET ORAL ONCE
OUTPATIENT
Start: 2024-06-06 | End: 2024-06-06

## 2024-06-06 RX ORDER — CLOBETASOL PROPIONATE 0.5 MG/G
OINTMENT TOPICAL ONCE
OUTPATIENT
Start: 2024-06-06 | End: 2024-06-06

## 2024-06-06 RX ORDER — GENTAMICIN SULFATE 1 MG/G
OINTMENT TOPICAL ONCE
OUTPATIENT
Start: 2024-06-06 | End: 2024-06-06

## 2024-06-06 RX ORDER — TRIAMCINOLONE ACETONIDE 1 MG/G
OINTMENT TOPICAL ONCE
OUTPATIENT
Start: 2024-06-06 | End: 2024-06-06

## 2024-06-06 RX ORDER — LIDOCAINE 40 MG/G
CREAM TOPICAL ONCE
OUTPATIENT
Start: 2024-06-06 | End: 2024-06-06

## 2024-06-06 RX ORDER — LIDOCAINE HYDROCHLORIDE 40 MG/ML
SOLUTION TOPICAL ONCE
OUTPATIENT
Start: 2024-06-06 | End: 2024-06-06

## 2024-06-06 RX ORDER — SODIUM CHLOR/HYPOCHLOROUS ACID 0.033 %
SOLUTION, IRRIGATION IRRIGATION ONCE
OUTPATIENT
Start: 2024-06-06 | End: 2024-06-06

## 2024-06-06 ASSESSMENT — PAIN SCALES - GENERAL: PAINLEVEL_OUTOF10: 3

## 2024-06-06 ASSESSMENT — PAIN DESCRIPTION - DESCRIPTORS: DESCRIPTORS: BURNING

## 2024-06-06 ASSESSMENT — PAIN DESCRIPTION - ORIENTATION: ORIENTATION: LEFT

## 2024-06-06 ASSESSMENT — PAIN DESCRIPTION - LOCATION: LOCATION: ANKLE

## 2024-06-06 NOTE — DISCHARGE INSTRUCTIONS
[facility]   Physician Orders and Discharge Instructions  [id number] [place]  [address]  Telephone: [phone]      [phone]        NAME:  Khari Daiz                                                                                                YOB: 1955  MEDICAL RECORD NUMBER:  [id number]     Your  is:  Rosanna     Home Care/Facility: None     Wound Location: Left Lateral Ankle     Dressing orders:1.Cleanse wound(s) with normal saline.   1. Cover the wound with Hydrofera Blue CLASSIC  then  apply Calamine unna wrap then cover with coban   Leave unna boot in place until next scheduled change. Monitor circulation in feet and if needed, unwrap unna boot and apply dry dressing until next appointment.        Compression: You may wear your own compression sock on your Right leg     Offloading Device:      Other Instructions:  Continue to place a pillow under your calf to prevent pressure on the area Or use a Prevalon Boot. Increase your Protein Intake.      Keep all dressings clean, dry and intact.  Keep pressure off the wound(s) at all times.      Follow up visit   1 Week June 13, 2024 @  3:30     Please give 24 hour notice if unable to keep appointment. [phone]     If you experience any of the following, please call the Wound Care Service at  [phone] or go to the nearest emergency room.        *Increase in pain         *Temperature over 101           *Increase in drainage from your wound or a foul odor  *Uncontrolled swelling            *Need for compression bandage changes due to slippage, breakthrough drainage       PLEASE NOTE: IF YOU ARE UNABLE TO OBTAIN WOUND SUPPLIES, CONTINUE TO USE THE SUPPLIES YOU HAVE AVAILABLE UNTIL YOU ARE ABLE TO REACH US. IT IS MOST IMPORTANT TO KEEP THE WOUND COVERED AT ALL TIMES

## 2024-06-06 NOTE — PROGRESS NOTES
[facility]                                                   Progress Note and Procedure Note      Khari Diaz  MEDICAL RECORD NUMBER:  [id number]  AGE: 69 y.o.   GENDER: male  : 1955  EPISODE DATE:  2024    Subjective:     Chief Complaint   Patient presents with    Wound Check         HISTORY of PRESENT ILLNESS HPI     Khari Diaz is a 69 y.o. male who presents today for wound/ulcer evaluation.   History of Wound Context: Patient presents for continued treatment of a chronic diabetic ulceration of the left lateral ankle.  Patient reports compliant use of offloading devices to take pressure off the ankle.  Patient left his Unna boot in place.    Patient denies nausea, vomiting, fever, chills, chest pain, or shortness of breath.     Wound/Ulcer Pain Timing/Severity: none  Quality of pain: N/A  Severity:  0 / 10   Modifying Factors: None  Associated Signs/Symptoms: edema and drainage    Ulcer Identification:  Ulcer Type: diabetic  Contributing Factors: edema and venous stasis    Wound:  Full-thickness ulceration left lateral ankle        PAST MEDICAL HISTORY        Diagnosis Date    Abscess of back 2020    Abscess of right leg 2020    Acute renal failure (HCC)     Adenomatous polyp of ascending colon     Adenomatous polyp of descending colon     Adenomatous polyp of transverse colon     Anxiety     CAD S/P percutaneous coronary angioplasty 2014    Cardiomyopathy (HCC)     Cecal polyp     Cerebrovascular accident (CVA) due to occlusion of left middle cerebral artery (HCC) 2016    brainstem infarction from left MCA occlusion    Cerebrovascular disease 2016    Claudication (HCC)     Colon polyp     Coronary angioplasty status     Cutaneous abscess of back excluding buttocks 2018    CVA (cerebral vascular accident) ([facility]) 2017    Dependent edema 2017    Diplopia 05/15/2017    Resolved with management of cataracts    Dupuytren contracture 2018

## 2024-06-06 NOTE — FLOWSHEET NOTE
Unna Boot Application   Below Knee    NAME:  Khari Diaz  YOB: 1955  MEDICAL RECORD NUMBER:  [id number]  DATE:  6/6/2024    Unna boot: Applied moisturizing agent to dry skin as needed.   Appied primary and secondary dressing as ordered.  Applied Unna roll from toes to knee overlapping each time.   Applied ace wrap or coban from toes to below the knee.   Secured with tape and/or metal clips covered with tape.   Instructed patient/caregiver to keep dressing dry and intact. DO NOT REMOVE DRESSING.   Instructed pt/family/caregiver to report excessive draining, loose bandage, wet dressing, severe pain or tingling in toes.  Applied Unna Boot dressing below the knee to left lower leg.    Unna Boot(s) were applied per  Guidelines.     Electronically signed by Rosanna Jordan RN on 6/6/2024 at 4:27 PM

## 2024-06-07 SDOH — ECONOMIC STABILITY: INCOME INSECURITY: HOW HARD IS IT FOR YOU TO PAY FOR THE VERY BASICS LIKE FOOD, HOUSING, MEDICAL CARE, AND HEATING?: PATIENT DECLINED

## 2024-06-07 SDOH — ECONOMIC STABILITY: TRANSPORTATION INSECURITY
IN THE PAST 12 MONTHS, HAS LACK OF TRANSPORTATION KEPT YOU FROM MEETINGS, WORK, OR FROM GETTING THINGS NEEDED FOR DAILY LIVING?: PATIENT DECLINED

## 2024-06-07 SDOH — ECONOMIC STABILITY: FOOD INSECURITY: WITHIN THE PAST 12 MONTHS, YOU WORRIED THAT YOUR FOOD WOULD RUN OUT BEFORE YOU GOT MONEY TO BUY MORE.: PATIENT DECLINED

## 2024-06-07 SDOH — ECONOMIC STABILITY: HOUSING INSECURITY
IN THE LAST 12 MONTHS, WAS THERE A TIME WHEN YOU DID NOT HAVE A STEADY PLACE TO SLEEP OR SLEPT IN A SHELTER (INCLUDING NOW)?: PATIENT DECLINED

## 2024-06-07 SDOH — ECONOMIC STABILITY: FOOD INSECURITY: WITHIN THE PAST 12 MONTHS, THE FOOD YOU BOUGHT JUST DIDN'T LAST AND YOU DIDN'T HAVE MONEY TO GET MORE.: PATIENT DECLINED

## 2024-06-07 ASSESSMENT — LIFESTYLE VARIABLES
HOW OFTEN DO YOU HAVE A DRINK CONTAINING ALCOHOL: PATIENT DECLINED
HOW OFTEN DO YOU HAVE A DRINK CONTAINING ALCOHOL: 98
HOW OFTEN DO YOU HAVE SIX OR MORE DRINKS ON ONE OCCASION: 98
HOW MANY STANDARD DRINKS CONTAINING ALCOHOL DO YOU HAVE ON A TYPICAL DAY: PATIENT DECLINED
HOW MANY STANDARD DRINKS CONTAINING ALCOHOL DO YOU HAVE ON A TYPICAL DAY: 98

## 2024-06-07 ASSESSMENT — PATIENT HEALTH QUESTIONNAIRE - PHQ9
SUM OF ALL RESPONSES TO PHQ QUESTIONS 1-9: 0
SUM OF ALL RESPONSES TO PHQ9 QUESTIONS 1 & 2: 0
2. FEELING DOWN, DEPRESSED OR HOPELESS: NOT AT ALL
SUM OF ALL RESPONSES TO PHQ QUESTIONS 1-9: 0
SUM OF ALL RESPONSES TO PHQ QUESTIONS 1-9: 0
1. LITTLE INTEREST OR PLEASURE IN DOING THINGS: NOT AT ALL
SUM OF ALL RESPONSES TO PHQ QUESTIONS 1-9: 0

## 2024-06-10 ENCOUNTER — OFFICE VISIT (OUTPATIENT)
Dept: FAMILY MEDICINE CLINIC | Age: 69
End: 2024-06-10
Payer: MEDICARE

## 2024-06-10 VITALS
BODY MASS INDEX: 32.74 KG/M2 | HEART RATE: 95 BPM | OXYGEN SATURATION: 98 % | DIASTOLIC BLOOD PRESSURE: 74 MMHG | HEIGHT: 73 IN | WEIGHT: 247 LBS | TEMPERATURE: 98.7 F | SYSTOLIC BLOOD PRESSURE: 114 MMHG

## 2024-06-10 DIAGNOSIS — I25.10 CORONARY ARTERY DISEASE INVOLVING NATIVE CORONARY ARTERY OF NATIVE HEART WITHOUT ANGINA PECTORIS: ICD-10-CM

## 2024-06-10 DIAGNOSIS — R80.9 MICROALBUMINURIA DUE TO TYPE 2 DIABETES MELLITUS (HCC): Chronic | ICD-10-CM

## 2024-06-10 DIAGNOSIS — L08.9 INFECTED SEBACEOUS CYST OF SKIN: ICD-10-CM

## 2024-06-10 DIAGNOSIS — N18.31 TYPE 2 DIABETES MELLITUS WITH STAGE 3A CHRONIC KIDNEY DISEASE, WITHOUT LONG-TERM CURRENT USE OF INSULIN (HCC): ICD-10-CM

## 2024-06-10 DIAGNOSIS — E11.622 TYPE 2 DIABETES MELLITUS WITH OTHER SKIN ULCER (CODE) (HCC): ICD-10-CM

## 2024-06-10 DIAGNOSIS — I10 ESSENTIAL HYPERTENSION: Chronic | ICD-10-CM

## 2024-06-10 DIAGNOSIS — E78.2 MIXED HYPERLIPIDEMIA: ICD-10-CM

## 2024-06-10 DIAGNOSIS — R26.81 UNSTEADY GAIT: ICD-10-CM

## 2024-06-10 DIAGNOSIS — R26.0 ATAXIC GAIT: ICD-10-CM

## 2024-06-10 DIAGNOSIS — Z12.5 SCREENING FOR PROSTATE CANCER: ICD-10-CM

## 2024-06-10 DIAGNOSIS — L72.3 INFECTED SEBACEOUS CYST OF SKIN: ICD-10-CM

## 2024-06-10 DIAGNOSIS — F10.10 EXCESSIVE DRINKING OF ALCOHOL: ICD-10-CM

## 2024-06-10 DIAGNOSIS — E11.22 TYPE 2 DIABETES MELLITUS WITH STAGE 3A CHRONIC KIDNEY DISEASE, WITHOUT LONG-TERM CURRENT USE OF INSULIN (HCC): ICD-10-CM

## 2024-06-10 DIAGNOSIS — Z91.81 AT HIGH RISK FOR FALLS: Chronic | ICD-10-CM

## 2024-06-10 DIAGNOSIS — E66.09 CLASS 1 OBESITY DUE TO EXCESS CALORIES WITH SERIOUS COMORBIDITY AND BODY MASS INDEX (BMI) OF 32.0 TO 32.9 IN ADULT: ICD-10-CM

## 2024-06-10 DIAGNOSIS — D75.89 MACROCYTOSIS WITHOUT ANEMIA: Chronic | ICD-10-CM

## 2024-06-10 DIAGNOSIS — Z87.891 PERSONAL HISTORY OF TOBACCO USE, PRESENTING HAZARDS TO HEALTH: ICD-10-CM

## 2024-06-10 DIAGNOSIS — M10.9 GOUT OF MULTIPLE SITES, UNSPECIFIED CAUSE, UNSPECIFIED CHRONICITY: Chronic | ICD-10-CM

## 2024-06-10 DIAGNOSIS — E87.6 HYPOKALEMIA: ICD-10-CM

## 2024-06-10 DIAGNOSIS — Z23 NEED FOR VACCINATION: ICD-10-CM

## 2024-06-10 DIAGNOSIS — G31.9 DEGENERATIVE DISEASE OF NERVOUS SYSTEM, UNSPECIFIED (HCC): ICD-10-CM

## 2024-06-10 DIAGNOSIS — I73.9 PAD (PERIPHERAL ARTERY DISEASE) (HCC): ICD-10-CM

## 2024-06-10 DIAGNOSIS — E11.29 MICROALBUMINURIA DUE TO TYPE 2 DIABETES MELLITUS (HCC): Chronic | ICD-10-CM

## 2024-06-10 DIAGNOSIS — Z00.00 MEDICARE ANNUAL WELLNESS VISIT, SUBSEQUENT: Primary | ICD-10-CM

## 2024-06-10 DIAGNOSIS — R60.0 BILATERAL LOWER EXTREMITY EDEMA: ICD-10-CM

## 2024-06-10 DIAGNOSIS — L97.322 ULCER OF LEFT ANKLE, WITH FAT LAYER EXPOSED (HCC): ICD-10-CM

## 2024-06-10 DIAGNOSIS — E11.21 DIABETIC NEPHROPATHY ASSOCIATED WITH TYPE 2 DIABETES MELLITUS (HCC): ICD-10-CM

## 2024-06-10 DIAGNOSIS — Z72.0 TOBACCO USE: ICD-10-CM

## 2024-06-10 PROCEDURE — 3051F HG A1C>EQUAL 7.0%<8.0%: CPT | Performed by: FAMILY MEDICINE

## 2024-06-10 PROCEDURE — 3078F DIAST BP <80 MM HG: CPT | Performed by: FAMILY MEDICINE

## 2024-06-10 PROCEDURE — 99406 BEHAV CHNG SMOKING 3-10 MIN: CPT | Performed by: FAMILY MEDICINE

## 2024-06-10 PROCEDURE — G0439 PPPS, SUBSEQ VISIT: HCPCS | Performed by: FAMILY MEDICINE

## 2024-06-10 PROCEDURE — 3074F SYST BP LT 130 MM HG: CPT | Performed by: FAMILY MEDICINE

## 2024-06-10 PROCEDURE — 1123F ACP DISCUSS/DSCN MKR DOCD: CPT | Performed by: FAMILY MEDICINE

## 2024-06-10 PROCEDURE — 99214 OFFICE O/P EST MOD 30 MIN: CPT | Performed by: FAMILY MEDICINE

## 2024-06-10 RX ORDER — TORSEMIDE 20 MG/1
20 TABLET ORAL DAILY
Qty: 90 TABLET | Refills: 1 | Status: SHIPPED | OUTPATIENT
Start: 2024-06-10

## 2024-06-10 RX ORDER — CLINDAMYCIN HYDROCHLORIDE 300 MG/1
300 CAPSULE ORAL 3 TIMES DAILY
Qty: 30 CAPSULE | Refills: 0 | Status: SHIPPED | OUTPATIENT
Start: 2024-06-10 | End: 2024-06-20

## 2024-06-10 RX ORDER — CLINDAMYCIN HYDROCHLORIDE 300 MG/1
300 CAPSULE ORAL 3 TIMES DAILY
Qty: 30 CAPSULE | Refills: 0 | Status: SHIPPED | OUTPATIENT
Start: 2024-06-10 | End: 2024-06-10 | Stop reason: SDUPTHER

## 2024-06-10 NOTE — ASSESSMENT & PLAN NOTE
Values have been worsening over time.  Now in the range of macroalbuminuria/diabetic nephropathy. See below.

## 2024-06-10 NOTE — PROGRESS NOTES
follow recommendations of the specialist for management.   Orders:  -     Lipid Panel; Future  11. Ulcer of left ankle, with fat layer exposed (HCC)  Assessment & Plan:  Patient established with wound care/podiatry for long-term evaluation and management.  Ulcer has been slowly healing over time.  Instructions given to keep close follow-up visits with the specialist for treatment.    Orders:  -     CBC with Auto Differential; Future  12. Infected sebaceous cyst of skin  Assessment & Plan:  Secondary to Bactrim allergy patient has been started on clindamycin to cover for MRSA.  ASAP referral to general surgeon has been placed secondary to size of affected area and likely need for drainage.  Instructions were given to go to ER for any new onset fever/chills/sweats, worsening pain, streaking erythema, and/or increasing size despite antibiotic over the next 48 to 72 hours.  We will keep close follow-up in the next 7 to 10 days for reassessment.  Orders:  -     Ambulatory referral to General Surgery  -     clindamycin (CLEOCIN) 300 MG capsule; Take 1 capsule by mouth 3 times daily for 10 days, Disp-30 capsule, R-0Normal  13. Essential hypertension  -     CBC with Auto Differential; Future  -     TSH with Reflex; Future  14. Bilateral lower extremity edema  Comments:  Monitoring; daily weights. Knee high Tariq hose. Resume Demadex  Orders:  -     torsemide (DEMADEX) 20 MG tablet; Take 1 tablet by mouth daily, Disp-90 tablet, R-1Please send a replace/new response with 90-Day Supply if appropriate to maximize member benefit. Requesting 1 year supply.Normal  15. Hypokalemia  Comments:  Noted on recent endocrinology labwork. Patient instructed to increase potassium rich foods in diet and return to lab for repeat testing in 1 week.  Orders:  -     Potassium; Future  16. Mixed hyperlipidemia  -     Lipid Panel; Future  17. Coronary artery disease involving native coronary artery of native heart without angina pectoris  -     CBC

## 2024-06-10 NOTE — ASSESSMENT & PLAN NOTE
Based on most recent worsening microalbumin level >300. Referral placed to nephrology for help with preserving renal function. Patient again urged to start jardiance for renal benefits.

## 2024-06-10 NOTE — ASSESSMENT & PLAN NOTE
Most recent A1c much improved and at borderline control based on age.  Importance of keeping follow-up visits with endocrinology to discuss any further management.  I stressed with patient again the importance of starting Jardiance for further management in the setting of his known chronic kidney disease.

## 2024-06-10 NOTE — ASSESSMENT & PLAN NOTE
Secondary to Bactrim allergy patient has been started on clindamycin to cover for MRSA.  ASAP referral to general surgeon has been placed secondary to size of affected area and likely need for drainage.  Instructions were given to go to ER for any new onset fever/chills/sweats, worsening pain, streaking erythema, and/or increasing size despite antibiotic over the next 48 to 72 hours.  We will keep close follow-up in the next 7 to 10 days for reassessment.

## 2024-06-13 ENCOUNTER — HOSPITAL ENCOUNTER (OUTPATIENT)
Dept: WOUND CARE | Age: 69
Discharge: HOME | End: 2024-06-13
Attending: PODIATRIST
Payer: MEDICARE

## 2024-06-13 VITALS
DIASTOLIC BLOOD PRESSURE: 67 MMHG | SYSTOLIC BLOOD PRESSURE: 120 MMHG | RESPIRATION RATE: 18 BRPM | HEART RATE: 91 BPM | TEMPERATURE: 97.3 F

## 2024-06-13 DIAGNOSIS — L97.322 ULCER OF LEFT ANKLE, WITH FAT LAYER EXPOSED (HCC): Primary | ICD-10-CM

## 2024-06-13 PROCEDURE — 11042 DBRDMT SUBQ TIS 1ST 20SQCM/<: CPT | Performed by: PODIATRIST

## 2024-06-13 PROCEDURE — 11042 DBRDMT SUBQ TIS 1ST 20SQCM/<: CPT

## 2024-06-13 RX ORDER — TRIAMCINOLONE ACETONIDE 1 MG/G
OINTMENT TOPICAL ONCE
OUTPATIENT
Start: 2024-06-13 | End: 2024-06-13

## 2024-06-13 RX ORDER — LIDOCAINE HYDROCHLORIDE 20 MG/ML
JELLY TOPICAL ONCE
OUTPATIENT
Start: 2024-06-13 | End: 2024-06-13

## 2024-06-13 RX ORDER — LIDOCAINE HYDROCHLORIDE 40 MG/ML
SOLUTION TOPICAL ONCE
OUTPATIENT
Start: 2024-06-13 | End: 2024-06-13

## 2024-06-13 RX ORDER — IBUPROFEN 200 MG
TABLET ORAL ONCE
OUTPATIENT
Start: 2024-06-13 | End: 2024-06-13

## 2024-06-13 RX ORDER — LIDOCAINE 40 MG/G
CREAM TOPICAL ONCE
OUTPATIENT
Start: 2024-06-13 | End: 2024-06-13

## 2024-06-13 RX ORDER — BACITRACIN ZINC AND POLYMYXIN B SULFATE 500; 1000 [USP'U]/G; [USP'U]/G
OINTMENT TOPICAL ONCE
OUTPATIENT
Start: 2024-06-13 | End: 2024-06-13

## 2024-06-13 RX ORDER — CLOBETASOL PROPIONATE 0.5 MG/G
OINTMENT TOPICAL ONCE
OUTPATIENT
Start: 2024-06-13 | End: 2024-06-13

## 2024-06-13 RX ORDER — LIDOCAINE 50 MG/G
OINTMENT TOPICAL ONCE
OUTPATIENT
Start: 2024-06-13 | End: 2024-06-13

## 2024-06-13 RX ORDER — GINSENG 100 MG
CAPSULE ORAL ONCE
OUTPATIENT
Start: 2024-06-13 | End: 2024-06-13

## 2024-06-13 RX ORDER — GENTAMICIN SULFATE 1 MG/G
OINTMENT TOPICAL ONCE
OUTPATIENT
Start: 2024-06-13 | End: 2024-06-13

## 2024-06-13 RX ORDER — BETAMETHASONE DIPROPIONATE 0.05 %
OINTMENT (GRAM) TOPICAL ONCE
OUTPATIENT
Start: 2024-06-13 | End: 2024-06-13

## 2024-06-13 RX ORDER — SODIUM CHLOR/HYPOCHLOROUS ACID 0.033 %
SOLUTION, IRRIGATION IRRIGATION ONCE
OUTPATIENT
Start: 2024-06-13 | End: 2024-06-13

## 2024-06-13 NOTE — FLOWSHEET NOTE
Unna Boot Application   Below Knee    NAME:  Khari Diaz  YOB: 1955  MEDICAL RECORD NUMBER:  [id number]  DATE:  6/13/2024    Unna boot: Applied moisturizing agent to dry skin as needed.   Appied primary and secondary dressing as ordered.  Applied Unna roll from toes to knee overlapping each time.   Applied ace wrap or coban from toes to below the knee.   Secured with tape and/or metal clips covered with tape.   Instructed patient/caregiver to keep dressing dry and intact. DO NOT REMOVE DRESSING.   Instructed pt/family/caregiver to report excessive draining, loose bandage, wet dressing, severe pain or tingling in toes.  Applied Unna Boot dressing below the knee to left lower leg.    Unna Boot(s) were applied per  Guidelines.     Electronically signed by Rosanna Jordan RN on 6/13/2024 at 4:25 PM

## 2024-06-13 NOTE — PROGRESS NOTES
[facility]                                                   Progress Note and Procedure Note      Khari Diaz  MEDICAL RECORD NUMBER:  [id number]  AGE: 69 y.o.   GENDER: male  : 1955  EPISODE DATE:  2024    Subjective:     Chief Complaint   Patient presents with    Wound Check         HISTORY of PRESENT ILLNESS HPI     Khari Diaz is a 69 y.o. male who presents today for wound/ulcer evaluation.   History of Wound Context: Patient presents for continued treatment of a chronic ulceration of the left ankle.  Patient reports compliance with leaving his Unna boot intact.  Patient states he has been offloading the wound with a pillow under the leg and with a Prevalon boot.    Patient denies nausea, vomiting, fever, chills, chest pain, or shortness of breath.     Wound/Ulcer Pain Timing/Severity: none  Quality of pain: N/A  Severity:  0 / 10   Modifying Factors: None  Associated Signs/Symptoms: edema and drainage    Ulcer Identification:  Ulcer Type: diabetic  Contributing Factors: chronic pressure    Wound:  Full-thickness diabetic ulceration left lateral ankle        PAST MEDICAL HISTORY        Diagnosis Date    Abscess of back 2020    Abscess of right leg 2020    Acute renal failure (HCC)     Adenomatous polyp of ascending colon     Adenomatous polyp of descending colon     Adenomatous polyp of transverse colon     Anxiety     CAD S/P percutaneous coronary angioplasty 2014    Cardiomyopathy (HCC)     Cecal polyp     Cerebrovascular accident (CVA) due to occlusion of left middle cerebral artery ([facility]) 2016    brainstem infarction from left MCA occlusion    Cerebrovascular disease 2016    Claudication (HCC)     Colon polyp     Coronary angioplasty status     Cutaneous abscess of back excluding buttocks 2018    CVA (cerebral vascular accident) ([facility]) 2017    Dependent edema 2017    Diplopia 05/15/2017    Resolved with management of cataracts

## 2024-06-13 NOTE — DISCHARGE INSTRUCTIONS
[facility]   Physician Orders and Discharge Instructions  [id number] [place]  [address]  Telephone: [phone]      [phone]        NAME:  Kahri Diaz                                                                                                YOB: 1955  MEDICAL RECORD NUMBER:  [id number]     Your  is:  Rosanna     Home Care/Facility: None     Wound Location: Left Lateral Ankle     Dressing orders:1.Cleanse wound(s) with normal saline.   1. Cover the wound with Hydrofera Blue CLASSIC  then  apply Calamine unna wrap then cover with coban   Leave unna boot in place until next scheduled change. Monitor circulation in feet and if needed, unwrap unna boot and apply dry dressing until next appointment.        Compression: You may wear your own compression sock on your Right leg     Offloading Device:      Other Instructions:  Continue to place a pillow under your calf to prevent pressure on the area Or use a Prevalon Boot. Increase your Protein Intake.      Keep all dressings clean, dry and intact.  Keep pressure off the wound(s) at all times.      Follow up visit   1 Week June 20 , 2024 @  3:45     Please give 24 hour notice if unable to keep appointment. [phone]     If you experience any of the following, please call the Wound Care Service at  [phone] or go to the nearest emergency room.        *Increase in pain         *Temperature over 101           *Increase in drainage from your wound or a foul odor  *Uncontrolled swelling            *Need for compression bandage changes due to slippage, breakthrough drainage       PLEASE NOTE: IF YOU ARE UNABLE TO OBTAIN WOUND SUPPLIES, CONTINUE TO USE THE SUPPLIES YOU HAVE AVAILABLE UNTIL YOU ARE ABLE TO REACH US. IT IS MOST IMPORTANT TO KEEP THE WOUND COVERED AT ALL TIMES

## 2024-06-14 ENCOUNTER — TELEPHONE (OUTPATIENT)
Dept: FAMILY MEDICINE CLINIC | Age: 69
End: 2024-06-14

## 2024-06-14 ENCOUNTER — OFFICE VISIT (OUTPATIENT)
Dept: SURGERY | Age: 69
End: 2024-06-14

## 2024-06-14 VITALS
DIASTOLIC BLOOD PRESSURE: 68 MMHG | SYSTOLIC BLOOD PRESSURE: 110 MMHG | HEART RATE: 88 BPM | WEIGHT: 247 LBS | HEIGHT: 73 IN | TEMPERATURE: 98.2 F | OXYGEN SATURATION: 99 % | BODY MASS INDEX: 32.74 KG/M2

## 2024-06-14 DIAGNOSIS — L97.322 ULCER OF LEFT ANKLE, WITH FAT LAYER EXPOSED (HCC): ICD-10-CM

## 2024-06-14 DIAGNOSIS — I10 ESSENTIAL HYPERTENSION: Chronic | ICD-10-CM

## 2024-06-14 DIAGNOSIS — D75.89 MACROCYTOSIS WITHOUT ANEMIA: Chronic | ICD-10-CM

## 2024-06-14 DIAGNOSIS — E11.22 TYPE 2 DIABETES MELLITUS WITH STAGE 3A CHRONIC KIDNEY DISEASE, WITHOUT LONG-TERM CURRENT USE OF INSULIN (HCC): ICD-10-CM

## 2024-06-14 DIAGNOSIS — E11.622 TYPE 2 DIABETES MELLITUS WITH OTHER SKIN ULCER (CODE) (HCC): ICD-10-CM

## 2024-06-14 DIAGNOSIS — F10.10 EXCESSIVE DRINKING OF ALCOHOL: ICD-10-CM

## 2024-06-14 DIAGNOSIS — L02.91 ABSCESS: ICD-10-CM

## 2024-06-14 DIAGNOSIS — I73.9 PAD (PERIPHERAL ARTERY DISEASE) (HCC): ICD-10-CM

## 2024-06-14 DIAGNOSIS — N18.31 TYPE 2 DIABETES MELLITUS WITH STAGE 3A CHRONIC KIDNEY DISEASE, WITHOUT LONG-TERM CURRENT USE OF INSULIN (HCC): ICD-10-CM

## 2024-06-14 DIAGNOSIS — E87.6 HYPOKALEMIA: ICD-10-CM

## 2024-06-14 DIAGNOSIS — E11.29 MICROALBUMINURIA DUE TO TYPE 2 DIABETES MELLITUS (HCC): Chronic | ICD-10-CM

## 2024-06-14 DIAGNOSIS — L02.91 ABSCESS: Primary | ICD-10-CM

## 2024-06-14 DIAGNOSIS — Z12.5 SCREENING FOR PROSTATE CANCER: ICD-10-CM

## 2024-06-14 DIAGNOSIS — M10.9 GOUT OF MULTIPLE SITES, UNSPECIFIED CAUSE, UNSPECIFIED CHRONICITY: Chronic | ICD-10-CM

## 2024-06-14 DIAGNOSIS — I25.10 CORONARY ARTERY DISEASE INVOLVING NATIVE CORONARY ARTERY OF NATIVE HEART WITHOUT ANGINA PECTORIS: ICD-10-CM

## 2024-06-14 DIAGNOSIS — E66.09 CLASS 1 OBESITY DUE TO EXCESS CALORIES WITH SERIOUS COMORBIDITY AND BODY MASS INDEX (BMI) OF 32.0 TO 32.9 IN ADULT: ICD-10-CM

## 2024-06-14 DIAGNOSIS — G31.9 DEGENERATIVE DISEASE OF NERVOUS SYSTEM, UNSPECIFIED (HCC): ICD-10-CM

## 2024-06-14 DIAGNOSIS — E78.2 MIXED HYPERLIPIDEMIA: ICD-10-CM

## 2024-06-14 DIAGNOSIS — R80.9 MICROALBUMINURIA DUE TO TYPE 2 DIABETES MELLITUS (HCC): Chronic | ICD-10-CM

## 2024-06-14 LAB
BASOPHILS # BLD: 0 K/UL (ref 0–0.2)
BASOPHILS NFR BLD: 0.4 %
CHOLEST SERPL-MCNC: 85 MG/DL (ref 0–199)
EOSINOPHIL # BLD: 0.1 K/UL (ref 0–0.7)
EOSINOPHIL NFR BLD: 1.8 %
ERYTHROCYTE [DISTWIDTH] IN BLOOD BY AUTOMATED COUNT: 13 % (ref 11.5–14.5)
HCT VFR BLD AUTO: 40.7 % (ref 42–52)
HDLC SERPL-MCNC: 30 MG/DL (ref 40–59)
HGB BLD-MCNC: 14.1 G/DL (ref 14–18)
LDLC SERPL CALC-MCNC: 15 MG/DL (ref 0–129)
LYMPHOCYTES # BLD: 1.3 K/UL (ref 1–4.8)
LYMPHOCYTES NFR BLD: 24.3 %
MCH RBC QN AUTO: 36.2 PG (ref 27–31.3)
MCHC RBC AUTO-ENTMCNC: 34.6 % (ref 33–37)
MCV RBC AUTO: 104.6 FL (ref 79–92.2)
MONOCYTES # BLD: 0.4 K/UL (ref 0.2–0.8)
MONOCYTES NFR BLD: 7.3 %
NEUTROPHILS # BLD: 3.6 K/UL (ref 1.4–6.5)
NEUTS SEG NFR BLD: 65.8 %
PLATELET # BLD AUTO: 221 K/UL (ref 130–400)
POTASSIUM SERPL-SCNC: 3.8 MEQ/L (ref 3.4–4.9)
PSA SERPL-MCNC: 1.51 NG/ML (ref 0–4)
RBC # BLD AUTO: 3.89 M/UL (ref 4.7–6.1)
TRIGL SERPL-MCNC: 201 MG/DL (ref 0–150)
TSH REFLEX: 2.55 UIU/ML (ref 0.44–3.86)
URATE SERPL-MCNC: 3.4 MG/DL (ref 3.4–7)
WBC # BLD AUTO: 5.5 K/UL (ref 4.8–10.8)

## 2024-06-14 NOTE — TELEPHONE ENCOUNTER
FYI PCP   Patient calling and stated he was  to let PCP know that his surgery went well with Dr Hernandez and patient said he will see PCP on June 21

## 2024-06-14 NOTE — PROGRESS NOTES
GENERAL SURGERY CLINIC NOTE    Pt Name: Khari Diaz  MRN: [id number]  Date: 6/14/2024        SUBJECTIVE:     History of Chief Complaint:    Khari is a 69 y.o. male with a PMH of  who presents with abscess on the his right side of back. He is stating that this started couple days ago and he is on antibiotics.        Past Medical History:   Diagnosis Date    Abscess of back 01/06/2020    Abscess of right leg 01/06/2020    Acute renal failure (HCC)     Adenomatous polyp of ascending colon     Adenomatous polyp of descending colon     Adenomatous polyp of transverse colon     Anxiety     CAD S/P percutaneous coronary angioplasty 03/11/2014    Cardiomyopathy (HCC)     Cecal polyp     Cerebrovascular accident (CVA) due to occlusion of left middle cerebral artery (HCC) 08/2016    brainstem infarction from left MCA occlusion    Cerebrovascular disease 07/27/2016    Claudication ([facility])     Colon polyp     Coronary angioplasty status     Cutaneous abscess of back excluding buttocks 01/05/2018    CVA (cerebral vascular accident) ([facility]) 11/12/2017    Dependent edema 09/14/2017    Diplopia 05/15/2017    Resolved with management of cataracts    Dupuytren contracture 01/02/2018    Dysphagia 08/18/2011    Dysphonia 08/18/2011    Essential hypertension 08/17/2016    Gallop rhythm     Gout 12/10/2016    Gout of big toe 08/2014    Right Great Toe    H/O fall     Headache     migraines    Heart failure ([facility])     History of chest pain 01/02/2014    History of CVA (cerebrovascular accident) 08/15/2016    Brainstem infarction - occlusion of left MCA 08/2016    History of heart failure 01/06/2014    History of myocardial infarction 03/11/2014    History of recurrent pneumonia 02/11/2014    Hx of bacterial pneumonia     Hyperlipidemia 04/30/2021    Hyperlipidemia, mixed 04/30/2021    Hyperlipidemia, mixed 04/30/2021    Hypotension     Left carotid artery stenosis 08/23/2020    Left carotid artery stenosis 08/23/2020    Leg swelling

## 2024-06-15 LAB
FOLATE: >20 NG/ML (ref 4.8–24.2)
VITAMIN B-12: 1694 PG/ML (ref 232–1245)

## 2024-06-16 LAB — CULTURE WOUND: NORMAL

## 2024-06-18 LAB — VIT B6 SERPL-MCNC: 26.9 NMOL/L (ref 20–125)

## 2024-06-19 LAB — CULTURE WOUND: NORMAL

## 2024-06-19 NOTE — PROGRESS NOTES
Mohs Case Number: [id number] Subjective:      Patient ID: Elly Kothari is a 59 y.o. male. HPI   Eric Diaz is status post Incision and drainage of a back and right thigh abscess on 1/6/2020. Cultures showed Staphylococcus coagulase-negative. The packing has been removed. The wound is not draining. Pain is rated as a 1. He is back to normal activities. He now has a new lesion in the left posterior auricular area that spontaneously drained yesterday. He is on no antibiotics. Review of Systems  14 symptoms of systems were reviewed and all were negative other than history of present illness  Objective:   Physical Exam  Constitutional:       General: He is not in acute distress. Appearance: Normal appearance. HENT:      Head:        Mouth/Throat:      Mouth: Mucous membranes are moist.      Pharynx: Oropharynx is clear. Eyes:      Pupils: Pupils are equal, round, and reactive to light. Neck:      Comments: Neck is supple without any masses, no thyromegaly, trachea midline  Musculoskeletal:      Comments: Normal gait   Lymphadenopathy:      Cervical: No cervical adenopathy. Upper Body:      Right upper body: No supraclavicular or axillary adenopathy. Left upper body: No supraclavicular or axillary adenopathy. Skin:     Findings: No bruising, lesion or rash. Neurological:      Mental Status: He is alert and oriented to person, place, and time. Psychiatric:         Mood and Affect: Mood normal.         Judgment: Judgment normal.       /82   Temp 98.1 °F (36.7 °C) (Temporal)   Ht 6' 1\" (1.854 m)   Wt 275 lb (124.7 kg)   BMI 36.28 kg/m²   Assessment:      Back and right thigh areas are doing well  Left posterior  auricular cyst has spontaneously drained      Plan:      The patient is instructed to return to see me in 2 weeks.    Bactrim ARIS Burns MD Date Of Previous Biopsy (Optional): 04/11/2024 Previous Accession (Optional): [id number] Biopsy Photograph Reviewed: Yes Referring Physician (Optional): Madhavi Arroyo PA-C Consent Type: Consent 1 (Standard) Eye Shield Used: No Surgeon Performing Repair (Optional): Charan Osborne MD Initial Size Of Lesion: 1.6 Number Of Stages: 1 Primary Defect Length In Cm (Final Defect Size - Required For Flaps/Grafts): 2 Primary Defect Depth In Cm (Optional But Required For Some Insurers): 0 Repair Type: Flap Which Instrument Did You Use For Dermabrasion?: Wire Brush Which Eyelid Repair Cpt Are You Using?: 80491 Oculoplastic Surgeon Procedure Text (A): After obtaining clear surgical margins the patient was sent to oculoplastics for surgical repair.  The patient understands they will receive post-surgical care and follow-up from the referring physician's office. Otolaryngologist Procedure Text (A): After obtaining clear surgical margins the patient was sent to otolaryngology for surgical repair.  The patient understands they will receive post-surgical care and follow-up from the referring physician's office. Plastic Surgeon Procedure Text (A): After obtaining clear surgical margins the patient was sent to plastics for surgical repair.  The patient understands they will receive post-surgical care and follow-up from the referring physician's office. Mid-Level Procedure Text (A): After obtaining clear surgical margins the patient was sent to a mid-level provider for surgical repair.  The patient understands they will receive post-surgical care and follow-up from the mid-level provider. Provider Procedure Text (A): After obtaining clear surgical margins the defect was repaired by another provider. Asc Procedure Text (A): After obtaining clear surgical margins the patient was sent to an ASC for surgical repair.  The patient understands they will receive post-surgical care and follow-up from the ASC physician. Suturegard Retention Suture: 2-0 Nylon Retention Suture Bite Size: 3 mm Length To Time In Minutes Device Was In Place: 10 Undermining Type: Entire Wound Debridement Text: The wound edges were debrided prior to proceeding with the closure to facilitate wound healing. Helical Rim Text: The closure involved the helical rim. Vermilion Border Text: The closure involved the vermilion border. Nostril Rim Text: The closure involved the nostril rim. Retention Suture Text: Retention sutures were placed to support the closure and prevent dehiscence. Flap Type: Advancement Flap (Single) Secondary Defect Length In Cm (Required For Flaps): 3 Secondary Defect Width In Cm (Required For Flaps): 2.5 Location Indication Override (Is Already Calculated Based On Selected Body Location): Area M Area H Indication Text: Tumors in this location are included in Area H (eyelids, eyebrows, nose, lips, chin, ear, pre-auricular, post-auricular, temple, genitalia, hands, feet, ankles and areola).  Tissue conservation is critical in these anatomic locations. Area M Indication Text: Tumors in this location are included in Area M (cheek, forehead, scalp, neck, jawline and pretibial skin).  Mohs surgery is indicated for tumors in these anatomic locations. Area L Indication Text: Tumors in this location are included in Area L (trunk and extremities).  Mohs surgery is indicated for larger tumors, or tumors with aggressive histologic features, in these anatomic locations. Tumor Debulked?: not debulked Depth Of Tumor Invasion (For Histology): tumor not visualized Perineural Invasion (For Histology - Be Specific If Possible): absent Surgical Defect Length In Cm (Optional): 2.0 Special Stains Stage 1 - Results: Base On Clearance Noted Above Stage 2: Additional Anesthesia Type: 1% lidocaine with epinephrine Staging Info: By selecting yes to the question above you will include information on AJCC 8 tumor staging in your Mohs note. Information on tumor staging will be automatically added for SCCs on the head and neck. AJCC 8 includes tumor size, tumor depth, perineural involvement and bone invasion. Tumor Depth: Less than 6mm from granular layer and no invasion beyond the subcutaneous fat Was The Patient On Physician Recommended Anticoagulation Therapy?: Please Select the Appropriate Response Medical Necessity Statement: Based on my medical judgement, Mohs surgery is the most appropriate treatment for this cancer compared to other treatments. Alternatives Discussed Intro (Do Not Add Period): I discussed alternative treatments to Mohs surgery and specifically discussed the risks and benefits of Consent 1/Introductory Paragraph: The rationale for Mohs was explained to the patient and consent was obtained. The risks, benefits and alternatives to therapy were discussed in detail. Specifically, the risks of infection, scarring, bleeding, prolonged wound healing, incomplete removal, allergy to anesthesia, nerve injury and recurrence were addressed. Prior to the procedure, the treatment site was clearly identified and confirmed by the patient. All components of Universal Protocol/PAUSE Rule completed. Consent 2/Introductory Paragraph: Mohs surgery was explained to the patient and consent was obtained. The risks, benefits and alternatives to therapy were discussed in detail. Specifically, the risks of infection, scarring, bleeding, prolonged wound healing, incomplete removal, allergy to anesthesia, nerve injury and recurrence were addressed. Prior to the procedure, the treatment site was clearly identified and confirmed by the patient. All components of Universal Protocol/PAUSE Rule completed. Consent 3/Introductory Paragraph: I gave the patient a chance to ask questions they had about the procedure.  Following this I explained the Mohs procedure and consent was obtained. The risks, benefits and alternatives to therapy were discussed in detail. Specifically, the risks of infection, scarring, bleeding, prolonged wound healing, incomplete removal, allergy to anesthesia, nerve injury and recurrence were addressed. Prior to the procedure, the treatment site was clearly identified and confirmed by the patient. All components of Universal Protocol/PAUSE Rule completed. Consent (Temporal Branch)/Introductory Paragraph: The rationale for Mohs was explained to the patient and consent was obtained. The risks, benefits and alternatives to therapy were discussed in detail. Specifically, the risks of damage to the temporal branch of the facial nerve, infection, scarring, bleeding, prolonged wound healing, incomplete removal, allergy to anesthesia, and recurrence were addressed. Prior to the procedure, the treatment site was clearly identified and confirmed by the patient. All components of Universal Protocol/PAUSE Rule completed. Consent (Marginal Mandibular)/Introductory Paragraph: The rationale for Mohs was explained to the patient and consent was obtained. The risks, benefits and alternatives to therapy were discussed in detail. Specifically, the risks of damage to the marginal mandibular branch of the facial nerve, infection, scarring, bleeding, prolonged wound healing, incomplete removal, allergy to anesthesia, and recurrence were addressed. Prior to the procedure, the treatment site was clearly identified and confirmed by the patient. All components of Universal Protocol/PAUSE Rule completed. Consent (Spinal Accessory)/Introductory Paragraph: The rationale for Mohs was explained to the patient and consent was obtained. The risks, benefits and alternatives to therapy were discussed in detail. Specifically, the risks of damage to the spinal accessory nerve, infection, scarring, bleeding, prolonged wound healing, incomplete removal, allergy to anesthesia, and recurrence were addressed. Prior to the procedure, the treatment site was clearly identified and confirmed by the patient. All components of Universal Protocol/PAUSE Rule completed. Consent (Near Eyelid Margin)/Introductory Paragraph: The rationale for Mohs was explained to the patient and consent was obtained. The risks, benefits and alternatives to therapy were discussed in detail. Specifically, the risks of ectropion or eyelid deformity, infection, scarring, bleeding, prolonged wound healing, incomplete removal, allergy to anesthesia, nerve injury and recurrence were addressed. Prior to the procedure, the treatment site was clearly identified and confirmed by the patient. All components of Universal Protocol/PAUSE Rule completed. Consent (Ear)/Introductory Paragraph: The rationale for Mohs was explained to the patient and consent was obtained. The risks, benefits and alternatives to therapy were discussed in detail. Specifically, the risks of ear deformity, infection, scarring, bleeding, prolonged wound healing, incomplete removal, allergy to anesthesia, nerve injury and recurrence were addressed. Prior to the procedure, the treatment site was clearly identified and confirmed by the patient. All components of Universal Protocol/PAUSE Rule completed. Consent (Nose)/Introductory Paragraph: The rationale for Mohs was explained to the patient and consent was obtained. The risks, benefits and alternatives to therapy were discussed in detail. Specifically, the risks of nasal deformity, changes in the flow of air through the nose, infection, scarring, bleeding, prolonged wound healing, incomplete removal, allergy to anesthesia, nerve injury and recurrence were addressed. Prior to the procedure, the treatment site was clearly identified and confirmed by the patient. All components of Universal Protocol/PAUSE Rule completed. Consent (Lip)/Introductory Paragraph: The rationale for Mohs was explained to the patient and consent was obtained. The risks, benefits and alternatives to therapy were discussed in detail. Specifically, the risks of lip deformity, changes in the oral aperture, infection, scarring, bleeding, prolonged wound healing, incomplete removal, allergy to anesthesia, nerve injury and recurrence were addressed. Prior to the procedure, the treatment site was clearly identified and confirmed by the patient. All components of Universal Protocol/PAUSE Rule completed. Consent (Scalp)/Introductory Paragraph: The rationale for Mohs was explained to the patient and consent was obtained. The risks, benefits and alternatives to therapy were discussed in detail. Specifically, the risks of changes in hair growth pattern secondary to repair, infection, scarring, bleeding, prolonged wound healing, incomplete removal, allergy to anesthesia, nerve injury and recurrence were addressed. Prior to the procedure, the treatment site was clearly identified and confirmed by the patient. All components of Universal Protocol/PAUSE Rule completed. Detail Level: Detailed Postop Diagnosis: same Anesthesia Volume In Cc: 6 Hemostasis: Electrocautery Estimated Blood Loss (Cc): minimal Brow Lift Text: A midfrontal incision was made medially to the defect to allow access to the tissues just superior to the left eyebrow. Following careful dissection inferiorly in a supraperiosteal plane to the level of the left eyebrow, several 3-0 monocryl sutures were used to resuspend the eyebrow orbicularis oculi muscular unit to the superior frontal bone periosteum. This resulted in an appropriate reapproximation of static eyebrow symmetry and correction of the left brow ptosis. Deep Sutures: 5-0 Monocryl Epidermal Sutures: 5-0 Fast Absorbing Gut Epidermal Closure: simple interrupted Suturegard Intro: Intraoperative tissue expansion was performed, utilizing the SUTUREGARD device, in order to reduce wound tension. Suturegard Body: The suture ends were repeatedly re-tightened and re-clamped to achieve the desired tissue expansion. Hemigard Intro: Due to skin fragility and wound tension, it was decided to use HEMIGARD adhesive retention suture devices to permit a linear closure. The skin was cleaned and dried for a 6cm distance away from the wound. Excessive hair, if present, was removed to allow for adhesion. Hemigard Postcare Instructions: The HEMIGARD strips are to remain completely dry for at least 5-7 days. Donor Site Anesthesia Type: same as repair anesthesia Graft Donor Site Bandage (Optional-Leave Blank If You Don't Want In Note): Steri-strips and a pressure bandage were applied to the donor site. Closure 2 Information: This tab is for additional flaps and grafts, including complex repair and grafts and complex repair and flaps. You can also specify a different location for the additional defect, if the location is the same you do not need to select a new one. We will insert the automated text for the repair you select below just as we do for solitary flaps and grafts. Please note that at this time if you select a location with a different insurance zone you will need to override the ICD10 and CPT if appropriate. Closure 3 Information: This tab is for additional flaps and grafts above and beyond our usual structured repairs.  Please note if you enter information here it will not currently bill and you will need to add the billing information manually. Wound Care: Petrolatum Dressing: dry sterile dressing Unna Boot Text: An Unna boot was placed to help immobilize the limb and facilitate more rapid healing. Home Suture Removal Text: Patient was provided instructions on removing sutures and will remove their sutures at home.  If they have any questions or difficulties they will call the office. Post-Care Instructions: I reviewed with the patient in detail post-care instructions. Patient is not to engage in any heavy lifting, exercise, or swimming for the next 14 days. Should the patient develop any fevers, chills, bleeding, severe pain patient will contact the office immediately. Pain Refusal Text: I offered to prescribe pain medication but the patient refused to take this medication. Mauc Instructions: By selecting yes to the question below the MAUC number will be added into the note.  This will be calculated automatically based on the diagnosis chosen, the size entered, the body zone selected (H,M,L) and the specific indications you chose. You will also have the option to override the Mohs AUC if you disagree with the automatically calculated number and this option is found in the Case Summary tab. Where Do You Want The Question To Include Opioid Counseling Located?: Case Summary Tab Eye Protection Verbiage: Before proceeding with the stage, a plastic scleral shield was inserted. The globe was anesthetized with a few drops of 1% lidocaine with 1:100,000 epinephrine. Then, an appropriate sized scleral shield was chosen and coated with lacrilube ointment. The shield was gently inserted and left in place for the duration of each stage. After the stage was completed, the shield was gently removed. Mohs Method Verbiage: An incision at a 45 degree angle following the standard Mohs approach was done and the specimen was harvested as a microscopic controlled layer. Surgeon/Pathologist Verbiage (Will Incorporate Name Of Surgeon From Intro If Not Blank): operated in two distinct and integrated capacities as the surgeon and pathologist. Mohs Histo Method Verbiage: Each section was then chromacoded and processed in the Mohs lab using the Mohs protocol and submitted for tissue processing. Subsequent Stages Histo Method Verbiage: Using a similar technique to that described above, a thin layer of tissue was removed from all areas where tumor was visible on the previous stage.  The tissue was again oriented, mapped, dyed, and processed as above. Mohs Rapid Report Verbiage: The area of clinically evident tumor was marked with skin marking ink and appropriately hatched.  The initial incision was made following the Mohs approach through the skin.  The specimen was taken to the lab, divided into the necessary number of pieces, chromacoded and processed according to the Mohs protocol.  This was repeated in successive stages until a tumor free defect was achieved. Complex Repair Preamble Text (Leave Blank If You Do Not Want): Extensive wide undermining was performed. Intermediate Repair Preamble Text (Leave Blank If You Do Not Want): Undermining was performed with blunt dissection. Non-Graft Cartilage Fenestration Text: The cartilage was fenestrated with a 2mm punch biopsy to help facilitate healing. Graft Cartilage Fenestration Text: The cartilage was fenestrated with a 2mm punch biopsy to help facilitate graft survival and healing. Secondary Intention Text (Leave Blank If You Do Not Want): The defect will heal with secondary intention. No Repair - Repaired With Adjacent Surgical Defect Text (Leave Blank If You Do Not Want): After obtaining clear surgical margins the defect was repaired concurrently with another surgical defect which was in close approximation. Adjacent Tissue Transfer Text: The defect edges were debeveled with a #15 scalpel blade.  Given the location of the defect and the proximity to free margins an adjacent tissue transfer was deemed most appropriate.  Using a sterile surgical marker, an appropriate flap was drawn incorporating the defect and placing the expected incisions within the relaxed skin tension lines where possible.    The area thus outlined was incised deep to adipose tissue with a #15 scalpel blade.  The skin margins were undermined to an appropriate distance in all directions utilizing iris scissors. Advancement Flap (Single) Text: The defect edges were debeveled with a #15 scalpel blade.  Given the location of the defect and the proximity to free margins a single advancement flap was deemed most appropriate.  Using a sterile surgical marker, an appropriate advancement flap was drawn incorporating the defect and placing the expected incisions within the relaxed skin tension lines where possible.    The area thus outlined was incised deep to adipose tissue with a #15 scalpel blade.  The skin margins were undermined to an appropriate distance in all directions utilizing iris scissors. Advancement Flap (Double) Text: The defect edges were debeveled with a #15 scalpel blade.  Given the location of the defect and the proximity to free margins a double advancement flap was deemed most appropriate.  Using a sterile surgical marker, the appropriate advancement flaps were drawn incorporating the defect and placing the expected incisions within the relaxed skin tension lines where possible.    The area thus outlined was incised deep to adipose tissue with a #15 scalpel blade.  The skin margins were undermined to an appropriate distance in all directions utilizing iris scissors. Burow's Advancement Flap Text: The defect edges were debeveled with a #15 scalpel blade.  Given the location of the defect and the proximity to free margins a Burow's advancement flap was deemed most appropriate.  Using a sterile surgical marker, the appropriate advancement flap was drawn incorporating the defect and placing the expected incisions within the relaxed skin tension lines where possible.    The area thus outlined was incised deep to adipose tissue with a #15 scalpel blade.  The skin margins were undermined to an appropriate distance in all directions utilizing iris scissors. Chonodrocutaneous Helical Advancement Flap Text: The defect edges were debeveled with a #15 scalpel blade.  Given the location of the defect and the proximity to free margins a chondrocutaneous helical advancement flap was deemed most appropriate.  Using a sterile surgical marker, the appropriate advancement flap was drawn incorporating the defect and placing the expected incisions within the relaxed skin tension lines where possible.    The area thus outlined was incised deep to adipose tissue with a #15 scalpel blade.  The skin margins were undermined to an appropriate distance in all directions utilizing iris scissors. Crescentic Advancement Flap Text: The defect edges were debeveled with a #15 scalpel blade.  Given the location of the defect and the proximity to free margins a crescentic advancement flap was deemed most appropriate.  Using a sterile surgical marker, the appropriate advancement flap was drawn incorporating the defect and placing the expected incisions within the relaxed skin tension lines where possible.    The area thus outlined was incised deep to adipose tissue with a #15 scalpel blade.  The skin margins were undermined to an appropriate distance in all directions utilizing iris scissors. A-T Advancement Flap Text: The defect edges were debeveled with a #15 scalpel blade.  Given the location of the defect, shape of the defect and the proximity to free margins an A-T advancement flap was deemed most appropriate.  Using a sterile surgical marker, an appropriate advancement flap was drawn incorporating the defect and placing the expected incisions within the relaxed skin tension lines where possible.    The area thus outlined was incised deep to adipose tissue with a #15 scalpel blade.  The skin margins were undermined to an appropriate distance in all directions utilizing iris scissors. O-T Advancement Flap Text: The defect edges were debeveled with a #15 scalpel blade.  Given the location of the defect, shape of the defect and the proximity to free margins an O-T advancement flap was deemed most appropriate.  Using a sterile surgical marker, an appropriate advancement flap was drawn incorporating the defect and placing the expected incisions within the relaxed skin tension lines where possible.    The area thus outlined was incised deep to adipose tissue with a #15 scalpel blade.  The skin margins were undermined to an appropriate distance in all directions utilizing iris scissors. O-L Flap Text: The defect edges were debeveled with a #15 scalpel blade.  Given the location of the defect, shape of the defect and the proximity to free margins an O-L flap was deemed most appropriate.  Using a sterile surgical marker, an appropriate advancement flap was drawn incorporating the defect and placing the expected incisions within the relaxed skin tension lines where possible.    The area thus outlined was incised deep to adipose tissue with a #15 scalpel blade.  The skin margins were undermined to an appropriate distance in all directions utilizing iris scissors. O-Z Flap Text: The defect edges were debeveled with a #15 scalpel blade.  Given the location of the defect, shape of the defect and the proximity to free margins an O-Z flap was deemed most appropriate.  Using a sterile surgical marker, an appropriate transposition flap was drawn incorporating the defect and placing the expected incisions within the relaxed skin tension lines where possible. The area thus outlined was incised deep to adipose tissue with a #15 scalpel blade.  The skin margins were undermined to an appropriate distance in all directions utilizing iris scissors. Double O-Z Flap Text: The defect edges were debeveled with a #15 scalpel blade.  Given the location of the defect, shape of the defect and the proximity to free margins a Double O-Z flap was deemed most appropriate.  Using a sterile surgical marker, an appropriate transposition flap was drawn incorporating the defect and placing the expected incisions within the relaxed skin tension lines where possible. The area thus outlined was incised deep to adipose tissue with a #15 scalpel blade.  The skin margins were undermined to an appropriate distance in all directions utilizing iris scissors. V-Y Flap Text: The defect edges were debeveled with a #15 scalpel blade.  Given the location of the defect, shape of the defect and the proximity to free margins a V-Y flap was deemed most appropriate.  Using a sterile surgical marker, an appropriate advancement flap was drawn incorporating the defect and placing the expected incisions within the relaxed skin tension lines where possible.    The area thus outlined was incised deep to adipose tissue with a #15 scalpel blade.  The skin margins were undermined to an appropriate distance in all directions utilizing iris scissors. Advancement-Rotation Flap Text: The defect edges were debeveled with a #15 scalpel blade.  Given the location of the defect, shape of the defect and the proximity to free margins an advancement-rotation flap was deemed most appropriate.  Using a sterile surgical marker, an appropriate flap was drawn incorporating the defect and placing the expected incisions within the relaxed skin tension lines where possible. The area thus outlined was incised deep to adipose tissue with a #15 scalpel blade.  The skin margins were undermined to an appropriate distance in all directions utilizing iris scissors. Mercedes Flap Text: The defect edges were debeveled with a #15 scalpel blade.  Given the location of the defect, shape of the defect and the proximity to free margins a Mercedes flap was deemed most appropriate.  Using a sterile surgical marker, an appropriate advancement flap was drawn incorporating the defect and placing the expected incisions within the relaxed skin tension lines where possible. The area thus outlined was incised deep to adipose tissue with a #15 scalpel blade.  The skin margins were undermined to an appropriate distance in all directions utilizing iris scissors. Modified Advancement Flap Text: The defect edges were debeveled with a #15 scalpel blade.  Given the location of the defect, shape of the defect and the proximity to free margins a modified advancement flap was deemed most appropriate.  Using a sterile surgical marker, an appropriate advancement flap was drawn incorporating the defect and placing the expected incisions within the relaxed skin tension lines where possible.    The area thus outlined was incised deep to adipose tissue with a #15 scalpel blade.  The skin margins were undermined to an appropriate distance in all directions utilizing iris scissors. Mucosal Advancement Flap Text: Given the location of the defect, shape of the defect and the proximity to free margins a mucosal advancement flap was deemed most appropriate. Incisions were made with a 15 blade scalpel in the appropriate fashion along the cutaneous vermilion border and the mucosal lip. The remaining actinically damaged mucosal tissue was excised.  The mucosal advancement flap was then elevated to the gingival sulcus with care taken to preserve the neurovascular structures and advanced into the primary defect. Care was taken to ensure that precise realignment of the vermilion border was achieved. Peng Advancement Flap Text: The defect edges were debeveled with a #15 scalpel blade.  Given the location of the defect, shape of the defect and the proximity to free margins a Peng advancement flap was deemed most appropriate.  Using a sterile surgical marker, an appropriate advancement flap was drawn incorporating the defect and placing the expected incisions within the relaxed skin tension lines where possible. The area thus outlined was incised deep to adipose tissue with a #15 scalpel blade.  The skin margins were undermined to an appropriate distance in all directions utilizing iris scissors. Hatchet Flap Text: The defect edges were debeveled with a #15 scalpel blade.  Given the location of the defect, shape of the defect and the proximity to free margins a hatchet flap was deemed most appropriate.  Using a sterile surgical marker, an appropriate hatchet flap was drawn incorporating the defect and placing the expected incisions within the relaxed skin tension lines where possible.    The area thus outlined was incised deep to adipose tissue with a #15 scalpel blade.  The skin margins were undermined to an appropriate distance in all directions utilizing iris scissors. Rotation Flap Text: The defect edges were debeveled with a #15 scalpel blade.  Given the location of the defect, shape of the defect and the proximity to free margins a rotation flap was deemed most appropriate.  Using a sterile surgical marker, an appropriate rotation flap was drawn incorporating the defect and placing the expected incisions within the relaxed skin tension lines where possible.    The area thus outlined was incised deep to adipose tissue with a #15 scalpel blade.  The skin margins were undermined to an appropriate distance in all directions utilizing iris scissors. Bilateral Rotation Flap Text: The defect edges were debeveled with a #15 scalpel blade. Given the location of the defect, shape of the defect and the proximity to free margins a bilateral rotation flap was deemed most appropriate. Using a sterile surgical marker, an appropriate rotation flap was drawn incorporating the defect and placing the expected incisions within the relaxed skin tension lines where possible. The area thus outlined was incised deep to adipose tissue with a #15 scalpel blade. The skin margins were undermined to an appropriate distance in all directions utilizing iris scissors. Following this, the designed flap was carried over into the primary defect and sutured into place. Spiral Flap Text: The defect edges were debeveled with a #15 scalpel blade.  Given the location of the defect, shape of the defect and the proximity to free margins a spiral flap was deemed most appropriate.  Using a sterile surgical marker, an appropriate rotation flap was drawn incorporating the defect and placing the expected incisions within the relaxed skin tension lines where possible. The area thus outlined was incised deep to adipose tissue with a #15 scalpel blade.  The skin margins were undermined to an appropriate distance in all directions utilizing iris scissors. Staged Advancement Flap Text: The defect edges were debeveled with a #15 scalpel blade.  Given the location of the defect, shape of the defect and the proximity to free margins a staged advancement flap was deemed most appropriate.  Using a sterile surgical marker, an appropriate advancement flap was drawn incorporating the defect and placing the expected incisions within the relaxed skin tension lines where possible. The area thus outlined was incised deep to adipose tissue with a #15 scalpel blade.  The skin margins were undermined to an appropriate distance in all directions utilizing iris scissors. Star Wedge Flap Text: The defect edges were debeveled with a #15 scalpel blade.  Given the location of the defect, shape of the defect and the proximity to free margins a star wedge flap was deemed most appropriate.  Using a sterile surgical marker, an appropriate rotation flap was drawn incorporating the defect and placing the expected incisions within the relaxed skin tension lines where possible. The area thus outlined was incised deep to adipose tissue with a #15 scalpel blade.  The skin margins were undermined to an appropriate distance in all directions utilizing iris scissors. Transposition Flap Text: The defect edges were debeveled with a #15 scalpel blade.  Given the location of the defect and the proximity to free margins a transposition flap was deemed most appropriate.  Using a sterile surgical marker, an appropriate transposition flap was drawn incorporating the defect.    The area thus outlined was incised deep to adipose tissue with a #15 scalpel blade.  The skin margins were undermined to an appropriate distance in all directions utilizing iris scissors. Muscle Hinge Flap Text: The defect edges were debeveled with a #15 scalpel blade.  Given the size, depth and location of the defect and the proximity to free margins a muscle hinge flap was deemed most appropriate.  Using a sterile surgical marker, an appropriate hinge flap was drawn incorporating the defect. The area thus outlined was incised with a #15 scalpel blade.  The skin margins were undermined to an appropriate distance in all directions utilizing iris scissors. Mustarde Flap Text: The defect edges were debeveled with a #15 scalpel blade.  Given the size, depth and location of the defect and the proximity to free margins a Mustarde flap was deemed most appropriate.  Using a sterile surgical marker, an appropriate flap was drawn incorporating the defect. The area thus outlined was incised with a #15 scalpel blade.  The skin margins were undermined to an appropriate distance in all directions utilizing iris scissors. Nasal Turnover Hinge Flap Text: The defect edges were debeveled with a #15 scalpel blade.  Given the size, depth, location of the defect and the defect being full thickness a nasal turnover hinge flap was deemed most appropriate.  Using a sterile surgical marker, an appropriate hinge flap was drawn incorporating the defect. The area thus outlined was incised with a #15 scalpel blade. The flap was designed to recreate the nasal mucosal lining and the alar rim. The skin margins were undermined to an appropriate distance in all directions utilizing iris scissors. Nasalis-Muscle-Based Myocutaneous Island Pedicle Flap Text: Using a #15 blade, an incision was made around the donor flap to the level of the nasalis muscle. Wide lateral undermining was then performed in both the subcutaneous plane above the nasalis muscle, and in a submuscular plane just above periosteum. This allowed the formation of a free nasalis muscle axial pedicle (based on the angular artery) which was still attached to the actual cutaneous flap, increasing its mobility and vascular viability. Hemostasis was obtained with pinpoint electrocoagulation. The flap was mobilized into position and the pivotal anchor points positioned and stabilized with buried interrupted sutures. Subcutaneous and dermal tissues were closed in a multilayered fashion with sutures. Tissue redundancies were excised, and the epidermal edges were apposed without significant tension and sutured with sutures. Nasalis Myocutaneous Flap Text: Using a #15 blade, an incision was made around the donor flap to the level of the nasalis muscle. Wide lateral undermining was then performed in both the subcutaneous plane above the nasalis muscle, and in a submuscular plane just above periosteum. This allowed the formation of a free nasalis muscle axial pedicle which was still attached to the actual cutaneous flap, increasing its mobility and vascular viability. Hemostasis was obtained with pinpoint electrocoagulation. The flap was mobilized into position and the pivotal anchor points positioned and stabilized with buried interrupted sutures. Subcutaneous and dermal tissues were closed in a multilayered fashion with sutures. Tissue redundancies were excised, and the epidermal edges were apposed without significant tension and sutured with sutures. Nasolabial Transposition Flap Text: The defect edges were debeveled with a #15 scalpel blade.  Given the size, depth and location of the defect and the proximity to free margins a nasolabial transposition flap was deemed most appropriate. Using a sterile surgical marker, an appropriate flap was drawn incorporating the defect. The area thus outlined was incised with a #15 scalpel blade. The skin margins were undermined to an appropriate distance in all directions utilizing iris scissors. Following this, the designed flap was carried into the primary defect and sutured into place. Orbicularis Oris Muscle Flap Text: The defect edges were debeveled with a #15 scalpel blade.  Given that the defect affected the competency of the oral sphincter an orbicularis oris muscle flap was deemed most appropriate to restore this competency and normal muscle function.  Using a sterile surgical marker, an appropriate flap was drawn incorporating the defect. The area thus outlined was incised with a #15 scalpel blade. Melolabial Transposition Flap Text: The defect edges were debeveled with a #15 scalpel blade.  Given the location of the defect and the proximity to free margins a melolabial flap was deemed most appropriate.  Using a sterile surgical marker, an appropriate melolabial transposition flap was drawn incorporating the defect.    The area thus outlined was incised deep to adipose tissue with a #15 scalpel blade.  The skin margins were undermined to an appropriate distance in all directions utilizing iris scissors. Rectangular Flap Text: The defect edges were debeveled with a #15 scalpel blade. Given the location of the defect and the proximity to free margins a rectangular flap was deemed most appropriate. Using a sterile surgical marker, an appropriate rectangular flap was drawn incorporating the defect. The area thus outlined was incised deep to adipose tissue with a #15 scalpel blade. The skin margins were undermined to an appropriate distance in all directions utilizing iris scissors. Following this, the designed flap was carried over into the primary defect and sutured into place. Rhombic Flap Text: The defect edges were debeveled with a #15 scalpel blade.  Given the location of the defect and the proximity to free margins a rhombic flap was deemed most appropriate.  Using a sterile surgical marker, an appropriate rhombic flap was drawn incorporating the defect.    The area thus outlined was incised deep to adipose tissue with a #15 scalpel blade.  The skin margins were undermined to an appropriate distance in all directions utilizing iris scissors. Rhomboid Transposition Flap Text: The defect edges were debeveled with a #15 scalpel blade.  Given the location of the defect and the proximity to free margins a rhomboid transposition flap was deemed most appropriate.  Using a sterile surgical marker, an appropriate rhomboid flap was drawn incorporating the defect.    The area thus outlined was incised deep to adipose tissue with a #15 scalpel blade.  The skin margins were undermined to an appropriate distance in all directions utilizing iris scissors. Bi-Rhombic Flap Text: The defect edges were debeveled with a #15 scalpel blade.  Given the location of the defect and the proximity to free margins a bi-rhombic flap was deemed most appropriate.  Using a sterile surgical marker, an appropriate rhombic flap was drawn incorporating the defect. The area thus outlined was incised deep to adipose tissue with a #15 scalpel blade.  The skin margins were undermined to an appropriate distance in all directions utilizing iris scissors. Helical Rim Advancement Flap Text: The defect edges were debeveled with a #15 blade scalpel.  Given the location of the defect and the proximity to free margins (helical rim) a double helical rim advancement flap was deemed most appropriate.  Using a sterile surgical marker, the appropriate advancement flaps were drawn incorporating the defect and placing the expected incisions between the helical rim and antihelix where possible.  The area thus outlined was incised through and through with a #15 scalpel blade.  With a skin hook and iris scissors, the flaps were gently and sharply undermined and freed up. Bilateral Helical Rim Advancement Flap Text: The defect edges were debeveled with a #15 blade scalpel.  Given the location of the defect and the proximity to free margins (helical rim) a bilateral helical rim advancement flap was deemed most appropriate.  Using a sterile surgical marker, the appropriate advancement flaps were drawn incorporating the defect and placing the expected incisions between the helical rim and antihelix where possible.  The area thus outlined was incised through and through with a #15 scalpel blade.  With a skin hook and iris scissors, the flaps were gently and sharply undermined and freed up. Ear Star Wedge Flap Text: The defect edges were debeveled with a #15 blade scalpel.  Given the location of the defect and the proximity to free margins (helical rim) an ear star wedge flap was deemed most appropriate.  Using a sterile surgical marker, the appropriate flap was drawn incorporating the defect and placing the expected incisions between the helical rim and antihelix where possible.  The area thus outlined was incised through and through with a #15 scalpel blade. Banner Transposition Flap Text: The defect edges were debeveled with a #15 scalpel blade.  Given the location of the defect and the proximity to free margins a Banner transposition flap was deemed most appropriate.  Using a sterile surgical marker, an appropriate flap drawn around the defect. The area thus outlined was incised deep to adipose tissue with a #15 scalpel blade.  The skin margins were undermined to an appropriate distance in all directions utilizing iris scissors. Bilobed Flap Text: The defect edges were debeveled with a #15 scalpel blade.  Given the location of the defect and the proximity to free margins a bilobe flap was deemed most appropriate.  Using a sterile surgical marker, an appropriate bilobe flap drawn around the defect.    The area thus outlined was incised deep to adipose tissue with a #15 scalpel blade.  The skin margins were undermined to an appropriate distance in all directions utilizing iris scissors. Bilobed Transposition Flap Text: The defect edges were debeveled with a #15 scalpel blade.  Given the location of the defect and the proximity to free margins a bilobed transposition flap was deemed most appropriate.  Using a sterile surgical marker, an appropriate bilobe flap drawn around the defect.    The area thus outlined was incised deep to adipose tissue with a #15 scalpel blade.  The skin margins were undermined to an appropriate distance in all directions utilizing iris scissors. Trilobed Flap Text: The defect edges were debeveled with a #15 scalpel blade.  Given the location of the defect and the proximity to free margins a trilobed flap was deemed most appropriate.  Using a sterile surgical marker, an appropriate trilobed flap drawn around the defect.    The area thus outlined was incised deep to adipose tissue with a #15 scalpel blade.  The skin margins were undermined to an appropriate distance in all directions utilizing iris scissors. Dorsal Nasal Flap Text: The defect edges were debeveled with a #15 scalpel blade.  Given the location of the defect and the proximity to free margins a dorsal nasal flap was deemed most appropriate.  Using a sterile surgical marker, an appropriate dorsal nasal flap was drawn around the defect.    The area thus outlined was incised deep to adipose tissue with a #15 scalpel blade.  The skin margins were undermined to an appropriate distance in all directions utilizing iris scissors. Island Pedicle Flap Text: The defect edges were debeveled with a #15 scalpel blade.  Given the location of the defect, shape of the defect and the proximity to free margins an island pedicle advancement flap was deemed most appropriate.  Using a sterile surgical marker, an appropriate advancement flap was drawn incorporating the defect, outlining the appropriate donor tissue and placing the expected incisions within the relaxed skin tension lines where possible.    The area thus outlined was incised deep to adipose tissue with a #15 scalpel blade.  The skin margins were undermined to an appropriate distance in all directions around the primary defect and laterally outward around the island pedicle utilizing iris scissors.  There was minimal undermining beneath the pedicle flap. Island Pedicle Flap With Canthal Suspension Text: The defect edges were debeveled with a #15 scalpel blade.  Given the location of the defect, shape of the defect and the proximity to free margins an island pedicle advancement flap was deemed most appropriate.  Using a sterile surgical marker, an appropriate advancement flap was drawn incorporating the defect, outlining the appropriate donor tissue and placing the expected incisions within the relaxed skin tension lines where possible. The area thus outlined was incised deep to adipose tissue with a #15 scalpel blade.  The skin margins were undermined to an appropriate distance in all directions around the primary defect and laterally outward around the island pedicle utilizing iris scissors.  There was minimal undermining beneath the pedicle flap. A suspension suture was placed in the canthal tendon to prevent tension and prevent ectropion. Alar Island Pedicle Flap Text: The defect edges were debeveled with a #15 scalpel blade.  Given the location of the defect, shape of the defect and the proximity to the alar rim an island pedicle advancement flap was deemed most appropriate.  Using a sterile surgical marker, an appropriate advancement flap was drawn incorporating the defect, outlining the appropriate donor tissue and placing the expected incisions within the nasal ala running parallel to the alar rim. The area thus outlined was incised with a #15 scalpel blade.  The skin margins were undermined minimally to an appropriate distance in all directions around the primary defect and laterally outward around the island pedicle utilizing iris scissors.  There was minimal undermining beneath the pedicle flap. Double Island Pedicle Flap Text: The defect edges were debeveled with a #15 scalpel blade.  Given the location of the defect, shape of the defect and the proximity to free margins a double island pedicle advancement flap was deemed most appropriate.  Using a sterile surgical marker, an appropriate advancement flap was drawn incorporating the defect, outlining the appropriate donor tissue and placing the expected incisions within the relaxed skin tension lines where possible.    The area thus outlined was incised deep to adipose tissue with a #15 scalpel blade.  The skin margins were undermined to an appropriate distance in all directions around the primary defect and laterally outward around the island pedicle utilizing iris scissors.  There was minimal undermining beneath the pedicle flap. Island Pedicle Flap-Requiring Vessel Identification Text: The defect edges were debeveled with a #15 scalpel blade.  Given the location of the defect, shape of the defect and the proximity to free margins an island pedicle advancement flap was deemed most appropriate.  Using a sterile surgical marker, an appropriate advancement flap was drawn, based on the axial vessel mentioned above, incorporating the defect, outlining the appropriate donor tissue and placing the expected incisions within the relaxed skin tension lines where possible.    The area thus outlined was incised deep to adipose tissue with a #15 scalpel blade.  The skin margins were undermined to an appropriate distance in all directions around the primary defect and laterally outward around the island pedicle utilizing iris scissors.  There was minimal undermining beneath the pedicle flap. Keystone Flap Text: The defect edges were debeveled with a #15 scalpel blade.  Given the location of the defect, shape of the defect a keystone flap was deemed most appropriate.  Using a sterile surgical marker, an appropriate keystone flap was drawn incorporating the defect, outlining the appropriate donor tissue and placing the expected incisions within the relaxed skin tension lines where possible. The area thus outlined was incised deep to adipose tissue with a #15 scalpel blade.  The skin margins were undermined to an appropriate distance in all directions around the primary defect and laterally outward around the flap utilizing iris scissors. O-T Plasty Text: The defect edges were debeveled with a #15 scalpel blade.  Given the location of the defect, shape of the defect and the proximity to free margins an O-T plasty was deemed most appropriate.  Using a sterile surgical marker, an appropriate O-T plasty was drawn incorporating the defect and placing the expected incisions within the relaxed skin tension lines where possible.    The area thus outlined was incised deep to adipose tissue with a #15 scalpel blade.  The skin margins were undermined to an appropriate distance in all directions utilizing iris scissors. O-Z Plasty Text: The defect edges were debeveled with a #15 scalpel blade.  Given the location of the defect, shape of the defect and the proximity to free margins an O-Z plasty (double transposition flap) was deemed most appropriate.  Using a sterile surgical marker, the appropriate transposition flaps were drawn incorporating the defect and placing the expected incisions within the relaxed skin tension lines where possible.    The area thus outlined was incised deep to adipose tissue with a #15 scalpel blade.  The skin margins were undermined to an appropriate distance in all directions utilizing iris scissors.  Hemostasis was achieved with electrocautery.  The flaps were then transposed into place, one clockwise and the other counterclockwise, and anchored with interrupted buried subcutaneous sutures. Double O-Z Plasty Text: The defect edges were debeveled with a #15 scalpel blade.  Given the location of the defect, shape of the defect and the proximity to free margins a Double O-Z plasty (double transposition flap) was deemed most appropriate.  Using a sterile surgical marker, the appropriate transposition flaps were drawn incorporating the defect and placing the expected incisions within the relaxed skin tension lines where possible. The area thus outlined was incised deep to adipose tissue with a #15 scalpel blade.  The skin margins were undermined to an appropriate distance in all directions utilizing iris scissors.  Hemostasis was achieved with electrocautery.  The flaps were then transposed into place, one clockwise and the other counterclockwise, and anchored with interrupted buried subcutaneous sutures. V-Y Plasty Text: The defect edges were debeveled with a #15 scalpel blade.  Given the location of the defect, shape of the defect and the proximity to free margins an V-Y advancement flap was deemed most appropriate.  Using a sterile surgical marker, an appropriate advancement flap was drawn incorporating the defect and placing the expected incisions within the relaxed skin tension lines where possible.    The area thus outlined was incised deep to adipose tissue with a #15 scalpel blade.  The skin margins were undermined to an appropriate distance in all directions utilizing iris scissors. H Plasty Text: Given the location of the defect, shape of the defect and the proximity to free margins a H-plasty was deemed most appropriate for repair.  Using a sterile surgical marker, the appropriate advancement arms of the H-plasty were drawn incorporating the defect and placing the expected incisions within the relaxed skin tension lines where possible. The area thus outlined was incised deep to adipose tissue with a #15 scalpel blade. The skin margins were undermined to an appropriate distance in all directions utilizing iris scissors.  The opposing advancement arms were then advanced into place in opposite direction and anchored with interrupted buried subcutaneous sutures. W Plasty Text: The lesion was extirpated to the level of the fat with a #15 scalpel blade.  Given the location of the defect, shape of the defect and the proximity to free margins a W-plasty was deemed most appropriate for repair.  Using a sterile surgical marker, the appropriate transposition arms of the W-plasty were drawn incorporating the defect and placing the expected incisions within the relaxed skin tension lines where possible.    The area thus outlined was incised deep to adipose tissue with a #15 scalpel blade.  The skin margins were undermined to an appropriate distance in all directions utilizing iris scissors.  The opposing transposition arms were then transposed into place in opposite direction and anchored with interrupted buried subcutaneous sutures. Z Plasty Text: The lesion was extirpated to the level of the fat with a #15 scalpel blade.  Given the location of the defect, shape of the defect and the proximity to free margins a Z-plasty was deemed most appropriate for repair.  Using a sterile surgical marker, the appropriate transposition arms of the Z-plasty were drawn incorporating the defect and placing the expected incisions within the relaxed skin tension lines where possible.    The area thus outlined was incised deep to adipose tissue with a #15 scalpel blade.  The skin margins were undermined to an appropriate distance in all directions utilizing iris scissors.  The opposing transposition arms were then transposed into place in opposite direction and anchored with interrupted buried subcutaneous sutures. Double Z Plasty Text: The lesion was extirpated to the level of the fat with a #15 scalpel blade. Given the location of the defect, shape of the defect and the proximity to free margins a double Z-plasty was deemed most appropriate for repair. Using a sterile surgical marker, the appropriate transposition arms of the double Z-plasty were drawn incorporating the defect and placing the expected incisions within the relaxed skin tension lines where possible. The area thus outlined was incised deep to adipose tissue with a #15 scalpel blade. The skin margins were undermined to an appropriate distance in all directions utilizing iris scissors. The opposing transposition arms were then transposed and carried over into place in opposite direction and anchored with interrupted buried subcutaneous sutures. Zygomaticofacial Flap Text: Given the location of the defect, shape of the defect and the proximity to free margins a zygomaticofacial flap was deemed most appropriate for repair.  Using a sterile surgical marker, the appropriate flap was drawn incorporating the defect and placing the expected incisions within the relaxed skin tension lines where possible. The area thus outlined was incised deep to adipose tissue with a #15 scalpel blade with preservation of a vascular pedicle.  The skin margins were undermined to an appropriate distance in all directions utilizing iris scissors.  The flap was then placed into the defect and anchored with interrupted buried subcutaneous sutures. Cheek Interpolation Flap Text: A decision was made to reconstruct the defect utilizing an interpolation axial flap and a staged reconstruction.  A telfa template was made of the defect.  This telfa template was then used to outline the Cheek Interpolation flap.  The donor area for the pedicle flap was then injected with anesthesia.  The flap was excised through the skin and subcutaneous tissue down to the layer of the underlying musculature.  The interpolation flap was carefully excised within this deep plane to maintain its blood supply.  The edges of the donor site were undermined.   The donor site was closed in a primary fashion.  The pedicle was then rotated into position and sutured.  Once the tube was sutured into place, adequate blood supply was confirmed with blanching and refill.  The pedicle was then wrapped with xeroform gauze and dressed appropriately with a telfa and gauze bandage to ensure continued blood supply and protect the attached pedicle. Cheek-To-Nose Interpolation Flap Text: A decision was made to reconstruct the defect utilizing an interpolation axial flap and a staged reconstruction.  A telfa template was made of the defect.  This telfa template was then used to outline the Cheek-To-Nose Interpolation flap.  The donor area for the pedicle flap was then injected with anesthesia.  The flap was excised through the skin and subcutaneous tissue down to the layer of the underlying musculature.  The interpolation flap was carefully excised within this deep plane to maintain its blood supply.  The edges of the donor site were undermined.   The donor site was closed in a primary fashion.  The pedicle was then rotated into position and sutured.  Once the tube was sutured into place, adequate blood supply was confirmed with blanching and refill.  The pedicle was then wrapped with xeroform gauze and dressed appropriately with a telfa and gauze bandage to ensure continued blood supply and protect the attached pedicle. Interpolation Flap Text: A decision was made to reconstruct the defect utilizing an interpolation axial flap and a staged reconstruction.  A telfa template was made of the defect.  This telfa template was then used to outline the interpolation flap.  The donor area for the pedicle flap was then injected with anesthesia.  The flap was excised through the skin and subcutaneous tissue down to the layer of the underlying musculature.  The interpolation flap was carefully excised within this deep plane to maintain its blood supply.  The edges of the donor site were undermined.   The donor site was closed in a primary fashion.  The pedicle was then rotated into position and sutured.  Once the tube was sutured into place, adequate blood supply was confirmed with blanching and refill.  The pedicle was then wrapped with xeroform gauze and dressed appropriately with a telfa and gauze bandage to ensure continued blood supply and protect the attached pedicle. Melolabial Interpolation Flap Text: A decision was made to reconstruct the defect utilizing an interpolation axial flap and a staged reconstruction.  A telfa template was made of the defect.  This telfa template was then used to outline the melolabial interpolation flap.  The donor area for the pedicle flap was then injected with anesthesia.  The flap was excised through the skin and subcutaneous tissue down to the layer of the underlying musculature.  The pedicle flap was carefully excised within this deep plane to maintain its blood supply.  The edges of the donor site were undermined.   The donor site was closed in a primary fashion.  The pedicle was then rotated into position and sutured.  Once the tube was sutured into place, adequate blood supply was confirmed with blanching and refill.  The pedicle was then wrapped with xeroform gauze and dressed appropriately with a telfa and gauze bandage to ensure continued blood supply and protect the attached pedicle. Mastoid Interpolation Flap Text: A decision was made to reconstruct the defect utilizing an interpolation axial flap and a staged reconstruction.  A telfa template was made of the defect.  This telfa template was then used to outline the mastoid interpolation flap.  The donor area for the pedicle flap was then injected with anesthesia.  The flap was excised through the skin and subcutaneous tissue down to the layer of the underlying musculature.  The pedicle flap was carefully excised within this deep plane to maintain its blood supply.  The edges of the donor site were undermined.   The donor site was closed in a primary fashion.  The pedicle was then rotated into position and sutured.  Once the tube was sutured into place, adequate blood supply was confirmed with blanching and refill.  The pedicle was then wrapped with xeroform gauze and dressed appropriately with a telfa and gauze bandage to ensure continued blood supply and protect the attached pedicle. Posterior Auricular Interpolation Flap Text: A decision was made to reconstruct the defect utilizing an interpolation axial flap and a staged reconstruction.  A telfa template was made of the defect.  This telfa template was then used to outline the posterior auricular interpolation flap.  The donor area for the pedicle flap was then injected with anesthesia.  The flap was excised through the skin and subcutaneous tissue down to the layer of the underlying musculature.  The pedicle flap was carefully excised within this deep plane to maintain its blood supply.  The edges of the donor site were undermined.   The donor site was closed in a primary fashion.  The pedicle was then rotated into position and sutured.  Once the tube was sutured into place, adequate blood supply was confirmed with blanching and refill.  The pedicle was then wrapped with xeroform gauze and dressed appropriately with a telfa and gauze bandage to ensure continued blood supply and protect the attached pedicle. Paramedian Forehead Flap Text: A decision was made to reconstruct the defect utilizing an interpolation axial flap and a staged reconstruction.  A telfa template was made of the defect.  This telfa template was then used to outline the paramedian forehead pedicle flap.  The donor area for the pedicle flap was then injected with anesthesia.  The flap was excised through the skin and subcutaneous tissue down to the layer of the underlying musculature.  The pedicle flap was carefully excised within this deep plane to maintain its blood supply.  The edges of the donor site were undermined.   The donor site was closed in a primary fashion.  The pedicle was then rotated into position and sutured.  Once the tube was sutured into place, adequate blood supply was confirmed with blanching and refill.  The pedicle was then wrapped with xeroform gauze and dressed appropriately with a telfa and gauze bandage to ensure continued blood supply and protect the attached pedicle. Abbe Flap (Upper To Lower Lip) Text: The defect of the lower lip was assessed and measured.  Given the location and size of the defect, an Abbe flap was deemed most appropriate. Using a sterile surgical marker, an appropriate Abbe flap was measured and drawn on the upper lip. Local anesthesia was then infiltrated.  A scalpel was then used to incise the upper lip through and through the skin, vermilion, muscle and mucosa, leaving the flap pedicled on the opposite side.  The flap was then rotated and transferred to the lower lip defect.  The flap was then sutured into place with a three layer technique, closing the orbicularis oris muscle layer with subcutaneous buried sutures, followed by a mucosal layer and an epidermal layer. Abbe Flap (Lower To Upper Lip) Text: The defect of the upper lip was assessed and measured.  Given the location and size of the defect, an Abbe flap was deemed most appropriate. Using a sterile surgical marker, an appropriate Abbe flap was measured and drawn on the lower lip. Local anesthesia was then infiltrated. A scalpel was then used to incise the upper lip through and through the skin, vermilion, muscle and mucosa, leaving the flap pedicled on the opposite side.  The flap was then rotated and transferred to the lower lip defect.  The flap was then sutured into place with a three layer technique, closing the orbicularis oris muscle layer with subcutaneous buried sutures, followed by a mucosal layer and an epidermal layer. Estlander Flap (Upper To Lower Lip) Text: The defect of the lower lip was assessed and measured.  Given the location and size of the defect, an Estlander flap was deemed most appropriate. Using a sterile surgical marker, an appropriate Estlander flap was measured and drawn on the upper lip. Local anesthesia was then infiltrated. A scalpel was then used to incise the lateral aspect of the flap, through skin, muscle and mucosa, leaving the flap pedicled medially.  The flap was then rotated and positioned to fill the lower lip defect.  The flap was then sutured into place with a three layer technique, closing the orbicularis oris muscle layer with subcutaneous buried sutures, followed by a mucosal layer and an epidermal layer. Cheiloplasty (Less Than 50%) Text: A decision was made to reconstruct the defect with a  cheiloplasty.  The defect was undermined extensively.  Additional orbicularis oris muscle was excised with a 15 blade scalpel.  The defect was converted into a full thickness wedge, of less than 50% of the vertical height of the lip, to facilite a better cosmetic result.  Small vessels were then tied off with 5-0 monocyrl. The orbicularis oris, superficial fascia, adipose and dermis were then reapproximated.  After the deeper layers were approximated the epidermis was reapproximated with particular care given to realign the vermilion border. Cheiloplasty (Complex) Text: A decision was made to reconstruct the defect with a  cheiloplasty.  The defect was undermined extensively.  Additional orbicularis oris muscle was excised with a 15 blade scalpel.  The defect was converted into a full thickness wedge to facilite a better cosmetic result.  Small vessels were then tied off with 5-0 monocyrl. The orbicularis oris, superficial fascia, adipose and dermis were then reapproximated.  After the deeper layers were approximated the epidermis was reapproximated with particular care given to realign the vermilion border. Ear Wedge Repair Text: A wedge excision was completed by carrying down an excision through the full thickness of the ear and cartilage with an inward facing Burow's triangle. The wound was then closed in a layered fashion. Full Thickness Lip Wedge Repair (Flap) Text: Given the location of the defect and the proximity to free margins a full thickness wedge repair was deemed most appropriate.  Using a sterile surgical marker, the appropriate repair was drawn incorporating the defect and placing the expected incisions perpendicular to the vermilion border.  The vermilion border was also meticulously outlined to ensure appropriate reapproximation during the repair.  The area thus outlined was incised through and through with a #15 scalpel blade.  The muscularis and dermis were reaproximated with deep sutures following hemostasis. Care was taken to realign the vermilion border before proceeding with the superficial closure.  Once the vermilion was realigned the superfical and mucosal closure was finished. Ftsg Text: The defect edges were debeveled with a #15 scalpel blade.  Given the location of the defect, shape of the defect and the proximity to free margins a full thickness skin graft was deemed most appropriate.  Using a sterile surgical marker, the primary defect shape was transferred to the donor site. The area thus outlined was incised deep to adipose tissue with a #15 scalpel blade.  The harvested graft was then trimmed of adipose tissue until only dermis and epidermis was left.  The skin margins of the secondary defect were undermined to an appropriate distance in all directions utilizing iris scissors.  The secondary defect was closed with interrupted buried subcutaneous sutures.  The skin edges were then re-apposed with running  sutures.  The skin graft was then placed in the primary defect and oriented appropriately. Split-Thickness Skin Graft Text: The defect edges were debeveled with a #15 scalpel blade.  Given the location of the defect, shape of the defect and the proximity to free margins a split thickness skin graft was deemed most appropriate.  Using a sterile surgical marker, the primary defect shape was transferred to the donor site. The split thickness graft was then harvested.  The skin graft was then placed in the primary defect and oriented appropriately. Pinch Graft Text: The defect edges were debeveled with a #15 scalpel blade. Given the location of the defect, shape of the defect and the proximity to free margins a pinch graft was deemed most appropriate. Using a sterile surgical marker, the primary defect shape was transferred to the donor site. The area thus outlined was incised deep to adipose tissue with a #15 scalpel blade.  The harvested graft was then trimmed of adipose tissue until only dermis and epidermis was left. The skin margins of the secondary defect were undermined to an appropriate distance in all directions utilizing iris scissors.  The secondary defect was closed with interrupted buried subcutaneous sutures.  The skin edges were then re-apposed with running  sutures.  The skin graft was then placed in the primary defect and oriented appropriately. Burow's Graft Text: The defect edges were debeveled with a #15 scalpel blade.  Given the location of the defect, shape of the defect, the proximity to free margins and the presence of a standing cone deformity a Burow's skin graft was deemed most appropriate. The standing cone was removed and this tissue was then trimmed to the shape of the primary defect. The adipose tissue was also removed until only dermis and epidermis were left.  The skin margins of the secondary defect were undermined to an appropriate distance in all directions utilizing iris scissors.  The secondary defect was closed with interrupted buried subcutaneous sutures.  The skin edges were then re-apposed with running  sutures.  The skin graft was then placed in the primary defect and oriented appropriately. Cartilage Graft Text: The defect edges were debeveled with a #15 scalpel blade.  Given the location of the defect, shape of the defect, the fact the defect involved a full thickness cartilage defect a cartilage graft was deemed most appropriate.  An appropriate donor site was identified, cleansed, and anesthetized. The cartilage graft was then harvested and transferred to the recipient site, oriented appropriately and then sutured into place.  The secondary defect was then repaired using a primary closure. Composite Graft Text: The defect edges were debeveled with a #15 scalpel blade.  Given the location of the defect, shape of the defect, the proximity to free margins and the fact the defect was full thickness a composite graft was deemed most appropriate.  The defect was outline and then transferred to the donor site.  A full thickness graft was then excised from the donor site. The graft was then placed in the primary defect, oriented appropriately and then sutured into place.  The secondary defect was then repaired using a primary closure. Epidermal Autograft Text: The defect edges were debeveled with a #15 scalpel blade.  Given the location of the defect, shape of the defect and the proximity to free margins an epidermal autograft was deemed most appropriate.  Using a sterile surgical marker, the primary defect shape was transferred to the donor site. The epidermal graft was then harvested.  The skin graft was then placed in the primary defect and oriented appropriately. Dermal Autograft Text: The defect edges were debeveled with a #15 scalpel blade.  Given the location of the defect, shape of the defect and the proximity to free margins a dermal autograft was deemed most appropriate.  Using a sterile surgical marker, the primary defect shape was transferred to the donor site. The area thus outlined was incised deep to adipose tissue with a #15 scalpel blade.  The harvested graft was then trimmed of adipose and epidermal tissue until only dermis was left.  The skin graft was then placed in the primary defect and oriented appropriately. Skin Substitute Text: The defect edges were debeveled with a #15 scalpel blade.  Given the location of the defect, shape of the defect and the proximity to free margins a skin substitute graft was deemed most appropriate.  The graft material was trimmed to fit the size of the defect. The graft was then placed in the primary defect and oriented appropriately. Tissue Cultured Epidermal Autograft Text: The defect edges were debeveled with a #15 scalpel blade.  Given the location of the defect, shape of the defect and the proximity to free margins a tissue cultured epidermal autograft was deemed most appropriate.  The graft was then trimmed to fit the size of the defect.  The graft was then placed in the primary defect and oriented appropriately. Xenograft Text: The defect edges were debeveled with a #15 scalpel blade.  Given the location of the defect, shape of the defect and the proximity to free margins a xenograft was deemed most appropriate.  The graft was then trimmed to fit the size of the defect.  The graft was then placed in the primary defect and oriented appropriately. Purse String (Simple) Text: Given the location of the defect and the characteristics of the surrounding skin a purse string closure was deemed most appropriate.  Undermining was performed circumfirentially around the surgical defect.  A purse string suture was then placed and tightened. Purse String (Intermediate) Text: Given the location of the defect and the characteristics of the surrounding skin a purse string intermediate closure was deemed most appropriate.  Undermining was performed circumfirentially around the surgical defect.  A purse string suture was then placed and tightened. Partial Purse String (Simple) Text: Given the location of the defect and the characteristics of the surrounding skin a simple purse string closure was deemed most appropriate.  Undermining was performed circumfirentially around the surgical defect.  A purse string suture was then placed and tightened. Wound tension only allowed a partial closure of the circular defect. Partial Purse String (Intermediate) Text: Given the location of the defect and the characteristics of the surrounding skin an intermediate purse string closure was deemed most appropriate.  Undermining was performed circumfirentially around the surgical defect.  A purse string suture was then placed and tightened. Wound tension only allowed a partial closure of the circular defect. Localized Dermabrasion With Wire Brush Text: The patient was draped in routine manner.  Localized dermabrasion using 3 x 17 mm wire brush was performed in routine manner to papillary dermis. This spot dermabrasion is being performed to complete skin cancer reconstruction. It also will eliminate the other sun damaged precancerous cells that are known to be part of the regional effect of a lifetime's worth of sun exposure. This localized dermabrasion is therapeutic and should not be considered cosmetic in any regard. Localized Dermabrasion With Sand Papertext: The patient was draped in routine manner.  Localized dermabrasion using sterile sand paper was performed in routine manner to papillary dermis. This spot dermabrasion is being performed to complete skin cancer reconstruction. It also will eliminate the other sun damaged precancerous cells that are known to be part of the regional effect of a lifetime's worth of sun exposure. This localized dermabrasion is therapeutic and should not be considered cosmetic in any regard. Localized Dermabrasion With 15 Blade Text: The patient was draped in routine manner.  Localized dermabrasion using a 15 blade was performed in routine manner to papillary dermis. This spot dermabrasion is being performed to complete skin cancer reconstruction. It also will eliminate the other sun damaged precancerous cells that are known to be part of the regional effect of a lifetime's worth of sun exposure. This localized dermabrasion is therapeutic and should not be considered cosmetic in any regard. Tarsorrhaphy Text: A tarsorrhaphy was performed using Frost sutures. Intermediate Repair And Flap Additional Text (Will Appearing After The Standard Complex Repair Text): The intermediate repair was not sufficient to completely close the primary defect. The remaining additional defect was repaired with the flap mentioned below. Intermediate Repair And Graft Additional Text (Will Appearing After The Standard Complex Repair Text): The intermediate repair was not sufficient to completely close the primary defect. The remaining additional defect was repaired with the graft mentioned below. Complex Repair And Flap Additional Text (Will Appearing After The Standard Complex Repair Text): The complex repair was not sufficient to completely close the primary defect. The remaining additional defect was repaired with the flap mentioned below. Complex Repair And Graft Additional Text (Will Appearing After The Standard Complex Repair Text): The complex repair was not sufficient to completely close the primary defect. The remaining additional defect was repaired with the graft mentioned below. Eyelid Full Thickness Repair - 61089: The eyelid defect was full thickness which required a wedge repair of the eyelid. Special care was taken to ensure that the eyelid margin was realligned when placing sutures. Eyelid Partial Thickness Repair - 12198: The eyelid defect was partial thickness which required a wedge repair of the eyelid. Special care was taken to ensure that the eyelid margin was realligned when placing sutures. Manual Repair Warning Statement: We plan on removing the manually selected variable below in favor of our much easier automatic structured text blocks found in the previous tab. We decided to do this to help make the flow better and give you the full power of structured data. Manual selection is never going to be ideal in our platform and I would encourage you to avoid using manual selection from this point on, especially since I will be sunsetting this feature. It is important that you do one of two things with the customized text below. First, you can save all of the text in a word file so you can have it for future reference. Second, transfer the text to the appropriate area in the Library tab. Lastly, if there is a flap or graft type which we do not have you need to let us know right away so I can add it in before the variable is hidden. No need to panic, we plan to give you roughly 6 months to make the change. Same Histology In Subsequent Stages Text: The pattern and morphology of the tumor is as described in the first stage. No Residual Tumor Seen Histology Text: There were no malignant cells seen in the sections examined. Inflammation Suggestive Of Cancer Camouflage Histology Text: There was a dense lymphocytic infiltrate which prevented adequate histologic evaluation of adjacent structures. Bcc Histology Text: There were numerous aggregates of basaloid cells. Bcc Infiltrative Histology Text: There were numerous aggregates of basaloid cells demonstrating an infiltrative pattern. Mart-1 - Positive Histology Text: MART-1 staining demonstrates areas of higher density and clustering of melanocytes with Pagetoid spread upwards within the epidermis. The surgical margins are positive for tumor cells. Mart-1 - Negative Histology Text: MART-1 staining demonstrates a normal density and pattern of melanocytes along the dermal-epidermal junction. The surgical margins are negative for tumor cells. Information: Selecting Yes will display possible errors in your note based on the variables you have selected. This validation is only offered as a suggestion for you. PLEASE NOTE THAT THE VALIDATION TEXT WILL BE REMOVED WHEN YOU FINALIZE YOUR NOTE. IF YOU WANT TO FAX A PRELIMINARY NOTE YOU WILL NEED TO TOGGLE THIS TO 'NO' IF YOU DO NOT WANT IT IN YOUR FAXED NOTE. Bill 59 Modifier?: No - Continue to Bill 79 Modifier

## 2024-06-20 ENCOUNTER — HOSPITAL ENCOUNTER (OUTPATIENT)
Dept: WOUND CARE | Age: 69
Discharge: HOME | End: 2024-06-20
Attending: PODIATRIST
Payer: MEDICARE

## 2024-06-20 ENCOUNTER — OFFICE VISIT (OUTPATIENT)
Dept: SURGERY | Age: 69
End: 2024-06-20

## 2024-06-20 VITALS
DIASTOLIC BLOOD PRESSURE: 64 MMHG | SYSTOLIC BLOOD PRESSURE: 116 MMHG | HEART RATE: 78 BPM | WEIGHT: 247 LBS | OXYGEN SATURATION: 98 % | HEIGHT: 73 IN | BODY MASS INDEX: 32.74 KG/M2 | TEMPERATURE: 98.9 F

## 2024-06-20 VITALS
SYSTOLIC BLOOD PRESSURE: 109 MMHG | HEART RATE: 86 BPM | DIASTOLIC BLOOD PRESSURE: 63 MMHG | TEMPERATURE: 97 F | RESPIRATION RATE: 17 BRPM

## 2024-06-20 DIAGNOSIS — L97.322 ULCER OF LEFT ANKLE, WITH FAT LAYER EXPOSED (HCC): Primary | ICD-10-CM

## 2024-06-20 DIAGNOSIS — L02.91 ABSCESS: Primary | ICD-10-CM

## 2024-06-20 LAB — VIT B1 BLD-MCNC: 120 NMOL/L (ref 70–180)

## 2024-06-20 PROCEDURE — 99024 POSTOP FOLLOW-UP VISIT: CPT | Performed by: SURGERY

## 2024-06-20 PROCEDURE — 11042 DBRDMT SUBQ TIS 1ST 20SQCM/<: CPT | Performed by: PODIATRIST

## 2024-06-20 PROCEDURE — 6370000000 HC RX 637 (ALT 250 FOR IP): Performed by: PODIATRIST

## 2024-06-20 PROCEDURE — 11042 DBRDMT SUBQ TIS 1ST 20SQCM/<: CPT

## 2024-06-20 RX ORDER — BACITRACIN ZINC AND POLYMYXIN B SULFATE 500; 1000 [USP'U]/G; [USP'U]/G
OINTMENT TOPICAL ONCE
OUTPATIENT
Start: 2024-06-20 | End: 2024-06-20

## 2024-06-20 RX ORDER — SODIUM CHLOR/HYPOCHLOROUS ACID 0.033 %
SOLUTION, IRRIGATION IRRIGATION ONCE
OUTPATIENT
Start: 2024-06-20 | End: 2024-06-20

## 2024-06-20 RX ORDER — LIDOCAINE 40 MG/G
CREAM TOPICAL ONCE
Status: COMPLETED | OUTPATIENT
Start: 2024-06-20 | End: 2024-06-20

## 2024-06-20 RX ORDER — LIDOCAINE HYDROCHLORIDE 20 MG/ML
JELLY TOPICAL ONCE
OUTPATIENT
Start: 2024-06-20 | End: 2024-06-20

## 2024-06-20 RX ORDER — IBUPROFEN 200 MG
TABLET ORAL ONCE
OUTPATIENT
Start: 2024-06-20 | End: 2024-06-20

## 2024-06-20 RX ORDER — GINSENG 100 MG
CAPSULE ORAL ONCE
OUTPATIENT
Start: 2024-06-20 | End: 2024-06-20

## 2024-06-20 RX ORDER — BETAMETHASONE DIPROPIONATE 0.05 %
OINTMENT (GRAM) TOPICAL ONCE
OUTPATIENT
Start: 2024-06-20 | End: 2024-06-20

## 2024-06-20 RX ORDER — LIDOCAINE 50 MG/G
OINTMENT TOPICAL ONCE
OUTPATIENT
Start: 2024-06-20 | End: 2024-06-20

## 2024-06-20 RX ORDER — GENTAMICIN SULFATE 1 MG/G
OINTMENT TOPICAL ONCE
OUTPATIENT
Start: 2024-06-20 | End: 2024-06-20

## 2024-06-20 RX ORDER — CLOBETASOL PROPIONATE 0.5 MG/G
OINTMENT TOPICAL ONCE
OUTPATIENT
Start: 2024-06-20 | End: 2024-06-20

## 2024-06-20 RX ORDER — LIDOCAINE HYDROCHLORIDE 40 MG/ML
SOLUTION TOPICAL ONCE
OUTPATIENT
Start: 2024-06-20 | End: 2024-06-20

## 2024-06-20 RX ORDER — LIDOCAINE 40 MG/G
CREAM TOPICAL ONCE
OUTPATIENT
Start: 2024-06-20 | End: 2024-06-20

## 2024-06-20 RX ORDER — TRIAMCINOLONE ACETONIDE 1 MG/G
OINTMENT TOPICAL ONCE
OUTPATIENT
Start: 2024-06-20 | End: 2024-06-20

## 2024-06-20 RX ADMIN — LIDOCAINE: 40 CREAM TOPICAL at 16:13

## 2024-06-20 ASSESSMENT — PAIN SCALES - GENERAL: PAINLEVEL_OUTOF10: 3

## 2024-06-20 NOTE — DISCHARGE INSTRUCTIONS
[facility]   Physician Orders and Discharge Instructions  [id number] [place]  [address]  Telephone: [phone]      [phone]        NAME:  Khari Diaz                                                                                                YOB: 1955  MEDICAL RECORD NUMBER:  [id number]     Your  is:  Rosanna     Home Care/Facility: None     Wound Location: Left Lateral Ankle     Dressing orders:1.Cleanse wound(s) with normal saline.   1. Cover the wound with Hydrofera Blue CLASSIC  then  apply Calamine unna wrap then cover with coban   Leave unna boot in place until next scheduled change. Monitor circulation in feet and if needed, unwrap unna boot and apply dry dressing until next appointment.        Compression: You may wear your own compression sock on your Right leg     Offloading Device:      Other Instructions:  Continue to place a pillow under your calf to prevent pressure on the area Or use a Prevalon Boot. Increase your Protein Intake.      Keep all dressings clean, dry and intact.  Keep pressure off the wound(s) at all times.      Follow up visit   1 Week June 27, 2024 @  2:15     Please give 24 hour notice if unable to keep appointment. [phone]     If you experience any of the following, please call the Wound Care Service at  [phone] or go to the nearest emergency room.        *Increase in pain         *Temperature over 101           *Increase in drainage from your wound or a foul odor  *Uncontrolled swelling            *Need for compression bandage changes due to slippage, breakthrough drainage       PLEASE NOTE: IF YOU ARE UNABLE TO OBTAIN WOUND SUPPLIES, CONTINUE TO USE THE SUPPLIES YOU HAVE AVAILABLE UNTIL YOU ARE ABLE TO REACH US. IT IS MOST IMPORTANT TO KEEP THE WOUND COVERED AT ALL TIMES

## 2024-06-20 NOTE — PROGRESS NOTES
[facility]                                                   Progress Note and Procedure Note      Khari Diaz  MEDICAL RECORD NUMBER:  [id number]  AGE: 69 y.o.   GENDER: male  : 1955  EPISODE DATE:  2024    Subjective:     Chief Complaint   Patient presents with    Wound Check         HISTORY of PRESENT ILLNESS HPI     Khari Diaz is a 69 y.o. male who presents today for wound/ulcer evaluation.   History of Wound Context: Patient presents for continued care for chronic diabetic ulceration of the left ankle.  Patient reports compliance with leaving the Unna boot in place and using offloading to take pressure off the leg while at rest.  Patient denies complications or observing signs of infection.    Patient denies nausea, vomiting, fever, chills, chest pain, or shortness of breath.     Wound/Ulcer Pain Timing/Severity: none  Quality of pain: N/A  Severity:  0 / 10   Modifying Factors: None  Associated Signs/Symptoms: edema    Ulcer Identification:  Ulcer Type: diabetic  Contributing Factors: edema, venous stasis, and arterial insufficiency    Wound:  Full-thickness diabetic ulceration left lateral ankle        PAST MEDICAL HISTORY        Diagnosis Date    Abscess of back 2020    Abscess of right leg 2020    Acute renal failure (HCC)     Adenomatous polyp of ascending colon     Adenomatous polyp of descending colon     Adenomatous polyp of transverse colon     Anxiety     CAD S/P percutaneous coronary angioplasty 2014    Cardiomyopathy (HCC)     Cecal polyp     Cerebrovascular accident (CVA) due to occlusion of left middle cerebral artery ([facility]) 2016    brainstem infarction from left MCA occlusion    Cerebrovascular disease 2016    Claudication ([facility])     Colon polyp     Coronary angioplasty status     Cutaneous abscess of back excluding buttocks 2018    CVA (cerebral vascular accident) ([facility]) 2017    Dependent edema 2017    Diplopia 05/15/2017

## 2024-06-20 NOTE — PROGRESS NOTES
GEN: Alert and oriented x3, no acute distress, cooperative   SKIN: Skin color, texture, turgor normal. No rashes or lesions  LYMPH: No inguinal nodes  HEENT: Head is normocephalic, atraumatic. EOMI  NECK: Supple, symmetrical, trachea midline, skin normal  PULM: Chest symmetric, clear to auscultation bilaterally without wheezes, rales or rhonchi. No increased work of breathing or accessory muscle use  CV: Heart regular rate and rhythm, no murmurs appreciated  ABD: Soft, nontender, nondistended, no palpable masses  GROIN:  no palapble right or left inguinal defects  NEURO: No focalizing motor or sensory deficits   EXTREMITIES: Warm, dry, no lower extremity edema    LABS:   No results for input(s): \"WBC\", \"HGB\", \"HCT\", \"PLT\", \"NA\", \"K\", \"CL\", \"CO2\", \"BUN\", \"CREATININE\", \"MG\", \"PHOS\", \"CALCIUM\", \"INR\", \"AST\", \"ALT\", \"BILITOT\", \"BILIDIR\", \"AMYLASE\", \"LIPASE\", \"LDH\", \"LACTA\", \"NITRU\", \"COLORU\", \"BACTERIA\" in the last 72 hours.    Invalid input(s): \"PT\", \"PTT\", \"WBCU\", \"RBCU\", \"LEUKOCYTESUA\"    RADIOLOGY:   I have personally reviewed the following films:    No results found.    ASSESSMENT AND PLAN:   Khari Diaz is a 69 y.o. male who presents with s/p I&D of back abscess. He is stating that the abscess cavity is getting smaller still needs packing daily.     The abscess cavity looks smaller  Continue daily dressing change with packing      Guanakito Hernandez MD   General surgeon    Electronically signed by Guanakito Hernandez MD FACS, on 6/20/2024

## 2024-06-20 NOTE — FLOWSHEET NOTE
Unna Boot Application   Below Knee    NAME:  Khari Diaz  YOB: 1955  MEDICAL RECORD NUMBER:  [id number]  DATE:  6/20/2024    Unna boot: Applied moisturizing agent to dry skin as needed.   Appied primary and secondary dressing as ordered.  Applied Unna roll from toes to knee overlapping each time.   Applied ace wrap or coban from toes to below the knee.   Secured with tape and/or metal clips covered with tape.   Instructed patient/caregiver to keep dressing dry and intact. DO NOT REMOVE DRESSING.   Instructed pt/family/caregiver to report excessive draining, loose bandage, wet dressing, severe pain or tingling in toes.  Applied Unna Boot dressing below the knee to left lower leg.    Unna Boot(s) were applied per  Guidelines.     Electronically signed by Bina Vilchis RN on 6/20/2024 at 4:57 PM

## 2024-06-21 ENCOUNTER — OFFICE VISIT (OUTPATIENT)
Dept: FAMILY MEDICINE CLINIC | Age: 69
End: 2024-06-21
Payer: MEDICARE

## 2024-06-21 VITALS
SYSTOLIC BLOOD PRESSURE: 116 MMHG | HEIGHT: 73 IN | BODY MASS INDEX: 32.74 KG/M2 | HEART RATE: 86 BPM | WEIGHT: 247 LBS | TEMPERATURE: 98.1 F | DIASTOLIC BLOOD PRESSURE: 68 MMHG | OXYGEN SATURATION: 98 %

## 2024-06-21 DIAGNOSIS — L02.219 CELLULITIS AND ABSCESS OF TRUNK: Primary | ICD-10-CM

## 2024-06-21 DIAGNOSIS — L03.319 CELLULITIS AND ABSCESS OF TRUNK: Primary | ICD-10-CM

## 2024-06-21 PROCEDURE — 3078F DIAST BP <80 MM HG: CPT | Performed by: FAMILY MEDICINE

## 2024-06-21 PROCEDURE — 3074F SYST BP LT 130 MM HG: CPT | Performed by: FAMILY MEDICINE

## 2024-06-21 PROCEDURE — 99213 OFFICE O/P EST LOW 20 MIN: CPT | Performed by: FAMILY MEDICINE

## 2024-06-21 PROCEDURE — 1123F ACP DISCUSS/DSCN MKR DOCD: CPT | Performed by: FAMILY MEDICINE

## 2024-06-21 ASSESSMENT — ENCOUNTER SYMPTOMS
SHORTNESS OF BREATH: 0
CHEST TIGHTNESS: 0
ABDOMINAL PAIN: 0
DIARRHEA: 0
NAUSEA: 0
VOMITING: 0

## 2024-06-21 NOTE — PROGRESS NOTES
Khari Diaz (: 1955) is a 69 y.o. male, Established patient, who presents today for:    Chief Complaint   Patient presents with    Other     Pt is here today to go over recent results          Assessment & Plan     1. Cellulitis and abscess of trunk  Comments:  Healing well s/p I&D. Patient instructed to finish full course of antibiotic and continue packing changes every 1-2 days until would cavity is healed.           Return in about 4 months (around 10/21/2024) for Chronic Disease Check.       Subjective     HPI    Patient returns for follow-up visit status post incision and drainage of abscess/infected sebaceous cyst  over right low back per Dr. Hernandez on 24. He continues on bactrim as prescribed, reports pain is improved overall and the area has decreased in size overall.  Patient denies any fever/chills/sweats, nausea/vomiting, or diarrhea.  The wound is being packed on a daily basis by his wife.  Dressings are being changed on a daily basis per wife as well.    Review of Systems   Constitutional:  Negative for appetite change, chills, diaphoresis, fatigue and fever.   Respiratory:  Negative for chest tightness and shortness of breath.    Cardiovascular:  Negative for chest pain and palpitations.   Gastrointestinal:  Negative for abdominal pain, diarrhea, nausea and vomiting.   Skin:  Positive for wound. Negative for rash.   Neurological:  Negative for syncope and light-headedness.              Objective     Vitals:  /68   Pulse 86   Temp 98.1 °F (36.7 °C) (Temporal)   Ht 1.854 m (6' 1\")   Wt 112 kg (247 lb)   SpO2 98%   BMI 32.59 kg/m²     Physical Exam  Vitals reviewed.   Constitutional:       General: He is not in acute distress.     Appearance: He is obese. He is not ill-appearing.   Cardiovascular:      Rate and Rhythm: Normal rate and regular rhythm.   Pulmonary:      Effort: Pulmonary effort is normal. No respiratory distress.      Breath sounds: Normal breath sounds. No

## 2024-06-24 ENCOUNTER — TELEPHONE (OUTPATIENT)
Dept: ENDOCRINOLOGY | Age: 69
End: 2024-06-24

## 2024-06-24 NOTE — TELEPHONE ENCOUNTER
The trulicity is on back order and he is not able to get medication,  he wants to know what he should do.  He would like a message on my-chart letting him know what he should do

## 2024-06-26 ENCOUNTER — OFFICE VISIT (OUTPATIENT)
Dept: NEUROLOGY | Age: 69
End: 2024-06-26
Payer: MEDICARE

## 2024-06-26 VITALS
BODY MASS INDEX: 32.87 KG/M2 | WEIGHT: 248 LBS | OXYGEN SATURATION: 99 % | HEIGHT: 73 IN | TEMPERATURE: 97.3 F | HEART RATE: 85 BPM

## 2024-06-26 DIAGNOSIS — H81.03 MENIERE'S DISEASE OF BOTH EARS: ICD-10-CM

## 2024-06-26 DIAGNOSIS — G45.0 VERTEBROBASILAR INSUFFICIENCY: ICD-10-CM

## 2024-06-26 DIAGNOSIS — G31.9 DEGENERATIVE DISEASE OF NERVOUS SYSTEM, UNSPECIFIED (HCC): ICD-10-CM

## 2024-06-26 DIAGNOSIS — R26.81 UNSTEADY GAIT: Chronic | ICD-10-CM

## 2024-06-26 DIAGNOSIS — R42 DISEQUILIBRIUM: Primary | ICD-10-CM

## 2024-06-26 PROCEDURE — 1123F ACP DISCUSS/DSCN MKR DOCD: CPT | Performed by: PSYCHIATRY & NEUROLOGY

## 2024-06-26 PROCEDURE — 99214 OFFICE O/P EST MOD 30 MIN: CPT | Performed by: PSYCHIATRY & NEUROLOGY

## 2024-06-26 NOTE — PROGRESS NOTES
Subjective:      Patient ID: Khari Diaz is a 69 y.o. 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  Patient has multiple other comorbidities including carotid disease coronary stents spina bifida and known history of vertigo patient seen a year ago.  When last seen we had him on hydroxyzine and he was having difficulty with his insurance about the same.  Patient has multiple risk factors for cerebrovascular disease that he is doing about the same without any medication.  Patient is on dual antiplatelet therapy likely from cardiac standpoint    Patient actually doing very well since November is not any vertigo.  The only thing that is left over is loss of balance and photophobia.    Past Medical History:   Diagnosis Date    Abscess of back 01/06/2020    Abscess of right leg 01/06/2020    Acute renal failure (HCC)     Adenomatous polyp of ascending colon     Adenomatous polyp of descending colon     Adenomatous polyp of transverse colon     Anxiety     CAD S/P percutaneous coronary angioplasty 03/11/2014    Cardiomyopathy (HCC)     Cecal polyp     Cerebrovascular accident (CVA) due to occlusion of left middle cerebral artery (HCC) 08/2016    brainstem infarction from left MCA occlusion    Cerebrovascular disease 07/27/2016    Claudication (HCC)     Colon polyp     Coronary angioplasty status     Cutaneous abscess of back excluding buttocks 01/05/2018    CVA (cerebral vascular accident) ([facility]) 11/12/2017    Dependent edema 09/14/2017    Diplopia 05/15/2017    Resolved with management of cataracts    Dupuytren contracture 01/02/2018    Dysphagia 08/18/2011    Dysphonia 08/18/2011    Essential hypertension 08/17/2016    Gallop rhythm     Gout 12/10/2016    Gout of big toe 08/2014

## 2024-06-27 ENCOUNTER — HOSPITAL ENCOUNTER (OUTPATIENT)
Dept: WOUND CARE | Age: 69
Discharge: HOME | End: 2024-06-27
Attending: PODIATRIST
Payer: MEDICARE

## 2024-06-27 VITALS
HEART RATE: 81 BPM | SYSTOLIC BLOOD PRESSURE: 118 MMHG | DIASTOLIC BLOOD PRESSURE: 65 MMHG | TEMPERATURE: 96.2 F | RESPIRATION RATE: 16 BRPM

## 2024-06-27 DIAGNOSIS — L97.322 ULCER OF LEFT ANKLE, WITH FAT LAYER EXPOSED (HCC): Primary | ICD-10-CM

## 2024-06-27 PROCEDURE — 6370000000 HC RX 637 (ALT 250 FOR IP): Performed by: PODIATRIST

## 2024-06-27 PROCEDURE — 99212 OFFICE O/P EST SF 10 MIN: CPT

## 2024-06-27 PROCEDURE — 99213 OFFICE O/P EST LOW 20 MIN: CPT | Performed by: PODIATRIST

## 2024-06-27 RX ORDER — LIDOCAINE 40 MG/G
CREAM TOPICAL ONCE
Status: COMPLETED | OUTPATIENT
Start: 2024-06-27 | End: 2024-06-27

## 2024-06-27 RX ORDER — GINSENG 100 MG
CAPSULE ORAL ONCE
Status: CANCELLED | OUTPATIENT
Start: 2024-06-27 | End: 2024-06-27

## 2024-06-27 RX ORDER — LIDOCAINE 50 MG/G
OINTMENT TOPICAL ONCE
Status: CANCELLED | OUTPATIENT
Start: 2024-06-27 | End: 2024-06-27

## 2024-06-27 RX ORDER — GENTAMICIN SULFATE 1 MG/G
OINTMENT TOPICAL ONCE
Status: CANCELLED | OUTPATIENT
Start: 2024-06-27 | End: 2024-06-27

## 2024-06-27 RX ORDER — CLOBETASOL PROPIONATE 0.5 MG/G
OINTMENT TOPICAL ONCE
Status: CANCELLED | OUTPATIENT
Start: 2024-06-27 | End: 2024-06-27

## 2024-06-27 RX ORDER — BETAMETHASONE DIPROPIONATE 0.05 %
OINTMENT (GRAM) TOPICAL ONCE
Status: CANCELLED | OUTPATIENT
Start: 2024-06-27 | End: 2024-06-27

## 2024-06-27 RX ORDER — LIDOCAINE HYDROCHLORIDE 20 MG/ML
JELLY TOPICAL ONCE
Status: CANCELLED | OUTPATIENT
Start: 2024-06-27 | End: 2024-06-27

## 2024-06-27 RX ORDER — LIDOCAINE 40 MG/G
CREAM TOPICAL ONCE
Status: CANCELLED | OUTPATIENT
Start: 2024-06-27 | End: 2024-06-27

## 2024-06-27 RX ORDER — TRIAMCINOLONE ACETONIDE 1 MG/G
OINTMENT TOPICAL ONCE
Status: CANCELLED | OUTPATIENT
Start: 2024-06-27 | End: 2024-06-27

## 2024-06-27 RX ORDER — IBUPROFEN 200 MG
TABLET ORAL ONCE
Status: CANCELLED | OUTPATIENT
Start: 2024-06-27 | End: 2024-06-27

## 2024-06-27 RX ORDER — LIDOCAINE HYDROCHLORIDE 40 MG/ML
SOLUTION TOPICAL ONCE
Status: CANCELLED | OUTPATIENT
Start: 2024-06-27 | End: 2024-06-27

## 2024-06-27 RX ORDER — BACITRACIN ZINC AND POLYMYXIN B SULFATE 500; 1000 [USP'U]/G; [USP'U]/G
OINTMENT TOPICAL ONCE
Status: CANCELLED | OUTPATIENT
Start: 2024-06-27 | End: 2024-06-27

## 2024-06-27 RX ORDER — SODIUM CHLOR/HYPOCHLOROUS ACID 0.033 %
SOLUTION, IRRIGATION IRRIGATION ONCE
Status: CANCELLED | OUTPATIENT
Start: 2024-06-27 | End: 2024-06-27

## 2024-06-27 RX ADMIN — LIDOCAINE: 40 CREAM TOPICAL at 14:49

## 2024-06-27 NOTE — DISCHARGE INSTRUCTIONS
Wound Clinic Physician Orders and Discharge Instructions  [address] [address]  Telephone: [phone]     FAX [phone]    NAME:  Khari Diaz  YOB: 1955  MEDICAL RECORD NUMBER:  [id number]  DATE:  6/27/2024    Congratulations!! You have completed your treatment.   1. Return to your Primary Care Physician for all your health issues.   2. Resume your ordinary activities as tolerated.   3. Take your medications as prescribed by your primary care physician.   4. Check your skin daily for cracks, bruises, sores, or dryness. Use a moisturizer as needed.   5. Clean and dry your skin, using mild soap and warm water (not hot).   6. Avoid alcohol and caffeine and do not smoke.   7. Maintain a nutritious diet.   8. Avoid pressure on your wound site. Keep your legs elevated above the level of the heart whenever possible.   9. Continue to use wraps/stockings/compression as prescribed.   10. Replace compression stockings every four to six months as needed to ensure proper fit.   11. Wear well-fitting shoes and leg garments.   12.  Today in the wound clinic .Apply Spandagrip 10-20mmHg  to Left lower leg, apply first thing in the morning, remove at bedtime, elevate legs as much as possible during the day.    Cover the newly healed wound with a dry dressing if you see drainage.  Continue to wear the prevalon boot with a \"neck pillow\" to off-load the newly healed wound. Follow up with Dr. Houston in 9 weeks at his [place] [facility] for nail care.    THANK YOU FOR ALLOWING US TO SERVE YOU. PLEASE CALL IF YOU DEVELOP ANOTHER WOUND. [phone]

## 2024-06-27 NOTE — PROGRESS NOTES
08/20/2021    polyps x 3, 3y repeat (DR MORGAN)  COLONOSCOPY with polypectomies DIAGNOSTIC performed by Alexey Morgan MD at [facility]    CORONARY ANGIOPLASTY WITH STENT PLACEMENT  2014    3 stents    CYST REMOVAL  05/16/2016    DR SCHMID,  L SCROTAL CYST, R thigh, L ear, back    SHOULDER SURGERY Bilateral 12/18/2015    left once right twice- to remove bone spurs    TONSILLECTOMY         FAMILY HISTORY    Family History   Problem Relation Age of Onset    Breast Cancer Mother     Stroke Father     Colon Cancer Father 75       SOCIAL HISTORY    Social History     Tobacco Use    Smoking status: Some Days     Current packs/day: 2.50     Average packs/day: 2.5 packs/day for 30.0 years (75.0 ttl pk-yrs)     Types: Cigarettes    Smokeless tobacco: Never    Tobacco comments:     varies   Vaping Use    Vaping Use: Never used   Substance Use Topics    Alcohol use: Yes     Alcohol/week: 0.0 standard drinks of alcohol     Comment: limits less than 10/wk varies    Drug use: No       ALLERGIES    Allergies   Allergen Reactions    Bactrim [Sulfamethoxazole-Trimethoprim] Nausea And Vomiting and Other (See Comments)     Sugar count elevated, had troubles urinating       MEDICATIONS    Current Outpatient Medications on File Prior to Encounter   Medication Sig Dispense Refill    torsemide (DEMADEX) 20 MG tablet Take 1 tablet by mouth daily 90 tablet 1    carvedilol (COREG) 25 MG tablet Take 1 tablet by mouth 2 times daily 180 tablet 1    empagliflozin (JARDIANCE) 10 MG tablet Take 1 tablet by mouth daily 90 tablet 3    Dulaglutide (TRULICITY) 4.5 MG/0.5ML SOPN Inject 4.5 mg into the skin once a week 12 Adjustable Dose Pre-filled Pen Syringe 3    Lancets (SAFETY LANCET 28G/PRESSURE ACT) MISC USE TO CHECK BLOOD GLUCOSE UP TO 3 TIMES DAILY 100 each 5    metFORMIN (GLUCOPHAGE) 500 MG tablet Take 1 tablet by mouth 2 times daily (with meals) 180 tablet 1    allopurinol (ZYLOPRIM) 300 MG tablet Take 1 tablet by mouth daily 90 tablet 1

## 2024-06-27 NOTE — PLAN OF CARE
Problem: Chronic Conditions and Co-morbidities  Goal: Patient's chronic conditions and co-morbidity symptoms are monitored and maintained or improved  6/27/2024 1429 by Yamila Meyer RN  Outcome: Progressing     Problem: Wound:  Goal: Will show signs of wound healing; wound closure and no evidence of infection  6/27/2024 1429 by Yamila Meyer, RN  Outcome: Progressing

## 2024-06-28 ENCOUNTER — TELEPHONE (OUTPATIENT)
Dept: PODIATRY | Age: 69
End: 2024-06-28

## 2024-06-28 DIAGNOSIS — I25.10 CORONARY ARTERY DISEASE INVOLVING NATIVE CORONARY ARTERY OF NATIVE HEART WITHOUT ANGINA PECTORIS: ICD-10-CM

## 2024-06-28 DIAGNOSIS — E78.2 MIXED HYPERLIPIDEMIA: ICD-10-CM

## 2024-06-28 DIAGNOSIS — I73.9 PAD (PERIPHERAL ARTERY DISEASE) (HCC): ICD-10-CM

## 2024-06-28 RX ORDER — ROSUVASTATIN CALCIUM 40 MG/1
40 TABLET, COATED ORAL NIGHTLY
Qty: 90 TABLET | Refills: 1 | Status: SHIPPED | OUTPATIENT
Start: 2024-06-28

## 2024-06-28 NOTE — TELEPHONE ENCOUNTER
Comments:     Last Office Visit (last PCP visit):   6/21/2024    Next Visit Date:  Future Appointments   Date Time Provider Department Center   7/3/2024  2:30 PM [facility]ello [facility] [facility] [place]   7/16/2024  3:15 PM Lali Kilgore MD [place] [facility]   7/25/2024  2:30 PM Holiday, DO [facility]   10/22/2024  3:30 PM Giovanny Palm MD [facility] [place]   6/25/2025  3:30 PM Alex Collins MD [facility] [facility] -       **If hasn't been seen in over a year OR hasn't followed up according to last diabetes/ADHD visit, make appointment for patient before sending refill to provider.    Rx requested:  Requested Prescriptions     Pending Prescriptions Disp Refills    rosuvastatin (CRESTOR) 40 MG tablet [Pharmacy Med Name: Rosuvastatin Calcium 40 MG Oral Tablet] 90 tablet 3     Sig: TAKE 1 TABLET BY MOUTH EVERY  NIGHT

## 2024-06-28 NOTE — TELEPHONE ENCOUNTER
Left a voicemail for pt to call back and schedule a diabetic exam per Dr Houston in 9 weeks. Any day after 8/29/24.

## 2024-06-28 NOTE — TELEPHONE ENCOUNTER
----- Message from Teodoro Houston DPM sent at 6/27/2024  3:13 PM EDT -----  Regarding: diabetic foot care appt  Patient has completed his care at the wound care center, please contact him and make an appointment for diabetic footcare/toenails, approximately 9 weeks from now.

## 2024-07-03 ENCOUNTER — OFFICE VISIT (OUTPATIENT)
Dept: ENDOCRINOLOGY | Age: 69
End: 2024-07-03
Payer: MEDICARE

## 2024-07-03 VITALS
WEIGHT: 246 LBS | SYSTOLIC BLOOD PRESSURE: 113 MMHG | OXYGEN SATURATION: 94 % | BODY MASS INDEX: 32.6 KG/M2 | DIASTOLIC BLOOD PRESSURE: 76 MMHG | HEART RATE: 96 BPM | HEIGHT: 73 IN

## 2024-07-03 DIAGNOSIS — E11.22 TYPE 2 DIABETES MELLITUS WITH STAGE 3A CHRONIC KIDNEY DISEASE, WITHOUT LONG-TERM CURRENT USE OF INSULIN (HCC): Primary | ICD-10-CM

## 2024-07-03 DIAGNOSIS — N18.31 TYPE 2 DIABETES MELLITUS WITH STAGE 3A CHRONIC KIDNEY DISEASE, WITHOUT LONG-TERM CURRENT USE OF INSULIN (HCC): Primary | ICD-10-CM

## 2024-07-03 LAB
CHP ED QC CHECK: NORMAL
GLUCOSE BLD-MCNC: 271 MG/DL

## 2024-07-03 PROCEDURE — 1123F ACP DISCUSS/DSCN MKR DOCD: CPT | Performed by: PHYSICIAN ASSISTANT

## 2024-07-03 PROCEDURE — 3074F SYST BP LT 130 MM HG: CPT | Performed by: PHYSICIAN ASSISTANT

## 2024-07-03 PROCEDURE — 3078F DIAST BP <80 MM HG: CPT | Performed by: PHYSICIAN ASSISTANT

## 2024-07-03 PROCEDURE — 82962 GLUCOSE BLOOD TEST: CPT | Performed by: PHYSICIAN ASSISTANT

## 2024-07-03 PROCEDURE — 3051F HG A1C>EQUAL 7.0%<8.0%: CPT | Performed by: PHYSICIAN ASSISTANT

## 2024-07-03 PROCEDURE — 99214 OFFICE O/P EST MOD 30 MIN: CPT | Performed by: PHYSICIAN ASSISTANT

## 2024-07-03 RX ORDER — DULAGLUTIDE 1.5 MG/.5ML
1.5 INJECTION, SOLUTION SUBCUTANEOUS WEEKLY
Qty: 4 ADJUSTABLE DOSE PRE-FILLED PEN SYRINGE | Refills: 3 | Status: SHIPPED | OUTPATIENT
Start: 2024-07-03

## 2024-07-03 RX ORDER — DULAGLUTIDE 4.5 MG/.5ML
4.5 INJECTION, SOLUTION SUBCUTANEOUS WEEKLY
Qty: 12 ADJUSTABLE DOSE PRE-FILLED PEN SYRINGE | Refills: 3 | Status: SHIPPED | OUTPATIENT
Start: 2024-07-03

## 2024-07-03 ASSESSMENT — ENCOUNTER SYMPTOMS
WHEEZING: 0
SHORTNESS OF BREATH: 0
BACK PAIN: 1
RHINORRHEA: 0
ABDOMINAL PAIN: 0
VOMITING: 0
COUGH: 0
SINUS PRESSURE: 0
NAUSEA: 0
DIARRHEA: 0
SORE THROAT: 0

## 2024-07-03 NOTE — PROGRESS NOTES
06/14/2024    .6 (H) 06/14/2024     06/14/2024     Lab Results   Component Value Date    LABA1C 7.7 (H) 06/03/2024    LABA1C 8.4 (H) 02/27/2024    LABA1C 8.8 (H) 11/27/2023     Lab Results   Component Value Date    HDL 30 (L) 06/14/2024    HDL 29 (L) 02/27/2024    HDL 40 08/14/2023    CHOL 85 06/14/2024    CHOL 85 02/27/2024    CHOL 102 08/14/2023    TRIG 201 (H) 06/14/2024    TRIG 201 (H) 02/27/2024    TRIG 120 08/14/2023     No results found for: \"TESTOSTERONE\", \"SHBG\", \"TESTFREENM\"  Lab Results   Component Value Date    TSH 2.550 06/14/2024    TSH 4.780 (H) 11/01/2022    TSH 4.080 11/12/2017    T4FREE 1.54 11/01/2022     No results found for: \"TPOABS\"    Review of Systems   Constitutional:  Negative for chills, fatigue and fever.   HENT:  Negative for congestion, ear pain, postnasal drip, rhinorrhea, sinus pressure and sore throat.    Eyes:  Negative for visual disturbance.   Respiratory:  Negative for cough, shortness of breath and wheezing.    Cardiovascular:  Negative for chest pain, palpitations and leg swelling.   Gastrointestinal:  Negative for abdominal pain, diarrhea, nausea and vomiting.   Endocrine: Negative for cold intolerance, heat intolerance, polydipsia and polyuria.        Patient denies history of pancreatitis, alcohol abuse, family or personal history of thyroid cancer, MEN 2, MTC.    Genitourinary:  Negative for difficulty urinating.   Musculoskeletal:  Positive for back pain. Negative for arthralgias.   Skin:  Negative for rash.   Allergic/Immunologic: Negative for environmental allergies.   Neurological:  Negative for dizziness, tremors, weakness, light-headedness and headaches.   Hematological:  Does not bruise/bleed easily.   Psychiatric/Behavioral:  Negative for dysphoric mood.        Objective:   Physical Exam  Vitals reviewed.   Constitutional:       Appearance: Normal appearance. He is well-developed. He is not ill-appearing.   HENT:      Head: Normocephalic and

## 2024-07-03 NOTE — PATIENT INSTRUCTIONS
Endocrinology-    Check your blood sugars 4 times a day, before meals and at night  Document these numbers in a blood glucose log and bring them with you to your follow-up appointment.  If you are prescribed insulin, Do not take your mealtime insulin if your blood sugars less than 120   Call our office if you have blood sugars less than 80 or greater then 300 on two or more occasions  Call our office if you have any questions regarding your blood sugars or insulin dosing regiment  Signs of low blood sugar may include tremors, feeling shaky, sweating, dizziness, confusion and weakness. Check your blood sugar immediatly if you have any of these symptoms.     The plan as discussed at your appointment-    Continue Trulicity 4.5 mg injected weekly (will use 1.5 mg until 4.5 mg available)   Start Jardiance 10 mg daily when wounds healed  Metformin 500 mg before breakfast and dinner  Glucose logs given   Repeat labs 4-5 days before your follow up appointment   Follow up in 3 months

## 2024-07-12 ENCOUNTER — TELEPHONE (OUTPATIENT)
Dept: ORTHOPEDIC SURGERY | Age: 69
End: 2024-07-12

## 2024-07-12 NOTE — TELEPHONE ENCOUNTER
Patient states that he got a letter from Pavithra Gramajo's office regarding his shoes. He is asking if an order can be put in and sent to them so he can get his new shoes? Please advise.

## 2024-07-16 ENCOUNTER — OFFICE VISIT (OUTPATIENT)
Dept: PULMONOLOGY | Age: 69
End: 2024-07-16
Payer: MEDICARE

## 2024-07-16 VITALS
HEART RATE: 90 BPM | TEMPERATURE: 97.6 F | DIASTOLIC BLOOD PRESSURE: 62 MMHG | OXYGEN SATURATION: 96 % | BODY MASS INDEX: 33.05 KG/M2 | WEIGHT: 249.4 LBS | RESPIRATION RATE: 18 BRPM | HEIGHT: 73 IN | SYSTOLIC BLOOD PRESSURE: 129 MMHG

## 2024-07-16 DIAGNOSIS — R91.8 LUNG NODULES: ICD-10-CM

## 2024-07-16 DIAGNOSIS — G47.33 OSA ON CPAP: Primary | ICD-10-CM

## 2024-07-16 DIAGNOSIS — Z87.891 PERSONAL HISTORY OF TOBACCO USE: ICD-10-CM

## 2024-07-16 DIAGNOSIS — E66.01 SEVERE OBESITY (BMI 35.0-39.9) WITH COMORBIDITY (HCC): ICD-10-CM

## 2024-07-16 DIAGNOSIS — J44.9 CHRONIC OBSTRUCTIVE PULMONARY DISEASE, UNSPECIFIED COPD TYPE (HCC): ICD-10-CM

## 2024-07-16 PROCEDURE — 1123F ACP DISCUSS/DSCN MKR DOCD: CPT | Performed by: INTERNAL MEDICINE

## 2024-07-16 PROCEDURE — G0296 VISIT TO DETERM LDCT ELIG: HCPCS | Performed by: INTERNAL MEDICINE

## 2024-07-16 PROCEDURE — 99214 OFFICE O/P EST MOD 30 MIN: CPT | Performed by: INTERNAL MEDICINE

## 2024-07-16 PROCEDURE — 3074F SYST BP LT 130 MM HG: CPT | Performed by: INTERNAL MEDICINE

## 2024-07-16 PROCEDURE — 3078F DIAST BP <80 MM HG: CPT | Performed by: INTERNAL MEDICINE

## 2024-07-16 RX ORDER — ALBUTEROL SULFATE 90 UG/1
2 AEROSOL, METERED RESPIRATORY (INHALATION) 4 TIMES DAILY PRN
Qty: 18 G | Refills: 0 | Status: SHIPPED | OUTPATIENT
Start: 2024-07-16

## 2024-07-16 NOTE — PROGRESS NOTES
years the grade B recommendation is to:  Screen for lung cancer with low-dose computed tomography (LDCT) every year.  Stop screening once a person has not smoked for 15 years or has a health problem that limits life expectancy or the ability to have lung surgery.    The patient  reports that he has been smoking cigarettes. He has a 75.0 pack-year smoking history. He has never used smokeless tobacco.. Discussed with patient the risks and benefits of screening, including over-diagnosis, false positive rate, and total radiation exposure.  The patient currently exhibits no signs or symptoms suggestive of lung cancer.  Discussed with patient the importance of compliance with yearly annual lung cancer screenings and willingness to undergo diagnosis and treatment if screening scan is positive.  In addition, the patient was counseled regarding the importance of remaining smoke free and/or total smoking cessation.    Also reviewed the following if the patient has Medicare that as of February 10, 2022, Medicare only covers LDCT screening in patients aged 50-77 with at least a 20 pack-year smoking history who currently smoke or have quit in the last 15 years  Discussed with the patient the current USPSTF guidelines released March 9, 2021 for screening for lung cancer.    For adults aged 50 to 80 years who have a 20 pack-year smoking history and currently smoke or have quit within the past 15 years the grade B recommendation is to:  Screen for lung cancer with low-dose computed tomography (LDCT) every year.  Stop screening once a person has not smoked for 15 years or has a health problem that limits life expectancy or the ability to have lung surgery.    The patient  reports that he has been smoking cigarettes. He has a 75.0 pack-year smoking history. He has never used smokeless tobacco.. Discussed with patient the risks and benefits of screening, including over-diagnosis, false positive rate, and total radiation exposure.  The

## 2024-07-25 ENCOUNTER — OFFICE VISIT (OUTPATIENT)
Dept: CARDIOLOGY CLINIC | Age: 69
End: 2024-07-25
Payer: MEDICARE

## 2024-07-25 VITALS — SYSTOLIC BLOOD PRESSURE: 126 MMHG | DIASTOLIC BLOOD PRESSURE: 84 MMHG | HEART RATE: 86 BPM

## 2024-07-25 DIAGNOSIS — N18.31 STAGE 3A CHRONIC KIDNEY DISEASE (HCC): ICD-10-CM

## 2024-07-25 DIAGNOSIS — I25.10 CAD S/P PERCUTANEOUS CORONARY ANGIOPLASTY: Primary | ICD-10-CM

## 2024-07-25 DIAGNOSIS — E66.01 CLASS 2 SEVERE OBESITY DUE TO EXCESS CALORIES WITH SERIOUS COMORBIDITY AND BODY MASS INDEX (BMI) OF 37.0 TO 37.9 IN ADULT (HCC): ICD-10-CM

## 2024-07-25 DIAGNOSIS — I25.2 HISTORY OF MYOCARDIAL INFARCTION: ICD-10-CM

## 2024-07-25 DIAGNOSIS — I10 ESSENTIAL HYPERTENSION: ICD-10-CM

## 2024-07-25 DIAGNOSIS — G47.33 OSA (OBSTRUCTIVE SLEEP APNEA): ICD-10-CM

## 2024-07-25 DIAGNOSIS — Z72.0 TOBACCO USE: ICD-10-CM

## 2024-07-25 DIAGNOSIS — I65.23 BILATERAL CAROTID ARTERY STENOSIS: ICD-10-CM

## 2024-07-25 DIAGNOSIS — Z98.61 CAD S/P PERCUTANEOUS CORONARY ANGIOPLASTY: Primary | ICD-10-CM

## 2024-07-25 DIAGNOSIS — I73.9 PAD (PERIPHERAL ARTERY DISEASE) (HCC): ICD-10-CM

## 2024-07-25 DIAGNOSIS — I73.9 CLAUDICATION (HCC): ICD-10-CM

## 2024-07-25 PROCEDURE — 3079F DIAST BP 80-89 MM HG: CPT | Performed by: INTERNAL MEDICINE

## 2024-07-25 PROCEDURE — 99214 OFFICE O/P EST MOD 30 MIN: CPT | Performed by: INTERNAL MEDICINE

## 2024-07-25 PROCEDURE — 93000 ELECTROCARDIOGRAM COMPLETE: CPT | Performed by: INTERNAL MEDICINE

## 2024-07-25 PROCEDURE — 1123F ACP DISCUSS/DSCN MKR DOCD: CPT | Performed by: INTERNAL MEDICINE

## 2024-07-25 PROCEDURE — 3074F SYST BP LT 130 MM HG: CPT | Performed by: INTERNAL MEDICINE

## 2024-07-25 NOTE — PROGRESS NOTES
Chief Complaint   Patient presents with    Coronary Artery Disease       Patient presents for initial medical evaluation. Patient is followed on a regular basis by Giovanny Antunez MD. Follows with dr. Correia for general cardiology.  S/p abnormal MRA of legs with severe SFA disease on right. + hx of DM, HTN and HLP. Hx of PCI with me in 2014. Pt denies chest pain, dyspnea, dyspnea on exertion, change in exercise capacity, fatigue,  nausea, vomiting, diarrhea, constipation, motor weakness, insomnia, weight loss, syncope, dizziness, lightheadedness, palpitations, PND, orthopnea. Has discoloration of his legs and pain in calves. has symptoms of: Both legs with chronic swelling, severe pain and more concentrated to both anterior calf, heaviness, fatigue, going on intermittent since 2007 and has progressively gotten worse over years. RLE worse than LLE.  Has lidoderm patches on at night due to pain. + smoker. Had podiatry procedures with dr. Veloz.     7-1-22: Patient with history of coronary status post remote PCI in 2014.  History of peripheral arterial disease status post right  SFA peripheral vas interventions in 2019.  Left SFA with distal 50% stenosis no significant gradient and treated medically  States he is doing okay overall.  Denies any major angina or heart failure type symptoms  He has some discomfort in his legs on occasion.  Does have some soreness.  He continues to smoke.  EKG with normal sinus rhythm nonspecific changes.     7-13-23: doing ok. BP is good. Patient with history of coronary status post remote PCI in 2014.  History of peripheral arterial disease status post right  SFA peripheral vascular interventions in 2019.  Left SFA with distal 50% stenosis no significant gradient and treated medically. + smoker. On DPAT. No bleeding issues. LDL is 54. Was placed on abx for left lateral superficial wound per patient, following with podiatry/dr Gerhardt  Status post abdominal ultrasound in 2022

## 2024-08-01 DIAGNOSIS — I63.9 CEREBROVASCULAR ACCIDENT (CVA), UNSPECIFIED MECHANISM (HCC): ICD-10-CM

## 2024-08-01 DIAGNOSIS — I25.10 CAD S/P PERCUTANEOUS CORONARY ANGIOPLASTY: Chronic | ICD-10-CM

## 2024-08-01 DIAGNOSIS — Z98.61 CAD S/P PERCUTANEOUS CORONARY ANGIOPLASTY: Chronic | ICD-10-CM

## 2024-08-01 NOTE — TELEPHONE ENCOUNTER
Per pt [facility] requesting a new script for metformin  500mg  90days,    Short term refills will continue to go to [facility] .     LOV 12/4/23  FOV 3/5/24   Sent in prescription for:     Requested Prescriptions     Signed Prescriptions Disp Refills    levoFLOXacin (LEVAQUIN) 500 MG tablet 10 tablet 0     Sig: Take 1 tablet by mouth daily for 10 days     Authorizing Provider: BRADEN LINDA

## 2024-08-01 NOTE — TELEPHONE ENCOUNTER
Comments:     Last Office Visit (last PCP visit):   6/21/2024    Next Visit Date:  Future Appointments   Date Time Provider Department Center   9/4/2024  2:45 PM Teodoro Houston DPM [facility]   10/22/2024  3:30 PM Giovanny Palm MD [facility] DEP   11/4/2024  3:00 PM Micah Bello PA [facility] [facility]   6/25/2025  3:30 PM Alex Collins MD [place] [facility] -   7/16/2025  3:00 PM Lali Kilgore MD [place] [facility]   7/24/2025  3:15 PM Bernard Emmanuel DO [place] [facility]       **If hasn't been seen in over a year OR hasn't followed up according to last diabetes/ADHD visit, make appointment for patient before sending refill to provider.    Rx requested:  Requested Prescriptions     Pending Prescriptions Disp Refills    clopidogrel (PLAVIX) 75 MG tablet [Pharmacy Med Name: Clopidogrel Bisulfate 75 MG Oral Tablet] 90 tablet 3     Sig: TAKE 1 TABLET BY MOUTH DAILY

## 2024-08-02 RX ORDER — CLOPIDOGREL BISULFATE 75 MG/1
75 TABLET ORAL DAILY
Qty: 90 TABLET | Refills: 1 | Status: SHIPPED | OUTPATIENT
Start: 2024-08-02

## 2024-08-08 ENCOUNTER — HOSPITAL ENCOUNTER (OUTPATIENT)
Dept: ULTRASOUND IMAGING | Age: 69
Discharge: HOME | End: 2024-08-10
Attending: INTERNAL MEDICINE
Payer: MEDICARE

## 2024-08-08 DIAGNOSIS — I73.9 CLAUDICATION (HCC): ICD-10-CM

## 2024-08-08 DIAGNOSIS — I65.23 BILATERAL CAROTID ARTERY STENOSIS: ICD-10-CM

## 2024-08-08 PROCEDURE — 93880 EXTRACRANIAL BILAT STUDY: CPT

## 2024-08-08 PROCEDURE — 93925 LOWER EXTREMITY STUDY: CPT

## 2024-08-11 LAB
VAS LEFT ATA DIST PSV: 121 CM/S
VAS LEFT ATA MID PSV: 117 CM/S
VAS LEFT ATA PROX PSV: 94.7 CM/S
VAS LEFT CCA DIST EDV: 20.3 CM/S
VAS LEFT CCA DIST PSV: 107 CM/S
VAS LEFT CCA MID EDV: 13.3 CM/S
VAS LEFT CCA MID PSV: 116 CM/S
VAS LEFT CCA PROX EDV: 14 CM/S
VAS LEFT CCA PROX PSV: 118 CM/S
VAS LEFT CFA PROX PSV: 117 CM/S
VAS LEFT ECA EDV: 11.9 CM/S
VAS LEFT ECA PSV: 119 CM/S
VAS LEFT ICA DIST EDV: 24.7 CM/S
VAS LEFT ICA DIST PSV: 98.2 CM/S
VAS LEFT ICA MID EDV: 34.3 CM/S
VAS LEFT ICA MID PSV: 107 CM/S
VAS LEFT ICA PROX EDV: 25.2 CM/S
VAS LEFT ICA PROX PSV: 86.2 CM/S
VAS LEFT ICA/CCA PSV: 0.92
VAS LEFT PERONEAL DIST PSV: 21.3 CM/S
VAS LEFT PERONEAL MID PSV: 23.9 CM/S
VAS LEFT PERONEAL PROX PSV: 24.4 CM/S
VAS LEFT PTA DIST PSV: 27.3 CM/S
VAS LEFT PTA MID PSV: 51.8 CM/S
VAS LEFT PTA PROX PSV: 65.6 CM/S
VAS LEFT SFA DIST PSV: 143 CM/S
VAS LEFT SFA DIST VEL RATIO: 1.27
VAS LEFT SFA MID PSV: 113 CM/S
VAS LEFT SFA MID VEL RATIO: 1.19
VAS LEFT SFA PROX PSV: 95.3 CM/S
VAS LEFT SFA PROX VEL RATIO: 0.81
VAS LEFT VERTEBRAL EDV: 8.62 CM/S
VAS LEFT VERTEBRAL PSV: 52.5 CM/S
VAS RIGHT ATA DIST PSV: 81.6 CM/S
VAS RIGHT ATA MID PSV: 51.8 CM/S
VAS RIGHT ATA PROX PSV: 90 CM/S
VAS RIGHT CCA DIST EDV: 19.9 CM/S
VAS RIGHT CCA DIST PSV: 104 CM/S
VAS RIGHT CCA MID EDV: 18 CM/S
VAS RIGHT CCA MID PSV: 101 CM/S
VAS RIGHT CCA PROX EDV: 13 CM/S
VAS RIGHT CCA PROX PSV: 126 CM/S
VAS RIGHT CFA PROX PSV: 136 CM/S
VAS RIGHT ECA EDV: 17.2 CM/S
VAS RIGHT ECA PSV: 147 CM/S
VAS RIGHT ICA DIST EDV: 22.4 CM/S
VAS RIGHT ICA DIST PSV: 97.5 CM/S
VAS RIGHT ICA MID EDV: 24.9 CM/S
VAS RIGHT ICA MID PSV: 107 CM/S
VAS RIGHT ICA PROX EDV: 19.3 CM/S
VAS RIGHT ICA PROX PSV: 97.5 CM/S
VAS RIGHT ICA/CCA PSV: 1.06
VAS RIGHT PERONEAL DIST PSV: 111 CM/S
VAS RIGHT PERONEAL MID PSV: 113 CM/S
VAS RIGHT PERONEAL PROX PSV: 87.3 CM/S
VAS RIGHT PTA DIST PSV: 52 CM/S
VAS RIGHT PTA MID PSV: 43.7 CM/S
VAS RIGHT PTA PROX PSV: 65.8 CM/S
VAS RIGHT SFA DIST PSV: 91.1 CM/S
VAS RIGHT SFA DIST VEL RATIO: 1
VAS RIGHT SFA MID PSV: 91.1 CM/S
VAS RIGHT SFA MID VEL RATIO: 1
VAS RIGHT SFA PROX PSV: 87.8 CM/S
VAS RIGHT SFA PROX VEL RATIO: 0.6
VAS RIGHT VERTEBRAL EDV: 10.2 CM/S
VAS RIGHT VERTEBRAL PSV: 40.6 CM/S

## 2024-09-03 ENCOUNTER — HOSPITAL ENCOUNTER (OUTPATIENT)
Dept: LAB | Age: 69
Discharge: HOME | End: 2024-09-03
Payer: MEDICARE

## 2024-09-03 LAB
ALBUMIN SERPL-MCNC: 4.2 G/DL (ref 3.5–4.6)
ANION GAP SERPL CALCULATED.3IONS-SCNC: 12 MEQ/L (ref 9–15)
BACTERIA URNS QL MICRO: NEGATIVE /HPF
BILIRUB UR QL STRIP: NEGATIVE
BUN SERPL-MCNC: 11 MG/DL (ref 8–23)
CALCIUM SERPL-MCNC: 9.9 MG/DL (ref 8.5–9.9)
CHLORIDE SERPL-SCNC: 96 MEQ/L (ref 95–107)
CLARITY UR: CLEAR
CO2 SERPL-SCNC: 31 MEQ/L (ref 20–31)
COLOR UR: YELLOW
CREAT SERPL-MCNC: 1.04 MG/DL (ref 0.7–1.2)
CREAT UR-MCNC: 101.8 MG/DL
EPI CELLS #/AREA URNS AUTO: ABNORMAL /HPF (ref 0–5)
ERYTHROCYTE [DISTWIDTH] IN BLOOD BY AUTOMATED COUNT: 12.4 % (ref 11.5–14.5)
GLUCOSE SERPL-MCNC: 177 MG/DL (ref 70–99)
GLUCOSE UR STRIP-MCNC: NEGATIVE MG/DL
HCT VFR BLD AUTO: 43.7 % (ref 42–52)
HGB BLD-MCNC: 15.6 G/DL (ref 14–18)
HGB UR QL STRIP: ABNORMAL
HYALINE CASTS #/AREA URNS AUTO: ABNORMAL /HPF (ref 0–5)
KETONES UR STRIP-MCNC: 15 MG/DL
LEUKOCYTE ESTERASE UR QL STRIP: NEGATIVE
MCH RBC QN AUTO: 36.4 PG (ref 27–31.3)
MCHC RBC AUTO-ENTMCNC: 35.7 % (ref 33–37)
MCV RBC AUTO: 102.1 FL (ref 79–92.2)
NITRITE UR QL STRIP: NEGATIVE
PH UR STRIP: 6.5 [PH] (ref 5–9)
PHOSPHATE SERPL-MCNC: 3.5 MG/DL (ref 2.3–4.8)
PLATELET # BLD AUTO: 159 K/UL (ref 130–400)
POTASSIUM SERPL-SCNC: 3.6 MEQ/L (ref 3.4–4.9)
PROT UR STRIP-MCNC: 100 MG/DL
PROT UR-MCNC: 99 MG/DL
PROT/CREAT UR-RTO: 1 ML/ML
PROT/CREAT UR-RTO: 1 ML/ML (ref 0–0.2)
RBC # BLD AUTO: 4.28 M/UL (ref 4.7–6.1)
RBC #/AREA URNS AUTO: ABNORMAL /HPF (ref 0–5)
SODIUM SERPL-SCNC: 139 MEQ/L (ref 135–144)
SP GR UR STRIP: 1.01 (ref 1–1.03)
UROBILINOGEN UR STRIP-ACNC: 0.2 E.U./DL
WBC # BLD AUTO: 4.4 K/UL (ref 4.8–10.8)
WBC #/AREA URNS AUTO: ABNORMAL /HPF (ref 0–5)

## 2024-09-03 PROCEDURE — 81001 URINALYSIS AUTO W/SCOPE: CPT

## 2024-09-03 PROCEDURE — 85027 COMPLETE CBC AUTOMATED: CPT

## 2024-09-03 PROCEDURE — 36415 COLL VENOUS BLD VENIPUNCTURE: CPT

## 2024-09-03 PROCEDURE — 80069 RENAL FUNCTION PANEL: CPT

## 2024-09-03 PROCEDURE — 84156 ASSAY OF PROTEIN URINE: CPT

## 2024-09-04 ENCOUNTER — OFFICE VISIT (OUTPATIENT)
Dept: PODIATRY | Age: 69
End: 2024-09-04
Payer: MEDICARE

## 2024-09-04 VITALS — WEIGHT: 238 LBS | BODY MASS INDEX: 31.54 KG/M2 | TEMPERATURE: 98.1 F | HEIGHT: 73 IN

## 2024-09-04 DIAGNOSIS — M20.5X2 ACQUIRED HALLUX LIMITUS OF BOTH FEET: ICD-10-CM

## 2024-09-04 DIAGNOSIS — M79.672 PAIN IN BOTH FEET: ICD-10-CM

## 2024-09-04 DIAGNOSIS — B35.1 DERMATOPHYTOSIS OF NAIL: ICD-10-CM

## 2024-09-04 DIAGNOSIS — L60.3 NAIL DYSTROPHY: ICD-10-CM

## 2024-09-04 DIAGNOSIS — E11.42 DIABETIC POLYNEUROPATHY ASSOCIATED WITH TYPE 2 DIABETES MELLITUS (HCC): Primary | ICD-10-CM

## 2024-09-04 DIAGNOSIS — M20.5X1 ACQUIRED HALLUX LIMITUS OF BOTH FEET: ICD-10-CM

## 2024-09-04 DIAGNOSIS — L84 CALLUS: ICD-10-CM

## 2024-09-04 DIAGNOSIS — M79.671 PAIN IN BOTH FEET: ICD-10-CM

## 2024-09-04 PROCEDURE — 3051F HG A1C>EQUAL 7.0%<8.0%: CPT | Performed by: PODIATRIST

## 2024-09-04 PROCEDURE — 99213 OFFICE O/P EST LOW 20 MIN: CPT | Performed by: PODIATRIST

## 2024-09-04 PROCEDURE — 11056 PARNG/CUTG B9 HYPRKR LES 2-4: CPT | Performed by: PODIATRIST

## 2024-09-04 PROCEDURE — 1123F ACP DISCUSS/DSCN MKR DOCD: CPT | Performed by: PODIATRIST

## 2024-09-04 PROCEDURE — 11721 DEBRIDE NAIL 6 OR MORE: CPT | Performed by: PODIATRIST

## 2024-09-04 ASSESSMENT — ENCOUNTER SYMPTOMS
VOMITING: 0
NAUSEA: 0
SHORTNESS OF BREATH: 0
BACK PAIN: 0

## 2024-09-04 NOTE — PROGRESS NOTES
[facility] [place] [facility], [facility]ATRY  [address]  [place] [address]  Dept: [phone]  Loc: [phone]       Khari Diaz  (1955)    9/4/24    Subjective     Khari Diaz is 69 y.o. male who complains today of:    Chief Complaint   Patient presents with    Nail Problem    Diabetes       Diabetes  Pertinent negatives for diabetes include no chest pain.     HPI: Patient presents with a complaint of painful toenails and painful calluses bilaterally.  Patient reports continued discoloration of the left fifth toe, he has not observed drainage or increased warmth.  Patient reports continued difficulty cutting his toenails due to the thickness and deformity of the nail plates to the lesser toes.  Patient chronically anticoagulated, he takes Plavix.  Patient request prescription for new diabetic shoes and insoles, he states he is overdue.  Patient denies recurrence of the diabetic ulceration of the left lateral ankle.  Patient reports continued swelling but states he has been wearing his compression stockings on a daily basis.    Review of Systems   Constitutional:  Negative for chills and fever.   HENT:  Negative for hearing loss.    Respiratory:  Negative for shortness of breath.    Cardiovascular:  Negative for chest pain.   Gastrointestinal:  Negative for nausea and vomiting.   Genitourinary:  Negative for difficulty urinating.   Musculoskeletal:  Negative for back pain and gait problem.   Skin:  Negative for wound.   Neurological:  Negative for numbness.   Hematological:  Does not bruise/bleed easily.   Psychiatric/Behavioral:  Negative for sleep disturbance.        The patient is a diabetic.   PCP/ Endocrinologist: Micah Bello PA-C   Date last seen: 07/3/24    Allergies:  Bactrim [sulfamethoxazole-trimethoprim]    Current Outpatient Medications on File Prior to Visit   Medication Sig Dispense Refill    clopidogrel (PLAVIX) 75

## 2024-09-16 ENCOUNTER — TELEPHONE (OUTPATIENT)
Dept: PHARMACY | Facility: CLINIC | Age: 69
End: 2024-09-16

## 2024-09-19 ENCOUNTER — HOSPITAL ENCOUNTER (OUTPATIENT)
Dept: ULTRASOUND IMAGING | Age: 69
Discharge: HOME | End: 2024-09-21
Attending: INTERNAL MEDICINE
Payer: MEDICARE

## 2024-09-19 DIAGNOSIS — N18.31 CHRONIC KIDNEY DISEASE, STAGE 3A (HCC): ICD-10-CM

## 2024-09-19 PROCEDURE — 76775 US EXAM ABDO BACK WALL LIM: CPT

## 2024-09-24 ENCOUNTER — TELEPHONE (OUTPATIENT)
Age: 69
End: 2024-09-24

## 2024-10-10 ENCOUNTER — TELEPHONE (OUTPATIENT)
Dept: PODIATRY | Age: 69
End: 2024-10-10

## 2024-10-14 ENCOUNTER — TELEPHONE (OUTPATIENT)
Dept: FAMILY MEDICINE CLINIC | Age: 69
End: 2024-10-14

## 2024-10-14 DIAGNOSIS — N18.31 TYPE 2 DIABETES MELLITUS WITH STAGE 3A CHRONIC KIDNEY DISEASE, WITHOUT LONG-TERM CURRENT USE OF INSULIN (HCC): Primary | ICD-10-CM

## 2024-10-14 DIAGNOSIS — E78.2 MIXED HYPERLIPIDEMIA: ICD-10-CM

## 2024-10-14 DIAGNOSIS — M10.9 GOUT OF MULTIPLE SITES, UNSPECIFIED CAUSE, UNSPECIFIED CHRONICITY: Chronic | ICD-10-CM

## 2024-10-14 DIAGNOSIS — I25.10 CORONARY ARTERY DISEASE INVOLVING NATIVE CORONARY ARTERY OF NATIVE HEART WITHOUT ANGINA PECTORIS: ICD-10-CM

## 2024-10-14 DIAGNOSIS — E11.21 DIABETIC NEPHROPATHY ASSOCIATED WITH TYPE 2 DIABETES MELLITUS (HCC): ICD-10-CM

## 2024-10-14 DIAGNOSIS — I73.9 PAD (PERIPHERAL ARTERY DISEASE) (HCC): ICD-10-CM

## 2024-10-14 DIAGNOSIS — I10 ESSENTIAL HYPERTENSION: Chronic | ICD-10-CM

## 2024-10-14 DIAGNOSIS — D75.89 MACROCYTOSIS WITHOUT ANEMIA: Chronic | ICD-10-CM

## 2024-10-14 DIAGNOSIS — E11.22 TYPE 2 DIABETES MELLITUS WITH STAGE 3A CHRONIC KIDNEY DISEASE, WITHOUT LONG-TERM CURRENT USE OF INSULIN (HCC): Primary | ICD-10-CM

## 2024-10-14 NOTE — TELEPHONE ENCOUNTER
----- Message from Sonal WINSTON sent at 10/14/2024  4:31 PM EDT -----  Regarding: ECC Referral Request  ECC Referral Request    Reason for referral request: Lab/Test Order    Specialist/Lab/Test patient is requesting (if known):Lab work    Specialist Phone Number (if applicable):    Additional Information Want to know if he needs a lab work and if yes he wants to get an order.  --------------------------------------------------------------------------------------------------------------------------    Relationship to Patient: Self     Call Back Information: OK to leave message on voicemail  Preferred Call Back Number: Phone [phone]

## 2024-10-14 NOTE — TELEPHONE ENCOUNTER
Fasting lab work orders left in chart.  Please instruct patient to go to the lab in the next week for this testing.

## 2024-10-16 ENCOUNTER — HOSPITAL ENCOUNTER (OUTPATIENT)
Dept: LAB | Age: 69
Discharge: HOME | End: 2024-10-16
Payer: MEDICARE

## 2024-10-16 DIAGNOSIS — I73.9 PAD (PERIPHERAL ARTERY DISEASE) (HCC): ICD-10-CM

## 2024-10-16 DIAGNOSIS — E11.22 TYPE 2 DIABETES MELLITUS WITH STAGE 3A CHRONIC KIDNEY DISEASE, WITHOUT LONG-TERM CURRENT USE OF INSULIN (HCC): ICD-10-CM

## 2024-10-16 DIAGNOSIS — E11.21 DIABETIC NEPHROPATHY ASSOCIATED WITH TYPE 2 DIABETES MELLITUS (HCC): ICD-10-CM

## 2024-10-16 DIAGNOSIS — I25.10 CORONARY ARTERY DISEASE INVOLVING NATIVE CORONARY ARTERY OF NATIVE HEART WITHOUT ANGINA PECTORIS: ICD-10-CM

## 2024-10-16 DIAGNOSIS — N18.31 TYPE 2 DIABETES MELLITUS WITH STAGE 3A CHRONIC KIDNEY DISEASE, WITHOUT LONG-TERM CURRENT USE OF INSULIN (HCC): ICD-10-CM

## 2024-10-16 DIAGNOSIS — E78.2 MIXED HYPERLIPIDEMIA: ICD-10-CM

## 2024-10-16 DIAGNOSIS — M10.9 GOUT OF MULTIPLE SITES, UNSPECIFIED CAUSE, UNSPECIFIED CHRONICITY: Chronic | ICD-10-CM

## 2024-10-16 DIAGNOSIS — I10 ESSENTIAL HYPERTENSION: Chronic | ICD-10-CM

## 2024-10-16 DIAGNOSIS — D75.89 MACROCYTOSIS WITHOUT ANEMIA: Chronic | ICD-10-CM

## 2024-10-16 LAB
ALBUMIN SERPL-MCNC: 4.4 G/DL (ref 3.5–4.6)
ALP SERPL-CCNC: 67 U/L (ref 35–104)
ALT SERPL-CCNC: 58 U/L (ref 0–41)
ANION GAP SERPL CALCULATED.3IONS-SCNC: 14 MEQ/L (ref 9–15)
AST SERPL-CCNC: 51 U/L (ref 0–40)
BASOPHILS # BLD: 0 K/UL (ref 0–0.2)
BASOPHILS NFR BLD: 0.2 %
BILIRUB SERPL-MCNC: 0.6 MG/DL (ref 0.2–0.7)
BUN SERPL-MCNC: 15 MG/DL (ref 8–23)
CALCIUM SERPL-MCNC: 10 MG/DL (ref 8.5–9.9)
CHLORIDE SERPL-SCNC: 98 MEQ/L (ref 95–107)
CHOLEST SERPL-MCNC: 78 MG/DL (ref 0–199)
CO2 SERPL-SCNC: 27 MEQ/L (ref 20–31)
CREAT SERPL-MCNC: 0.92 MG/DL (ref 0.7–1.2)
EOSINOPHIL # BLD: 0.1 K/UL (ref 0–0.7)
EOSINOPHIL NFR BLD: 1.9 %
ERYTHROCYTE [DISTWIDTH] IN BLOOD BY AUTOMATED COUNT: 12.8 % (ref 11.5–14.5)
GLOBULIN SER CALC-MCNC: 2.8 G/DL (ref 2.3–3.5)
GLUCOSE SERPL-MCNC: 131 MG/DL (ref 70–99)
HCT VFR BLD AUTO: 42.4 % (ref 42–52)
HDLC SERPL-MCNC: 29 MG/DL (ref 40–59)
HGB BLD-MCNC: 14.9 G/DL (ref 14–18)
LDLC SERPL CALC-MCNC: 1 MG/DL (ref 0–129)
LYMPHOCYTES # BLD: 1.1 K/UL (ref 1–4.8)
LYMPHOCYTES NFR BLD: 25.8 %
MCH RBC QN AUTO: 36.8 PG (ref 27–31.3)
MCHC RBC AUTO-ENTMCNC: 35.1 % (ref 33–37)
MCV RBC AUTO: 104.7 FL (ref 79–92.2)
MONOCYTES # BLD: 0.3 K/UL (ref 0.2–0.8)
MONOCYTES NFR BLD: 6 %
NEUTROPHILS # BLD: 2.8 K/UL (ref 1.4–6.5)
NEUTS SEG NFR BLD: 65.6 %
PLATELET # BLD AUTO: 151 K/UL (ref 130–400)
POTASSIUM SERPL-SCNC: 3.6 MEQ/L (ref 3.4–4.9)
PROT SERPL-MCNC: 7.2 G/DL (ref 6.3–8)
RBC # BLD AUTO: 4.05 M/UL (ref 4.7–6.1)
SODIUM SERPL-SCNC: 139 MEQ/L (ref 135–144)
TRIGL SERPL-MCNC: 238 MG/DL (ref 0–150)
TSH REFLEX: 2.11 UIU/ML (ref 0.44–3.86)
URATE SERPL-MCNC: 3.9 MG/DL (ref 3.4–7)
WBC # BLD AUTO: 4.2 K/UL (ref 4.8–10.8)

## 2024-10-16 PROCEDURE — 82607 VITAMIN B-12: CPT

## 2024-10-16 PROCEDURE — 84207 ASSAY OF VITAMIN B-6: CPT

## 2024-10-16 PROCEDURE — 85025 COMPLETE CBC W/AUTO DIFF WBC: CPT

## 2024-10-16 PROCEDURE — 82746 ASSAY OF FOLIC ACID SERUM: CPT

## 2024-10-16 PROCEDURE — 84550 ASSAY OF BLOOD/URIC ACID: CPT

## 2024-10-16 PROCEDURE — 80053 COMPREHEN METABOLIC PANEL: CPT

## 2024-10-16 PROCEDURE — 84425 ASSAY OF VITAMIN B-1: CPT

## 2024-10-16 PROCEDURE — 80061 LIPID PANEL: CPT

## 2024-10-16 PROCEDURE — 36415 COLL VENOUS BLD VENIPUNCTURE: CPT

## 2024-10-16 PROCEDURE — 84443 ASSAY THYROID STIM HORMONE: CPT

## 2024-10-17 DIAGNOSIS — M10.9 GOUT OF MULTIPLE SITES, UNSPECIFIED CAUSE, UNSPECIFIED CHRONICITY: Chronic | ICD-10-CM

## 2024-10-17 LAB
FOLATE: 19.4 NG/ML (ref 4.8–24.2)
VITAMIN B-12: 1638 PG/ML (ref 232–1245)

## 2024-10-17 RX ORDER — ALLOPURINOL 300 MG/1
300 TABLET ORAL DAILY
Qty: 90 TABLET | Refills: 1 | Status: SHIPPED | OUTPATIENT
Start: 2024-10-17

## 2024-10-17 NOTE — TELEPHONE ENCOUNTER
Pharmacy is requesting medication refill. Please approve or deny this request.    Rx requested:  Requested Prescriptions     Pending Prescriptions Disp Refills    allopurinol (ZYLOPRIM) 300 MG tablet [Pharmacy Med Name: Allopurinol 300 MG Oral Tablet] 90 tablet 3     Sig: TAKE 1 TABLET BY MOUTH DAILY         Last Office Visit:   6/21/2024      Next Visit Date:  Future Appointments   Date Time Provider Department Center   10/24/2024  3:00 PM Giovanny Palm MD [facility]   11/4/2024  3:00 PM Micah Bello PA [facility] [facility]   11/8/2024  1:30 PM Teodoro Houston DPM [facility]   6/25/2025  3:30 PM Alex Collins MD [place] [facility] -   7/16/2025  3:00 PM Lali Kilgore MD [place] [facility]   7/24/2025  3:15 PM Bernard Emmanuel DO [place] [facility]

## 2024-10-18 DIAGNOSIS — E11.65 TYPE 2 DIABETES MELLITUS WITH HYPERGLYCEMIA, WITHOUT LONG-TERM CURRENT USE OF INSULIN (HCC): ICD-10-CM

## 2024-10-18 DIAGNOSIS — E11.622 TYPE 2 DIABETES MELLITUS WITH OTHER SKIN ULCER (CODE) (HCC): ICD-10-CM

## 2024-10-18 DIAGNOSIS — E11.29 MICROALBUMINURIA DUE TO TYPE 2 DIABETES MELLITUS (HCC): Chronic | ICD-10-CM

## 2024-10-18 DIAGNOSIS — R80.9 MICROALBUMINURIA DUE TO TYPE 2 DIABETES MELLITUS (HCC): Chronic | ICD-10-CM

## 2024-10-18 RX ORDER — GLUCOSAMINE HCL/CHONDROITIN SU 500-400 MG
CAPSULE ORAL
Qty: 100 STRIP | Refills: 5 | Status: SHIPPED | OUTPATIENT
Start: 2024-10-18

## 2024-10-18 NOTE — TELEPHONE ENCOUNTER
Patient is requesting medication refill. Please approve or deny this request.    Rx requested:  Requested Prescriptions     Pending Prescriptions Disp Refills    blood glucose monitor strips 100 strip 5     Sig: Test 1-3 times a day & as needed for symptoms of irregular blood glucose. Dispense sufficient amount for indicated testing frequency plus additional to accommodate PRN testing needs.         Last Office Visit:   6/21/2024      Next Visit Date:  Future Appointments   Date Time Provider Department Center   10/24/2024  3:00 PM Giovanny Palm MD [facility] DEP   11/4/2024  3:00 PM Micah Bello PA [facility] [facility]   11/8/2024  1:30 PM Teodoro Houston DPM [facility]   6/25/2025  3:30 PM Alex Collins MD [place] [facility] -   7/16/2025  3:00 PM Lali Kilgore MD [place] [facility]   7/24/2025  3:15 PM Bernard Emmanuel DO [place] [facility]

## 2024-10-21 LAB
VIT B1 BLD-MCNC: 195 NMOL/L (ref 70–180)
VIT B6 SERPL-MCNC: 115.8 NMOL/L (ref 20–125)

## 2024-10-24 ENCOUNTER — OFFICE VISIT (OUTPATIENT)
Dept: FAMILY MEDICINE CLINIC | Age: 69
End: 2024-10-24

## 2024-10-24 VITALS
WEIGHT: 232.6 LBS | DIASTOLIC BLOOD PRESSURE: 64 MMHG | SYSTOLIC BLOOD PRESSURE: 120 MMHG | TEMPERATURE: 97.8 F | HEART RATE: 93 BPM | HEIGHT: 73 IN | OXYGEN SATURATION: 97 % | BODY MASS INDEX: 30.83 KG/M2

## 2024-10-24 DIAGNOSIS — Z12.11 SCREENING FOR COLON CANCER: ICD-10-CM

## 2024-10-24 DIAGNOSIS — E78.2 MIXED HYPERLIPIDEMIA: ICD-10-CM

## 2024-10-24 DIAGNOSIS — E11.21 DIABETIC NEPHROPATHY ASSOCIATED WITH TYPE 2 DIABETES MELLITUS (HCC): ICD-10-CM

## 2024-10-24 DIAGNOSIS — R26.81 UNSTEADY GAIT: Chronic | ICD-10-CM

## 2024-10-24 DIAGNOSIS — I25.10 CORONARY ARTERY DISEASE INVOLVING NATIVE CORONARY ARTERY OF NATIVE HEART WITHOUT ANGINA PECTORIS: ICD-10-CM

## 2024-10-24 DIAGNOSIS — F10.10 EXCESSIVE DRINKING OF ALCOHOL: ICD-10-CM

## 2024-10-24 DIAGNOSIS — E66.811 CLASS 1 OBESITY DUE TO EXCESS CALORIES WITH SERIOUS COMORBIDITY AND BODY MASS INDEX (BMI) OF 30.0 TO 30.9 IN ADULT: ICD-10-CM

## 2024-10-24 DIAGNOSIS — M10.9 GOUT OF MULTIPLE SITES, UNSPECIFIED CAUSE, UNSPECIFIED CHRONICITY: Chronic | ICD-10-CM

## 2024-10-24 DIAGNOSIS — N18.31 TYPE 2 DIABETES MELLITUS WITH STAGE 3A CHRONIC KIDNEY DISEASE, WITHOUT LONG-TERM CURRENT USE OF INSULIN (HCC): Primary | ICD-10-CM

## 2024-10-24 DIAGNOSIS — I10 ESSENTIAL HYPERTENSION: Chronic | ICD-10-CM

## 2024-10-24 DIAGNOSIS — E66.09 CLASS 1 OBESITY DUE TO EXCESS CALORIES WITH SERIOUS COMORBIDITY AND BODY MASS INDEX (BMI) OF 30.0 TO 30.9 IN ADULT: ICD-10-CM

## 2024-10-24 DIAGNOSIS — E11.22 TYPE 2 DIABETES MELLITUS WITH STAGE 3A CHRONIC KIDNEY DISEASE, WITHOUT LONG-TERM CURRENT USE OF INSULIN (HCC): Primary | ICD-10-CM

## 2024-10-24 DIAGNOSIS — G47.33 OSA (OBSTRUCTIVE SLEEP APNEA): Chronic | ICD-10-CM

## 2024-10-24 DIAGNOSIS — E11.29 MICROALBUMINURIA DUE TO TYPE 2 DIABETES MELLITUS (HCC): Chronic | ICD-10-CM

## 2024-10-24 DIAGNOSIS — Z86.0100 HISTORY OF COLON POLYPS: Chronic | ICD-10-CM

## 2024-10-24 DIAGNOSIS — R80.9 MICROALBUMINURIA DUE TO TYPE 2 DIABETES MELLITUS (HCC): Chronic | ICD-10-CM

## 2024-10-24 DIAGNOSIS — I73.9 PAD (PERIPHERAL ARTERY DISEASE) (HCC): ICD-10-CM

## 2024-10-24 DIAGNOSIS — Z23 NEED FOR VACCINATION: ICD-10-CM

## 2024-10-24 PROBLEM — E11.622 TYPE 2 DIABETES MELLITUS WITH OTHER SKIN ULCER (CODE) (HCC): Status: RESOLVED | Noted: 2023-08-03 | Resolved: 2024-10-24

## 2024-10-24 LAB — HBA1C MFR BLD: 6.7 %

## 2024-10-24 ASSESSMENT — ENCOUNTER SYMPTOMS
CONSTIPATION: 0
WHEEZING: 0
NAUSEA: 0
ABDOMINAL PAIN: 0
COUGH: 0
SHORTNESS OF BREATH: 0
ANAL BLEEDING: 0
VOMITING: 0
DIARRHEA: 0
BLOOD IN STOOL: 0
CHEST TIGHTNESS: 0

## 2024-10-24 NOTE — ASSESSMENT & PLAN NOTE
A1c today in the office at goal control.  Patient instructed to continue with current dose of metformin.  They continue to decline initiation of Jardiance secondary to fears over potential side effects.  I have reviewed with patient at length the cardiac and renal benefits of starting Jardiance for diabetes management.  Patient will discuss further with endocrinology office.

## 2024-10-24 NOTE — ASSESSMENT & PLAN NOTE
Patient advised to continue with home exercises, use walker with all ambulation, and keep chronic follow-up visits with neurology.  They declined new referral to PT/OT/vestibular therapy, but were advised to call office should they change their mind in the future.

## 2024-10-24 NOTE — ASSESSMENT & PLAN NOTE
Patient now established with nephrology for long-term management.  They have continued to decline starting Jardiance secondary to fears over potential side effects.

## 2024-10-24 NOTE — ASSESSMENT & PLAN NOTE
Blood pressure within normal limits today in the office.  Patient instructed to continue with current dose of carvedilol.

## 2024-10-24 NOTE — PROGRESS NOTES
Khari Diaz (: 1955) is a 69 y.o. male, Established patient, who presents today for:    Chief Complaint   Patient presents with    Follow-up Chronic Condition     No concerns          Assessment & Plan     1. Type 2 diabetes mellitus with stage 3a chronic kidney disease, without long-term current use of insulin (HCC)  Assessment & Plan:  A1c today in the office at goal control.  Patient instructed to continue with current dose of metformin.  They continue to decline initiation of Jardiance secondary to fears over potential side effects.  I have reviewed with patient at length the cardiac and renal benefits of starting Jardiance for diabetes management.  Patient will discuss further with endocrinology office.  Orders:  -     POCT glycosylated hemoglobin (Hb A1C)  2. Diabetic nephropathy associated with type 2 diabetes mellitus (HCC)  Assessment & Plan:  Patient now established with nephrology for long-term management.  They have continued to decline starting Jardiance secondary to fears over potential side effects.    Orders:  -     POCT glycosylated hemoglobin (Hb A1C)  3. Microalbuminuria due to type 2 diabetes mellitus (HCC)  -     POCT glycosylated hemoglobin (Hb A1C)  4. Essential hypertension  Assessment & Plan:  Blood pressure within normal limits today in the office.  Patient instructed to continue with current dose of carvedilol.   5. AYLIN (obstructive sleep apnea)  Assessment & Plan:  Patient using CPAP machine on a nightly basis and is getting restful sleep with use of the machine.  Instructions given to continue with CPAP compliance with machine at current settings.  6. Mixed hyperlipidemia  Assessment & Plan:  Most recent lipid panel again with elevated triglycerides and LDL at goal control.  I stressed with him the importance of doubling down on efforts to eat a lower carbohydrate and lower saturated fat diet.  Patient was instructed to continue with current dose of rosuvastatin.    7.

## 2024-10-24 NOTE — ASSESSMENT & PLAN NOTE
Most recent lipid panel again with elevated triglycerides and LDL at goal control.  I stressed with him the importance of doubling down on efforts to eat a lower carbohydrate and lower saturated fat diet.  Patient was instructed to continue with current dose of rosuvastatin.

## 2024-10-24 NOTE — ASSESSMENT & PLAN NOTE
No reported claudication pain.  Patient instructed to continue with current doses of Plavix and rosuvastatin.

## 2024-10-25 ENCOUNTER — TELEPHONE (OUTPATIENT)
Dept: FAMILY MEDICINE CLINIC | Age: 69
End: 2024-10-25

## 2024-10-25 NOTE — TELEPHONE ENCOUNTER
Pt calling stated Dr Mullins changed his Coreg dosage to 6.25mg 2x daily instead of 25mg 1 tablet 2x daily . Pt asked if this can be updated on his med list     Please advise

## 2024-11-03 ENCOUNTER — PATIENT MESSAGE (OUTPATIENT)
Dept: FAMILY MEDICINE CLINIC | Age: 69
End: 2024-11-03

## 2024-11-03 DIAGNOSIS — M62.81 RIGHT-SIDED MUSCLE WEAKNESS: Chronic | ICD-10-CM

## 2024-11-03 DIAGNOSIS — R42 DIZZINESS: ICD-10-CM

## 2024-11-03 DIAGNOSIS — Z91.81 AT HIGH RISK FOR FALLS: Chronic | ICD-10-CM

## 2024-11-03 DIAGNOSIS — R27.0 ATAXIA: ICD-10-CM

## 2024-11-03 DIAGNOSIS — R26.81 UNSTEADY GAIT: Chronic | ICD-10-CM

## 2024-11-03 DIAGNOSIS — H81.03 MENIERE DISEASE, BILATERAL: Primary | ICD-10-CM

## 2024-11-03 DIAGNOSIS — I69.90 LATE EFFECTS OF CVA (CEREBROVASCULAR ACCIDENT): Chronic | ICD-10-CM

## 2024-11-03 DIAGNOSIS — I63.9 CEREBROVASCULAR ACCIDENT (CVA), UNSPECIFIED MECHANISM (HCC): ICD-10-CM

## 2024-11-03 DIAGNOSIS — I25.10 CAD S/P PERCUTANEOUS CORONARY ANGIOPLASTY: Chronic | ICD-10-CM

## 2024-11-03 DIAGNOSIS — Z98.61 CAD S/P PERCUTANEOUS CORONARY ANGIOPLASTY: Chronic | ICD-10-CM

## 2024-11-04 ENCOUNTER — OFFICE VISIT (OUTPATIENT)
Dept: ENDOCRINOLOGY | Age: 69
End: 2024-11-04
Payer: MEDICARE

## 2024-11-04 VITALS
HEIGHT: 73 IN | SYSTOLIC BLOOD PRESSURE: 120 MMHG | BODY MASS INDEX: 31.14 KG/M2 | WEIGHT: 235 LBS | HEART RATE: 94 BPM | DIASTOLIC BLOOD PRESSURE: 82 MMHG | OXYGEN SATURATION: 95 %

## 2024-11-04 DIAGNOSIS — N18.31 TYPE 2 DIABETES MELLITUS WITH STAGE 3A CHRONIC KIDNEY DISEASE, WITHOUT LONG-TERM CURRENT USE OF INSULIN (HCC): Primary | ICD-10-CM

## 2024-11-04 DIAGNOSIS — E11.22 TYPE 2 DIABETES MELLITUS WITH STAGE 3A CHRONIC KIDNEY DISEASE, WITHOUT LONG-TERM CURRENT USE OF INSULIN (HCC): Primary | ICD-10-CM

## 2024-11-04 LAB
CHP ED QC CHECK: NORMAL
GLUCOSE BLD-MCNC: 217 MG/DL

## 2024-11-04 PROCEDURE — 1123F ACP DISCUSS/DSCN MKR DOCD: CPT | Performed by: PHYSICIAN ASSISTANT

## 2024-11-04 PROCEDURE — 3074F SYST BP LT 130 MM HG: CPT | Performed by: PHYSICIAN ASSISTANT

## 2024-11-04 PROCEDURE — 99214 OFFICE O/P EST MOD 30 MIN: CPT | Performed by: PHYSICIAN ASSISTANT

## 2024-11-04 PROCEDURE — 82962 GLUCOSE BLOOD TEST: CPT | Performed by: PHYSICIAN ASSISTANT

## 2024-11-04 PROCEDURE — 3044F HG A1C LEVEL LT 7.0%: CPT | Performed by: PHYSICIAN ASSISTANT

## 2024-11-04 PROCEDURE — 1159F MED LIST DOCD IN RCRD: CPT | Performed by: PHYSICIAN ASSISTANT

## 2024-11-04 PROCEDURE — 3079F DIAST BP 80-89 MM HG: CPT | Performed by: PHYSICIAN ASSISTANT

## 2024-11-04 RX ORDER — CLOPIDOGREL BISULFATE 75 MG/1
75 TABLET ORAL DAILY
Qty: 90 TABLET | Refills: 1 | Status: SHIPPED | OUTPATIENT
Start: 2024-11-04

## 2024-11-04 ASSESSMENT — ENCOUNTER SYMPTOMS
SHORTNESS OF BREATH: 0
COUGH: 0
NAUSEA: 0
RHINORRHEA: 0
VOMITING: 0
ABDOMINAL PAIN: 0
BACK PAIN: 1
WHEEZING: 0
SINUS PRESSURE: 0
SORE THROAT: 0
DIARRHEA: 0

## 2024-11-04 NOTE — PATIENT INSTRUCTIONS
Continue Trulicity 4.5 mg injected weekly  Jardiance 10 mg daily when wounds healed  Metformin 500 mg before breakfast and dinner  Glucose logs given   Repeat labs 4-5 days before your follow up appointment   Follow up in 6 months

## 2024-11-04 NOTE — PROGRESS NOTES
11/4/2024    Assessment:       Diagnosis Orders   1. Type 2 diabetes mellitus with stage 3a chronic kidney disease, without long-term current use of insulin ([facility])  Comprehensive Metabolic Panel    Hemoglobin A1C    POCT Glucose        PLAN:     Continue Trulicity 4.5 mg injected weekly  Jardiance 10 mg daily when wounds healed  Metformin 500 mg before breakfast and dinner  Glucose logs given   Repeat labs 4-5 days before your follow up appointment   Follow up in 6 months     Orders Placed This Encounter   Procedures    Comprehensive Metabolic Panel     Standing Status:   Future     Standing Expiration Date:   11/4/2025    Hemoglobin A1C     Standing Status:   Future     Standing Expiration Date:   11/4/2025    POCT Glucose     No orders of the defined types were placed in this encounter.    Return in about 6 months (around 5/4/2025) for Diabetes.  Subjective:     Chief Complaint   Patient presents with    Diabetes    Follow-up     Vitals:    11/04/24 1514   BP: 120/82   Pulse: 94   SpO2: 95%   Weight: 106.6 kg (235 lb)   Height: 1.854 m (6' 1\")       Wt Readings from Last 3 Encounters:   11/04/24 106.6 kg (235 lb)   10/24/24 105.5 kg (232 lb 9.6 oz)   09/04/24 108 kg (238 lb)     BP Readings from Last 3 Encounters:   11/04/24 120/82   10/24/24 120/64   07/25/24 126/84     Patient's type II diabetic male presenting for a follow up.  Most recent A1c is 7.7%. He was being maintained on sulfonylureas for a number of years, began having hypoglycemic events and was taken off of the medication.  He has been taking his Trulicity and metformin as prescribed.  He was informed by his PCP to stop the Jardiance until his wounds heal, they have not healed yet and he has not resumed that medication.  Glucose logs were reviewed, average blood glucose is 120-150.  Laboratory studies were reviewed with the patient.  His renal function is stable, liver enzymes are mildly elevated, hemoglobin A1c is 6.7%.  He is seeing Dr. Chapman

## 2024-11-07 NOTE — TELEPHONE ENCOUNTER
I have placed a new referral to [facility] Physical therapy Suz[facility]. Since there is no contact phone number on the referral, please call patient to give them contact information to call and schedule.

## 2024-11-17 ENCOUNTER — PATIENT MESSAGE (OUTPATIENT)
Dept: FAMILY MEDICINE CLINIC | Age: 69
End: 2024-11-17

## 2024-11-17 DIAGNOSIS — R60.0 BILATERAL LOWER EXTREMITY EDEMA: ICD-10-CM

## 2024-11-17 DIAGNOSIS — M10.9 GOUT OF MULTIPLE SITES, UNSPECIFIED CAUSE, UNSPECIFIED CHRONICITY: Chronic | ICD-10-CM

## 2024-11-18 ENCOUNTER — OFFICE VISIT (OUTPATIENT)
Dept: PODIATRY | Age: 69
End: 2024-11-18
Payer: MEDICARE

## 2024-11-18 VITALS — WEIGHT: 235 LBS | TEMPERATURE: 98.2 F | BODY MASS INDEX: 31.14 KG/M2 | HEIGHT: 73 IN

## 2024-11-18 DIAGNOSIS — E11.42 DIABETIC POLYNEUROPATHY ASSOCIATED WITH TYPE 2 DIABETES MELLITUS (HCC): Primary | ICD-10-CM

## 2024-11-18 DIAGNOSIS — B35.1 DERMATOPHYTOSIS OF NAIL: ICD-10-CM

## 2024-11-18 DIAGNOSIS — M79.672 PAIN IN BOTH FEET: ICD-10-CM

## 2024-11-18 DIAGNOSIS — L84 CALLUS: ICD-10-CM

## 2024-11-18 DIAGNOSIS — L60.3 NAIL DYSTROPHY: ICD-10-CM

## 2024-11-18 DIAGNOSIS — M79.671 PAIN IN BOTH FEET: ICD-10-CM

## 2024-11-18 PROCEDURE — 99999 PR OFFICE/OUTPT VISIT,PROCEDURE ONLY: CPT | Performed by: PODIATRIST

## 2024-11-18 PROCEDURE — 11721 DEBRIDE NAIL 6 OR MORE: CPT | Performed by: PODIATRIST

## 2024-11-18 PROCEDURE — 11056 PARNG/CUTG B9 HYPRKR LES 2-4: CPT | Performed by: PODIATRIST

## 2024-11-18 RX ORDER — TORSEMIDE 20 MG/1
20 TABLET ORAL DAILY
Qty: 90 TABLET | Refills: 1 | Status: SHIPPED | OUTPATIENT
Start: 2024-11-18

## 2024-11-18 RX ORDER — ALLOPURINOL 300 MG/1
300 TABLET ORAL DAILY
Qty: 90 TABLET | Refills: 1 | Status: SHIPPED | OUTPATIENT
Start: 2024-11-18

## 2024-11-18 ASSESSMENT — ENCOUNTER SYMPTOMS
NAUSEA: 0
BACK PAIN: 1
VOMITING: 0
SHORTNESS OF BREATH: 0

## 2024-11-18 NOTE — PROGRESS NOTES
discrimination is intact.   Psychiatric:         Mood and Affect: Mood normal.         Behavior: Behavior normal.         Assessment:      Diagnosis Orders   1. Diabetic polyneuropathy associated with type 2 diabetes mellitus (HCC)  TRIM HYPERKERATOTIC SKIN LESION,2-4    DEBRIDEMENT OF NAILS, 6 OR MORE      2. Callus  TRIM HYPERKERATOTIC SKIN LESION,2-4      3. Dermatophytosis of nail  DEBRIDEMENT OF NAILS, 6 OR MORE      4. Nail dystrophy  DEBRIDEMENT OF NAILS, 6 OR MORE      5. Pain in both feet  TRIM HYPERKERATOTIC SKIN LESION,2-4    DEBRIDEMENT OF NAILS, 6 OR MORE          Plan:     Calluses: The calluses located at the bilateral fifth toe and the left third toe were pared to the level of normal skin with a #15 blade scalpel without incident. The patient is instructed to monitor for signs of infection and call immediately if these arise. The patient is instructed to apply lotion to the callussed areas daily.     Dystrophic/fungal nails:  Nails 2 through 5 bilateral foot were mechanically and electrically debrided in thickness and length to the level of normal underlying nail bed removing all devitalized tissue.  Each nail bed was cleansed with alcohol to prevent bacterial infection.  The patient was instructed to attempt to utilize an emery board to maintain the length and thickness of their nails as best as possible.     Orders Placed This Encounter   Procedures    TRIM HYPERKERATOTIC SKIN LESION,2-4    DEBRIDEMENT OF NAILS, 6 OR MORE           Follow up:  Return in about 9 weeks (around 1/20/2025).    Teodoro Houston DPM          Please note that this report has been partially produced using speech recognition software which may cause errors including grammar, punctuation, and spelling or words and phrases that may seem inappropriate. If there are questions or concerns please feel free to contact me for clarification.

## 2024-11-18 NOTE — TELEPHONE ENCOUNTER
Patient is requesting medication refill. Please approve or deny this request.    Rx requested:  Requested Prescriptions     Pending Prescriptions Disp Refills    allopurinol (ZYLOPRIM) 300 MG tablet 90 tablet 1     Sig: Take 1 tablet by mouth daily    torsemide (DEMADEX) 20 MG tablet 90 tablet 1     Sig: Take 1 tablet by mouth daily         Last Office Visit:   10/24/2024      Next Visit Date:  Future Appointments   Date Time Provider Department Center   11/18/2024  2:30 PM Teodoro Houston DPM [facility]   3/25/2025  3:30 PM Hector Truong MD [facility] DEP   5/5/2025  4:15 PM Bello, Micah S, PA [place] [facility]   6/25/2025  3:30 PM Alex Collins MD [place] [facility] -   7/16/2025  3:00 PM Lali Kilgore MD [place] [facility]   7/24/2025  3:15 PM Bernard Emmanuel DO [place] [facility]

## 2024-11-19 DIAGNOSIS — E11.42 TYPE 2 DIABETES MELLITUS WITH DIABETIC POLYNEUROPATHY, WITHOUT LONG-TERM CURRENT USE OF INSULIN (HCC): Chronic | ICD-10-CM

## 2024-11-19 NOTE — TELEPHONE ENCOUNTER
Pharmacy is requesting medication refill. Please approve or deny this request.    Rx requested:  Requested Prescriptions     Pending Prescriptions Disp Refills    metFORMIN (GLUCOPHAGE) 500 MG tablet [Pharmacy Med Name: metFORMIN HCl 500 MG Oral Tablet] 180 tablet 3     Sig: TAKE 1 TABLET BY MOUTH TWICE  DAILY WITH MEALS         Last Office Visit:   10/24/2024      Next Visit Date:  Future Appointments   Date Time Provider Department Center   1/27/2025  3:30 PM Teodoro Houston DPM [facility]   3/25/2025  3:30 PM Hector Truong MD [facility] ECC DEP   5/5/2025  4:15 PM Bello, Micah S, [facility]   6/25/2025  3:30 PM Alex Collins MD [place] [facility] -   7/16/2025  3:00 PM Lali Kilgore MD [place] [facility]   7/24/2025  3:15 PM Bernard Emmanuel DO [place] [facility]

## 2024-12-10 ENCOUNTER — HOSPITAL ENCOUNTER (OUTPATIENT)
Dept: LAB | Age: 69
Discharge: HOME | End: 2024-12-10
Payer: MEDICARE

## 2024-12-10 PROCEDURE — 80069 RENAL FUNCTION PANEL: CPT

## 2024-12-10 PROCEDURE — 36415 COLL VENOUS BLD VENIPUNCTURE: CPT

## 2024-12-10 PROCEDURE — 85027 COMPLETE CBC AUTOMATED: CPT

## 2024-12-10 PROCEDURE — 83970 ASSAY OF PARATHORMONE: CPT

## 2024-12-10 PROCEDURE — 82306 VITAMIN D 25 HYDROXY: CPT

## 2024-12-10 PROCEDURE — 81001 URINALYSIS AUTO W/SCOPE: CPT

## 2024-12-23 ENCOUNTER — HOSPITAL ENCOUNTER (OUTPATIENT)
Dept: PHYSICAL THERAPY | Age: 69
Setting detail: THERAPIES SERIES
Discharge: HOME | End: 2024-12-23
Payer: MEDICARE

## 2024-12-23 PROCEDURE — 97162 PT EVAL MOD COMPLEX 30 MIN: CPT

## 2024-12-23 NOTE — THERAPY EVALUATION
Physical Therapy Evaluation/Plan of Care   [facility]   [place] [id number]  Dept: [phone]  Dept Fax: [phone]  Loc: [phone]    Physical Therapy: Initial Evaluation    General Information    Patient: Khari Diaz (69 y.o.     male)   Examination Date: 2024   :  1955 ;    Confirmed: Yes MRN: [id number]  CSN: [id number]   Insurance: Payor: [facility] MEDICARE / Plan: [facility] MEDICARE COMPLETE / Product Type: *No Product type* /   Insurance ID: [id number] - (Medicare Managed)  PT Insurance Information: [employer] Medicare Secondary Insurance (if applicable):     Referring Physician: Giovanny Palm MD       Visits to Date/Visits Approved:   (Eval only)    No Show/Cancelled Appts: 0 / 0     Medical Diagnosis: Meniere's disease, bilateral [H81.03]  Ataxia, unspecified [R27.0]  Dizziness and giddiness [R42]  Unsteadiness on feet [R26.81]  Muscle weakness (generalized) [M62.81]  Unspecified sequelae of unspecified cerebrovascular disease [I69.90]  History of falling [Z91.81]        Treatment Diagnosis: Impaired balance      SUBJECTIVE:     Onset date:        Subjective/ Mechanism of Injury: Pt reports balance has been off since his first CVA 16.  Pt had a second CVA in 2017.  Both were in the brainstem and Lt parietal lobe.  Pt reports neurologist feels his inner ear is deteriorating - has tinnitus constantly in Lt ear.  Balance improved after stroke but seems to be gradually declining.  Pt reports having good and bad days.  Pt repots using a cane in the house and a hand hold but uses a WW outside of the house.  Tends to rely on light touch or posterior knees to balance.  Pt reports vertigo disappeared last November.  Reports 2 falls in the same day in October and had 1 other fall in the past 6 months.  Pt has lost a lot of weight and not sure if that affected balance.  Reduced weight has helped as pt

## 2025-01-06 ENCOUNTER — HOSPITAL ENCOUNTER (OUTPATIENT)
Dept: PHYSICAL THERAPY | Age: 70
Setting detail: THERAPIES SERIES
Discharge: HOME | End: 2025-01-06
Payer: MEDICARE

## 2025-01-06 PROCEDURE — 97112 NEUROMUSCULAR REEDUCATION: CPT

## 2025-01-06 PROCEDURE — 97110 THERAPEUTIC EXERCISES: CPT

## 2025-01-06 NOTE — PROGRESS NOTES
sit to stands. Patient displays mild Quad lag with SLR exercises.  Treatment Diagnosis: Impaired balance  Therapy Prognosis: Fair, Good          Post-Pain Assessment:       Pain Rating (0-10 pain scale):   0/10   Location and pain description same as pre-treatment unless indicated.   Action: [x] NA   [] Perform HEP  [] Meds as prescribed  [] Modalities as prescribed   [] Call Physician     GOALS   Patient Goal(s): Patient Goals : Better balance    Short Term Goals Completed by 2 weeks Goal Status   STG 1 Independent with HEP. In progress     Long Term Goals Completed by 6 weeks Goal Status   LTG 1 Improve gilma LE strength to >/= 4+/5 to improve stability with standing and walking. In progress   LTG 2 Pt will ambulate with LRD >/= 200' on even and uneven surfaces with improved step length, speed and stability S/I. In progress   LTG 3 Anderson >/= 24/56 to reduce pt's risk for falls. In progress   LTG 4 TUG with LRD </= 30sec to improve safety with ambulation. In progress       Plan:  Frequency/Duration:  Plan  Plan Frequency: 2  Plan weeks: 6  Current Treatment Recommendations: Strengthening, ROM, Balance training, Functional mobility training, Transfer training, Gait training, Stair training, Neuromuscular re-education, Manual, Home exercise program, Safety education & training, Patient/Caregiver education & training, Equipment evaluation, education, & procurement, Modalities, Vestibular rehab, Group Therapy  Modalities: Heat/Cold, E-stim - unattended  Pt to continue current HEP.  See objective section for any therapeutic exercise changes, additions or modifications this date.    Therapy Time:      PT Individual Minutes  Time In: 1558  Time Out: 1659  Minutes: 61  Timed Code Treatment Minutes: 61 Minutes  Procedure Minutes:0  Timed Activity Minutes Units   Ther Ex 50 3   Neuro Zach 11 1     Electronically signed by Radha Montgomery PTA on 1/6/25 at 2:40 PM EST

## 2025-01-09 ENCOUNTER — HOSPITAL ENCOUNTER (OUTPATIENT)
Dept: PHYSICAL THERAPY | Age: 70
Setting detail: THERAPIES SERIES
Discharge: HOME | End: 2025-01-09
Payer: MEDICARE

## 2025-01-09 NOTE — PROGRESS NOTES
Therapy                            Cancellation/No-show Note    Date: 2025  Patient: Khari Diaz (69 y.o. male)  : 1955  MRN:  [id number]  Referring Physician: Giovanny Palm MD    Medical Diagnosis: Meniere's disease, bilateral [H81.03]  Ataxia, unspecified [R27.0]  Dizziness and giddiness [R42]  Unsteadiness on feet [R26.81]  Muscle weakness (generalized) [M62.81]  Unspecified sequelae of unspecified cerebrovascular disease [I69.90]  History of falling [Z91.81]      Visit Information:  Insurance: Payor: [facility] MEDICARE / Plan: [facility] MEDICARE COMPLETE / Product Type: *No Product type* /   Visits to Date: 2   No Show/Cancelled Appts: 0 / 1      For today's appointment patient:  [x]  Cancelled  []  Rescheduled appointment  []  No-show   []  Called pt to remind of next appointment     Reason given by patient:  [x]  Patient ill  []  Conflicting appointment  []  No transportation    []  Conflict with work  []  No reason given  []  Other:      [] Pt has future appointments scheduled, no follow up needed  [] Pt requests to be on hold.    Reason:   If > 2 weeks please discuss with therapist.  [] Therapist to call pt for follow up  [x] [place] to call pt to reschedule   Comments:       Signature: Electronically signed by Radha Montgomery PTA on 25 at 9:08 AM EST

## 2025-01-13 ENCOUNTER — HOSPITAL ENCOUNTER (OUTPATIENT)
Dept: PHYSICAL THERAPY | Age: 70
Setting detail: THERAPIES SERIES
Discharge: HOME | End: 2025-01-13
Payer: MEDICARE

## 2025-01-13 PROCEDURE — 97110 THERAPEUTIC EXERCISES: CPT

## 2025-01-13 PROCEDURE — 97112 NEUROMUSCULAR REEDUCATION: CPT

## 2025-01-13 ASSESSMENT — PAIN SCALES - GENERAL: PAINLEVEL_OUTOF10: 5

## 2025-01-13 ASSESSMENT — PAIN DESCRIPTION - LOCATION: LOCATION: BACK;SHOULDER

## 2025-01-13 NOTE — PROGRESS NOTES
[facility]  Outpatient Physical Therapy   Treatment Note        Date: 2025  Patient: Khari Diaz  : 1955   Confirmed: Yes  MRN: [id number]  Referring Provider: Giovanny Palm MD      Medical Diagnosis: Meniere's disease, bilateral [H81.03]  Ataxia, unspecified [R27.0]  Dizziness and giddiness [R42]  Unsteadiness on feet [R26.81]  Muscle weakness (generalized) [M62.81]  Unspecified sequelae of unspecified cerebrovascular disease [I69.90]  History of falling [Z91.81]      Treatment Diagnosis: Impaired balance    Visit Information:  Insurance: Payor: [facility] MEDICARE / Plan: [facility] MEDICARE COMPLETE / Product Type: *No Product type* /   PT Visit Information  PT Insurance Information: [facility] Medicare  Total # of Visits Approved:  (Eval only)  Total # of Visits to Date: 2  No Show: 0  Progress Note Due Date: 25  Canceled Appointment: 1  Progress Note Counter:  (12v until 2/10/25)    Subjective Information:  Subjective: Pt reports that he had a head cold last week thats why he had to cancel  HEP Compliance:  [x] Good [] Fair [] Poor [] Reports not doing due to:      Pain Screening  Patient Currently in Pain: Yes  Pain Assessment: 0-10  Pain Level: 5  Pain Location: Back, Shoulder    Treatment:  Exercises:  Exercises  Exercise 1: Static standing: unsupported FA up to 1min 40 sec , FT 45''  Exercise 3: Gait drills: Forward with focus on heel strike, march and retro in = bars  Exercise 7: 2 way SLR x10 R s/L with pillow under hip  Exercise 8: Bridges 3'' x10, clams 3'' x10 R S/L with pillow under hip  Exercise 9: STS x5 from mat with vc to lean fwd 2 ue assist      *Indicates exercise, modality, or manual techniques to be initiated when appropriate      Assessment:   Body Structures, Functions, Activity Limitations Requiring Skilled Therapeutic Intervention: Decreased functional mobility , Decreased strength, Decreased balance  Assessment: Pt with sig increased difficulty with sit to stands not

## 2025-01-17 ENCOUNTER — HOSPITAL ENCOUNTER (OUTPATIENT)
Dept: PHYSICAL THERAPY | Age: 70
Setting detail: THERAPIES SERIES
Discharge: HOME | End: 2025-01-17
Payer: MEDICARE

## 2025-01-17 PROCEDURE — 97112 NEUROMUSCULAR REEDUCATION: CPT

## 2025-01-17 ASSESSMENT — PAIN DESCRIPTION - LOCATION: LOCATION: BACK;SHOULDER

## 2025-01-17 NOTE — PROGRESS NOTES
[facility]  Outpatient Physical Therapy    Treatment Note        Date: 2025  Patient: Khari Diaz  : 1955   Confirmed: Yes  MRN: [id number]  Referring Provider: Giovanny Palm MD    Medical Diagnosis: Meniere's disease, bilateral [H81.03]  Ataxia, unspecified [R27.0]  Dizziness and giddiness [R42]  Unsteadiness on feet [R26.81]  Muscle weakness (generalized) [M62.81]  Unspecified sequelae of unspecified cerebrovascular disease [I69.90]  History of falling [Z91.81]       Treatment Diagnosis: Impaired balance    Visit Information:  Insurance: Payor: [facility] MEDICARE / Plan: [facility] MEDICARE COMPLETE / Product Type: *No Product type* /   PT Visit Information  PT Insurance Information: [facility] Medicare  Total # of Visits Approved:  (Eval only)  Total # of Visits to Date: 3  No Show: 0  Progress Note Due Date: 25  Canceled Appointment: 1  Progress Note Counter: 3/12 (12v until 2/10/25)    Subjective Information:  Subjective: \"Yesterday was a bad day. I was looking at a picture and there were optical illusions\"  HEP Compliance:  [x] Good [] Fair [] Poor [] Reports not doing due to:    Pain Screening  Patient Currently in Pain: Denies  Pain Location: Back, Shoulder    Treatment:  Exercises:  Exercises  Exercise 3: Gait drills: Forward with focus on heel strike, retro, lateral, march x1-2 ea  Exercise 8: air ex: WBOS, alt arm lifts  Exercise 9: STS x5 from alana chair x5  Exercise 20: HEP: 2way SLR, bridges, moy      Assessment:   Body Structures, Functions, Activity Limitations Requiring Skilled Therapeutic Intervention: Decreased functional mobility , Decreased strength, Decreased balance  Assessment: Challenged patient with gait drills by completing with 1 UE support. He requires increased time, effort and c/o mild ankle instability. He continues to fatigue quickly with all activity.  Unable to progress additional exercises due to time.  Treatment Diagnosis: Impaired balance  Therapy Prognosis:

## 2025-01-20 ENCOUNTER — HOSPITAL ENCOUNTER (OUTPATIENT)
Dept: PHYSICAL THERAPY | Age: 70
Setting detail: THERAPIES SERIES
Discharge: HOME | End: 2025-01-20
Payer: MEDICARE

## 2025-01-20 PROCEDURE — 97110 THERAPEUTIC EXERCISES: CPT

## 2025-01-20 PROCEDURE — 97112 NEUROMUSCULAR REEDUCATION: CPT

## 2025-01-20 ASSESSMENT — PAIN SCALES - GENERAL: PAINLEVEL_OUTOF10: 5

## 2025-01-20 ASSESSMENT — PAIN DESCRIPTION - LOCATION: LOCATION: GENERALIZED

## 2025-01-20 NOTE — PROGRESS NOTES
[facility]  Outpatient Physical Therapy    Treatment Note        Date: 2025  Patient: Khari Diaz  : 1955   Confirmed: Yes  MRN: [id number]  Referring Provider: Giovanny Palm MD    Medical Diagnosis: Meniere's disease, bilateral [H81.03]  Ataxia, unspecified [R27.0]  Dizziness and giddiness [R42]  Unsteadiness on feet [R26.81]  Muscle weakness (generalized) [M62.81]  Unspecified sequelae of unspecified cerebrovascular disease [I69.90]  History of falling [Z91.81]       Treatment Diagnosis: Impaired balance    Visit Information:  Insurance: Payor: [facility] MEDICARE / Plan: [facility] MEDICARE COMPLETE / Product Type: *No Product type* /   PT Visit Information  PT Insurance Information: [facility] Medicare  Total # of Visits Approved:  (Eval only)  Total # of Visits to Date: 4  No Show: 0  Progress Note Due Date: 25  Canceled Appointment: 1  Progress Note Counter:  (12v until 2/10/25)    Subjective Information:  Subjective: Patient reports difficulty ambulating over the weekend, \"My brain lost the connection to my Rt foot, I didn't trust it\"  HEP Compliance:  [x] Good [] Fair [] Poor [] Reports not doing due to:               Pain Screening  Patient Currently in Pain: Yes  Pain Level: 5  Pain Location: Generalized    Treatment:  Exercises:  Exercises  Exercise 9: STS with focus on decreasing UE use as able  Exercise 10: Anderson tasks  Exercise 11: Sink ex's x10 (HR, HS curls)  Exercise 12: Obj measures for PN: MMT, TUG 36'', Anderson 24/56  Exercise 20: HEP: Sink ex's NV*            *Indicates exercise, modality, or manual techniques to be initiated when appropriate    Objective Measures:             Strength: [] NT  [x] MMT completed:  Strength RLE  R Hip Flexion: 4/5  R Hip ABduction: 4/5  Strength LLE  L Hip Flexion: 4/5, 4+/5  L Hip ABduction: 4+/5          Assessment:   Body Structures, Functions, Activity Limitations Requiring Skilled Therapeutic Intervention: Decreased functional mobility , 
Requiring Skilled Therapeutic Intervention: Decreased functional mobility , Decreased strength, Decreased balance  Assessment: Patient displaying improved time completing TUG, completed in 36'' progressing towards goal. Patient displaying 5point increase in Anderson score however patient is still a high risks for falls. Difficulty completing dynamic tasks unsupported. Patient would benefit from continuing with PT to further improve strength, balance and mobility.  Therapy Prognosis: Fair, Good      PLAN: [] Evaluate and Treat  Frequency/Duration:  Plan Frequency: 2  Plan weeks: 6  Current Treatment Recommendations: Strengthening, ROM, Balance training, Functional mobility training, Transfer training, Gait training, Stair training, Neuromuscular re-education, Manual, Home exercise program, Safety education & training, Patient/Caregiver education & training, Equipment evaluation, education, & procurement, Modalities, Vestibular rehab, Group Therapy  Modalities: Heat/Cold, E-stim - unattended                    Patient Status:[x] Continue/ Initiate plan of Care     [] Discharge PT.  Recommend pt continue with HEP.      [] Additional visits requested, Please re-certify for additional visits:     [] Hold   Obj info by:  Electronically signed by Radha Montgomery PTA on 1/20/2025 at 4:48 PM    Signature:  Electronically signed by Anna Middleton PT on 1/21/2025 at 4:30 PM          If you have any questions or concerns, please don't hesitate to call.  Thank you for your referral.    I have reviewed this plan of care and certify a need for medically necessary rehabilitation services.    Physician Signature:__________________________________________________________  Date:  Please sign and return

## 2025-01-23 ENCOUNTER — HOSPITAL ENCOUNTER (OUTPATIENT)
Dept: PHYSICAL THERAPY | Age: 70
Setting detail: THERAPIES SERIES
Discharge: HOME | End: 2025-01-23
Payer: MEDICARE

## 2025-01-23 PROCEDURE — 97112 NEUROMUSCULAR REEDUCATION: CPT

## 2025-01-23 PROCEDURE — 97110 THERAPEUTIC EXERCISES: CPT

## 2025-01-23 ASSESSMENT — PAIN DESCRIPTION - PAIN TYPE: TYPE: CHRONIC PAIN

## 2025-01-23 ASSESSMENT — PAIN DESCRIPTION - LOCATION: LOCATION: GENERALIZED

## 2025-01-23 ASSESSMENT — PAIN SCALES - GENERAL: PAINLEVEL_OUTOF10: 4

## 2025-01-23 ASSESSMENT — PAIN DESCRIPTION - DESCRIPTORS: DESCRIPTORS: ACHING

## 2025-01-23 NOTE — PROGRESS NOTES
[facility]  Outpatient Physical Therapy    Treatment Note        Date: 2025  Patient: Khari Diaz  : 1955   Confirmed: Yes  MRN: [id number]  Referring Provider: Giovanny Palm MD    Medical Diagnosis: Meniere's disease, bilateral [H81.03]  Ataxia, unspecified [R27.0]  Dizziness and giddiness [R42]  Unsteadiness on feet [R26.81]  Muscle weakness (generalized) [M62.81]  Unspecified sequelae of unspecified cerebrovascular disease [I69.90]  History of falling [Z91.81]       Treatment Diagnosis: Impaired balance    Visit Information:  Insurance: Payor: [facility] MEDICARE / Plan: [facility] MEDICARE COMPLETE / Product Type: *No Product type* /   PT Visit Information  PT Insurance Information: [facility] Medicare  Total # of Visits Approved:  (Eval only)  Total # of Visits to Date: 5  Plan of Care/Certification Expiration Date: 02/10/25  No Show: 0  Progress Note Due Date: 25  Canceled Appointment: 1  Progress Note Counter:  (12v until 2/10/25)    Subjective Information:  Subjective: Pt reports yesterday was a bad day for balance  HEP Compliance:  [] Good [x] Fair [] Poor [] Reports not doing due to:    Pain Screening  Patient Currently in Pain: Yes  Pain Assessment: 0-10  Pain Level: 4  Pain Type: Chronic pain  Pain Location: Generalized  Pain Descriptors: Aching    Treatment:  Exercises:  Exercises  Exercise 2: Single stepping over SC x 10 F/L  Exercise 3: Gait drills: Forward with focus on heel strike, retro, lateral, march x1-2 ea  Exercise 4: Step ups 4\" x 5 Srikanth  Exercise 6: Amb over uneven surfaces x 2  Exercise 10: Anderson tasks: alt taps, SLS suppoted Rt>Lt, 5-10 secs Lt, Rt 20-30 secs  Exercise 11: Sink ex's x10 (HR, HS curls)hip flex/abd/ ext/mini squats  Exercise 20: HEP: Cont current       *Indicates exercise, modality, or manual techniques to be initiated when appropriate    Objective Measures:      Strength: [x] NT  [] MMT completed:   ROM: [x] NT  [] ROM measurements:         LTG 2 Current

## 2025-01-27 ENCOUNTER — OFFICE VISIT (OUTPATIENT)
Age: 70
End: 2025-01-27
Payer: MEDICARE

## 2025-01-27 VITALS — BODY MASS INDEX: 31.14 KG/M2 | HEIGHT: 73 IN | TEMPERATURE: 97.5 F | WEIGHT: 235 LBS

## 2025-01-27 DIAGNOSIS — L60.3 NAIL DYSTROPHY: ICD-10-CM

## 2025-01-27 DIAGNOSIS — M79.672 PAIN IN BOTH FEET: ICD-10-CM

## 2025-01-27 DIAGNOSIS — M79.671 PAIN IN BOTH FEET: ICD-10-CM

## 2025-01-27 DIAGNOSIS — E11.42 DIABETIC POLYNEUROPATHY ASSOCIATED WITH TYPE 2 DIABETES MELLITUS (HCC): Primary | ICD-10-CM

## 2025-01-27 DIAGNOSIS — B35.1 DERMATOPHYTOSIS OF NAIL: ICD-10-CM

## 2025-01-27 DIAGNOSIS — L84 CALLUS: ICD-10-CM

## 2025-01-27 PROCEDURE — 11056 PARNG/CUTG B9 HYPRKR LES 2-4: CPT | Performed by: PODIATRIST

## 2025-01-27 PROCEDURE — 99999 PR OFFICE/OUTPT VISIT,PROCEDURE ONLY: CPT | Performed by: PODIATRIST

## 2025-01-27 PROCEDURE — 11721 DEBRIDE NAIL 6 OR MORE: CPT | Performed by: PODIATRIST

## 2025-01-27 ASSESSMENT — ENCOUNTER SYMPTOMS
NAUSEA: 0
VOMITING: 0
BACK PAIN: 0
SHORTNESS OF BREATH: 0

## 2025-01-27 NOTE — PROGRESS NOTES
09/14/2018    Morbid obesity 01/02/2014    Myocardial infarction ([facility])     Obesity     AYLIN (obstructive sleep apnea) 12/10/2016    AYLIN (obstructive sleep apnea) 12/10/2016    Osteoarthritis     PAD (peripheral artery disease) ([facility]) 01/14/2019    Rectal bleeding 08/20/2021    Right-sided muscle weakness 10/19/2016    S/P cardiac catheterization     Sciatica     Skin cyst 04/08/2016    Spasm 11/09/2016    Spina bifida ([facility])     Stented coronary artery     TIA (transient ischemic attack) 11/06/2020    Tremor of right hand 05/15/2017    Type 2 diabetes mellitus with diabetic polyneuropathy, without long-term current use of insulin ([facility])     Unsteady gait 05/15/2017    Weakness     Weight gain      Past Surgical History:   Procedure Laterality Date    ANKLE SURGERY Left     BACK SURGERY      lumbar laminectomy    CARDIAC SURGERY  1-10-14 3 Stents    CHOLECYSTECTOMY      COLONOSCOPY  01/15/2010    polyp, diverticulosis (DR ELAINE)    COLONOSCOPY N/A 08/19/2021    COLORECTAL CANCER SCREENING, HIGH RISK with polypectomies  performed by Bina Ferguson MD at [facility]    COLONOSCOPY N/A 08/20/2021    polyps x 3, 3y repeat (DR VIVEROS)  COLONOSCOPY with polypectomies DIAGNOSTIC performed by Alexey Viveros MD at [facility]    CORONARY ANGIOPLASTY WITH STENT PLACEMENT  2014    3 stents    CYST REMOVAL  05/16/2016    DR SCHMID,  L SCROTAL CYST, R thigh, L ear, back    EYE SURGERY  2017    cataractsboth eyes    SHOULDER SURGERY Bilateral 12/18/2015    left once right twice- to remove bone spurs    TONSILLECTOMY       Social History     Socioeconomic History    Marital status:      Spouse name: Kristi    Number of children: 3    Years of education: 12+    Highest education level: Not on file   Occupational History    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     Employer: [employer]   Tobacco Use    Smoking status: Every Day     Current packs/day: 2.50     Average packs/day: 2.5 packs/day for 30.0 years (75.0

## 2025-01-28 ENCOUNTER — HOSPITAL ENCOUNTER (OUTPATIENT)
Dept: PHYSICAL THERAPY | Age: 70
Setting detail: THERAPIES SERIES
Discharge: HOME | End: 2025-01-28
Payer: MEDICARE

## 2025-01-28 PROCEDURE — 97110 THERAPEUTIC EXERCISES: CPT

## 2025-01-28 PROCEDURE — 97116 GAIT TRAINING THERAPY: CPT

## 2025-01-28 PROCEDURE — 97112 NEUROMUSCULAR REEDUCATION: CPT

## 2025-01-28 ASSESSMENT — PAIN DESCRIPTION - DESCRIPTORS: DESCRIPTORS: ACHING;SORE

## 2025-01-28 ASSESSMENT — PAIN DESCRIPTION - PAIN TYPE: TYPE: CHRONIC PAIN

## 2025-01-28 ASSESSMENT — PAIN DESCRIPTION - LOCATION: LOCATION: HIP;GENERALIZED

## 2025-01-28 ASSESSMENT — PAIN DESCRIPTION - ORIENTATION: ORIENTATION: RIGHT

## 2025-01-28 ASSESSMENT — PAIN SCALES - GENERAL: PAINLEVEL_OUTOF10: 4

## 2025-01-28 NOTE — PROGRESS NOTES
= 36 secs       Assessment:   Body Structures, Functions, Activity Limitations Requiring Skilled Therapeutic Intervention: Decreased functional mobility , Decreased strength, Decreased balance  Assessment: Initiated Hurdles to focus on hip flexion and reciprocal pattern for cooridination. Pt catching rt foot x 2 and Lt foot x 1 with fwd motion, possibly d/t weak hip flexors. Pt with TUG = 36 secs, VCs to improve Turning vs picking walker up. Pt with more fluidity doing figure 8 around bolsters once cued.  Treatment Diagnosis: Impaired balance  Therapy Prognosis: Fair, Good          Post-Pain Assessment:       Pain Rating (0-10 pain scale):   4-5/10   Location and pain description same as pre-treatment unless indicated.   Action: [] NA   [] Perform HEP  [x] Meds as prescribed  [] Modalities as prescribed   [] Call Physician     GOALS   Patient Goal(s): Patient Goals : Better balance    Short Term Goals Completed by 2 weeks Goal Status   STG 1 Independent with HEP. In progress     Long Term Goals Completed by 6 weeks Goal Status   LTG 1 Improve gilma LE strength to >/= 4+/5 to improve stability with standing and walking. In progress   LTG 2 Pt will ambulate with LRD >/= 200' on even and uneven surfaces with improved step length, speed and stability S/I. In progress   LTG 3 Anderson >/= 24/56 to reduce pt's risk for falls. In progress   LTG 4 TUG with LRD </= 30sec to improve safety with ambulation. In progress     Plan:  Frequency/Duration:  Plan  Plan Frequency: 2  Plan weeks: 6  Current Treatment Recommendations: Strengthening, ROM, Balance training, Functional mobility training, Transfer training, Gait training, Stair training, Neuromuscular re-education, Manual, Home exercise program, Safety education & training, Patient/Caregiver education & training, Equipment evaluation, education, & procurement, Modalities, Vestibular rehab, Group Therapy  Modalities: Heat/Cold, E-stim - unattended  Pt to continue current HEP.  See

## 2025-01-30 ENCOUNTER — HOSPITAL ENCOUNTER (OUTPATIENT)
Dept: PHYSICAL THERAPY | Age: 70
Setting detail: THERAPIES SERIES
Discharge: HOME | End: 2025-01-30
Payer: MEDICARE

## 2025-01-30 PROCEDURE — 97112 NEUROMUSCULAR REEDUCATION: CPT

## 2025-01-30 NOTE — PROGRESS NOTES
Therapeutic Intervention: Decreased functional mobility , Decreased strength, Decreased balance  Assessment: pt required gilma UE support to walk across uneven surface. Pt startled when Therapist pushed against the // bar and pt states vestibular instability. Pt challenged by reciprocal patterns causing him to feel nauseous , however he also felt nausea looking down at the carpet. Initiated reciprocal pattern with box stepping with graduated boxes 4\"/6\"/8\".Pt needed several minutes to recover after session.  Treatment Diagnosis: Impaired balance  Therapy Prognosis: Fair, Good   Post-Pain Assessment:       Pain Rating (0-10 pain scale):   0/10   Location and pain description same as pre-treatment unless indicated.   Action: [x] NA   [] Perform HEP  [] Meds as prescribed  [] Modalities as prescribed   [] Call Physician     GOALS   Patient Goal(s): Patient Goals : Better balance    Short Term Goals Completed by 2 weeks Goal Status   STG 1 Independent with HEP. In progress     Long Term Goals Completed by 6 weeks Goal Status   LTG 1 Improve gilma LE strength to >/= 4+/5 to improve stability with standing and walking. In progress   LTG 2 Pt will ambulate with LRD >/= 200' on even and uneven surfaces with improved step length, speed and stability S/I. In progress   LTG 3 Anderson >/= 24/56 to reduce pt's risk for falls. In progress   LTG 4 TUG with LRD </= 30sec to improve safety with ambulation. In progress     Plan:  Frequency/Duration:  Plan  Plan Frequency: 2  Plan weeks: 6  Current Treatment Recommendations: Strengthening, ROM, Balance training, Functional mobility training, Transfer training, Gait training, Stair training, Neuromuscular re-education, Manual, Home exercise program, Safety education & training, Patient/Caregiver education & training, Equipment evaluation, education, & procurement, Modalities, Vestibular rehab, Group Therapy  Modalities: Heat/Cold, E-stim - unattended  Pt to continue current HEP.  See

## 2025-02-03 ENCOUNTER — HOSPITAL ENCOUNTER (OUTPATIENT)
Dept: PHYSICAL THERAPY | Age: 70
Setting detail: THERAPIES SERIES
Discharge: HOME | End: 2025-02-03
Payer: MEDICARE

## 2025-02-03 PROCEDURE — 97112 NEUROMUSCULAR REEDUCATION: CPT

## 2025-02-03 ASSESSMENT — PAIN SCALES - GENERAL: PAINLEVEL_OUTOF10: 4

## 2025-02-03 ASSESSMENT — PAIN DESCRIPTION - LOCATION: LOCATION: HIP

## 2025-02-03 NOTE — PROGRESS NOTES
[facility]  Outpatient Physical Therapy    Treatment Note        Date: 2/3/2025  Patient: Khari Diaz  : 1955   Confirmed: Yes  MRN: [id number]  Referring Provider: Giovanny Palm MD    Medical Diagnosis: Meniere's disease, bilateral [H81.03]  Ataxia, unspecified [R27.0]  Dizziness and giddiness [R42]  Unsteadiness on feet [R26.81]  Muscle weakness (generalized) [M62.81]  Unspecified sequelae of unspecified cerebrovascular disease [I69.90]  History of falling [Z91.81]       Treatment Diagnosis: Impaired balance    Visit Information:  Insurance: Payor: [facility] MEDICARE / Plan: [facility] MEDICARE COMPLETE / Product Type: *No Product type* /   PT Visit Information  PT Insurance Information: [facility] Medicare  Total # of Visits Approved:  (Eval only)  Total # of Visits to Date: 8  Plan of Care/Certification Expiration Date: 02/10/25  No Show: 0  Progress Note Due Date: 02/10/25  Canceled Appointment: 1  Progress Note Counter:  (12v until 2/10/25)    Subjective Information:  Subjective: Patient reports Vertigo episode over the weekend, \"My brain was scrambled\"  HEP Compliance:  [x] Good [] Fair [] Poor [] Reports not doing due to:               Pain Screening  Patient Currently in Pain: Yes  Pain Level: 4  Pain Location: Hip Soreness    Treatment:  Exercises:  Exercises  Exercise 3: Gait drills: Forward with focus on heel strike lateral, march x1-2 ea  Exercise 7: 6\" Hurdles F/L- cues for reciprocal- requires UE support  Exercise 13: Single stepping F/L/R 0- fingertip support  Exercise 14: Reaching*  Exercise 20: HEP: Single stepping F/L         *Indicates exercise, modality, or manual techniques to be initiated when appropriate              Assessment:   Body Structures, Functions, Activity Limitations Requiring Skilled Therapeutic Intervention: Decreased functional mobility , Decreased strength, Decreased balance  Assessment: Initiated single stepping in various directions, patient requiring fingertip

## 2025-02-06 ENCOUNTER — HOSPITAL ENCOUNTER (OUTPATIENT)
Dept: PHYSICAL THERAPY | Age: 70
Setting detail: THERAPIES SERIES
Discharge: HOME | End: 2025-02-06
Payer: MEDICARE

## 2025-02-06 PROCEDURE — 97110 THERAPEUTIC EXERCISES: CPT

## 2025-02-06 PROCEDURE — 97116 GAIT TRAINING THERAPY: CPT

## 2025-02-06 PROCEDURE — 97112 NEUROMUSCULAR REEDUCATION: CPT

## 2025-02-06 NOTE — PROGRESS NOTES
[facility]  Outpatient Physical Therapy    Treatment Note        Date: 2025  Patient: Khari Diaz  : 1955   Confirmed: Yes  MRN: [id number]  Referring Provider: Giovanny Palm MD    Medical Diagnosis: Meniere's disease, bilateral [H81.03]  Ataxia, unspecified [R27.0]  Dizziness and giddiness [R42]  Unsteadiness on feet [R26.81]  Muscle weakness (generalized) [M62.81]  Unspecified sequelae of unspecified cerebrovascular disease [I69.90]  History of falling [Z91.81]       Treatment Diagnosis: Impaired balance    Visit Information:  Insurance: Payor: [facility] MEDICARE / Plan: [facility] MEDICARE COMPLETE / Product Type: *No Product type* /   PT Visit Information  PT Insurance Information: [facility] Medicare  Total # of Visits Approved:  (Eval only)  Total # of Visits to Date: 9  Plan of Care/Certification Expiration Date: 02/10/25  No Show: 0  Progress Note Due Date: 02/10/25  Canceled Appointment: 1  Progress Note Counter:  (12v until 2/10/25)    Subjective Information:  Subjective: Pt states he feels therapy is helping with his balance  HEP Compliance:  [] Good [x] Fair [] Poor [] Reports not doing due to:    Pain Screening  Patient Currently in Pain: Denies    Treatment:  Exercises:  Exercises  Exercise 1: Obj measures: gait/TUG  Exercise 3: Gait drills: Forward with focus on heel strike lateral, march x1-2 ea  Exercise 11: Sink ex's x10 hip flex/abd/ ext/ knee flex with 1.5# ankle wts  Exercise 12: 90 deg turns with spotting  Exercise 14: Reaching across all planes  Exercise 20: HEP: Verbal, pay attention to sx's with Lt vs Rt turns       *Indicates exercise, modality, or manual techniques to be initiated when appropriate    Objective Measures:      Ambulation  Surface: Carpet  Device: Rolling Walker  Assistance: Modified Independent  Quality of Gait: Slow gait speed, occasional decreased foot clearance and step length with 2 turns,  Distance: 200ft    Strength: [] NT  [x] MMT completed:      LTG 2

## 2025-02-10 ENCOUNTER — HOSPITAL ENCOUNTER (OUTPATIENT)
Dept: PHYSICAL THERAPY | Age: 70
Setting detail: THERAPIES SERIES
Discharge: HOME | End: 2025-02-10
Payer: MEDICARE

## 2025-02-10 PROCEDURE — 97110 THERAPEUTIC EXERCISES: CPT

## 2025-02-10 PROCEDURE — 97112 NEUROMUSCULAR REEDUCATION: CPT

## 2025-02-10 NOTE — PROGRESS NOTES
[facility]  PHYSICAL THERAPY PLAN OF CARE                                    [facility] [place] [address]     Ph: [phone]  Fax: [phone]    [] Certification  [x] Recertification [x]  Plan of Care  [] Progress Note [] Discharge      Referring Provider:   Hector Truong MD  From:  Seda Wang DPT  Patient: Khari Diaz (69 y.o. male) : 1955 Date: 02/10/2025   Medical Diagnosis: Meniere's disease, bilateral [H81.03]  Ataxia, unspecified [R27.0]  Dizziness and giddiness [R42]  Unsteadiness on feet [R26.81]  Muscle weakness (generalized) [M62.81]  Unspecified sequelae of unspecified cerebrovascular disease [I69.90]  History of falling [Z91.81]    Treatment Diagnosis: Impaired balance    Plan of Care/Certification Expiration Date: 02/10/25   Progress Report Period from: 2025  to 2025    Visits to Date: 10 No Show: 0 Cancelled Appts: 1    OBJECTIVE:   Short Term Goals - Time Frame for Short Term Goals: 2 weeks    Goals Current/Discharge status  Status   Short Term Goal 1: Independent with HEP.     Increased compliance needed In progress     Long Term Goals - Time Frame for Long Term Goals : 6 weeks  Goals Current/ Discharge status Status   Long Term Goal 1: Improve gilma LE strength to >/= 4+/5 to improve stability with standing and walking. LTG 1 Current Status:: 2/10: see strength      Right hip ext 3- <> 3+/5, hip flexion 4+/5-4/5; hip abduction 4/5  Left hip extension 4+/5, hip flexion 4+/5         In progress   Long Term Goal 2: Pt will ambulate with LRD >/= 200' on even and uneven surfaces with improved step length, speed and stability S/I. LTG 2 Current Status:: : See ambulation, met distance      Ambulation  Surface: Carpet  Device: Rolling Walker  Assistance: Modified Independent  Quality of Gait: Slow gait speed, occasional decreased foot clearance and step length with 2 turns,  Distance: 200ft   In progress, Partially met   Long Term Goal 3: Andesron >/=

## 2025-02-17 ENCOUNTER — HOSPITAL ENCOUNTER (OUTPATIENT)
Dept: PHYSICAL THERAPY | Age: 70
Setting detail: THERAPIES SERIES
Discharge: HOME | End: 2025-02-17
Payer: MEDICARE

## 2025-02-17 PROCEDURE — 97112 NEUROMUSCULAR REEDUCATION: CPT

## 2025-02-17 PROCEDURE — 97110 THERAPEUTIC EXERCISES: CPT

## 2025-02-17 ASSESSMENT — PAIN SCALES - GENERAL: PAINLEVEL_OUTOF10: 3

## 2025-02-17 ASSESSMENT — PAIN DESCRIPTION - LOCATION: LOCATION: KNEE;BACK;GENERALIZED

## 2025-02-17 ASSESSMENT — PAIN DESCRIPTION - ORIENTATION: ORIENTATION: LEFT

## 2025-02-17 ASSESSMENT — PAIN DESCRIPTION - DESCRIPTORS: DESCRIPTORS: ACHING;SORE

## 2025-02-17 NOTE — PROGRESS NOTES
[facility]  Outpatient Physical Therapy    Treatment Note        Date: 2025  Patient: Khari Diaz  : 1955   Confirmed: Yes  MRN: [id number]  Referring Provider: Giovanny Palm MD    Medical Diagnosis: Meniere's disease, bilateral [H81.03]  Ataxia, unspecified [R27.0]  Dizziness and giddiness [R42]  Unsteadiness on feet [R26.81]  Muscle weakness (generalized) [M62.81]  Unspecified sequelae of unspecified cerebrovascular disease [I69.90]  History of falling [Z91.81]       Treatment Diagnosis: Impaired balance    Visit Information:  Insurance: Payor: [facility] MEDICARE / Plan: [facility] MEDICARE COMPLETE / Product Type: *No Product type* /   PT Visit Information  PT Insurance Information: [facility] Medicare  Total # of Visits Approved:  (Eval only)  Total # of Visits to Date: 10  Plan of Care/Certification Expiration Date: 02/10/25  No Show: 0  Progress Note Due Date: 02/10/25  Canceled Appointment: 1  Progress Note Counter: (4v until 3/12//25)    Subjective Information:  Subjective: Pt states the weather Saturday made his day rough d/t the pressure  HEP Compliance:  [x] Good [x] Fair [] Poor [] Reports not doing due to:      Pain Screening  Patient Currently in Pain: Yes  Pain Assessment: 0-10  Pain Level: 3  Pain Location: Knee, Back, Generalized  Pain Orientation: Left  Pain Descriptors: Aching, Sore    Treatment:  Exercises:  Exercises  Exercise 1: 2\" SLS supported  holding boom dorian  Exercise 2: alt tapping 2\" , fingertip support  Exercise 3: Gait drills: Forward with , march x1-2 ea, Tandem fwd  Exercise 9: seated march/abd/ LAQ with  1.5 ankle wt x 10  *Indicates exercise, modality, or manual techniques to be initiated when appropriate    Objective Measures:      Strength: [x] NT  [] MMT completed:      LTG 3 Current Status:: : Pt states home tasks are getting easier such as bending down to put  dishes in      Assessment:   Body Structures, Functions, Activity Limitations

## 2025-02-19 DIAGNOSIS — E11.22 TYPE 2 DIABETES MELLITUS WITH STAGE 3A CHRONIC KIDNEY DISEASE, WITHOUT LONG-TERM CURRENT USE OF INSULIN (HCC): ICD-10-CM

## 2025-02-19 DIAGNOSIS — E11.622 TYPE 2 DIABETES MELLITUS WITH OTHER SKIN ULCER (CODE) (HCC): ICD-10-CM

## 2025-02-19 DIAGNOSIS — N18.31 TYPE 2 DIABETES MELLITUS WITH STAGE 3A CHRONIC KIDNEY DISEASE, WITHOUT LONG-TERM CURRENT USE OF INSULIN (HCC): ICD-10-CM

## 2025-02-19 DIAGNOSIS — R80.9 MICROALBUMINURIA DUE TO TYPE 2 DIABETES MELLITUS (HCC): Chronic | ICD-10-CM

## 2025-02-19 DIAGNOSIS — E11.29 MICROALBUMINURIA DUE TO TYPE 2 DIABETES MELLITUS (HCC): Chronic | ICD-10-CM

## 2025-02-19 RX ORDER — LANCETS 28 GAUGE
EACH MISCELLANEOUS
Qty: 100 EACH | Refills: 5 | Status: SHIPPED | OUTPATIENT
Start: 2025-02-19

## 2025-02-19 NOTE — TELEPHONE ENCOUNTER
Comments:     Last Office Visit (last PCP visit):   10/24/2024    Next Visit Date:  Future Appointments   Date Time Provider Department Center   2/24/2025  4:00 PM Radha Montgomery, PTA [facility]   3/3/2025  4:00 PM Radha Montgomery, PTA [facility]   3/10/2025  4:00 PM Radha Montgomery, PTA [facility]   3/25/2025  3:30 PM Hector Truong MD [facility]  [facility] DEP   4/4/2025  3:30 PM Teodoro Houston, DPM Podiat[facility]   5/5/2025  4:15 PM Micah Bello PA [facility]   6/25/2025  3:30 PM Alex Collins MD [facility] Neurology -   7/16/2025  3:00 PM Lali Kilgore MD [facility]   7/24/2025  3:15 PM Bernard Emmanuel DO [facility]       **If hasn't been seen in over a year OR hasn't followed up according to last diabetes/ADHD visit, make appointment for patient before sending refill to provider.    Rx requested:  Requested Prescriptions     Pending Prescriptions Disp Refills    Lancets (SAFETY LANCET 28G/PRESSURE ACT) MISC 100 each 5     Sig: USE TO CHECK BLOOD GLUCOSE UP TO 3 TIMES DAILY

## 2025-02-19 NOTE — TELEPHONE ENCOUNTER
Pt requesting refills for Lancets (SAFETY LANCET 28G/PRESSURE ACT) MISC to be sent to [facility] in [place]. Please advise.    LOV: 10/24/2024  Future appts: 3/25/2025    Ph: [phone]

## 2025-02-24 ENCOUNTER — HOSPITAL ENCOUNTER (OUTPATIENT)
Dept: PHYSICAL THERAPY | Age: 70
Setting detail: THERAPIES SERIES
Discharge: HOME | End: 2025-02-24
Payer: MEDICARE

## 2025-02-24 PROCEDURE — 97110 THERAPEUTIC EXERCISES: CPT

## 2025-02-24 PROCEDURE — 97112 NEUROMUSCULAR REEDUCATION: CPT

## 2025-02-24 ASSESSMENT — PAIN DESCRIPTION - LOCATION: LOCATION: GENERALIZED

## 2025-02-24 ASSESSMENT — PAIN DESCRIPTION - DESCRIPTORS: DESCRIPTORS: ACHING

## 2025-02-24 ASSESSMENT — PAIN SCALES - GENERAL: PAINLEVEL_OUTOF10: 4

## 2025-02-24 NOTE — PROGRESS NOTES
Good          Post-Pain Assessment:       Pain Rating (0-10 pain scale):   5/10   Location and pain description same as pre-treatment unless indicated.   Action: [] NA   [] Perform HEP  [x] Meds as prescribed  [x] Modalities as prescribed   [] Call Physician     GOALS   Patient Goal(s): Patient Goals : Better balance    Short Term Goals Completed by 2 weeks Goal Status   STG 1 Independent with HEP. In progress     Long Term Goals Completed by 6 weeks Goal Status   LTG 1 Improve gilma LE strength to >/= 4+/5 to improve stability with standing and walking. In progress   LTG 2 Pt will ambulate with LRD >/= 200' on even and uneven surfaces with improved step length, speed and stability S/I. In progress, Partially met   LTG 3 Anderson >/= 35/56 to reduce pt's risk for falls. Updated   LTG 4 TUG with LRD </= 30sec to improve safety with ambulation. Met       Plan:  Frequency/Duration:  Plan  Plan Frequency: 1  Plan weeks: 4  Current Treatment Recommendations: Strengthening, ROM, Balance training, Functional mobility training, Transfer training, Gait training, Stair training, Neuromuscular re-education, Manual, Home exercise program, Safety education & training, Patient/Caregiver education & training, Equipment evaluation, education, & procurement, Modalities, Vestibular rehab, Group Therapy  Modalities: Heat/Cold, E-stim - unattended  Pt to continue current HEP.  See objective section for any therapeutic exercise changes, additions or modifications this date.    Therapy Time:      PT Individual Minutes  Time In: 1602  Time Out: 1658  Minutes: 56  Timed Code Treatment Minutes: 55 Minutes  Procedure Minutes:0  Timed Activity Minutes Units   Ther Ex 25 2   Neuro Zach 30 2     Electronically signed by Radha Montgomery PTA on 2/24/25 at 1:04 PM EST

## 2025-03-03 ENCOUNTER — HOSPITAL ENCOUNTER (OUTPATIENT)
Dept: PHYSICAL THERAPY | Age: 70
Setting detail: THERAPIES SERIES
Discharge: HOME | End: 2025-03-03
Payer: MEDICARE

## 2025-03-03 PROCEDURE — 97110 THERAPEUTIC EXERCISES: CPT

## 2025-03-03 PROCEDURE — 97112 NEUROMUSCULAR REEDUCATION: CPT

## 2025-03-03 ASSESSMENT — PAIN DESCRIPTION - DESCRIPTORS: DESCRIPTORS: ACHING

## 2025-03-03 ASSESSMENT — PAIN DESCRIPTION - LOCATION: LOCATION: GENERALIZED

## 2025-03-03 ASSESSMENT — PAIN SCALES - GENERAL: PAINLEVEL_OUTOF10: 3

## 2025-03-03 NOTE — PROGRESS NOTES
Treatment Minutes: 55 Minutes  Procedure Minutes:0  Timed Activity Minutes Units   Ther Ex 40 3   Neuro Zach 15 1     Electronically signed by Radha Montgomery PTA on 3/3/25 at 3:11 PM EST

## 2025-03-05 RX ORDER — DULAGLUTIDE 4.5 MG/.5ML
INJECTION, SOLUTION SUBCUTANEOUS
Qty: 6 ML | Refills: 3 | Status: SHIPPED | OUTPATIENT
Start: 2025-03-05

## 2025-03-09 ENCOUNTER — HOSPITAL ENCOUNTER (EMERGENCY)
Age: 70
Discharge: HOME | End: 2025-03-09
Payer: MEDICARE

## 2025-03-09 ENCOUNTER — APPOINTMENT (OUTPATIENT)
Dept: GENERAL RADIOLOGY | Age: 70
End: 2025-03-09
Payer: MEDICARE

## 2025-03-09 VITALS
TEMPERATURE: 98.3 F | DIASTOLIC BLOOD PRESSURE: 68 MMHG | SYSTOLIC BLOOD PRESSURE: 120 MMHG | WEIGHT: 229.6 LBS | HEIGHT: 73 IN | HEART RATE: 97 BPM | OXYGEN SATURATION: 94 % | BODY MASS INDEX: 30.43 KG/M2 | RESPIRATION RATE: 18 BRPM

## 2025-03-09 DIAGNOSIS — E11.621 DIABETIC ULCER OF TOE OF LEFT FOOT ASSOCIATED WITH TYPE 2 DIABETES MELLITUS, WITH FAT LAYER EXPOSED (HCC): ICD-10-CM

## 2025-03-09 DIAGNOSIS — L97.522 DIABETIC ULCER OF TOE OF LEFT FOOT ASSOCIATED WITH TYPE 2 DIABETES MELLITUS, WITH FAT LAYER EXPOSED (HCC): ICD-10-CM

## 2025-03-09 DIAGNOSIS — S91.105A OPEN WOUND OF THIRD TOE OF LEFT FOOT, INITIAL ENCOUNTER: Primary | ICD-10-CM

## 2025-03-09 DIAGNOSIS — S91.105A: ICD-10-CM

## 2025-03-09 LAB
ALBUMIN SERPL-MCNC: 4.2 G/DL (ref 3.5–4.6)
ALP SERPL-CCNC: 61 U/L (ref 35–104)
ALT SERPL-CCNC: 27 U/L (ref 0–41)
ANION GAP SERPL CALCULATED.3IONS-SCNC: 18 MEQ/L (ref 9–15)
AST SERPL-CCNC: 38 U/L (ref 0–40)
BASOPHILS # BLD: 0 K/UL (ref 0–0.2)
BASOPHILS NFR BLD: 0.2 %
BILIRUB SERPL-MCNC: 0.6 MG/DL (ref 0.2–0.7)
BUN SERPL-MCNC: 22 MG/DL (ref 8–23)
CALCIUM SERPL-MCNC: 10 MG/DL (ref 8.5–9.9)
CHLORIDE SERPL-SCNC: 94 MEQ/L (ref 95–107)
CO2 SERPL-SCNC: 25 MEQ/L (ref 20–31)
CREAT SERPL-MCNC: 1.05 MG/DL (ref 0.7–1.2)
CRP SERPL HS-MCNC: 7.8 MG/L (ref 0–5)
EOSINOPHIL # BLD: 0.1 K/UL (ref 0–0.7)
EOSINOPHIL NFR BLD: 2.5 %
ERYTHROCYTE [DISTWIDTH] IN BLOOD BY AUTOMATED COUNT: 11.8 % (ref 11.5–14.5)
ERYTHROCYTE [SEDIMENTATION RATE] IN BLOOD BY WESTERGREN METHOD: 33 MM (ref 0–20)
GLOBULIN SER CALC-MCNC: 3.1 G/DL (ref 2.3–3.5)
GLUCOSE SERPL-MCNC: 112 MG/DL (ref 70–99)
HCT VFR BLD AUTO: 40.3 % (ref 42–52)
HGB BLD-MCNC: 14.8 G/DL (ref 14–18)
LACTATE BLDV-SCNC: 2.3 MMOL/L (ref 0.5–2.2)
LYMPHOCYTES # BLD: 1.6 K/UL (ref 1–4.8)
LYMPHOCYTES NFR BLD: 35.7 %
MCH RBC QN AUTO: 38.1 PG (ref 27–31.3)
MCHC RBC AUTO-ENTMCNC: 36.7 % (ref 33–37)
MCV RBC AUTO: 103.9 FL (ref 79–92.2)
MONOCYTES # BLD: 0.4 K/UL (ref 0.2–0.8)
MONOCYTES NFR BLD: 9.6 %
NEUTROPHILS # BLD: 2.3 K/UL (ref 1.4–6.5)
NEUTS SEG NFR BLD: 51.5 %
PLATELET # BLD AUTO: 144 K/UL (ref 130–400)
POTASSIUM SERPL-SCNC: 4.1 MEQ/L (ref 3.4–4.9)
PROT SERPL-MCNC: 7.3 G/DL (ref 6.3–8)
RBC # BLD AUTO: 3.88 M/UL (ref 4.7–6.1)
SODIUM SERPL-SCNC: 137 MEQ/L (ref 135–144)
WBC # BLD AUTO: 4.4 K/UL (ref 4.8–10.8)

## 2025-03-09 PROCEDURE — 86403 PARTICLE AGGLUT ANTBDY SCRN: CPT

## 2025-03-09 PROCEDURE — 83605 ASSAY OF LACTIC ACID: CPT

## 2025-03-09 PROCEDURE — 85025 COMPLETE CBC W/AUTO DIFF WBC: CPT

## 2025-03-09 PROCEDURE — 6370000000 HC RX 637 (ALT 250 FOR IP): Performed by: PHYSICIAN ASSISTANT

## 2025-03-09 PROCEDURE — 73630 X-RAY EXAM OF FOOT: CPT

## 2025-03-09 PROCEDURE — 99284 EMERGENCY DEPT VISIT MOD MDM: CPT

## 2025-03-09 PROCEDURE — 87040 BLOOD CULTURE FOR BACTERIA: CPT

## 2025-03-09 PROCEDURE — 36415 COLL VENOUS BLD VENIPUNCTURE: CPT

## 2025-03-09 PROCEDURE — 85652 RBC SED RATE AUTOMATED: CPT

## 2025-03-09 PROCEDURE — 87070 CULTURE OTHR SPECIMN AEROBIC: CPT

## 2025-03-09 PROCEDURE — 86140 C-REACTIVE PROTEIN: CPT

## 2025-03-09 PROCEDURE — 2580000003 HC RX 258: Performed by: PHYSICIAN ASSISTANT

## 2025-03-09 PROCEDURE — 87075 CULTR BACTERIA EXCEPT BLOOD: CPT

## 2025-03-09 PROCEDURE — 80053 COMPREHEN METABOLIC PANEL: CPT

## 2025-03-09 PROCEDURE — 96365 THER/PROPH/DIAG IV INF INIT: CPT

## 2025-03-09 PROCEDURE — 6360000002 HC RX W HCPCS: Performed by: PHYSICIAN ASSISTANT

## 2025-03-09 RX ORDER — DOXYCYCLINE 100 MG/1
100 CAPSULE ORAL ONCE
Status: COMPLETED | OUTPATIENT
Start: 2025-03-09 | End: 2025-03-09

## 2025-03-09 RX ORDER — DOXYCYCLINE HYCLATE 100 MG
100 TABLET ORAL 2 TIMES DAILY
Qty: 20 TABLET | Refills: 0 | Status: SHIPPED | OUTPATIENT
Start: 2025-03-09 | End: 2025-03-19

## 2025-03-09 RX ADMIN — PIPERACILLIN AND TAZOBACTAM 3375 MG: 3; .375 INJECTION, POWDER, LYOPHILIZED, FOR SOLUTION INTRAVENOUS at 16:39

## 2025-03-09 RX ADMIN — DOXYCYCLINE HYCLATE 100 MG: 100 CAPSULE ORAL at 17:37

## 2025-03-09 ASSESSMENT — LIFESTYLE VARIABLES
HOW MANY STANDARD DRINKS CONTAINING ALCOHOL DO YOU HAVE ON A TYPICAL DAY: PATIENT DECLINED
HOW OFTEN DO YOU HAVE A DRINK CONTAINING ALCOHOL: PATIENT DECLINED

## 2025-03-09 ASSESSMENT — PAIN - FUNCTIONAL ASSESSMENT: PAIN_FUNCTIONAL_ASSESSMENT: NONE - DENIES PAIN

## 2025-03-09 NOTE — ED PROVIDER NOTES
[facility] EMERGENCY DEPARTMENT  eMERGENCYdEPARTMENT eNCOUnter        Pt Name: Khari Diaz  MRN: [id number]  Birthdate 1955of evaluation: 3/9/2025  Provider:Gasper Gonzalez PA-C  3:41 PM EDT    CHIEF COMPLAINT       Chief Complaint   Patient presents with    Wound Check     Pt was sent to the ER by his podiatrist for further evaluation of wounds on toes of his left foot         HISTORY OF PRESENT ILLNESS  (Location/Symptom, Timing/Onset, Context/Setting, Quality, Duration, Modifying Factors, Severity.)   Khari Diaz is a 69 y.o. male who presents to the emergency department with wounds to the left third and fourth toe.  Patient has a history of diabetic neuropathy and states that on Friday he noticed wound developing between these 2 toes.  Wife has been soaking and changing the bandage and today wife noticed a malodorous drainage coming from the wound.  Patient denies any fevers.  Patient denies any pain to the area.    HPI    Nursing Notes were reviewed and I agree.    REVIEW OF SYSTEMS    (2-9 systems for level 4, 10 or more for level 5)     Review of Systems   Constitutional:  Negative for diaphoresis and fever.   HENT:  Negative for hearing loss and trouble swallowing.    Eyes:  Negative for pain.   Respiratory:  Negative for apnea and shortness of breath.    Cardiovascular:  Negative for chest pain.   Gastrointestinal:  Negative for abdominal pain.   Endocrine: Negative.    Genitourinary:  Negative for hematuria.   Musculoskeletal:  Negative for neck pain and neck stiffness.   Skin:  Positive for wound. Negative for color change.   Allergic/Immunologic: Negative.    Neurological:  Negative for dizziness and numbness.   Hematological: Negative.    Psychiatric/Behavioral: Negative.     All other systems reviewed and are negative.       as noted above the remainder of the review of systems was reviewed and negative.       PAST MEDICAL HISTORY     Past Medical History:   Diagnosis Date    Abscess of back

## 2025-03-09 NOTE — ED TRIAGE NOTES
Pt was instructed to come to the ER by his podiatrist for further evaluation of wounds on his left foot, Pt c/o edema and foul smelling drainage, Pt is A&OX4, calm, ambulatory with a walker, afebrile, breathes are equal and unlabored,

## 2025-03-09 NOTE — ED NOTES
AVS reviewed with and given to pt  IV removed  Pt aware antibiotic prescriptions sent to his preferred pharmacy  Wife here to drive patient home  Wound cleansed with saline, 2x2 gauze dressing/roll gauze applied to wound  Pt aware to schedule follow up with Dr. Houston  Aware to come to ER if symptoms worsen  
(2) good, crying

## 2025-03-10 ENCOUNTER — HOSPITAL ENCOUNTER (OUTPATIENT)
Dept: PHYSICAL THERAPY | Age: 70
Setting detail: THERAPIES SERIES
Discharge: HOME | End: 2025-03-10
Payer: MEDICARE

## 2025-03-10 ENCOUNTER — TELEPHONE (OUTPATIENT)
Age: 70
End: 2025-03-10

## 2025-03-10 ENCOUNTER — RESULTS FOLLOW-UP (OUTPATIENT)
Dept: EMERGENCY DEPT | Age: 70
End: 2025-03-10

## 2025-03-10 NOTE — TELEPHONE ENCOUNTER
Pt was seen in ED over the weekend. His wife called this morning to make a follow up appt and they put him in on 3/20/2024. Put the pt feels he probaly should be seen sooner then next week. The wound is bad and was wondering if Dr. Houston felt that same that he should be seen sooner then next Thursday after looking at the report and images from ED. Please Advise if Dr. Houston would like him to be seen this week.

## 2025-03-10 NOTE — PROGRESS NOTES
Therapy                            Cancellation/No-show Note    Date: 03/10/2025  Patient: Khari Diaz (69 y.o. male)  : 1955  MRN:  [id number]  Referring Physician: Giovanny Palm MD    Medical Diagnosis: Meniere's disease, bilateral [H81.03]  Ataxia, unspecified [R27.0]  Dizziness and giddiness [R42]  Unsteadiness on feet [R26.81]  Muscle weakness (generalized) [M62.81]  Unspecified sequelae of unspecified cerebrovascular disease [I69.90]  History of falling [Z91.81]      Visit Information:  Insurance: Payor: [facility] MEDICARE / Plan: [facility] MEDICARE COMPLETE / Product Type: *No Product type* /   Visits to Date: 12   No Show/Cancelled Appts: 0 / 2      For today's appointment patient:  [x]  Cancelled  []  Rescheduled appointment  []  No-show   []  Called pt to remind of next appointment     Reason given by patient:  []  Patient ill  []  Conflicting appointment  []  No transportation    []  Conflict with work  []  No reason given  [x]  Other: has wound on foot has to see DR     [] Pt has future appointments scheduled, no follow up needed  [] Pt requests to be on hold.    Reason:   If > 2 weeks please discuss with therapist.  [x] Therapist to call pt for follow up  []  to call pt to reschedule   Comments:       Signature: Electronically signed by Juliana Quinn PTA on 3/10/25 at 8:39 AM EDT

## 2025-03-13 ENCOUNTER — HOSPITAL ENCOUNTER (OUTPATIENT)
Dept: WOUND CARE | Age: 70
Discharge: HOME | End: 2025-03-13
Attending: PODIATRIST
Payer: MEDICARE

## 2025-03-13 VITALS
RESPIRATION RATE: 16 BRPM | DIASTOLIC BLOOD PRESSURE: 79 MMHG | SYSTOLIC BLOOD PRESSURE: 139 MMHG | TEMPERATURE: 98.1 F | HEART RATE: 86 BPM

## 2025-03-13 DIAGNOSIS — E11.621 TYPE 2 DIABETES MELLITUS WITH FOOT ULCER (CODE) (HCC): ICD-10-CM

## 2025-03-13 DIAGNOSIS — L97.522 TOE ULCER, LEFT, WITH FAT LAYER EXPOSED (HCC): Primary | ICD-10-CM

## 2025-03-13 PROCEDURE — 11042 DBRDMT SUBQ TIS 1ST 20SQCM/<: CPT

## 2025-03-13 PROCEDURE — 99213 OFFICE O/P EST LOW 20 MIN: CPT

## 2025-03-13 PROCEDURE — 6370000000 HC RX 637 (ALT 250 FOR IP): Performed by: PODIATRIST

## 2025-03-13 RX ORDER — LIDOCAINE 50 MG/G
OINTMENT TOPICAL PRN
OUTPATIENT
Start: 2025-03-13

## 2025-03-13 RX ORDER — CLOBETASOL PROPIONATE 0.5 MG/G
OINTMENT TOPICAL PRN
OUTPATIENT
Start: 2025-03-13

## 2025-03-13 RX ORDER — TRIAMCINOLONE ACETONIDE 1 MG/G
OINTMENT TOPICAL PRN
OUTPATIENT
Start: 2025-03-13

## 2025-03-13 RX ORDER — SODIUM HYPOCHLORITE 1.25 MG/ML
SOLUTION TOPICAL DAILY
Qty: 473 ML | Refills: 3 | Status: SHIPPED | OUTPATIENT
Start: 2025-03-13

## 2025-03-13 RX ORDER — LIDOCAINE HYDROCHLORIDE 20 MG/ML
JELLY TOPICAL PRN
OUTPATIENT
Start: 2025-03-13

## 2025-03-13 RX ORDER — NEOMYCIN/BACITRACIN/POLYMYXINB 3.5-400-5K
OINTMENT (GRAM) TOPICAL PRN
OUTPATIENT
Start: 2025-03-13

## 2025-03-13 RX ORDER — BETAMETHASONE DIPROPIONATE 0.5 MG/G
CREAM TOPICAL PRN
OUTPATIENT
Start: 2025-03-13

## 2025-03-13 RX ORDER — BACITRACIN ZINC AND POLYMYXIN B SULFATE 500; 1000 [USP'U]/G; [USP'U]/G
OINTMENT TOPICAL PRN
OUTPATIENT
Start: 2025-03-13

## 2025-03-13 RX ORDER — LIDOCAINE 40 MG/G
CREAM TOPICAL PRN
OUTPATIENT
Start: 2025-03-13

## 2025-03-13 RX ORDER — GENTAMICIN SULFATE 1 MG/G
OINTMENT TOPICAL PRN
OUTPATIENT
Start: 2025-03-13

## 2025-03-13 RX ORDER — LIDOCAINE 40 MG/G
CREAM TOPICAL PRN
Status: DISCONTINUED | OUTPATIENT
Start: 2025-03-13 | End: 2025-03-14 | Stop reason: HOSPADM

## 2025-03-13 RX ORDER — MUPIROCIN 20 MG/G
OINTMENT TOPICAL PRN
OUTPATIENT
Start: 2025-03-13

## 2025-03-13 RX ORDER — SODIUM CHLOR/HYPOCHLOROUS ACID 0.033 %
SOLUTION, IRRIGATION IRRIGATION PRN
OUTPATIENT
Start: 2025-03-13

## 2025-03-13 RX ORDER — GENTAMICIN SULFATE 1 MG/G
CREAM TOPICAL
Qty: 15 G | Refills: 1 | Status: SHIPPED | OUTPATIENT
Start: 2025-03-13

## 2025-03-13 RX ORDER — LIDOCAINE HYDROCHLORIDE 40 MG/ML
SOLUTION TOPICAL PRN
OUTPATIENT
Start: 2025-03-13

## 2025-03-13 RX ORDER — GINSENG 100 MG
CAPSULE ORAL PRN
OUTPATIENT
Start: 2025-03-13

## 2025-03-13 RX ORDER — GENTAMICIN SULFATE 1 MG/G
OINTMENT TOPICAL PRN
Status: DISCONTINUED | OUTPATIENT
Start: 2025-03-13 | End: 2025-03-14 | Stop reason: HOSPADM

## 2025-03-13 RX ADMIN — LIDOCAINE 4%: 4 CREAM TOPICAL at 14:09

## 2025-03-13 RX ADMIN — GENTAMICIN SULFATE: 1 OINTMENT TOPICAL at 14:51

## 2025-03-13 ASSESSMENT — PAIN DESCRIPTION - PAIN TYPE: TYPE: CHRONIC PAIN

## 2025-03-13 ASSESSMENT — PAIN DESCRIPTION - LOCATION: LOCATION: ANKLE

## 2025-03-13 ASSESSMENT — PAIN DESCRIPTION - DESCRIPTORS: DESCRIPTORS: BURNING;ACHING

## 2025-03-13 ASSESSMENT — PAIN - FUNCTIONAL ASSESSMENT: PAIN_FUNCTIONAL_ASSESSMENT: PREVENTS OR INTERFERES SOME ACTIVE ACTIVITIES AND ADLS

## 2025-03-13 ASSESSMENT — PAIN SCALES - GENERAL: PAINLEVEL_OUTOF10: 4

## 2025-03-13 ASSESSMENT — PAIN DESCRIPTION - ORIENTATION: ORIENTATION: LEFT

## 2025-03-13 NOTE — DISCHARGE INSTRUCTIONS
[facility]ound [facility] and Hyperbaric Medicine   Physician Orders and Discharge Instructions  [facility]  [address] [place]  [address]  Telephone: [phone]      [phone]      NAME:  Khari Diaz          YOB: 1955  MEDICAL RECORD NUMBER:  [id number]    Your  is:  Rosanna    Home Care/Facility: None    Wound Location: Left 3rd and 4th toe    Dressing orders: Cleanse with Dakin's solution  Apply a thin amount of Gentamicin ointment to Silvercel (calcium alginate with silver) and place between the 3rd and 4th toe  Cover both with a dry dressing  Change Daily    Compression: Wear your own compression stockings on the right and left lower legs.    Offloading Device:    Other Instructions: Finish taking your oral antibiotics that the ER ordered.  Please  your Dakin's solution and Gentamicin ointment prescription at your pharmacy and take/use as prescribed.    Keep all dressings clean, dry and intact.  Keep pressure off the wound(s) at all times.     Follow up visit   1 Week(s) March 20, 2025  @  2:00    Please give 24 hour notice if unable to keep appointment. [phone]    If you experience any of the following, please call the Wound Care Service at  [phone] or go to the nearest emergency room.   *Increase in pain *Temperature over 101 *Increase in drainage from your wound or a foul odor  *Uncontrolled swelling *Need for compression bandage changes due to slippage, breakthrough drainage       PLEASE NOTE: IF YOU ARE UNABLE TO OBTAIN WOUND SUPPLIES, CONTINUE TO USE THE SUPPLIES YOU HAVE AVAILABLE UNTIL YOU ARE ABLE TO REACH US. IT IS MOST IMPORTANT TO KEEP THE WOUND COVERED AT ALL TIMES

## 2025-03-13 NOTE — PROGRESS NOTES
[facility]                                                   Progress Note and Procedure Note      Khari Diaz  MEDICAL RECORD NUMBER:  [id number]  AGE: 69 y.o.   GENDER: male  : 1955  EPISODE DATE:  3/13/2025    Subjective:     No chief complaint on file.        HISTORY of PRESENT ILLNESS HPI     Khari Diaz is a 69 y.o. male who presents today for wound/ulcer evaluation.   History of Wound Context: Patient presents for treatment for wounds to the left third and fourth toes.  Patient believes he developed a blister after working with physical therapy for his lower back problems.  Patient presented to the emergency room after noticing increased discoloration of the surrounding skin.  Patient was given antibiotics, he is taking them as directed.  Patient reports continued burning sensation to his left ankle, he has not observed recurrence of the open wound that was present last year.    Patient denies nausea, vomiting, fever, chills, chest pain, or shortness of breath.    Wound/Ulcer Pain Timing/Severity: none  Quality of pain: N/A  Severity:  0 / 10   Modifying Factors: None  Associated Signs/Symptoms: drainage    Ulcer Identification:  Ulcer Type: diabetic  Contributing Factors: shear force    Wound:  Full-thickness ulcerations left third and fourth toe        PAST MEDICAL HISTORY        Diagnosis Date    Abscess of back 2020    Abscess of right leg 2020    Acute renal failure     Adenomatous polyp of ascending colon     Adenomatous polyp of descending colon     Adenomatous polyp of transverse colon     Anxiety     CAD S/P percutaneous coronary angioplasty 2014    Cardiomyopathy (HCC)     Cecal polyp     Cerebrovascular accident (CVA) due to occlusion of left middle cerebral artery (HCC) 2016    brainstem infarction from left MCA occlusion    Cerebrovascular disease 2016    Claudication     Colon polyp     Coronary angioplasty status     Cutaneous abscess of back  You were seen today for some stiffness in your hands and also some nodules  Lets evaluate to see if anything autoimmune is going on  We will have you get x-rays today  Please do follow-up when you have the active nodules

## 2025-03-14 LAB
BACTERIA BLD CULT ORG #2: NORMAL
BACTERIA BLD CULT: NORMAL
CULTURE WOUND: NORMAL

## 2025-03-20 ENCOUNTER — HOSPITAL ENCOUNTER (OUTPATIENT)
Dept: WOUND CARE | Age: 70
Discharge: HOME | End: 2025-03-20
Attending: PODIATRIST
Payer: MEDICARE

## 2025-03-20 VITALS
HEART RATE: 75 BPM | RESPIRATION RATE: 18 BRPM | DIASTOLIC BLOOD PRESSURE: 66 MMHG | SYSTOLIC BLOOD PRESSURE: 132 MMHG | TEMPERATURE: 97.6 F

## 2025-03-20 DIAGNOSIS — L97.522 TOE ULCER, LEFT, WITH FAT LAYER EXPOSED (HCC): Primary | ICD-10-CM

## 2025-03-20 PROBLEM — L97.322 ULCER OF LEFT ANKLE, WITH FAT LAYER EXPOSED (HCC): Status: RESOLVED | Noted: 2023-07-20 | Resolved: 2025-03-20

## 2025-03-20 PROCEDURE — 11042 DBRDMT SUBQ TIS 1ST 20SQCM/<: CPT

## 2025-03-20 PROCEDURE — 6370000000 HC RX 637 (ALT 250 FOR IP): Performed by: PODIATRIST

## 2025-03-20 RX ORDER — LIDOCAINE HYDROCHLORIDE 20 MG/ML
JELLY TOPICAL PRN
Status: DISCONTINUED | OUTPATIENT
Start: 2025-03-20 | End: 2025-03-21 | Stop reason: HOSPADM

## 2025-03-20 RX ORDER — SODIUM CHLOR/HYPOCHLOROUS ACID 0.033 %
SOLUTION, IRRIGATION IRRIGATION PRN
OUTPATIENT
Start: 2025-03-20

## 2025-03-20 RX ORDER — GENTAMICIN SULFATE 1 MG/G
OINTMENT TOPICAL PRN
Status: DISCONTINUED | OUTPATIENT
Start: 2025-03-20 | End: 2025-03-21 | Stop reason: HOSPADM

## 2025-03-20 RX ORDER — LIDOCAINE HYDROCHLORIDE 20 MG/ML
JELLY TOPICAL PRN
OUTPATIENT
Start: 2025-03-20

## 2025-03-20 RX ORDER — CLOBETASOL PROPIONATE 0.5 MG/G
OINTMENT TOPICAL PRN
OUTPATIENT
Start: 2025-03-20

## 2025-03-20 RX ORDER — BETAMETHASONE DIPROPIONATE 0.5 MG/G
CREAM TOPICAL PRN
OUTPATIENT
Start: 2025-03-20

## 2025-03-20 RX ORDER — MUPIROCIN 20 MG/G
OINTMENT TOPICAL PRN
OUTPATIENT
Start: 2025-03-20

## 2025-03-20 RX ORDER — LIDOCAINE HYDROCHLORIDE 40 MG/ML
SOLUTION TOPICAL PRN
OUTPATIENT
Start: 2025-03-20

## 2025-03-20 RX ORDER — GENTAMICIN SULFATE 1 MG/G
OINTMENT TOPICAL PRN
OUTPATIENT
Start: 2025-03-20

## 2025-03-20 RX ORDER — GINSENG 100 MG
CAPSULE ORAL PRN
OUTPATIENT
Start: 2025-03-20

## 2025-03-20 RX ORDER — NEOMYCIN/BACITRACIN/POLYMYXINB 3.5-400-5K
OINTMENT (GRAM) TOPICAL PRN
OUTPATIENT
Start: 2025-03-20

## 2025-03-20 RX ORDER — BACITRACIN ZINC AND POLYMYXIN B SULFATE 500; 1000 [USP'U]/G; [USP'U]/G
OINTMENT TOPICAL PRN
OUTPATIENT
Start: 2025-03-20

## 2025-03-20 RX ORDER — TRIAMCINOLONE ACETONIDE 1 MG/G
OINTMENT TOPICAL PRN
OUTPATIENT
Start: 2025-03-20

## 2025-03-20 RX ORDER — LIDOCAINE 50 MG/G
OINTMENT TOPICAL PRN
OUTPATIENT
Start: 2025-03-20

## 2025-03-20 RX ORDER — LIDOCAINE 40 MG/G
CREAM TOPICAL PRN
OUTPATIENT
Start: 2025-03-20

## 2025-03-20 RX ADMIN — LIDOCAINE HYDROCHLORIDE: 20 JELLY TOPICAL at 14:22

## 2025-03-20 NOTE — PROGRESS NOTES
gel  thin layer applied topically to wound bed      Debridement:Excisional Debridement    Using curette the wound(s)/ulcer(s) was/were sharply debrided down through and including the removal of subcutaneous tissue.        Devitalized Tissue Debrided:  fibrin, slough, and callus    Pre Debridement Measurements:  Are located in the Wound/Ulcer Documentation Flow Sheet    Wound/Ulcer #: 1 and 2    Post Debridement Measurements:  Wound/Ulcer Descriptions are Pre Debridement except measurements:    Wound 03/13/25 Toe (Comment  which one) Left #1 left third toe (Active)   Wound Image   03/20/25 1411   Wound Etiology Diabetic 03/20/25 1411   Wound Cleansed Cleansed with saline 03/20/25 1411   Dressing/Treatment Antibacterial ointment;Alginate with Ag;Dry dressing 03/13/25 1443   Wound Length (cm) 0.8 cm 03/20/25 1418   Wound Width (cm) 1.4 cm 03/20/25 1418   Wound Depth (cm) 0.2 cm 03/20/25 1418   Wound Surface Area (cm^2) 1.12 cm^2 03/20/25 1418   Change in Wound Size % (l*w) 25.33 03/20/25 1418   Wound Volume (cm^3) 0.224 cm^3 03/20/25 1418   Wound Healing % 25 03/20/25 1418   Post-Procedure Length (cm) 0.8 cm 03/20/25 1436   Post-Procedure Width (cm) 1.4 cm 03/20/25 1436   Post-Procedure Depth (cm) 0.2 cm 03/20/25 1436   Post-Procedure Surface Area (cm^2) 1.12 cm^2 03/20/25 1436   Post-Procedure Volume (cm^3) 0.224 cm^3 03/20/25 1436   Wound Assessment Pink/red;Slough 03/20/25 1411   Drainage Amount Moderate (25-50%) 03/20/25 1411   Drainage Description Serosanguinous 03/20/25 1411   Odor None 03/20/25 1411   Gisela-wound Assessment Fragile;Maceration 03/20/25 1411   Margins Undefined edges 03/20/25 1411   Wound Thickness Description not for Pressure Injury Full thickness 03/20/25 1411   Number of days: 7       Wound 03/13/25 Toe (Comment  which one) Left;Anterior #2 left fourth toe (Active)   Wound Image   03/20/25 1411   Wound Etiology Diabetic 03/20/25 1411   Wound Cleansed Cleansed with saline 03/20/25 1411

## 2025-03-20 NOTE — DISCHARGE INSTRUCTIONS
[facility] Wound Center and Hyperbaric Medicine   Physician Orders and Discharge Instructions  [facility]  [address] [place]  [address]  Telephone: [phone]      [phone]        NAME:  Khari Diaz                                                                                                YOB: 1955  MEDICAL RECORD NUMBER:  [id number]     Your  is:  Rosanna     Home Care/Facility: None     Wound Location: Left 3rd and 4th toe     Dressing orders: Cleanse with Dakin's solution  Apply a thin amount of Gentamicin ointment to Silvercel (calcium alginate with silver) and place between the 3rd and 4th toe  Cover both with a dry dressing  Change Daily     Compression: Wear your own compression stockings on the right and left lower legs.     Offloading Device:     Other Instructions: Let the area \"air out\" for an hour daily     Keep all dressings clean, dry and intact.  Keep pressure off the wound(s) at all times.      Follow up visit   1 Week(s) March 27, 2025  @  10:30     Please give 24 hour notice if unable to keep appointment. [phone]     If you experience any of the following, please call the Wound Care Service at  [phone] or go to the nearest emergency room.        *Increase in pain*Temperature over 101*Increase in drainage from your wound or a foul odor  *Uncontrolled swelling*Need for compression bandage changes due to slippage, breakthrough drainage       PLEASE NOTE: IF YOU ARE UNABLE TO OBTAIN WOUND SUPPLIES, CONTINUE TO USE THE SUPPLIES YOU HAVE AVAILABLE UNTIL YOU ARE ABLE TO REACH US. IT IS MOST IMPORTANT TO KEEP THE WOUND COVERED AT ALL TIMES

## 2025-03-25 ENCOUNTER — HOSPITAL ENCOUNTER (OUTPATIENT)
Dept: LAB | Age: 70
Discharge: HOME | End: 2025-03-25
Payer: MEDICARE

## 2025-03-25 ENCOUNTER — OFFICE VISIT (OUTPATIENT)
Dept: FAMILY MEDICINE CLINIC | Age: 70
End: 2025-03-25
Payer: MEDICARE

## 2025-03-25 VITALS
OXYGEN SATURATION: 98 % | HEART RATE: 97 BPM | BODY MASS INDEX: 31.12 KG/M2 | SYSTOLIC BLOOD PRESSURE: 116 MMHG | HEIGHT: 73 IN | DIASTOLIC BLOOD PRESSURE: 58 MMHG | TEMPERATURE: 98.1 F | WEIGHT: 234.8 LBS

## 2025-03-25 DIAGNOSIS — E11.65 TYPE 2 DIABETES MELLITUS WITH HYPERGLYCEMIA, WITHOUT LONG-TERM CURRENT USE OF INSULIN (HCC): ICD-10-CM

## 2025-03-25 DIAGNOSIS — J44.9 CHRONIC OBSTRUCTIVE PULMONARY DISEASE, UNSPECIFIED COPD TYPE (HCC): Primary | ICD-10-CM

## 2025-03-25 DIAGNOSIS — E11.29 MICROALBUMINURIA DUE TO TYPE 2 DIABETES MELLITUS (HCC): Chronic | ICD-10-CM

## 2025-03-25 DIAGNOSIS — Z13.0 SCREENING, ANEMIA, DEFICIENCY, IRON: ICD-10-CM

## 2025-03-25 DIAGNOSIS — R80.9 MICROALBUMINURIA DUE TO TYPE 2 DIABETES MELLITUS (HCC): Chronic | ICD-10-CM

## 2025-03-25 DIAGNOSIS — Z13.29 SCREENING FOR THYROID DISORDER: ICD-10-CM

## 2025-03-25 DIAGNOSIS — Z12.11 SCREENING FOR COLON CANCER: ICD-10-CM

## 2025-03-25 DIAGNOSIS — E11.59 HYPERTENSION ASSOCIATED WITH DIABETES: ICD-10-CM

## 2025-03-25 DIAGNOSIS — E11.22 TYPE 2 DIABETES MELLITUS WITH STAGE 3A CHRONIC KIDNEY DISEASE, WITHOUT LONG-TERM CURRENT USE OF INSULIN (HCC): ICD-10-CM

## 2025-03-25 DIAGNOSIS — N18.31 TYPE 2 DIABETES MELLITUS WITH STAGE 3A CHRONIC KIDNEY DISEASE, WITHOUT LONG-TERM CURRENT USE OF INSULIN (HCC): ICD-10-CM

## 2025-03-25 DIAGNOSIS — I15.2 HYPERTENSION ASSOCIATED WITH DIABETES: ICD-10-CM

## 2025-03-25 DIAGNOSIS — Z12.5 SCREENING FOR PROSTATE CANCER: ICD-10-CM

## 2025-03-25 DIAGNOSIS — Z72.0 TOBACCO USE: ICD-10-CM

## 2025-03-25 DIAGNOSIS — Z87.39 H/O: GOUT: ICD-10-CM

## 2025-03-25 DIAGNOSIS — L97.522 TOE ULCER, LEFT, WITH FAT LAYER EXPOSED (HCC): ICD-10-CM

## 2025-03-25 PROBLEM — Z98.61 CAD S/P PERCUTANEOUS CORONARY ANGIOPLASTY: Chronic | Status: ACTIVE | Noted: 2025-03-25

## 2025-03-25 PROBLEM — E11.42 TYPE 2 DIABETES MELLITUS WITH DIABETIC POLYNEUROPATHY, WITHOUT LONG-TERM CURRENT USE OF INSULIN (HCC): Chronic | Status: ACTIVE | Noted: 2025-03-25

## 2025-03-25 PROBLEM — Z86.73 HISTORY OF STROKE: Status: RESOLVED | Noted: 2025-03-25 | Resolved: 2025-03-25

## 2025-03-25 PROBLEM — H93.13 TINNITUS OF BOTH EARS: Chronic | Status: RESOLVED | Noted: 2020-07-01 | Resolved: 2025-03-25

## 2025-03-25 PROBLEM — G31.9 DEGENERATIVE DISEASE OF NERVOUS SYSTEM, UNSPECIFIED: Status: RESOLVED | Noted: 2023-01-04 | Resolved: 2025-03-25

## 2025-03-25 PROBLEM — R26.9 GAIT DISTURBANCE: Status: ACTIVE | Noted: 2025-03-25

## 2025-03-25 PROBLEM — R60.0 LOWER EXTREMITY EDEMA: Status: RESOLVED | Noted: 2023-09-07 | Resolved: 2025-03-25

## 2025-03-25 PROBLEM — Z86.73 HISTORY OF STROKE: Status: ACTIVE | Noted: 2025-03-25

## 2025-03-25 PROBLEM — R26.81 UNSTEADY GAIT: Chronic | Status: RESOLVED | Noted: 2017-05-15 | Resolved: 2025-03-25

## 2025-03-25 PROBLEM — R60.0 BILATERAL LOWER EXTREMITY EDEMA: Status: RESOLVED | Noted: 2025-03-25 | Resolved: 2025-03-25

## 2025-03-25 PROBLEM — E11.622 TYPE 2 DIABETES MELLITUS WITH OTHER SKIN ULCER (CODE): Status: ACTIVE | Noted: 2025-03-25

## 2025-03-25 PROBLEM — I63.9 CEREBROVASCULAR ACCIDENT (CVA) (HCC): Status: RESOLVED | Noted: 2025-03-25 | Resolved: 2025-03-25

## 2025-03-25 PROBLEM — Z86.0100 HISTORY OF COLON POLYPS: Chronic | Status: RESOLVED | Noted: 2021-04-30 | Resolved: 2025-03-25

## 2025-03-25 PROBLEM — I69.90 LATE EFFECTS OF CVA (CEREBROVASCULAR ACCIDENT): Chronic | Status: RESOLVED | Noted: 2020-10-28 | Resolved: 2025-03-25

## 2025-03-25 PROBLEM — E78.5 HYPERLIPIDEMIA ASSOCIATED WITH TYPE 2 DIABETES MELLITUS: Status: ACTIVE | Noted: 2025-03-25

## 2025-03-25 PROBLEM — E11.69 HYPERLIPIDEMIA ASSOCIATED WITH TYPE 2 DIABETES MELLITUS: Status: ACTIVE | Noted: 2025-03-25

## 2025-03-25 PROBLEM — R26.0 ATAXIC GAIT: Status: RESOLVED | Noted: 2020-07-01 | Resolved: 2025-03-25

## 2025-03-25 PROBLEM — L72.3 INFECTED SEBACEOUS CYST OF SKIN: Status: RESOLVED | Noted: 2024-06-10 | Resolved: 2025-03-25

## 2025-03-25 PROBLEM — M54.16 LUMBAR RADICULOPATHY: Status: RESOLVED | Noted: 2021-06-30 | Resolved: 2025-03-25

## 2025-03-25 PROBLEM — E11.9 TYPE 2 DIABETES MELLITUS WITHOUT COMPLICATION, WITHOUT LONG-TERM CURRENT USE OF INSULIN: Status: ACTIVE | Noted: 2025-03-25

## 2025-03-25 PROBLEM — G45.0 VBI (VERTEBROBASILAR INSUFFICIENCY): Status: RESOLVED | Noted: 2022-06-29 | Resolved: 2025-03-25

## 2025-03-25 PROBLEM — L08.9 INFECTED SEBACEOUS CYST OF SKIN: Status: RESOLVED | Noted: 2024-06-10 | Resolved: 2025-03-25

## 2025-03-25 PROBLEM — I25.10 CAD S/P PERCUTANEOUS CORONARY ANGIOPLASTY: Chronic | Status: ACTIVE | Noted: 2025-03-25

## 2025-03-25 PROBLEM — E78.5 HYPERLIPIDEMIA: Status: RESOLVED | Noted: 2021-04-30 | Resolved: 2025-03-25

## 2025-03-25 PROBLEM — R60.0 BILATERAL LOWER EXTREMITY EDEMA: Status: ACTIVE | Noted: 2025-03-25

## 2025-03-25 PROBLEM — I63.9 CEREBROVASCULAR ACCIDENT (CVA) (HCC): Status: ACTIVE | Noted: 2025-03-25

## 2025-03-25 PROBLEM — H61.92 SKIN LESION OF LEFT EXTERNAL EAR: Status: RESOLVED | Noted: 2021-04-30 | Resolved: 2025-03-25

## 2025-03-25 PROBLEM — R42 DIZZINESS: Status: RESOLVED | Noted: 2021-06-30 | Resolved: 2025-03-25

## 2025-03-25 PROBLEM — Z91.81 AT HIGH RISK FOR FALLS: Chronic | Status: RESOLVED | Noted: 2020-11-06 | Resolved: 2025-03-25

## 2025-03-25 PROBLEM — E11.621 TYPE 2 DIABETES MELLITUS WITH FOOT ULCER (CODE): Status: RESOLVED | Noted: 2025-03-13 | Resolved: 2025-03-25

## 2025-03-25 PROBLEM — R60.9 DEPENDENT EDEMA: Chronic | Status: RESOLVED | Noted: 2017-09-14 | Resolved: 2025-03-25

## 2025-03-25 PROBLEM — R27.0 ATAXIA: Status: RESOLVED | Noted: 2022-06-29 | Resolved: 2025-03-25

## 2025-03-25 PROCEDURE — 3017F COLORECTAL CA SCREEN DOC REV: CPT | Performed by: INTERNAL MEDICINE

## 2025-03-25 PROCEDURE — 84165 PROTEIN E-PHORESIS SERUM: CPT

## 2025-03-25 PROCEDURE — 3023F SPIROM DOC REV: CPT | Performed by: INTERNAL MEDICINE

## 2025-03-25 PROCEDURE — 1159F MED LIST DOCD IN RCRD: CPT | Performed by: INTERNAL MEDICINE

## 2025-03-25 PROCEDURE — 2022F DILAT RTA XM EVC RTNOPTHY: CPT | Performed by: INTERNAL MEDICINE

## 2025-03-25 PROCEDURE — 3046F HEMOGLOBIN A1C LEVEL >9.0%: CPT | Performed by: INTERNAL MEDICINE

## 2025-03-25 PROCEDURE — 83970 ASSAY OF PARATHORMONE: CPT

## 2025-03-25 PROCEDURE — 3078F DIAST BP <80 MM HG: CPT | Performed by: INTERNAL MEDICINE

## 2025-03-25 PROCEDURE — 84155 ASSAY OF PROTEIN SERUM: CPT

## 2025-03-25 PROCEDURE — 85027 COMPLETE CBC AUTOMATED: CPT

## 2025-03-25 PROCEDURE — 4004F PT TOBACCO SCREEN RCVD TLK: CPT | Performed by: INTERNAL MEDICINE

## 2025-03-25 PROCEDURE — 82306 VITAMIN D 25 HYDROXY: CPT

## 2025-03-25 PROCEDURE — 3074F SYST BP LT 130 MM HG: CPT | Performed by: INTERNAL MEDICINE

## 2025-03-25 PROCEDURE — 84156 ASSAY OF PROTEIN URINE: CPT

## 2025-03-25 PROCEDURE — 80069 RENAL FUNCTION PANEL: CPT

## 2025-03-25 PROCEDURE — 99214 OFFICE O/P EST MOD 30 MIN: CPT | Performed by: INTERNAL MEDICINE

## 2025-03-25 PROCEDURE — 1123F ACP DISCUSS/DSCN MKR DOCD: CPT | Performed by: INTERNAL MEDICINE

## 2025-03-25 PROCEDURE — G8417 CALC BMI ABV UP PARAM F/U: HCPCS | Performed by: INTERNAL MEDICINE

## 2025-03-25 PROCEDURE — 36415 COLL VENOUS BLD VENIPUNCTURE: CPT

## 2025-03-25 PROCEDURE — 1160F RVW MEDS BY RX/DR IN RCRD: CPT | Performed by: INTERNAL MEDICINE

## 2025-03-25 PROCEDURE — G8427 DOCREV CUR MEDS BY ELIG CLIN: HCPCS | Performed by: INTERNAL MEDICINE

## 2025-03-25 RX ORDER — TORSEMIDE 20 MG/1
20 TABLET ORAL DAILY
Qty: 90 TABLET | Refills: 1 | Status: SHIPPED | OUTPATIENT
Start: 2025-03-25

## 2025-03-25 RX ORDER — LANCETS 28 GAUGE
EACH MISCELLANEOUS
Qty: 100 EACH | Refills: 5 | Status: SHIPPED | OUTPATIENT
Start: 2025-03-25

## 2025-03-25 RX ORDER — CARVEDILOL 25 MG/1
25 TABLET ORAL 2 TIMES DAILY
Qty: 180 TABLET | Refills: 1 | Status: SHIPPED | OUTPATIENT
Start: 2025-03-25

## 2025-03-25 RX ORDER — ROSUVASTATIN CALCIUM 40 MG/1
40 TABLET, COATED ORAL NIGHTLY
Qty: 90 TABLET | Refills: 1 | Status: SHIPPED | OUTPATIENT
Start: 2025-03-25

## 2025-03-25 RX ORDER — CLOPIDOGREL BISULFATE 75 MG/1
75 TABLET ORAL DAILY
Qty: 90 TABLET | Refills: 1 | Status: SHIPPED | OUTPATIENT
Start: 2025-03-25

## 2025-03-25 RX ORDER — ALLOPURINOL 300 MG/1
300 TABLET ORAL DAILY
Qty: 90 TABLET | Refills: 1 | Status: SHIPPED | OUTPATIENT
Start: 2025-03-25

## 2025-03-25 SDOH — ECONOMIC STABILITY: FOOD INSECURITY: WITHIN THE PAST 12 MONTHS, YOU WORRIED THAT YOUR FOOD WOULD RUN OUT BEFORE YOU GOT MONEY TO BUY MORE.: NEVER TRUE

## 2025-03-25 SDOH — ECONOMIC STABILITY: FOOD INSECURITY: WITHIN THE PAST 12 MONTHS, THE FOOD YOU BOUGHT JUST DIDN'T LAST AND YOU DIDN'T HAVE MONEY TO GET MORE.: NEVER TRUE

## 2025-03-25 ASSESSMENT — ENCOUNTER SYMPTOMS
CONSTIPATION: 0
PHOTOPHOBIA: 0
EYE PAIN: 0
COLOR CHANGE: 0
ABDOMINAL PAIN: 0
BLOOD IN STOOL: 0
NAUSEA: 0
VOICE CHANGE: 0

## 2025-03-25 ASSESSMENT — PATIENT HEALTH QUESTIONNAIRE - PHQ9
3. TROUBLE FALLING OR STAYING ASLEEP: NOT AT ALL
2. FEELING DOWN, DEPRESSED OR HOPELESS: NOT AT ALL
SUM OF ALL RESPONSES TO PHQ QUESTIONS 1-9: 0
SUM OF ALL RESPONSES TO PHQ QUESTIONS 1-9: 0
10. IF YOU CHECKED OFF ANY PROBLEMS, HOW DIFFICULT HAVE THESE PROBLEMS MADE IT FOR YOU TO DO YOUR WORK, TAKE CARE OF THINGS AT HOME, OR GET ALONG WITH OTHER PEOPLE: NOT DIFFICULT AT ALL
5. POOR APPETITE OR OVEREATING: NOT AT ALL
4. FEELING TIRED OR HAVING LITTLE ENERGY: NOT AT ALL
7. TROUBLE CONCENTRATING ON THINGS, SUCH AS READING THE NEWSPAPER OR WATCHING TELEVISION: NOT AT ALL
6. FEELING BAD ABOUT YOURSELF - OR THAT YOU ARE A FAILURE OR HAVE LET YOURSELF OR YOUR FAMILY DOWN: NOT AT ALL
9. THOUGHTS THAT YOU WOULD BE BETTER OFF DEAD, OR OF HURTING YOURSELF: NOT AT ALL
SUM OF ALL RESPONSES TO PHQ QUESTIONS 1-9: 0
1. LITTLE INTEREST OR PLEASURE IN DOING THINGS: NOT AT ALL
SUM OF ALL RESPONSES TO PHQ QUESTIONS 1-9: 0
8. MOVING OR SPEAKING SO SLOWLY THAT OTHER PEOPLE COULD HAVE NOTICED. OR THE OPPOSITE, BEING SO FIGETY OR RESTLESS THAT YOU HAVE BEEN MOVING AROUND A LOT MORE THAN USUAL: NOT AT ALL

## 2025-03-25 NOTE — PROGRESS NOTES
[facility]  [address]  [address]  [facility] [address]  Dept: [phone]  Dept Fax: [phone]  Loc: [phone]     3/25/2025    Visit type: Follow up    The patient (or guardian, if applicable) and other individuals in attendance with the patient were advised that Artificial Intelligence will be utilized during this visit to record, process the conversation to generate a clinical note, and support improvement of the AI technology. The patient (or guardian, if applicable) and other individuals in attendance at the appointment consented to the use of AI, including the recording.      Reason for Visit: Establish Care (Patient presents today to establish care. Previous PCP Dr. Palm. ) and Skin Problem (Patient injects Trulicity in abdomen. States he has a scab x4 months at one of the injection sites. )       ASSESSMENT/PLAN     Assessment & Plan  1. Diabetes Mellitus.  - Recent blood work results are satisfactory, with an A1c of 7.2 and blood sugar levels well-controlled (112 mg/dL this morning).  - Current medication regimen is effective; advised to maintain a balanced diet.  - Blood work is scheduled for 06/2025 to monitor condition.  - Continue current medications and dietary recommendations.    2. Hypertension.  - Blood pressure readings are within the normal range.  - Current antihypertensive medication regimen is effective.  - Advised to adhere to a DASH diet.  - Continue current medications and dietary recommendations.    3. Chronic Obstructive Pulmonary Disease (COPD).  - Continues to smoke between three-quarters to a full pack per day.  - Advised to quit smoking to prevent further deterioration of COPD and reduce stroke risk.  - Smoking cessation aids such as patches and medications discussed.  - Strongly advised to quit smoking; consider cessation aids.    4. Peripheral Arterial Disease.  - Ongoing problem with left foot, including a wound

## 2025-03-27 ENCOUNTER — HOSPITAL ENCOUNTER (OUTPATIENT)
Dept: WOUND CARE | Age: 70
Discharge: HOME | End: 2025-03-27
Attending: PODIATRIST
Payer: MEDICARE

## 2025-03-27 VITALS
SYSTOLIC BLOOD PRESSURE: 135 MMHG | DIASTOLIC BLOOD PRESSURE: 67 MMHG | RESPIRATION RATE: 18 BRPM | TEMPERATURE: 98.2 F | HEART RATE: 91 BPM

## 2025-03-27 DIAGNOSIS — L97.522 TOE ULCER, LEFT, WITH FAT LAYER EXPOSED (HCC): Primary | ICD-10-CM

## 2025-03-27 PROCEDURE — 11042 DBRDMT SUBQ TIS 1ST 20SQCM/<: CPT

## 2025-03-27 PROCEDURE — 6370000000 HC RX 637 (ALT 250 FOR IP): Performed by: PODIATRIST

## 2025-03-27 RX ORDER — GENTAMICIN SULFATE 1 MG/G
OINTMENT TOPICAL PRN
OUTPATIENT
Start: 2025-03-27

## 2025-03-27 RX ORDER — LIDOCAINE HYDROCHLORIDE 20 MG/ML
JELLY TOPICAL PRN
OUTPATIENT
Start: 2025-03-27

## 2025-03-27 RX ORDER — BACITRACIN ZINC AND POLYMYXIN B SULFATE 500; 1000 [USP'U]/G; [USP'U]/G
OINTMENT TOPICAL PRN
OUTPATIENT
Start: 2025-03-27

## 2025-03-27 RX ORDER — SODIUM CHLOR/HYPOCHLOROUS ACID 0.033 %
SOLUTION, IRRIGATION IRRIGATION PRN
OUTPATIENT
Start: 2025-03-27

## 2025-03-27 RX ORDER — TRIAMCINOLONE ACETONIDE 1 MG/G
OINTMENT TOPICAL PRN
OUTPATIENT
Start: 2025-03-27

## 2025-03-27 RX ORDER — GINSENG 100 MG
CAPSULE ORAL PRN
OUTPATIENT
Start: 2025-03-27

## 2025-03-27 RX ORDER — LIDOCAINE 50 MG/G
OINTMENT TOPICAL PRN
OUTPATIENT
Start: 2025-03-27

## 2025-03-27 RX ORDER — CLOBETASOL PROPIONATE 0.5 MG/G
OINTMENT TOPICAL PRN
OUTPATIENT
Start: 2025-03-27

## 2025-03-27 RX ORDER — LIDOCAINE 40 MG/G
CREAM TOPICAL PRN
OUTPATIENT
Start: 2025-03-27

## 2025-03-27 RX ORDER — MUPIROCIN 20 MG/G
OINTMENT TOPICAL PRN
OUTPATIENT
Start: 2025-03-27

## 2025-03-27 RX ORDER — LIDOCAINE HYDROCHLORIDE 20 MG/ML
JELLY TOPICAL PRN
Status: DISCONTINUED | OUTPATIENT
Start: 2025-03-27 | End: 2025-03-28 | Stop reason: HOSPADM

## 2025-03-27 RX ORDER — LIDOCAINE HYDROCHLORIDE 40 MG/ML
SOLUTION TOPICAL PRN
OUTPATIENT
Start: 2025-03-27

## 2025-03-27 RX ORDER — NEOMYCIN/BACITRACIN/POLYMYXINB 3.5-400-5K
OINTMENT (GRAM) TOPICAL PRN
OUTPATIENT
Start: 2025-03-27

## 2025-03-27 RX ORDER — BETAMETHASONE DIPROPIONATE 0.5 MG/G
CREAM TOPICAL PRN
OUTPATIENT
Start: 2025-03-27

## 2025-03-27 RX ADMIN — LIDOCAINE HYDROCHLORIDE: 20 JELLY TOPICAL at 11:25

## 2025-03-27 ASSESSMENT — PAIN DESCRIPTION - LOCATION: LOCATION: ANKLE

## 2025-03-27 ASSESSMENT — PAIN DESCRIPTION - ORIENTATION: ORIENTATION: LEFT

## 2025-03-27 ASSESSMENT — PAIN DESCRIPTION - DESCRIPTORS: DESCRIPTORS: BURNING;ACHING

## 2025-03-27 ASSESSMENT — PAIN - FUNCTIONAL ASSESSMENT: PAIN_FUNCTIONAL_ASSESSMENT: PREVENTS OR INTERFERES SOME ACTIVE ACTIVITIES AND ADLS

## 2025-03-27 ASSESSMENT — PAIN DESCRIPTION - PAIN TYPE: TYPE: CHRONIC PAIN

## 2025-03-27 ASSESSMENT — PAIN SCALES - GENERAL: PAINLEVEL_OUTOF10: 4

## 2025-03-27 NOTE — PROGRESS NOTES
CHOLECYSTECTOMY      COLONOSCOPY  01/15/2010    polyp, diverticulosis (DR ELAINE)    COLONOSCOPY N/A 08/19/2021    COLORECTAL CANCER SCREENING, HIGH RISK with polypectomies  performed by Bina Ferguson MD at [facility]    COLONOSCOPY N/A 08/20/2021    polyps x 3, 3y repeat (DR VIVEROS)  COLONOSCOPY with polypectomies DIAGNOSTIC performed by Alexey Viveros MD at [facility]    CORONARY ANGIOPLASTY WITH STENT PLACEMENT  2014    3 stents    CYST REMOVAL  05/16/2016    DR SCHMID,  L SCROTAL CYST, R thigh, L ear, back    EYE SURGERY  2017    cataractsboth eyes    SHOULDER SURGERY Bilateral 12/18/2015    left once right twice- to remove bone spurs    TONSILLECTOMY         FAMILY HISTORY    Family History   Problem Relation Age of Onset    Breast Cancer Mother     Stroke Father         86 years old    Colon Cancer Father 75       SOCIAL HISTORY    Social History     Tobacco Use    Smoking status: Every Day     Current packs/day: 2.50     Average packs/day: 2.5 packs/day for 30.0 years (75.0 ttl pk-yrs)     Types: Cigarettes     Passive exposure: Current    Smokeless tobacco: Never    Tobacco comments:     varies   Vaping Use    Vaping status: Never Used   Substance Use Topics    Alcohol use: Yes     Comment: varies    Drug use: No       ALLERGIES    Allergies   Allergen Reactions    Bactrim [Sulfamethoxazole-Trimethoprim] Nausea And Vomiting and Other (See Comments)     Sugar count elevated, had troubles urinating       MEDICATIONS    Current Outpatient Medications on File Prior to Encounter   Medication Sig Dispense Refill    torsemide (DEMADEX) 20 MG tablet Take 1 tablet by mouth daily 90 tablet 1    rosuvastatin (CRESTOR) 40 MG tablet Take 1 tablet by mouth nightly 90 tablet 1    metFORMIN (GLUCOPHAGE) 500 MG tablet Take 1 tablet by mouth 2 times daily (with meals) 180 tablet 1    carvedilol (COREG) 25 MG tablet Take 1 tablet by mouth 2 times daily 180 tablet 1    Lancets (SAFETY LANCET 28G/PRESSURE ACT)

## 2025-03-27 NOTE — FLOWSHEET NOTE
Wound Care Supplies      Supply Company:     [facility] [address] [facility] [address] [place] [address]  p: [phone] f: [phone]     Ordering Center:     [facility]  [address]  [place] [address]  [phone]  WOUND CARE [phone]  FAX NUMBER [phone]    Patient Information:      Khari Diaz  [address]  [place] [address]   [phone]   : 1955  AGE: 70 y.o.     GENDER: male   TODAYS DATE:  3/27/2025    Insurance:      PRIMARY INSURANCE:  Plan: [facility] MEDICARE COMPLETE  Coverage: [facility] MEDICARE  Effective Date: 2024  [id number] - (Medicare Managed)    SECONDARY INSURANCE:  Plan:   Coverage:   Effective Date:   @[facility]@    [unfilled]   [unfilled]     Patient Wound Information:      Problem List Items Addressed This Visit          Musculoskeletal and Integument    Toe ulcer, left, with fat layer exposed (HCC) - Primary    Relevant Medications    lidocaine (XYLOCAINE) 2 % jelly    lidocaine (XYLOCAINE) 2 % uro-jet    Other Relevant Orders    MD COMMUNICATION 1    MD COMMUNICATION 2    Initiate Outpatient Wound Care Protocol    Initiate Oxygen Therapy Protocol       WOUNDS REQUIRING DRESSING SUPPLIES:     Wound 25 Toe (Comment  which one) Left #1 left third toe (Active)   Wound Image   25 1105   Wound Etiology Diabetic 25 1105   Wound Cleansed Cleansed with saline 25 1105   Dressing/Treatment Dry dressing 25 1457   Wound Length (cm) 0.8 cm 25 1105   Wound Width (cm) 1.4 cm 25 1105   Wound Depth (cm) 0.2 cm 25 1105   Wound Surface Area (cm^2) 1.12 cm^2 25 1105   Change in Wound Size % (l*w) 25.33 25 1105   Wound Volume (cm^3) 0.224 cm^3 25 1105   Wound Healing % 25 25 1105   Post-Procedure Length (cm) 0.8 cm 25 1130   Post-Procedure Width (cm) 1.4 cm 25 1130   Post-Procedure Depth (cm) 0.2 cm 25   Post-Procedure Surface Area (cm^2) 1.12 cm^2

## 2025-03-27 NOTE — DISCHARGE INSTRUCTIONS
[facility]   Physician Orders and Discharge Instructions  [address] [place]  [address]  Telephone: [phone]      [phone]        NAME:  Khari Diaz                                                                                                YOB: 1955  MEDICAL RECORD NUMBER:  [id number]     Your  is:  Rosanna     Home Care/Facility: None     Wound Location: Left 3rd and 4th toe     Dressing orders: Cleanse with Dakin's solution  Apply Silvercel (calcium alginate with silver) and place between the 3rd and 4th toe  Cover both with a dry dressing  Change Daily     Compression: Wear your own compression stockings on the right and left lower legs.     Offloading Device:     Other Instructions: Let the area \"air out\" for an hour daily     Keep all dressings clean, dry and intact.  Keep pressure off the wound(s) at all times.      Follow up visit   1 Week(s) April 3, 2025  @  11:15     Please give 24 hour notice if unable to keep appointment. [phone]     If you experience any of the following, please call the Wound Care Service at  [phone] or go to the nearest emergency room.        *Increase in pain*Temperature over 101*Increase in drainage from your wound or a foul odor  *Uncontrolled swelling*Need for compression bandage changes due to slippage, breakthrough drainage       PLEASE NOTE: IF YOU ARE UNABLE TO OBTAIN WOUND SUPPLIES, CONTINUE TO USE THE SUPPLIES YOU HAVE AVAILABLE UNTIL YOU ARE ABLE TO REACH US. IT IS MOST IMPORTANT TO KEEP THE WOUND COVERED AT ALL TIMES

## 2025-03-27 NOTE — PLAN OF CARE
Problem: Pain  Goal: Verbalizes/displays adequate comfort level or baseline comfort level  Outcome: Progressing     Problem: Pain  Goal: Verbalizes/displays adequate comfort level or baseline comfort level  Outcome: Progressing     Problem: Pain  Goal: Verbalizes/displays adequate comfort level or baseline comfort level  Outcome: Progressing     Problem: Wound:  Goal: Will show signs of wound healing; wound closure and no evidence of infection  Description: Will show signs of wound healing; wound closure and no evidence of infection  Outcome: Progressing

## 2025-04-03 ENCOUNTER — HOSPITAL ENCOUNTER (OUTPATIENT)
Dept: WOUND CARE | Age: 70
Discharge: HOME | End: 2025-04-03
Attending: PODIATRIST
Payer: MEDICARE

## 2025-04-03 VITALS
TEMPERATURE: 97.7 F | RESPIRATION RATE: 18 BRPM | DIASTOLIC BLOOD PRESSURE: 70 MMHG | HEART RATE: 91 BPM | SYSTOLIC BLOOD PRESSURE: 132 MMHG

## 2025-04-03 DIAGNOSIS — L97.522 TOE ULCER, LEFT, WITH FAT LAYER EXPOSED (HCC): Primary | ICD-10-CM

## 2025-04-03 DIAGNOSIS — E11.42 DIABETIC POLYNEUROPATHY ASSOCIATED WITH TYPE 2 DIABETES MELLITUS: ICD-10-CM

## 2025-04-03 DIAGNOSIS — M79.674 PAIN OF TOES OF BOTH FEET: ICD-10-CM

## 2025-04-03 DIAGNOSIS — M79.675 PAIN OF TOES OF BOTH FEET: ICD-10-CM

## 2025-04-03 DIAGNOSIS — B35.1 DERMATOPHYTOSIS OF NAIL: ICD-10-CM

## 2025-04-03 PROCEDURE — 11042 DBRDMT SUBQ TIS 1ST 20SQCM/<: CPT

## 2025-04-03 PROCEDURE — 6370000000 HC RX 637 (ALT 250 FOR IP): Performed by: PODIATRIST

## 2025-04-03 PROCEDURE — 11721 DEBRIDE NAIL 6 OR MORE: CPT

## 2025-04-03 RX ORDER — LIDOCAINE 40 MG/G
CREAM TOPICAL PRN
OUTPATIENT
Start: 2025-04-03

## 2025-04-03 RX ORDER — SODIUM CHLOR/HYPOCHLOROUS ACID 0.033 %
SOLUTION, IRRIGATION IRRIGATION PRN
OUTPATIENT
Start: 2025-04-03

## 2025-04-03 RX ORDER — CLOBETASOL PROPIONATE 0.5 MG/G
OINTMENT TOPICAL PRN
OUTPATIENT
Start: 2025-04-03

## 2025-04-03 RX ORDER — LIDOCAINE HYDROCHLORIDE 40 MG/ML
SOLUTION TOPICAL PRN
OUTPATIENT
Start: 2025-04-03

## 2025-04-03 RX ORDER — NEOMYCIN/BACITRACIN/POLYMYXINB 3.5-400-5K
OINTMENT (GRAM) TOPICAL PRN
OUTPATIENT
Start: 2025-04-03

## 2025-04-03 RX ORDER — LIDOCAINE 50 MG/G
OINTMENT TOPICAL PRN
OUTPATIENT
Start: 2025-04-03

## 2025-04-03 RX ORDER — LIDOCAINE HYDROCHLORIDE 20 MG/ML
JELLY TOPICAL PRN
Status: DISCONTINUED | OUTPATIENT
Start: 2025-04-03 | End: 2025-04-04 | Stop reason: HOSPADM

## 2025-04-03 RX ORDER — GENTAMICIN SULFATE 1 MG/G
OINTMENT TOPICAL PRN
OUTPATIENT
Start: 2025-04-03

## 2025-04-03 RX ORDER — BETAMETHASONE DIPROPIONATE 0.5 MG/G
CREAM TOPICAL PRN
OUTPATIENT
Start: 2025-04-03

## 2025-04-03 RX ORDER — LIDOCAINE HYDROCHLORIDE 20 MG/ML
JELLY TOPICAL PRN
OUTPATIENT
Start: 2025-04-03

## 2025-04-03 RX ORDER — GINSENG 100 MG
CAPSULE ORAL PRN
OUTPATIENT
Start: 2025-04-03

## 2025-04-03 RX ORDER — TRIAMCINOLONE ACETONIDE 1 MG/G
OINTMENT TOPICAL PRN
OUTPATIENT
Start: 2025-04-03

## 2025-04-03 RX ORDER — MUPIROCIN 20 MG/G
OINTMENT TOPICAL PRN
OUTPATIENT
Start: 2025-04-03

## 2025-04-03 RX ORDER — BACITRACIN ZINC AND POLYMYXIN B SULFATE 500; 1000 [USP'U]/G; [USP'U]/G
OINTMENT TOPICAL PRN
OUTPATIENT
Start: 2025-04-03

## 2025-04-03 RX ADMIN — LIDOCAINE HYDROCHLORIDE: 20 JELLY TOPICAL at 11:57

## 2025-04-03 NOTE — PLAN OF CARE
Problem: Wound:  Goal: Will show signs of wound healing; wound closure and no evidence of infection  Description: Will show signs of wound healing; wound closure and no evidence of infection  4/3/2025 1135 by Gina Guerin RN  Outcome: Progressing  4/3/2025 1135 by Gina Guerin, RN  Outcome: Progressing

## 2025-04-03 NOTE — PROGRESS NOTES
tablet Take 1 tablet by mouth daily      Probiotic Product (PROBIOTIC DAILY PO) Take 1 tablet by mouth daily      vitamin B-12 (CYANOCOBALAMIN) 100 MCG tablet Take 0.5 tablets by mouth daily      ammonium lactate (AMLACTIN) 12 % cream   5    Misc. Devices (COMMODE BEDSIDE) MISC Use daily as needed 1 each 0    MULTIPLE VITAMIN PO Take 1 tablet by mouth daily        No current facility-administered medications on file prior to encounter.       REVIEW OF SYSTEMS    Pertinent items are noted in HPI.    Objective:      /70   Pulse 91   Temp 97.7 °F (36.5 °C) (Temporal)   Resp 18     Wt Readings from Last 3 Encounters:   03/25/25 106.5 kg (234 lb 12.8 oz)   03/09/25 104.1 kg (229 lb 9.6 oz)   01/27/25 106.6 kg (235 lb)       PHYSICAL EXAM    Constitutional:   Well nourished and well developed.  Appears neat and clean.  Patient is alert, oriented x3, and in no apparent distress.     Respiratory:  Respiratory effort is easy and symmetric bilaterally.  Rate is normal at rest and on room air.    Vascular:  Pedal Pulses is palpable and audible with doppler.  Capillary refill is <3 sec to digits bilateral.  Extremities positive for 1+ pitting edema.    Neurological:   Sensation is diminished to lower extremities.    Dermatological:  Wound description noted in wound assessment.  Decreased maceration noted to the left third webspace.  No ángela purulence noted.  No fluctuance or increased warmth noted.  Nails 2-5 bilateral foot are elongated, thickened, incurvated, yellow in color, and there is subungual debris.    Psychiatric:  Judgement and insight intact. Short and long term memory intact.  No evidence of depression, anxiety, or agitation.  Patient is calm, cooperative, and communicative.  Appropriate interactions and affect.        Assessment:      Active Hospital Problems    Diagnosis Date Noted    Type 2 diabetes mellitus with other skin ulcer (CODE) [E11.622] 03/25/2025    Toe ulcer, left, with fat layer exposed

## 2025-04-03 NOTE — DISCHARGE INSTRUCTIONS
[facility] and Hyper[facility] Medicine   Physician Orders and Discharge Instructions  [address] [place]  [address]  Telephone: [phone]      [phone]        NAME:  Khari Diaz                                                                                                YOB: 1955  MEDICAL RECORD NUMBER:  [id number]     Your  is:  Rosanna     Home Care/Facility: None     Wound Location: Left 3rd and 4th toe     Dressing orders: Cleanse with Dakin's solution  Apply Silvercel (calcium alginate with silver) and place between the 3rd and 4th toe  Cover both with a dry dressing  Change Daily     Compression: Wear your own compression stockings on the right and left lower legs.     Offloading Device:     Other Instructions: Let the area \"air out\" for an hour daily     Keep all dressings clean, dry and intact.  Keep pressure off the wound(s) at all times.      Follow up visit   2 Week(s) April 17, 2025  @  3:00     Please give 24 hour notice if unable to keep appointment. [phone]     If you experience any of the following, please call the Wound Care Service at  [phone] or go to the nearest emergency room.        *Increase in pain*Temperature over 101*Increase in drainage from your wound or a foul odor  *Uncontrolled swelling*Need for compression bandage changes due to slippage, breakthrough drainage       PLEASE NOTE: IF YOU ARE UNABLE TO OBTAIN WOUND SUPPLIES, CONTINUE TO USE THE SUPPLIES YOU HAVE AVAILABLE UNTIL YOU ARE ABLE TO REACH US. IT IS MOST IMPORTANT TO KEEP THE WOUND COVERED AT ALL TIMES

## 2025-04-16 ENCOUNTER — TELEPHONE (OUTPATIENT)
Age: 70
End: 2025-04-16

## 2025-04-16 DIAGNOSIS — E11.42 TYPE 2 DIABETES MELLITUS WITH DIABETIC POLYNEUROPATHY, WITHOUT LONG-TERM CURRENT USE OF INSULIN (HCC): Primary | Chronic | ICD-10-CM

## 2025-04-17 ENCOUNTER — HOSPITAL ENCOUNTER (OUTPATIENT)
Dept: WOUND CARE | Age: 70
Discharge: HOME | End: 2025-04-17
Attending: PODIATRIST
Payer: MEDICARE

## 2025-04-17 VITALS
DIASTOLIC BLOOD PRESSURE: 76 MMHG | SYSTOLIC BLOOD PRESSURE: 154 MMHG | TEMPERATURE: 97.5 F | RESPIRATION RATE: 17 BRPM | HEART RATE: 89 BPM

## 2025-04-17 DIAGNOSIS — L97.522 TOE ULCER, LEFT, WITH FAT LAYER EXPOSED (HCC): Primary | ICD-10-CM

## 2025-04-17 PROCEDURE — 11042 DBRDMT SUBQ TIS 1ST 20SQCM/<: CPT

## 2025-04-17 PROCEDURE — 6370000000 HC RX 637 (ALT 250 FOR IP): Performed by: PODIATRIST

## 2025-04-17 RX ORDER — LIDOCAINE HYDROCHLORIDE 20 MG/ML
JELLY TOPICAL PRN
OUTPATIENT
Start: 2025-04-17

## 2025-04-17 RX ORDER — TRIAMCINOLONE ACETONIDE 1 MG/G
OINTMENT TOPICAL PRN
OUTPATIENT
Start: 2025-04-17

## 2025-04-17 RX ORDER — MUPIROCIN 20 MG/G
OINTMENT TOPICAL PRN
OUTPATIENT
Start: 2025-04-17

## 2025-04-17 RX ORDER — LIDOCAINE 40 MG/G
CREAM TOPICAL PRN
OUTPATIENT
Start: 2025-04-17

## 2025-04-17 RX ORDER — LIDOCAINE 50 MG/G
OINTMENT TOPICAL PRN
OUTPATIENT
Start: 2025-04-17

## 2025-04-17 RX ORDER — LIDOCAINE 40 MG/G
CREAM TOPICAL PRN
Status: DISCONTINUED | OUTPATIENT
Start: 2025-04-17 | End: 2025-04-18 | Stop reason: HOSPADM

## 2025-04-17 RX ORDER — NEOMYCIN/BACITRACIN/POLYMYXINB 3.5-400-5K
OINTMENT (GRAM) TOPICAL PRN
OUTPATIENT
Start: 2025-04-17

## 2025-04-17 RX ORDER — SODIUM CHLOR/HYPOCHLOROUS ACID 0.033 %
SOLUTION, IRRIGATION IRRIGATION PRN
OUTPATIENT
Start: 2025-04-17

## 2025-04-17 RX ORDER — CLOBETASOL PROPIONATE 0.5 MG/G
OINTMENT TOPICAL PRN
OUTPATIENT
Start: 2025-04-17

## 2025-04-17 RX ORDER — GENTAMICIN SULFATE 1 MG/G
OINTMENT TOPICAL PRN
OUTPATIENT
Start: 2025-04-17

## 2025-04-17 RX ORDER — BACITRACIN ZINC AND POLYMYXIN B SULFATE 500; 1000 [USP'U]/G; [USP'U]/G
OINTMENT TOPICAL PRN
OUTPATIENT
Start: 2025-04-17

## 2025-04-17 RX ORDER — GINSENG 100 MG
CAPSULE ORAL PRN
OUTPATIENT
Start: 2025-04-17

## 2025-04-17 RX ORDER — LIDOCAINE HYDROCHLORIDE 40 MG/ML
SOLUTION TOPICAL PRN
OUTPATIENT
Start: 2025-04-17

## 2025-04-17 RX ORDER — BETAMETHASONE DIPROPIONATE 0.5 MG/G
CREAM TOPICAL PRN
OUTPATIENT
Start: 2025-04-17

## 2025-04-17 RX ADMIN — LIDOCAINE 4%: 4 CREAM TOPICAL at 15:36

## 2025-04-17 NOTE — DISCHARGE INSTRUCTIONS
[facility] and Hyperbaric Medicine   Physician Orders and Discharge Instructions  [address] [place]  [address]  Telephone: [phone]      [phone]        NAME:  Khari Diaz                                                                                                YOB: 1955  MEDICAL RECORD NUMBER:  [id number]     Your  is:  Rosanna     Home Care/Facility: None     Wound Location: Left 3rd and 4th toe     Dressing orders: Cleanse with Dakin's solution  Apply Silvercel (calcium alginate with silver) and place between the 3rd and 4th toe  Cover both with a dry dressing  Change Daily     Compression: Wear your own compression stockings on the right and left lower legs.     Offloading Device:     Other Instructions: Let the area \"air out\" for an hour daily     Keep all dressings clean, dry and intact.  Keep pressure off the wound(s) at all times.      Follow up visit   2 Week(s) May 1, 2025  @  3:15     Please give 24 hour notice if unable to keep appointment. [phone]     If you experience any of the following, please call the Wound Care Service at  [phone] or go to the nearest emergency room.        *Increase in pain*Temperature over 101*Increase in drainage from your wound or a foul odor  *Uncontrolled swelling*Need for compression bandage changes due to slippage, breakthrough drainage       PLEASE NOTE: IF YOU ARE UNABLE TO OBTAIN WOUND SUPPLIES, CONTINUE TO USE THE SUPPLIES YOU HAVE AVAILABLE UNTIL YOU ARE ABLE TO REACH US. IT IS MOST IMPORTANT TO KEEP THE WOUND COVERED AT ALL TIMES

## 2025-04-18 LAB — NONINV COLON CA DNA+OCC BLD SCRN STL QL: NEGATIVE

## 2025-04-21 ENCOUNTER — RESULTS FOLLOW-UP (OUTPATIENT)
Dept: FAMILY MEDICINE CLINIC | Age: 70
End: 2025-04-21

## 2025-04-22 ENCOUNTER — HOSPITAL ENCOUNTER (OUTPATIENT)
Dept: LAB | Age: 70
Discharge: HOME | End: 2025-04-22
Payer: MEDICARE

## 2025-04-22 DIAGNOSIS — E11.42 TYPE 2 DIABETES MELLITUS WITH DIABETIC POLYNEUROPATHY, WITHOUT LONG-TERM CURRENT USE OF INSULIN (HCC): Chronic | ICD-10-CM

## 2025-04-22 LAB
ALBUMIN SERPL-MCNC: 4.1 G/DL (ref 3.5–4.6)
ALP SERPL-CCNC: 59 U/L (ref 35–104)
ALT SERPL-CCNC: 32 U/L (ref 0–41)
ANION GAP SERPL CALCULATED.3IONS-SCNC: 16 MEQ/L (ref 9–15)
AST SERPL-CCNC: 42 U/L (ref 0–40)
BILIRUB SERPL-MCNC: 0.4 MG/DL (ref 0.2–0.7)
BUN SERPL-MCNC: 15 MG/DL (ref 8–23)
CALCIUM SERPL-MCNC: 9.7 MG/DL (ref 8.5–9.9)
CHLORIDE SERPL-SCNC: 99 MEQ/L (ref 95–107)
CO2 SERPL-SCNC: 25 MEQ/L (ref 20–31)
CREAT SERPL-MCNC: 1.12 MG/DL (ref 0.7–1.2)
GLOBULIN SER CALC-MCNC: 2.9 G/DL (ref 2.3–3.5)
GLUCOSE SERPL-MCNC: 133 MG/DL (ref 70–99)
POTASSIUM SERPL-SCNC: 3.8 MEQ/L (ref 3.4–4.9)
PROT SERPL-MCNC: 7 G/DL (ref 6.3–8)
SODIUM SERPL-SCNC: 140 MEQ/L (ref 135–144)

## 2025-04-22 PROCEDURE — 80053 COMPREHEN METABOLIC PANEL: CPT

## 2025-04-22 PROCEDURE — 36415 COLL VENOUS BLD VENIPUNCTURE: CPT

## 2025-04-22 PROCEDURE — 83036 HEMOGLOBIN GLYCOSYLATED A1C: CPT

## 2025-04-23 LAB
ESTIMATED AVERAGE GLUCOSE: 128 MG/DL
HBA1C MFR BLD: 6.1 % (ref 4–6)

## 2025-04-24 PROBLEM — Z13.29 SCREENING FOR THYROID DISORDER: Status: RESOLVED | Noted: 2025-03-25 | Resolved: 2025-04-24

## 2025-04-24 PROBLEM — Z13.0 SCREENING, ANEMIA, DEFICIENCY, IRON: Status: RESOLVED | Noted: 2025-03-25 | Resolved: 2025-04-24

## 2025-04-24 PROBLEM — Z12.5 SCREENING FOR PROSTATE CANCER: Status: RESOLVED | Noted: 2025-03-25 | Resolved: 2025-04-24

## 2025-05-01 ENCOUNTER — HOSPITAL ENCOUNTER (OUTPATIENT)
Dept: WOUND CARE | Age: 70
Discharge: HOME | End: 2025-05-01
Attending: PODIATRIST
Payer: MEDICARE

## 2025-05-01 VITALS
HEART RATE: 97 BPM | DIASTOLIC BLOOD PRESSURE: 73 MMHG | RESPIRATION RATE: 18 BRPM | SYSTOLIC BLOOD PRESSURE: 143 MMHG | TEMPERATURE: 97.6 F

## 2025-05-01 DIAGNOSIS — L97.522 TOE ULCER, LEFT, WITH FAT LAYER EXPOSED (HCC): Primary | ICD-10-CM

## 2025-05-01 PROCEDURE — 11042 DBRDMT SUBQ TIS 1ST 20SQCM/<: CPT

## 2025-05-01 RX ORDER — LIDOCAINE 50 MG/G
OINTMENT TOPICAL PRN
OUTPATIENT
Start: 2025-05-01

## 2025-05-01 RX ORDER — CLOBETASOL PROPIONATE 0.5 MG/G
OINTMENT TOPICAL PRN
OUTPATIENT
Start: 2025-05-01

## 2025-05-01 RX ORDER — NEOMYCIN/BACITRACIN/POLYMYXINB 3.5-400-5K
OINTMENT (GRAM) TOPICAL PRN
OUTPATIENT
Start: 2025-05-01

## 2025-05-01 RX ORDER — LIDOCAINE HYDROCHLORIDE 20 MG/ML
JELLY TOPICAL PRN
OUTPATIENT
Start: 2025-05-01

## 2025-05-01 RX ORDER — MUPIROCIN 20 MG/G
OINTMENT TOPICAL PRN
OUTPATIENT
Start: 2025-05-01

## 2025-05-01 RX ORDER — TRIAMCINOLONE ACETONIDE 1 MG/G
OINTMENT TOPICAL PRN
OUTPATIENT
Start: 2025-05-01

## 2025-05-01 RX ORDER — SODIUM CHLOR/HYPOCHLOROUS ACID 0.033 %
SOLUTION, IRRIGATION IRRIGATION PRN
OUTPATIENT
Start: 2025-05-01

## 2025-05-01 RX ORDER — BETAMETHASONE DIPROPIONATE 0.5 MG/G
CREAM TOPICAL PRN
OUTPATIENT
Start: 2025-05-01

## 2025-05-01 RX ORDER — BACITRACIN ZINC AND POLYMYXIN B SULFATE 500; 1000 [USP'U]/G; [USP'U]/G
OINTMENT TOPICAL PRN
OUTPATIENT
Start: 2025-05-01

## 2025-05-01 RX ORDER — GINSENG 100 MG
CAPSULE ORAL PRN
OUTPATIENT
Start: 2025-05-01

## 2025-05-01 RX ORDER — LIDOCAINE HYDROCHLORIDE 40 MG/ML
SOLUTION TOPICAL PRN
OUTPATIENT
Start: 2025-05-01

## 2025-05-01 RX ORDER — LIDOCAINE 40 MG/G
CREAM TOPICAL PRN
OUTPATIENT
Start: 2025-05-01

## 2025-05-01 RX ORDER — GENTAMICIN SULFATE 1 MG/G
OINTMENT TOPICAL PRN
OUTPATIENT
Start: 2025-05-01

## 2025-05-01 ASSESSMENT — PAIN DESCRIPTION - LOCATION: LOCATION: LEG

## 2025-05-01 ASSESSMENT — PAIN DESCRIPTION - ORIENTATION: ORIENTATION: LEFT

## 2025-05-01 ASSESSMENT — PAIN SCALES - GENERAL: PAINLEVEL_OUTOF10: 3

## 2025-05-01 NOTE — DISCHARGE INSTRUCTIONS
[facility] Wound Center and Hyperbaric Medicine   Physician Orders and Discharge Instructions  [address] [place]  [address]  Telephone: [phone]      [phone]        NAME:  Khari Diaz                                                                                                YOB: 1955  MEDICAL RECORD NUMBER:  [id number]     Your  is:  Rosanna     Home Care/Facility: None     Wound Location: Left 3rd and 4th toe     Dressing orders:   Cleanse with Dakin's solution  Apply Silvercel (calcium alginate with silver) and place between the 3rd and 4th toe  Cover both with a dry dressing  Change Daily     Compression: Wear your own compression stockings on the right and left lower legs.     Offloading Device:     Other Instructions: Let the area \"air out\" for an hour daily     Keep all dressings clean, dry and intact.  Keep pressure off the wound(s) at all times.      Follow up visit   2 Weeks May 15, 2025  @  3:15pm     Please give 24 hour notice if unable to keep appointment. [phone]     If you experience any of the following, please call the Wound Care Service at  [phone] or go to the nearest emergency room.        *Increase in pain*Temperature over 101*Increase in drainage from your wound or a foul odor  *Uncontrolled swelling*Need for compression bandage changes due to slippage, breakthrough drainage       PLEASE NOTE: IF YOU ARE UNABLE TO OBTAIN WOUND SUPPLIES, CONTINUE TO USE THE SUPPLIES YOU HAVE AVAILABLE UNTIL YOU ARE ABLE TO REACH US. IT IS MOST IMPORTANT TO KEEP THE WOUND COVERED AT ALL TIMES

## 2025-05-01 NOTE — PROGRESS NOTES
[facility]                                                   Progress Note and Procedure Note      Khari Diaz  MEDICAL RECORD NUMBER:  [id number]  AGE: 70 y.o.   GENDER: male  : 1955  EPISODE DATE:  2025    Subjective:     Chief Complaint   Patient presents with    Wound Check         HISTORY of PRESENT ILLNESS HPI     Khari Diaz is a 70 y.o. male who presents today for wound/ulcer evaluation.   History of Wound Context: Patient presents for continued treatment of chronic ulcerations of the left 3rd and 4th toes.  Patient reports compliance with dressing changes and allowing the wounds to be open to air for several hours a day.  Patient denies observing signs of acute bacterial infection.    Patient denies nausea, vomiting, fever, chills, chest pain, or shortness of breath.      Wound/Ulcer Pain Timing/Severity: intermittent  Quality of pain: burning  Severity:  3 / 10   Modifying Factors: None  Associated Signs/Symptoms: drainage    Ulcer Identification:  Ulcer Type: diabetic  Contributing Factors: shear force    Wound:  Full-thickness diabetic ulcerations left 3rd and 4th toes        PAST MEDICAL HISTORY        Diagnosis Date    Abscess of back 2020    Abscess of right leg 2020    Acute renal failure     Adenomatous polyp of ascending colon     Adenomatous polyp of descending colon     Adenomatous polyp of transverse colon     Anxiety     CAD S/P percutaneous coronary angioplasty 2014    Cardiomyopathy ([facility])     Cecal polyp     Cerebrovascular accident (CVA) due to occlusion of left middle cerebral artery ([facility]) 2016    brainstem infarction from left MCA occlusion    Cerebrovascular disease 2016    Chronic obstructive pulmonary disease, unspecified COPD type ([facility]) 2025    Claudication     Colon polyp     Coronary angioplasty status     Cutaneous abscess of back excluding buttocks 2018    CVA (cerebral vascular accident) ([facility]) 2017

## 2025-05-05 ENCOUNTER — OFFICE VISIT (OUTPATIENT)
Age: 70
End: 2025-05-05
Payer: MEDICARE

## 2025-05-05 VITALS
OXYGEN SATURATION: 96 % | HEIGHT: 73 IN | HEART RATE: 93 BPM | BODY MASS INDEX: 30.88 KG/M2 | WEIGHT: 233 LBS | DIASTOLIC BLOOD PRESSURE: 81 MMHG | SYSTOLIC BLOOD PRESSURE: 123 MMHG

## 2025-05-05 DIAGNOSIS — N18.31 TYPE 2 DIABETES MELLITUS WITH STAGE 3A CHRONIC KIDNEY DISEASE, WITHOUT LONG-TERM CURRENT USE OF INSULIN (HCC): Primary | ICD-10-CM

## 2025-05-05 DIAGNOSIS — E11.22 TYPE 2 DIABETES MELLITUS WITH STAGE 3A CHRONIC KIDNEY DISEASE, WITHOUT LONG-TERM CURRENT USE OF INSULIN (HCC): Primary | ICD-10-CM

## 2025-05-05 PROCEDURE — 3079F DIAST BP 80-89 MM HG: CPT | Performed by: PHYSICIAN ASSISTANT

## 2025-05-05 PROCEDURE — 3074F SYST BP LT 130 MM HG: CPT | Performed by: PHYSICIAN ASSISTANT

## 2025-05-05 PROCEDURE — 4004F PT TOBACCO SCREEN RCVD TLK: CPT | Performed by: PHYSICIAN ASSISTANT

## 2025-05-05 PROCEDURE — 1160F RVW MEDS BY RX/DR IN RCRD: CPT | Performed by: PHYSICIAN ASSISTANT

## 2025-05-05 PROCEDURE — 1125F AMNT PAIN NOTED PAIN PRSNT: CPT | Performed by: PHYSICIAN ASSISTANT

## 2025-05-05 PROCEDURE — G8427 DOCREV CUR MEDS BY ELIG CLIN: HCPCS | Performed by: PHYSICIAN ASSISTANT

## 2025-05-05 PROCEDURE — 82962 GLUCOSE BLOOD TEST: CPT | Performed by: PHYSICIAN ASSISTANT

## 2025-05-05 PROCEDURE — 3017F COLORECTAL CA SCREEN DOC REV: CPT | Performed by: PHYSICIAN ASSISTANT

## 2025-05-05 PROCEDURE — G8417 CALC BMI ABV UP PARAM F/U: HCPCS | Performed by: PHYSICIAN ASSISTANT

## 2025-05-05 PROCEDURE — 1123F ACP DISCUSS/DSCN MKR DOCD: CPT | Performed by: PHYSICIAN ASSISTANT

## 2025-05-05 PROCEDURE — 1159F MED LIST DOCD IN RCRD: CPT | Performed by: PHYSICIAN ASSISTANT

## 2025-05-05 PROCEDURE — 99214 OFFICE O/P EST MOD 30 MIN: CPT | Performed by: PHYSICIAN ASSISTANT

## 2025-05-05 PROCEDURE — 2022F DILAT RTA XM EVC RTNOPTHY: CPT | Performed by: PHYSICIAN ASSISTANT

## 2025-05-05 PROCEDURE — 3044F HG A1C LEVEL LT 7.0%: CPT | Performed by: PHYSICIAN ASSISTANT

## 2025-05-05 RX ORDER — DULAGLUTIDE 4.5 MG/.5ML
INJECTION, SOLUTION SUBCUTANEOUS
Qty: 6 ML | Refills: 3 | Status: SHIPPED | OUTPATIENT
Start: 2025-05-05

## 2025-05-05 ASSESSMENT — ENCOUNTER SYMPTOMS
BACK PAIN: 1
WHEEZING: 0
SORE THROAT: 0
COUGH: 0
DIARRHEA: 0
ABDOMINAL PAIN: 0
VOMITING: 0
NAUSEA: 0
SINUS PRESSURE: 0
SHORTNESS OF BREATH: 0

## 2025-05-05 NOTE — PROGRESS NOTES
Spasm 11/09/2016    Spina bifida (HCC)     Stented coronary artery     TIA (transient ischemic attack) 11/06/2020    Tremor of right hand 05/15/2017    Tremor of right hand 05/15/2017    Type 2 diabetes mellitus with diabetic polyneuropathy, without long-term current use of insulin (HCC)     Unsteady gait 05/15/2017    Weakness     Weight gain      Past Surgical History:   Procedure Laterality Date    ANKLE SURGERY Left     BACK SURGERY      lumbar laminectomy    CARDIAC SURGERY  1-10-14 3 Stents    CHOLECYSTECTOMY      COLONOSCOPY  01/15/2010    polyp, diverticulosis (DR ELAINE)    COLONOSCOPY N/A 08/19/2021    COLORECTAL CANCER SCREENING, HIGH RISK with polypectomies  performed by Bina Ferguson MD at [facility]    COLONOSCOPY N/A 08/20/2021    polyps x 3, 3y repeat (DR VIVEROS)  COLONOSCOPY with polypectomies DIAGNOSTIC performed by Alexey Viveros MD at [facility]    CORONARY ANGIOPLASTY WITH STENT PLACEMENT  2014    3 stents    CYST REMOVAL  05/16/2016    DR SCHMID,  L SCROTAL CYST, R thigh, L ear, back    EYE SURGERY  2017    cataractsboth eyes    SHOULDER SURGERY Bilateral 12/18/2015    left once right twice- to remove bone spurs    TONSILLECTOMY       Social History     Socioeconomic History    Marital status:      Spouse name: Kristi    Number of children: 3    Years of education: 12+    Highest education level: Not on file   Occupational History    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     Employer: [employer]   Tobacco Use    Smoking status: Every Day     Current packs/day: 2.50     Average packs/day: 2.5 packs/day for 30.0 years (75.0 ttl pk-yrs)     Types: Cigarettes     Passive exposure: Current    Smokeless tobacco: Never    Tobacco comments:     varies   Vaping Use    Vaping status: Never Used   Substance and Sexual Activity    Alcohol use: Yes     Comment: varies    Drug use: No    Sexual activity: Yes     Partners: Female   Other Topics Concern    Not on file   Social History

## 2025-05-13 ENCOUNTER — TELEPHONE (OUTPATIENT)
Age: 70
End: 2025-05-13

## 2025-05-13 NOTE — TELEPHONE ENCOUNTER
Pt calling to say he was going over his AVS from 5/5/25. He is not taking JARDIANCE and would like it taken off of his medication list.

## 2025-05-15 ENCOUNTER — HOSPITAL ENCOUNTER (OUTPATIENT)
Dept: WOUND CARE | Age: 70
Discharge: HOME | End: 2025-05-15
Attending: PODIATRIST
Payer: MEDICARE

## 2025-05-15 VITALS
HEART RATE: 94 BPM | TEMPERATURE: 98 F | DIASTOLIC BLOOD PRESSURE: 70 MMHG | SYSTOLIC BLOOD PRESSURE: 139 MMHG | RESPIRATION RATE: 18 BRPM

## 2025-05-15 DIAGNOSIS — L97.522 TOE ULCER, LEFT, WITH FAT LAYER EXPOSED (HCC): Primary | ICD-10-CM

## 2025-05-15 PROCEDURE — 6370000000 HC RX 637 (ALT 250 FOR IP): Performed by: PODIATRIST

## 2025-05-15 PROCEDURE — 11042 DBRDMT SUBQ TIS 1ST 20SQCM/<: CPT

## 2025-05-15 RX ORDER — GENTAMICIN SULFATE 1 MG/G
OINTMENT TOPICAL PRN
OUTPATIENT
Start: 2025-05-15

## 2025-05-15 RX ORDER — LIDOCAINE 40 MG/G
CREAM TOPICAL PRN
OUTPATIENT
Start: 2025-05-15

## 2025-05-15 RX ORDER — LIDOCAINE 50 MG/G
OINTMENT TOPICAL PRN
OUTPATIENT
Start: 2025-05-15

## 2025-05-15 RX ORDER — MUPIROCIN 20 MG/G
OINTMENT TOPICAL PRN
OUTPATIENT
Start: 2025-05-15

## 2025-05-15 RX ORDER — LIDOCAINE HYDROCHLORIDE 20 MG/ML
JELLY TOPICAL PRN
Status: DISCONTINUED | OUTPATIENT
Start: 2025-05-15 | End: 2025-05-16 | Stop reason: HOSPADM

## 2025-05-15 RX ORDER — TRIAMCINOLONE ACETONIDE 1 MG/G
OINTMENT TOPICAL PRN
OUTPATIENT
Start: 2025-05-15

## 2025-05-15 RX ORDER — SODIUM CHLOR/HYPOCHLOROUS ACID 0.033 %
SOLUTION, IRRIGATION IRRIGATION PRN
OUTPATIENT
Start: 2025-05-15

## 2025-05-15 RX ORDER — GINSENG 100 MG
CAPSULE ORAL PRN
OUTPATIENT
Start: 2025-05-15

## 2025-05-15 RX ORDER — CLOBETASOL PROPIONATE 0.5 MG/G
OINTMENT TOPICAL PRN
OUTPATIENT
Start: 2025-05-15

## 2025-05-15 RX ORDER — BETAMETHASONE DIPROPIONATE 0.5 MG/G
CREAM TOPICAL PRN
OUTPATIENT
Start: 2025-05-15

## 2025-05-15 RX ORDER — LIDOCAINE HYDROCHLORIDE 20 MG/ML
JELLY TOPICAL PRN
OUTPATIENT
Start: 2025-05-15

## 2025-05-15 RX ORDER — BACITRACIN ZINC AND POLYMYXIN B SULFATE 500; 1000 [USP'U]/G; [USP'U]/G
OINTMENT TOPICAL PRN
OUTPATIENT
Start: 2025-05-15

## 2025-05-15 RX ORDER — LIDOCAINE HYDROCHLORIDE 40 MG/ML
SOLUTION TOPICAL PRN
OUTPATIENT
Start: 2025-05-15

## 2025-05-15 RX ORDER — NEOMYCIN/BACITRACIN/POLYMYXINB 3.5-400-5K
OINTMENT (GRAM) TOPICAL PRN
OUTPATIENT
Start: 2025-05-15

## 2025-05-15 RX ADMIN — LIDOCAINE HYDROCHLORIDE: 20 JELLY TOPICAL at 15:44

## 2025-05-15 ASSESSMENT — PAIN SCALES - GENERAL: PAINLEVEL_OUTOF10: 4

## 2025-05-15 NOTE — DISCHARGE INSTRUCTIONS
[facility] Wound Center and Hyperbaric Medicine   Physician Orders and Discharge Instructions  [address] [place]  [address]  Telephone: [phone]      [phone]        NAME:  Khari Diaz                                                                                                YOB: 1955  MEDICAL RECORD NUMBER:  [id number]     Your  is:  Rosanna     Home Care/Facility: None     Wound Location: Left 3rd and 4th toe     Dressing orders:   Cleanse with Dakin's solution.  2. Apply dry DASHA  Or equivalent to wound bed.  3. Moisten DASHA with a few drops of normal saline.  4. Cover with 4x4's and wrap with gauze (lazarus or kerlix)  5. Change  Every other day or Tuesday, Thursday and Saturday    Compression: Wear your own compression stockings on the right and left lower legs.     Offloading Device:     Other Instructions: Let the area \"air out\" for an hour daily     Keep all dressings clean, dry and intact.  Keep pressure off the wound(s) at all times.      Follow up visit   2 Weeks May 29, 2025  @  3:00     Please give 24 hour notice if unable to keep appointment. [phone]     If you experience any of the following, please call the Wound Care Service at  [phone] or go to the nearest emergency room.        *Increase in pain*Temperature over 101*Increase in drainage from your wound or a foul odor  *Uncontrolled swelling*Need for compression bandage changes due to slippage, breakthrough drainage       PLEASE NOTE: IF YOU ARE UNABLE TO OBTAIN WOUND SUPPLIES, CONTINUE TO USE THE SUPPLIES YOU HAVE AVAILABLE UNTIL YOU ARE ABLE TO REACH US. IT IS MOST IMPORTANT TO KEEP THE WOUND COVERED AT ALL TIMES

## 2025-05-15 NOTE — PROGRESS NOTES
[facility] Care Center                                                   Progress Note and Procedure Note      Khari Diaz  MEDICAL RECORD NUMBER:  [id number]  AGE: 70 y.o.   GENDER: male  : 1955  EPISODE DATE:  5/15/2025    Subjective:     No chief complaint on file.        HISTORY of PRESENT ILLNESS HPI     Khari Diaz is a 70 y.o. male who presents today for wound/ulcer evaluation.   History of Wound Context: Patient presents for continued treatment of diabetic ulcerations of the left 3rd and 4th toes.  Patient reports compliance with using silver alginate as his dressing changes.  Patient reports continued drainage but there has been less maceration bilaterally the wound to be open to air.  Patient complains of 4/10 burning sensations to the lateral left ankle, he denies pain to the toe wounds.    Patient denies nausea, vomiting, fever, chills, chest pain, or shortness of breath.    Wound/Ulcer Pain Timing/Severity: none  Quality of pain: N/A  Severity:  0 / 10   Modifying Factors: None  Associated Signs/Symptoms: drainage    Ulcer Identification:  Ulcer Type: diabetic  Contributing Factors: edema, venous stasis, and shear force    Wound:  Full-thickness ulcerations left foot toes        PAST MEDICAL HISTORY        Diagnosis Date    Abscess of back 2020    Abscess of right leg 2020    Acute renal failure     Adenomatous polyp of ascending colon     Adenomatous polyp of descending colon     Adenomatous polyp of transverse colon     Anxiety     CAD S/P percutaneous coronary angioplasty 2014    Cardiomyopathy (HCC)     Cecal polyp     Cerebrovascular accident (CVA) due to occlusion of left middle cerebral artery ([facility]) 2016    brainstem infarction from left MCA occlusion    Cerebrovascular disease 2016    Chronic obstructive pulmonary disease, unspecified COPD type ([facility]) 2025    Claudication     Colon polyp     Coronary angioplasty status     Cutaneous abscess of back

## 2025-05-29 ENCOUNTER — HOSPITAL ENCOUNTER (OUTPATIENT)
Dept: WOUND CARE | Age: 70
Discharge: HOME | End: 2025-05-29
Attending: PODIATRIST
Payer: MEDICARE

## 2025-05-29 VITALS
SYSTOLIC BLOOD PRESSURE: 141 MMHG | TEMPERATURE: 98.8 F | HEART RATE: 92 BPM | RESPIRATION RATE: 18 BRPM | DIASTOLIC BLOOD PRESSURE: 68 MMHG

## 2025-05-29 DIAGNOSIS — L97.325 NON-PRESSURE CHRONIC ULCER OF LEFT ANKLE WITH MUSCLE INVOLVEMENT WITHOUT EVIDENCE OF NECROSIS (HCC): ICD-10-CM

## 2025-05-29 DIAGNOSIS — L97.522 TOE ULCER, LEFT, WITH FAT LAYER EXPOSED (HCC): Primary | ICD-10-CM

## 2025-05-29 PROCEDURE — 11042 DBRDMT SUBQ TIS 1ST 20SQCM/<: CPT

## 2025-05-29 PROCEDURE — 11043 DBRDMT MUSC&/FSCA 1ST 20/<: CPT

## 2025-05-29 RX ORDER — BACITRACIN ZINC AND POLYMYXIN B SULFATE 500; 1000 [USP'U]/G; [USP'U]/G
OINTMENT TOPICAL PRN
OUTPATIENT
Start: 2025-05-29

## 2025-05-29 RX ORDER — LIDOCAINE HYDROCHLORIDE 20 MG/ML
JELLY TOPICAL PRN
OUTPATIENT
Start: 2025-05-29

## 2025-05-29 RX ORDER — TRIAMCINOLONE ACETONIDE 1 MG/G
OINTMENT TOPICAL PRN
OUTPATIENT
Start: 2025-05-29

## 2025-05-29 RX ORDER — NEOMYCIN/BACITRACIN/POLYMYXINB 3.5-400-5K
OINTMENT (GRAM) TOPICAL PRN
OUTPATIENT
Start: 2025-05-29

## 2025-05-29 RX ORDER — LIDOCAINE HYDROCHLORIDE 40 MG/ML
SOLUTION TOPICAL PRN
OUTPATIENT
Start: 2025-05-29

## 2025-05-29 RX ORDER — LIDOCAINE 40 MG/G
CREAM TOPICAL PRN
OUTPATIENT
Start: 2025-05-29

## 2025-05-29 RX ORDER — CLOBETASOL PROPIONATE 0.5 MG/G
OINTMENT TOPICAL PRN
OUTPATIENT
Start: 2025-05-29

## 2025-05-29 RX ORDER — BETAMETHASONE DIPROPIONATE 0.5 MG/G
CREAM TOPICAL PRN
OUTPATIENT
Start: 2025-05-29

## 2025-05-29 RX ORDER — GENTAMICIN SULFATE 1 MG/G
OINTMENT TOPICAL PRN
OUTPATIENT
Start: 2025-05-29

## 2025-05-29 RX ORDER — SODIUM CHLOR/HYPOCHLOROUS ACID 0.033 %
SOLUTION, IRRIGATION IRRIGATION PRN
OUTPATIENT
Start: 2025-05-29

## 2025-05-29 RX ORDER — LIDOCAINE 50 MG/G
OINTMENT TOPICAL PRN
OUTPATIENT
Start: 2025-05-29

## 2025-05-29 RX ORDER — GINSENG 100 MG
CAPSULE ORAL PRN
OUTPATIENT
Start: 2025-05-29

## 2025-05-29 RX ORDER — MUPIROCIN 20 MG/G
OINTMENT TOPICAL PRN
OUTPATIENT
Start: 2025-05-29

## 2025-05-29 ASSESSMENT — PAIN SCALES - GENERAL: PAINLEVEL_OUTOF10: 4

## 2025-05-29 NOTE — PLAN OF CARE
Problem: Wound:  Goal: Will show signs of wound healing; wound closure and no evidence of infection  Description: Will show signs of wound healing; wound closure and no evidence of infection  5/29/2025 1518 by Gina Guerin RN  Outcome: Progressing  5/29/2025 1514 by Bina Vilchis RN  Outcome: Progressing

## 2025-05-29 NOTE — DISCHARGE INSTRUCTIONS
[facility] Wound Center and Hyperbaric Medicine   Physician Orders and Discharge Instructions  [address] [place]  [address]  Telephone: [phone]      [phone]        NAME:  Khari Diaz                                                                                                YOB: 1955  MEDICAL RECORD NUMBER:  [id number]     Your  is:  Rosanna     Home Care/Facility: None     Wound Location: Left 3rd, 4th toe and lateral ankle     Dressing orders:   Cleanse with Dakin's solution.  2. Apply dry DASHA  Or equivalent to wound bed.  3. Moisten DASHA with a few drops of normal saline.  4. Cover with 4x4's and wrap with gauze (lazarus or kerlix)  5. Change  Daily     Compression: Wear your own compression stockings on the right and left lower legs.     Offloading Device: Wear your Prevalon Boot as much as possible when not ambulating      Other Instructions: Let the area \"air out\" for an hour daily     Keep all dressings clean, dry and intact.  Keep pressure off the wound(s) at all times.      Follow up visit  2  Week(s) June 12, 2025  @  1:00     Please give 24 hour notice if unable to keep appointment. [phone]     If you experience any of the following, please call the Wound Care Service at  [phone] or go to the nearest emergency room.        *Increase in pain*Temperature over 101*Increase in drainage from your wound or a foul odor  *Uncontrolled swelling*Need for compression bandage changes due to slippage, breakthrough drainage       PLEASE NOTE: IF YOU ARE UNABLE TO OBTAIN WOUND SUPPLIES, CONTINUE TO USE THE SUPPLIES YOU HAVE AVAILABLE UNTIL YOU ARE ABLE TO REACH US. IT IS MOST IMPORTANT TO KEEP THE WOUND COVERED AT ALL TIMES

## 2025-05-29 NOTE — PROGRESS NOTES
[facility]                                                   Progress Note and Procedure Note      Khari Diaz  MEDICAL RECORD NUMBER:  [id number]  AGE: 70 y.o.   GENDER: male  : 1955  EPISODE DATE:  2025    Subjective:     Chief Complaint   Patient presents with    Wound Check         HISTORY of PRESENT ILLNESS HPI     Khari Diaz is a 70 y.o. male who presents today for wound/ulcer evaluation.   History of Wound Context: Patient presents for continued treatment of ulcerations to the left 3rd and 4th toe.  Patient reports compliance with wound care regimen.  Patient reports increased burning sensation to the left lateral ankle at the site of previous ulceration.  Patient states he has not been using his Prevalon boot to offload the ankle.  Patient reports increased swelling of the left lower extremity due to difficulty using the compression stockings with the ulcerations.  Patient does own a Farrow type compression garment.    Patient denies nausea, vomiting, fever, chills, chest pain, or shortness of breath.    Wound/Ulcer Pain Timing/Severity: intermittent  Quality of pain: burning  Severity:  4 / 10   Modifying Factors: Pain worsens with direct pressure to the foot and ankle  Associated Signs/Symptoms: edema, drainage, and pain    Ulcer Identification:  Ulcer Type: diabetic  Contributing Factors: chronic pressure, shear force, and arterial insufficiency    Wound:  Full-thickness ulcerations left 3rd and 4th toe and left lateral ankle        PAST MEDICAL HISTORY        Diagnosis Date    Abscess of back 2020    Abscess of right leg 2020    Acute renal failure     Adenomatous polyp of ascending colon     Adenomatous polyp of descending colon     Adenomatous polyp of transverse colon     Anxiety     CAD S/P percutaneous coronary angioplasty 2014    Cardiomyopathy (HCC)     Cecal polyp     Cerebrovascular accident (CVA) due to occlusion of left middle cerebral artery

## 2025-06-09 ENCOUNTER — HOSPITAL ENCOUNTER (OUTPATIENT)
Dept: LAB | Age: 70
Discharge: HOME | End: 2025-06-09
Payer: MEDICARE

## 2025-06-09 DIAGNOSIS — Z87.39 H/O: GOUT: ICD-10-CM

## 2025-06-09 DIAGNOSIS — Z13.0 SCREENING, ANEMIA, DEFICIENCY, IRON: ICD-10-CM

## 2025-06-09 DIAGNOSIS — E11.65 TYPE 2 DIABETES MELLITUS WITH HYPERGLYCEMIA, WITHOUT LONG-TERM CURRENT USE OF INSULIN (HCC): ICD-10-CM

## 2025-06-09 DIAGNOSIS — E11.22 TYPE 2 DIABETES MELLITUS WITH STAGE 3A CHRONIC KIDNEY DISEASE, WITHOUT LONG-TERM CURRENT USE OF INSULIN (HCC): ICD-10-CM

## 2025-06-09 DIAGNOSIS — Z12.5 SCREENING FOR PROSTATE CANCER: ICD-10-CM

## 2025-06-09 DIAGNOSIS — Z13.29 SCREENING FOR THYROID DISORDER: ICD-10-CM

## 2025-06-09 DIAGNOSIS — N18.31 TYPE 2 DIABETES MELLITUS WITH STAGE 3A CHRONIC KIDNEY DISEASE, WITHOUT LONG-TERM CURRENT USE OF INSULIN (HCC): ICD-10-CM

## 2025-06-09 LAB
ALBUMIN SERPL-MCNC: 4.2 G/DL (ref 3.5–4.6)
ALP SERPL-CCNC: 55 U/L (ref 35–104)
ALT SERPL-CCNC: 32 U/L (ref 0–41)
ANION GAP SERPL CALCULATED.3IONS-SCNC: 14 MEQ/L (ref 9–15)
ANISOCYTOSIS BLD QL SMEAR: ABNORMAL
AST SERPL-CCNC: 37 U/L (ref 0–40)
BASOPHILS # BLD: 0 K/UL (ref 0–0.2)
BASOPHILS NFR BLD: 0.5 %
BILIRUB SERPL-MCNC: 0.8 MG/DL (ref 0.2–0.7)
BUN SERPL-MCNC: 13 MG/DL (ref 8–23)
CALCIUM SERPL-MCNC: 10.3 MG/DL (ref 8.5–9.9)
CHLORIDE SERPL-SCNC: 96 MEQ/L (ref 95–107)
CHOLEST SERPL-MCNC: 112 MG/DL (ref 0–199)
CO2 SERPL-SCNC: 28 MEQ/L (ref 20–31)
CREAT SERPL-MCNC: 1.02 MG/DL (ref 0.7–1.2)
CREAT UR-MCNC: 169.3 MG/DL
EOSINOPHIL # BLD: 0.1 K/UL (ref 0–0.7)
EOSINOPHIL NFR BLD: 2.5 %
ERYTHROCYTE [DISTWIDTH] IN BLOOD BY AUTOMATED COUNT: 12.7 % (ref 11.5–14.5)
GLOBULIN SER CALC-MCNC: 3.3 G/DL (ref 2.3–3.5)
GLUCOSE FASTING: 143 MG/DL (ref 70–99)
HCT VFR BLD AUTO: 42 % (ref 42–52)
HDLC SERPL-MCNC: 42 MG/DL (ref 40–59)
HGB BLD-MCNC: 14.6 G/DL (ref 14–18)
LDL CHOLESTEROL: 51 MG/DL (ref 0–129)
LYMPHOCYTES # BLD: 1.2 K/UL (ref 1–4.8)
LYMPHOCYTES NFR BLD: 27.6 %
MACROCYTES BLD QL SMEAR: ABNORMAL
MCH RBC QN AUTO: 36.8 PG (ref 27–31.3)
MCHC RBC AUTO-ENTMCNC: 34.8 % (ref 33–37)
MCV RBC AUTO: 105.8 FL (ref 79–92.2)
MICROALBUMIN UR-MCNC: 14.3 MG/DL
MICROALBUMIN/CREAT UR-RTO: 84.5 MG/G (ref 0–30)
MONOCYTES # BLD: 0.3 K/UL (ref 0.2–0.8)
MONOCYTES NFR BLD: 7.1 %
NEUTROPHILS # BLD: 2.7 K/UL (ref 1.4–6.5)
NEUTS SEG NFR BLD: 61.8 %
PLATELET # BLD AUTO: 164 K/UL (ref 130–400)
POIKILOCYTOSIS BLD QL SMEAR: ABNORMAL
POTASSIUM SERPL-SCNC: 3.6 MEQ/L (ref 3.4–4.9)
PROT SERPL-MCNC: 7.5 G/DL (ref 6.3–8)
PSA SERPL-MCNC: 2.24 NG/ML (ref 0–4)
RBC # BLD AUTO: 3.97 M/UL (ref 4.7–6.1)
SLIDE REVIEW: ABNORMAL
SODIUM SERPL-SCNC: 138 MEQ/L (ref 135–144)
TRIGLYCERIDE, FASTING: 95 MG/DL (ref 0–150)
TSH SERPL-MCNC: 1.82 UIU/ML (ref 0.44–3.86)
URATE SERPL-MCNC: 4.5 MG/DL (ref 3.4–7)
WBC # BLD AUTO: 4.4 K/UL (ref 4.8–10.8)

## 2025-06-09 PROCEDURE — 36415 COLL VENOUS BLD VENIPUNCTURE: CPT

## 2025-06-09 PROCEDURE — 84443 ASSAY THYROID STIM HORMONE: CPT

## 2025-06-09 PROCEDURE — 82043 UR ALBUMIN QUANTITATIVE: CPT

## 2025-06-09 PROCEDURE — 84550 ASSAY OF BLOOD/URIC ACID: CPT

## 2025-06-09 PROCEDURE — 83036 HEMOGLOBIN GLYCOSYLATED A1C: CPT

## 2025-06-09 PROCEDURE — 80061 LIPID PANEL: CPT

## 2025-06-09 PROCEDURE — 80053 COMPREHEN METABOLIC PANEL: CPT

## 2025-06-09 PROCEDURE — 85025 COMPLETE CBC W/AUTO DIFF WBC: CPT

## 2025-06-09 PROCEDURE — 82570 ASSAY OF URINE CREATININE: CPT

## 2025-06-09 PROCEDURE — 84153 ASSAY OF PSA TOTAL: CPT

## 2025-06-10 ENCOUNTER — HOSPITAL ENCOUNTER (OUTPATIENT)
Dept: CT IMAGING | Age: 70
Discharge: HOME | End: 2025-06-12
Attending: INTERNAL MEDICINE
Payer: MEDICARE

## 2025-06-10 VITALS — HEIGHT: 73 IN | WEIGHT: 225 LBS | BODY MASS INDEX: 29.82 KG/M2

## 2025-06-10 DIAGNOSIS — Z87.891 PERSONAL HISTORY OF TOBACCO USE: ICD-10-CM

## 2025-06-10 LAB
ESTIMATED AVERAGE GLUCOSE: 128 MG/DL
HBA1C MFR BLD: 6.1 % (ref 4–6)

## 2025-06-10 PROCEDURE — 71271 CT THORAX LUNG CANCER SCR C-: CPT

## 2025-06-12 ENCOUNTER — HOSPITAL ENCOUNTER (OUTPATIENT)
Dept: WOUND CARE | Age: 70
Discharge: HOME | End: 2025-06-12
Attending: PODIATRIST
Payer: MEDICARE

## 2025-06-12 VITALS
SYSTOLIC BLOOD PRESSURE: 121 MMHG | HEART RATE: 94 BPM | RESPIRATION RATE: 16 BRPM | DIASTOLIC BLOOD PRESSURE: 73 MMHG | TEMPERATURE: 97.7 F

## 2025-06-12 DIAGNOSIS — I73.9 PAD (PERIPHERAL ARTERY DISEASE): ICD-10-CM

## 2025-06-12 DIAGNOSIS — L97.325 NON-PRESSURE CHRONIC ULCER OF LEFT ANKLE WITH MUSCLE INVOLVEMENT WITHOUT EVIDENCE OF NECROSIS (HCC): ICD-10-CM

## 2025-06-12 DIAGNOSIS — L97.522 TOE ULCER, LEFT, WITH FAT LAYER EXPOSED (HCC): Primary | ICD-10-CM

## 2025-06-12 PROCEDURE — 11043 DBRDMT MUSC&/FSCA 1ST 20/<: CPT

## 2025-06-12 PROCEDURE — 6370000000 HC RX 637 (ALT 250 FOR IP): Performed by: PODIATRIST

## 2025-06-12 PROCEDURE — 11042 DBRDMT SUBQ TIS 1ST 20SQCM/<: CPT

## 2025-06-12 RX ORDER — LIDOCAINE 40 MG/G
CREAM TOPICAL PRN
Status: DISCONTINUED | OUTPATIENT
Start: 2025-06-12 | End: 2025-06-13 | Stop reason: HOSPADM

## 2025-06-12 RX ORDER — GENTAMICIN SULFATE 1 MG/G
OINTMENT TOPICAL PRN
OUTPATIENT
Start: 2025-06-12

## 2025-06-12 RX ORDER — MUPIROCIN 2 %
OINTMENT (GRAM) TOPICAL PRN
OUTPATIENT
Start: 2025-06-12

## 2025-06-12 RX ORDER — TRIAMCINOLONE ACETONIDE 1 MG/G
OINTMENT TOPICAL PRN
OUTPATIENT
Start: 2025-06-12

## 2025-06-12 RX ORDER — LIDOCAINE 40 MG/G
CREAM TOPICAL PRN
OUTPATIENT
Start: 2025-06-12

## 2025-06-12 RX ORDER — NEOMYCIN/BACITRACIN/POLYMYXINB 3.5-400-5K
OINTMENT (GRAM) TOPICAL PRN
OUTPATIENT
Start: 2025-06-12

## 2025-06-12 RX ORDER — GINSENG 100 MG
CAPSULE ORAL PRN
OUTPATIENT
Start: 2025-06-12

## 2025-06-12 RX ORDER — SODIUM CHLOR/HYPOCHLOROUS ACID 0.033 %
SOLUTION, IRRIGATION IRRIGATION PRN
OUTPATIENT
Start: 2025-06-12

## 2025-06-12 RX ORDER — LIDOCAINE HYDROCHLORIDE 20 MG/ML
JELLY TOPICAL PRN
OUTPATIENT
Start: 2025-06-12

## 2025-06-12 RX ORDER — LIDOCAINE HYDROCHLORIDE 40 MG/ML
SOLUTION TOPICAL PRN
OUTPATIENT
Start: 2025-06-12

## 2025-06-12 RX ORDER — LIDOCAINE 50 MG/G
OINTMENT TOPICAL PRN
OUTPATIENT
Start: 2025-06-12

## 2025-06-12 RX ORDER — BETAMETHASONE DIPROPIONATE 0.5 MG/G
CREAM TOPICAL PRN
OUTPATIENT
Start: 2025-06-12

## 2025-06-12 RX ORDER — BACITRACIN ZINC AND POLYMYXIN B SULFATE 500; 1000 [USP'U]/G; [USP'U]/G
OINTMENT TOPICAL PRN
OUTPATIENT
Start: 2025-06-12

## 2025-06-12 RX ORDER — CLOBETASOL PROPIONATE 0.5 MG/G
OINTMENT TOPICAL PRN
OUTPATIENT
Start: 2025-06-12

## 2025-06-12 RX ADMIN — LIDOCAINE 4%: 4 CREAM TOPICAL at 13:29

## 2025-06-12 ASSESSMENT — PAIN SCALES - GENERAL: PAINLEVEL_OUTOF10: 4

## 2025-06-12 ASSESSMENT — PAIN DESCRIPTION - ORIENTATION: ORIENTATION: LEFT

## 2025-06-12 NOTE — PROGRESS NOTES
Status New dressing applied;Clean;Dry;Intact 03/27/25 1147   Wound Cleansed Cleansed with saline 06/12/25 1318   Dressing/Treatment Collagen with Ag;Dry dressing 06/12/25 1344   Wound Length (cm) 0.5 cm 06/12/25 1318   Wound Width (cm) 1 cm 06/12/25 1318   Wound Depth (cm) 0.1 cm 06/12/25 1318   Wound Surface Area (cm^2) 0.5 cm^2 06/12/25 1318   Change in Wound Size % (l*w) 37.5 06/12/25 1318   Wound Volume (cm^3) 0.05 cm^3 06/12/25 1318   Wound Healing % 69 06/12/25 1318   Post-Procedure Length (cm) 0.5 cm 06/12/25 1336   Post-Procedure Width (cm) 1 cm 06/12/25 1336   Post-Procedure Depth (cm) 0.15 cm 06/12/25 1336   Post-Procedure Surface Area (cm^2) 0.5 cm^2 06/12/25 1336   Post-Procedure Volume (cm^3) 0.075 cm^3 06/12/25 1336   Wound Assessment Slough;Pink/red 06/12/25 1318   Drainage Amount Moderate (25-50%) 06/12/25 1318   Drainage Description Serosanguinous 06/12/25 1318   Odor None 06/12/25 1318   Gisela-wound Assessment Maceration 06/12/25 1318   Margins Undefined edges 06/12/25 1318   Wound Thickness Description not for Pressure Injury Full thickness 06/12/25 1318   Number of days: 90       Wound 05/29/25 Ankle Left;Lateral #3 (Active)   Wound Image   06/12/25 1318   Wound Etiology Diabetic 06/12/25 1318   Wound Cleansed Cleansed with saline 06/12/25 1318   Dressing/Treatment Collagen with Ag;Dry dressing 06/12/25 1344   Wound Length (cm) 0 cm 06/12/25 1318   Wound Width (cm) 0 cm 06/12/25 1318   Wound Depth (cm) 0 cm 06/12/25 1318   Wound Surface Area (cm^2) 0 cm^2 06/12/25 1318   Change in Wound Size % (l*w) 100 06/12/25 1318   Wound Volume (cm^3) 0 cm^3 06/12/25 1318   Wound Healing % 100 06/12/25 1318   Post-Procedure Length (cm) 1.9 cm 06/12/25 1339   Post-Procedure Width (cm) 1.6 cm 06/12/25 1339   Post-Procedure Depth (cm) 0.4 cm 06/12/25 1339   Post-Procedure Surface Area (cm^2) 3.04 cm^2 06/12/25 1339   Post-Procedure Volume (cm^3) 1.216 cm^3 06/12/25 1339   Wound Assessment Dry 06/12/25 1318

## 2025-06-12 NOTE — DISCHARGE INSTRUCTIONS
[facility]   Physician Orders and Discharge Instructions  [facility]  [address] [place]  [address]  Telephone: [phone]      [phone]        NAME:  Khari Diaz                                                                                                YOB: 1955  MEDICAL RECORD NUMBER:  [id number]     Your  is:  Rosanna     Home Care/Facility: None     Wound Location: Left 3rd, 4th toe and lateral ankle     Dressing orders:   Cleanse with Dakin's solution.  2. Apply dry DASHA  Or equivalent to wound bed.  3. Moisten DASHA with a few drops of normal saline.  4. Cover with 4x4's and wrap with gauze (lazarus or kerlix)  5. Change  Daily     Compression: .Apply 10-20mmHg Spandagrip to Left lower leg, apply first thing in the morning, remove at bedtime, elevate legs as much as possible during the day.(41).  Wear your own compression on the right lower leg.     Offloading Device: Wear your Prevalon Boot as much as possible when not ambulating.  A vascular test was ordered today.      Other Instructions: Let the area \"air out\" for an hour daily     Keep all dressings clean, dry and intact.  Keep pressure off the wound(s) at all times.      Follow up visit  2  Week(s) June 26, 2025  @  3:00     Please give 24 hour notice if unable to keep appointment. [phone]     If you experience any of the following, please call the Wound Care Service at  [phone] or go to the nearest emergency room.        *Increase in pain*Temperature over 101*Increase in drainage from your wound or a foul odor  *Uncontrolled swelling*Need for compression bandage changes due to slippage, breakthrough drainage       PLEASE NOTE: IF YOU ARE UNABLE TO OBTAIN WOUND SUPPLIES, CONTINUE TO USE THE SUPPLIES YOU HAVE AVAILABLE UNTIL YOU ARE ABLE TO REACH US. IT IS MOST IMPORTANT TO KEEP THE WOUND COVERED AT ALL TIMES

## 2025-06-14 ASSESSMENT — LIFESTYLE VARIABLES
HOW MANY STANDARD DRINKS CONTAINING ALCOHOL DO YOU HAVE ON A TYPICAL DAY: PATIENT DECLINED
HOW OFTEN DO YOU HAVE A DRINK CONTAINING ALCOHOL: 98
HOW OFTEN DO YOU HAVE A DRINK CONTAINING ALCOHOL: PATIENT DECLINED
HOW OFTEN DO YOU HAVE SIX OR MORE DRINKS ON ONE OCCASION: 98
HOW MANY STANDARD DRINKS CONTAINING ALCOHOL DO YOU HAVE ON A TYPICAL DAY: 98

## 2025-06-14 ASSESSMENT — PATIENT HEALTH QUESTIONNAIRE - PHQ9
SUM OF ALL RESPONSES TO PHQ QUESTIONS 1-9: 0
1. LITTLE INTEREST OR PLEASURE IN DOING THINGS: NOT AT ALL
SUM OF ALL RESPONSES TO PHQ QUESTIONS 1-9: 0
SUM OF ALL RESPONSES TO PHQ QUESTIONS 1-9: 0
2. FEELING DOWN, DEPRESSED OR HOPELESS: NOT AT ALL
SUM OF ALL RESPONSES TO PHQ QUESTIONS 1-9: 0

## 2025-06-16 ENCOUNTER — OFFICE VISIT (OUTPATIENT)
Dept: FAMILY MEDICINE CLINIC | Age: 70
End: 2025-06-16
Payer: MEDICARE

## 2025-06-16 VITALS
SYSTOLIC BLOOD PRESSURE: 126 MMHG | BODY MASS INDEX: 29.69 KG/M2 | OXYGEN SATURATION: 97 % | DIASTOLIC BLOOD PRESSURE: 80 MMHG | RESPIRATION RATE: 18 BRPM | WEIGHT: 225 LBS | HEART RATE: 91 BPM

## 2025-06-16 DIAGNOSIS — E11.42 TYPE 2 DIABETES MELLITUS WITH DIABETIC POLYNEUROPATHY, WITHOUT LONG-TERM CURRENT USE OF INSULIN (HCC): Chronic | ICD-10-CM

## 2025-06-16 DIAGNOSIS — Z13.6 SCREENING FOR CARDIOVASCULAR CONDITION: ICD-10-CM

## 2025-06-16 DIAGNOSIS — F10.10 EXCESSIVE DRINKING OF ALCOHOL: ICD-10-CM

## 2025-06-16 DIAGNOSIS — Z71.89 ACP (ADVANCE CARE PLANNING): ICD-10-CM

## 2025-06-16 DIAGNOSIS — Z00.00 MEDICARE ANNUAL WELLNESS VISIT, SUBSEQUENT: Primary | ICD-10-CM

## 2025-06-16 DIAGNOSIS — E11.59 HYPERTENSION ASSOCIATED WITH DIABETES (HCC): ICD-10-CM

## 2025-06-16 DIAGNOSIS — E11.69 HYPERLIPIDEMIA ASSOCIATED WITH TYPE 2 DIABETES MELLITUS (HCC): ICD-10-CM

## 2025-06-16 DIAGNOSIS — E78.5 HYPERLIPIDEMIA ASSOCIATED WITH TYPE 2 DIABETES MELLITUS (HCC): ICD-10-CM

## 2025-06-16 DIAGNOSIS — Z72.0 TOBACCO USE: ICD-10-CM

## 2025-06-16 DIAGNOSIS — J44.9 CHRONIC OBSTRUCTIVE PULMONARY DISEASE, UNSPECIFIED COPD TYPE (HCC): ICD-10-CM

## 2025-06-16 DIAGNOSIS — I15.2 HYPERTENSION ASSOCIATED WITH DIABETES (HCC): ICD-10-CM

## 2025-06-16 DIAGNOSIS — E11.65 TYPE 2 DIABETES MELLITUS WITH HYPERGLYCEMIA, WITHOUT LONG-TERM CURRENT USE OF INSULIN (HCC): ICD-10-CM

## 2025-06-16 DIAGNOSIS — Z87.891 PERSONAL HISTORY OF TOBACCO USE, PRESENTING HAZARDS TO HEALTH: ICD-10-CM

## 2025-06-16 PROBLEM — L97.325 NON-PRESSURE CHRONIC ULCER OF LEFT ANKLE WITH MUSCLE INVOLVEMENT WITHOUT EVIDENCE OF NECROSIS (HCC): Status: RESOLVED | Noted: 2025-05-29 | Resolved: 2025-06-16

## 2025-06-16 PROBLEM — E11.622 TYPE 2 DIABETES MELLITUS WITH OTHER SKIN ULCER (CODE) (HCC): Status: RESOLVED | Noted: 2025-03-25 | Resolved: 2025-06-16

## 2025-06-16 PROBLEM — E11.29 MICROALBUMINURIA DUE TO TYPE 2 DIABETES MELLITUS (HCC): Chronic | Status: RESOLVED | Noted: 2018-09-14 | Resolved: 2025-06-16

## 2025-06-16 PROBLEM — R80.9 MICROALBUMINURIA DUE TO TYPE 2 DIABETES MELLITUS (HCC): Chronic | Status: RESOLVED | Noted: 2018-09-14 | Resolved: 2025-06-16

## 2025-06-16 PROBLEM — N18.31 TYPE 2 DIABETES MELLITUS WITH STAGE 3A CHRONIC KIDNEY DISEASE, WITHOUT LONG-TERM CURRENT USE OF INSULIN (HCC): Status: RESOLVED | Noted: 2025-03-25 | Resolved: 2025-06-16

## 2025-06-16 PROBLEM — E11.22 TYPE 2 DIABETES MELLITUS WITH STAGE 3A CHRONIC KIDNEY DISEASE, WITHOUT LONG-TERM CURRENT USE OF INSULIN (HCC): Status: RESOLVED | Noted: 2025-03-25 | Resolved: 2025-06-16

## 2025-06-16 PROBLEM — I87.2 VENOUS INSUFFICIENCY OF BOTH LOWER EXTREMITIES: Status: RESOLVED | Noted: 2019-01-21 | Resolved: 2025-06-16

## 2025-06-16 PROBLEM — E11.9 TYPE 2 DIABETES MELLITUS WITHOUT COMPLICATION, WITHOUT LONG-TERM CURRENT USE OF INSULIN (HCC): Status: RESOLVED | Noted: 2025-03-25 | Resolved: 2025-06-16

## 2025-06-16 PROCEDURE — 3044F HG A1C LEVEL LT 7.0%: CPT | Performed by: INTERNAL MEDICINE

## 2025-06-16 PROCEDURE — G8427 DOCREV CUR MEDS BY ELIG CLIN: HCPCS | Performed by: INTERNAL MEDICINE

## 2025-06-16 PROCEDURE — 3023F SPIROM DOC REV: CPT | Performed by: INTERNAL MEDICINE

## 2025-06-16 PROCEDURE — 1123F ACP DISCUSS/DSCN MKR DOCD: CPT | Performed by: INTERNAL MEDICINE

## 2025-06-16 PROCEDURE — 99406 BEHAV CHNG SMOKING 3-10 MIN: CPT | Performed by: INTERNAL MEDICINE

## 2025-06-16 PROCEDURE — G0439 PPPS, SUBSEQ VISIT: HCPCS | Performed by: INTERNAL MEDICINE

## 2025-06-16 PROCEDURE — 99214 OFFICE O/P EST MOD 30 MIN: CPT | Performed by: INTERNAL MEDICINE

## 2025-06-16 PROCEDURE — G8417 CALC BMI ABV UP PARAM F/U: HCPCS | Performed by: INTERNAL MEDICINE

## 2025-06-16 PROCEDURE — 3017F COLORECTAL CA SCREEN DOC REV: CPT | Performed by: INTERNAL MEDICINE

## 2025-06-16 PROCEDURE — 3074F SYST BP LT 130 MM HG: CPT | Performed by: INTERNAL MEDICINE

## 2025-06-16 PROCEDURE — 1159F MED LIST DOCD IN RCRD: CPT | Performed by: INTERNAL MEDICINE

## 2025-06-16 PROCEDURE — 2022F DILAT RTA XM EVC RTNOPTHY: CPT | Performed by: INTERNAL MEDICINE

## 2025-06-16 PROCEDURE — 99497 ADVNCD CARE PLAN 30 MIN: CPT | Performed by: INTERNAL MEDICINE

## 2025-06-16 PROCEDURE — 3079F DIAST BP 80-89 MM HG: CPT | Performed by: INTERNAL MEDICINE

## 2025-06-16 PROCEDURE — G0446 INTENS BEHAVE THER CARDIO DX: HCPCS | Performed by: INTERNAL MEDICINE

## 2025-06-16 PROCEDURE — 1160F RVW MEDS BY RX/DR IN RCRD: CPT | Performed by: INTERNAL MEDICINE

## 2025-06-16 PROCEDURE — 4004F PT TOBACCO SCREEN RCVD TLK: CPT | Performed by: INTERNAL MEDICINE

## 2025-06-16 NOTE — PATIENT INSTRUCTIONS
least 30 minutes on most days of the week.  Schedule time each day for a bowel movement. A daily routine may help. Take your time and do not strain when having a bowel movement.  When should you call for help?   Call your doctor now or seek immediate medical care if:    Your pain gets worse or is out of control.     You feel down or blue, or you do not enjoy things like you once did. You may be depressed, which is common in people with chronic pain. Depression can be treated.     You have vomiting or cramps for more than 2 hours.   Watch closely for changes in your health, and be sure to contact your doctor if:    You cannot sleep because of pain.     You are very worried or anxious about your pain.     You have trouble taking your pain medicine.     You have any concerns about your pain medicine.     You have trouble with bowel movements, such as:  No bowel movement in 3 days.  Blood in the anal area, in your stool, or on the toilet paper.  Diarrhea for more than 24 hours.   Where can you learn more?  Go to https://www.[employer].net/patientEd and enter N004 to learn more about \"Chronic Pain: Care Instructions.\"  Current as of: July 31, 2024  Content Version: 14.5  © 1499-4396 [employer].   Care instructions adapted under license by [employer]. If you have questions about a medical condition or this instruction, always ask your healthcare professional. [employer], disclaims any warranty or liability for your use of this information.         Learning About Activities of Daily Living  What are activities of daily living?     Activities of daily living (ADLs) are the basic self-care tasks you do every day. These include eating, bathing, dressing, and moving around.  As you age, and if you have health problems, you may find that it's harder to do some of these tasks. If so, your doctor can suggest ideas that may help.  To measure what kind of help you may need, your doctor will ask how well

## 2025-06-16 NOTE — PROGRESS NOTES
Medicare Annual Wellness Visit    Khari Diaz is here for Medicare AWV    Assessment & Plan   Medicare annual wellness visit, subsequent  Type 2 diabetes mellitus without complication, without long-term current use of insulin (HCC)  -     ESTABLISHED, MOD MDM, 30-39 MIN [32991]  Type 2 diabetes mellitus with hyperglycemia, without long-term current use of insulin (HCC)  -     ESTABLISHED, MOD MDM, 30-39 MIN [57884]  Type 2 diabetes mellitus with diabetic polyneuropathy, without long-term current use of insulin (HCC)  -     ESTABLISHED, MOD MDM, 30-39 MIN [68545]  Tobacco use  -     ESTABLISHED, MOD MDM, 30-39 MIN [72536]  Hypertension associated with diabetes (HCC)  -     ESTABLISHED, MOD MDM, 30-39 MIN [77323]  Hyperlipidemia associated with type 2 diabetes mellitus (HCC)  -     ESTABLISHED, MOD MDM, 30-39 MIN [43088]  Excessive drinking of alcohol  -     ESTABLISHED, MOD MDM, 30-39 MIN [60206]  Chronic obstructive pulmonary disease, unspecified COPD type (HCC)  -     ESTABLISHED, MOD MDM, 30-39 MIN [26620]  ACP (advance care planning)  -     DE Advanced Care Planning (16-30 minutes) [89685]  -     Full code  Personal history of tobacco use, presenting hazards to health  -     DE Smoking and Tobacco Use Cessation (Intermediate): 3-10 MINUTES [54470]  Screening for cardiovascular condition  -     DE Intensive Behavior Counseling for Cardiovascular Diseases, 8-15 minutes []       Return in 3 months (on 9/16/2025).     Subjective       Patient's complete Health Risk Assessment and screening values have been reviewed and are found in Flowsheets. The following problems were reviewed today and where indicated follow up appointments were made and/or referrals ordered.    Positive Risk Factor Screenings with Interventions:    Fall Risk:  Do you feel unsteady or are you worried about falling? : (!) (Patient-Rptd) yes  2 or more falls in past year?: (!) (Patient-Rptd) yes  Fall with injury in past year?: (!)

## 2025-06-17 ENCOUNTER — OFFICE VISIT (OUTPATIENT)
Age: 70
End: 2025-06-17
Payer: MEDICARE

## 2025-06-17 VITALS
BODY MASS INDEX: 29.03 KG/M2 | HEART RATE: 95 BPM | DIASTOLIC BLOOD PRESSURE: 72 MMHG | SYSTOLIC BLOOD PRESSURE: 118 MMHG | WEIGHT: 220 LBS

## 2025-06-17 DIAGNOSIS — E11.21 DIABETIC NEPHROPATHY ASSOCIATED WITH TYPE 2 DIABETES MELLITUS (HCC): ICD-10-CM

## 2025-06-17 DIAGNOSIS — E87.8 DISEQUILIBRIUM SYNDROME: Primary | ICD-10-CM

## 2025-06-17 DIAGNOSIS — H81.03 MENIERE DISEASE, BILATERAL: ICD-10-CM

## 2025-06-17 DIAGNOSIS — R26.9 GAIT DISTURBANCE: ICD-10-CM

## 2025-06-17 DIAGNOSIS — H90.5 SENSORINEURAL HEARING LOSS (SNHL) OF LEFT EAR, UNSPECIFIED HEARING STATUS ON CONTRALATERAL SIDE: ICD-10-CM

## 2025-06-17 PROCEDURE — 3017F COLORECTAL CA SCREEN DOC REV: CPT | Performed by: PSYCHIATRY & NEUROLOGY

## 2025-06-17 PROCEDURE — 1159F MED LIST DOCD IN RCRD: CPT | Performed by: PSYCHIATRY & NEUROLOGY

## 2025-06-17 PROCEDURE — 99214 OFFICE O/P EST MOD 30 MIN: CPT | Performed by: PSYCHIATRY & NEUROLOGY

## 2025-06-17 PROCEDURE — 3044F HG A1C LEVEL LT 7.0%: CPT | Performed by: PSYCHIATRY & NEUROLOGY

## 2025-06-17 PROCEDURE — G8417 CALC BMI ABV UP PARAM F/U: HCPCS | Performed by: PSYCHIATRY & NEUROLOGY

## 2025-06-17 PROCEDURE — G8427 DOCREV CUR MEDS BY ELIG CLIN: HCPCS | Performed by: PSYCHIATRY & NEUROLOGY

## 2025-06-17 PROCEDURE — 1125F AMNT PAIN NOTED PAIN PRSNT: CPT | Performed by: PSYCHIATRY & NEUROLOGY

## 2025-06-17 PROCEDURE — 4004F PT TOBACCO SCREEN RCVD TLK: CPT | Performed by: PSYCHIATRY & NEUROLOGY

## 2025-06-17 PROCEDURE — 3074F SYST BP LT 130 MM HG: CPT | Performed by: PSYCHIATRY & NEUROLOGY

## 2025-06-17 PROCEDURE — 1123F ACP DISCUSS/DSCN MKR DOCD: CPT | Performed by: PSYCHIATRY & NEUROLOGY

## 2025-06-17 PROCEDURE — 2022F DILAT RTA XM EVC RTNOPTHY: CPT | Performed by: PSYCHIATRY & NEUROLOGY

## 2025-06-17 PROCEDURE — 3078F DIAST BP <80 MM HG: CPT | Performed by: PSYCHIATRY & NEUROLOGY

## 2025-06-17 ASSESSMENT — ENCOUNTER SYMPTOMS
BACK PAIN: 0
COLOR CHANGE: 0
NAUSEA: 0
CHOKING: 0
VOMITING: 0
SHORTNESS OF BREATH: 0
PHOTOPHOBIA: 0
TROUBLE SWALLOWING: 0

## 2025-06-17 NOTE — PROGRESS NOTES
Subjective:      Patient ID: Khari Diaz is a 70 y.o. male who presents today for:  Chief Complaint   Patient presents with    Follow-up     Pt states his gait is still off. Pt reports a minor fall last week.        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  Patient has extensive relations with the same and is only had few minor falls.  We had him on hydroxyzine though insurance issues occurred.  Patient has multiple risk factors for cerebrovascular disease and likely the disequilibrium is from small ischemic changes.  Patient has not had any significant vertigo and not on any medication from me.  Patient is on Plavix as well as Lipitor for stroke prevention  Past Medical History:   Diagnosis Date    Abscess of back 01/06/2020    Abscess of right leg 01/06/2020    Acute renal failure     Adenomatous polyp of ascending colon     Adenomatous polyp of descending colon     Adenomatous polyp of transverse colon     Anxiety     CAD S/P percutaneous coronary angioplasty 03/11/2014    Cardiomyopathy ([facility])     Cecal polyp     Cerebrovascular accident (CVA) due to occlusion of left middle cerebral artery ([facility]) 08/2016    brainstem infarction from left MCA occlusion    Cerebrovascular disease 07/27/2016    Chronic obstructive pulmonary disease, unspecified COPD type ([facility]) 03/25/2025    Claudication     Colon polyp     Coronary angioplasty status     Cutaneous abscess of back excluding buttocks 01/05/2018    CVA (cerebral vascular accident) ([facility]) 11/12/2017    Dependent edema 09/14/2017    Diplopia 05/15/2017    Resolved with management of cataracts    Dupuytren contracture 01/02/2018    Dysphagia 08/18/2011    Dysphonia 08/18/2011    Essential hypertension 08/17/2016    Gallop rhythm     Gout 12/10/2016    Gout of big toe 08/2014    Right Great Toe    H/O fall     Headache     migraines    Heart failure (HCC)     History of

## 2025-06-18 ENCOUNTER — HOSPITAL ENCOUNTER (OUTPATIENT)
Dept: ULTRASOUND IMAGING | Age: 70
Discharge: HOME | End: 2025-06-20
Attending: PODIATRIST
Payer: MEDICARE

## 2025-06-18 DIAGNOSIS — I73.9 PAD (PERIPHERAL ARTERY DISEASE): ICD-10-CM

## 2025-06-18 DIAGNOSIS — L97.325 NON-PRESSURE CHRONIC ULCER OF LEFT ANKLE WITH MUSCLE INVOLVEMENT WITHOUT EVIDENCE OF NECROSIS (HCC): ICD-10-CM

## 2025-06-18 PROCEDURE — 93925 LOWER EXTREMITY STUDY: CPT

## 2025-06-26 ENCOUNTER — HOSPITAL ENCOUNTER (OUTPATIENT)
Dept: WOUND CARE | Age: 70
Discharge: HOME | End: 2025-06-26
Attending: PODIATRIST
Payer: MEDICARE

## 2025-06-26 VITALS
SYSTOLIC BLOOD PRESSURE: 131 MMHG | TEMPERATURE: 99.4 F | DIASTOLIC BLOOD PRESSURE: 71 MMHG | RESPIRATION RATE: 19 BRPM | HEART RATE: 89 BPM

## 2025-06-26 DIAGNOSIS — L97.325 NON-PRESSURE CHRONIC ULCER OF LEFT ANKLE WITH MUSCLE INVOLVEMENT WITHOUT EVIDENCE OF NECROSIS (HCC): ICD-10-CM

## 2025-06-26 DIAGNOSIS — E11.622 TYPE 2 DIABETES MELLITUS WITH OTHER SKIN ULCER (CODE) (HCC): ICD-10-CM

## 2025-06-26 DIAGNOSIS — L97.522 TOE ULCER, LEFT, WITH FAT LAYER EXPOSED (HCC): Primary | ICD-10-CM

## 2025-06-26 PROCEDURE — 6370000000 HC RX 637 (ALT 250 FOR IP): Performed by: PODIATRIST

## 2025-06-26 RX ORDER — SODIUM CHLOR/HYPOCHLOROUS ACID 0.033 %
SOLUTION, IRRIGATION IRRIGATION PRN
OUTPATIENT
Start: 2025-06-26

## 2025-06-26 RX ORDER — GENTAMICIN SULFATE 1 MG/G
OINTMENT TOPICAL PRN
OUTPATIENT
Start: 2025-06-26

## 2025-06-26 RX ORDER — MUPIROCIN 2 %
OINTMENT (GRAM) TOPICAL PRN
OUTPATIENT
Start: 2025-06-26

## 2025-06-26 RX ORDER — NEOMYCIN/BACITRACIN/POLYMYXINB 3.5-400-5K
OINTMENT (GRAM) TOPICAL PRN
OUTPATIENT
Start: 2025-06-26

## 2025-06-26 RX ORDER — BETAMETHASONE DIPROPIONATE 0.5 MG/G
CREAM TOPICAL PRN
OUTPATIENT
Start: 2025-06-26

## 2025-06-26 RX ORDER — LIDOCAINE HYDROCHLORIDE 40 MG/ML
SOLUTION TOPICAL PRN
OUTPATIENT
Start: 2025-06-26

## 2025-06-26 RX ORDER — CLOBETASOL PROPIONATE 0.5 MG/G
OINTMENT TOPICAL PRN
OUTPATIENT
Start: 2025-06-26

## 2025-06-26 RX ORDER — BACITRACIN ZINC AND POLYMYXIN B SULFATE 500; 1000 [USP'U]/G; [USP'U]/G
OINTMENT TOPICAL PRN
OUTPATIENT
Start: 2025-06-26

## 2025-06-26 RX ORDER — GINSENG 100 MG
CAPSULE ORAL PRN
OUTPATIENT
Start: 2025-06-26

## 2025-06-26 RX ORDER — LIDOCAINE HYDROCHLORIDE 20 MG/ML
JELLY TOPICAL PRN
Status: DISCONTINUED | OUTPATIENT
Start: 2025-06-26 | End: 2025-06-27 | Stop reason: HOSPADM

## 2025-06-26 RX ORDER — LIDOCAINE 40 MG/G
CREAM TOPICAL PRN
OUTPATIENT
Start: 2025-06-26

## 2025-06-26 RX ORDER — LIDOCAINE HYDROCHLORIDE 20 MG/ML
JELLY TOPICAL PRN
OUTPATIENT
Start: 2025-06-26

## 2025-06-26 RX ORDER — LIDOCAINE 50 MG/G
OINTMENT TOPICAL PRN
OUTPATIENT
Start: 2025-06-26

## 2025-06-26 RX ORDER — TRIAMCINOLONE ACETONIDE 1 MG/G
OINTMENT TOPICAL PRN
OUTPATIENT
Start: 2025-06-26

## 2025-06-26 RX ADMIN — LIDOCAINE HYDROCHLORIDE: 20 JELLY TOPICAL at 15:32

## 2025-06-26 ASSESSMENT — PAIN DESCRIPTION - LOCATION: LOCATION: FOOT

## 2025-06-26 ASSESSMENT — PAIN DESCRIPTION - ORIENTATION: ORIENTATION: LEFT

## 2025-06-26 ASSESSMENT — PAIN SCALES - GENERAL: PAINLEVEL_OUTOF10: 4

## 2025-06-26 NOTE — DISCHARGE INSTRUCTIONS
[facility]   Physician Orders and Discharge Instructions  [facility]  [address] [place]  [address]  Telephone: [phone]      [phone]        NAME:  Khari Diaz                                                                                                YOB: 1955  MEDICAL RECORD NUMBER:  [id number]     Your  is:  Rosanna     Home Care/Facility: None     Wound Location: Left 3rd, 4th toe and lateral ankle     Dressing orders:   Cleanse with Dakin's solution  2. Apply dry HYDROFERA BLUE CLASSIC or equivalent to wound bed.  NOTE: If your HYDROFRERA BLUE is not soft and pliable, moisten with saline until it is sponge like and then ring out excess saline and apply to wound bed.  3. Cover with 4x4's and wrap with gauze (lazarus or kerlix)  4. Change  Every other day or Monday, Wednesday, and Friday     Compression: .Apply 10-20mmHg Spandagrip to Left lower leg, apply first thing in the morning, remove at bedtime, elevate legs as much as possible during the day.(41) or   Wear your own compression on the right lower leg.     Offloading Device: Wear your Prevalon Boot as much as possible when not ambulating.       Other Instructions: Let the area \"air out\" for an hour daily     Keep all dressings clean, dry and intact.  Keep pressure off the wound(s) at all times.      Follow up visit  2  Week(s)  July 10, 2025  @  3:15     Please give 24 hour notice if unable to keep appointment. [phone]     If you experience any of the following, please call the Wound Care Service at  [phone] or go to the nearest emergency room.        *Increase in pain*Temperature over 101*Increase in drainage from your wound or a foul odor  *Uncontrolled swelling*Need for compression bandage changes due to slippage, breakthrough drainage       PLEASE NOTE: IF YOU ARE UNABLE TO OBTAIN WOUND SUPPLIES, CONTINUE TO USE THE

## 2025-06-26 NOTE — PROGRESS NOTES
Wound 03/13/25 Toe (Comment  which one) Left;Anterior #2 left fourth toe (Active)   Wound Image   06/26/25 1521   Wound Etiology Diabetic 06/26/25 1527   Dressing Status New dressing applied;Clean;Dry;Intact 03/27/25 1147   Wound Cleansed Cleansed with saline 06/26/25 1521   Dressing/Treatment Hydrofera blue;Dry dressing 06/26/25 1611   Wound Length (cm) 0.5 cm 06/26/25 1527   Wound Width (cm) 1 cm 06/26/25 1527   Wound Depth (cm) 0.2 cm 06/26/25 1527   Wound Surface Area (cm^2) 0.5 cm^2 06/26/25 1527   Change in Wound Size % (l*w) 37.5 06/26/25 1527   Wound Volume (cm^3) 0.1 cm^3 06/26/25 1527   Wound Healing % 38 06/26/25 1527   Post-Procedure Length (cm) 0.5 cm 06/26/25 1558   Post-Procedure Width (cm) 1 cm 06/26/25 1558   Post-Procedure Depth (cm) 0.25 cm 06/26/25 1558   Post-Procedure Surface Area (cm^2) 0.5 cm^2 06/26/25 1558   Post-Procedure Volume (cm^3) 0.125 cm^3 06/26/25 1558   Wound Assessment Slough;Pink/red 06/12/25 1318   Drainage Amount None (dry) 06/26/25 1527   Drainage Description Serosanguinous 06/12/25 1318   Odor None 06/26/25 1527   Gisela-wound Assessment Fragile 06/26/25 1527   Margins Undefined edges 06/12/25 1318   Wound Thickness Description not for Pressure Injury Full thickness 06/12/25 1318   Number of days: 105       Wound 05/29/25 Ankle Left;Lateral #3 (Active)   Wound Image   06/26/25 1521   Wound Etiology Diabetic 06/26/25 1521   Wound Cleansed Cleansed with saline 06/26/25 1521   Dressing/Treatment Hydrofera blue;Dry dressing 06/26/25 1611   Wound Length (cm) 1.7 cm 06/26/25 1527   Wound Width (cm) 0.5 cm 06/26/25 1527   Wound Depth (cm) 0.2 cm 06/26/25 1527   Wound Surface Area (cm^2) 0.85 cm^2 06/26/25 1527   Change in Wound Size % (l*w) 85.54 06/26/25 1527   Wound Volume (cm^3) 0.17 cm^3 06/26/25 1527   Wound Healing % 86 06/26/25 1527   Post-Procedure Length (cm) 1.7 cm 06/26/25 1601   Post-Procedure Width (cm) 0.5 cm 06/26/25 1601   Post-Procedure Depth (cm) 0.25 cm

## 2025-07-10 ENCOUNTER — HOSPITAL ENCOUNTER (OUTPATIENT)
Dept: WOUND CARE | Age: 70
Discharge: HOME | End: 2025-07-10
Attending: PODIATRIST
Payer: MEDICARE

## 2025-07-10 VITALS
DIASTOLIC BLOOD PRESSURE: 67 MMHG | SYSTOLIC BLOOD PRESSURE: 137 MMHG | TEMPERATURE: 97.8 F | RESPIRATION RATE: 17 BRPM | HEART RATE: 87 BPM

## 2025-07-10 DIAGNOSIS — L97.522 TOE ULCER, LEFT, WITH FAT LAYER EXPOSED (HCC): Primary | ICD-10-CM

## 2025-07-10 PROCEDURE — 6370000000 HC RX 637 (ALT 250 FOR IP): Performed by: PODIATRIST

## 2025-07-10 PROCEDURE — 11042 DBRDMT SUBQ TIS 1ST 20SQCM/<: CPT

## 2025-07-10 RX ORDER — LIDOCAINE HYDROCHLORIDE 20 MG/ML
JELLY TOPICAL PRN
OUTPATIENT
Start: 2025-07-10

## 2025-07-10 RX ORDER — TRIAMCINOLONE ACETONIDE 1 MG/G
OINTMENT TOPICAL PRN
OUTPATIENT
Start: 2025-07-10

## 2025-07-10 RX ORDER — SODIUM CHLOR/HYPOCHLOROUS ACID 0.033 %
SOLUTION, IRRIGATION IRRIGATION PRN
OUTPATIENT
Start: 2025-07-10

## 2025-07-10 RX ORDER — GENTAMICIN SULFATE 1 MG/G
OINTMENT TOPICAL PRN
OUTPATIENT
Start: 2025-07-10

## 2025-07-10 RX ORDER — MUPIROCIN 2 %
OINTMENT (GRAM) TOPICAL PRN
OUTPATIENT
Start: 2025-07-10

## 2025-07-10 RX ORDER — GINSENG 100 MG
CAPSULE ORAL PRN
OUTPATIENT
Start: 2025-07-10

## 2025-07-10 RX ORDER — CLOBETASOL PROPIONATE 0.5 MG/G
OINTMENT TOPICAL PRN
OUTPATIENT
Start: 2025-07-10

## 2025-07-10 RX ORDER — LIDOCAINE HYDROCHLORIDE 40 MG/ML
SOLUTION TOPICAL PRN
OUTPATIENT
Start: 2025-07-10

## 2025-07-10 RX ORDER — LIDOCAINE 40 MG/G
CREAM TOPICAL PRN
OUTPATIENT
Start: 2025-07-10

## 2025-07-10 RX ORDER — BACITRACIN ZINC AND POLYMYXIN B SULFATE 500; 1000 [USP'U]/G; [USP'U]/G
OINTMENT TOPICAL PRN
OUTPATIENT
Start: 2025-07-10

## 2025-07-10 RX ORDER — LIDOCAINE 40 MG/G
CREAM TOPICAL PRN
Status: DISCONTINUED | OUTPATIENT
Start: 2025-07-10 | End: 2025-07-11 | Stop reason: HOSPADM

## 2025-07-10 RX ORDER — LIDOCAINE 50 MG/G
OINTMENT TOPICAL PRN
OUTPATIENT
Start: 2025-07-10

## 2025-07-10 RX ORDER — NEOMYCIN/BACITRACIN/POLYMYXINB 3.5-400-5K
OINTMENT (GRAM) TOPICAL PRN
OUTPATIENT
Start: 2025-07-10

## 2025-07-10 RX ORDER — BETAMETHASONE DIPROPIONATE 0.5 MG/G
CREAM TOPICAL PRN
OUTPATIENT
Start: 2025-07-10

## 2025-07-10 RX ADMIN — LIDOCAINE 4%: 4 CREAM TOPICAL at 15:43

## 2025-07-10 ASSESSMENT — PAIN DESCRIPTION - LOCATION: LOCATION: FOOT

## 2025-07-10 ASSESSMENT — PAIN DESCRIPTION - ORIENTATION: ORIENTATION: LEFT

## 2025-07-10 ASSESSMENT — PAIN SCALES - GENERAL: PAINLEVEL_OUTOF10: 3

## 2025-07-10 NOTE — DISCHARGE INSTRUCTIONS
[facility]   Physician Orders and Discharge Instructions  [facility]  [address] [place]  [address]  Telephone: [phone]      [phone]        NAME:  Khari Diaz                                                                                                YOB: 1955  MEDICAL RECORD NUMBER:  [id number]     Your  is:  Rosanna     Home Care/Facility: None     Wound Location: Left 3rd, 4th toe and lateral ankle     Dressing orders:   Cleanse with Dakin's solution  2. Apply dry HYDROFERA BLUE CLASSIC or equivalent to wound bed.  NOTE: If your HYDROFRERA BLUE is not soft and pliable, moisten with saline until it is sponge like and then ring out excess saline and apply to wound bed.  3. Cover with 4x4's and wrap with gauze (lazarus or kerlix)  4. Change  Every other day or Monday, Wednesday, and Friday     Compression: .Apply 10-20mmHg Spandagrip to Left lower leg, apply first thing in the morning, remove at bedtime, elevate legs as much as possible during the day.(38) or   Wear your own compression on the right lower leg.     Offloading Device: Wear your Prevalon Boot as much as possible when not ambulating.       Other Instructions: Let the area \"air out\" for an hour daily.  X-ray was ordered today. Try to slow down or quit smoking.     Keep all dressings clean, dry and intact.  Keep pressure off the wound(s) at all times.      Follow up visit  2  Week(s)  July 24, 2025  @  1:00     Please give 24 hour notice if unable to keep appointment. [phone]     If you experience any of the following, please call the Wound Care Service at  [phone] or go to the nearest emergency room.        *Increase in pain*Temperature over 101*Increase in drainage from your wound or a foul odor  *Uncontrolled swelling*Need for compression bandage changes due to slippage, breakthrough drainage       PLEASE NOTE: IF YOU

## 2025-07-10 NOTE — PROGRESS NOTES
[facility]                                                   Progress Note and Procedure Note      Khari Diaz  MEDICAL RECORD NUMBER:  [id number]  AGE: 70 y.o.   GENDER: male  : 1955  EPISODE DATE:  7/10/2025    Subjective:     Chief Complaint   Patient presents with    Wound Check         HISTORY of PRESENT ILLNESS HPI     Khari Diaz is a 70 y.o. male who presents today for wound/ulcer evaluation.   History of Wound Context: Patient presents for continued treatment of diabetic ulcerations of the left 3rd and 4th toe in the left lateral ankle.  Patient reports compliance with dressing changes and use of offloading device while at rest for the ankle.  Patient states he has been allowing the toe wounds to be open to air to manage the maceration.  Patient has not observed redness, increased warmth, or ángela purulence.  Patient reports he continues to smoke about 14 cigarettes a day.    Patient denies nausea, vomiting, fever, chills, chest pain, or shortness of breath.      Wound/Ulcer Pain Timing/Severity: none  Quality of pain: N/A  Severity:  3 / 10   Modifying Factors: Pain worsens with direct pressure to the wounds  Associated Signs/Symptoms: drainage    Ulcer Identification:  Ulcer Type: diabetic  Contributing Factors: chronic pressure and shear force    Wound: Full-thickness ulcerations left lower extremity        PAST MEDICAL HISTORY        Diagnosis Date    Abscess of back 2020    Abscess of right leg 2020    Acute renal failure     Adenomatous polyp of ascending colon     Adenomatous polyp of descending colon     Adenomatous polyp of transverse colon     Anxiety     CAD S/P percutaneous coronary angioplasty 2014    Cardiomyopathy (HCC)     Cecal polyp     Cerebrovascular accident (CVA) due to occlusion of left middle cerebral artery (HCC) 2016    brainstem infarction from left MCA occlusion    Cerebrovascular disease 2016    Chronic obstructive pulmonary

## 2025-07-11 ENCOUNTER — HOSPITAL ENCOUNTER (OUTPATIENT)
Dept: GENERAL RADIOLOGY | Age: 70
Discharge: HOME | End: 2025-07-13
Attending: PODIATRIST
Payer: MEDICARE

## 2025-07-11 ENCOUNTER — HOSPITAL ENCOUNTER (OUTPATIENT)
Dept: PULMONOLOGY | Age: 70
Discharge: HOME | End: 2025-07-11
Attending: INTERNAL MEDICINE
Payer: MEDICARE

## 2025-07-11 DIAGNOSIS — J44.9 CHRONIC OBSTRUCTIVE PULMONARY DISEASE, UNSPECIFIED COPD TYPE (HCC): ICD-10-CM

## 2025-07-11 DIAGNOSIS — L97.522 TOE ULCER, LEFT, WITH FAT LAYER EXPOSED (HCC): ICD-10-CM

## 2025-07-11 PROCEDURE — 6360000002 HC RX W HCPCS

## 2025-07-11 PROCEDURE — 94726 PLETHYSMOGRAPHY LUNG VOLUMES: CPT

## 2025-07-11 PROCEDURE — 94060 EVALUATION OF WHEEZING: CPT

## 2025-07-11 PROCEDURE — 73630 X-RAY EXAM OF FOOT: CPT

## 2025-07-11 PROCEDURE — 94729 DIFFUSING CAPACITY: CPT

## 2025-07-11 RX ORDER — ALBUTEROL SULFATE 0.83 MG/ML
SOLUTION RESPIRATORY (INHALATION)
Status: COMPLETED
Start: 2025-07-11 | End: 2025-07-11

## 2025-07-11 RX ADMIN — ALBUTEROL SULFATE 2.5 MG: 2.5 SOLUTION RESPIRATORY (INHALATION) at 13:34

## 2025-07-16 ENCOUNTER — OFFICE VISIT (OUTPATIENT)
Age: 70
End: 2025-07-16
Payer: MEDICARE

## 2025-07-16 VITALS
SYSTOLIC BLOOD PRESSURE: 114 MMHG | WEIGHT: 226 LBS | DIASTOLIC BLOOD PRESSURE: 64 MMHG | HEIGHT: 73 IN | OXYGEN SATURATION: 96 % | TEMPERATURE: 98 F | RESPIRATION RATE: 18 BRPM | BODY MASS INDEX: 29.95 KG/M2 | HEART RATE: 99 BPM

## 2025-07-16 DIAGNOSIS — E66.01 SEVERE OBESITY (BMI 35.0-39.9) WITH COMORBIDITY (HCC): ICD-10-CM

## 2025-07-16 DIAGNOSIS — R91.8 LUNG NODULES: ICD-10-CM

## 2025-07-16 DIAGNOSIS — Z87.891 PERSONAL HISTORY OF TOBACCO USE: Primary | ICD-10-CM

## 2025-07-16 DIAGNOSIS — G47.33 OSA ON CPAP: ICD-10-CM

## 2025-07-16 PROBLEM — Z00.00 MEDICARE ANNUAL WELLNESS VISIT, SUBSEQUENT: Status: RESOLVED | Noted: 2025-06-16 | Resolved: 2025-07-16

## 2025-07-16 PROBLEM — Z13.6 SCREENING FOR CARDIOVASCULAR CONDITION: Status: RESOLVED | Noted: 2025-06-16 | Resolved: 2025-07-16

## 2025-07-16 PROCEDURE — 3078F DIAST BP <80 MM HG: CPT | Performed by: INTERNAL MEDICINE

## 2025-07-16 PROCEDURE — 99214 OFFICE O/P EST MOD 30 MIN: CPT | Performed by: INTERNAL MEDICINE

## 2025-07-16 PROCEDURE — G8427 DOCREV CUR MEDS BY ELIG CLIN: HCPCS | Performed by: INTERNAL MEDICINE

## 2025-07-16 PROCEDURE — G0296 VISIT TO DETERM LDCT ELIG: HCPCS | Performed by: INTERNAL MEDICINE

## 2025-07-16 PROCEDURE — 3074F SYST BP LT 130 MM HG: CPT | Performed by: INTERNAL MEDICINE

## 2025-07-16 PROCEDURE — 3017F COLORECTAL CA SCREEN DOC REV: CPT | Performed by: INTERNAL MEDICINE

## 2025-07-16 PROCEDURE — 1159F MED LIST DOCD IN RCRD: CPT | Performed by: INTERNAL MEDICINE

## 2025-07-16 PROCEDURE — G8417 CALC BMI ABV UP PARAM F/U: HCPCS | Performed by: INTERNAL MEDICINE

## 2025-07-16 PROCEDURE — 4004F PT TOBACCO SCREEN RCVD TLK: CPT | Performed by: INTERNAL MEDICINE

## 2025-07-16 PROCEDURE — 1123F ACP DISCUSS/DSCN MKR DOCD: CPT | Performed by: INTERNAL MEDICINE

## 2025-07-16 NOTE — PROGRESS NOTES
Discussed with the patient the current USPSTF guidelines released March 9, 2021 for screening for lung cancer.    For adults aged 50 to 80 years who have a 20 pack-year smoking history and currently smoke or have quit within the past 15 years the grade B recommendation is to:  Screen for lung cancer with low-dose computed tomography (LDCT) every year.  Stop screening once a person has not smoked for 15 years or has a health problem that limits life expectancy or the ability to have lung surgery.    The patient  reports that he has been smoking cigarettes. He has a 75 pack-year smoking history. He has been exposed to tobacco smoke. He has never used smokeless tobacco.. Discussed with patient the risks and benefits of screening, including over-diagnosis, false positive rate, and total radiation exposure.  The patient currently exhibits no signs or symptoms suggestive of lung cancer.  Discussed with patient the importance of compliance with yearly annual lung cancer screenings and willingness to undergo diagnosis and treatment if screening scan is positive.  In addition, the patient was counseled regarding the importance of remaining smoke free and/or total smoking cessation.    Also reviewed the following if the patient has Medicare that as of February 10, 2022, Medicare only covers LDCT screening in patients aged 50-77 with at least a 20 pack-year smoking history who currently smoke or have quit in the last 15 years  
recommended      Orders Placed This Encounter   Procedures    CT Lung Screen (Initial/Annual/Baseline)     Age: Patient is 70 y.o.    Smoking History:   Tobacco Use      Smoking status: Every Day        Packs/day: 2.50        Years: 2.5 packs/day for 30.0 years (75.0 ttl pk-yrs)        Types: Cigarettes        Passive exposure: Current      Smokeless tobacco: Never      Tobacco comments: varies        Date of last lung cancer screenin/10/2025     Standing Status:   Future     Expected Date:   6/10/2026     Expiration Date:   2027     Is there documentation of shared decision making?:   Yes     Is this a low dose CT or a routine CT?:   Low Dose CT [1]     Is this the first (baseline) CT or an annual exam?:   Annual [2]     Does the patient show any signs or symptoms of lung cancer?:   No     Smoking Status?:   Every Day [1]     Pack Years:   75    SD VISIT TO DISCUSS LUNG CA SCREEN W LDCT       No orders of the defined types were placed in this encounter.             Discussed with patient the importance of exercise and weight control and  overall health and well-being.     Reviewed with the patient: current clinical status, medications, activities and diet.      Side effects, adverse effects of the medication prescribed today, as well as treatment plan and result expectations have been discussed with the patient who expresses understanding and desires to proceed.     I spent 30 min with this patient, greater the 80% of this time was spent in counseling and/or coordinating of care.  Return in about 1 year (around 2026).      Lali Kilgore MD

## 2025-07-24 ENCOUNTER — HOSPITAL ENCOUNTER (OUTPATIENT)
Dept: WOUND CARE | Age: 70
Discharge: HOME | End: 2025-07-24
Attending: PODIATRIST
Payer: MEDICARE

## 2025-07-24 VITALS
RESPIRATION RATE: 18 BRPM | DIASTOLIC BLOOD PRESSURE: 76 MMHG | SYSTOLIC BLOOD PRESSURE: 141 MMHG | TEMPERATURE: 97.1 F | HEART RATE: 90 BPM

## 2025-07-24 DIAGNOSIS — L97.522 TOE ULCER, LEFT, WITH FAT LAYER EXPOSED (HCC): Primary | ICD-10-CM

## 2025-07-24 PROCEDURE — 11042 DBRDMT SUBQ TIS 1ST 20SQCM/<: CPT

## 2025-07-24 PROCEDURE — 6370000000 HC RX 637 (ALT 250 FOR IP): Performed by: PODIATRIST

## 2025-07-24 RX ORDER — LIDOCAINE 50 MG/G
OINTMENT TOPICAL PRN
OUTPATIENT
Start: 2025-07-24

## 2025-07-24 RX ORDER — BETAMETHASONE DIPROPIONATE 0.5 MG/G
CREAM TOPICAL PRN
OUTPATIENT
Start: 2025-07-24

## 2025-07-24 RX ORDER — TRIAMCINOLONE ACETONIDE 1 MG/G
OINTMENT TOPICAL PRN
OUTPATIENT
Start: 2025-07-24

## 2025-07-24 RX ORDER — NEOMYCIN/BACITRACIN/POLYMYXINB 3.5-400-5K
OINTMENT (GRAM) TOPICAL PRN
OUTPATIENT
Start: 2025-07-24

## 2025-07-24 RX ORDER — MUPIROCIN 2 %
OINTMENT (GRAM) TOPICAL PRN
OUTPATIENT
Start: 2025-07-24

## 2025-07-24 RX ORDER — LIDOCAINE HYDROCHLORIDE 40 MG/ML
SOLUTION TOPICAL PRN
OUTPATIENT
Start: 2025-07-24

## 2025-07-24 RX ORDER — LIDOCAINE 40 MG/G
CREAM TOPICAL PRN
OUTPATIENT
Start: 2025-07-24

## 2025-07-24 RX ORDER — LIDOCAINE HYDROCHLORIDE 20 MG/ML
JELLY TOPICAL PRN
Status: DISCONTINUED | OUTPATIENT
Start: 2025-07-24 | End: 2025-07-25 | Stop reason: HOSPADM

## 2025-07-24 RX ORDER — GINSENG 100 MG
CAPSULE ORAL PRN
OUTPATIENT
Start: 2025-07-24

## 2025-07-24 RX ORDER — LIDOCAINE HYDROCHLORIDE 20 MG/ML
JELLY TOPICAL PRN
OUTPATIENT
Start: 2025-07-24

## 2025-07-24 RX ORDER — CLOBETASOL PROPIONATE 0.5 MG/G
OINTMENT TOPICAL PRN
OUTPATIENT
Start: 2025-07-24

## 2025-07-24 RX ORDER — SODIUM CHLOR/HYPOCHLOROUS ACID 0.033 %
SOLUTION, IRRIGATION IRRIGATION PRN
OUTPATIENT
Start: 2025-07-24

## 2025-07-24 RX ORDER — BACITRACIN ZINC AND POLYMYXIN B SULFATE 500; 1000 [USP'U]/G; [USP'U]/G
OINTMENT TOPICAL PRN
OUTPATIENT
Start: 2025-07-24

## 2025-07-24 RX ORDER — GENTAMICIN SULFATE 1 MG/G
OINTMENT TOPICAL PRN
OUTPATIENT
Start: 2025-07-24

## 2025-07-24 RX ADMIN — LIDOCAINE HYDROCHLORIDE: 20 JELLY TOPICAL at 13:48

## 2025-07-24 ASSESSMENT — PAIN DESCRIPTION - LOCATION: LOCATION: FOOT

## 2025-07-24 ASSESSMENT — PAIN SCALES - GENERAL: PAINLEVEL_OUTOF10: 5

## 2025-07-24 ASSESSMENT — PAIN DESCRIPTION - ORIENTATION: ORIENTATION: LEFT

## 2025-07-24 NOTE — PROGRESS NOTES
[facility]                                                   Progress Note and Procedure Note      Khari Diaz  MEDICAL RECORD NUMBER:  [id number]  AGE: 70 y.o.   GENDER: male  : 1955  EPISODE DATE:  2025    Subjective:     Chief Complaint   Patient presents with    Wound Check         HISTORY of PRESENT ILLNESS HPI     Khari Diaz is a 70 y.o. male who presents today for wound/ulcer evaluation.   History of Wound Context: Patient presents for continued treatment of wounds to the left ankle and the left third toes.  Patient reports compliance with dressing changes.  Patient has not observed signs of acute bacterial infection.    Patient denies nausea, vomiting, fever, chills, chest pain, or shortness of breath.    Wound/Ulcer Pain Timing/Severity: intermittent  Quality of pain: burning  Severity:  5 / 10   Modifying Factors: Pain worsens with direct pressure to the wounds  Associated Signs/Symptoms: drainage and pain    Ulcer Identification:  Ulcer Type: diabetic  Contributing Factors: edema, venous stasis, smoking, and arterial insufficiency    Wound: Diabetic ulcerations left lower extremity        PAST MEDICAL HISTORY        Diagnosis Date    Abscess of back 2020    Abscess of right leg 2020    Acute renal failure     Adenomatous polyp of ascending colon     Adenomatous polyp of descending colon     Adenomatous polyp of transverse colon     Anxiety     CAD S/P percutaneous coronary angioplasty 2014    Cardiomyopathy ([facility])     Cecal polyp     Cerebrovascular accident (CVA) due to occlusion of left middle cerebral artery ([facility]) 2016    brainstem infarction from left MCA occlusion    Cerebrovascular disease 2016    Chronic obstructive pulmonary disease, unspecified COPD type ([facility]) 2025    Claudication     Colon polyp     Coronary angioplasty status     Cutaneous abscess of back excluding buttocks 2018    CVA (cerebral vascular accident) ([facility])

## 2025-07-30 ENCOUNTER — APPOINTMENT (OUTPATIENT)
Dept: GENERAL RADIOLOGY | Age: 70
End: 2025-07-30
Payer: MEDICARE

## 2025-07-30 ENCOUNTER — HOSPITAL ENCOUNTER (EMERGENCY)
Age: 70
Discharge: HOME | End: 2025-07-30
Attending: STUDENT IN AN ORGANIZED HEALTH CARE EDUCATION/TRAINING PROGRAM
Payer: MEDICARE

## 2025-07-30 ENCOUNTER — APPOINTMENT (OUTPATIENT)
Dept: CT IMAGING | Age: 70
End: 2025-07-30
Payer: MEDICARE

## 2025-07-30 VITALS
HEIGHT: 73 IN | SYSTOLIC BLOOD PRESSURE: 117 MMHG | HEART RATE: 81 BPM | BODY MASS INDEX: 29.82 KG/M2 | TEMPERATURE: 97.4 F | OXYGEN SATURATION: 98 % | RESPIRATION RATE: 16 BRPM | DIASTOLIC BLOOD PRESSURE: 68 MMHG

## 2025-07-30 DIAGNOSIS — S46.911A RIGHT SHOULDER STRAIN, INITIAL ENCOUNTER: ICD-10-CM
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
## 2025-07-30 DIAGNOSIS — F10.929 ACUTE ALCOHOLIC INTOXICATION WITH COMPLICATION: ICD-10-CM

## 2025-07-30 DIAGNOSIS — S09.90XA CLOSED HEAD INJURY, INITIAL ENCOUNTER: ICD-10-CM

## 2025-07-30 PROBLEM — L97.522 CHRONIC FOOT ULCER, LEFT, WITH FAT LAYER EXPOSED (HCC): Status: ACTIVE | Noted: 2025-07-30

## 2025-07-30 LAB
ALBUMIN SERPL-MCNC: 4 G/DL (ref 3.5–4.6)
ALP SERPL-CCNC: 58 U/L (ref 35–104)
ALT SERPL-CCNC: 30 U/L (ref 0–41)
ANION GAP SERPL CALCULATED.3IONS-SCNC: 15 MEQ/L (ref 9–15)
ANISOCYTOSIS BLD QL SMEAR: ABNORMAL
AST SERPL-CCNC: 30 U/L (ref 0–40)
BASOPHILS # BLD: 0 K/UL (ref 0–0.2)
BASOPHILS NFR BLD: 0.5 %
BILIRUB SERPL-MCNC: 0.5 MG/DL (ref 0.2–0.7)
BUN SERPL-MCNC: 14 MG/DL (ref 8–23)
CALCIUM SERPL-MCNC: 9.6 MG/DL (ref 8.5–9.9)
CHLORIDE SERPL-SCNC: 96 MEQ/L (ref 95–107)
CK SERPL-CCNC: 62 U/L (ref 0–190)
CO2 SERPL-SCNC: 25 MEQ/L (ref 20–31)
CREAT SERPL-MCNC: 1.08 MG/DL (ref 0.7–1.2)
EKG ATRIAL RATE: 83 BPM
EKG DIAGNOSIS: NORMAL
EKG P AXIS: 46 DEGREES
EKG P-R INTERVAL: 250 MS
EKG Q-T INTERVAL: 402 MS
EKG QRS DURATION: 80 MS
EKG QTC CALCULATION (BAZETT): 472 MS
EKG R AXIS: -1 DEGREES
EKG T AXIS: 62 DEGREES
EKG VENTRICULAR RATE: 83 BPM
EOSINOPHIL # BLD: 0.1 K/UL (ref 0–0.7)
EOSINOPHIL NFR BLD: 1.8 %
ERYTHROCYTE [DISTWIDTH] IN BLOOD BY AUTOMATED COUNT: 12.4 % (ref 11.5–14.5)
ETHANOL PERCENT: 0.18 G/DL
ETHANOLAMINE SERPL-MCNC: 203 MG/DL (ref 0–0.08)
GLOBULIN SER CALC-MCNC: 3 G/DL (ref 2.3–3.5)
GLUCOSE SERPL-MCNC: 114 MG/DL (ref 70–99)
HCT VFR BLD AUTO: 43.9 % (ref 42–52)
HGB BLD-MCNC: 15 G/DL (ref 14–18)
LYMPHOCYTES # BLD: 2.5 K/UL (ref 1–4.8)
LYMPHOCYTES NFR BLD: 44.2 %
MACROCYTES BLD QL SMEAR: ABNORMAL
MAGNESIUM SERPL-MCNC: 1.6 MG/DL (ref 1.7–2.4)
MCH RBC QN AUTO: 37.4 PG (ref 27–31.3)
MCHC RBC AUTO-ENTMCNC: 34.2 % (ref 33–37)
MCV RBC AUTO: 109.5 FL (ref 79–92.2)
MONOCYTES # BLD: 0.4 K/UL (ref 0.2–0.8)
MONOCYTES NFR BLD: 6.5 %
NEUTROPHILS # BLD: 2.6 K/UL (ref 1.4–6.5)
NEUTS SEG NFR BLD: 46.6 %
PLATELET # BLD AUTO: 217 K/UL (ref 130–400)
PLATELET BLD QL SMEAR: ADEQUATE
POC CREATININE WHOLE BLOOD: 1.4
POTASSIUM SERPL-SCNC: 3.5 MEQ/L (ref 3.4–4.9)
PROT SERPL-MCNC: 7 G/DL (ref 6.3–8)
RBC # BLD AUTO: 4.01 M/UL (ref 4.7–6.1)
REASON FOR REJECTION: NORMAL
REJECTED TEST: NORMAL
SLIDE REVIEW: ABNORMAL
SODIUM SERPL-SCNC: 136 MEQ/L (ref 135–144)
WBC # BLD AUTO: 5.6 K/UL (ref 4.8–10.8)

## 2025-07-30 PROCEDURE — 70450 CT HEAD/BRAIN W/O DYE: CPT

## 2025-07-30 PROCEDURE — 36415 COLL VENOUS BLD VENIPUNCTURE: CPT

## 2025-07-30 PROCEDURE — 80053 COMPREHEN METABOLIC PANEL: CPT

## 2025-07-30 PROCEDURE — 82077 ASSAY SPEC XCP UR&BREATH IA: CPT

## 2025-07-30 PROCEDURE — 85025 COMPLETE CBC W/AUTO DIFF WBC: CPT

## 2025-07-30 PROCEDURE — 6360000004 HC RX CONTRAST MEDICATION: Performed by: STUDENT IN AN ORGANIZED HEALTH CARE EDUCATION/TRAINING PROGRAM

## 2025-07-30 PROCEDURE — 6370000000 HC RX 637 (ALT 250 FOR IP): Performed by: STUDENT IN AN ORGANIZED HEALTH CARE EDUCATION/TRAINING PROGRAM

## 2025-07-30 PROCEDURE — 73030 X-RAY EXAM OF SHOULDER: CPT

## 2025-07-30 PROCEDURE — 82550 ASSAY OF CK (CPK): CPT

## 2025-07-30 PROCEDURE — 93005 ELECTROCARDIOGRAM TRACING: CPT | Performed by: STUDENT IN AN ORGANIZED HEALTH CARE EDUCATION/TRAINING PROGRAM

## 2025-07-30 PROCEDURE — 99285 EMERGENCY DEPT VISIT HI MDM: CPT

## 2025-07-30 PROCEDURE — 83735 ASSAY OF MAGNESIUM: CPT

## 2025-07-30 PROCEDURE — 72125 CT NECK SPINE W/O DYE: CPT

## 2025-07-30 PROCEDURE — 71260 CT THORAX DX C+: CPT

## 2025-07-30 RX ORDER — ACETAMINOPHEN 500 MG
1000 TABLET ORAL EVERY 6 HOURS PRN
Qty: 60 TABLET | Refills: 0 | Status: SHIPPED | OUTPATIENT
Start: 2025-07-30

## 2025-07-30 RX ORDER — ACETAMINOPHEN 500 MG
1000 TABLET ORAL ONCE
Status: COMPLETED | OUTPATIENT
Start: 2025-07-30 | End: 2025-07-30

## 2025-07-30 RX ORDER — IOPAMIDOL 755 MG/ML
75 INJECTION, SOLUTION INTRAVASCULAR
Status: COMPLETED | OUTPATIENT
Start: 2025-07-30 | End: 2025-07-30

## 2025-07-30 RX ADMIN — IOPAMIDOL 75 ML: 755 INJECTION, SOLUTION INTRAVENOUS at 09:05

## 2025-07-30 RX ADMIN — ACETAMINOPHEN 1000 MG: 500 TABLET ORAL at 08:18

## 2025-07-30 ASSESSMENT — PAIN DESCRIPTION - LOCATION: LOCATION: ARM

## 2025-07-30 ASSESSMENT — PAIN DESCRIPTION - DESCRIPTORS: DESCRIPTORS: ACHING

## 2025-07-30 ASSESSMENT — PAIN DESCRIPTION - ORIENTATION
ORIENTATION: LEFT;RIGHT
ORIENTATION: RIGHT

## 2025-07-30 ASSESSMENT — PAIN DESCRIPTION - ONSET: ONSET: ON-GOING

## 2025-07-30 ASSESSMENT — PAIN DESCRIPTION - PAIN TYPE
TYPE: ACUTE PAIN;CHRONIC PAIN
TYPE: ACUTE PAIN

## 2025-07-30 ASSESSMENT — PAIN - FUNCTIONAL ASSESSMENT: PAIN_FUNCTIONAL_ASSESSMENT: 0-10

## 2025-07-30 ASSESSMENT — PAIN SCALES - GENERAL: PAINLEVEL_OUTOF10: 5

## 2025-07-30 ASSESSMENT — PAIN DESCRIPTION - FREQUENCY: FREQUENCY: CONTINUOUS

## 2025-07-30 NOTE — ED TRIAGE NOTES
Pt in after fall about 1 hour ago. Pt states hitting the right side of his head and chest. Pt is intoxicated, states upset due to recent police shooting. Pt is prior police and a marine. Pt is alert to self. Pt denies LOC, pt is on Plavix.

## 2025-07-30 NOTE — ED PROVIDER NOTES
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-- Disabled from [employer] after 2016 CVA    Lives With: Spouse    Type of Home: House    Home Layout: Two level, Bed/Bath upstairs, Laundry in basement(Railing)    Home Access: Stairs to enter with rails    Entrance Stairs - Number of Steps: 3 in back, 4 in front    Bathroom Shower/Tub: Tub/Shower unit    Bathroom Equipment: Shower chair    Home Equipment: Rolling walker, Cane    ADL Assistance: Independent    Homemaking Assistance: Needs assistance(Spouse  is primary; vertigo and arthritis limit housework)    Ambulation Assistance: Independent(Walker outside, cane inside)    Transfer Assistance: Independent    Active : No    Patient's  Info: has not driven since CVA     Social Drivers of Health     Financial Resource Strain: Low Risk  (4/18/2023)    Overall Financial Resource Strain (CARDIA)     Difficulty of Paying Living Expenses: Not hard at all   Food Insecurity: No Food Insecurity (3/25/2025)    Hunger Vital Sign     Worried About Running Out of Food in the Last Year: Never true     Ran Out of Food in the Last Year: Never true   Transportation Needs: No Transportation Needs (3/25/2025)    PRAPARE - Transportation     Lack of Transportation (Medical): No     Lack of Transportation (Non-Medical): No   Physical Activity: Unknown (6/14/2025)    Exercise Vital Sign     Days of Exercise per Week: Patient declined   Stress: No Stress Concern Present (10/28/2020)    [employer] - Occupational Stress Questionnaire     Feeling of Stress : Only a little   Social Connections: Socially Integrated (10/28/2020)    Social Connection and Isolation

## 2025-07-31 LAB
PERFORMED ON: ABNORMAL
POC CREATININE: 1.4 MG/DL (ref 0.8–1.3)
POC SAMPLE TYPE: ABNORMAL

## 2025-08-05 ENCOUNTER — HOSPITAL ENCOUNTER (OUTPATIENT)
Age: 70
Setting detail: OUTPATIENT SURGERY
Discharge: HOME | End: 2025-08-05
Attending: PODIATRIST | Admitting: PODIATRIST
Payer: MEDICARE

## 2025-08-05 VITALS
RESPIRATION RATE: 16 BRPM | DIASTOLIC BLOOD PRESSURE: 73 MMHG | BODY MASS INDEX: 29.16 KG/M2 | HEIGHT: 73 IN | OXYGEN SATURATION: 97 % | HEART RATE: 88 BPM | TEMPERATURE: 97.2 F | SYSTOLIC BLOOD PRESSURE: 141 MMHG | WEIGHT: 220 LBS

## 2025-08-05 DIAGNOSIS — L97.522 CHRONIC FOOT ULCER, LEFT, WITH FAT LAYER EXPOSED (HCC): ICD-10-CM

## 2025-08-05 LAB
GLUCOSE BLD-MCNC: 138 MG/DL (ref 70–99)
PERFORMED ON: ABNORMAL

## 2025-08-05 PROCEDURE — 3600000013 HC SURGERY LEVEL 3 ADDTL 15MIN: Performed by: PODIATRIST

## 2025-08-05 PROCEDURE — 87077 CULTURE AEROBIC IDENTIFY: CPT

## 2025-08-05 PROCEDURE — 2500000003 HC RX 250 WO HCPCS: Performed by: PODIATRIST

## 2025-08-05 PROCEDURE — 87070 CULTURE OTHR SPECIMN AEROBIC: CPT

## 2025-08-05 PROCEDURE — 87186 SC STD MICRODIL/AGAR DIL: CPT

## 2025-08-05 PROCEDURE — 87075 CULTR BACTERIA EXCEPT BLOOD: CPT

## 2025-08-05 PROCEDURE — 3600000003 HC SURGERY LEVEL 3 BASE: Performed by: PODIATRIST

## 2025-08-05 PROCEDURE — 2709999900 HC NON-CHARGEABLE SUPPLY: Performed by: PODIATRIST

## 2025-08-05 PROCEDURE — 7100000010 HC PHASE II RECOVERY - FIRST 15 MIN: Performed by: PODIATRIST

## 2025-08-05 PROCEDURE — 6360000002 HC RX W HCPCS: Performed by: PODIATRIST

## 2025-08-05 PROCEDURE — 7100000011 HC PHASE II RECOVERY - ADDTL 15 MIN: Performed by: PODIATRIST

## 2025-08-05 RX ORDER — SODIUM CHLORIDE 0.9 % (FLUSH) 0.9 %
5-40 SYRINGE (ML) INJECTION PRN
Status: DISCONTINUED | OUTPATIENT
Start: 2025-08-05 | End: 2025-08-05 | Stop reason: HOSPADM

## 2025-08-05 RX ORDER — SODIUM CHLORIDE 0.9 % (FLUSH) 0.9 %
5-40 SYRINGE (ML) INJECTION EVERY 12 HOURS SCHEDULED
Status: DISCONTINUED | OUTPATIENT
Start: 2025-08-05 | End: 2025-08-05 | Stop reason: HOSPADM

## 2025-08-05 RX ORDER — LIDOCAINE HYDROCHLORIDE 20 MG/ML
INJECTION, SOLUTION EPIDURAL; INFILTRATION; INTRACAUDAL; PERINEURAL PRN
Status: DISCONTINUED | OUTPATIENT
Start: 2025-08-05 | End: 2025-08-05 | Stop reason: ALTCHOICE

## 2025-08-05 RX ORDER — CEPHALEXIN 500 MG/1
500 CAPSULE ORAL 2 TIMES DAILY
Qty: 14 CAPSULE | Refills: 0 | Status: SHIPPED | OUTPATIENT
Start: 2025-08-05 | End: 2025-08-12

## 2025-08-05 RX ORDER — BUPIVACAINE HYDROCHLORIDE 5 MG/ML
INJECTION, SOLUTION EPIDURAL; INTRACAUDAL; PERINEURAL PRN
Status: DISCONTINUED | OUTPATIENT
Start: 2025-08-05 | End: 2025-08-05 | Stop reason: ALTCHOICE

## 2025-08-05 RX ORDER — MAGNESIUM HYDROXIDE 1200 MG/15ML
LIQUID ORAL CONTINUOUS PRN
Status: COMPLETED | OUTPATIENT
Start: 2025-08-05 | End: 2025-08-05

## 2025-08-05 RX ORDER — SODIUM CHLORIDE 9 MG/ML
INJECTION, SOLUTION INTRAVENOUS PRN
Status: DISCONTINUED | OUTPATIENT
Start: 2025-08-05 | End: 2025-08-05 | Stop reason: HOSPADM

## 2025-08-05 RX ADMIN — WATER 2000 MG: 1 INJECTION INTRAMUSCULAR; INTRAVENOUS; SUBCUTANEOUS at 13:29

## 2025-08-05 ASSESSMENT — PAIN SCALES - GENERAL: PAINLEVEL_OUTOF10: 0

## 2025-08-05 ASSESSMENT — PAIN DESCRIPTION - DESCRIPTORS: DESCRIPTORS: BURNING

## 2025-08-05 ASSESSMENT — PAIN - FUNCTIONAL ASSESSMENT: PAIN_FUNCTIONAL_ASSESSMENT: 0-10

## 2025-08-07 ENCOUNTER — OFFICE VISIT (OUTPATIENT)
Age: 70
End: 2025-08-07
Payer: MEDICARE

## 2025-08-07 ENCOUNTER — HOSPITAL ENCOUNTER (OUTPATIENT)
Dept: WOUND CARE | Age: 70
Discharge: HOME | End: 2025-08-07
Attending: PODIATRIST
Payer: MEDICARE

## 2025-08-07 VITALS — HEART RATE: 84 BPM | SYSTOLIC BLOOD PRESSURE: 110 MMHG | OXYGEN SATURATION: 97 % | DIASTOLIC BLOOD PRESSURE: 80 MMHG

## 2025-08-07 VITALS
TEMPERATURE: 98.1 F | RESPIRATION RATE: 18 BRPM | SYSTOLIC BLOOD PRESSURE: 126 MMHG | HEART RATE: 79 BPM | DIASTOLIC BLOOD PRESSURE: 67 MMHG

## 2025-08-07 DIAGNOSIS — I65.23 BILATERAL CAROTID ARTERY STENOSIS: ICD-10-CM

## 2025-08-07 DIAGNOSIS — G47.33 OSA (OBSTRUCTIVE SLEEP APNEA): ICD-10-CM

## 2025-08-07 DIAGNOSIS — I10 ESSENTIAL HYPERTENSION: ICD-10-CM

## 2025-08-07 DIAGNOSIS — I15.2 HYPERTENSION ASSOCIATED WITH DIABETES (HCC): ICD-10-CM

## 2025-08-07 DIAGNOSIS — Z98.61 CAD S/P PERCUTANEOUS CORONARY ANGIOPLASTY: Primary | ICD-10-CM

## 2025-08-07 DIAGNOSIS — I25.2 HISTORY OF MYOCARDIAL INFARCTION: ICD-10-CM

## 2025-08-07 DIAGNOSIS — E11.59 HYPERTENSION ASSOCIATED WITH DIABETES (HCC): ICD-10-CM

## 2025-08-07 DIAGNOSIS — Z72.0 TOBACCO USE: ICD-10-CM

## 2025-08-07 DIAGNOSIS — I25.10 CAD S/P PERCUTANEOUS CORONARY ANGIOPLASTY: Primary | ICD-10-CM

## 2025-08-07 DIAGNOSIS — L97.522 TOE ULCER, LEFT, WITH FAT LAYER EXPOSED (HCC): Primary | ICD-10-CM

## 2025-08-07 DIAGNOSIS — I73.9 PAD (PERIPHERAL ARTERY DISEASE): ICD-10-CM

## 2025-08-07 PROCEDURE — 3017F COLORECTAL CA SCREEN DOC REV: CPT | Performed by: INTERNAL MEDICINE

## 2025-08-07 PROCEDURE — 11043 DBRDMT MUSC&/FSCA 1ST 20/<: CPT

## 2025-08-07 PROCEDURE — 6370000000 HC RX 637 (ALT 250 FOR IP): Performed by: PODIATRIST

## 2025-08-07 PROCEDURE — 3079F DIAST BP 80-89 MM HG: CPT | Performed by: INTERNAL MEDICINE

## 2025-08-07 PROCEDURE — G8427 DOCREV CUR MEDS BY ELIG CLIN: HCPCS | Performed by: INTERNAL MEDICINE

## 2025-08-07 PROCEDURE — 99214 OFFICE O/P EST MOD 30 MIN: CPT | Performed by: INTERNAL MEDICINE

## 2025-08-07 PROCEDURE — 4004F PT TOBACCO SCREEN RCVD TLK: CPT | Performed by: INTERNAL MEDICINE

## 2025-08-07 PROCEDURE — G8417 CALC BMI ABV UP PARAM F/U: HCPCS | Performed by: INTERNAL MEDICINE

## 2025-08-07 PROCEDURE — 1123F ACP DISCUSS/DSCN MKR DOCD: CPT | Performed by: INTERNAL MEDICINE

## 2025-08-07 PROCEDURE — 1126F AMNT PAIN NOTED NONE PRSNT: CPT | Performed by: INTERNAL MEDICINE

## 2025-08-07 PROCEDURE — 3074F SYST BP LT 130 MM HG: CPT | Performed by: INTERNAL MEDICINE

## 2025-08-07 PROCEDURE — 1159F MED LIST DOCD IN RCRD: CPT | Performed by: INTERNAL MEDICINE

## 2025-08-07 RX ORDER — LIDOCAINE 50 MG/G
OINTMENT TOPICAL PRN
OUTPATIENT
Start: 2025-08-07

## 2025-08-07 RX ORDER — LIDOCAINE HYDROCHLORIDE 20 MG/ML
JELLY TOPICAL PRN
OUTPATIENT
Start: 2025-08-07

## 2025-08-07 RX ORDER — LIDOCAINE 40 MG/G
CREAM TOPICAL PRN
OUTPATIENT
Start: 2025-08-07

## 2025-08-07 RX ORDER — SODIUM CHLOR/HYPOCHLOROUS ACID 0.033 %
SOLUTION, IRRIGATION IRRIGATION PRN
OUTPATIENT
Start: 2025-08-07

## 2025-08-07 RX ORDER — GINSENG 100 MG
CAPSULE ORAL PRN
OUTPATIENT
Start: 2025-08-07

## 2025-08-07 RX ORDER — NEOMYCIN/BACITRACIN/POLYMYXINB 3.5-400-5K
OINTMENT (GRAM) TOPICAL PRN
OUTPATIENT
Start: 2025-08-07

## 2025-08-07 RX ORDER — BACITRACIN ZINC AND POLYMYXIN B SULFATE 500; 1000 [USP'U]/G; [USP'U]/G
OINTMENT TOPICAL PRN
OUTPATIENT
Start: 2025-08-07

## 2025-08-07 RX ORDER — CLOBETASOL PROPIONATE 0.5 MG/G
OINTMENT TOPICAL PRN
OUTPATIENT
Start: 2025-08-07

## 2025-08-07 RX ORDER — CARVEDILOL 6.25 MG/1
6.25 TABLET ORAL 2 TIMES DAILY
Qty: 180 TABLET | Refills: 3 | Status: SHIPPED | OUTPATIENT
Start: 2025-08-07

## 2025-08-07 RX ORDER — LIDOCAINE HYDROCHLORIDE 40 MG/ML
SOLUTION TOPICAL PRN
OUTPATIENT
Start: 2025-08-07

## 2025-08-07 RX ORDER — BETAMETHASONE DIPROPIONATE 0.5 MG/G
CREAM TOPICAL PRN
OUTPATIENT
Start: 2025-08-07

## 2025-08-07 RX ORDER — TRIAMCINOLONE ACETONIDE 1 MG/G
OINTMENT TOPICAL PRN
OUTPATIENT
Start: 2025-08-07

## 2025-08-07 RX ORDER — MUPIROCIN 2 %
OINTMENT (GRAM) TOPICAL PRN
OUTPATIENT
Start: 2025-08-07

## 2025-08-07 RX ORDER — GENTAMICIN SULFATE 1 MG/G
OINTMENT TOPICAL PRN
OUTPATIENT
Start: 2025-08-07

## 2025-08-07 RX ORDER — LIDOCAINE HYDROCHLORIDE 20 MG/ML
JELLY TOPICAL PRN
Status: DISCONTINUED | OUTPATIENT
Start: 2025-08-07 | End: 2025-08-08 | Stop reason: HOSPADM

## 2025-08-07 RX ADMIN — LIDOCAINE HYDROCHLORIDE: 20 JELLY TOPICAL at 16:23

## 2025-08-07 ASSESSMENT — PAIN DESCRIPTION - DESCRIPTORS: DESCRIPTORS: SPASM

## 2025-08-07 ASSESSMENT — PAIN DESCRIPTION - ORIENTATION: ORIENTATION: LEFT

## 2025-08-07 ASSESSMENT — PAIN DESCRIPTION - LOCATION: LOCATION: FOOT;ANKLE

## 2025-08-07 ASSESSMENT — PAIN SCALES - GENERAL: PAINLEVEL_OUTOF10: 2

## 2025-08-10 LAB
CULTURE SURGICAL: ABNORMAL
CULTURE SURGICAL: ABNORMAL
ORGANISM: ABNORMAL

## 2025-08-11 ENCOUNTER — TELEPHONE (OUTPATIENT)
Dept: PHARMACY | Facility: CLINIC | Age: 70
End: 2025-08-11

## 2025-08-11 ENCOUNTER — TELEPHONE (OUTPATIENT)
Age: 70
End: 2025-08-11

## 2025-08-20 ENCOUNTER — TELEPHONE (OUTPATIENT)
Dept: FAMILY MEDICINE CLINIC | Age: 70
End: 2025-08-20

## 2025-08-21 ENCOUNTER — PATIENT MESSAGE (OUTPATIENT)
Dept: FAMILY MEDICINE CLINIC | Age: 70
End: 2025-08-21

## 2025-08-21 ENCOUNTER — HOSPITAL ENCOUNTER (OUTPATIENT)
Dept: WOUND CARE | Age: 70
Discharge: HOME | End: 2025-08-21
Attending: PODIATRIST
Payer: MEDICARE

## 2025-08-21 VITALS
TEMPERATURE: 97.3 F | SYSTOLIC BLOOD PRESSURE: 144 MMHG | RESPIRATION RATE: 18 BRPM | HEART RATE: 90 BPM | DIASTOLIC BLOOD PRESSURE: 69 MMHG

## 2025-08-21 DIAGNOSIS — L97.522 TOE ULCER, LEFT, WITH FAT LAYER EXPOSED (HCC): Primary | ICD-10-CM

## 2025-08-21 DIAGNOSIS — I73.9 PAD (PERIPHERAL ARTERY DISEASE): ICD-10-CM

## 2025-08-21 DIAGNOSIS — L97.322 ULCER OF LEFT ANKLE, WITH FAT LAYER EXPOSED (HCC): ICD-10-CM

## 2025-08-21 PROCEDURE — 11042 DBRDMT SUBQ TIS 1ST 20SQCM/<: CPT | Performed by: STUDENT IN AN ORGANIZED HEALTH CARE EDUCATION/TRAINING PROGRAM

## 2025-08-21 PROCEDURE — 11042 DBRDMT SUBQ TIS 1ST 20SQCM/<: CPT

## 2025-08-21 PROCEDURE — 99213 OFFICE O/P EST LOW 20 MIN: CPT | Performed by: STUDENT IN AN ORGANIZED HEALTH CARE EDUCATION/TRAINING PROGRAM

## 2025-08-21 PROCEDURE — 6370000000 HC RX 637 (ALT 250 FOR IP): Performed by: PODIATRIST

## 2025-08-21 RX ORDER — TRIAMCINOLONE ACETONIDE 1 MG/G
OINTMENT TOPICAL PRN
OUTPATIENT
Start: 2025-08-21

## 2025-08-21 RX ORDER — LIDOCAINE HYDROCHLORIDE 40 MG/ML
SOLUTION TOPICAL PRN
OUTPATIENT
Start: 2025-08-21

## 2025-08-21 RX ORDER — BETAMETHASONE DIPROPIONATE 0.5 MG/G
CREAM TOPICAL PRN
OUTPATIENT
Start: 2025-08-21

## 2025-08-21 RX ORDER — MUPIROCIN 2 %
OINTMENT (GRAM) TOPICAL PRN
OUTPATIENT
Start: 2025-08-21

## 2025-08-21 RX ORDER — NEOMYCIN/BACITRACIN/POLYMYXINB 3.5-400-5K
OINTMENT (GRAM) TOPICAL PRN
OUTPATIENT
Start: 2025-08-21

## 2025-08-21 RX ORDER — LIDOCAINE 40 MG/G
CREAM TOPICAL PRN
OUTPATIENT
Start: 2025-08-21

## 2025-08-21 RX ORDER — GENTAMICIN SULFATE 1 MG/G
OINTMENT TOPICAL PRN
OUTPATIENT
Start: 2025-08-21

## 2025-08-21 RX ORDER — LIDOCAINE HYDROCHLORIDE 20 MG/ML
JELLY TOPICAL PRN
Status: DISCONTINUED | OUTPATIENT
Start: 2025-08-21 | End: 2025-08-22 | Stop reason: HOSPADM

## 2025-08-21 RX ORDER — LIDOCAINE HYDROCHLORIDE 20 MG/ML
JELLY TOPICAL PRN
OUTPATIENT
Start: 2025-08-21

## 2025-08-21 RX ORDER — CLOBETASOL PROPIONATE 0.5 MG/G
OINTMENT TOPICAL PRN
OUTPATIENT
Start: 2025-08-21

## 2025-08-21 RX ORDER — GINSENG 100 MG
CAPSULE ORAL PRN
OUTPATIENT
Start: 2025-08-21

## 2025-08-21 RX ORDER — SODIUM CHLOR/HYPOCHLOROUS ACID 0.033 %
SOLUTION, IRRIGATION IRRIGATION PRN
OUTPATIENT
Start: 2025-08-21

## 2025-08-21 RX ORDER — LIDOCAINE 50 MG/G
OINTMENT TOPICAL PRN
OUTPATIENT
Start: 2025-08-21

## 2025-08-21 RX ORDER — BACITRACIN ZINC AND POLYMYXIN B SULFATE 500; 1000 [USP'U]/G; [USP'U]/G
OINTMENT TOPICAL PRN
OUTPATIENT
Start: 2025-08-21

## 2025-08-21 RX ADMIN — LIDOCAINE HYDROCHLORIDE: 20 JELLY TOPICAL at 14:48

## 2025-08-21 ASSESSMENT — PAIN DESCRIPTION - LOCATION: LOCATION: FOOT;ANKLE

## 2025-08-21 ASSESSMENT — PAIN SCALES - GENERAL: PAINLEVEL_OUTOF10: 4

## 2025-08-21 ASSESSMENT — PAIN DESCRIPTION - ORIENTATION: ORIENTATION: LEFT

## 2025-08-22 ENCOUNTER — PATIENT MESSAGE (OUTPATIENT)
Dept: FAMILY MEDICINE CLINIC | Age: 70
End: 2025-08-22

## 2025-08-25 ENCOUNTER — PATIENT MESSAGE (OUTPATIENT)
Age: 70
End: 2025-08-25

## 2025-08-28 ENCOUNTER — HOSPITAL ENCOUNTER (OUTPATIENT)
Dept: WOUND CARE | Age: 70
Discharge: HOME | End: 2025-08-28
Attending: PODIATRIST
Payer: MEDICARE

## 2025-08-28 DIAGNOSIS — L97.522 TOE ULCER, LEFT, WITH FAT LAYER EXPOSED (HCC): Primary | ICD-10-CM

## 2025-08-28 PROCEDURE — 6370000000 HC RX 637 (ALT 250 FOR IP): Performed by: PODIATRIST

## 2025-08-28 PROCEDURE — 11042 DBRDMT SUBQ TIS 1ST 20SQCM/<: CPT

## 2025-08-28 RX ORDER — GINSENG 100 MG
CAPSULE ORAL PRN
OUTPATIENT
Start: 2025-08-28

## 2025-08-28 RX ORDER — LIDOCAINE 40 MG/G
CREAM TOPICAL PRN
OUTPATIENT
Start: 2025-08-28

## 2025-08-28 RX ORDER — NEOMYCIN/BACITRACIN/POLYMYXINB 3.5-400-5K
OINTMENT (GRAM) TOPICAL PRN
OUTPATIENT
Start: 2025-08-28

## 2025-08-28 RX ORDER — LIDOCAINE HYDROCHLORIDE 20 MG/ML
JELLY TOPICAL PRN
OUTPATIENT
Start: 2025-08-28

## 2025-08-28 RX ORDER — LIDOCAINE 50 MG/G
OINTMENT TOPICAL PRN
OUTPATIENT
Start: 2025-08-28

## 2025-08-28 RX ORDER — TRIAMCINOLONE ACETONIDE 1 MG/G
OINTMENT TOPICAL PRN
OUTPATIENT
Start: 2025-08-28

## 2025-08-28 RX ORDER — LIDOCAINE HYDROCHLORIDE 20 MG/ML
JELLY TOPICAL PRN
Status: DISCONTINUED | OUTPATIENT
Start: 2025-08-28 | End: 2025-08-29 | Stop reason: HOSPADM

## 2025-08-28 RX ORDER — BETAMETHASONE DIPROPIONATE 0.5 MG/G
CREAM TOPICAL PRN
OUTPATIENT
Start: 2025-08-28

## 2025-08-28 RX ORDER — BACITRACIN ZINC AND POLYMYXIN B SULFATE 500; 1000 [USP'U]/G; [USP'U]/G
OINTMENT TOPICAL PRN
OUTPATIENT
Start: 2025-08-28

## 2025-08-28 RX ORDER — SODIUM CHLOR/HYPOCHLOROUS ACID 0.033 %
SOLUTION, IRRIGATION IRRIGATION PRN
OUTPATIENT
Start: 2025-08-28

## 2025-08-28 RX ORDER — MUPIROCIN 2 %
OINTMENT (GRAM) TOPICAL PRN
OUTPATIENT
Start: 2025-08-28

## 2025-08-28 RX ORDER — CLOBETASOL PROPIONATE 0.5 MG/G
OINTMENT TOPICAL PRN
OUTPATIENT
Start: 2025-08-28

## 2025-08-28 RX ORDER — GENTAMICIN SULFATE 1 MG/G
OINTMENT TOPICAL PRN
OUTPATIENT
Start: 2025-08-28

## 2025-08-28 RX ORDER — LIDOCAINE HYDROCHLORIDE 40 MG/ML
SOLUTION TOPICAL PRN
OUTPATIENT
Start: 2025-08-28

## 2025-08-28 RX ADMIN — LIDOCAINE HYDROCHLORIDE: 20 JELLY TOPICAL at 16:23

## 2025-09-02 DIAGNOSIS — E11.42 TYPE 2 DIABETES MELLITUS WITH DIABETIC POLYNEUROPATHY, WITHOUT LONG-TERM CURRENT USE OF INSULIN (HCC): Chronic | ICD-10-CM

## 2025-09-03 DIAGNOSIS — R80.9 MICROALBUMINURIA DUE TO TYPE 2 DIABETES MELLITUS (HCC): Chronic | ICD-10-CM

## 2025-09-03 DIAGNOSIS — E11.29 MICROALBUMINURIA DUE TO TYPE 2 DIABETES MELLITUS (HCC): Chronic | ICD-10-CM

## 2025-09-03 DIAGNOSIS — E11.622 TYPE 2 DIABETES MELLITUS WITH OTHER SKIN ULCER (CODE) (HCC): ICD-10-CM

## 2025-09-03 DIAGNOSIS — E11.65 TYPE 2 DIABETES MELLITUS WITH HYPERGLYCEMIA, WITHOUT LONG-TERM CURRENT USE OF INSULIN (HCC): ICD-10-CM

## 2025-09-03 RX ORDER — GLUCOSAMINE HCL/CHONDROITIN SU 500-400 MG
CAPSULE ORAL
Qty: 100 STRIP | Refills: 5 | Status: SHIPPED | OUTPATIENT
Start: 2025-09-03

## 2025-09-04 ENCOUNTER — HOSPITAL ENCOUNTER (OUTPATIENT)
Dept: WOUND CARE | Age: 70
Discharge: HOME | End: 2025-09-04
Attending: PODIATRIST
Payer: MEDICARE

## 2025-09-04 VITALS
TEMPERATURE: 97.6 F | RESPIRATION RATE: 18 BRPM | SYSTOLIC BLOOD PRESSURE: 131 MMHG | DIASTOLIC BLOOD PRESSURE: 67 MMHG | HEART RATE: 92 BPM

## 2025-09-04 DIAGNOSIS — L97.522 TOE ULCER, LEFT, WITH FAT LAYER EXPOSED (HCC): Primary | ICD-10-CM

## 2025-09-04 PROCEDURE — 11042 DBRDMT SUBQ TIS 1ST 20SQCM/<: CPT

## 2025-09-04 PROCEDURE — 6370000000 HC RX 637 (ALT 250 FOR IP): Performed by: PODIATRIST

## 2025-09-04 RX ORDER — MUPIROCIN 2 %
OINTMENT (GRAM) TOPICAL PRN
OUTPATIENT
Start: 2025-09-04

## 2025-09-04 RX ORDER — LIDOCAINE 50 MG/G
OINTMENT TOPICAL PRN
OUTPATIENT
Start: 2025-09-04

## 2025-09-04 RX ORDER — GENTAMICIN SULFATE 1 MG/G
OINTMENT TOPICAL PRN
OUTPATIENT
Start: 2025-09-04

## 2025-09-04 RX ORDER — GINSENG 100 MG
CAPSULE ORAL PRN
OUTPATIENT
Start: 2025-09-04

## 2025-09-04 RX ORDER — SODIUM CHLOR/HYPOCHLOROUS ACID 0.033 %
SOLUTION, IRRIGATION IRRIGATION PRN
OUTPATIENT
Start: 2025-09-04

## 2025-09-04 RX ORDER — LIDOCAINE 40 MG/G
CREAM TOPICAL PRN
OUTPATIENT
Start: 2025-09-04

## 2025-09-04 RX ORDER — TRIAMCINOLONE ACETONIDE 1 MG/G
OINTMENT TOPICAL PRN
OUTPATIENT
Start: 2025-09-04

## 2025-09-04 RX ORDER — BACITRACIN ZINC AND POLYMYXIN B SULFATE 500; 1000 [USP'U]/G; [USP'U]/G
OINTMENT TOPICAL PRN
OUTPATIENT
Start: 2025-09-04

## 2025-09-04 RX ORDER — CLOBETASOL PROPIONATE 0.5 MG/G
OINTMENT TOPICAL PRN
OUTPATIENT
Start: 2025-09-04

## 2025-09-04 RX ORDER — LIDOCAINE HYDROCHLORIDE 20 MG/ML
JELLY TOPICAL PRN
OUTPATIENT
Start: 2025-09-04

## 2025-09-04 RX ORDER — LIDOCAINE HYDROCHLORIDE 20 MG/ML
JELLY TOPICAL PRN
Status: DISCONTINUED | OUTPATIENT
Start: 2025-09-04 | End: 2025-09-05 | Stop reason: HOSPADM

## 2025-09-04 RX ORDER — BETAMETHASONE DIPROPIONATE 0.5 MG/G
CREAM TOPICAL PRN
OUTPATIENT
Start: 2025-09-04

## 2025-09-04 RX ORDER — LIDOCAINE HYDROCHLORIDE 40 MG/ML
SOLUTION TOPICAL PRN
OUTPATIENT
Start: 2025-09-04

## 2025-09-04 RX ORDER — NEOMYCIN/BACITRACIN/POLYMYXINB 3.5-400-5K
OINTMENT (GRAM) TOPICAL PRN
OUTPATIENT
Start: 2025-09-04

## 2025-09-04 RX ADMIN — LIDOCAINE HYDROCHLORIDE: 20 JELLY TOPICAL at 15:14

## 2025-09-04 ASSESSMENT — PAIN DESCRIPTION - PAIN TYPE: TYPE: CHRONIC PAIN

## 2025-09-04 ASSESSMENT — PAIN SCALES - GENERAL: PAINLEVEL_OUTOF10: 4

## 2025-09-04 ASSESSMENT — PAIN DESCRIPTION - LOCATION: LOCATION: FOOT;ANKLE

## 2025-09-04 ASSESSMENT — PAIN - FUNCTIONAL ASSESSMENT: PAIN_FUNCTIONAL_ASSESSMENT: PREVENTS OR INTERFERES SOME ACTIVE ACTIVITIES AND ADLS

## 2025-09-04 ASSESSMENT — PAIN DESCRIPTION - ORIENTATION: ORIENTATION: LEFT

## 2025-09-04 ASSESSMENT — PAIN DESCRIPTION - DESCRIPTORS: DESCRIPTORS: BURNING

## (undated) DEVICE — SINGLE PORT MANIFOLD: Brand: NEPTUNE 2

## (undated) DEVICE — STRIP SKIN CLSR W0.5XL4IN WHT SPUNBOUND FBR NYL STERIL HI ADH

## (undated) DEVICE — DRAPE SURG L W23XL17IN CLR POLYETH TRNSPAR TWL STERI-DRP

## (undated) DEVICE — TRAP POLYP BALEEN

## (undated) DEVICE — SOLUTION PREP PAINT POV IOD FOR SKIN MUCOUS MEM

## (undated) DEVICE — TUBE SET 96 MM 64 MM H2O PERISTALTIC STD AUX CHANNEL

## (undated) DEVICE — TUBING, SUCTION, 1/4" X 10', STRAIGHT: Brand: MEDLINE

## (undated) DEVICE — Device: Brand: ENDO SMARTCAP

## (undated) DEVICE — CUFF TRNQT W4XL18IN 2 PRT SGL BLDR CYL DISP

## (undated) DEVICE — BRUSH ENDO CLN L90.5IN SHTH DIA1.7MM BRIST DIA5-7MM 2-6MM

## (undated) DEVICE — FORCEPS BX L240CM JAW DIA2.8MM L CAP W/ NDL MIC MESH TOOTH

## (undated) DEVICE — DRESSING PETRO W3XL8IN OIL EMUL N ADH GZ KNIT IMPREG CELOS

## (undated) DEVICE — ENDO CARRY-ON PROCEDURE KIT: Brand: ENDO CARRY-ON PROCEDURE KIT

## (undated) DEVICE — GLOVE ORANGE PI 8   MSG9080

## (undated) DEVICE — INSTINCT ENDOSCOPIC HEMOCLIP: Brand: INSTINCT

## (undated) DEVICE — PENCIL SMK EVAC BLADE COAT 70 MM BUTTON SWITCH NEPTUNE E-SEP

## (undated) DEVICE — PATIENT RETURN ELECTRODE, SINGLE-USE, NON CONTACT QUALITY MONITORING, ADULT, WITH 9 FT (2.7 M) CORD, FOR PATIENTS WEIGHING OVER 33LBS. (15KG): Brand: MEGADYNE

## (undated) DEVICE — SPONGE GZ W6XL6.75IN COT 6 PLY EXTRA ABSRB SUP FLUF

## (undated) DEVICE — SPONGE GZ W4XL4IN RAYON POLY CVR W/NONWOVEN FAB STRL 2/PK

## (undated) DEVICE — SNARE ENDOSCP AD L240CM LOOP W10MM SHTH DIA2.4MM RND INSUL

## (undated) DEVICE — Device

## (undated) DEVICE — CLIP HEMOSTATIC REPOSITIONABLE 235 CMX11 MM DURACLIP

## (undated) DEVICE — SHOE POSTOP L MAN 11-13 UNIV FOAM TRICOT SEMI FLX SKID

## (undated) DEVICE — ADAPTER FLSH PMP FLD MGMT GI IRRIG OFP 2 DISPOSABLE

## (undated) DEVICE — GOWN SURG 2XL L49IN BLU NONREINFORCED SET IN SL HK LOOP

## (undated) DEVICE — MANIFOLD SUCT SMK EVAC SGL PRT DISP NEPTUNE 2

## (undated) DEVICE — BLADE SURG 15 TWIN BK CARBON STL STRL CISION LF DISP